# Patient Record
Sex: MALE | Race: WHITE | NOT HISPANIC OR LATINO | Employment: OTHER | URBAN - METROPOLITAN AREA
[De-identification: names, ages, dates, MRNs, and addresses within clinical notes are randomized per-mention and may not be internally consistent; named-entity substitution may affect disease eponyms.]

---

## 2017-01-03 ENCOUNTER — INFUSION (OUTPATIENT)
Dept: ONCOLOGY | Facility: HOSPITAL | Age: 72
End: 2017-01-03

## 2017-01-03 ENCOUNTER — APPOINTMENT (OUTPATIENT)
Dept: ONCOLOGY | Facility: HOSPITAL | Age: 72
End: 2017-01-03

## 2017-01-03 VITALS
TEMPERATURE: 98.4 F | DIASTOLIC BLOOD PRESSURE: 74 MMHG | BODY MASS INDEX: 27.16 KG/M2 | SYSTOLIC BLOOD PRESSURE: 136 MMHG | WEIGHT: 230 LBS | HEIGHT: 77 IN | RESPIRATION RATE: 16 BRPM | HEART RATE: 85 BPM | OXYGEN SATURATION: 95 %

## 2017-01-03 DIAGNOSIS — C91.10 CHRONIC LYMPHOCYTIC LEUKEMIA (HCC): Primary | ICD-10-CM

## 2017-01-03 LAB
ALBUMIN SERPL-MCNC: 4 G/DL (ref 3.5–5.2)
ALBUMIN/GLOB SERPL: 1.6 G/DL
ALP SERPL-CCNC: 56 U/L (ref 39–117)
ALT SERPL W P-5'-P-CCNC: 13 U/L (ref 1–41)
ANION GAP SERPL CALCULATED.3IONS-SCNC: 15.8 MMOL/L
AST SERPL-CCNC: 16 U/L (ref 1–40)
BASOPHILS # BLD AUTO: 0.05 10*3/MM3 (ref 0–0.2)
BASOPHILS NFR BLD AUTO: 0.9 % (ref 0–1.5)
BILIRUB SERPL-MCNC: 0.7 MG/DL (ref 0.1–1.2)
BUN BLD-MCNC: 20 MG/DL (ref 8–23)
BUN/CREAT SERPL: 22.2 (ref 7–25)
CALCIUM SPEC-SCNC: 9.3 MG/DL (ref 8.6–10.5)
CHLORIDE SERPL-SCNC: 101 MMOL/L (ref 98–107)
CO2 SERPL-SCNC: 23.2 MMOL/L (ref 22–29)
CREAT BLD-MCNC: 0.9 MG/DL (ref 0.76–1.27)
DEPRECATED RDW RBC AUTO: 45.8 FL (ref 37–54)
EOSINOPHIL # BLD AUTO: 0.18 10*3/MM3 (ref 0–0.7)
EOSINOPHIL NFR BLD AUTO: 3.2 % (ref 0.3–6.2)
ERYTHROCYTE [DISTWIDTH] IN BLOOD BY AUTOMATED COUNT: 14.8 % (ref 11.5–14.5)
GFR SERPL CREATININE-BSD FRML MDRD: 83 ML/MIN/1.73
GLOBULIN UR ELPH-MCNC: 2.5 GM/DL
GLUCOSE BLD-MCNC: 92 MG/DL (ref 65–99)
HCT VFR BLD AUTO: 41.3 % (ref 40.4–52.2)
HGB BLD-MCNC: 13.9 G/DL (ref 13.7–17.6)
IMM GRANULOCYTES # BLD: 0.03 10*3/MM3 (ref 0–0.03)
IMM GRANULOCYTES NFR BLD: 0.5 % (ref 0–0.5)
LYMPHOCYTES # BLD AUTO: 1.28 10*3/MM3 (ref 0.9–4.8)
LYMPHOCYTES NFR BLD AUTO: 22.7 % (ref 19.6–45.3)
MCH RBC QN AUTO: 28.4 PG (ref 27–32.7)
MCHC RBC AUTO-ENTMCNC: 33.7 G/DL (ref 32.6–36.4)
MCV RBC AUTO: 84.5 FL (ref 79.8–96.2)
MONOCYTES # BLD AUTO: 0.51 10*3/MM3 (ref 0.2–1.2)
MONOCYTES NFR BLD AUTO: 9 % (ref 5–12)
NEUTROPHILS # BLD AUTO: 3.59 10*3/MM3 (ref 1.9–8.1)
NEUTROPHILS NFR BLD AUTO: 63.7 % (ref 42.7–76)
PLATELET # BLD AUTO: 146 10*3/MM3 (ref 140–500)
PMV BLD AUTO: 9.5 FL (ref 6–12)
POTASSIUM BLD-SCNC: 4.5 MMOL/L (ref 3.5–5.2)
PROT SERPL-MCNC: 6.5 G/DL (ref 6–8.5)
RBC # BLD AUTO: 4.89 10*6/MM3 (ref 4.6–6)
SODIUM BLD-SCNC: 140 MMOL/L (ref 136–145)
WBC NRBC COR # BLD: 5.64 10*3/MM3 (ref 4.5–10.7)

## 2017-01-03 PROCEDURE — 36415 COLL VENOUS BLD VENIPUNCTURE: CPT

## 2017-01-03 PROCEDURE — 96375 TX/PRO/DX INJ NEW DRUG ADDON: CPT

## 2017-01-03 PROCEDURE — 25010000002 RITUXIMAB 10 MG/ML SOLUTION 50 ML VIAL: Performed by: INTERNAL MEDICINE

## 2017-01-03 PROCEDURE — 25010000002 DIPHENHYDRAMINE PER 50 MG: Performed by: INTERNAL MEDICINE

## 2017-01-03 PROCEDURE — 96413 CHEMO IV INFUSION 1 HR: CPT

## 2017-01-03 PROCEDURE — 96415 CHEMO IV INFUSION ADDL HR: CPT

## 2017-01-03 PROCEDURE — 25010000002 RITUXIMAB 10 MG/ML SOLUTION 10 ML VIAL: Performed by: INTERNAL MEDICINE

## 2017-01-03 PROCEDURE — 96376 TX/PRO/DX INJ SAME DRUG ADON: CPT

## 2017-01-03 PROCEDURE — 25010000002 HYDROCORTISONE SODIUM SUCCINATE 100 MG RECONSTITUTED SOLUTION: Performed by: INTERNAL MEDICINE

## 2017-01-03 PROCEDURE — 96367 TX/PROPH/DG ADDL SEQ IV INF: CPT

## 2017-01-03 RX ORDER — FAMOTIDINE 10 MG/ML
20 INJECTION, SOLUTION INTRAVENOUS ONCE
Status: COMPLETED | OUTPATIENT
Start: 2017-01-03 | End: 2017-01-03

## 2017-01-03 RX ORDER — SODIUM CHLORIDE 9 MG/ML
250 INJECTION, SOLUTION INTRAVENOUS ONCE
Status: COMPLETED | OUTPATIENT
Start: 2017-01-03 | End: 2017-01-03

## 2017-01-03 RX ORDER — MEPERIDINE HYDROCHLORIDE 50 MG/ML
25 INJECTION INTRAMUSCULAR; INTRAVENOUS; SUBCUTANEOUS
Status: DISCONTINUED | OUTPATIENT
Start: 2017-01-03 | End: 2017-01-03 | Stop reason: HOSPADM

## 2017-01-03 RX ORDER — FAMOTIDINE 10 MG/ML
20 INJECTION, SOLUTION INTRAVENOUS AS NEEDED
Status: DISCONTINUED | OUTPATIENT
Start: 2017-01-03 | End: 2017-01-03 | Stop reason: HOSPADM

## 2017-01-03 RX ORDER — DIPHENHYDRAMINE HYDROCHLORIDE 50 MG/ML
50 INJECTION INTRAMUSCULAR; INTRAVENOUS AS NEEDED
Status: DISCONTINUED | OUTPATIENT
Start: 2017-01-03 | End: 2017-01-03 | Stop reason: HOSPADM

## 2017-01-03 RX ORDER — ACETAMINOPHEN 325 MG/1
650 TABLET ORAL ONCE
Status: COMPLETED | OUTPATIENT
Start: 2017-01-03 | End: 2017-01-03

## 2017-01-03 RX ADMIN — HYDROCORTISONE SODIUM SUCCINATE 200 MG: 100 INJECTION, POWDER, FOR SOLUTION INTRAMUSCULAR; INTRAVENOUS at 10:39

## 2017-01-03 RX ADMIN — ACETAMINOPHEN 650 MG: 325 TABLET ORAL at 10:39

## 2017-01-03 RX ADMIN — FAMOTIDINE 20 MG: 10 INJECTION, SOLUTION INTRAVENOUS at 10:38

## 2017-01-03 RX ADMIN — DIPHENHYDRAMINE HYDROCHLORIDE 25 MG: 50 INJECTION, SOLUTION INTRAMUSCULAR; INTRAVENOUS at 11:01

## 2017-01-03 RX ADMIN — RITUXIMAB 890 MG: 10 INJECTION, SOLUTION INTRAVENOUS at 11:28

## 2017-01-03 RX ADMIN — SODIUM CHLORIDE 250 ML: 9 INJECTION, SOLUTION INTRAVENOUS at 10:38

## 2017-02-01 ENCOUNTER — LAB (OUTPATIENT)
Dept: LAB | Facility: HOSPITAL | Age: 72
End: 2017-02-01

## 2017-02-01 ENCOUNTER — OFFICE VISIT (OUTPATIENT)
Dept: ONCOLOGY | Facility: CLINIC | Age: 72
End: 2017-02-01

## 2017-02-01 VITALS
BODY MASS INDEX: 29.88 KG/M2 | WEIGHT: 245.4 LBS | TEMPERATURE: 98.4 F | SYSTOLIC BLOOD PRESSURE: 142 MMHG | DIASTOLIC BLOOD PRESSURE: 70 MMHG | OXYGEN SATURATION: 96 % | HEART RATE: 86 BPM | RESPIRATION RATE: 14 BRPM | HEIGHT: 76 IN

## 2017-02-01 DIAGNOSIS — C91.10 CHRONIC LYMPHOCYTIC LEUKEMIA (HCC): ICD-10-CM

## 2017-02-01 DIAGNOSIS — C61 PROSTATE CANCER (HCC): ICD-10-CM

## 2017-02-01 DIAGNOSIS — R91.8 MASS OF LEFT LUNG: ICD-10-CM

## 2017-02-01 DIAGNOSIS — C91.10 CHRONIC LYMPHOCYTIC LEUKEMIA (HCC): Primary | ICD-10-CM

## 2017-02-01 LAB
BASOPHILS # BLD AUTO: 0.06 10*3/MM3 (ref 0–0.1)
BASOPHILS NFR BLD AUTO: 1 % (ref 0–1.1)
DEPRECATED RDW RBC AUTO: 42.9 FL (ref 37–49)
EOSINOPHIL # BLD AUTO: 0.2 10*3/MM3 (ref 0–0.36)
EOSINOPHIL NFR BLD AUTO: 3.2 % (ref 1–5)
ERYTHROCYTE [DISTWIDTH] IN BLOOD BY AUTOMATED COUNT: 14.4 % (ref 11.7–14.5)
HCT VFR BLD AUTO: 40.6 % (ref 40–49)
HGB BLD-MCNC: 14.5 G/DL (ref 13.5–16.5)
IMM GRANULOCYTES # BLD: 0.04 10*3/MM3 (ref 0–0.03)
IMM GRANULOCYTES NFR BLD: 0.6 % (ref 0–0.5)
LYMPHOCYTES # BLD AUTO: 1.34 10*3/MM3 (ref 1–3.5)
LYMPHOCYTES NFR BLD AUTO: 21.5 % (ref 20–49)
MCH RBC QN AUTO: 29.4 PG (ref 27–33)
MCHC RBC AUTO-ENTMCNC: 35.7 G/DL (ref 32–35)
MCV RBC AUTO: 82.2 FL (ref 83–97)
MONOCYTES # BLD AUTO: 0.7 10*3/MM3 (ref 0.25–0.8)
MONOCYTES NFR BLD AUTO: 11.2 % (ref 4–12)
NEUTROPHILS # BLD AUTO: 3.9 10*3/MM3 (ref 1.5–7)
NEUTROPHILS NFR BLD AUTO: 62.5 % (ref 39–75)
NRBC BLD MANUAL-RTO: 0 /100 WBC (ref 0–0)
PLATELET # BLD AUTO: 191 10*3/MM3 (ref 150–375)
PMV BLD AUTO: 9.7 FL (ref 8.9–12.1)
RBC # BLD AUTO: 4.94 10*6/MM3 (ref 4.3–5.5)
WBC NRBC COR # BLD: 6.24 10*3/MM3 (ref 4–10)

## 2017-02-01 PROCEDURE — 99213 OFFICE O/P EST LOW 20 MIN: CPT | Performed by: INTERNAL MEDICINE

## 2017-02-01 PROCEDURE — 36416 COLLJ CAPILLARY BLOOD SPEC: CPT

## 2017-02-01 PROCEDURE — 85025 COMPLETE CBC W/AUTO DIFF WBC: CPT

## 2017-02-01 NOTE — PROGRESS NOTES
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     HISTORY OF PRESENT ILLNESS:  Mr. Bass returns t  amparo to the office for followup.  He is here today with no specific symptomatology related to his chronic lymphoid leukemia, specifically no fevers, no chills, no sweats, no pruritus. No skin abnormalities. No peripheral adenopathy. No fatigue. No alterations in weight   He had significant difficulty with the first Rituxan infusion with a reaction and fever requiring to come back the second day for completion of his infusion. Today he presents for a new infusion and he will be premedicated and from here on with hydrocortisone 200 mg IV. So far the white count is dramatically improving to normalization and the lymphocyte count is improving, a sign of response to therapy.     The second issue is that the patient has undergone bronchoscopy. Please review the pathological analysis before showing no evidence of malignancy or infectious or inflammatory process. Even thyroid biopsy was obtained that disclosed no obvious pathological diagnosis. Therefore under these circumstances we will plan to repeat his CT scan of the chest 2-3 months from now just to review the thyroid and to review the area in the lingula. He has no respiratory symptomatology at this time. He had no ill consequences from the bronchoscopy.    In regard to his prostate cancer he had the seed implants at the [facility]. He still has some dysuria with no hematuria, no infection. He does not require a catheter and he is coping with this in the best way that he can. He has no need for any other assessment in this regard.                         PAST MEDICAL HISTORY:  Significant for hyperlipidemia.  He also has vitamin D deficiency and has undergone replacement of vitamin D.  SUE ponce has no history of hypertension, cardiovascular disease, diabetes, asthma or respiratory problems.  He was  worked up by Dr. Luis Ortega in 2009 for an incidentally found mass in the liver through MRI and CT scan that turned out to be a hemangioma.  He al  s  o has benign cysts in the liver.  PET scan in that location in 2009 disclosed no abnormal uptake.  On that occasion, it was also found that the patient had a retrosternal thyroid enlargement with a normal TSH.  The patient had no symptomatology in regard   to thyroid abnormalities otherwise.          The patient has had kidney stones in the past on one occasion.  He had trauma as a child of the middle finger of the right hand with some deformity in the nail that never grew back normally.  Otherwise, he has not had   any hospitalizations or other interventions.          HEMATOLOGIC/ONCOLOGIC HISTORY: (History from previous dates can be found in the separate document.)        On 02/22/2016 no clinical evidence of recurrence of his CLL.  Hematological parameters were normal.  He had no B symptoms, no autoimmunity, no infections.  He was advised to remain in observation.          MEDICATIONS: The current medication list was reviewed with the patient and updated in the EMR this date per the medical assistant.  Medical dosages and frequencies were confirmed to be accurate.         ALLERGIES:  No known drug allergies.          SOCIAL HISTORY:  Lives with his wife (who has developed Alzheimer and requires total care at home).  One child, a daughter age 36, who just had her first baby recently.  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  He does not smoke and does not drink alcohol.  The patient, as per 03/14/2014, had a lengthy conversation with Dr. Schroeder in regard to the future of his wife who has advanced Alzheimer nena  ntia.  In the way I see things, according to the patient’s report, I think she is going to be institutionalized very soon, given the circumstances and level of care that she requires.  As of 08/22/2014 please see HPI.   "       FAMILY HISTORY:  Father  of heart   disease after rheumatic heart disease was found.  Mother  at age 97.  He has no brothers or sisters.  As per 2015, in regard to further illness in his ill wife who has dementia with significant changes in her overall situation with more somnole  nce, likely stroke and like an infection of some sort.         REVIEW OF SYSTEMS:      PAIN:  See VITAL SIGNS below.     GENERAL:  No change in appetite or weight, no fevers, chills or sweats.     SKIN:  No rashes or nonhealing lesions.    HEME/LYMPH:  No anemia, easy bruising, bleeding or swollen nodes.    EYES:  No vision changes or diplopia.      ENT:  No tinnitus, hearing loss, gum bleeding, epistaxis, hoarseness or dysphagia.    RESPIRATORY:  No cough, shortness of breath, hemoptysis or wheezing.    CVS:  No chest pain, palpitations, orthopnea, dyspnea on exertion or PND.    GI:  No abdominal pain, nausea, vomiting, constipation, diarrhea, melena or hematochezia.    :  STATED lower tract obstructive symptoms, dysuria ,NO hematuria.     MUSCULOSKELETAL:  No bone pain or joint stiffness.    NEUROLOGICAL:  No dizziness, global weakness, loss of consciousness or seizures.    PSYCHIATRIC:  No increased nervousness, mood changes or depression.        VITAL SIGNS:  Vitals:    17 1425   BP: 142/70   Pulse: 86   Resp: 14   Temp: 98.4 °F (36.9 °C)   SpO2: 96%   Weight: 245 lb 6.4 oz (111 kg)   Height: 76.38\" (194 cm)           PAIN:  0 out of 10      ECO         PHYSICAL EXAMINATION:      GENERAL:  Well-developed, well-nourished male in no acute distress.     SKIN:  Warm, dry without rashes, purpura or petechiae.    HEAD:  Normocephalic.    EYES:  Pupils equal, round and reactive to light.  EOMs intact.  Conjunctivae normal.    EARS:  Hearing intact.    NOSE:  Septum midline.  No excoriations or nasal discharge.    MOUTH:  Tongue is well-papillated; no stomatitis or ulcers.  Lips normal.    THROAT:  Oropharynx " without lesions or exudates.    NECK:  Supple with good range of motion; no thyromegaly or masses, no JVD or bruits.    LYMPHATICS:  No cervical, supraclavicular, axillary or inguinal adenopathy.    CHEST:  Lungs clear to percussion and auscultation.    CARDIAC:  Regular rate and rhythm without murmurs, rubs or gallops.    ABDOMEN:  Soft, nontender with no organomegaly or masses.    EXTREMITIES:  No clubbing, cyanosis or edema.    NEUROLOGICAL:  No focal neurological deficits.        LABORATORY DATA:  Auto Differential   Order: [id number] - Part of Panel Order [id number]   Status:  Final result   Visible to patient:  No (Not Released) Dx:  Chronic lymphocytic leukemia      Ref Range & Units 2:17 PM     WBC 4.00 - 10.00 10*3/mm3 6.24   RBC 4.30 - 5.50 10*6/mm3 4.94   Hemoglobin 13.5 - 16.5 g/dL 14.5   Hematocrit 40.0 - 49.0 % 40.6   MCV 83.0 - 97.0 fL 82.2 (L)   MCH 27.0 - 33.0 pg 29.4   MCHC 32.0 - 35.0 g/dL 35.7 (H)   RDW 11.7 - 14.5 % 14.4   RDW-SD 37.0 - 49.0 fl 42.9   MPV 8.9 - 12.1 fL 9.7   Platelets 150 - 375 10*3/mm3 191   Neutrophil % 39.0 - 75.0 % 62.5   Lymphocyte % 20.0 - 49.0 % 21.5   Monocyte % 4.0 - 12.0 % 11.2   Eosinophil % 1.0 - 5.0 % 3.2   Basophil % 0.0 - 1.1 % 1.0   Immature Grans % 0.0 - 0.5 % 0.6 (H)   Neutrophils, Absolute 1.50 - 7.00 10*3/mm3 3.90   Lymphocytes, Absolute 1.00 - 3.50 10*3/mm3 1.34   Monocytes, Absolute 0.25 - 0.80 10*3/mm3 0.70   Eosinophils, Absolute 0.00 - 0.36 10*3/mm3 0.20   Basophils, Absolute 0.00 - 0.10 10*3/mm3 0.06   Immature Grans, Absolute 0.00 - 0.03 10*3/mm3 0.04 (H)   nRBC 0.0 - 0.0 /100 WBC 0.0   Resulting Agency   CBC LAB      Specimen Collected: 02/01/17  2:17 PM Last Resulted: 02/01/17  2:32 PM                 :      ASSESSMENT:  PROBLEM:   1. This patient has chronic lymphoid leukemia that was treated years back with Treanda/Rituxan.       1. In December 2016 the patient had a relapse of his CLL documented through minimal mediastinal adenopathy and also a  progressive leukocytosis and lymphocytosis with no autoimmunity and no B symptoms, no bulky disease. He was treated with Rituxan on 4 different occasions developing febrile illness during the infusion of the first Rituxan that was further controlled with IV hydrocortisone. As per 02/01/2017 the patients lymphocytosis has resolved, the hemoglobin is stable, the platelet count is stable, the physical exam is normal with no palpable peripheral adenopathy or hepatosplenomegaly. The patient has no B symptoms nor does he have any autoimmunity. He will remain in observation returning in 7 weeks for a CT scan of the chest.   2. Mass in the lingula. The patient had bronchoscopy and extensive assessment biopsies, bronchoalveolar lavage, not concluded anything in particular. His radon level in his office was high at 10 and he has done radon mitigation process by now. He needs to have a CT scan of the chest in 7 weeks for followup on this.     The patient will pursue this avenue and go from there. In the meantime I have not advised him anything in particular besides continuing staying away from people who are sick and to be sure that he washes his hands before he eats.

## 2017-02-02 DIAGNOSIS — R91.1 PULMONARY NODULE: ICD-10-CM

## 2017-02-02 DIAGNOSIS — E04.1 THYROID NODULE: ICD-10-CM

## 2017-02-02 DIAGNOSIS — C91.10 CHRONIC LYMPHOCYTIC LEUKEMIA (HCC): Primary | ICD-10-CM

## 2017-03-24 ENCOUNTER — HOSPITAL ENCOUNTER (OUTPATIENT)
Dept: PET IMAGING | Facility: HOSPITAL | Age: 72
Discharge: HOME | End: 2017-03-24
Attending: INTERNAL MEDICINE | Admitting: INTERNAL MEDICINE

## 2017-03-24 DIAGNOSIS — C91.10 CHRONIC LYMPHOCYTIC LEUKEMIA (HCC): ICD-10-CM

## 2017-03-24 DIAGNOSIS — R91.1 PULMONARY NODULE: ICD-10-CM

## 2017-03-24 DIAGNOSIS — E04.1 THYROID NODULE: ICD-10-CM

## 2017-03-24 PROCEDURE — 71250 CT THORAX DX C-: CPT

## 2017-03-31 ENCOUNTER — OFFICE VISIT (OUTPATIENT)
Dept: ONCOLOGY | Facility: CLINIC | Age: 72
End: 2017-03-31

## 2017-03-31 ENCOUNTER — LAB (OUTPATIENT)
Dept: LAB | Facility: HOSPITAL | Age: 72
End: 2017-03-31

## 2017-03-31 VITALS
BODY MASS INDEX: 30.03 KG/M2 | HEIGHT: 76 IN | SYSTOLIC BLOOD PRESSURE: 134 MMHG | DIASTOLIC BLOOD PRESSURE: 72 MMHG | TEMPERATURE: 98.2 F | RESPIRATION RATE: 16 BRPM | HEART RATE: 75 BPM | OXYGEN SATURATION: 97 % | WEIGHT: 246.6 LBS

## 2017-03-31 DIAGNOSIS — C91.10 CHRONIC LYMPHOCYTIC LEUKEMIA (HCC): ICD-10-CM

## 2017-03-31 DIAGNOSIS — C91.10 CHRONIC LYMPHOCYTIC LEUKEMIA (HCC): Primary | ICD-10-CM

## 2017-03-31 DIAGNOSIS — C61 PROSTATE CANCER (HCC): ICD-10-CM

## 2017-03-31 DIAGNOSIS — R91.8 MASS OF LEFT LUNG: ICD-10-CM

## 2017-03-31 LAB
ALBUMIN SERPL-MCNC: 4 G/DL (ref 3.5–5.2)
ALBUMIN/GLOB SERPL: 1.6 G/DL (ref 1.1–2.4)
ALP SERPL-CCNC: 55 U/L (ref 38–116)
ALT SERPL W P-5'-P-CCNC: 12 U/L (ref 0–41)
ANION GAP SERPL CALCULATED.3IONS-SCNC: 11.5 MMOL/L
AST SERPL-CCNC: 13 U/L (ref 0–40)
BASOPHILS # BLD AUTO: 0.07 10*3/MM3 (ref 0–0.1)
BASOPHILS NFR BLD AUTO: 1.1 % (ref 0–1.1)
BILIRUB SERPL-MCNC: 0.4 MG/DL (ref 0.1–1.2)
BUN BLD-MCNC: 17 MG/DL (ref 6–20)
BUN/CREAT SERPL: 23.9 (ref 7.3–30)
CALCIUM SPEC-SCNC: 9.2 MG/DL (ref 8.5–10.2)
CHLORIDE SERPL-SCNC: 99 MMOL/L (ref 98–107)
CO2 SERPL-SCNC: 26.5 MMOL/L (ref 22–29)
CREAT BLD-MCNC: 0.71 MG/DL (ref 0.7–1.3)
DEPRECATED RDW RBC AUTO: 40.8 FL (ref 37–49)
EOSINOPHIL # BLD AUTO: 0.15 10*3/MM3 (ref 0–0.36)
EOSINOPHIL NFR BLD AUTO: 2.4 % (ref 1–5)
ERYTHROCYTE [DISTWIDTH] IN BLOOD BY AUTOMATED COUNT: 13.3 % (ref 11.7–14.5)
GFR SERPL CREATININE-BSD FRML MDRD: 109 ML/MIN/1.73
GLOBULIN UR ELPH-MCNC: 2.5 GM/DL (ref 1.8–3.5)
GLUCOSE BLD-MCNC: 116 MG/DL (ref 74–124)
HCT VFR BLD AUTO: 43.8 % (ref 40–49)
HGB BLD-MCNC: 15.3 G/DL (ref 13.5–16.5)
IMM GRANULOCYTES # BLD: 0.04 10*3/MM3 (ref 0–0.03)
IMM GRANULOCYTES NFR BLD: 0.6 % (ref 0–0.5)
LYMPHOCYTES # BLD AUTO: 1.27 10*3/MM3 (ref 1–3.5)
LYMPHOCYTES NFR BLD AUTO: 20 % (ref 20–49)
MCH RBC QN AUTO: 29.3 PG (ref 27–33)
MCHC RBC AUTO-ENTMCNC: 34.9 G/DL (ref 32–35)
MCV RBC AUTO: 83.9 FL (ref 83–97)
MONOCYTES # BLD AUTO: 0.54 10*3/MM3 (ref 0.25–0.8)
MONOCYTES NFR BLD AUTO: 8.5 % (ref 4–12)
NEUTROPHILS # BLD AUTO: 4.29 10*3/MM3 (ref 1.5–7)
NEUTROPHILS NFR BLD AUTO: 67.4 % (ref 39–75)
NRBC BLD MANUAL-RTO: 0 /100 WBC (ref 0–0)
PLATELET # BLD AUTO: 188 10*3/MM3 (ref 150–375)
PMV BLD AUTO: 9.5 FL (ref 8.9–12.1)
POTASSIUM BLD-SCNC: 3.7 MMOL/L (ref 3.5–4.7)
PROT SERPL-MCNC: 6.5 G/DL (ref 6.3–8)
RBC # BLD AUTO: 5.22 10*6/MM3 (ref 4.3–5.5)
SODIUM BLD-SCNC: 137 MMOL/L (ref 134–145)
WBC NRBC COR # BLD: 6.36 10*3/MM3 (ref 4–10)

## 2017-03-31 PROCEDURE — 85025 COMPLETE CBC W/AUTO DIFF WBC: CPT

## 2017-03-31 PROCEDURE — 80053 COMPREHEN METABOLIC PANEL: CPT

## 2017-03-31 PROCEDURE — 36416 COLLJ CAPILLARY BLOOD SPEC: CPT

## 2017-03-31 PROCEDURE — 36415 COLL VENOUS BLD VENIPUNCTURE: CPT

## 2017-03-31 PROCEDURE — 99213 OFFICE O/P EST LOW 20 MIN: CPT | Performed by: INTERNAL MEDICINE

## 2017-03-31 NOTE — PROGRESS NOTES
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     HISTORY OF PRESENT ILLNESS:  Mr. Bass returns t  amparo to the office for followup.  He is here today with no specific symptomatology related to his chronic lymphoid leukemia, specifically no fevers, no chills, no sweats, no pruritus. No skin abnormalities. No peripheral adenopathy. No fatigue. No alterations in weight   In regard to his prostate cancer he had the seed implants at the [facility]. He still has some dysuria with no hematuria, no infection. He does not require a catheter and he is coping with this in the best way that he can. He has no need for any other assessment in this regard.               PAST MEDICAL HISTORY:  Significant for hyperlipidemia.  He also has vitamin D deficiency and has undergone replacement of vitamin D.  SUE ponce has no history of hypertension, cardiovascular disease, diabetes, asthma or respiratory problems.  He was worked up by Dr. Luis Ortega in 2009 for an incidentally found mass in the liver through MRI and CT scan that turned out to be a hemangioma.  He al  s  o has benign cysts in the liver.  PET scan in that location in 2009 disclosed no abnormal uptake.  On that occasion, it was also found that the patient had a retrosternal thyroid enlargement with a normal TSH.  The patient had no symptomatology in regard   to thyroid abnormalities otherwise.          The patient has had kidney stones in the past on one occasion.  He had trauma as a child of the middle finger of the right hand with some deformity in the nail that never grew back normally.  Otherwise, he has not had   any hospitalizations or other interventions.          HEMATOLOGIC/ONCOLOGIC HISTORY: (History from previous dates can be found in the separate document.)        On 02/22/2016 no clinical evidence of recurrence of his CLL.  Hematological parameters were normal.  He had no B symptoms, no  autoimmunity, no infections.  He was advised to remain in observation.          MEDICATIONS: The current medication list was reviewed with the patient and updated in the EMR this date per the medical assistant.  Medical dosages and frequencies were confirmed to be accurate.         ALLERGIES:  No known drug allergies.          SOCIAL HISTORY:  Lives with his wife (who has developed Alzheimer and requires total care at home).  One child, a daughter age 36, who just had her first baby recently.  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  He does not smoke and does not drink alcohol.  The patient, as per 2014, had a lengthy conversation with Dr. Schroeder in regard to the future of his wife who has advanced Alzheimer nena  ntia.  In the way I see things, according to the patient’s report, I think she is going to be institutionalized very soon, given the circumstances and level of care that she requires.  As of 2014 please see HPI.         FAMILY HISTORY:  Father  of heart   disease after rheumatic heart disease was found.  Mother  at age 97.  He has no brothers or sisters.  As per 2015, in regard to further illness in his ill wife who has dementia with significant changes in her overall situation with more somnole  nce, likely stroke and like an infection of some sort.         REVIEW OF SYSTEMS:      PAIN:  See VITAL SIGNS below.     GENERAL:  No change in appetite or weight, no fevers, chills or sweats.     SKIN:  No rashes or nonhealing lesions.    HEME/LYMPH:  No anemia, easy bruising, bleeding or swollen nodes.    EYES:  No vision changes or diplopia.      ENT:  No tinnitus, hearing loss, gum bleeding, epistaxis, hoarseness or dysphagia.    RESPIRATORY:  No cough, shortness of breath, hemoptysis or wheezing.    CVS:  No chest pain, palpitations, orthopnea, dyspnea on exertion or PND.    GI:  No abdominal pain, nausea, vomiting, constipation, diarrhea,  "melena or hematochezia.    :  STATED lower tract obstructive symptoms, dysuria ,NO hematuria.     MUSCULOSKELETAL:  No bone pain or joint stiffness.    NEUROLOGICAL:  No dizziness, global weakness, loss of consciousness or seizures.    PSYCHIATRIC:  No increased nervousness, mood changes or depression.        VITAL SIGNS:  Vitals:    17 1513   BP: 134/72   Pulse: 75   Resp: 16   Temp: 98.2 °F (36.8 °C)   TempSrc: Oral   SpO2: 97%   Weight: 246 lb 9.6 oz (112 kg)   Height: 76.38\" (194 cm)           PAIN:  0 out of 10      ECO         PHYSICAL EXAMINATION:      GENERAL:  Well-developed, well-nourished male in no acute distress.     SKIN:  Warm, dry without rashes, purpura or petechiae.    HEAD:  Normocephalic.    EYES:  Pupils equal, round and reactive to light.  EOMs intact.  Conjunctivae normal.    EARS:  Hearing intact.    NOSE:  Septum midline.  No excoriations or nasal discharge.    MOUTH:  Tongue is well-papillated; no stomatitis or ulcers.  Lips normal.    THROAT:  Oropharynx without lesions or exudates.    NECK:  Supple with good range of motion; no thyromegaly or masses, no JVD or bruits.    LYMPHATICS:  No cervical, supraclavicular, axillary or inguinal adenopathy.    CHEST:  Lungs clear to percussion and auscultation.    CARDIAC:  Regular rate and rhythm without murmurs, rubs or gallops.    ABDOMEN:  Soft, nontender with no organomegaly or masses.    EXTREMITIES:  No clubbing, cyanosis or edema.    NEUROLOGICAL:  No focal neurological deficits.        LABORATORY DATA:  CHEST WITHOUT CONTRAST      HISTORY: Pulmonary nodule, CLL.      TECHNIQUE: Spiral axial CT imaging of the chest was performed at 3 mm  intervals throughout. No intravenous contrast was administered.      COMPARISON: Correlation is made with noncontrasted CT chest 2016.   There are prior CTs of the chest dated 2012. A PET/CT includes  noncontrasted CT imaging of the chest as early as 2009.      FINDINGS: Heart " and great vessels appear stable compared with previous  imaging. There is calcified coronary disease along the proximal LAD. No  mass or adenopathy is present within the mediastinum or at the hilar  levels. Calcified nodes are present consistent with granulomatous  disease. There is a lesion within the left lobe of the thyroid measuring  up to 5.6 x 5.4 cm producing rightward tracheal deviation at the  thoracic inlet. There has been a lesion at this level dating to original  imaging of 2009 where it measured 5.3 x 4.2 cm. No axillary or  supraclavicular adenopathy is present. Chest wall is unremarkable.  Degenerative changes are noted at the spine.      Lung windows again demonstrate a somewhat ill-defined nodular density  within the perihilar left upper lobe measuring approximately 1.5 x 0.8  cm. It is unchanged in size and configuration compared to previous  imaging 12- and appears slightly smaller than on previous study  of 11/03/2016 where it measured 2.2 x 1.1 cm. There are calcified  granulomata slightly more inferiorly near the lesion. No new pulmonary  nodule or mass is identified within either lung. There is no active  infiltrate or effusion. Limited imaging of the upper abdomen is stable.  Numerous hepatic cysts are present.      IMPRESSION:  1. There is an ill-defined nodular density within the perihilar left  upper lobe measuring approximately 1.5 x 0.8 cm. This is stable compared  with previous imaging of 12-, however has regressed in size when  compared with prior CT 11/03/2016. I would recommend continued CT  followup.  2. There is a mass in the left lobe of thyroid causing rightward  tracheal deviation at the level of the thoracic inlet. This has been  present since imaging of 2009 although appears to have slowly progressed  in size.      This report was finalized on 3/27/2017 4:54 PM by Dr. Hunter Martin,     Differential   Order: [id number] - Part of Panel Order [id number]   Status:   Final result   Visible to patient:  No (Not Released)      Ref Range & Units 3:02 PM     WBC 4.00 - 10.00 10*3/mm3 6.36   RBC 4.30 - 5.50 10*6/mm3 5.22   Hemoglobin 13.5 - 16.5 g/dL 15.3   Hematocrit 40.0 - 49.0 % 43.8   MCV 83.0 - 97.0 fL 83.9   MCH 27.0 - 33.0 pg 29.3   MCHC 32.0 - 35.0 g/dL 34.9   RDW 11.7 - 14.5 % 13.3   RDW-SD 37.0 - 49.0 fl 40.8   MPV 8.9 - 12.1 fL 9.5   Platelets 150 - 375 10*3/mm3 188   Neutrophil % 39.0 - 75.0 % 67.4   Lymphocyte % 20.0 - 49.0 % 20.0   Monocyte % 4.0 - 12.0 % 8.5   Eosinophil % 1.0 - 5.0 % 2.4   Basophil % 0.0 - 1.1 % 1.1   Immature Grans % 0.0 - 0.5 % 0.6 (H)   Neutrophils, Absolute 1.50 - 7.00 10*3/mm3 4.29   Lymphocytes, Absolute 1.00 - 3.50 10*3/mm3 1.27   Monocytes, Absolute 0.25 - 0.80 10*3/mm3 0.54   Eosinophils, Absolute 0.00 - 0.36 10*3/mm3 0.15   Basophils, Absolute 0.00 - 0.10 10*3/mm3 0.07   Immature Grans, Absolute 0.00 - 0.03 10*3/mm3 0.04 (H)   nRBC 0.0 - 0.0 /100 WBC 0.0   Resulting Agency   CBC LAB      Specimen Collected: 03/31/17  3:02 PM Last Resulted: 03/31/17  3:11 PM                 :      ASSESSMENT:  PROBLEM:   1. This patient has chronic lymphoid leukemia that was treated years back with Treanda/Rituxan, In December 2016 the patient had a relapse of his CLL documented through minimal mediastinal adenopathy and also a progressive leukocytosis and lymphocytosis with no autoimmunity and no B symptoms, no bulky disease. He was treated with Rituxan on 4 different occasions developing febrile illness during the infusion of the first Rituxan that was further controlled with IV hydrocortisone. As per TODAY the patient’s lymphocytosis has resolved, the hemoglobin is stable, the platelet count is stable, the physical exam is normal with no palpable peripheral adenopathy or hepatosplenomegaly. The patient has no B symptoms nor does he have any autoimmunity. He will remain in observation returning in 12 weeks.  2. Mass in the lingula. The patient had  bronchoscopy and extensive assessment biopsies, bronchoalveolar lavage, not concluded anything in particular. His radon level in his office was high at 10 and he has done radon mitigation process by now. He  HAS A CT SCAN THAT SHOWS IMPROVEMENT, LIKELY INFLAMMATORY LYMPH NODE, NO OTHER ABNORMALITIES. SCAN IN 6 MONTHS    3. PROSTATE CANCER RECENT PSA 2.1, S/P SEED IMPLANTS [facility].

## 2017-06-23 ENCOUNTER — LAB (OUTPATIENT)
Dept: LAB | Facility: HOSPITAL | Age: 72
End: 2017-06-23

## 2017-06-23 ENCOUNTER — OFFICE VISIT (OUTPATIENT)
Dept: ONCOLOGY | Facility: CLINIC | Age: 72
End: 2017-06-23

## 2017-06-23 VITALS
SYSTOLIC BLOOD PRESSURE: 120 MMHG | OXYGEN SATURATION: 95 % | HEIGHT: 76 IN | RESPIRATION RATE: 16 BRPM | DIASTOLIC BLOOD PRESSURE: 76 MMHG | TEMPERATURE: 98.1 F | HEART RATE: 70 BPM | WEIGHT: 251.8 LBS | BODY MASS INDEX: 30.66 KG/M2

## 2017-06-23 DIAGNOSIS — C61 PROSTATE CANCER (HCC): ICD-10-CM

## 2017-06-23 DIAGNOSIS — C91.10 CHRONIC LYMPHOCYTIC LEUKEMIA (HCC): Primary | ICD-10-CM

## 2017-06-23 DIAGNOSIS — R91.8 MASS OF LEFT LUNG: ICD-10-CM

## 2017-06-23 DIAGNOSIS — C91.10 CHRONIC LYMPHOCYTIC LEUKEMIA (HCC): ICD-10-CM

## 2017-06-23 LAB
BASOPHILS # BLD AUTO: 0.05 10*3/MM3 (ref 0–0.1)
BASOPHILS NFR BLD AUTO: 0.7 % (ref 0–1.1)
DEPRECATED RDW RBC AUTO: 39.5 FL (ref 37–49)
EOSINOPHIL # BLD AUTO: 0.17 10*3/MM3 (ref 0–0.36)
EOSINOPHIL NFR BLD AUTO: 2.3 % (ref 1–5)
ERYTHROCYTE [DISTWIDTH] IN BLOOD BY AUTOMATED COUNT: 12.6 % (ref 11.7–14.5)
HCT VFR BLD AUTO: 44.7 % (ref 40–49)
HGB BLD-MCNC: 16.1 G/DL (ref 13.5–16.5)
IMM GRANULOCYTES # BLD: 0.07 10*3/MM3 (ref 0–0.03)
IMM GRANULOCYTES NFR BLD: 0.9 % (ref 0–0.5)
LYMPHOCYTES # BLD AUTO: 1.33 10*3/MM3 (ref 1–3.5)
LYMPHOCYTES NFR BLD AUTO: 17.7 % (ref 20–49)
MCH RBC QN AUTO: 30.9 PG (ref 27–33)
MCHC RBC AUTO-ENTMCNC: 36 G/DL (ref 32–35)
MCV RBC AUTO: 85.8 FL (ref 83–97)
MONOCYTES # BLD AUTO: 0.67 10*3/MM3 (ref 0.25–0.8)
MONOCYTES NFR BLD AUTO: 8.9 % (ref 4–12)
NEUTROPHILS # BLD AUTO: 5.22 10*3/MM3 (ref 1.5–7)
NEUTROPHILS NFR BLD AUTO: 69.5 % (ref 39–75)
NRBC BLD MANUAL-RTO: 0 /100 WBC (ref 0–0)
PLATELET # BLD AUTO: 202 10*3/MM3 (ref 150–375)
PMV BLD AUTO: 9.4 FL (ref 8.9–12.1)
RBC # BLD AUTO: 5.21 10*6/MM3 (ref 4.3–5.5)
WBC NRBC COR # BLD: 7.51 10*3/MM3 (ref 4–10)

## 2017-06-23 PROCEDURE — 99213 OFFICE O/P EST LOW 20 MIN: CPT | Performed by: INTERNAL MEDICINE

## 2017-06-23 PROCEDURE — 36416 COLLJ CAPILLARY BLOOD SPEC: CPT | Performed by: INTERNAL MEDICINE

## 2017-06-23 PROCEDURE — 85025 COMPLETE CBC W/AUTO DIFF WBC: CPT | Performed by: INTERNAL MEDICINE

## 2017-06-23 NOTE — PROGRESS NOTES
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     HISTORY OF PRESENT ILLNESS:  Mr. Bass returns t  amparo to the office for followup.  He is here today with no specific symptomatology related to his chronic lymphoid leukemia, specifically no fevers, no chills, no sweats, no pruritus. No skin abnormalities. No peripheral adenopathy. No fatigue. No alterations in weight   In regard to his prostate cancer he had the seed implants at the [facility]. He still has some dysuria with no hematuria, no infection. He does not require a catheter and he is coping with this in the best way that he can. He has no need for any other assessment in this regard. New urologist in [place]. He wants to have a peneal implant.              PAST MEDICAL HISTORY:  Significant for hyperlipidemia.  He also has vitamin D deficiency and has undergone replacement of vitamin D.  SUE ponce has no history of hypertension, cardiovascular disease, diabetes, asthma or respiratory problems.  He was worked up by Dr. Luis Ortega in 2009 for an incidentally found mass in the liver through MRI and CT scan that turned out to be a hemangioma.  He al  s  o has benign cysts in the liver.  PET scan in that location in 2009 disclosed no abnormal uptake.  On that occasion, it was also found that the patient had a retrosternal thyroid enlargement with a normal TSH.  The patient had no symptomatology in regard   to thyroid abnormalities otherwise.          The patient has had kidney stones in the past on one occasion.  He had trauma as a child of the middle finger of the right hand with some deformity in the nail that never grew back normally.  Otherwise, he has not had   any hospitalizations or other interventions.          HEMATOLOGIC/ONCOLOGIC HISTORY: (History from previous dates can be found in the separate document.)        On 02/22/2016 no clinical evidence of recurrence of his CLL.   Hematological parameters were normal.  He had no B symptoms, no autoimmunity, no infections.  He was advised to remain in observation.          MEDICATIONS: The current medication list was reviewed with the patient and updated in the EMR this date per the medical assistant.  Medical dosages and frequencies were confirmed to be accurate.         ALLERGIES:  No known drug allergies.          SOCIAL HISTORY:  Lives with his wife (who has developed Alzheimer and requires total care at home).  One child, a daughter age 36, who just had her first baby recently.  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  He does not smoke and does not drink alcohol.  The patient, as per 2014, had a lengthy conversation with Dr. Schroeder in regard to the future of his wife who has advanced Alzheimer nena  ntia.  In the way I see things, according to the patient’s report, I think she is going to be institutionalized very soon, given the circumstances and level of care that she requires.  As of 2014 please see HPI.         FAMILY HISTORY:  Father  of heart   disease after rheumatic heart disease was found.  Mother  at age 97.  He has no brothers or sisters.  As per 2015, in regard to further illness in his ill wife who has dementia with significant changes in her overall situation with more somnole  nce, likely stroke and like an infection of some sort.         REVIEW OF SYSTEMS:      PAIN:  See VITAL SIGNS below.     GENERAL:  No change in appetite or weight, no fevers, chills or sweats.     SKIN:  No rashes or nonhealing lesions.    HEME/LYMPH:  No anemia, easy bruising, bleeding or swollen nodes.    EYES:  No vision changes or diplopia.      ENT:  No tinnitus, hearing loss, gum bleeding, epistaxis, hoarseness or dysphagia.    RESPIRATORY:  No cough, shortness of breath, hemoptysis or wheezing.    CVS:  No chest pain, palpitations, orthopnea, dyspnea on exertion or PND.    GI:   "No abdominal pain, nausea, vomiting, constipation, diarrhea, melena or hematochezia.    :  STATED lower tract obstructive symptoms, dysuria ,NO hematuria.     MUSCULOSKELETAL:  No bone pain or joint stiffness.    NEUROLOGICAL:  No dizziness, global weakness, loss of consciousness or seizures.    PSYCHIATRIC:  No increased nervousness, mood changes or depression.        VITAL SIGNS:  Vitals:    17 1500   BP: 120/76   Pulse: 70   Resp: 16   Temp: 98.1 °F (36.7 °C)   TempSrc: Oral   SpO2: 95%   Weight: 251 lb 12.8 oz (114 kg)   Height: 76.38\" (194 cm)           PAIN:  0 out of 10      ECO         PHYSICAL EXAMINATION:      GENERAL:  Well-developed, well-nourished male in no acute distress.     SKIN:  Warm, dry without rashes, purpura or petechiae.    HEAD:  Normocephalic.    EYES:  Pupils equal, round and reactive to light.  EOMs intact.  Conjunctivae normal.    EARS:  Hearing intact.    NOSE:  Septum midline.  No excoriations or nasal discharge.    MOUTH:  Tongue is well-papillated; no stomatitis or ulcers.  Lips normal.    THROAT:  Oropharynx without lesions or exudates.    NECK:  Supple with good range of motion; no thyromegaly or masses, no JVD or bruits.    LYMPHATICS:  No cervical, supraclavicular, axillary or inguinal adenopathy.    CHEST:  Lungs clear to percussion and auscultation.    CARDIAC:  Regular rate and rhythm without murmurs, rubs or gallops.    ABDOMEN:  Soft, nontender with no organomegaly or masses.    EXTREMITIES:  No clubbing, cyanosis or edema.    NEUROLOGICAL:  No focal neurological deficits.        LABORATORY DATA:   Component      Latest Ref Rng & Units 3/31/2017 2017   WBC      4.00 - 10.00 10*3/mm3 6.36 7.51   RBC      4.30 - 5.50 10*6/mm3 5.22 5.21   Hemoglobin      13.5 - 16.5 g/dL 15.3 16.1   Hematocrit      40.0 - 49.0 % 43.8 44.7   MCV      83.0 - 97.0 fL 83.9 85.8   MCH      27.0 - 33.0 pg 29.3 30.9   MCHC      32.0 - 35.0 g/dL 34.9 36.0 (H)   RDW      11.7 - 14.5 % " 13.3 12.6   RDW-SD      37.0 - 49.0 fl 40.8 39.5   MPV      8.9 - 12.1 fL 9.5 9.4   Platelets      150 - 375 10*3/mm3 188 202   Neutrophil %      39.0 - 75.0 % 67.4 69.5   Lymphocyte %      20.0 - 49.0 % 20.0 17.7 (L)   Monocyte %      4.0 - 12.0 % 8.5 8.9   Eosinophil %      1.0 - 5.0 % 2.4 2.3   Basophil %      0.0 - 1.1 % 1.1 0.7   Immature Grans %      0.0 - 0.5 % 0.6 (H) 0.9 (H)   Neutrophils, Absolute      1.50 - 7.00 10*3/mm3 4.29 5.22   Lymphocytes, Absolute      1.00 - 3.50 10*3/mm3 1.27 1.33      :      ASSESSMENT:  PROBLEM:   1. This patient has chronic lymphoid leukemia that was treated years back with Treanda/Rituxan, In December 2016 the patient had a relapse of his CLL documented through minimal mediastinal adenopathy and also a progressive leukocytosis and lymphocytosis with no autoimmunity and no B symptoms, no bulky disease. He was treated with Rituxan on 4 different occasions developing febrile illness during the infusion of the first Rituxan that was further controlled with IV hydrocortisone. As per TODAY the patient’s lymphocytosis has resolved, the hemoglobin is stable, the platelet count is stable, the physical exam is normal with no palpable peripheral adenopathy or hepatosplenomegaly. The patient has no B symptoms nor does he have any autoimmunity. He will remain in observation returning in 8 weeks.  2. Mass in the lingula. The patient had bronchoscopy and extensive assessment biopsies, bronchoalveolar lavage, not concluded anything in particular. His radon level in his office was high at 10 and he has done radon mitigation process by now. He  HAS A CT SCAN THAT SHOWS IMPROVEMENT, LIKELY INFLAMMATORY LYMPH NODE, NO OTHER ABNORMALITIES. SCAN IN 4 MONTHS    3. PROSTATE CANCER RECENT PSA 2.05, S/P SEED IMPLANTS [facility].Peneal implant to be placed in September

## 2017-08-17 ENCOUNTER — TELEPHONE (OUTPATIENT)
Dept: ONCOLOGY | Facility: HOSPITAL | Age: 72
End: 2017-08-17

## 2017-08-17 NOTE — TELEPHONE ENCOUNTER
Following message sent to Dr Schroeder per patient request:    Patient calling to see if you could discuss his need for a Titan OTR penile implant with Dr Babcock at the [facility]. Pt is being set up to go to [place] for one but if [facility] does it he might prefer to go there. Not sure if this is something you can take on before patient sees you next week or not? Told patient I would just leave you a note. Thanks

## 2017-08-18 ENCOUNTER — TELEPHONE (OUTPATIENT)
Dept: ONCOLOGY | Facility: HOSPITAL | Age: 72
End: 2017-08-18

## 2017-08-18 NOTE — TELEPHONE ENCOUNTER
Called and notified patient of this and he v/u     ----- Message from Gerry Schroeder MD sent at 8/18/2017 10:02 AM EDT -----  He can set it up himself calling dr shane office, he doesnot need referral from me for that  ----- Message -----     From: Ilana English RN     Sent: 8/17/2017   4:06 PM       To: Gerry Schroeder MD    Patient calling to see if you could discuss his need for a Titan OTR penile implant with Dr Shane at the [facility]. Pt is being set up to go to [place] for one but if [facility] does it he might prefer to go there. Not sure if this is something you can take on before patient sees you next week or not? Told patient I would just leave you a note. Thanks

## 2017-08-23 ENCOUNTER — LAB (OUTPATIENT)
Dept: LAB | Facility: HOSPITAL | Age: 72
End: 2017-08-23

## 2017-08-23 ENCOUNTER — OFFICE VISIT (OUTPATIENT)
Dept: ONCOLOGY | Facility: CLINIC | Age: 72
End: 2017-08-23

## 2017-08-23 VITALS
HEART RATE: 76 BPM | OXYGEN SATURATION: 97 % | TEMPERATURE: 98.1 F | BODY MASS INDEX: 30.76 KG/M2 | HEIGHT: 76 IN | WEIGHT: 252.6 LBS | RESPIRATION RATE: 16 BRPM | SYSTOLIC BLOOD PRESSURE: 142 MMHG | DIASTOLIC BLOOD PRESSURE: 76 MMHG

## 2017-08-23 DIAGNOSIS — C91.10 CHRONIC LYMPHOCYTIC LEUKEMIA (HCC): ICD-10-CM

## 2017-08-23 DIAGNOSIS — C91.10 CHRONIC LYMPHOCYTIC LEUKEMIA (HCC): Primary | ICD-10-CM

## 2017-08-23 LAB
ALBUMIN SERPL-MCNC: 4.1 G/DL (ref 3.5–5.2)
ALBUMIN/GLOB SERPL: 1.6 G/DL (ref 1.1–2.4)
ALP SERPL-CCNC: 50 U/L (ref 38–116)
ALT SERPL W P-5'-P-CCNC: 12 U/L (ref 0–41)
ANION GAP SERPL CALCULATED.3IONS-SCNC: 13.9 MMOL/L
AST SERPL-CCNC: 13 U/L (ref 0–40)
BASOPHILS # BLD AUTO: 0.06 10*3/MM3 (ref 0–0.1)
BASOPHILS NFR BLD AUTO: 0.8 % (ref 0–1.1)
BILIRUB SERPL-MCNC: 0.6 MG/DL (ref 0.1–1.2)
BUN BLD-MCNC: 20 MG/DL (ref 6–20)
BUN/CREAT SERPL: 25 (ref 7.3–30)
CALCIUM SPEC-SCNC: 9.4 MG/DL (ref 8.5–10.2)
CHLORIDE SERPL-SCNC: 99 MMOL/L (ref 98–107)
CO2 SERPL-SCNC: 27.1 MMOL/L (ref 22–29)
CREAT BLD-MCNC: 0.8 MG/DL (ref 0.7–1.3)
DEPRECATED RDW RBC AUTO: 39.3 FL (ref 37–49)
EOSINOPHIL # BLD AUTO: 0.19 10*3/MM3 (ref 0–0.36)
EOSINOPHIL NFR BLD AUTO: 2.6 % (ref 1–5)
ERYTHROCYTE [DISTWIDTH] IN BLOOD BY AUTOMATED COUNT: 12.4 % (ref 11.7–14.5)
GFR SERPL CREATININE-BSD FRML MDRD: 95 ML/MIN/1.73
GLOBULIN UR ELPH-MCNC: 2.5 GM/DL (ref 1.8–3.5)
GLUCOSE BLD-MCNC: 103 MG/DL (ref 74–124)
HCT VFR BLD AUTO: 43.4 % (ref 40–49)
HGB BLD-MCNC: 15.4 G/DL (ref 13.5–16.5)
IMM GRANULOCYTES # BLD: 0.02 10*3/MM3 (ref 0–0.03)
IMM GRANULOCYTES NFR BLD: 0.3 % (ref 0–0.5)
LYMPHOCYTES # BLD AUTO: 1.43 10*3/MM3 (ref 1–3.5)
LYMPHOCYTES NFR BLD AUTO: 19.6 % (ref 20–49)
MCH RBC QN AUTO: 30.8 PG (ref 27–33)
MCHC RBC AUTO-ENTMCNC: 35.5 G/DL (ref 32–35)
MCV RBC AUTO: 86.8 FL (ref 83–97)
MONOCYTES # BLD AUTO: 0.66 10*3/MM3 (ref 0.25–0.8)
MONOCYTES NFR BLD AUTO: 9.1 % (ref 4–12)
NEUTROPHILS # BLD AUTO: 4.93 10*3/MM3 (ref 1.5–7)
NEUTROPHILS NFR BLD AUTO: 67.6 % (ref 39–75)
NRBC BLD MANUAL-RTO: 0 /100 WBC (ref 0–0)
PLATELET # BLD AUTO: 198 10*3/MM3 (ref 150–375)
PMV BLD AUTO: 9.7 FL (ref 8.9–12.1)
POTASSIUM BLD-SCNC: 3.8 MMOL/L (ref 3.5–4.7)
PROT SERPL-MCNC: 6.6 G/DL (ref 6.3–8)
RBC # BLD AUTO: 5 10*6/MM3 (ref 4.3–5.5)
SODIUM BLD-SCNC: 140 MMOL/L (ref 134–145)
WBC NRBC COR # BLD: 7.29 10*3/MM3 (ref 4–10)

## 2017-08-23 PROCEDURE — 85025 COMPLETE CBC W/AUTO DIFF WBC: CPT | Performed by: INTERNAL MEDICINE

## 2017-08-23 PROCEDURE — 99213 OFFICE O/P EST LOW 20 MIN: CPT | Performed by: INTERNAL MEDICINE

## 2017-08-23 PROCEDURE — 36415 COLL VENOUS BLD VENIPUNCTURE: CPT | Performed by: INTERNAL MEDICINE

## 2017-08-23 PROCEDURE — 80053 COMPREHEN METABOLIC PANEL: CPT | Performed by: INTERNAL MEDICINE

## 2017-08-23 NOTE — PROGRESS NOTES
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     HISTORY OF PRESENT ILLNESS:  Mr. Bass returns t  amparo to the office for followup.  He is here today with no specific symptomatology related to his chronic lymphoid leukemia, specifically no fevers, no chills, no sweats, no pruritus. No skin abnormalities. No peripheral adenopathy. No fatigue. No alterations in weight   In regard to his prostate cancer he had the seed implants at the [facility]. He still has some dysuria with no hematuria, no infection. He does not require a catheter and he is coping with this in the best way that he can. He has no need for any other assessment in this regard. New urologist in [place]. He wants to have a peneal implant.              PAST MEDICAL HISTORY:  Significant for hyperlipidemia.  He also has vitamin D deficiency and has undergone replacement of vitamin D.  SUE ponce has no history of hypertension, cardiovascular disease, diabetes, asthma or respiratory problems.  He was worked up by Dr. Luis Ortega in 2009 for an incidentally found mass in the liver through MRI and CT scan that turned out to be a hemangioma.  He al  s  o has benign cysts in the liver.  PET scan in that location in 2009 disclosed no abnormal uptake.  On that occasion, it was also found that the patient had a retrosternal thyroid enlargement with a normal TSH.  The patient had no symptomatology in regard   to thyroid abnormalities otherwise.          The patient has had kidney stones in the past on one occasion.  He had trauma as a child of the middle finger of the right hand with some deformity in the nail that never grew back normally.  Otherwise, he has not had   any hospitalizations or other interventions.          HEMATOLOGIC/ONCOLOGIC HISTORY: (History from previous dates can be found in the separate document.)        On 02/22/2016 no clinical evidence of recurrence of his CLL.   Hematological parameters were normal.  He had no B symptoms, no autoimmunity, no infections.  He was advised to remain in observation.          MEDICATIONS: The current medication list was reviewed with the patient and updated in the EMR this date per the medical assistant.  Medical dosages and frequencies were confirmed to be accurate.         ALLERGIES:  No known drug allergies.          SOCIAL HISTORY:  Lives with his wife (who has developed Alzheimer and requires total care at home).  One child, a daughter age 36, who just had her first baby recently.  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  He does not smoke and does not drink alcohol.  The patient, as per 2014, had a lengthy conversation with Dr. Schroeder in regard to the future of his wife who has advanced Alzheimer nena  ntia.  In the way I see things, according to the patient’s report, I think she is going to be institutionalized very soon, given the circumstances and level of care that she requires.  As of 2014 please see HPI.         FAMILY HISTORY:  Father  of heart   disease after rheumatic heart disease was found.  Mother  at age 97.  He has no brothers or sisters.  As per 2015, in regard to further illness in his ill wife who has dementia with significant changes in her overall situation with more somnole  nce, likely stroke and like an infection of some sort.         REVIEW OF SYSTEMS:      PAIN:  See VITAL SIGNS below.     GENERAL:  No change in appetite or weight, no fevers, chills or sweats.     SKIN:  No rashes or nonhealing lesions.    HEME/LYMPH:  No anemia, easy bruising, bleeding or swollen nodes.    EYES:  No vision changes or diplopia.      ENT:  No tinnitus, hearing loss, gum bleeding, epistaxis, hoarseness or dysphagia.    RESPIRATORY:  No cough, shortness of breath, hemoptysis or wheezing.    CVS:  No chest pain, palpitations, orthopnea, dyspnea on exertion or PND.    GI:   "No abdominal pain, nausea, vomiting, constipation, diarrhea, melena or hematochezia.    :  STATED lower tract obstructive symptoms, dysuria ,NO hematuria.     MUSCULOSKELETAL:  No bone pain or joint stiffness.    NEUROLOGICAL:  No dizziness, global weakness, loss of consciousness or seizures.    PSYCHIATRIC:  No increased nervousness, mood changes or depression.        VITAL SIGNS:  Vitals:    17 1513   BP: 142/76   Pulse: 76   Resp: 16   Temp: 98.1 °F (36.7 °C)   SpO2: 97%   Weight: 252 lb 9.6 oz (115 kg)   Height: 76.38\" (194 cm)           PAIN:  0 out of 10      ECO         PHYSICAL EXAMINATION:      GENERAL:  Well-developed, well-nourished male in no acute distress.     SKIN:  Warm, dry without rashes, purpura or petechiae.    HEAD:  Normocephalic.    EYES:  Pupils equal, round and reactive to light.  EOMs intact.  Conjunctivae normal.    EARS:  Hearing intact.    NOSE:  Septum midline.  No excoriations or nasal discharge.    MOUTH:  Tongue is well-papillated; no stomatitis or ulcers.  Lips normal.    THROAT:  Oropharynx without lesions or exudates.    NECK:  Supple with good range of motion; no thyromegaly or masses, no JVD or bruits.    LYMPHATICS:  No cervical, supraclavicular, axillary or inguinal adenopathy.    CHEST:  Lungs clear to percussion and auscultation.    CARDIAC:  Regular rate and rhythm without murmurs, rubs or gallops.    ABDOMEN:  Soft, nontender with no organomegaly or masses.    EXTREMITIES:  No clubbing, cyanosis or edema.    NEUROLOGICAL:  No focal neurological deficits.        LABORATORY DATA: CBC Auto Differential   Order: [id number] - Part of Panel Order [id number]   Status:  Final result   Visible to patient:  Yes (MyChart)      Newer results are available. Click to view them now.            Ref Range & Units 4mo ago     WBC 4.00 - 10.00 10*3/mm3 6.36   RBC 4.30 - 5.50 10*6/mm3 5.22   Hemoglobin 13.5 - 16.5 g/dL 15.3   Hematocrit 40.0 - 49.0 % 43.8   MCV 83.0 - 97.0 fL 83.9 "   MCH 27.0 - 33.0 pg 29.3   MCHC 32.0 - 35.0 g/dL 34.9   RDW 11.7 - 14.5 % 13.3   RDW-SD 37.0 - 49.0 fl 40.8   MPV 8.9 - 12.1 fL 9.5   Platelets 150 - 375 10*3/mm3 188   Neutrophil % 39.0 - 75.0 % 67.4   Lymphocyte % 20.0 - 49.0 % 20.                ASSESSMENT:  PROBLEM:   1. This patient has chronic lymphoid leukemia that was treated years back with Treanda/Rituxan, In December 2016 the patient had a relapse of his CLL documented through minimal mediastinal adenopathy and also a progressive leukocytosis and lymphocytosis with no autoimmunity and no B symptoms, no bulky disease. He was treated with Rituxan on 4 different occasions developing febrile illness during the infusion of the first Rituxan that was further controlled with IV hydrocortisone. As per TODAY the patient’s lymphocytosis has resolved, the hemoglobin is stable, the platelet count is stable, the physical exam is normal with no palpable peripheral adenopathy or hepatosplenomegaly. The patient has no B symptoms nor does he have any autoimmunity. He will remain in observation returning in 12 weeks.  2. Mass in the lingula. The patient had bronchoscopy and extensive assessment biopsies, bronchoalveolar lavage, not concluded anything in particular. His radon level in his office was high at 10 and he has done radon mitigation process by now. He  HAS A CT SCAN THAT SHOWS IMPROVEMENT, LIKELY INFLAMMATORY LYMPH NODE, NO OTHER ABNORMALITIES.     3. PROSTATE CANCER RECENT PSA 2.05, S/P SEED IMPLANTS [facility].Peneal implant to be placed in September

## 2017-09-18 ENCOUNTER — TELEPHONE (OUTPATIENT)
Dept: ONCOLOGY | Facility: HOSPITAL | Age: 72
End: 2017-09-18

## 2017-09-18 NOTE — TELEPHONE ENCOUNTER
Radha with Dr Salomon office calling to see if there was a CT scan scheduled for patient or f/u with us. Informed her we saw patient on 8/23 and patient is scheduled for MD f/u in November. There are no scans ordered at this time. She v/u

## 2017-11-15 ENCOUNTER — APPOINTMENT (OUTPATIENT)
Dept: ONCOLOGY | Facility: CLINIC | Age: 72
End: 2017-11-15

## 2017-11-15 ENCOUNTER — APPOINTMENT (OUTPATIENT)
Dept: LAB | Facility: HOSPITAL | Age: 72
End: 2017-11-15

## 2018-01-04 ENCOUNTER — HOSPITAL ENCOUNTER (OUTPATIENT)
Dept: CARDIOLOGY | Facility: HOSPITAL | Age: 73
Discharge: HOME | End: 2018-01-04
Attending: INTERNAL MEDICINE | Admitting: INTERNAL MEDICINE

## 2018-01-04 ENCOUNTER — TELEPHONE (OUTPATIENT)
Dept: ONCOLOGY | Facility: CLINIC | Age: 73
End: 2018-01-04

## 2018-01-04 ENCOUNTER — LAB (OUTPATIENT)
Dept: LAB | Facility: HOSPITAL | Age: 73
End: 2018-01-04

## 2018-01-04 ENCOUNTER — OFFICE VISIT (OUTPATIENT)
Dept: ONCOLOGY | Facility: CLINIC | Age: 73
End: 2018-01-04

## 2018-01-04 VITALS
HEIGHT: 77 IN | BODY MASS INDEX: 29.09 KG/M2 | DIASTOLIC BLOOD PRESSURE: 88 MMHG | RESPIRATION RATE: 16 BRPM | WEIGHT: 246.4 LBS | TEMPERATURE: 98.5 F | OXYGEN SATURATION: 95 % | SYSTOLIC BLOOD PRESSURE: 142 MMHG | HEART RATE: 86 BPM

## 2018-01-04 DIAGNOSIS — M79.604 PAIN AND SWELLING OF LOWER EXTREMITY, RIGHT: ICD-10-CM

## 2018-01-04 DIAGNOSIS — C91.10 CHRONIC LYMPHOCYTIC LEUKEMIA (HCC): Primary | ICD-10-CM

## 2018-01-04 DIAGNOSIS — C61 PROSTATE CANCER (HCC): ICD-10-CM

## 2018-01-04 DIAGNOSIS — L53.9 REDNESS: ICD-10-CM

## 2018-01-04 DIAGNOSIS — M79.89 PAIN AND SWELLING OF LOWER EXTREMITY, RIGHT: ICD-10-CM

## 2018-01-04 DIAGNOSIS — C91.10 CHRONIC LYMPHOCYTIC LEUKEMIA (HCC): ICD-10-CM

## 2018-01-04 LAB
BASOPHILS # BLD AUTO: 0.06 10*3/MM3 (ref 0–0.1)
BASOPHILS NFR BLD AUTO: 1 % (ref 0–1.1)
BH CV LOW VAS RIGHT POPLITEAL SPONT: 1
BH CV LOWER VASCULAR LEFT COMMON FEMORAL AUGMENT: NORMAL
BH CV LOWER VASCULAR LEFT COMMON FEMORAL COMPETENT: NORMAL
BH CV LOWER VASCULAR LEFT COMMON FEMORAL COMPRESS: NORMAL
BH CV LOWER VASCULAR LEFT COMMON FEMORAL PHASIC: NORMAL
BH CV LOWER VASCULAR LEFT COMMON FEMORAL SPONT: NORMAL
BH CV LOWER VASCULAR RIGHT COMMON FEMORAL AUGMENT: NORMAL
BH CV LOWER VASCULAR RIGHT COMMON FEMORAL COMPETENT: NORMAL
BH CV LOWER VASCULAR RIGHT COMMON FEMORAL COMPRESS: NORMAL
BH CV LOWER VASCULAR RIGHT COMMON FEMORAL PHASIC: NORMAL
BH CV LOWER VASCULAR RIGHT COMMON FEMORAL SPONT: NORMAL
BH CV LOWER VASCULAR RIGHT DISTAL FEMORAL COMPRESS: NORMAL
BH CV LOWER VASCULAR RIGHT GASTRONEMIUS COMPRESS: NORMAL
BH CV LOWER VASCULAR RIGHT GREATER SAPH AK COMPRESS: NORMAL
BH CV LOWER VASCULAR RIGHT GREATER SAPH BK COMPRESS: NORMAL
BH CV LOWER VASCULAR RIGHT LESSER SAPH COMPRESS: NORMAL
BH CV LOWER VASCULAR RIGHT MID FEMORAL AUGMENT: NORMAL
BH CV LOWER VASCULAR RIGHT MID FEMORAL COMPETENT: NORMAL
BH CV LOWER VASCULAR RIGHT MID FEMORAL COMPRESS: NORMAL
BH CV LOWER VASCULAR RIGHT MID FEMORAL PHASIC: NORMAL
BH CV LOWER VASCULAR RIGHT MID FEMORAL SPONT: NORMAL
BH CV LOWER VASCULAR RIGHT PERONEAL COMPRESS: NORMAL
BH CV LOWER VASCULAR RIGHT POPLITEAL AUGMENT: NORMAL
BH CV LOWER VASCULAR RIGHT POPLITEAL COMPETENT: NORMAL
BH CV LOWER VASCULAR RIGHT POPLITEAL COMPRESS: NORMAL
BH CV LOWER VASCULAR RIGHT POPLITEAL PHASIC: NORMAL
BH CV LOWER VASCULAR RIGHT POPLITEAL SPONT: NORMAL
BH CV LOWER VASCULAR RIGHT POSTERIOR TIBIAL COMPRESS: NORMAL
BH CV LOWER VASCULAR RIGHT PROXIMAL FEMORAL COMPRESS: NORMAL
BH CV LOWER VASCULAR RIGHT SAPHENOFEMORAL JUNCTION AUGMENT: NORMAL
BH CV LOWER VASCULAR RIGHT SAPHENOFEMORAL JUNCTION COMPETENT: NORMAL
BH CV LOWER VASCULAR RIGHT SAPHENOFEMORAL JUNCTION COMPRESS: NORMAL
BH CV LOWER VASCULAR RIGHT SAPHENOFEMORAL JUNCTION PHASIC: NORMAL
BH CV LOWER VASCULAR RIGHT SAPHENOFEMORAL JUNCTION SPONT: NORMAL
BH CV VAS POP FLUID COLLECTED: 1
DEPRECATED RDW RBC AUTO: 37.5 FL (ref 37–49)
EOSINOPHIL # BLD AUTO: 0.08 10*3/MM3 (ref 0–0.36)
EOSINOPHIL NFR BLD AUTO: 1.3 % (ref 1–5)
ERYTHROCYTE [DISTWIDTH] IN BLOOD BY AUTOMATED COUNT: 12.2 % (ref 11.7–14.5)
HCT VFR BLD AUTO: 43.9 % (ref 40–49)
HGB BLD-MCNC: 15.9 G/DL (ref 13.5–16.5)
IMM GRANULOCYTES # BLD: 0.04 10*3/MM3 (ref 0–0.03)
IMM GRANULOCYTES NFR BLD: 0.6 % (ref 0–0.5)
LYMPHOCYTES # BLD AUTO: 1.16 10*3/MM3 (ref 1–3.5)
LYMPHOCYTES NFR BLD AUTO: 18.5 % (ref 20–49)
MCH RBC QN AUTO: 30.8 PG (ref 27–33)
MCHC RBC AUTO-ENTMCNC: 36.2 G/DL (ref 32–35)
MCV RBC AUTO: 85.1 FL (ref 83–97)
MONOCYTES # BLD AUTO: 0.48 10*3/MM3 (ref 0.25–0.8)
MONOCYTES NFR BLD AUTO: 7.7 % (ref 4–12)
NEUTROPHILS # BLD AUTO: 4.45 10*3/MM3 (ref 1.5–7)
NEUTROPHILS NFR BLD AUTO: 70.9 % (ref 39–75)
NRBC BLD MANUAL-RTO: 0 /100 WBC (ref 0–0)
PLATELET # BLD AUTO: 203 10*3/MM3 (ref 150–375)
PMV BLD AUTO: 9.5 FL (ref 8.9–12.1)
RBC # BLD AUTO: 5.16 10*6/MM3 (ref 4.3–5.5)
WBC NRBC COR # BLD: 6.27 10*3/MM3 (ref 4–10)

## 2018-01-04 PROCEDURE — 99214 OFFICE O/P EST MOD 30 MIN: CPT | Performed by: INTERNAL MEDICINE

## 2018-01-04 PROCEDURE — 36416 COLLJ CAPILLARY BLOOD SPEC: CPT | Performed by: INTERNAL MEDICINE

## 2018-01-04 PROCEDURE — 93971 EXTREMITY STUDY: CPT

## 2018-01-04 PROCEDURE — 85025 COMPLETE CBC W/AUTO DIFF WBC: CPT | Performed by: INTERNAL MEDICINE

## 2018-01-04 RX ORDER — GABAPENTIN 100 MG/1
CAPSULE ORAL
Refills: 2 | COMMUNITY
Start: 2017-12-15 | End: 2020-09-21 | Stop reason: ALTCHOICE

## 2018-01-04 NOTE — TELEPHONE ENCOUNTER
Phone with pt no dvt, fluid in popliteal fossa ? Baker cyst he will see his orthopedic md tomorrow, nothing to change, pt agrees

## 2018-01-04 NOTE — PROGRESS NOTES
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     HISTORY OF PRESENT ILLNESS:  Mr. Bass returns t  amparo to the office for followup.  He is here today with no specific symptomatology related to his chronic lymphoid leukemia, specifically no fevers, no chills, no sweats, no pruritus. No skin abnormalities. No peripheral adenopathy. No fatigue. No alterations in weight   In regard to his prostate cancer he had the seed implants at the [facility]. He still has some dysuria with no hematuria, no infection. He does not require a catheter and he is coping with this in the best way that he can. He has no need for any other assessment in this regard. New urologist in [place]. He wants to have a peneal implant.      Also he decided a month ago to have stem cells injected into the knees through orthopedic surgery at [facility]. On further questioning in regard if they did any kind of purging in bone marrow specimen knowing that he has chronic lymphoid leukemia, he was surprised by the question. I do not know the answer to the question anyway neither.  That raises a concern from my point of view. On further asking him he is not aware if orthopedic surgeon was aware that the patient has chronic lymphoid leukemia. In any event he has some pain in the right calf with swelling especially when he stands up and walks and the physical exam documents some degree of swelling in the right calf. For this reason a Doppler study will be done today. Otherwise no other new issues.             PAST MEDICAL HISTORY:  Significant for hyperlipidemia.  He also has vitamin D deficiency and has undergone replacement of vitamin D.  SUE ponce has no history of hypertension, cardiovascular disease, diabetes, asthma or respiratory problems.  He was worked up by Dr. Luis Ortega in 2009 for an incidentally found mass in the liver through MRI and CT scan that turned out to  be a hemangioma.  He al  s  o has benign cysts in the liver.  PET scan in that location in  disclosed no abnormal uptake.  On that occasion, it was also found that the patient had a retrosternal thyroid enlargement with a normal TSH.  The patient had no symptomatology in regard   to thyroid abnormalities otherwise.          The patient has had kidney stones in the past on one occasion.  He had trauma as a child of the middle finger of the right hand with some deformity in the nail that never grew back normally.  Otherwise, he has not had   any hospitalizations or other interventions.          HEMATOLOGIC/ONCOLOGIC HISTORY: (History from previous dates can be found in the separate document.)        On 2016 no clinical evidence of recurrence of his CLL.  Hematological parameters were normal.  He had no B symptoms, no autoimmunity, no infections.  He was advised to remain in observation.          MEDICATIONS: The current medication list was reviewed with the patient and updated in the EMR this date per the medical assistant.  Medical dosages and frequencies were confirmed to be accurate.         ALLERGIES:  No known drug allergies.          SOCIAL HISTORY:  Lives with his wife (who has developed Alzheimer and requires total care at home).  One child, a daughter age 36, who just had her first baby recently.  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  He does not smoke and does not drink alcohol.  The patient, as per 2014, had a lengthy conversation with Dr. Schroeder in regard to the future of his wife who has advanced Alzheimer nena  ntia.  In the way I see things, according to the patient’s report, I think she is going to be institutionalized very soon, given the circumstances and level of care that she requires.  As of 2014 please see HPI.         FAMILY HISTORY:  Father  of heart   disease after rheumatic heart disease was found.  Mother  at age 97.  He  "has no brothers or sisters.  As per 2015, in regard to further illness in his ill wife who has dementia with significant changes in her overall situation with more somnole  nce, likely stroke and like an infection of some sort.         REVIEW OF SYSTEMS:      PAIN:  See VITAL SIGNS below.     GENERAL:  No change in appetite or weight, no fevers, chills or sweats.     SKIN:  No rashes or nonhealing lesions.    HEME/LYMPH:  No anemia, easy bruising, bleeding or swollen nodes.    EYES:  No vision changes or diplopia.      ENT:  No tinnitus, hearing loss, gum bleeding, epistaxis, hoarseness or dysphagia.    RESPIRATORY:  No cough, shortness of breath, hemoptysis or wheezing.    CVS:  No chest pain, palpitations, orthopnea, dyspnea on exertion or PND.    GI:  No abdominal pain, nausea, vomiting, constipation, diarrhea, melena or hematochezia.    :  STATED lower tract obstructive symptoms, dysuria ,NO hematuria.     MUSCULOSKELETAL:  No bone pain or joint stiffness.  Stated pain r calf  NEUROLOGICAL:  No dizziness, global weakness, loss of consciousness or seizures.    PSYCHIATRIC:  No increased nervousness, mood changes or depression.        VITAL SIGNS:  Vitals:    18 0927   BP: 142/88   Pulse: 86   Resp: 16   Temp: 98.5 °F (36.9 °C)   TempSrc: Oral   SpO2: 95%   Weight: 112 kg (246 lb 6.4 oz)   Height: 196 cm (77.17\")           PAIN:  0 out of 10      ECO         PHYSICAL EXAMINATION:      GENERAL:  Well-developed, well-nourished male in no acute distress.     SKIN:  Warm, dry without rashes, purpura or petechiae.    HEAD:  Normocephalic.    EYES:  Pupils equal, round and reactive to light.  EOMs intact.  Conjunctivae normal.    EARS:  Hearing intact.    NOSE:  Septum midline.  No excoriations or nasal discharge.    MOUTH:  Tongue is well-papillated; no stomatitis or ulcers.  Lips normal.    THROAT:  Oropharynx without lesions or exudates.    NECK:  Supple with good range of motion; no thyromegaly or " masses, no JVD or bruits.    LYMPHATICS:  No cervical, supraclavicular, axillary or inguinal adenopathy.    CHEST:  Lungs clear to percussion and auscultation.    CARDIAC:  Regular rate and rhythm without murmurs, rubs or gallops.    ABDOMEN:  Soft, nontender with no organomegaly or masses.    EXTREMITIES:  No clubbing, cyanosis . Pain and minimal edema r calf with sentinel veins no cyanosis   NEUROLOGICAL:  No focal neurological deficits.        LABORATORY DATA:CBC Auto Differential   Order: [id number] - Part of Panel Order [id number]   Status:  Final result   Visible to patient:  No (Not Released)   Dx:  Chronic lymphocytic leukemia      Ref Range & Units 9:13 AM     WBC 4.00 - 10.00 10*3/mm3 6.27   RBC 4.30 - 5.50 10*6/mm3 5.16   Hemoglobin 13.5 - 16.5 g/dL 15.9   Hematocrit 40.0 - 49.0 % 43.9   MCV 83.0 - 97.0 fL 85.1   MCH 27.0 - 33.0 pg 30.8   MCHC 32.0 - 35.0 g/dL 36.2 (H)   RDW 11.7 - 14.5 % 12.2   RDW-SD 37.0 - 49.0 fl 37.5   MPV 8.9 - 12.1 fL 9.5   Platelets 150 - 375 10*3/mm3 203   Neutrophil % 39.0 - 75.0 % 70.9   Lymphocyte % 20.0 - 49.0 % 18.5 (L)   Monocyte % 4.0 - 12.0 % 7.7   Eosinophil % 1.0 - 5.0 % 1.3   Basophil % 0.0 - 1.1 % 1.0   Immature Grans % 0.0 - 0.5 % 0.6 (H)   Neutrophils, Absolute 1.50 - 7.00 10*3/mm3 4.45   Lymphocytes, Absolute 1.00 - 3.50 10*3/mm3 1.16                    ASSESSMENT:  PROBLEM:   1. This patient has chronic lymphoid leukemia that was treated years back with Treanda/Rituxan, In December 2016 the patient had a relapse of his CLL documented through minimal mediastinal adenopathy and also a progressive leukocytosis and lymphocytosis with no autoimmunity and no B symptoms, no bulky disease. He was treated with Rituxan on 4 different occasions developing febrile illness during the infusion of the first Rituxan that was further controlled with IV hydrocortisone. As per TODAY the patient’s lymphocytosis has resolved, the hemoglobin is stable, the platelet count is stable, the  physical exam is normal with no palpable peripheral adenopathy or hepatosplenomegaly. The patient has no B symptoms nor does he have any autoimmunity. He will remain in observation returning in 12 weeks.  2. Mass in the lingula. The patient had bronchoscopy and extensive assessment biopsies, bronchoalveolar lavage, not concluded anything in particular. His radon level in his office was high at 10 and he has done radon mitigation process by now. He  HAS A CT SCAN THAT SHOWS IMPROVEMENT, LIKELY INFLAMMATORY LYMPH NODE, NO OTHER ABNORMALITIES.     3. PROSTATE CANCER RECENT PSA 2.05, S/P SEED IMPLANTS [facility].Peneal implant to be placed in September 4.1. Injection of stem cells in both knees through orthopedic surgery. On further questioning to the patient if this was purged by the orthopedic surgeon the answer is unknown. The consequences of this to me in the background of chronic lymphoid leukemia is unknown as well.   2. Pain in the right calf with minimal swelling and minimal evidence of sentinel veins. We need to be sure that there are no blood clots. Doppler study will be done today. The patient will be informed about the report of this.  3. Cardiac irregularity on physical examination. I asked him to be seen by Dr. Salomon as soon as possible. The patient has no other symptomatology. I think he is going to require a LOOP for chronic monitoring of this.   4. I will review him back in 3 months with a CBC. Again he will be called with the report of the Doppler test.

## 2018-03-28 ENCOUNTER — OFFICE VISIT (OUTPATIENT)
Dept: ONCOLOGY | Facility: CLINIC | Age: 73
End: 2018-03-28

## 2018-03-28 ENCOUNTER — APPOINTMENT (OUTPATIENT)
Dept: LAB | Facility: HOSPITAL | Age: 73
End: 2018-03-28

## 2018-03-28 ENCOUNTER — LAB (OUTPATIENT)
Dept: LAB | Facility: HOSPITAL | Age: 73
End: 2018-03-28

## 2018-03-28 ENCOUNTER — APPOINTMENT (OUTPATIENT)
Dept: ONCOLOGY | Facility: CLINIC | Age: 73
End: 2018-03-28

## 2018-03-28 VITALS
RESPIRATION RATE: 16 BRPM | DIASTOLIC BLOOD PRESSURE: 82 MMHG | HEART RATE: 74 BPM | BODY MASS INDEX: 28.69 KG/M2 | TEMPERATURE: 97.6 F | SYSTOLIC BLOOD PRESSURE: 124 MMHG | WEIGHT: 243 LBS | HEIGHT: 77 IN | OXYGEN SATURATION: 94 %

## 2018-03-28 DIAGNOSIS — C91.10 CHRONIC LYMPHOCYTIC LEUKEMIA (HCC): Primary | ICD-10-CM

## 2018-03-28 DIAGNOSIS — C61 PROSTATE CANCER (HCC): ICD-10-CM

## 2018-03-28 DIAGNOSIS — C91.10 CHRONIC LYMPHOCYTIC LEUKEMIA (HCC): ICD-10-CM

## 2018-03-28 LAB
BASOPHILS # BLD AUTO: 0.07 10*3/MM3 (ref 0–0.1)
BASOPHILS NFR BLD AUTO: 1 % (ref 0–1.1)
DEPRECATED RDW RBC AUTO: 38.7 FL (ref 37–49)
EOSINOPHIL # BLD AUTO: 0.15 10*3/MM3 (ref 0–0.36)
EOSINOPHIL NFR BLD AUTO: 2.1 % (ref 1–5)
ERYTHROCYTE [DISTWIDTH] IN BLOOD BY AUTOMATED COUNT: 12.7 % (ref 11.7–14.5)
HCT VFR BLD AUTO: 42.2 % (ref 40–49)
HGB BLD-MCNC: 15.2 G/DL (ref 13.5–16.5)
IMM GRANULOCYTES # BLD: 0.05 10*3/MM3 (ref 0–0.03)
IMM GRANULOCYTES NFR BLD: 0.7 % (ref 0–0.5)
LYMPHOCYTES # BLD AUTO: 1.36 10*3/MM3 (ref 1–3.5)
LYMPHOCYTES NFR BLD AUTO: 18.9 % (ref 20–49)
MCH RBC QN AUTO: 30.4 PG (ref 27–33)
MCHC RBC AUTO-ENTMCNC: 36 G/DL (ref 32–35)
MCV RBC AUTO: 84.4 FL (ref 83–97)
MONOCYTES # BLD AUTO: 0.69 10*3/MM3 (ref 0.25–0.8)
MONOCYTES NFR BLD AUTO: 9.6 % (ref 4–12)
NEUTROPHILS # BLD AUTO: 4.88 10*3/MM3 (ref 1.5–7)
NEUTROPHILS NFR BLD AUTO: 67.7 % (ref 39–75)
NRBC BLD MANUAL-RTO: 0 /100 WBC (ref 0–0)
PLATELET # BLD AUTO: 215 10*3/MM3 (ref 150–375)
PMV BLD AUTO: 9.2 FL (ref 8.9–12.1)
RBC # BLD AUTO: 5 10*6/MM3 (ref 4.3–5.5)
WBC NRBC COR # BLD: 7.2 10*3/MM3 (ref 4–10)

## 2018-03-28 PROCEDURE — 36415 COLL VENOUS BLD VENIPUNCTURE: CPT | Performed by: INTERNAL MEDICINE

## 2018-03-28 PROCEDURE — 99214 OFFICE O/P EST MOD 30 MIN: CPT | Performed by: INTERNAL MEDICINE

## 2018-03-28 PROCEDURE — 85025 COMPLETE CBC W/AUTO DIFF WBC: CPT | Performed by: INTERNAL MEDICINE

## 2018-03-28 NOTE — PROGRESS NOTES
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     HISTORY OF PRESENT ILLNESS:  This patient is here today feeling relatively well in regard to the history of his chronic lymphoid leukemia that looks in hematological remission at this time.  The patient is not having any fevers, chills, infection or anything of this nature.  He continues having some pain in the right knee and right hip and he is going to see his orthopedic surgeon for this.  He has had steroid therapy before and local injections with not too much benefit.  He has not had any new issues with his prostate cancer, his PSA remains at 2 and he continues seeing his urologist in [place].  He has not had any hematuria or urinary tract infections.  He has his usual screening checkup for skin cancer by Dr. Thacker and no new alterations found.  He is aware that he has a higher chance of developing skin cancer given his CLL.            PAST MEDICAL HISTORY:  Significant for hyperlipidemia.  He also has vitamin D deficiency and has undergone replacement of vitamin D.  SUE ponce has no history of hypertension, cardiovascular disease, diabetes, asthma or respiratory problems.  He was worked up by Dr. Luis Ortega in 2009 for an incidentally found mass in the liver through MRI and CT scan that turned out to be a hemangioma.  He al  s  o has benign cysts in the liver.  PET scan in that location in 2009 disclosed no abnormal uptake.  On that occasion, it was also found that the patient had a retrosternal thyroid enlargement with a normal TSH.  The patient had no symptomatology in regard   to thyroid abnormalities otherwise.          The patient has had kidney stones in the past on one occasion.  He had trauma as a child of the middle finger of the right hand with some deformity in the nail that never grew back normally.  Otherwise, he has not had   any hospitalizations or other interventions.           HEMATOLOGIC/ONCOLOGIC HISTORY: (History from previous dates can be found in the separate document.)        On 2016 no clinical evidence of recurrence of his CLL.  Hematological parameters were normal.  He had no B symptoms, no autoimmunity, no infections.  He was advised to remain in observation.          MEDICATIONS: The current medication list was reviewed with the patient and updated in the EMR this date per the medical assistant.  Medical dosages and frequencies were confirmed to be accurate.         ALLERGIES:  No known drug allergies.          SOCIAL HISTORY:  Lives with his wife (who has developed Alzheimer and requires total care at home).  One child, a daughter age 36, who just had her first baby recently.  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  He does not smoke and does not drink alcohol.  The patient, as per 2014, had a lengthy conversation with Dr. Schroeder in regard to the future of his wife who has advanced Alzheimer nena  ntia.  In the way I see things, according to the patient’s report, I think she is going to be institutionalized very soon, given the circumstances and level of care that she requires.  As of 2014 please see HPI.         FAMILY HISTORY:  Father  of heart   disease after rheumatic heart disease was found.  Mother  at age 97.  He has no brothers or sisters.  As per 2015, in regard to further illness in his ill wife who has dementia with significant changes in her overall situation with more somnole  nce, likely stroke and like an infection of some sort.         REVIEW OF SYSTEMS:      General: no fever, chills, fatigue, weight changes, or lack of appetite.  Eyes: no epiphora, xerophthalmia,conjunctivitis, pain, glaucoma, blurred vision, blindness, secretion, photophobia, proptosis, diplopia.  Ears: no otorrhea, tinnitus, otorrhagia, deafness, pain, vertigo.  Nose: no rhinorrhea, epistaxis, alteration in perception  "of odors, sinuses pressure.  Mouth: no alteration in gums or teeth,  ulcers, no difficulty with mastication or deglut ion, no odynophagia.  Neck: no masses or pain, no thyroid alterations, no pain in muscles or arteries, no carotid odynia, no crepitation.  Respiratory: no cough, sputum production, dyspnea, trepopnea, pleuritic pain, hemoptysis.  Heart: no syncope, irregularity, palpitations, angina, orthopnea, paroxysmal nocturnal dyspnea.  Vascular Venous: no tenderness,edema, palpable cords, postphlebitic syndrome, skin changes or ulcerations.  Vascular Arterial: no distal ischemia, claudication, gangrene, neuropathic ischemic pain, skin ulcers, paleness or cyanosis.  GI: no dysphagia, odynophagia, no regurgitation, no heartburn,no indigestion,no nausea,no vomiting,no hematemesis ,no melena,no jaundice,no distention, no obstipation,no enterorrhagia,no proctalgia,no anal  lesions, nochanges in bowel habits.  : no frequency, hesitancy, hematuria, discharge, pain.  Musculoskeletal: stated muscle or tendon pain or inflammation, joint pain, edema, functional limitation, fasciculations, mass.  Neurologic: no headache, seizures, alterations on Craneal nerves, no motor or senssory deficit, normal coordination, no alteration in memory, orientation, calculation,writting, verbal or written language.  Skin: no rashes, pruritus or localized lesions.  Psychiatric: no anxiety, depression, agitation, delusions, proper insight.          VITAL SIGNS:  Vitals:    18 1143   BP: 124/82   Pulse: 74   Resp: 16   Temp: 97.6 °F (36.4 °C)   TempSrc: Oral   SpO2: 94%   Weight: 110 kg (243 lb)   Height: 196 cm (77.17\")           PAIN:  0 out of 10      ECO         PHYSICAL EXAMINATION:    GENERAL:  Well-developed, well-nourished  Patient  in no acute distress.   SKIN:  Warm, dry without rashes, purpura or petechiae.  HEENT:  Pupils were equal and reactive to light and accomodation, conjunctivas non injected, no pterigion, normal " extraocular movements, normal visual acuity.   Mouth mucosa was moist, no exudates in oropharynx, normal gum line, normal roof of the mouth and pillars, normal papillations of the tongue.  NECK:  Supple with good range of motion; no thyromegaly or masses, no JVD or bruits, no cervical adenopathies.No carotid arteries pain, no carotid abnormal pulsation or arterial dance.  LYMPHATICS:  No cervical, supraclavicular, axillary, epitrochlear or inguinal adenopathy.  CHEST:  Normal excursion of both francis thoraces, normal voice fremitus, no subcutaneous emphysema, normal axillas, no rashes or acanthosis nigricans. Lungs clear to percussion and auscultation, normal breath sounds bilaterally, no wheezing, crackles or ronchi, no stridor, no rubs.  CARDIAC AND VASCULAR: normal rate and regular rhythm, without murmurs, rubs or S3 or S4 right or left sided gallops. Normal femoral, popliteal, pedis, brachial and carotid pulses.  ABDOMEN:  Soft, nontender with no organomegaly or masses, no ascites, no collateral circulation,no distention,no [place] sign, no abdominal pain, no inguinal hernias,no umbilical hernias, no abdominal bruits. Normal bowel sounds.  GENITAL: Not  Performed.  EXTREMITIES  AND SPINE:  No clubbing, cyanosis or edema, no deformities or pain r baker  cyst.No kyphosis, scoliosis, deformities or pain in spine, ribs or pelvic bone.  NEUROLOGICAL:  Patient was awake, alert, oriented to time, person and place,normal gait and coordination            LABORATORY DATA:CBC Auto Differential   Order: [id number] - Part of Panel Order [id number]   Status:  Final result   Visible to patient:  No (Not Released) Dx:  Prostate cancer; Chronic lymphocytic ...    Ref Range & Units 11:36   WBC 4.00 - 10.00 10*3/mm3 7.20    RBC 4.30 - 5.50 10*6/mm3 5.00    Hemoglobin 13.5 - 16.5 g/dL 15.2    Hematocrit 40.0 - 49.0 % 42.2    MCV 83.0 - 97.0 fL 84.4    MCH 27.0 - 33.0 pg 30.4    MCHC 32.0 - 35.0 g/dL 36.0     RDW 11.7 - 14.5 % 12.7     RDW-SD 37.0 - 49.0 fl 38.7    MPV 8.9 - 12.1 fL 9.2    Platelets 150 - 375 10*3/mm3 215    Neutrophil % 39.0 - 75.0 % 67.7    Lymphocyte % 20.0 - 49.0 % 18.9     Monocyte % 4.0 - 12.0 % 9.6    Eosinophil % 1.0 - 5.0 % 2.1    Basophil % 0.0 - 1.1 % 1.0    Immature Grans % 0.0 - 0.5 % 0.7     Neutrophils, Absolute 1.50 - 7.00 10*3/mm3 4.88    Lymphocytes, Absolute 1.00 - 3.50 10*3/mm3 1.36    Monocytes, Absolute 0.25 - 0.80 10*3/mm3 0.69    Eosinophils, Absolute 0.00 - 0.36 10*3/mm3 0.15                         ASSESSMENT:  1.  Patient has history of chronic lymphoid leukemia that was treated years ago with bendamustine/Rituxan achieving remission.  He has a relapse in 12/2016 that was treated with single agent Rituxan for 4 infusions and he has had remission since then.  Today, his white count, hemoglobin, platelets and white count differential are normal.  He had no peripheral adenopathy or hepatosplenomegaly, no B symptoms, no infection, no autoimmunity.  This entity remains in remission for the time being and he will be monitored in absence of any other intervention.  I find no need for radiological assessment at this time.    2.  History of prostate cancer status post implants at the [facility] and PSA remains at 2.  No evidence of recurrent disease.  There is still some bladder outlet obstruction symptomatology monitored by the Urologist in [place].  3.  Patient is aware that he has a higher chance of skin cancer developing even in any location and not exposed to sunshine related to the chronic lymphoid leukemia.  Given this fact, I asked him to remain attentive to skin lesions and if he has any problems consult with Darryl Thacker MD his Dermatologist. I asked him to wear a hat and have sun skin protection, Neutrogena 50, during the spring, summer and fall months if he is going to be outside.  4.  Patient raised the question in regard to the new shingles vaccination.  I do not recommend this for  him given his CLL history.  5.  I asked him to review with his Orthopedic Surgeon issues in regard to right hip producing pain in the right knee and maybe he needs to have at least a plain x-ray of the right hip or even an MRI.  He will discuss this with them.  Otherwise, I will review him back in 3 months.

## 2018-04-11 ENCOUNTER — TRANSCRIBE ORDERS (OUTPATIENT)
Dept: PHYSICAL THERAPY | Facility: HOSPITAL | Age: 73
End: 2018-04-11

## 2018-04-11 DIAGNOSIS — M54.5 LOW BACK PAIN, UNSPECIFIED BACK PAIN LATERALITY, UNSPECIFIED CHRONICITY, WITH SCIATICA PRESENCE UNSPECIFIED: Primary | ICD-10-CM

## 2018-04-16 ENCOUNTER — HOSPITAL ENCOUNTER (OUTPATIENT)
Dept: PHYSICAL THERAPY | Facility: HOSPITAL | Age: 73
Setting detail: THERAPIES SERIES
Discharge: HOME | End: 2018-04-16
Attending: INTERNAL MEDICINE

## 2018-04-16 DIAGNOSIS — M79.604 RIGHT LEG PAIN: ICD-10-CM

## 2018-04-16 DIAGNOSIS — M54.50 RIGHT-SIDED LOW BACK PAIN WITHOUT SCIATICA, UNSPECIFIED CHRONICITY: Primary | ICD-10-CM

## 2018-04-16 PROCEDURE — 97161 PT EVAL LOW COMPLEX 20 MIN: CPT

## 2018-04-16 PROCEDURE — G8978 MOBILITY CURRENT STATUS: HCPCS

## 2018-04-16 PROCEDURE — G8979 MOBILITY GOAL STATUS: HCPCS

## 2018-04-16 NOTE — THERAPY TREATMENT NOTE
"    Outpatient Physical Therapy Ortho Initial Evaluation  [facility]     Patient Name: Jeff Bass  : 1945  MRN: [id number]  Today's Date: 2018      Visit Date: 2018    Patient Active Problem List   Diagnosis   • Health care maintenance   • Benign prostatic hyperplasia   • Chronic lymphocytic leukemia   • Hypertension   • Hypovitaminosis D   • Hyperlipidemia   • Hypogonadism in male   • Vitamin D deficiency   • Prostate cancer        Past Medical History:   Diagnosis Date   • Chronic kidney disease     Kidney stones   • CLL (chronic lymphocytic leukemia)    • Disease of thyroid gland     enlarged   • Hyperlipidemia    • Hypertension    • Prostate CA    • Vitamin D deficiency         Past Surgical History:   Procedure Laterality Date   • BRONCHOSCOPY N/A 2016    Procedure: BRONCHOSCOPY WITH  BAL AND BIOPSY AND ENDOBRONCHIAL ULTRASOUND  AND NAVIGATION WITH BRUSHINGS AND BIOPSY AND BAL;  Surgeon: Pierre Manning MD;  Location: [facility] ENDOSCOPY;  Service:    • PROSTATE RADIOACTIVE SEED IMPLANT         Visit Dx:     ICD-10-CM ICD-9-CM   1. Right-sided low back pain without sciatica, unspecified chronicity M54.5 724.2   2. Right leg pain M79.604 729.5             Patient History     Row Name 18 1300             History    Chief Complaint Pain  -LP      Type of Pain Lower Extremity / Leg   pain shoots down R leg when I stand up  -LP      Date Current Problem(s) Began 18  -LP      Brief Description of Current Complaint Pt states he began having R leg pain when he stands up from a chair or when getting out of bed, about 4 weeks ago.  He also states that he has had previous episodes of this throughout the years.  Last time he went to [facility] and did water therapy and he was better in a week.  Pt denies any fall or incident that would have triggered said pain.  He states he is able to play \"pickle ball\" 3 days a week without difficulty or pain.  Pt. 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  He lives with his wife who has Alzhiemers and requires full care.  -LP         Pain     Pain Location Leg  -LP      Pain at Present 0  -LP      Pain at Worst 10  -LP      Pain Frequency Intermittent   when he stands up from bed or chair.  -LP      Pain Description Shooting;Radiating;Sharp  -LP      Is your sleep disturbed? Yes  -LP      Is medication used to assist with sleep? Yes  -LP         Fall Risk Assessment    Any falls in the past year: No  -LP        User Key  (r) = Recorded By, (t) = Taken By, (c) = Cosigned By    Initials Name Provider Type    LP Kimmy Castaneda PT Physical Therapist                PT Ortho     Row Name 04/16/18 1300       Subjective Pain    Able to rate subjective pain? yes  -LP    Pre-Treatment Pain Level 0  -LP       Posture/Observations    Posture/Observations Comments leans forward;doesn't stand up all the way., keeps knees slightly bent.  Possible thoracic scoliosis deformity  with rib hump-R.  -LP       DTR- Lower Quarter Clearing    Patellar tendon (L2-4) Bilateral:;2- Normal response  -LP    Achilles tendon (S1-2) Right:;1- Minimal response;Left:;2- Normal response  -LP       Sensory Screen for Light Touch- Lower Quarter Clearing    S1 (bottom of foot) Intact   numbness in bilateral toes since 2016  -LP       Myotomal Screen- Lower Quarter Clearing    Hip flexion (L2) Bilateral:;4 (Good)  -LP    Knee extension (L3) Right:;4- (Good -);Left:;4 (Good)  -LP    Ankle DF (L4) Bilateral:;4+ (Good +)  -LP    Great toe extension (L5) Bilateral:;4+ (Good +)  -LP    Ankle PF (S1) Bilateral:;4+ (Good +)  -LP    Knee flexion (S2) Bilateral:;4 (Good)  -LP       Lumbar ROM Screen- Lower Quarter Clearing    Lumbar Flexion Impaired   performed 75% of range no pain  -LP    Lumbar Extension Normal   no pain  -LP    Lumbar Lateral Flexion Impaired   to R- 50%, to L 75% of range.  No pain  -LP    Lumbar Rotation Impaired   to R-75- full, to L 50% of range- no  pain  -LP    Lumbar Quadrant  Normal  -LP       SI/Hip Screen- Lower Quarter Clearing    Nunu's/Pablo's test Bilateral:;Negative  -LP       Right Lower Ext    Rt Knee Extension/Flexion AROM -15-21/115  -LP    RT Lower Extremity Comments hip flex/rotation=WNL  -LP       Left Lower Ext    Lt Knee Extension/Flexion AROM -17-22/118  -LP    LT Lower Extremity Comments hip flex/rot+ WNL  -LP       Lower Extremity Flexibility    Hamstrings Bilateral:;Moderately limited  -LP       Balance Skills Training    SLS R=7-10 sec, L10 sec  -LP       Gait/Stairs Assessment/Training    Comment (Gait/Stairs) generally no apparent limp, but does lean forward with head and shoulders.   Pt had to get to another appt, so more detailed gait analysis was not done.  -LP      User Key  (r) = Recorded By, (t) = Taken By, (c) = Cosigned By    Initials Name Provider Type    LP Kimmy Castaneda, PT Physical Therapist                      Therapy Education  Given: HEP, Posture/body mechanics, Symptoms/condition management  Program: New (pt had to leave so only one ex given)  How Provided: Verbal, Written, Demonstration  Provided to: Patient  Level of Understanding: Teach back education performed           PT OP Goals     Row Name 04/16/18 1400          PT Short Term Goals    STG Date to Achieve 05/16/18  -LP     STG 1 Independent with home exer program/aquatic ex  -LP     STG 1 Progress New  -LP     STG 2 Pt reporting pain in R leg no worse than 7/10  -LP     STG 2 Progress New  -LP     STG 3 Pt demsontrates improved standing posture without cuing  -LP     STG 3 Progress New  -LP     STG 4 pt demonstrating 75%- full trunk rotation, bilaterally  -LP     STG 4 Progress New  -LP        Long Term Goals    LTG Date to Achieve 06/15/18  -LP     LTG 1 Pt with improved oswestry scoreby 5 points  -LP     LTG 1 Progress New  -LP     LTG 2 Pt able to stand from a chair without pain shooting down his leg.  -LP     LTG 2 Progress New  -LP     LTG 3 Pt to  "start walking program with goal for walking 1 mile without increasig leg pain  -LP     LTG 3 Progress New  -LP        Time Calculation    PT Goal Re-Cert Due Date 07/16/18  -LP       User Key  (r) = Recorded By, (t) = Taken By, (c) = Cosigned By    Initials Name Provider Type    LP Kimmy Castaneda, PT Physical Therapist                PT Assessment/Plan     Row Name 04/16/18 1116          PT Assessment    Functional Limitations Impaired gait;Performance in work activities  -LP     Impairments Poor body mechanics;Posture;Pain;Range of motion  -LP     Assessment Comments Mr. Bass presents today with complaints of R leg pain, when he stands up from a chair, or when he gets up out of bed in the morning.  Pt states this has been happening for about 4 weeks, but has had similar episodes in the past.  He has mild limitations in trunk ROM, mostly for forward flexion, R side-bending, and L rotation.  Pt also has moderate restricition for bilateral knee extension; R=-15-20 degrees, and l =-17-22 degrees.  Lack of knee extension is noticeable when he stands.  He also has very forward posture, but he is 6'5\" tall.  He has slightly decreased reflex of R. achilles, but equal strength for bilateral extremities.  He has moderately tight hanstrings bilaerally.  Pt will benefit from skilled pysical therapy at this time to help decrease R leg pain, to improve trunk mobility and core strength.  Pt would also benefit from education regarding back care and better posture habits.  -LP     Please refer to paper survey for additional self-reported information Yes  -LP     Rehab Potential Excellent  -LP     Patient/caregiver participated in establishment of treatment plan and goals Yes  -LP     Patient would benefit from skilled therapy intervention Yes  -LP        PT Plan    PT Frequency 2x/week  -LP     Predicted Duration of Therapy Intervention (OT Eval) 4-6 weeks  -LP     Planned CPT's? PT EVAL LOW COMPLEXITY: 46823;PT THER PROC " EA 15 MIN: 12862;PT TRACTION LUMBAR: 77928;PT HOT OR COLD PACK TREAT MCARE;PT AQUATIC THERAPY EA 15 MIN: 37938;PT MANUAL THERAPY EA 15 MIN: 22473  -LP     Physical Therapy Interventions (Optional Details) aquatics exercise;home exercise program;manual therapy techniques;lumbar stabilization;postural re-education;ROM (Range of Motion);strengthening;stretching  -LP     PT Plan Comments Pt to start aquatic therapy in one-two weeks, as he is going out of town on Thurs. for one week.    -LP       User Key  (r) = Recorded By, (t) = Taken By, (c) = Cosigned By    Initials Name Provider Type    LP Kimmy Castaneda, PT Physical Therapist                  Exercises     Row Name 04/16/18 1300             Subjective Pain    Able to rate subjective pain? yes  -LP      Pre-Treatment Pain Level 0  -LP         Exercise 1    Exercise Name 1 post pelvic tilt  -LP         Exercise 2    Exercise Name 2 DKTC/SKTC  -LP      Additional Comments R leg pain reporduced with DKTC and with R- SKTC- instructed pt NOT to do.  -LP        User Key  (r) = Recorded By, (t) = Taken By, (c) = Cosigned By    Initials Name Provider Type     Kimmy Castaneda, PT Physical Therapist                        Outcome Measure Options: Modifed Owestry  Modified Oswestry  Modified Oswestry Score/Comments: 44%      Time Calculation:   Start Time: 1300  Stop Time: 1340  Time Calculation (min): 40 min     Therapy Charges for Today     Code Description Service Date Service Provider Modifiers Qty    51239376698 HC PT MOBILITY CURRENT 4/16/2018 Kimmy Castaneda, PT GP, CK 1    23198967968 HC PT MOBILITY PROJECTED 4/16/2018 Kimmy Castaneda, PT GP, CL 1    70140045776 HC PT EVAL LOW COMPLEXITY 2 4/16/2018 Kimmy Castaneda, PT GP 1          PT G-Codes  PT Professional Judgement Used?: Yes  Outcome Measure Options: Modifed Owestry  Score: 44%  Functional Limitation: Mobility: Walking and moving around  Mobility: Walking and Moving Around Current Status (): At least  40 percent but less than 60 percent impaired, limited or restricted  Mobility: Walking and Moving Around Goal Status (): At least 60 percent but less than 80 percent impaired, limited or restricted         Kimmy Castaneda, PT  4/16/2018

## 2018-04-25 ENCOUNTER — HOSPITAL ENCOUNTER (OUTPATIENT)
Dept: PHYSICAL THERAPY | Facility: HOSPITAL | Age: 73
Setting detail: THERAPIES SERIES
Discharge: HOME | End: 2018-04-25

## 2018-04-25 DIAGNOSIS — M54.50 RIGHT-SIDED LOW BACK PAIN WITHOUT SCIATICA, UNSPECIFIED CHRONICITY: Primary | ICD-10-CM

## 2018-04-25 DIAGNOSIS — M79.604 RIGHT LEG PAIN: ICD-10-CM

## 2018-04-25 PROCEDURE — 97113 AQUATIC THERAPY/EXERCISES: CPT | Performed by: PHYSICAL THERAPIST

## 2018-04-25 NOTE — THERAPY TREATMENT NOTE
"    Outpatient Physical Therapy Ortho Treatment Note  [facility]     Patient Name: Jeff Bass  : 1945  MRN: [id number]  Today's Date: 2018      Visit Date: 2018    Visit Dx:    ICD-10-CM ICD-9-CM   1. Right-sided low back pain without sciatica, unspecified chronicity M54.5 724.2   2. Right leg pain M79.604 729.5       Patient Active Problem List   Diagnosis   • Health care maintenance   • Benign prostatic hyperplasia   • Chronic lymphocytic leukemia   • Hypertension   • Hypovitaminosis D   • Hyperlipidemia   • Hypogonadism in male   • Vitamin D deficiency   • Prostate cancer        Past Medical History:   Diagnosis Date   • Chronic kidney disease     Kidney stones   • CLL (chronic lymphocytic leukemia)    • Disease of thyroid gland     enlarged   • Hyperlipidemia    • Hypertension    • Prostate CA    • Vitamin D deficiency         Past Surgical History:   Procedure Laterality Date   • BRONCHOSCOPY N/A 2016    Procedure: BRONCHOSCOPY WITH  BAL AND BIOPSY AND ENDOBRONCHIAL ULTRASOUND  AND NAVIGATION WITH BRUSHINGS AND BIOPSY AND BAL;  Surgeon: Pierre Manning MD;  Location: [facility] ENDOSCOPY;  Service:    • PROSTATE RADIOACTIVE SEED IMPLANT                               PT Assessment/Plan     Row Name 18 1313          PT Assessment    Assessment Comments Initiated aquatic therapy including walking, mobility, LE stretching, LE/core exercises, and decompression.  Limited with walking today due to large class occupying deep end of pool which would be best for patient to walk in for reduced compression on spine secondary to his height (reports 6'5\").  Patient denied any increased symptoms during aquatic ex/activity except for extreme ROM with hip sweeps and when getting up from sitting on pool bench.  He required cues for posture, core activation, and correct form/technique with ther ex.   -RA        PT Plan    PT Plan Comments Assess respnse to initial aquatic session, continue " with ex/activity as appropriate/tolerated.  Patient reports he is also scheduled for land OT at 3900 for traction.    -RA       User Key  (r) = Recorded By, (t) = Taken By, (c) = Cosigned By    Initials Name Provider Type    POOJA West PT Physical Therapist                    Exercises     Row Name 04/25/18 1200             Subjective Comments    Subjective Comments I sit a lot at work and try to get up about every hour but I have pain down back of R LE whenever I get up from a chair.   It's really bad when I have to get OOB during the night to go to BR or 1st thing in the morning.    -RA         Subjective Pain    Able to rate subjective pain? yes  -RA      Subjective Pain Comment none at present, pain with STS transition rated 8-10/10  -RA         Aquatics    Aquatics performed? Yes  -RA         Exercise 3    Exercise Name 3 See  aquatic flowsheet in chart for ex details.   -RA      Time 3 Total aquatic = 44 minutes   -RA        User Key  (r) = Recorded By, (t) = Taken By, (c) = Cosigned By    Initials Name Provider Type    POOJA West PT Physical Therapist                             Therapy Education  Education Details: Benefits of aquatic environment and importance of optimal posture / core strength to reduce stress on spine with ex/activity  Given: Posture/body mechanics, Symptoms/condition management, Mobility training  Program: New (aquatic ex/activity)  How Provided: Demonstration, Verbal  Provided to: Patient  Level of Understanding: Verbalized              Time Calculation:   Start Time: 1204  Stop Time: 1248  Time Calculation (min): 44 min    Therapy Charges for Today     Code Description Service Date Service Provider Modifiers Qty    02163787346  PT AQUATIC THERAPY EA 15 MIN 4/25/2018 Lanette West, PT GP 3                    Lanette West PT  4/25/2018

## 2018-04-27 ENCOUNTER — APPOINTMENT (OUTPATIENT)
Dept: PHYSICAL THERAPY | Facility: HOSPITAL | Age: 73
End: 2018-04-27

## 2018-04-27 ENCOUNTER — OFFICE VISIT CONVERTED (OUTPATIENT)
Dept: SURGERY | Facility: CLINIC | Age: 73
End: 2018-04-27
Attending: UROLOGY

## 2018-04-30 ENCOUNTER — HOSPITAL ENCOUNTER (OUTPATIENT)
Dept: PHYSICAL THERAPY | Facility: HOSPITAL | Age: 73
Setting detail: THERAPIES SERIES
Discharge: HOME | End: 2018-04-30

## 2018-04-30 DIAGNOSIS — M79.604 RIGHT LEG PAIN: ICD-10-CM

## 2018-04-30 DIAGNOSIS — M54.50 RIGHT-SIDED LOW BACK PAIN WITHOUT SCIATICA, UNSPECIFIED CHRONICITY: Primary | ICD-10-CM

## 2018-04-30 PROCEDURE — 97110 THERAPEUTIC EXERCISES: CPT

## 2018-04-30 PROCEDURE — 97012 MECHANICAL TRACTION THERAPY: CPT

## 2018-05-02 ENCOUNTER — HOSPITAL ENCOUNTER (OUTPATIENT)
Dept: PHYSICAL THERAPY | Facility: HOSPITAL | Age: 73
Setting detail: THERAPIES SERIES
Discharge: HOME | End: 2018-05-02

## 2018-05-02 DIAGNOSIS — M54.50 RIGHT-SIDED LOW BACK PAIN WITHOUT SCIATICA, UNSPECIFIED CHRONICITY: Primary | ICD-10-CM

## 2018-05-02 DIAGNOSIS — M79.604 RIGHT LEG PAIN: ICD-10-CM

## 2018-05-02 PROCEDURE — 97113 AQUATIC THERAPY/EXERCISES: CPT | Performed by: PHYSICAL THERAPIST

## 2018-05-02 NOTE — THERAPY TREATMENT NOTE
Outpatient Physical Therapy Ortho Treatment Note   [facility]     Patient Name: Jeff Bass  : 1945  MRN: [id number]  Today's Date: 2018      Visit Date: 2018    Visit Dx:    ICD-10-CM ICD-9-CM   1. Right-sided low back pain without sciatica, unspecified chronicity M54.5 724.2   2. Right leg pain M79.604 729.5       Patient Active Problem List   Diagnosis   • Health care maintenance   • Benign prostatic hyperplasia   • Chronic lymphocytic leukemia   • Hypertension   • Hypovitaminosis D   • Hyperlipidemia   • Hypogonadism in male   • Vitamin D deficiency   • Prostate cancer        Past Medical History:   Diagnosis Date   • Chronic kidney disease     Kidney stones   • CLL (chronic lymphocytic leukemia)    • Disease of thyroid gland     enlarged   • Hyperlipidemia    • Hypertension    • Prostate CA    • Vitamin D deficiency         Past Surgical History:   Procedure Laterality Date   • BRONCHOSCOPY N/A 2016    Procedure: BRONCHOSCOPY WITH  BAL AND BIOPSY AND ENDOBRONCHIAL ULTRASOUND  AND NAVIGATION WITH BRUSHINGS AND BIOPSY AND BAL;  Surgeon: Pierre Manning MD;  Location: [facility] ENDOSCOPY;  Service:    • PROSTATE RADIOACTIVE SEED IMPLANT                               PT Assessment/Plan     Row Name 18 0954          PT Assessment    Assessment Comments Patient continues with pain primarily when getting up from sitting but doesn't think it's as bad as it was when he first started.  Progressed reps with some ther ex and added a couple new UE/core ex today.  Patient continues to need cuing for posture, core activation, and proper form/technique with ther ex.    -RA        PT Plan    PT Plan Comments Continue with aquatic therapy working on core/LE strength/stabilization, land therapy for stabilization and modalities (traction) as beneficial.  -RA       User Key  (r) = Recorded By, (t) = Taken By, (c) = Cosigned By    Initials Name Provider Type    RA Lnaette West, PT  Physical Therapist                    Exercises     Row Name 05/02/18 0900             Subjective Comments    Subjective Comments Pain continues with sit<->stand but doesn’t seem to be as bad when it hits. Had traction Friday but think I felt better after ther water than I did after the traction.  -RA         Subjective Pain    Able to rate subjective pain? yes  -RA      Pre-Treatment Pain Level 8  -RA      Subjective Pain Comment 7-8 with getting up from sitting  -RA         Aquatics    Aquatics performed? Yes  -RA         Exercise 3    Exercise Name 3 See  aquatic flowsheet in chart for ex details.   -RA        User Key  (r) = Recorded By, (t) = Taken By, (c) = Cosigned By    Initials Name Provider Type    RA Lanette West, PT Physical Therapist                             Therapy Education  Given: Symptoms/condition management, Posture/body mechanics  How Provided: Verbal, Demonstration  Provided to: Patient  Level of Understanding: Verbalized              Time Calculation:   Start Time: 0900  Stop Time: 0945  Time Calculation (min): 45 min  Total Timed Code Minutes- PT: 45 minute(s)    Therapy Charges for Today     Code Description Service Date Service Provider Modifiers Qty    82053533147 HC PT AQUATIC THERAPY EA 15 MIN 5/2/2018 Lanette West, PT GP 3                    Lanette West PT  5/2/2018

## 2018-05-08 ENCOUNTER — HOSPITAL ENCOUNTER (OUTPATIENT)
Dept: PHYSICAL THERAPY | Facility: HOSPITAL | Age: 73
Setting detail: THERAPIES SERIES
Discharge: HOME | End: 2018-05-08

## 2018-05-08 DIAGNOSIS — M54.50 RIGHT-SIDED LOW BACK PAIN WITHOUT SCIATICA, UNSPECIFIED CHRONICITY: Primary | ICD-10-CM

## 2018-05-08 DIAGNOSIS — M79.604 RIGHT LEG PAIN: ICD-10-CM

## 2018-05-08 PROCEDURE — 97113 AQUATIC THERAPY/EXERCISES: CPT | Performed by: PHYSICAL THERAPIST

## 2018-05-08 NOTE — THERAPY TREATMENT NOTE
Outpatient Physical Therapy Ortho Treatment Note   [facility]     Patient Name: Jeff Bass  : 1945  MRN: [id number]  Today's Date: 2018      Visit Date: 2018    Visit Dx:    ICD-10-CM ICD-9-CM   1. Right-sided low back pain without sciatica, unspecified chronicity M54.5 724.2   2. Right leg pain M79.604 729.5       Patient Active Problem List   Diagnosis   • Health care maintenance   • Benign prostatic hyperplasia   • Chronic lymphocytic leukemia   • Hypertension   • Hypovitaminosis D   • Hyperlipidemia   • Hypogonadism in male   • Vitamin D deficiency   • Prostate cancer        Past Medical History:   Diagnosis Date   • Chronic kidney disease     Kidney stones   • CLL (chronic lymphocytic leukemia)    • Disease of thyroid gland     enlarged   • Hyperlipidemia    • Hypertension    • Prostate CA    • Vitamin D deficiency         Past Surgical History:   Procedure Laterality Date   • BRONCHOSCOPY N/A 2016    Procedure: BRONCHOSCOPY WITH  BAL AND BIOPSY AND ENDOBRONCHIAL ULTRASOUND  AND NAVIGATION WITH BRUSHINGS AND BIOPSY AND BAL;  Surgeon: Pierre Manning MD;  Location: [facility] ENDOSCOPY;  Service:    • PROSTATE RADIOACTIVE SEED IMPLANT                               PT Assessment/Plan     Row Name 18 1345          PT Assessment    Assessment Comments worked on more educational aspect of LSS with emphasis on flexion, nerve flossing, and decompression ex. will get pt some written HEP for next session  -ZK       User Key  (r) = Recorded By, (t) = Taken By, (c) = Cosigned By    Initials Name Provider Type    ZK Zorre Zeno Kimura, PT Physical Therapist                    Exercises     Row Name 18 1300             Subjective Comments    Subjective Comments pt with some relief for 12 or more hours after rx but pain returns. does well in $3,000 office chair at home and afraid to make road trip to [place] due to LBP  -ZK         Subjective Pain    Able to rate subjective pain? yes   -ZK      Pre-Treatment Pain Level 5  -ZK      Post-Treatment Pain Level 4  -ZK         Aquatics    Aquatics performed? Yes  -ZK         Exercise 3    Exercise Name 3 see flow sheet: added quad stretch and more flossing ex for sciatic nerve  -ZK        User Key  (r) = Recorded By, (t) = Taken By, (c) = Cosigned By    Initials Name Provider Type    ASIMK Zorre Zeno Kimura, PT Physical Therapist                                            Time Calculation:   Start Time: 0900  Stop Time: 0945  Time Calculation (min): 45 min  Total Timed Code Minutes- PT: 44 minute(s)    Therapy Charges for Today     Code Description Service Date Service Provider Modifiers Qty    28904957423 HC PT AQUATIC THERAPY EA 15 MIN 5/8/2018 Zorre Zeno Kimura, PT GP 3                    Zorre Zeno Kimura, PT  5/8/2018

## 2018-05-10 ENCOUNTER — APPOINTMENT (OUTPATIENT)
Dept: PHYSICAL THERAPY | Facility: HOSPITAL | Age: 73
End: 2018-05-10

## 2018-05-15 ENCOUNTER — APPOINTMENT (OUTPATIENT)
Dept: PHYSICAL THERAPY | Facility: HOSPITAL | Age: 73
End: 2018-05-15

## 2018-05-17 ENCOUNTER — APPOINTMENT (OUTPATIENT)
Dept: PHYSICAL THERAPY | Facility: HOSPITAL | Age: 73
End: 2018-05-17

## 2018-05-21 ENCOUNTER — TRANSCRIBE ORDERS (OUTPATIENT)
Dept: ADMINISTRATIVE | Facility: HOSPITAL | Age: 73
End: 2018-05-21

## 2018-05-21 DIAGNOSIS — M54.5 CHRONIC LOW BACK PAIN, UNSPECIFIED BACK PAIN LATERALITY, WITH SCIATICA PRESENCE UNSPECIFIED: Primary | ICD-10-CM

## 2018-05-21 DIAGNOSIS — G89.29 CHRONIC LOW BACK PAIN, UNSPECIFIED BACK PAIN LATERALITY, WITH SCIATICA PRESENCE UNSPECIFIED: Primary | ICD-10-CM

## 2018-05-24 NOTE — PROGRESS NOTES
Subjective   Patient ID: Jeff Bass is a 72 y.o. male is being seen for consultation today at the request of Jovani Salomon,* for leg pain    Today the patient reports pain in the lower right leg with numbness in the foot and toes. He reports that he has seen some improvement since he started going to physical therapy.    Leg Pain    The incident occurred more than 1 week ago. There was no injury mechanism. The pain is present in the right knee, right ankle, right heel, right foot and right toes. Associated symptoms include numbness. Pertinent negatives include no inability to bear weight or tingling.       The following portions of the patient's history were reviewed and updated as appropriate: allergies, current medications, past family history, past medical history, past social history, past surgical history and problem list.    Review of Systems   Neurological: Positive for numbness. Negative for tingling.   All other systems reviewed and are negative.    The patient is generally active. 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 He has had about a 2-month history of pain in his calf and his ankle and foot when he stands up. He has had a history of knee problems and has had stem cell injections into his knees by the orthopedic surgeons. The pain in the right calf and the ankle occurs when he stands up. It does not bother him when he is sitting. After he has been up for a while it stops hurting him and he can walk all he wants and stand all he wants but it is just the transition from the sitting to the standing position that hurts and he does this many times per day. He has no neurological deficits. We discussed his lumbar MRI which does show severe stenosis at L3-L4 and L4-L5. I do think these symptoms are coming from his stenosis in his lumbar spine. They do represent early symptoms. He has already been scheduled to have an epidural block by Parag Breaux MD, on 06/05/2018. I  told him that is not a bad idea at this point. He has also been doing physical therapy exercises which seem to help his symptoms but we will touch base with him in 1 month to see if the block helped him. Again he is not debilitated by this and I will take a decidedly conservative nonoperative approach at least at the outset with this particular patient.       Objective   Physical Exam   Constitutional: He is oriented to person, place, and time. He appears well-developed and well-nourished.   HENT:   Head: Normocephalic and atraumatic.   Eyes: Conjunctivae and EOM are normal. Pupils are equal, round, and reactive to light.   Fundoscopic exam:       The right eye shows no papilledema. The right eye shows venous pulsations.        The left eye shows no papilledema. The left eye shows venous pulsations.   Neck: Carotid bruit is not present.   Neurological: He is oriented to person, place, and time. He has a normal Finger-Nose-Finger Test and a normal Heel to Shin Test. Gait normal.   Reflex Scores:       Tricep reflexes are 2+ on the right side and 2+ on the left side.       Bicep reflexes are 2+ on the right side and 2+ on the left side.       Brachioradialis reflexes are 2+ on the right side and 2+ on the left side.       Patellar reflexes are 2+ on the right side and 2+ on the left side.       Achilles reflexes are 2+ on the right side and 2+ on the left side.  Psychiatric: His speech is normal.     Neurologic Exam     Mental Status   Oriented to person, place, and time.   Registration of memory: Good recent and remote memory.   Attention: normal. Concentration: normal.   Speech: speech is normal   Level of consciousness: alert  Knowledge: consistent with education.     Cranial Nerves     CN II   Visual fields full to confrontation.   Visual acuity: normal    CN III, IV, VI   Pupils are equal, round, and reactive to light.  Extraocular motions are normal.     CN V   Facial sensation intact.   Right corneal reflex:  normal  Left corneal reflex: normal    CN VII   Facial expression full, symmetric.   Right facial weakness: none  Left facial weakness: none    CN VIII   Hearing: intact    CN IX, X   Palate: symmetric    CN XI   Right sternocleidomastoid strength: normal  Left sternocleidomastoid strength: normal    CN XII   Tongue: not atrophic  Tongue deviation: none    Motor Exam   Muscle bulk: normal  Right arm tone: normal  Left arm tone: normal  Right leg tone: normal  Left leg tone: normal    Strength   Strength 5/5 except as noted.     Sensory Exam   Light touch normal.     Gait, Coordination, and Reflexes     Gait  Gait: normal    Coordination   Finger to nose coordination: normal  Heel to shin coordination: normal    Reflexes   Right brachioradialis: 2+  Left brachioradialis: 2+  Right biceps: 2+  Left biceps: 2+  Right triceps: 2+  Left triceps: 2+  Right patellar: 2+  Left patellar: 2+  Right achilles: 2+  Left achilles: 2+  Right : 2+  Left : 2+      Assessment/Plan   Independent Review of Radiographic Studies:    The lumbar MRI done on 4/11/18 was reviewed.  He does show some dextroscoliosis and multilevel degenerative disc disease.  Most significant finding though is spinal stenosis at L3-L4 and L4-L5 which can be quite severe.  Agree with the report.      Medical Decision Making:    I think his symptoms are early symptoms from his spinal stenosis.  I encouraged him to go ahead and get the epidural block by Dr. Breaux on 6/5/18 and see me in 1 month.  He can utilize epidural blocks to help his symptoms on an intermittent basis.      Jeff was seen today for leg pain.    Diagnoses and all orders for this visit:    Spinal stenosis of lumbar region with neurogenic claudication    DDD (degenerative disc disease), lumbar      Return in about 4 weeks (around 6/26/2018).

## 2018-05-25 ENCOUNTER — HOSPITAL ENCOUNTER (OUTPATIENT)
Dept: PHYSICAL THERAPY | Facility: HOSPITAL | Age: 73
Setting detail: THERAPIES SERIES
Discharge: HOME | End: 2018-05-25

## 2018-05-25 DIAGNOSIS — M79.604 RIGHT LEG PAIN: ICD-10-CM

## 2018-05-25 DIAGNOSIS — M54.50 RIGHT-SIDED LOW BACK PAIN WITHOUT SCIATICA, UNSPECIFIED CHRONICITY: Primary | ICD-10-CM

## 2018-05-25 PROCEDURE — 97012 MECHANICAL TRACTION THERAPY: CPT

## 2018-05-25 PROCEDURE — G8979 MOBILITY GOAL STATUS: HCPCS

## 2018-05-25 PROCEDURE — 97110 THERAPEUTIC EXERCISES: CPT

## 2018-05-25 PROCEDURE — G8978 MOBILITY CURRENT STATUS: HCPCS

## 2018-05-25 NOTE — THERAPY RE-EVALUATION
Outpatient Physical Therapy Ortho Progress Note   Psychiatric     Patient Name: Jeff Bass  : 1945  MRN: [id number]  Today's Date: 2018      Visit Date: 2018    Patient Active Problem List   Diagnosis   • Health care maintenance   • Benign prostatic hyperplasia   • Chronic lymphocytic leukemia   • Hypertension   • Hypovitaminosis D   • Hyperlipidemia   • Hypogonadism in male   • Vitamin D deficiency   • Prostate cancer        Past Medical History:   Diagnosis Date   • Chronic kidney disease     Kidney stones   • CLL (chronic lymphocytic leukemia)    • Disease of thyroid gland     enlarged   • Hyperlipidemia    • Hypertension    • Prostate CA    • Vitamin D deficiency         Past Surgical History:   Procedure Laterality Date   • BRONCHOSCOPY N/A 2016    Procedure: BRONCHOSCOPY WITH  BAL AND BIOPSY AND ENDOBRONCHIAL ULTRASOUND  AND NAVIGATION WITH BRUSHINGS AND BIOPSY AND BAL;  Surgeon: Pierre Manning MD;  Location: [facility] ENDOSCOPY;  Service:    • PROSTATE RADIOACTIVE SEED IMPLANT         Visit Dx:     ICD-10-CM ICD-9-CM   1. Right-sided low back pain without sciatica, unspecified chronicity M54.5 724.2   2. Right leg pain M79.604 729.5                           Therapy Education  Education Details: progressed HEP, discussed body mechanics and posture   Given: Symptoms/condition management, HEP  Program: Reinforced  How Provided: Demonstration, Verbal, Written  Provided to: Patient  Level of Understanding: Verbalized, Teach back education performed, Demonstrated           PT OP Goals     Row Name 18 1600          PT Short Term Goals    STG Date to Achieve 18  -LS     STG 1 Independent with home exer program/aquatic ex  -LS     STG 1 Progress Partially Met  -LS     STG 1 Progress Comments Pt compliant with land HEP, limited performance of aquaic.  -LS     STG 2 Pt reporting pain in R leg no worse than 7/10  -LS     STG 2 Progress Ongoing  -LS     STG 2 Progress  Comments 8/10 in AM with STS  -LS     STG 3 Pt demsontrates improved standing posture without cuing  -LS     STG 3 Progress Ongoing  -LS     STG 3 Progress Comments continued forward head, rounded shoulders.  -LS     STG 4 pt demonstrating 75%- full trunk rotation, bilaterally  -LS     STG 4 Progress Met  -LS        Long Term Goals    LTG Date to Achieve 06/15/18  -LS     LTG 1 Pt with improved oswestry scoreby 5 points  -LS     LTG 1 Progress Ongoing  -LS     LTG 1 Progress Comments Pt reports little change in pain levels.  -LS     LTG 2 Pt able to stand from a chair without pain shooting down his leg.  -LS     LTG 2 Progress Ongoing  -LS     LTG 2 Progress Comments Pt continues to report shooting pain intermittently.  -LS     LTG 3 Pt to start walking program with goal for walking 1 mile without increasig leg pain  -LS     LTG 3 Progress Ongoing  -LS     LTG 3 Progress Comments Pt has not yet initiated walking program.  -LS       User Key  (r) = Recorded By, (t) = Taken By, (c) = Cosigned By    Initials Name Provider Type    LS Lillie Fowler, PT Physical Therapist                PT Assessment/Plan     Row Name 05/25/18 165          PT Assessment    Functional Limitations Impaired gait;Performance in work activities  -LS     Impairments Poor body mechanics;Posture;Pain;Range of motion;Locomotion;Sensation  -LS     Assessment Comments Pt has participated in 6 total land and aquatic therapy sessions to address sharp low back pain and RLE pain with STS transition primarily. He reports little change in symptoms today. However, previously he did report improvement but has experienced recent return of symptoms. Pt reports relief after aquatic therapy and lumbar traction but symptoms return after 1-2 days. He is tolerating pickle ball playing for 3 hours but reports this is tolerated bc he does not sit down. Pt continues to demonstrate poor posture and dec postural awareness. He has difficulty engaging TA musculature. He  will see neuro MD next week and plans to resume therapy following this appt and epidural once next steps are determined.   -LS     Please refer to paper survey for additional self-reported information Yes  -LS     Patient/caregiver participated in establishment of treatment plan and goals Yes  -LS     Patient would benefit from skilled therapy intervention Yes  -LS        PT Plan    PT Frequency 2x/week  -LS     Predicted Duration of Therapy Intervention (OT Eval) 4 weeks combined land and aquatic  -LS     PT Plan Comments Follow up after MD appt, continue combined land and aquatic therapy to improve posture, core strength, and neural gliding.   -LS       User Key  (r) = Recorded By, (t) = Taken By, (c) = Cosigned By    Initials Name Provider Type    LS Lillie Fowler, PT Physical Therapist                Modalities     Row Name 05/25/18 1600             Moist Heat    MH Applied Yes  -LS      Location lumbar spine  -LS      Rx Minutes 10 mins  -LS         Traction 59442    Traction Type Lumbar  -LS      Rx Minutes 10  -LS      Duration Intermittent  -LS      Position Hook-lying  -LS      Weight 80   /55  -LS      Hold 45  -LS      Relax 15  -LS        User Key  (r) = Recorded By, (t) = Taken By, (c) = Cosigned By    Initials Name Provider Type    LS Lillie Fowler, PT Physical Therapist              Exercises     Row Name 05/25/18 1600             Subjective Comments    Subjective Comments My pain was increased after the pool last time so I didn't go back. I was better after traction. When I first get up it is bad but I played pickle ball this morning and it was fine.  -LS         Subjective Pain    Able to rate subjective pain? yes  -LS      Pre-Treatment Pain Level 8  -LS      Subjective Pain Comment Right now its a 0/10 but when I get up it is an 8.   -LS         Aquatics    Aquatics performed? No  -LS         Exercise 1    Exercise Name 1 PPT  -LS      Reps 1 15  -LS      Time 1 5  -LS         Exercise 2     Exercise Name 2 stair HS/gastroc stretch  -LS      Reps 2 3  -LS      Time 2 20  -LS         Exercise 3    Exercise Name 3 LTR on blue ball  -LS      Reps 3 20  -LS         Exercise 4    Exercise Name 4 HS roll in blue ball  -LS      Reps 4 15  -LS         Exercise 5    Exercise Name 5 prone hip extension  -LS      Reps 5 15  -LS      Additional Comments B  -LS         Exercise 6    Exercise Name 6 prone on elbows   -LS      Sets 6 2  -LS      Time 6 30 seconds  -LS         Exercise 7    Exercise Name 7 SKTC  -LS      Reps 7 3  -LS      Time 7 20  -LS         Exercise 8    Exercise Name 8 seated R sciatic nerve glide  -LS      Reps 8 15  -LS      Time 8 5  -LS        User Key  (r) = Recorded By, (t) = Taken By, (c) = Cosigned By    Initials Name Provider Type    LS Lillie Fowler PT Physical Therapist                                  Time Calculation:   Start Time: 1610  Stop Time: 1655  Time Calculation (min): 45 min  Total Timed Code Minutes- PT: 30 minute(s)     Therapy Charges for Today     Code Description Service Date Service Provider Modifiers Qty    80690312354 HC PT MOBILITY CURRENT 5/25/2018 Lillie Fowler, PT GP, CK 1    81945840502 HC PT MOBILITY PROJECTED 5/25/2018 Lillie Fowler, PT GP, CJ 1    04998331760 HC PT THER PROC EA 15 MIN 5/25/2018 Lillie Fowler, PT GP 2    47537249743 HC PT HOT OR COLD PACK TREAT MCARE 5/25/2018 Lillie Fowler, PT GP 1    33551592351 HC PT TRACTION LUMBAR 5/25/2018 Lillie Fowler, PT GP 1          PT G-Codes  PT Professional Judgement Used?: Yes  Functional Limitation: Mobility: Walking and moving around  Mobility: Walking and Moving Around Current Status (): At least 40 percent but less than 60 percent impaired, limited or restricted  Mobility: Walking and Moving Around Goal Status (): At least 20 percent but less than 40 percent impaired, limited or restricted         Lillie Fowler PT  5/25/2018

## 2018-05-29 ENCOUNTER — OFFICE VISIT (OUTPATIENT)
Dept: NEUROSURGERY | Facility: CLINIC | Age: 73
End: 2018-05-29

## 2018-05-29 VITALS
HEART RATE: 83 BPM | WEIGHT: 243 LBS | HEIGHT: 77 IN | BODY MASS INDEX: 28.69 KG/M2 | SYSTOLIC BLOOD PRESSURE: 122 MMHG | DIASTOLIC BLOOD PRESSURE: 71 MMHG

## 2018-05-29 DIAGNOSIS — M51.36 DDD (DEGENERATIVE DISC DISEASE), LUMBAR: ICD-10-CM

## 2018-05-29 DIAGNOSIS — M48.062 SPINAL STENOSIS OF LUMBAR REGION WITH NEUROGENIC CLAUDICATION: Primary | ICD-10-CM

## 2018-05-29 PROCEDURE — 99204 OFFICE O/P NEW MOD 45 MIN: CPT | Performed by: NEUROLOGICAL SURGERY

## 2018-06-05 ENCOUNTER — ANESTHESIA (OUTPATIENT)
Dept: PAIN MEDICINE | Facility: HOSPITAL | Age: 73
End: 2018-06-05

## 2018-06-05 ENCOUNTER — HOSPITAL ENCOUNTER (OUTPATIENT)
Dept: GENERAL RADIOLOGY | Facility: HOSPITAL | Age: 73
Discharge: HOME | End: 2018-06-05

## 2018-06-05 ENCOUNTER — ANESTHESIA EVENT (OUTPATIENT)
Dept: PAIN MEDICINE | Facility: HOSPITAL | Age: 73
End: 2018-06-05

## 2018-06-05 ENCOUNTER — HOSPITAL ENCOUNTER (OUTPATIENT)
Dept: PAIN MEDICINE | Facility: HOSPITAL | Age: 73
Discharge: HOME | End: 2018-06-05
Attending: INTERNAL MEDICINE | Admitting: INTERNAL MEDICINE

## 2018-06-05 VITALS
HEART RATE: 80 BPM | BODY MASS INDEX: 29.16 KG/M2 | TEMPERATURE: 98 F | RESPIRATION RATE: 16 BRPM | WEIGHT: 247 LBS | HEIGHT: 77 IN | DIASTOLIC BLOOD PRESSURE: 75 MMHG | OXYGEN SATURATION: 94 % | SYSTOLIC BLOOD PRESSURE: 125 MMHG

## 2018-06-05 DIAGNOSIS — M54.5 CHRONIC LOW BACK PAIN, UNSPECIFIED BACK PAIN LATERALITY, WITH SCIATICA PRESENCE UNSPECIFIED: ICD-10-CM

## 2018-06-05 DIAGNOSIS — G89.29 CHRONIC LOW BACK PAIN, UNSPECIFIED BACK PAIN LATERALITY, WITH SCIATICA PRESENCE UNSPECIFIED: ICD-10-CM

## 2018-06-05 DIAGNOSIS — R52 PAIN: ICD-10-CM

## 2018-06-05 PROCEDURE — 0 IOPAMIDOL 41 % SOLUTION: Performed by: ANESTHESIOLOGY

## 2018-06-05 PROCEDURE — 25010000002 METHYLPREDNISOLONE PER 80 MG: Performed by: ANESTHESIOLOGY

## 2018-06-05 PROCEDURE — 77003 FLUOROGUIDE FOR SPINE INJECT: CPT

## 2018-06-05 PROCEDURE — C1755 CATHETER, INTRASPINAL: HCPCS

## 2018-06-05 RX ORDER — MIDAZOLAM HYDROCHLORIDE 1 MG/ML
1 INJECTION INTRAMUSCULAR; INTRAVENOUS
Status: DISCONTINUED | OUTPATIENT
Start: 2018-06-05 | End: 2018-06-06 | Stop reason: HOSPADM

## 2018-06-05 RX ORDER — FENTANYL CITRATE 50 UG/ML
50 INJECTION, SOLUTION INTRAMUSCULAR; INTRAVENOUS AS NEEDED
Status: DISCONTINUED | OUTPATIENT
Start: 2018-06-05 | End: 2018-06-06 | Stop reason: HOSPADM

## 2018-06-05 RX ORDER — LIDOCAINE HYDROCHLORIDE 10 MG/ML
1 INJECTION, SOLUTION INFILTRATION; PERINEURAL ONCE
Status: DISCONTINUED | OUTPATIENT
Start: 2018-06-05 | End: 2018-06-06 | Stop reason: HOSPADM

## 2018-06-05 RX ORDER — METHYLPREDNISOLONE ACETATE 80 MG/ML
80 INJECTION, SUSPENSION INTRA-ARTICULAR; INTRALESIONAL; INTRAMUSCULAR; SOFT TISSUE ONCE
Status: COMPLETED | OUTPATIENT
Start: 2018-06-05 | End: 2018-06-05

## 2018-06-05 RX ADMIN — METHYLPREDNISOLONE ACETATE 80 MG: 80 INJECTION, SUSPENSION INTRA-ARTICULAR; INTRALESIONAL; INTRAMUSCULAR; SOFT TISSUE at 13:28

## 2018-06-05 RX ADMIN — IOPAMIDOL 10 ML: 408 INJECTION, SOLUTION INTRATHECAL at 13:28

## 2018-06-05 NOTE — ANESTHESIA PROCEDURE NOTES
PAIN Epidural block    Patient location during procedure: pain clinic  Start Time: 6/5/2018 1:22 PM  Stop Time: 6/5/2018 1:29 PM  Indication:at surgeon's request and procedure for pain  Performed By  Anesthesiologist: ZAID KEMP  Preanesthetic Checklist  Completed: patient identified, site marked, surgical consent, pre-op evaluation, timeout performed, IV checked, risks and benefits discussed and monitors and equipment checked  Additional Notes  Dx:  Post-Op Diagnosis Codes:     * Lumbar spinal stenosis (M48.061)     * Lumbar degenerative disc disease (M51.36)  80 mg depomedrol in epid    Plan : return to clinic as needed  Prep:  Pt Position:prone (prone)  Sterile Tech:cap, gloves, mask and sterile barrier  Prep:chlorhexidine gluconate and isopropyl alcohol  Monitoring:blood pressure monitoring, EKG and continuous pulse oximetry  Procedure:  Sedation: no   Approach:midline  Guidance: fluoroscopy and c arm pa and lat and loss of resistance  Location:lumbar  Level:4-5 (interlaminar)  Needle Type:Wabeebwatead  Needle Gauge:20  Aspiration:negative  Medications:  Depomedrol:80 mg  Preservative Free Saline:3mL  Isovue:2mL    Post Assessment:  Post-procedure: bandaid.  Pt Tolerance:patient tolerated the procedure well with no apparent complications  Complications:no

## 2018-06-05 NOTE — H&P
[facility]    History and Physical    Patient Name: Jeff Bass  :  1945  MRN:  [id number]  Date of Admission: 2018    Subjective     Patient is a 72 y.o. male presents with chief complaint of chronic, constant, severe, ? etiology, 0-10/10, sharp, numb, leg: below the knee pain.  Onset of symptoms was gradual starting several years ago.  Symptoms are associated/aggravated by activity. Symptoms improve with certain positions    The following portions of the patients history were reviewed and updated as appropriate: current medications, allergies, past medical history, past surgical history, past family history, past social history and problem list                Objective     Past Medical History:   Past Medical History:   Diagnosis Date   • Chronic kidney disease     Kidney stones   • CLL (chronic lymphocytic leukemia)    • Disease of thyroid gland     enlarged   • Hyperlipidemia    • Hypertension    • Prostate CA    • Vitamin D deficiency      Past Surgical History:   Past Surgical History:   Procedure Laterality Date   • BRONCHOSCOPY N/A 2016    Procedure: BRONCHOSCOPY WITH  BAL AND BIOPSY AND ENDOBRONCHIAL ULTRASOUND  AND NAVIGATION WITH BRUSHINGS AND BIOPSY AND BAL;  Surgeon: Pierre Manning MD;  Location: [facility] ENDOSCOPY;  Service:    • PROSTATE RADIOACTIVE SEED IMPLANT       Family History:   Family History   Problem Relation Age of Onset   • Heart disease Father         Rheumatic heart disease   • Stroke Mother      Social History:   Social History   Substance Use Topics   • Smoking status: Never Smoker   • Smokeless tobacco: Never Used   • Alcohol use No       Vital Signs Range for the last 24 hours  Temperature:     Temp Source:     BP:     Pulse:     Respirations:     SPO2:     O2 Amount (l/min):     O2 Devices     Weight:           --------------------------------------------------------------------------------    Current Outpatient Prescriptions   Medication Sig Dispense  Refill   • alfuzosin (UROXATRAL) 10 MG 24 hr tablet      • Aspirin (ASPIR-81 PO) Take 81 mg by mouth.     • Cholecalciferol (VITAMIN D3) 96322 UNITS capsule Take by mouth.     • Cyanocobalamin (B-12 PO) Take 5,000 mg by mouth.     • doxycycline (ORAXYL) 20 MG capsule Take 20 mg by mouth Every 12 (Twelve) Hours.     • gabapentin (NEURONTIN) 100 MG capsule TK 4 CS PO QHS  2   • losartan (COZAAR) 25 MG tablet Take 25 mg by mouth.     • losartan-hydrochlorothiazide (HYZAAR) 100-25 MG per tablet Take 1 tablet by mouth Daily.     • potassium chloride (K-DUR,KLOR-CON) 20 MEQ CR tablet TAKE 1 TABLET EVERY DAY 90 tablet 0   • pravastatin (PRAVACHOL) 10 MG tablet      • Red Yeast Rice Extract (RED YEAST RICE PO) Take 1,200 mg by mouth.     • triamcinolone (KENALOG) 0.025 % ointment Apply  topically 2 (Two) Times a Day. Apply on left buttock twice a day 15 g 0   • triptorelin pamoate (TRELSTAR) 11.25 MG reconstituted suspension IM suspension Inject  into the shoulder, thigh, or buttocks As Needed.       Current Facility-Administered Medications   Medication Dose Route Frequency Provider Last Rate Last Dose   • fentaNYL citrate (PF) (SUBLIMAZE) injection 50 mcg  50 mcg Intravenous PRN Clifton Davalos MD       • iopamidol (ISOVUE-M 200) injection 41%  3 mL Epidural Once in imaging Clifton Davalos MD       • lidocaine (XYLOCAINE) 1 % injection 1 mL  1 mL Intradermal Once Clifton Davalos MD       • methylPREDNISolone acetate (DEPO-medrol) injection 80 mg  80 mg Intramuscular Once Clifton Davalos MD       • midazolam (VERSED) injection 1 mg  1 mg Intravenous Q5 Min PRN Clifton Davalos MD           --------------------------------------------------------------------------------  Assessment/Plan      Anesthesia Evaluation     Patient summary reviewed and Nursing notes reviewed         Pain impairs ability to perform ADLs: Sleeping  Modalities previously tried to control pain with limited effectiveness: OTC medications     Airway    Mallampati: II  Dental - normal exam     Pulmonary - negative pulmonary ROS and normal exam   Cardiovascular - normal exam    (+) hypertension,       Neuro/Psych- negative ROS and neuro exam normal  GI/Hepatic/Renal/Endo - negative ROS     Musculoskeletal (-) negative ROS and normal exam    Abdominal  - normal exam   Substance History - negative use     OB/GYN negative ob/gyn ROS         Other            Phys Exam Other: exercise         Diagnosis and Plan    Treatment Plan  ASA 2      Procedures: Lumbar Epidural Steroid Injection(LESI), With fluoroscopy,       Anesthetic plan and risks discussed with patient.          Diagnosis     * Lumbar spinal stenosis [M48.061]     * Lumbar degenerative disc disease [M51.36]                      PAST MEDICAL HISTORY:  Current Outpatient Prescriptions on File Prior to Encounter   Medication Sig Dispense Refill   • alfuzosin (UROXATRAL) 10 MG 24 hr tablet      • Aspirin (ASPIR-81 PO) Take 81 mg by mouth.     • Cholecalciferol (VITAMIN D3) 37158 UNITS capsule Take by mouth.     • doxycycline (ORAXYL) 20 MG capsule Take 20 mg by mouth Every 12 (Twelve) Hours.     • gabapentin (NEURONTIN) 100 MG capsule TK 4 CS PO QHS  2   • losartan (COZAAR) 25 MG tablet Take 25 mg by mouth.     • losartan-hydrochlorothiazide (HYZAAR) 100-25 MG per tablet Take 1 tablet by mouth Daily.     • potassium chloride (K-DUR,KLOR-CON) 20 MEQ CR tablet TAKE 1 TABLET EVERY DAY 90 tablet 0   • pravastatin (PRAVACHOL) 10 MG tablet      • Red Yeast Rice Extract (RED YEAST RICE PO) Take 1,200 mg by mouth.     • triamcinolone (KENALOG) 0.025 % ointment Apply  topically 2 (Two) Times a Day. Apply on left buttock twice a day 15 g 0   • triptorelin pamoate (TRELSTAR) 11.25 MG reconstituted suspension IM suspension Inject  into the shoulder, thigh, or buttocks As Needed.     • [DISCONTINUED] Coenzyme Q10 (COQ10 PO) Take  by mouth. Liquid form  2 tablespoons daily     • [DISCONTINUED] Cyanocobalamin (B-12 PO) Take  5,000 mg by mouth.       No current facility-administered medications on file prior to encounter.        Past Medical History:   Diagnosis Date   • Chronic kidney disease     Kidney stones   • CLL (chronic lymphocytic leukemia)    • Disease of thyroid gland     enlarged   • Hyperlipidemia    • Hypertension    • Prostate CA    • Vitamin D deficiency          SOCIAL HISTORY:  No tobacco    REVIEW OF SYSTEMS:  No hematologic infectious or constitutional symptoms  Other review of systems non-contributory    PHYSICAL EXAM:  There were no vitals taken for this visit.  Well-developed well-nourished no acute distress  Extra ocular movements intact  Mallampati class 2 airway  Cardiac:  Regular rate and rhythm  Lungs:  Clear to auscultation bilaterally with good effort  Alert and oriented ×3  Deep tendon reflexes normal in the bilateral patella  Negative straight leg raise bilaterally  5 out of 5 strength bilateral upper and lower extremities  Lumbar spine without obvious deformities ecchymoses  Lumbar spine nontender to palpation      DIAGNOSIS:  Post-Op Diagnosis Codes:     * Lumbar spinal stenosis [M48.061]     * Lumbar degenerative disc disease [M51.36]    PLAN:  1.  Lumbar epidural steroid injections, up to 3, spaced 1-2 weeks apart.  If pain control is acceptable after 1 or 2 injections, it was discussed with the patient that they may return for the subsequent injections if and when their pain returns.  The risks were discussed with the patient including failure of relief, worsening pain, Headache (post dural puncture headache), bleeding (epidural hematoma) and infection (epidural abscess or skin infection).  2.  Physical therapy exercises at home as prescribed by physical therapy or from the pain clinic handout (given to the patient).  Continuation of these exercises every day, or multiple times per week, even when the patient has good pain relief, was stressed to the patient as a preventative measure to decrease the  frequency and severity of future pain episodes.  3.  Continue pain medicines as already prescribed.  If patient not currently taking any, it is recommended to begin Acetaminophen 1000 mg po q 8 hours.  If other medicines containing Acetaminophen are currently prescribed, maintain daily dose at 3000 mg.    4.  If they can tolerate NSAIDS, it is recommended to take Ibuprofen 600 mg po q 6 hours for 7 days during pain exacerbations.  Alternatively, they may substitute an NSAID of their choice (e.g. Aleve).  This may be taken at the same time as Acetaminophen.  5.  Heat and ice to the affected area as tolerated for pain control.  It was discussed that heating pads can cause burns.  6.  Daily low impact exercise such as walking or water exercise was recommended to maintain overall health and aid in weight control.   7.  Follow up as needed for subsequent injections.  8.  Patient was counseled to abstain from tobacco products.

## 2018-06-20 ENCOUNTER — APPOINTMENT (OUTPATIENT)
Dept: ONCOLOGY | Facility: CLINIC | Age: 73
End: 2018-06-20

## 2018-06-20 ENCOUNTER — APPOINTMENT (OUTPATIENT)
Dept: LAB | Facility: HOSPITAL | Age: 73
End: 2018-06-20

## 2018-06-29 ENCOUNTER — OFFICE VISIT (OUTPATIENT)
Dept: NEUROSURGERY | Facility: CLINIC | Age: 73
End: 2018-06-29

## 2018-06-29 VITALS
HEART RATE: 84 BPM | SYSTOLIC BLOOD PRESSURE: 133 MMHG | WEIGHT: 247 LBS | DIASTOLIC BLOOD PRESSURE: 74 MMHG | BODY MASS INDEX: 29.16 KG/M2 | HEIGHT: 77 IN

## 2018-06-29 DIAGNOSIS — M48.062 SPINAL STENOSIS OF LUMBAR REGION WITH NEUROGENIC CLAUDICATION: Primary | ICD-10-CM

## 2018-06-29 DIAGNOSIS — M51.36 DDD (DEGENERATIVE DISC DISEASE), LUMBAR: ICD-10-CM

## 2018-06-29 PROCEDURE — 99213 OFFICE O/P EST LOW 20 MIN: CPT | Performed by: NEUROLOGICAL SURGERY

## 2018-07-18 ENCOUNTER — ANESTHESIA EVENT (OUTPATIENT)
Dept: PAIN MEDICINE | Facility: HOSPITAL | Age: 73
End: 2018-07-18

## 2018-07-18 ENCOUNTER — HOSPITAL ENCOUNTER (OUTPATIENT)
Dept: PAIN MEDICINE | Facility: HOSPITAL | Age: 73
Discharge: HOME | End: 2018-07-18
Admitting: ANESTHESIOLOGY

## 2018-07-18 ENCOUNTER — LAB (OUTPATIENT)
Dept: LAB | Facility: HOSPITAL | Age: 73
End: 2018-07-18

## 2018-07-18 ENCOUNTER — HOSPITAL ENCOUNTER (OUTPATIENT)
Dept: GENERAL RADIOLOGY | Facility: HOSPITAL | Age: 73
Discharge: HOME | End: 2018-07-18

## 2018-07-18 ENCOUNTER — ANESTHESIA (OUTPATIENT)
Dept: PAIN MEDICINE | Facility: HOSPITAL | Age: 73
End: 2018-07-18

## 2018-07-18 ENCOUNTER — OFFICE VISIT (OUTPATIENT)
Dept: ONCOLOGY | Facility: CLINIC | Age: 73
End: 2018-07-18

## 2018-07-18 VITALS
RESPIRATION RATE: 16 BRPM | HEART RATE: 81 BPM | BODY MASS INDEX: 28.34 KG/M2 | SYSTOLIC BLOOD PRESSURE: 129 MMHG | TEMPERATURE: 98.3 F | WEIGHT: 240 LBS | OXYGEN SATURATION: 93 % | HEIGHT: 77 IN | DIASTOLIC BLOOD PRESSURE: 72 MMHG

## 2018-07-18 VITALS
TEMPERATURE: 98.7 F | SYSTOLIC BLOOD PRESSURE: 120 MMHG | RESPIRATION RATE: 16 BRPM | OXYGEN SATURATION: 97 % | BODY MASS INDEX: 28.34 KG/M2 | HEIGHT: 77 IN | WEIGHT: 240 LBS | HEART RATE: 74 BPM | DIASTOLIC BLOOD PRESSURE: 70 MMHG

## 2018-07-18 DIAGNOSIS — C91.10 CHRONIC LYMPHOCYTIC LEUKEMIA (HCC): ICD-10-CM

## 2018-07-18 DIAGNOSIS — R52 PAIN: ICD-10-CM

## 2018-07-18 DIAGNOSIS — C91.10 CHRONIC LYMPHOCYTIC LEUKEMIA (HCC): Primary | ICD-10-CM

## 2018-07-18 LAB
ALBUMIN SERPL-MCNC: 4.1 G/DL (ref 3.5–5.2)
ALBUMIN/GLOB SERPL: 1.4 G/DL (ref 1.1–2.4)
ALP SERPL-CCNC: 50 U/L (ref 38–116)
ALT SERPL W P-5'-P-CCNC: 8 U/L (ref 0–41)
ANION GAP SERPL CALCULATED.3IONS-SCNC: 9 MMOL/L
AST SERPL-CCNC: 12 U/L (ref 0–40)
BASOPHILS # BLD AUTO: 0.08 10*3/MM3 (ref 0–0.1)
BASOPHILS NFR BLD AUTO: 1.1 % (ref 0–1.1)
BILIRUB SERPL-MCNC: 0.9 MG/DL (ref 0.1–1.2)
BUN BLD-MCNC: 12 MG/DL (ref 6–20)
BUN/CREAT SERPL: 13.6 (ref 7.3–30)
CALCIUM SPEC-SCNC: 9.6 MG/DL (ref 8.5–10.2)
CHLORIDE SERPL-SCNC: 99 MMOL/L (ref 98–107)
CO2 SERPL-SCNC: 29 MMOL/L (ref 22–29)
CREAT BLD-MCNC: 0.88 MG/DL (ref 0.7–1.3)
DEPRECATED RDW RBC AUTO: 39.8 FL (ref 37–49)
EOSINOPHIL # BLD AUTO: 0.2 10*3/MM3 (ref 0–0.36)
EOSINOPHIL NFR BLD AUTO: 2.8 % (ref 1–5)
ERYTHROCYTE [DISTWIDTH] IN BLOOD BY AUTOMATED COUNT: 12.8 % (ref 11.7–14.5)
GFR SERPL CREATININE-BSD FRML MDRD: 85 ML/MIN/1.73
GLOBULIN UR ELPH-MCNC: 2.9 GM/DL (ref 1.8–3.5)
GLUCOSE BLD-MCNC: 97 MG/DL (ref 74–124)
HCT VFR BLD AUTO: 43.7 % (ref 40–49)
HGB BLD-MCNC: 15.2 G/DL (ref 13.5–16.5)
IMM GRANULOCYTES # BLD: 0.06 10*3/MM3 (ref 0–0.03)
IMM GRANULOCYTES NFR BLD: 0.8 % (ref 0–0.5)
LDH SERPL-CCNC: 141 U/L (ref 99–259)
LYMPHOCYTES # BLD AUTO: 1.36 10*3/MM3 (ref 1–3.5)
LYMPHOCYTES NFR BLD AUTO: 19 % (ref 20–49)
MCH RBC QN AUTO: 29.8 PG (ref 27–33)
MCHC RBC AUTO-ENTMCNC: 34.8 G/DL (ref 32–35)
MCV RBC AUTO: 85.7 FL (ref 83–97)
MONOCYTES # BLD AUTO: 0.71 10*3/MM3 (ref 0.25–0.8)
MONOCYTES NFR BLD AUTO: 9.9 % (ref 4–12)
NEUTROPHILS # BLD AUTO: 4.74 10*3/MM3 (ref 1.5–7)
NEUTROPHILS NFR BLD AUTO: 66.4 % (ref 39–75)
NRBC BLD MANUAL-RTO: 0 /100 WBC (ref 0–0)
PLATELET # BLD AUTO: 213 10*3/MM3 (ref 150–375)
PMV BLD AUTO: 9.2 FL (ref 8.9–12.1)
POTASSIUM BLD-SCNC: 3.8 MMOL/L (ref 3.5–4.7)
PROT SERPL-MCNC: 7 G/DL (ref 6.3–8)
RBC # BLD AUTO: 5.1 10*6/MM3 (ref 4.3–5.5)
SODIUM BLD-SCNC: 137 MMOL/L (ref 134–145)
WBC NRBC COR # BLD: 7.15 10*3/MM3 (ref 4–10)

## 2018-07-18 PROCEDURE — 99214 OFFICE O/P EST MOD 30 MIN: CPT | Performed by: INTERNAL MEDICINE

## 2018-07-18 PROCEDURE — 25010000002 METHYLPREDNISOLONE PER 80 MG: Performed by: ANESTHESIOLOGY

## 2018-07-18 PROCEDURE — 85025 COMPLETE CBC W/AUTO DIFF WBC: CPT | Performed by: INTERNAL MEDICINE

## 2018-07-18 PROCEDURE — 80053 COMPREHEN METABOLIC PANEL: CPT | Performed by: INTERNAL MEDICINE

## 2018-07-18 PROCEDURE — 77003 FLUOROGUIDE FOR SPINE INJECT: CPT

## 2018-07-18 PROCEDURE — 36415 COLL VENOUS BLD VENIPUNCTURE: CPT | Performed by: INTERNAL MEDICINE

## 2018-07-18 PROCEDURE — 0 IOPAMIDOL 41 % SOLUTION: Performed by: ANESTHESIOLOGY

## 2018-07-18 PROCEDURE — 83615 LACTATE (LD) (LDH) ENZYME: CPT | Performed by: INTERNAL MEDICINE

## 2018-07-18 PROCEDURE — C1755 CATHETER, INTRASPINAL: HCPCS

## 2018-07-18 RX ORDER — METHYLPREDNISOLONE ACETATE 80 MG/ML
80 INJECTION, SUSPENSION INTRA-ARTICULAR; INTRALESIONAL; INTRAMUSCULAR; SOFT TISSUE ONCE
Status: COMPLETED | OUTPATIENT
Start: 2018-07-18 | End: 2018-07-18

## 2018-07-18 RX ORDER — MIDAZOLAM HYDROCHLORIDE 1 MG/ML
1 INJECTION INTRAMUSCULAR; INTRAVENOUS
Status: DISCONTINUED | OUTPATIENT
Start: 2018-07-18 | End: 2018-07-19 | Stop reason: HOSPADM

## 2018-07-18 RX ORDER — FENTANYL CITRATE 50 UG/ML
50 INJECTION, SOLUTION INTRAMUSCULAR; INTRAVENOUS AS NEEDED
Status: DISCONTINUED | OUTPATIENT
Start: 2018-07-18 | End: 2018-07-19 | Stop reason: HOSPADM

## 2018-07-18 RX ORDER — LIDOCAINE HYDROCHLORIDE 10 MG/ML
1 INJECTION, SOLUTION INFILTRATION; PERINEURAL ONCE
Status: DISCONTINUED | OUTPATIENT
Start: 2018-07-18 | End: 2018-07-19 | Stop reason: HOSPADM

## 2018-07-18 RX ADMIN — METHYLPREDNISOLONE ACETATE 80 MG: 80 INJECTION, SUSPENSION INTRA-ARTICULAR; INTRALESIONAL; INTRAMUSCULAR; SOFT TISSUE at 14:22

## 2018-07-18 RX ADMIN — IOPAMIDOL 2 ML: 408 INJECTION, SOLUTION INTRATHECAL at 14:22

## 2018-07-18 NOTE — PROGRESS NOTES
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     HISTORY OF PRESENT ILLNESS: This patient returns today to the office stating that he has had significant workup at [facility] recently doing cardiac assessment regarding coronary circulation.  He decided to do this because he has been having sensation of cardiac skip, skipping the 1st thing in the morning when he 1st wakes up in absence of any angina or syncopal episodes. Other than that the patient passed his heart testing with no difficulties and he is going to get connected with Jillian Burleson MD at [facility] in order to further review his cardiac irregularity.  The patient is not having any issues regarding his leukemia with no fevers, no chills, no sweats, no pruritus, no adenopathy, no pain.  Normal performance status.  In regard to his prostate cancer he is not having any issues at this time and his PSA has further dropped to 1.6.    He is going to have an epidural injection done this afternoon because he is having a significant degree of back pain associated with degenerative arthritis.  Otherwise he has no other new problems in life and his overall condition remains good.                PAST MEDICAL HISTORY:  Significant for hyperlipidemia.  He also has vitamin D deficiency and has undergone replacement of vitamin D.  SUE ponce has no history of hypertension, cardiovascular disease, diabetes, asthma or respiratory problems.  He was worked up by Dr. Luis Ortega in 2009 for an incidentally found mass in the liver through MRI and CT scan that turned out to be a hemangioma.  He al  s  o has benign cysts in the liver.  PET scan in that location in 2009 disclosed no abnormal uptake.  On that occasion, it was also found that the patient had a retrosternal thyroid enlargement with a normal TSH.  The patient had no symptomatology in regard   to thyroid  abnormalities otherwise.          The patient has had kidney stones in the past on one occasion.  He had trauma as a child of the middle finger of the right hand with some deformity in the nail that never grew back normally.  Otherwise, he has not had   any hospitalizations or other interventions.          HEMATOLOGIC/ONCOLOGIC HISTORY: (History from previous dates can be found in the separate document.)        On 2016 no clinical evidence of recurrence of his CLL.  Hematological parameters were normal.  He had no B symptoms, no autoimmunity, no infections.  He was advised to remain in observation.          MEDICATIONS: The current medication list was reviewed with the patient and updated in the EMR this date per the medical assistant.  Medical dosages and frequencies were confirmed to be accurate.         ALLERGIES:  No known drug allergies.          SOCIAL HISTORY:  Lives with his wife (who has developed Alzheimer and requires total care at home).  One child, a daughter age 36, who just had her first baby recently.  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  He does not smoke and does not drink alcohol.  The patient, as per 2014, had a lengthy conversation with Dr. Schroeder in regard to the future of his wife who has advanced Alzheimer nena  ntia.  In the way I see things, according to the patient’s report, I think she is going to be institutionalized very soon, given the circumstances and level of care that she requires.  As of 2014 please see HPI.         FAMILY HISTORY:  Father  of heart   disease after rheumatic heart disease was found.  Mother  at age 97.  He has no brothers or sisters.  As per 2015, in regard to further illness in his ill wife who has dementia with significant changes in her overall situation with more somnole  nce, likely stroke and like an infection of some sort.         REVIEW OF SYSTEMS:      General: no fever, chills,  "fatigue, weight changes, or lack of appetite.  Eyes: no epiphora, xerophthalmia,conjunctivitis, pain, glaucoma, blurred vision, blindness, secretion, photophobia, proptosis, diplopia.  Ears: no otorrhea, tinnitus, otorrhagia, deafness, pain, vertigo.  Nose: no rhinorrhea, epistaxis, alteration in perception of odors, sinuses pressure.  Mouth: no alteration in gums or teeth,  ulcers, no difficulty with mastication or deglut ion, no odynophagia.  Neck: no masses or pain, no thyroid alterations, no pain in muscles or arteries, no carotid odynia, no crepitation.  Respiratory: no cough, sputum production, dyspnea, trepopnea, pleuritic pain, hemoptysis.  Heart: no syncope,stated irregularity, palpitations,no angina, orthopnea, paroxysmal nocturnal dyspnea.  Vascular Venous: no tenderness,edema, palpable cords, postphlebitic syndrome, skin changes or ulcerations.  Vascular Arterial: no distal ischemia, claudication, gangrene, neuropathic ischemic pain, skin ulcers, paleness or cyanosis.  GI: no dysphagia, odynophagia, no regurgitation, no heartburn,no indigestion,no nausea,no vomiting,no hematemesis ,no melena,no jaundice,no distention, no obstipation,no enterorrhagia,no proctalgia,no anal  lesions, nochanges in bowel habits.  : no frequency, hesitancy, hematuria, discharge, pain.  Musculoskeletal: stated muscle or tendon pain or inflammation, joint pain, edema, functional limitation,no  fasciculations, mass.  Neurologic: no headache, seizures, alterations on Craneal nerves, no motor or senssory deficit, normal coordination, no alteration in memory, orientation, calculation,writting, verbal or written language.  Skin: no rashes, pruritus or localized lesions.  Psychiatric: no anxiety, depression, agitation, delusions, proper insight.          VITAL SIGNS:  Vitals:    07/18/18 1121   BP: 120/70   Pulse: 74   Resp: 16   Temp: 98.7 °F (37.1 °C)   SpO2: 97%   Weight: 109 kg (240 lb)   Height: 196 cm (77.17\")     "       PAIN:  0 out of 10      ECO         PHYSICAL EXAMINATION:    GENERAL:  Well-developed, well-nourished  Patient  in no acute distress.   SKIN:  Warm, dry without rashes, purpura or petechiae.  HEENT:  Pupils were equal and reactive to light and accomodation, conjunctivas non injected, no pterigion, normal extraocular movements, normal visual acuity.   Mouth mucosa was moist, no exudates in oropharynx, normal gum line, normal roof of the mouth and pillars, normal papillations of the tongue.  NECK:  Supple with good range of motion; no thyromegaly or masses, no JVD or bruits, no cervical adenopathies.No carotid arteries pain, no carotid abnormal pulsation or arterial dance.  LYMPHATICS:  No cervical, supraclavicular, axillary, epitrochlear or inguinal adenopathy.  CHEST:  Normal excursion of both francis thoraces, normal voice fremitus, no subcutaneous emphysema, normal axillas, no rashes or acanthosis nigricans. Lungs clear to percussion and auscultation, normal breath sounds bilaterally, no wheezing, crackles or ronchi, no stridor, no rubs.  CARDIAC AND VASCULAR: PMI not displaced,no thrills, normal rate and regular rhythm, without murmurs, rubs or S3 or S4 right or left sided gallops. Normal femoral, popliteal, pedis, brachial and carotid pulses.  ABDOMEN:  Soft, nontender with no organomegaly or masses, no ascites, no collateral circulation,no distention,no Eddie sign, no abdominal pain, no inguinal hernias,no umbilical hernias, no abdominal bruits. Normal bowel sounds.  GENITAL: Not  Performed.  EXTREMITIES  AND SPINE:  No clubbing, cyanosis or edema, no deformities or pain .No kyphosis, scoliosis, deformities or pain in spine, ribs or pelvic bone.pain in lumbar spine  NEUROLOGICAL:  Patient was awake, alert, oriented to time, person and place            LABORATORY DATA:  Component      Latest Ref Rng & Units 3/28/2018 2018          11:36 AM 10:48 AM   WBC      4.00 - 10.00 10*3/mm3 7.20 7.15   RBC       4.30 - 5.50 10*6/mm3 5.00 5.10   Hemoglobin      13.5 - 16.5 g/dL 15.2 15.2   Hematocrit      40.0 - 49.0 % 42.2 43.7   MCV      83.0 - 97.0 fL 84.4 85.7   MCH      27.0 - 33.0 pg 30.4 29.8   MCHC      32.0 - 35.0 g/dL 36.0 (H) 34.8   RDW      11.7 - 14.5 % 12.7 12.8   RDW-SD      37.0 - 49.0 fl 38.7 39.8   MPV      8.9 - 12.1 fL 9.2 9.2   Platelets      150 - 375 10*3/mm3 215 213   Neutrophil %      39.0 - 75.0 % 67.7 66.4   Lymphocyte %      20.0 - 49.0 % 18.9 (L) 19.0 (L)   Monocyte %      4.0 - 12.0 % 9.6 9.9   Eosinophil %      1.0 - 5.0 % 2.1 2.8   Basophil %      0.0 - 1.1 % 1.0 1.1   Immature Grans %      0.0 - 0.5 % 0.7 (H) 0.8 (H)   Neutrophils, Absolute      1.50 - 7.00 10*3/mm3 4.88 4.74   Lymphocytes, Absolute      1.00 - 3.50 10*3/mm3 1.36 1.36   Monocytes, Absolute      0.25 - 0.80 10*3/mm3 0.69 0.71   Eosinophils, Absolute      0.00 - 0.36 10*3/mm3 0.15 0.20   Basophils, Absolute      0.00 - 0.10 10*3/mm3 0.07 0.08                    ASSESSMENT: 1.  Patient has history of chronic lymphoid leukemia that required therapy with Rituxan a year ago and since then the patient has had remission with normal white count, normal hemoglobin, normal platelet count and total lymphocyte remains low today.  He has no palpable peripheral adenopathy, no hepatosplenomegaly, no B symptoms, no autoimmunity and no infections.  He will remain in observation from this point of view.   2.  Patient has had recently a workup at [facility].  He did this because he was experiencing palpitations.  His cardiac testing that I have reviewed in Epic available to me shows an excellent treadmill test with normal perfusion through coronary arteries. The patient will follow up with Jillian Burleson MD in order to continue monitoring his cardiac irregularity.  I would not be surprised if a loop-recording device is placed on him for further analysis.    3.  In regard to his previous history of prostate cancer  the patient’s PSA keeps dropping, he has had implanted beads, he is not experiencing too much trouble at this time in regard to urination.  This will be watched in absence of any other intervention.     RECOMMENDATIONS:  I will review him back in 4 months with a CBC and CMP at that time. Otherwise no other intervention from my point of view.  It seems to me that the stem cells that he is receiving in his knees as successful in regard to pain control and mobility.  Again, if they transferred CLL cells into the joints remain to be seen.  At least he has no symptoms related to this.

## 2018-07-18 NOTE — ANESTHESIA PROCEDURE NOTES
PAIN Epidural block    Patient location during procedure: pain clinic  Start Time: 7/18/2018 2:16 PM  Stop Time: 7/18/2018 2:23 PM  Indication:at surgeon's request and procedure for pain  Performed By  Anesthesiologist: ZAID KEMP  Preanesthetic Checklist  Completed: patient identified, site marked, surgical consent, pre-op evaluation, timeout performed, IV checked, risks and benefits discussed and monitors and equipment checked  Additional Notes  Dx:  Post-Op Diagnosis Codes:     * Lumbar spinal stenosis (M48.061)     * Lumbar degenerative disc disease (M51.36)  80 mg depomedrol in epid    Plan : return to clinic as needed  Prep:  Pt Position:prone (prone)  Sterile Tech:cap, gloves, mask and sterile barrier  Prep:chlorhexidine gluconate and isopropyl alcohol  Monitoring:blood pressure monitoring, EKG and continuous pulse oximetry  Procedure:  Sedation: no   Approach:midline  Guidance: fluoroscopy and c arm pa and lat and loss of resistance  Location:lumbar  Level:5-6 (interlaminar   L5S1)  Needle Type:Sociercisetead  Needle Gauge:20  Aspiration:negative  Medications:  Depomedrol:80 mg  Preservative Free Saline:3mL  Isovue:2mL    Post Assessment:  Post-procedure: bandaid.  Pt Tolerance:patient tolerated the procedure well with no apparent complications  Complications:no

## 2018-07-18 NOTE — H&P
INTERVAL HISTORY:    The patient returns for another Lumbar epidural steroid injection 2 today.  They have received 40 % improvement since their last injection with a pain level of 5 /10 at its worst recently.    Conservative measures tried in the interim. Daily activities are still affected by the pain.    Radiology reports and/or previous notes have been reviewed and are consistent with their diagnosis of Post-Op Diagnosis Codes:     * Lumbar spinal stenosis [M48.061]     * Lumbar degenerative disc disease [M51.36]    Alert and oriented  MP - 2  Lungs - clear  CV - rrr    Diagnosis:  Post-Op Diagnosis Codes:     * Lumbar spinal stenosis [M48.061]     * Lumbar degenerative disc disease [M51.36]      Plan:  Lumbar epidural steroid injection under fluoroscopic guidance    I have encouraged them to continue:  1.  Physical therapy exercises at home as prescribed by physical therapy or from the pain clinic handout (given to the patient).  Continuation of these exercises every day, or multiple times per week, even when the patient has good pain relief, was stressed to the patient as a preventative measure to decrease the frequency and severity of future pain episodes.  2.  Continue pain medicines as already prescribed.  If patient not currently taking any, it is recommended to begin Acetaminophen 1000 mg po q 8 hours.  If other medicines containing Acetaminophen are currently prescribed, maintain daily dose at 3000mg.    3.  If they can tolerate NSAIDS, it is recommended to take Ibuprofen 600 mg po q 6 hours for 7 days during pain exacerbations.   Alternatively, they may substitute an NSAID of their choice (e.g. Aleve)  4.  Heat and ice to the affected area as tolerated for pain control.  It was discussed that heating pads can cause burns.  5.  Low impact exercise such as walking or water exercise was recommended to maintain overall health and aid in weight control.   6.  Follow up as needed for subsequent  injections.  7.  Patient was counseled to abstain from tobacco products.

## 2018-08-08 ENCOUNTER — OFFICE VISIT (OUTPATIENT)
Dept: CARDIOLOGY | Facility: CLINIC | Age: 73
End: 2018-08-08

## 2018-08-08 VITALS
SYSTOLIC BLOOD PRESSURE: 124 MMHG | WEIGHT: 239 LBS | BODY MASS INDEX: 28.22 KG/M2 | RESPIRATION RATE: 16 BRPM | HEIGHT: 77 IN | DIASTOLIC BLOOD PRESSURE: 70 MMHG | HEART RATE: 96 BPM

## 2018-08-08 DIAGNOSIS — I25.10 CORONARY ARTERY DISEASE INVOLVING NATIVE HEART WITHOUT ANGINA PECTORIS, UNSPECIFIED VESSEL OR LESION TYPE: Primary | ICD-10-CM

## 2018-08-08 DIAGNOSIS — I49.3 PVC (PREMATURE VENTRICULAR CONTRACTION): ICD-10-CM

## 2018-08-08 DIAGNOSIS — E78.2 MIXED HYPERLIPIDEMIA: ICD-10-CM

## 2018-08-08 DIAGNOSIS — I10 ESSENTIAL HYPERTENSION: ICD-10-CM

## 2018-08-08 PROCEDURE — 93000 ELECTROCARDIOGRAM COMPLETE: CPT | Performed by: INTERNAL MEDICINE

## 2018-08-08 PROCEDURE — 99204 OFFICE O/P NEW MOD 45 MIN: CPT | Performed by: INTERNAL MEDICINE

## 2018-08-08 RX ORDER — ASPIRIN 81 MG/1
81 TABLET ORAL DAILY
COMMUNITY
End: 2018-12-13

## 2018-08-08 RX ORDER — MELOXICAM 15 MG/1
15 TABLET ORAL DAILY
COMMUNITY
End: 2019-01-31

## 2018-08-08 NOTE — PROGRESS NOTES
PATIENTINFORMATION    Date of Office Visit: 2018  Encounter Provider: Jillian Courtney MD  Place of Service: [facility]  Patient Name: Jeff Bass  : 1945    Subjective:     Encounter Date:2018      Patient ID: Jeff Bass is a 72 y.o. male.      History of Present Illness    This is a nice gentleman who was referred by Dr. Schroeder.  In 2016 he had a calcium score performed which revealed his total coronary calcification was 229 Agatston units, which was significantly elevated.  Dr. Schroeder noted some irregular heart rhythm in 2018 so the patient wore a 24-hour Holter monitor which revealed approximately 5,000 premature ventricular contractions which were asymptomatic.  In 2018 the patient started to feel uncomfortable in his chest.  He said it was not really a pain and it was not a palpitation.  He was concerned because he was about to leave on a 6 hour drive by himself to this [facility].  He ended up at the [facility].  On 2018 they did an exercise nuclear stress test.  He had a Duke treadmill score of 5.  He had an ejection fraction of 53% and no evidence of ischemia.  He also had an echocardiogram which revealed normal LV systolic function and an ejection fraction of 57%.  They commented that it was technically difficult.  I cannot tell if they used contrast or not.  Chamber sizes were normal.  There was trace tricuspid regurgitation.  There was aortic valve calcification with mild aortic valve regurgitation.      He says he has been feeling fine.  He plays pickleball three times a week and has had no symptoms with exertion.  His blood pressure is well controlled.  He is on statin therapy as well as aspirin.  He is not having any palpitation symptoms.  He denies shortness of breath, edema, lightheadedness or fatigue.        Review of Systems   Constitution: Negative for fever, malaise/fatigue, weight  "gain and weight loss.   HENT: Negative for ear pain, hearing loss, nosebleeds and sore throat.    Eyes: Negative for double vision, pain, vision loss in left eye and vision loss in right eye.   Cardiovascular:        See history of present illness.   Respiratory: Negative for cough, shortness of breath, sleep disturbances due to breathing, snoring and wheezing.    Endocrine: Negative for cold intolerance, heat intolerance and polyuria.   Skin: Negative for itching, poor wound healing and rash.   Musculoskeletal: Positive for joint pain and joint swelling. Negative for myalgias.   Gastrointestinal: Negative for abdominal pain, diarrhea, hematochezia, nausea and vomiting.   Genitourinary: Negative for hematuria and hesitancy.   Neurological: Negative for numbness, paresthesias and seizures.   Psychiatric/Behavioral: Negative for depression. The patient is not nervous/anxious.            ECG 12 Lead  Date/Time: 8/8/2018 11:22 AM  Performed by: ALMA STEINER  Authorized by: ALMA STEINER   Comparison: compared with previous ECG from 6/15/2017  Similar to previous ECG  Rhythm: sinus rhythm  BPM: 96  Conduction: right bundle branch block               Objective:     /70 (BP Location: Right arm, Patient Position: Sitting, Cuff Size: Adult)   Pulse 96   Resp 16   Ht 195.6 cm (77\")   Wt 108 kg (239 lb)   BMI 28.34 kg/m²  Body mass index is 28.34 kg/m².     Physical Exam   Constitutional: He appears well-developed.   HENT:   Head: Normocephalic and atraumatic.   Eyes: Pupils are equal, round, and reactive to light. Conjunctivae and lids are normal. Lids are everted and swept, no foreign bodies found.   Neck: Normal range of motion. No JVD present. Carotid bruit is not present. No tracheal deviation present. No thyroid mass present.   Cardiovascular: Normal rate, regular rhythm and normal heart sounds.    Pulses:       Dorsalis pedis pulses are 2+ on the right side, and 2+ on the left side.   Pulmonary/Chest: " "Effort normal and breath sounds normal.   Abdominal: Normal appearance and bowel sounds are normal.   Musculoskeletal: Normal range of motion.   Neurological: He is alert. He has normal strength.   Skin: Skin is warm, dry and intact.   Psychiatric: He has a normal mood and affect. His behavior is normal.   Vitals reviewed.      Lab Review: I reviewed lab work from July 2018      Assessment/Plan:       1. Coronary artery disease.  He has an elevated calcium score with a normal stress test.  I reviewed these reports which were performed at the [facility] in 07/2018.  He has normal LV systolic function.  He is on aspirin and statin therapy.  I encouraged regular exercise, good blood pressure control and monitoring his cholesterol.  We talked about the symptoms of coronary disease and when to call back.  2. PVCs.  He had about 5,000 PVCs on a Holter monitor in 2018.  These are asymptomatic.  I do not think that any further workup or change in medication is necessary at this time.  He does have a normal LV systolic function.    3. \"Funny feeling\" in his heart.  That has resolved and has not recurred.  This prior to his echocardiogram and stress test which were unremarkable.   4. Hypertension; well controlled.  Continue losartan and hydrochlorothiazide.    5. Hyperlipidemia.  He is on pravastatin and red yeast rice.    6. History of prostate cancer.    7. History of leukemia.  He follows with Dr. Schroeder.      The patient will followup with Barb in six months just to make sure there has been no change in his symptoms.  If he is feeling well at that time, then I will see him back yearly and maybe every other year after that.      Coronary Artery Disease  Assessment  • The patient has no angina    Plan  • Lifestyle modifications discussed include adhering to a heart healthy diet, regular exercise and regular monitoring of cholesterol and blood pressure    Subjective - Objective  • Current antiplatelet therapy " includes aspirin 81 mg          Orders Placed This Encounter   Procedures   • ECG 12 Lead     This order was created via procedure documentation        Discharge Medications          Accurate as of 8/8/18 12:10 PM. If you have any questions, ask your nurse or doctor.               Continue These Medications      Instructions Start Date   alfuzosin 10 MG 24 hr tablet  Commonly known as:  UROXATRAL   No dose, route, or frequency recorded.      aspirin 81 MG EC tablet   81 mg, Oral, Daily      COENZYME Q-10 PO   Oral      DOXYCYCLINE HYCLATE PO   20 mg, Oral, Daily      gabapentin 100 MG capsule  Commonly known as:  NEURONTIN   TK 4 CS PO QHS      GARLIC PO   Oral      losartan-hydrochlorothiazide 100-25 MG per tablet  Commonly known as:  HYZAAR   1 tablet, Oral, Daily      meloxicam 15 MG tablet  Commonly known as:  MOBIC   15 mg, Oral, Daily      potassium chloride 20 MEQ CR tablet  Commonly known as:  K-DUR,KLOR-CON   TAKE 1 TABLET EVERY DAY      pravastatin 10 MG tablet  Commonly known as:  PRAVACHOL   No dose, route, or frequency recorded.      RED YEAST RICE PO   1,200 mg, Oral      triptorelin pamoate 11.25 MG reconstituted suspension IM suspension  Commonly known as:  TRELSTAR   Intramuscular, As Needed      Vitamin D3 32200 units capsule   Oral                    Jillian Courtney MD  08/08/18  12:10 PM

## 2018-08-09 ENCOUNTER — TRANSCRIBE ORDERS (OUTPATIENT)
Dept: PHYSICAL THERAPY | Facility: HOSPITAL | Age: 73
End: 2018-08-09

## 2018-08-09 DIAGNOSIS — M54.5 LOW BACK PAIN, UNSPECIFIED BACK PAIN LATERALITY, UNSPECIFIED CHRONICITY, WITH SCIATICA PRESENCE UNSPECIFIED: Primary | ICD-10-CM

## 2018-08-20 ENCOUNTER — HOSPITAL ENCOUNTER (OUTPATIENT)
Dept: PHYSICAL THERAPY | Facility: HOSPITAL | Age: 73
Setting detail: THERAPIES SERIES
Discharge: HOME | End: 2018-08-20

## 2018-08-20 DIAGNOSIS — M79.604 RIGHT LEG PAIN: ICD-10-CM

## 2018-08-20 DIAGNOSIS — M54.50 RIGHT-SIDED LOW BACK PAIN WITHOUT SCIATICA, UNSPECIFIED CHRONICITY: Primary | ICD-10-CM

## 2018-08-20 PROCEDURE — G8978 MOBILITY CURRENT STATUS: HCPCS | Performed by: PHYSICAL THERAPIST

## 2018-08-20 PROCEDURE — G8979 MOBILITY GOAL STATUS: HCPCS | Performed by: PHYSICAL THERAPIST

## 2018-08-20 PROCEDURE — 97110 THERAPEUTIC EXERCISES: CPT | Performed by: PHYSICAL THERAPIST

## 2018-08-20 PROCEDURE — 97161 PT EVAL LOW COMPLEX 20 MIN: CPT | Performed by: PHYSICAL THERAPIST

## 2018-08-20 NOTE — THERAPY EVALUATION
Outpatient Physical Therapy Ortho Initial Evaluation  [facility]     Patient Name: Jeff Bass  : 1945  MRN: [id number]  Today's Date: 2018      Visit Date: 2018    Patient Active Problem List   Diagnosis   • Health care maintenance   • Benign prostatic hyperplasia   • Chronic lymphocytic leukemia (CMS/HCC)   • Hypertension   • Hypovitaminosis D   • Hyperlipidemia   • Hypogonadism in male   • Vitamin D deficiency   • Prostate cancer (CMS/HCC)   • Spinal stenosis of lumbar region with neurogenic claudication   • DDD (degenerative disc disease), lumbar   • Coronary artery disease involving native heart without angina pectoris   • PVC (premature ventricular contraction)        Past Medical History:   Diagnosis Date   • Benign prostatic hyperplasia    • Bursitis of right hip    • Chronic kidney disease     Kidney stones   • CLL (chronic lymphocytic leukemia) (CMS/HCC)    • Coronary arteriosclerosis    • Degeneration of lumbar intervertebral disc    • Disease of thyroid gland     enlarged   • Disorder of lipid metabolism    • Hyperlipidemia    • Hypertension    • Impotence of organic origin    • Leukemia (CMS/HCC)    • Localized, primary osteoarthritis    • Lung mass    • Osteoarthritis of knee    • Polyp of colon    • Primary erectile dysfunction    • Primary malignant neoplasm of prostate (CMS/HCC)    • Prostate CA (CMS/HCC)    • Sensory hearing loss, bilateral    • Substernal goiter    • Vitamin D deficiency         Past Surgical History:   Procedure Laterality Date   • BRONCHOSCOPY N/A 2016    Procedure: BRONCHOSCOPY WITH  BAL AND BIOPSY AND ENDOBRONCHIAL ULTRASOUND  AND NAVIGATION WITH BRUSHINGS AND BIOPSY AND BAL;  Surgeon: Pierre Manning MD;  Location: [facility] ENDOSCOPY;  Service:    • PROSTATE RADIOACTIVE SEED IMPLANT         Visit Dx:     ICD-10-CM ICD-9-CM   1. Right-sided low back pain without sciatica, unspecified chronicity M54.5 724.2   2. Right leg pain M79.604 729.5              Patient History     Row Name 08/20/18 1800             History    Chief Complaint Difficulty with daily activities;Difficulty Walking;Pain  -ZK      Type of Pain Back pain;Lower Extremity / Leg  -ZK      Date Current Problem(s) Began 03/16/18  -ZK      Brief Description of Current Complaint pt known to this PT for aquatic therapy returns after 6 sessions from April to May of this year. Pt helped with epidurals earlier this year and now ready for his 3rd of 3 shots on 8-24-18. Focus on flexion per MD and our LSS protocol. Pt still active at work as an , and still feels good playing [employer] 3x/week. Denies back pain at this time having more local right medial shin pain along L4 dermatome which matches with recent MRI.   -ZK      Previous treatment for THIS PROBLEM Pain Management;Rehabilitation  -ZK      Patient/Caregiver Goals --   resolve LE pain and avoid micro surgery  -ZK      Patient's Rating of General Health Good  -ZK      What clinical tests have you had for this problem? MRI  -ZK         Pain     Pain Location Leg  -ZK      Pain at Present 5  -ZK      Pain at Best 1  -ZK      Pain at Worst 5  -ZK      What Performance Factors Make the Current Problem(s) WORSE? standing more than 10-15 minutes  -ZK      What Performance Factors Make the Current Problem(s) BETTER? flexion type ex and activity like pickle ball  -ZK         Fall Risk Assessment    Any falls in the past year: No  -ZK         Services    Are you currently receiving Home Health services No  -ZK         Daily Activities    Primary Language English  -ZK      Barriers to learning None  -ZK      Pt Participated in POC and Goals Yes  -ZK         Safety    Are you being hurt, hit, or frightened by anyone at home or in your life? No  -ZK      Are you being neglected by a caregiver No  -ZK        User Key  (r) = Recorded By, (t) = Taken By, (c) = Cosigned By    Initials Name Provider Type    ZK Kimura, Zorre Zeno, PT Physical  Therapist                PT Ortho     Row Name 08/20/18 1800       Subjective Comments    Subjective Comments Review of notes here from prior rx shows a mix of reactions from water vs. traction not sure what really made him better or worse.   -ZK       Precautions and Contraindications    Precautions avoid any extension, over rotation, or excessive LE stretching for now  -ZK       Posture/Observations    Alignment Options Thoracic kyphosis  -ZK    Thoracic Kyphosis Moderate;Increased  -ZK       DTR- Lower Quarter Clearing    Patellar tendon (L2-4) Right:;1- Minimal response;Left:;2- Normal response  -ZK    Achilles tendon (S1-2) Right:;1- Minimal response;Left:;2- Normal response  -ZK       Neural Tension Signs- Lower Quarter Clearing    SLR Bilateral:;Negative  -ZK       Sensory Screen for Light Touch- Lower Quarter Clearing    L4 (medial lower leg/foot) Diminished;Right:  -ZK       Myotomal Screen- Lower Quarter Clearing    Hip flexion (L2) Right:;4- (Good -);Left:;5 (Normal)  -ZK    Knee extension (L3) Right:;4- (Good -);Left:;5 (Normal)  -ZK    Ankle DF (L4) Right:;4- (Good -);Left:;5 (Normal)  -ZK    Great toe extension (L5) Bilateral:;WNL  -ZK    Ankle PF (S1) Right:;4- (Good -);Left:;WNL  -ZK    Knee flexion (S2) Right:;4- (Good -);Left:;5 (Normal)  -ZK       Lumbar ROM Screen- Lower Quarter Clearing    Lumbar Flexion Impaired   45  -ZK    Lumbar Extension Impaired   0  -ZK    Lumbar Lateral Flexion Impaired   10  bending above lumbar spine  -ZK       Flexibility    Flexibility Tested? Lower Extremity  -ZK       Lower Extremity Flexibility    Hamstrings Bilateral:;Moderately limited  -ZK       Transfers    Comment (Transfers) no issue with sit to stand  -ZK       Gait/Stairs Assessment/Training    Comment (Gait/Stairs) no issues with gait or balance noted beside postural flexion  -ZK      User Key  (r) = Recorded By, (t) = Taken By, (c) = Cosigned By    Initials Name Provider Type    ZK Kimura, Zorre Zeno, PT  Physical Therapist                                  PT OP Goals     Row Name 08/20/18 1800          PT Short Term Goals    STG Date to Achieve 09/20/18  -ZK     STG 1 pt to be indep with aquatic ex following flexion protocol   -ZK     STG 1 Progress New  -ZK     STG 2 pt to state decrease in R LE pain from 5 to 2/10 at worse  -ZK     STG 2 Progress New  -ZK     STG 3 pt to be indep in his land based HEP with improvement in hamstring flexibility from -40 to -35 degrees   -ZK     STG 3 Progress New  -ZK        Long Term Goals    LTG Date to Achieve 10/20/18  -ZK     LTG 1 pt to improve LLOYD from 36 to < 28% by dc  -ZK     LTG 2 pt to feel that he can avoid micro surgery via pool and land based ex indep   -ZK     LTG 3 pt to feel like his R shin pain is more mild and intermittent  -ZK        Time Calculation    PT Goal Re-Cert Due Date 11/20/18  -K       User Key  (r) = Recorded By, (t) = Taken By, (c) = Cosigned By    Initials Name Provider Type    ZK Kimura, Zorre Zeno, PT Physical Therapist                PT Assessment/Plan     Row Name 08/20/18 1839          PT Assessment    Functional Limitations Impaired gait;Performance in work activities  -Z     Impairments Poor body mechanics;Posture;Pain;Range of motion;Locomotion;Sensation  -Z     Assessment Comments Pt continues to be functioning at a high level still working full time and enjoys playing pickle ball too with no increase in LBP. Very tight hamstrings and pt needs some guidance on frequency and specifics of his land and aquatic program. Condition and prognois appears good and stable but patient has little tolerance for the constant tendency of his LE pain thus may opt for micro surgery soon if we can't help him calm this pain down.   -PRECIOUS     Please refer to paper survey for additional self-reported information Yes  -ASIMK     Rehab Potential Good  -ASIMK     Patient/caregiver participated in establishment of treatment plan and goals Yes  -ASIMK     Patient would  benefit from skilled therapy intervention Yes  -ZK        PT Plan    PT Frequency 2x/week  -ZK     Predicted Duration of Therapy Intervention (Therapy Eval) 4 weeks  -ZK     Planned CPT's? PT EVAL LOW COMPLEXITY: 32221;PT AQUATIC THERAPY EA 15 MIN: 43375  -ZK       User Key  (r) = Recorded By, (t) = Taken By, (c) = Cosigned By    Initials Name Provider Type    ZK Kimura, Zorre Zeno, PT Physical Therapist                  Exercises     Row Name 08/20/18 1800             Subjective Comments    Subjective Comments Review of notes here from prior rx shows a mix of reactions from water vs. traction not sure what really made him better or worse.   -ZK         Total Minutes    62877 - PT Therapeutic Exercise Minutes 15  -ZK         Exercise 1    Exercise Name 1 reviewed his prior land HEP and crossed of ext ex and added supine hamstring stretch.   -ZK        User Key  (r) = Recorded By, (t) = Taken By, (c) = Cosigned By    Initials Name Provider Type    ZK Kimura, Zorre Zeno, PT Physical Therapist                        Outcome Measure Options: Modjaquelin Callahan  Modified Oswestry  Modified Oswestry Score/Comments: 36%      Time Calculation:     Therapy Suggested Charges     Code   Minutes Charges    51713 (CPT®) Hc Pt Neuromusc Re Education Ea 15 Min      44544 (CPT®) Hc Pt Ther Proc Ea 15 Min 15 1    92836 (CPT®) Hc Gait Training Ea 15 Min      80986 (CPT®) Hc Pt Therapeutic Act Ea 15 Min      09917 (CPT®) Hc Pt Manual Therapy Ea 15 Min      97470 (CPT®) Hc Pt Ther Massage- Per 15 Min      20995 (CPT®) Hc Pt Iontophoresis Ea 15 Min      09745 (CPT®) Hc Pt Elec Stim Ea-Per 15 Min      31634 (CPT®) Hc Pt Ultrasound Ea 15 Min      90366 (CPT®) Hc Pt Self Care/Mgmt/Train Ea 15 Min      85046 (CPT®) Hc Pt Prosthetic (S) Train Initial Encounter, Each 15 Min      62175 (CPT®) Hc Orthotic(S) Mgmt/Train Initial Encounter, Each 15min      60360 (CPT®) Hc Pt Aquatic Therapy Ea 15 Min      02081 (CPT®) Hc Pt Orthotic(S)/Prosthetic(S)  Encounter, Each 15 Min      Total  15 1          Start Time: 1430  Stop Time: 1515  Time Calculation (min): 45 min  Total Timed Code Minutes- PT: 45 minute(s)     Therapy Charges for Today     Code Description Service Date Service Provider Modifiers Qty    28611126962 HC PT MOBILITY CURRENT 8/20/2018 Kimura, Zorre Zeno, PT GP, CJ 1    83447599703 HC PT MOBILITY PROJECTED 8/20/2018 Kimura, Zorre Zeno, PT GP, CJ 1    64741166315 HC PT THER PROC EA 15 MIN 8/20/2018 Kimura, Zorre Zeno, PT GP 1    68148847198 HC PT AQUA EVAL LOW COMPLEXITY 2 8/20/2018 Kimura, Zorre Zeno, PT GP 1          PT G-Codes  Outcome Measure Options: Josh Callahan  Score: 36%  Functional Limitation: Mobility: Walking and moving around  Mobility: Walking and Moving Around Current Status (): At least 20 percent but less than 40 percent impaired, limited or restricted  Mobility: Walking and Moving Around Goal Status (): At least 20 percent but less than 40 percent impaired, limited or restricted         Zorre Zeno Kimura, PT  8/20/2018

## 2018-08-24 ENCOUNTER — ANESTHESIA EVENT (OUTPATIENT)
Dept: PAIN MEDICINE | Facility: HOSPITAL | Age: 73
End: 2018-08-24

## 2018-08-24 ENCOUNTER — HOSPITAL ENCOUNTER (OUTPATIENT)
Dept: PAIN MEDICINE | Facility: HOSPITAL | Age: 73
Discharge: HOME | End: 2018-08-24
Admitting: ANESTHESIOLOGY

## 2018-08-24 ENCOUNTER — ANESTHESIA (OUTPATIENT)
Dept: PAIN MEDICINE | Facility: HOSPITAL | Age: 73
End: 2018-08-24

## 2018-08-24 ENCOUNTER — HOSPITAL ENCOUNTER (OUTPATIENT)
Dept: GENERAL RADIOLOGY | Facility: HOSPITAL | Age: 73
Discharge: HOME | End: 2018-08-24

## 2018-08-24 VITALS
RESPIRATION RATE: 16 BRPM | SYSTOLIC BLOOD PRESSURE: 125 MMHG | DIASTOLIC BLOOD PRESSURE: 73 MMHG | HEART RATE: 85 BPM | TEMPERATURE: 97.7 F | OXYGEN SATURATION: 93 %

## 2018-08-24 DIAGNOSIS — R52 PAIN: ICD-10-CM

## 2018-08-24 PROCEDURE — 25010000002 METHYLPREDNISOLONE PER 80 MG: Performed by: ANESTHESIOLOGY

## 2018-08-24 PROCEDURE — 77003 FLUOROGUIDE FOR SPINE INJECT: CPT

## 2018-08-24 PROCEDURE — C1755 CATHETER, INTRASPINAL: HCPCS

## 2018-08-24 PROCEDURE — 0 IOPAMIDOL 41 % SOLUTION: Performed by: ANESTHESIOLOGY

## 2018-08-24 RX ORDER — LIDOCAINE HYDROCHLORIDE 10 MG/ML
1 INJECTION, SOLUTION INFILTRATION; PERINEURAL ONCE AS NEEDED
Status: DISCONTINUED | OUTPATIENT
Start: 2018-08-24 | End: 2018-08-25 | Stop reason: HOSPADM

## 2018-08-24 RX ORDER — SODIUM CHLORIDE 0.9 % (FLUSH) 0.9 %
1-10 SYRINGE (ML) INJECTION AS NEEDED
Status: DISCONTINUED | OUTPATIENT
Start: 2018-08-24 | End: 2018-08-25 | Stop reason: HOSPADM

## 2018-08-24 RX ORDER — METHYLPREDNISOLONE ACETATE 80 MG/ML
80 INJECTION, SUSPENSION INTRA-ARTICULAR; INTRALESIONAL; INTRAMUSCULAR; SOFT TISSUE ONCE
Status: COMPLETED | OUTPATIENT
Start: 2018-08-24 | End: 2018-08-24

## 2018-08-24 RX ORDER — MIDAZOLAM HYDROCHLORIDE 1 MG/ML
1 INJECTION INTRAMUSCULAR; INTRAVENOUS AS NEEDED
Status: DISCONTINUED | OUTPATIENT
Start: 2018-08-24 | End: 2018-08-25 | Stop reason: HOSPADM

## 2018-08-24 RX ORDER — FENTANYL CITRATE 50 UG/ML
50 INJECTION, SOLUTION INTRAMUSCULAR; INTRAVENOUS AS NEEDED
Status: DISCONTINUED | OUTPATIENT
Start: 2018-08-24 | End: 2018-08-25 | Stop reason: HOSPADM

## 2018-08-24 RX ADMIN — IOPAMIDOL 2 ML: 408 INJECTION, SOLUTION INTRATHECAL at 13:12

## 2018-08-24 RX ADMIN — METHYLPREDNISOLONE ACETATE 80 MG: 80 INJECTION, SUSPENSION INTRA-ARTICULAR; INTRALESIONAL; INTRAMUSCULAR; SOFT TISSUE at 13:12

## 2018-08-24 NOTE — ANESTHESIA PROCEDURE NOTES
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)      Prep:  Pt Position:prone  Sterile Tech:gloves, mask and sterile barrier  Prep:chlorhexidine gluconate and isopropyl alcohol  Monitoring:blood pressure monitoring, continuous pulse oximetry and EKG  Procedure:  Sedation: no   Approach:right paramedian  Guidance: fluoroscopy  Location:lumbar  Level:3-4 (attempted L4-5 without success)  Needle Type:Tuohy  Needle Gauge:20  Aspiration:negative  Test Dose:negative  Medications:  Depomedrol:80 mg  Preservative Free Saline:3mL  Isovue:3mL  Comments:Isovue dye was instilled and dye spread was confirmed to be consistent with epidural placement    Post Assessment:  Dressing:occlusive dressing applied  Pt Tolerance:patient tolerated the procedure well with no apparent complications  Complications:no

## 2018-08-24 NOTE — H&P
INTERVAL HISTORY:    The patient returns for another Lumbar epidural steroid injection today.  They have received 50-60% improvement since their last injection with a pain level of 6-7/10 at its worst recently.    Conservative measures tried in the interim     Current Outpatient Prescriptions on File Prior to Encounter   Medication Sig Dispense Refill   • alfuzosin (UROXATRAL) 10 MG 24 hr tablet      • Cholecalciferol (VITAMIN D3) 02161 UNITS capsule Take by mouth.     • COENZYME Q-10 PO Take  by mouth.     • DOXYCYCLINE HYCLATE PO Take 20 mg by mouth Daily.     • gabapentin (NEURONTIN) 100 MG capsule TK 4 CS PO QHS  2   • GARLIC PO Take  by mouth.     • losartan-hydrochlorothiazide (HYZAAR) 100-25 MG per tablet Take 1 tablet by mouth Daily.     • meloxicam (MOBIC) 15 MG tablet Take 15 mg by mouth Daily.     • potassium chloride (K-DUR,KLOR-CON) 20 MEQ CR tablet TAKE 1 TABLET EVERY DAY 90 tablet 0   • pravastatin (PRAVACHOL) 10 MG tablet      • Red Yeast Rice Extract (RED YEAST RICE PO) Take 1,200 mg by mouth.     • triptorelin pamoate (TRELSTAR) 11.25 MG reconstituted suspension IM suspension Inject  into the shoulder, thigh, or buttocks As Needed.     • aspirin 81 MG EC tablet Take 81 mg by mouth Daily.       No current facility-administered medications on file prior to encounter.        Past Medical History:   Diagnosis Date   • Benign prostatic hyperplasia    • Bursitis of right hip    • Chronic kidney disease     Kidney stones   • CLL (chronic lymphocytic leukemia) (CMS/HCC)    • Coronary arteriosclerosis    • Degeneration of lumbar intervertebral disc    • Disease of thyroid gland     enlarged   • Disorder of lipid metabolism    • Hyperlipidemia    • Hypertension    • Impotence of organic origin    • Leukemia (CMS/HCC)    • Localized, primary osteoarthritis    • Lung mass    • Osteoarthritis of knee    • Polyp of colon    • Primary erectile dysfunction    • Primary malignant neoplasm of prostate (CMS/HCC)    •  Prostate CA (CMS/HCC)    • Sensory hearing loss, bilateral    • Substernal goiter    • Vitamin D deficiency        No hematologic infectious or constitutional symptoms    Exam:  /71 (BP Location: Left arm, Patient Position: Sitting)   Pulse 81   Temp 36.5 °C (97.7 °F) (Oral)   Resp 16   SpO2 97%   Airway Mallampatti 2  Alert and oriented      Diagnosis:  Post-Op Diagnosis Codes:     * Lumbar stenosis [M48.061]     * Lumbar degenerative disc disease [M51.36]     * Lumbar neuritis [M54.16]    Plan:  Lumbar epidural steroid injection under fluoroscopic guidance    I have encouraged them to continue:  1.  Physical therapy exercises at home as prescribed by physical therapy or from the pain clinic handout (given to the patient).  Continuation of these exercises every day, or multiple times per week, even when the patient has good pain relief, was stressed to the patient as a preventative measure to decrease the frequency and severity of future pain episodes.  2.  Continue pain medicines as already prescribed.  If patient not currently taking any, it is recommended to begin Acetaminophen 1000 mg po q 8 hours.  If other medicines containing Acetaminophen are currently prescribed, maintain daily dose at 3000mg.    3.  If they can tolerate NSAIDS, it is recommended to take Ibuprofen 600 mg po q 6 hours for 7 days during pain exacerbations.   Alternatively, they may substitute an NSAID of their choice (e.g. Aleve)  4.  Heat and ice to the affected area as tolerated for pain control.  It was discussed that heating pads can cause burns.  5.  Low impact exercise such as walking or water exercise was recommended to maintain overall health and aid in weight control.   6.  Follow up as needed for subsequent injections.  7.  Patient was counseled to abstain from tobacco products.

## 2018-09-05 ENCOUNTER — HOSPITAL ENCOUNTER (OUTPATIENT)
Dept: PHYSICAL THERAPY | Facility: HOSPITAL | Age: 73
Setting detail: THERAPIES SERIES
Discharge: HOME | End: 2018-09-05

## 2018-09-05 DIAGNOSIS — M79.604 RIGHT LEG PAIN: ICD-10-CM

## 2018-09-05 DIAGNOSIS — M54.50 RIGHT-SIDED LOW BACK PAIN WITHOUT SCIATICA, UNSPECIFIED CHRONICITY: Primary | ICD-10-CM

## 2018-09-05 PROCEDURE — 97113 AQUATIC THERAPY/EXERCISES: CPT | Performed by: PHYSICAL THERAPIST

## 2018-09-05 NOTE — THERAPY TREATMENT NOTE
Outpatient Physical Therapy Ortho Treatment Note  [facility]     Patient Name: Jeff Bass  : 1945  MRN: [id number]  Today's Date: 2018      Visit Date: 2018    Visit Dx:    ICD-10-CM ICD-9-CM   1. Right-sided low back pain without sciatica, unspecified chronicity M54.5 724.2   2. Right leg pain M79.604 729.5       Patient Active Problem List   Diagnosis   • Health care maintenance   • Benign prostatic hyperplasia   • Chronic lymphocytic leukemia (CMS/HCC)   • Hypertension   • Hypovitaminosis D   • Hyperlipidemia   • Hypogonadism in male   • Vitamin D deficiency   • Prostate cancer (CMS/HCC)   • Spinal stenosis of lumbar region with neurogenic claudication   • DDD (degenerative disc disease), lumbar   • Coronary artery disease involving native heart without angina pectoris   • PVC (premature ventricular contraction)        Past Medical History:   Diagnosis Date   • Benign prostatic hyperplasia    • Bursitis of right hip    • Chronic kidney disease     Kidney stones   • CLL (chronic lymphocytic leukemia) (CMS/HCC)    • Coronary arteriosclerosis    • Degeneration of lumbar intervertebral disc    • Disease of thyroid gland     enlarged   • Disorder of lipid metabolism    • Hyperlipidemia    • Hypertension    • Impotence of organic origin    • Leukemia (CMS/HCC)    • Localized, primary osteoarthritis    • Lung mass    • Osteoarthritis of knee    • Polyp of colon    • Primary erectile dysfunction    • Primary malignant neoplasm of prostate (CMS/HCC)    • Prostate CA (CMS/HCC)    • Sensory hearing loss, bilateral    • Substernal goiter    • Vitamin D deficiency         Past Surgical History:   Procedure Laterality Date   • BRONCHOSCOPY N/A 2016    Procedure: BRONCHOSCOPY WITH  BAL AND BIOPSY AND ENDOBRONCHIAL ULTRASOUND  AND NAVIGATION WITH BRUSHINGS AND BIOPSY AND BAL;  Surgeon: Pierre Manning MD;  Location: [facility] ENDOSCOPY;  Service:    • PROSTATE RADIOACTIVE SEED IMPLANT                                PT Assessment/Plan     Row Name 09/05/18 1602          PT Assessment    Assessment Comments First session in aquatic environment. Educated/instructed on aquatic exercise. Noted significant flexibility restrictions in RLE musculature compared to L. Discussed use of inversion table 10 min every other day to assist with decompression strategy for lumbar spine. Exacerbation of pain today due to extended (6 hour) drive for holiday weekend.  -CK       User Key  (r) = Recorded By, (t) = Taken By, (c) = Cosigned By    Initials Name Provider Type    CK Henrik Kinsey, PT Physical Therapist                    Exercises     Row Name 09/05/18 1400             Subjective Comments    Subjective Comments Went out of town to [place] over the weekend 6 hours there and 6 hours back. Had my last epidural last week. I was painfree for about a week but now its back. I am trying to avoid surgery,  -CK         Subjective Pain    Able to rate subjective pain? yes  -CK      Pre-Treatment Pain Level 6  -CK      Post-Treatment Pain Level 6  -CK         Aquatics    Aquatics performed? Yes  -CK      86752 - Aquatic Therapy Minutes 42  -CK         Aquatics LE    Water Walk forward;side;backward   deep water x 6 laps with TA  -CK      Stretch 1 B calf stretch 2 x 30  -CK      Stretch 2 B piriformis stretch 2 x 30  -CK      Stretch 3 B HS stretch/sweep w/ LN x 12   -CK      Stretch Other 1 B quad stretch w/ LN 2 x30  -CK      Stretch Other 2 DKTC on wall 4 x 5 sec  -CK      Abdominals noodle   LN x 20, feet together  -CK      Hip Abd/Add B x 15  -CK      Mini Squat walking, deep water, 4 laps  -CK      Uni-Squat suspended tuck ups x 10  -CK      Bicycle suspendedx 2 min  -CK        User Key  (r) = Recorded By, (t) = Taken By, (c) = Cosigned By    Initials Name Provider Type    CK Henrik Kinsey, PT Physical Therapist                               PT OP Goals     Row Name 09/05/18 1600          PT Short Term Goals    STG Date  to Achieve 09/20/18  -CK     STG 1 pt to be indep with aquatic ex following flexion protocol   -CK     STG 1 Progress Ongoing  -CK     STG 1 Progress Comments first session  -CK     STG 2 pt to state decrease in R LE pain from 5 to 2/10 at worse  -CK     STG 2 Progress Ongoing  -CK     STG 2 Progress Comments 6/10  -CK     STG 3 pt to be indep in his land based HEP with improvement in hamstring flexibility from -40 to -35 degrees   -CK     STG 3 Progress Ongoing  -CK     STG 4 pt demonstrating 75%- full trunk rotation, bilaterally  -CK     STG 4 Progress Ongoing  -CK        Long Term Goals    LTG Date to Achieve 10/20/18  -CK     LTG 1 pt to improve LLOYD from 36 to < 28% by dc  -CK     LTG 1 Progress Ongoing  -CK     LTG 2 pt to feel that he can avoid micro surgery via pool and land based ex indep   -CK     LTG 2 Progress Ongoing  -CK     LTG 3 pt to feel like his R shin pain is more mild and intermittent  -CK     LTG 3 Progress Ongoing  -CK       User Key  (r) = Recorded By, (t) = Taken By, (c) = Cosigned By    Initials Name Provider Type    CK Henrik Kinsey S, PT Physical Therapist                         Time Calculation:   Start Time: 1430  Stop Time: 1515  Time Calculation (min): 45 min  Total Timed Code Minutes- PT: 45 minute(s)  Therapy Suggested Charges     Code   Minutes Charges    71279 (CPT®) Hc Pt Neuromusc Re Education Ea 15 Min      21898 (CPT®) Hc Pt Ther Proc Ea 15 Min      96074 (CPT®) Hc Gait Training Ea 15 Min      73880 (CPT®) Hc Pt Therapeutic Act Ea 15 Min      77263 (CPT®) Hc Pt Manual Therapy Ea 15 Min      15915 (CPT®) Hc Pt Ther Massage- Per 15 Min      84482 (CPT®) Hc Pt Iontophoresis Ea 15 Min      37159 (CPT®) Hc Pt Elec Stim Ea-Per 15 Min      85306 (CPT®) Hc Pt Ultrasound Ea 15 Min      43649 (CPT®) Hc Pt Self Care/Mgmt/Train Ea 15 Min      21563 (CPT®) Hc Pt Prosthetic (S) Train Initial Encounter, Each 15 Min      63428 (CPT®) Hc Orthotic(S) Mgmt/Train Initial Encounter, Each 15min       96419 (CPT®) Hc Pt Aquatic Therapy Ea 15 Min 42 3    43132 (CPT®) Hc Pt Orthotic(S)/Prosthetic(S) Encounter, Each 15 Min      Total  42 3        Therapy Charges for Today     Code Description Service Date Service Provider Modifiers Qty    95288432243 HC PT AQUATIC THERAPY EA 15 MIN 9/5/2018 Henrik Kinsey, PT GP 3                    Henrik DIOR. Tio, PT  9/5/2018

## 2018-09-10 ENCOUNTER — HOSPITAL ENCOUNTER (OUTPATIENT)
Dept: PHYSICAL THERAPY | Facility: HOSPITAL | Age: 73
Setting detail: THERAPIES SERIES
End: 2018-09-10

## 2018-09-12 ENCOUNTER — APPOINTMENT (OUTPATIENT)
Dept: PHYSICAL THERAPY | Facility: HOSPITAL | Age: 73
End: 2018-09-12

## 2018-09-17 ENCOUNTER — HOSPITAL ENCOUNTER (OUTPATIENT)
Dept: PHYSICAL THERAPY | Facility: HOSPITAL | Age: 73
Setting detail: THERAPIES SERIES
Discharge: HOME | End: 2018-09-17

## 2018-09-17 DIAGNOSIS — M79.604 RIGHT LEG PAIN: ICD-10-CM

## 2018-09-17 DIAGNOSIS — M54.50 RIGHT-SIDED LOW BACK PAIN WITHOUT SCIATICA, UNSPECIFIED CHRONICITY: Primary | ICD-10-CM

## 2018-09-17 PROCEDURE — 97113 AQUATIC THERAPY/EXERCISES: CPT | Performed by: PHYSICAL THERAPIST

## 2018-09-17 NOTE — THERAPY TREATMENT NOTE
Outpatient Physical Therapy Ortho Treatment Note  [facility]     Patient Name: Jeff Bass  : 1945  MRN: [id number]  Today's Date: 2018      Visit Date: 2018    Visit Dx:    ICD-10-CM ICD-9-CM   1. Right-sided low back pain without sciatica, unspecified chronicity M54.5 724.2   2. Right leg pain M79.604 729.5       Patient Active Problem List   Diagnosis   • Health care maintenance   • Benign prostatic hyperplasia   • Chronic lymphocytic leukemia (CMS/HCC)   • Hypertension   • Hypovitaminosis D   • Hyperlipidemia   • Hypogonadism in male   • Vitamin D deficiency   • Prostate cancer (CMS/HCC)   • Spinal stenosis of lumbar region with neurogenic claudication   • DDD (degenerative disc disease), lumbar   • Coronary artery disease involving native heart without angina pectoris   • PVC (premature ventricular contraction)        Past Medical History:   Diagnosis Date   • Benign prostatic hyperplasia    • Bursitis of right hip    • Chronic kidney disease     Kidney stones   • CLL (chronic lymphocytic leukemia) (CMS/HCC)    • Coronary arteriosclerosis    • Degeneration of lumbar intervertebral disc    • Disease of thyroid gland     enlarged   • Disorder of lipid metabolism    • Hyperlipidemia    • Hypertension    • Impotence of organic origin    • Leukemia (CMS/HCC)    • Localized, primary osteoarthritis    • Lung mass    • Osteoarthritis of knee    • Polyp of colon    • Primary erectile dysfunction    • Primary malignant neoplasm of prostate (CMS/HCC)    • Prostate CA (CMS/HCC)    • Sensory hearing loss, bilateral    • Substernal goiter    • Vitamin D deficiency         Past Surgical History:   Procedure Laterality Date   • BRONCHOSCOPY N/A 2016    Procedure: BRONCHOSCOPY WITH  BAL AND BIOPSY AND ENDOBRONCHIAL ULTRASOUND  AND NAVIGATION WITH BRUSHINGS AND BIOPSY AND BAL;  Surgeon: Pierre Manning MD;  Location: [facility] ENDOSCOPY;  Service:    • PROSTATE RADIOACTIVE SEED IMPLANT                                PT Assessment/Plan     Row Name 09/17/18 1517          PT Assessment    Assessment Comments Pt reports he has been a 9/10 since last session with inability to get OOB for “a few days” after last therapy session. No pain reported in R calf while in pool. Performed nearly all exercises in deep water with emphasis on decompression strategies. Slight reproduction of R calf pain with neural flossing and HS stretch (2-3/10). Patient to return to MD 9/24 for follow up re. micro surgery and/or aquatic gains. Reports he has not had enough water therapy to know if it is helping. No reports of pain at end of session. Suggested use of ice and decompression position x 15 min upon getting home to control potential for irritation. Also suggested hold on inversion until late tomorrow to be able to assess response to today’s aqua.  -CK       User Key  (r) = Recorded By, (t) = Taken By, (c) = Cosigned By    Initials Name Provider Type    CK Henrik Kinsey, PT Physical Therapist                    Exercises     Row Name 09/17/18 1400             Subjective Comments    Subjective Comments I have been a 9/10 since last session. It is back to where it was prior to CORRINE. I have tried the inversion table twice for 10 min each.   -CK         Subjective Pain    Able to rate subjective pain? yes  -CK      Pre-Treatment Pain Level 9  -CK      Post-Treatment Pain Level 9  -CK      Subjective Pain Comment no pain in water  -CK         Aquatics    Aquatics performed? Yes  -CK      75940 - Aquatic Therapy Minutes 44  -CK         Aquatics LE    Water Walk forward;side;backward   x 3 each deep water  -CK      Stretch 1 B calf stretch 2 x 30  -CK      Stretch 2 B piriformis stretch 2 x 30  -CK      Stretch 3 B HS stretch/sweep w/ SN x 10  -CK      Vertical Traction 3x 2 min  -CK      Abdominals noodle   LN x 20  -CK      Clams suspended x 20  -CK      Hip Abd/Add B x 15  -CK      March in Place walking x 3 laps deep water   -CK      Uni-Squat suspended tuck ups x 10  -CK      Uni-Clock leg press, B LN x 15  -CK      Bicycle suspendedx 2 min  -CK         Exercise 1    Exercise Name 1 hot tub jet massage x 4 min  -CK        User Key  (r) = Recorded By, (t) = Taken By, (c) = Cosigned By    Initials Name Provider Type    CK Henrik Kinsey, PT Physical Therapist                                            Time Calculation:   Start Time: 1430  Stop Time: 1514  Time Calculation (min): 44 min  Total Timed Code Minutes- PT: 44 minute(s)  Therapy Suggested Charges     Code   Minutes Charges    24516 (CPT®) Hc Pt Neuromusc Re Education Ea 15 Min      26503 (CPT®) Hc Pt Ther Proc Ea 15 Min      96823 (CPT®) Hc Gait Training Ea 15 Min      00517 (CPT®) Hc Pt Therapeutic Act Ea 15 Min      02148 (CPT®) Hc Pt Manual Therapy Ea 15 Min      74201 (CPT®) Hc Pt Ther Massage- Per 15 Min      80254 (CPT®) Hc Pt Iontophoresis Ea 15 Min      24091 (CPT®) Hc Pt Elec Stim Ea-Per 15 Min      97998 (CPT®) Hc Pt Ultrasound Ea 15 Min      47326 (CPT®) Hc Pt Self Care/Mgmt/Train Ea 15 Min      93165 (CPT®) Hc Pt Prosthetic (S) Train Initial Encounter, Each 15 Min      91763 (CPT®) Hc Orthotic(S) Mgmt/Train Initial Encounter, Each 15min      67696 (CPT®) Hc Pt Aquatic Therapy Ea 15 Min 44 3    86276 (CPT®) Hc Pt Orthotic(S)/Prosthetic(S) Encounter, Each 15 Min      Total  44 3        Therapy Charges for Today     Code Description Service Date Service Provider Modifiers Qty    [id number] HC PT AQUATIC THERAPY EA 15 MIN 9/17/2018 Henrik Kinsey, PT GP 3                    Henrik Kinsey PT  9/17/2018

## 2018-09-19 ENCOUNTER — APPOINTMENT (OUTPATIENT)
Dept: PHYSICAL THERAPY | Facility: HOSPITAL | Age: 73
End: 2018-09-19

## 2018-09-20 ENCOUNTER — PREP FOR SURGERY (OUTPATIENT)
Dept: OTHER | Facility: HOSPITAL | Age: 73
End: 2018-09-20

## 2018-09-20 DIAGNOSIS — Z86.010 HISTORY OF COLON POLYPS: Primary | ICD-10-CM

## 2018-09-24 ENCOUNTER — APPOINTMENT (OUTPATIENT)
Dept: PHYSICAL THERAPY | Facility: HOSPITAL | Age: 73
End: 2018-09-24

## 2018-09-26 ENCOUNTER — APPOINTMENT (OUTPATIENT)
Dept: PHYSICAL THERAPY | Facility: HOSPITAL | Age: 73
End: 2018-09-26

## 2018-10-03 ENCOUNTER — DOCUMENTATION (OUTPATIENT)
Dept: PHYSICAL THERAPY | Facility: HOSPITAL | Age: 73
End: 2018-10-03

## 2018-10-03 ENCOUNTER — APPOINTMENT (OUTPATIENT)
Dept: PHYSICAL THERAPY | Facility: HOSPITAL | Age: 73
End: 2018-10-03

## 2018-10-03 NOTE — THERAPY DISCHARGE NOTE
Outpatient Physical Therapy Discharge Summary         Patient Name: Jeff Bass  : 1945  MRN: [id number]    Today's Date: 10/3/2018    Visit Dx:  No diagnosis found.        OP PT Discharge Summary  Date of Discharge: 18  Reason for Discharge: Lack of progress, Patient/Caregiver request  Discharge Destination: Home without follow-up  Discharge Instructions/Additional Comments: pt seen for eval then just 2 sessions in the pool. given info on indep aquatic ex as pt had a hard time matching his busy work schedule with our available appts. Pt also expected marked and immediate pain relief during this short course of PT which did not occur. Hoping that he can find relief via pain management and home stretching but not sure of final status??      Time Calculation:        Therapy Suggested Charges     Code   Minutes Charges    None                       Zorre Zeno Kimura, PT  10/3/2018

## 2018-11-21 ENCOUNTER — DOCUMENTATION (OUTPATIENT)
Dept: PHYSICAL THERAPY | Facility: HOSPITAL | Age: 73
End: 2018-11-21

## 2018-11-21 NOTE — THERAPY DISCHARGE NOTE
Outpatient Physical Therapy Discharge Summary         Patient Name: Jeff Bass  : 1945  MRN: [id number]    Today's Date: 2018    Visit Dx:  No diagnosis found.        OP PT Discharge Summary  Date of Discharge: 18  Reason for Discharge: other (comment)(self discharged due to minimal tolerance to program. Per MD discharge)  Outcomes Achieved: Unable to tolerate or actively participate in therapy  Discharge Instructions/Additional Comments: Mr. Bass was seen for 6 skilled therapy sessions: 3 land/3 aquatic for low back pain with RLE radiculopathy. Discharged per MD orders due to increasing pain/lack of improvement with aquatic therapy.              Henrik Kinsey, PT  2018

## 2018-12-10 ENCOUNTER — OFFICE VISIT (OUTPATIENT)
Dept: ONCOLOGY | Facility: CLINIC | Age: 73
End: 2018-12-10

## 2018-12-10 ENCOUNTER — LAB (OUTPATIENT)
Dept: LAB | Facility: HOSPITAL | Age: 73
End: 2018-12-10

## 2018-12-10 VITALS
TEMPERATURE: 98.1 F | OXYGEN SATURATION: 94 % | HEIGHT: 77 IN | WEIGHT: 232.1 LBS | DIASTOLIC BLOOD PRESSURE: 81 MMHG | BODY MASS INDEX: 27.41 KG/M2 | SYSTOLIC BLOOD PRESSURE: 139 MMHG | RESPIRATION RATE: 16 BRPM | HEART RATE: 84 BPM

## 2018-12-10 DIAGNOSIS — C91.10 CHRONIC LYMPHOCYTIC LEUKEMIA (HCC): Primary | ICD-10-CM

## 2018-12-10 DIAGNOSIS — C91.10 CHRONIC LYMPHOCYTIC LEUKEMIA (HCC): ICD-10-CM

## 2018-12-10 LAB
ALBUMIN SERPL-MCNC: 4.3 G/DL (ref 3.5–5.2)
ALBUMIN/GLOB SERPL: 1.7 G/DL (ref 1.1–2.4)
ALP SERPL-CCNC: 55 U/L (ref 38–116)
ALT SERPL W P-5'-P-CCNC: 9 U/L (ref 0–41)
ANION GAP SERPL CALCULATED.3IONS-SCNC: 10.2 MMOL/L
AST SERPL-CCNC: 12 U/L (ref 0–40)
BASOPHILS # BLD AUTO: 0.05 10*3/MM3 (ref 0–0.1)
BASOPHILS NFR BLD AUTO: 0.7 % (ref 0–1.1)
BILIRUB SERPL-MCNC: 0.9 MG/DL (ref 0.1–1.2)
BUN BLD-MCNC: 13 MG/DL (ref 6–20)
BUN/CREAT SERPL: 14.4 (ref 7.3–30)
CALCIUM SPEC-SCNC: 9.6 MG/DL (ref 8.5–10.2)
CHLORIDE SERPL-SCNC: 99 MMOL/L (ref 98–107)
CO2 SERPL-SCNC: 27.8 MMOL/L (ref 22–29)
CREAT BLD-MCNC: 0.9 MG/DL (ref 0.7–1.3)
DEPRECATED RDW RBC AUTO: 40.6 FL (ref 37–49)
EOSINOPHIL # BLD AUTO: 0.09 10*3/MM3 (ref 0–0.36)
EOSINOPHIL NFR BLD AUTO: 1.2 % (ref 1–5)
ERYTHROCYTE [DISTWIDTH] IN BLOOD BY AUTOMATED COUNT: 12.8 % (ref 11.7–14.5)
GFR SERPL CREATININE-BSD FRML MDRD: 82 ML/MIN/1.73
GLOBULIN UR ELPH-MCNC: 2.5 GM/DL (ref 1.8–3.5)
GLUCOSE BLD-MCNC: 96 MG/DL (ref 74–124)
HCT VFR BLD AUTO: 44.1 % (ref 40–49)
HGB BLD-MCNC: 15.3 G/DL (ref 13.5–16.5)
IMM GRANULOCYTES # BLD: 0.03 10*3/MM3 (ref 0–0.03)
IMM GRANULOCYTES NFR BLD: 0.4 % (ref 0–0.5)
LYMPHOCYTES # BLD AUTO: 1.29 10*3/MM3 (ref 1–3.5)
LYMPHOCYTES NFR BLD AUTO: 17.6 % (ref 20–49)
MCH RBC QN AUTO: 30.2 PG (ref 27–33)
MCHC RBC AUTO-ENTMCNC: 34.7 G/DL (ref 32–35)
MCV RBC AUTO: 87.2 FL (ref 83–97)
MONOCYTES # BLD AUTO: 0.66 10*3/MM3 (ref 0.25–0.8)
MONOCYTES NFR BLD AUTO: 9 % (ref 4–12)
NEUTROPHILS # BLD AUTO: 5.22 10*3/MM3 (ref 1.5–7)
NEUTROPHILS NFR BLD AUTO: 71.1 % (ref 39–75)
NRBC BLD MANUAL-RTO: 0 /100 WBC (ref 0–0)
PLATELET # BLD AUTO: 193 10*3/MM3 (ref 150–375)
PMV BLD AUTO: 9.1 FL (ref 8.9–12.1)
POTASSIUM BLD-SCNC: 4.3 MMOL/L (ref 3.5–4.7)
PROT SERPL-MCNC: 6.8 G/DL (ref 6.3–8)
RBC # BLD AUTO: 5.06 10*6/MM3 (ref 4.3–5.5)
SODIUM BLD-SCNC: 137 MMOL/L (ref 134–145)
WBC NRBC COR # BLD: 7.34 10*3/MM3 (ref 4–10)

## 2018-12-10 PROCEDURE — 80053 COMPREHEN METABOLIC PANEL: CPT | Performed by: INTERNAL MEDICINE

## 2018-12-10 PROCEDURE — 85025 COMPLETE CBC W/AUTO DIFF WBC: CPT | Performed by: INTERNAL MEDICINE

## 2018-12-10 PROCEDURE — 99214 OFFICE O/P EST MOD 30 MIN: CPT | Performed by: INTERNAL MEDICINE

## 2018-12-10 PROCEDURE — 36415 COLL VENOUS BLD VENIPUNCTURE: CPT | Performed by: INTERNAL MEDICINE

## 2018-12-10 NOTE — PROGRESS NOTES
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     HISTORY OF PRESENT ILLNESS: This patient returns today to the office for followup. He is here today stating that he has developed some neuropathic discomfort in the lower extremities most likely associated with his spine issues. Maybe suggestion of some neuropathy. He has not had any further cardiac issues. His appetite has remained excellent, he has not had any infections, his bowel activity is normal, urination is normal. He is scheduled to have a colonoscopy the first of the year by Dr. Hieu Lester. The patient has no symptoms pertinent to his leukemia including no fevers, no chills, no sweats, no pruritus, no adenopathies, no autoimmunity and no repetitive infections.     His PSA has remained low.                     PAST MEDICAL HISTORY:  Significant for hyperlipidemia.  He also has vitamin D deficiency and has undergone replacement of vitamin D.  SUE ponce has no history of hypertension, cardiovascular disease, diabetes, asthma or respiratory problems.  He was worked up by Dr. Luis Ortega in 2009 for an incidentally found mass in the liver through MRI and CT scan that turned out to be a hemangioma.  He al  s  o has benign cysts in the liver.  PET scan in that location in 2009 disclosed no abnormal uptake.  On that occasion, it was also found that the patient had a retrosternal thyroid enlargement with a normal TSH.  The patient had no symptomatology in regard   to thyroid abnormalities otherwise.          The patient has had kidney stones in the past on one occasion.  He had trauma as a child of the middle finger of the right hand with some deformity in the nail that never grew back normally.  Otherwise, he has not had   any hospitalizations or other interventions.          HEMATOLOGIC/ONCOLOGIC HISTORY: (History from previous dates can be found in the separate document.)        On 02/22/2016 no  clinical evidence of recurrence of his CLL.  Hematological parameters were normal.  He had no B symptoms, no autoimmunity, no infections.  He was advised to remain in observation.          MEDICATIONS: The current medication list was reviewed with the patient and updated in the EMR this date per the medical assistant.  Medical dosages and frequencies were confirmed to be accurate.         ALLERGIES:  No known drug allergies.          SOCIAL HISTORY:  Lives with his wife (who has developed Alzheimer and requires total care at home).  One child, a daughter age 36, who just had her first baby recently.  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  He does not smoke and does not drink alcohol.  The patient, as per 2014, had a lengthy conversation with Dr. Schroeder in regard to the future of his wife who has advanced Alzheimer nena  ntia.  In the way I see things, according to the patient’s report, I think she is going to be institutionalized very soon, given the circumstances and level of care that she requires.  As of 2014 please see HPI.         FAMILY HISTORY:  Father  of heart   disease after rheumatic heart disease was found.  Mother  at age 97.  He has no brothers or sisters.  As per 2015, in regard to further illness in his ill wife who has dementia with significant changes in her overall situation with more somnole  nce, likely stroke and like an infection of some sort.         REVIEW OF SYSTEMS:        General: no fever, no chills, no fatigue,no weight changes, no lack of appetite.  Eyes: no epiphora, xerophthalmia,conjunctivitis, pain, glaucoma, blurred vision, blindness, secretion, photophobia, proptosis, diplopia.  Ears: no otorrhea, tinnitus, otorrhagia, deafness, pain, vertigo.  Nose: no rhinorrhea, no epistaxis, no alteration in perception of odors, no sinuses pressure.  Mouth: no alteration in gums or teeth,  No ulcers, no difficulty with  "mastication or deglut ion, no odynophagia.  Neck: no masses or pain, no thyroid alterations, no pain in muscles or arteries, no carotid odynia, no crepitation.  Respiratory: no cough, no sputum production,no dyspnea,no trepopnea, no pleuritic pain,no hemoptysis.  Heart: no syncope, no irregularity, no palpitations, no angina,no orthopnea,no paroxysmal nocturnal dyspnea.  Vascular Venous: no tenderness,no edema,no palpable cords,no postphlebitic syndrome, no skin changes no ulcerations.  Vascular Arterial: no distal ischemia, noclaudication, no gangrene, no neuropathic ischemic pain, no skin ulcers, no paleness no cyanosis.  GI: no dysphagia, no odynophagia, no regurgitation, no heartburn,no indigestion,no nausea,no vomiting,no hematemesis ,no melena,no jaundice,no distention, no obstipation,no enterorrhagia,no proctalgia,no anal  lesions, no changes in bowel habits.  : no frequency, no hesitancy, no hematuria, no discharge,no  pain.  Musculoskeletal: no muscle or tendon pain or inflammation,no  joint pain, no edema, no functional limitation,no fasciculations, no mass.  Neurologic: no headache, no seizures, noalterations on Craneal nerves, no motor deficit, STATED RLE sensory deficit, normal coordination, no alteration in memory,normal orientation, calculation,normal writting, verbal and written language.  Skin: no rashes,no pruritus no localized lesions.  Psychiatric: no anxiety, no depression,no agitation, no delusions, proper insight.          VITAL SIGNS:  Vitals:    12/10/18 1146   BP: 139/81   Pulse: 84   Resp: 16   Temp: 98.1 °F (36.7 °C)   TempSrc: Oral   SpO2: 94%   Weight: 105 kg (232 lb 1.6 oz)   Height: 195.6 cm (77.01\")           PAIN:  0 out of 10      ECO         PHYSICAL EXAMINATION:      GENERAL:  Well-developed, well-nourished  Patient  in no acute distress.   SKIN:  Warm, dry ,NO rashes,NO purpura ,NO petechiae.  HEENT:  Pupils were equal and reactive to light and accomodation, conjunctivas " non injected, no pterigion, normal extraocular movements, normal visual acuity.   Mouth mucosa was moist, no exudates in oropharynx, normal gum line, normal roof of the mouth and pillars, normal papillations of the tongue.  NECK:  Supple with good range of motion; no thyromegaly or masses, no JVD or bruits, no cervical adenopathies.No carotid arteries pain, no carotid abnormal pulsation , NO arterial dance.  LYMPHATICS:  No cervical, NO supraclavicular, NO axillary,NO epitrochlear , NO inguinal adenopathy.  CHEST:  Normal excursion of both francis thoraces, normal voice fremitus, no subcutaneous emphysema, normal axillas, no rashes or acanthosis nigricans. Lungs clear to percussion and auscultation, normal breath sounds bilaterally, no wheezing,NO crackles NO ronchi, NO stridor, NO rubs.  CARDIAC AND VASCULAR:  normal rate and regular rhythm, without murmurs,NO rubs NO S3 NO S4 right or left . Normal femoral, popliteal, pedis, brachial and carotid pulses.  ABDOMEN:  Soft, nontender with no organomegaly or masses, no ascites, no collateral circulation,no distention,no Eddie sign, no abdominal pain, no inguinal hernias,no umbilical hernia, no abdominal bruits. Normal bowel sounds.  GENITAL: Not  Performed.  EXTREMITIES  AND SPINE:  No clubbing, cyanosis or edema, no deformities or pain .No kyphosis, scoliosis, deformities or pain in spine, ribs or pelvic bone.  NEUROLOGICAL:  Patient was awake, alert, oriented to time, person and place.LOST VIBRATORY SENSATION RLE              LABORATORY DATA:Differential   Order: [id number] - Part of Panel Order [id number]   Status:  Final result   Visible to patient:  No (Not Released) Dx:  Chronic lymphocytic leukemia (CMS/[facility])    Ref Range & Units 11:39   WBC 4.00 - 10.00 10*3/mm3 7.34    RBC 4.30 - 5.50 10*6/mm3 5.06    Hemoglobin 13.5 - 16.5 g/dL 15.3    Hematocrit 40.0 - 49.0 % 44.1    MCV 83.0 - 97.0 fL 87.2    MCH 27.0 - 33.0 pg 30.2    MCHC 32.0 - 35.0 g/dL 34.7    RDW 11.7 -  14.5 % 12.8    RDW-SD 37.0 - 49.0 fl 40.6    MPV 8.9 - 12.1 fL 9.1    Platelets 150 - 375 10*3/mm3 193    Neutrophil % 39.0 - 75.0 % 71.1    Lymphocyte % 20.0 - 49.0 % 17.6 Abnormally low     Monocyte % 4.0 - 12.0 % 9.0    Eosinophil % 1.0 - 5.0 % 1.2    Basophil % 0.0 - 1.1 % 0.7    Immature Grans % 0.0 - 0.5 % 0.4    Neutrophils, Absolute 1.50 - 7.00 10*3/mm3 5.22                 ASSESSMENT: 1.  Patient has history of chronic lymphoid leukemia that required therapy with Rituxan 2 year ago and since then the patient has had remission with normal white count, normal hemoglobin, normal platelet count and total lymphocyte remains low today.  He has no palpable peripheral adenopathy, no hepatosplenomegaly, no B symptoms, no autoimmunity and no infections.  He will remain in observation from this point of view.   2. The patient has history of prostate cancer status post seed implants, PSA of 0.7. He has still some urinary frequency, no hematuria, no urinary tract infections. He will followup with his urologist in this regard.   3. Possible peripheral neuropathy in his right lower extremity, likely some component of radiculopathy as well. He will have another epidural injection soon.  4. Screening colonoscopy planned for January of 2019.  5. From the point of view of his leukemia I will review him back in 4 months with a CBC and a CMP. I find no need for radiological assessment on him at this point. His leukemia remains in remission.

## 2018-12-13 ENCOUNTER — HOSPITAL ENCOUNTER (OUTPATIENT)
Dept: GENERAL RADIOLOGY | Facility: HOSPITAL | Age: 73
Discharge: HOME | End: 2018-12-13

## 2018-12-13 ENCOUNTER — ANESTHESIA EVENT (OUTPATIENT)
Dept: PAIN MEDICINE | Facility: HOSPITAL | Age: 73
End: 2018-12-13

## 2018-12-13 ENCOUNTER — ANESTHESIA (OUTPATIENT)
Dept: PAIN MEDICINE | Facility: HOSPITAL | Age: 73
End: 2018-12-13

## 2018-12-13 ENCOUNTER — HOSPITAL ENCOUNTER (OUTPATIENT)
Dept: PAIN MEDICINE | Facility: HOSPITAL | Age: 73
Discharge: HOME | End: 2018-12-13
Admitting: ANESTHESIOLOGY

## 2018-12-13 VITALS
TEMPERATURE: 98 F | HEART RATE: 89 BPM | DIASTOLIC BLOOD PRESSURE: 71 MMHG | RESPIRATION RATE: 16 BRPM | SYSTOLIC BLOOD PRESSURE: 140 MMHG | OXYGEN SATURATION: 97 %

## 2018-12-13 DIAGNOSIS — R52 PAIN: ICD-10-CM

## 2018-12-13 DIAGNOSIS — M48.062 SPINAL STENOSIS OF LUMBAR REGION WITH NEUROGENIC CLAUDICATION: Primary | ICD-10-CM

## 2018-12-13 PROCEDURE — 77003 FLUOROGUIDE FOR SPINE INJECT: CPT

## 2018-12-13 PROCEDURE — C1755 CATHETER, INTRASPINAL: HCPCS

## 2018-12-13 PROCEDURE — 25010000002 METHYLPREDNISOLONE PER 80 MG: Performed by: ANESTHESIOLOGY

## 2018-12-13 PROCEDURE — 0 IOPAMIDOL 41 % SOLUTION: Performed by: ANESTHESIOLOGY

## 2018-12-13 RX ORDER — FENTANYL CITRATE 50 UG/ML
50 INJECTION, SOLUTION INTRAMUSCULAR; INTRAVENOUS AS NEEDED
Status: DISCONTINUED | OUTPATIENT
Start: 2018-12-13 | End: 2018-12-14 | Stop reason: HOSPADM

## 2018-12-13 RX ORDER — MIDAZOLAM HYDROCHLORIDE 1 MG/ML
1 INJECTION INTRAMUSCULAR; INTRAVENOUS AS NEEDED
Status: DISCONTINUED | OUTPATIENT
Start: 2018-12-13 | End: 2018-12-14 | Stop reason: HOSPADM

## 2018-12-13 RX ORDER — SODIUM CHLORIDE 0.9 % (FLUSH) 0.9 %
1-10 SYRINGE (ML) INJECTION AS NEEDED
Status: DISCONTINUED | OUTPATIENT
Start: 2018-12-13 | End: 2018-12-14 | Stop reason: HOSPADM

## 2018-12-13 RX ORDER — METHYLPREDNISOLONE ACETATE 80 MG/ML
80 INJECTION, SUSPENSION INTRA-ARTICULAR; INTRALESIONAL; INTRAMUSCULAR; SOFT TISSUE ONCE
Status: COMPLETED | OUTPATIENT
Start: 2018-12-13 | End: 2018-12-13

## 2018-12-13 RX ORDER — LIDOCAINE HYDROCHLORIDE 10 MG/ML
1 INJECTION, SOLUTION INFILTRATION; PERINEURAL ONCE AS NEEDED
Status: DISCONTINUED | OUTPATIENT
Start: 2018-12-13 | End: 2018-12-14 | Stop reason: HOSPADM

## 2018-12-13 RX ADMIN — METHYLPREDNISOLONE ACETATE 80 MG: 80 INJECTION, SUSPENSION INTRA-ARTICULAR; INTRALESIONAL; INTRAMUSCULAR; SOFT TISSUE at 10:26

## 2018-12-13 RX ADMIN — IOPAMIDOL 10 ML: 408 INJECTION, SOLUTION INTRATHECAL at 10:26

## 2018-12-13 NOTE — H&P
INTERVAL HISTORY:    The patient returns for another Lumbar epidural steroid injection today.  They have received over 90% improvement since their last injection but has returned to a pain level of 7-8/10 at its worst recently.    Conservative measures tried in the interim     Current Outpatient Medications on File Prior to Encounter   Medication Sig Dispense Refill   • alfuzosin (UROXATRAL) 10 MG 24 hr tablet      • Cholecalciferol (VITAMIN D3) 22984 UNITS capsule Take by mouth.     • COENZYME Q-10 PO Take  by mouth.     • cyanocobalamin ([employer] VITAMIN B-12) 1000 MCG tablet Take  by mouth.     • DOXYCYCLINE HYCLATE PO Take 20 mg by mouth Daily.     • gabapentin (NEURONTIN) 100 MG capsule TK 4 CS PO QHS  2   • GARLIC PO Take  by mouth.     • losartan-hydrochlorothiazide (HYZAAR) 100-25 MG per tablet Take 1 tablet by mouth Daily.     • Lycopene 10 MG capsule Take  by mouth.     • meloxicam (MOBIC) 15 MG tablet Take 15 mg by mouth Daily.     • potassium chloride (K-DUR,KLOR-CON) 20 MEQ CR tablet TAKE 1 TABLET EVERY DAY 90 tablet 0   • pravastatin (PRAVACHOL) 10 MG tablet      • Red Yeast Rice Extract (RED YEAST RICE PO) Take 1,200 mg by mouth.     • triptorelin pamoate (TRELSTAR) 11.25 MG reconstituted suspension IM suspension Inject  into the shoulder, thigh, or buttocks As Needed.     • [DISCONTINUED] aspirin 81 MG EC tablet Take 81 mg by mouth Daily.       No current facility-administered medications on file prior to encounter.        Past Medical History:   Diagnosis Date   • Benign prostatic hyperplasia    • Bursitis of right hip    • Chronic kidney disease     Kidney stones   • Coronary arteriosclerosis    • Degeneration of lumbar intervertebral disc    • Disease of thyroid gland     enlarged   • Disorder of lipid metabolism    • Erectile dysfunction    • History of kidney stones    • Hyperlipidemia    • Hypertension    • Impotence of organic origin    • Localized, primary osteoarthritis    • Lung mass    •  Osteoarthritis of knee    • Polyp of colon    • Primary erectile dysfunction    • Prostate CA (CMS/HCC)    • Sensory hearing loss, bilateral    • Substernal goiter    • Vitamin D deficiency        No hematologic infectious or constitutional symptoms    Exam:  /88 (BP Location: Left arm, Patient Position: Lying)   Pulse 86   Temp 36.7 °C (98 °F) (Oral)   Resp 16   SpO2 95%   Airway Mallampatti 2  Alert and oriented      Diagnosis:  Post-Op Diagnosis Codes:     * Lumbar degenerative disc disease [M51.36]     * Lumbar stenosis with neurogenic claudication [M48.062]     * Lumbar neuritis [M54.16]    Plan:  Lumbar epidural steroid injection under fluoroscopic guidance    I have encouraged them to continue:  1.  Physical therapy exercises at home as prescribed by physical therapy or from the pain clinic handout (given to the patient).  Continuation of these exercises every day, or multiple times per week, even when the patient has good pain relief, was stressed to the patient as a preventative measure to decrease the frequency and severity of future pain episodes.  2.  Continue pain medicines as already prescribed.  If patient not currently taking any, it is recommended to begin Acetaminophen 1000 mg po q 8 hours.  If other medicines containing Acetaminophen are currently prescribed, maintain daily dose at 3000mg.    3.  If they can tolerate NSAIDS, it is recommended to take Ibuprofen 600 mg po q 6 hours for 7 days during pain exacerbations.   Alternatively, they may substitute an NSAID of their choice (e.g. Aleve)  4.  Heat and ice to the affected area as tolerated for pain control.  It was discussed that heating pads can cause burns.  5.  Low impact exercise such as walking or water exercise was recommended to maintain overall health and aid in weight control.   6.  Follow up as needed for subsequent injections.  7.  Patient was counseled to abstain from tobacco products.

## 2018-12-13 NOTE — ANESTHESIA PROCEDURE NOTES
PAIN Epidural block    Pre-sedation assessment completed: 12/13/2018 10:20 AM    Patient reassessed immediately prior to procedure    Patient location during procedure: pain clinic  Start Time: 12/13/2018 10:21 AM  Stop Time: 12/13/2018 10:31 AM  Indication:procedure for pain  Performed By  Anesthesiologist: Diallo Moeller MD  Preanesthetic Checklist  Completed: patient identified, surgical consent, pre-op evaluation, timeout performed, IV checked, risks and benefits discussed and monitors and equipment checked  Additional Notes  Diagnosis:  Post-Op Diagnosis Codes:     * Lumbar degenerative disc disease (M51.36)     * Lumbar stenosis with neurogenic claudication (M48.062)     * Lumbar neuritis (M54.16)      Prep:  Pt Position:prone  Sterile Tech:gloves, mask and sterile barrier  Prep:chlorhexidine gluconate and isopropyl alcohol  Monitoring:blood pressure monitoring, continuous pulse oximetry and EKG  Procedure:  Sedation: no   Approach:right paramedian  Guidance: fluoroscopy  Location:lumbar  Interspace: L5-S1.  Needle Type:Tuohy  Needle Gauge:20  Aspiration:negative  Test Dose:negative  Medications:  Depomedrol:80 mg  Preservative Free Saline:3mL  Comments:Isovue dye was instilled and dye spread was confirmed to be consistent with epidural placement    Post Assessment:  Dressing:occlusive dressing applied  Pt Tolerance:patient tolerated the procedure well with no apparent complications  Complications:no

## 2019-01-19 ENCOUNTER — HOSPITAL ENCOUNTER (OUTPATIENT)
Facility: HOSPITAL | Age: 74
Setting detail: OBSERVATION
Discharge: HOME | End: 2019-01-20
Attending: EMERGENCY MEDICINE | Admitting: EMERGENCY MEDICINE

## 2019-01-19 ENCOUNTER — APPOINTMENT (OUTPATIENT)
Dept: ULTRASOUND IMAGING | Facility: HOSPITAL | Age: 74
End: 2019-01-19

## 2019-01-19 ENCOUNTER — APPOINTMENT (OUTPATIENT)
Dept: GENERAL RADIOLOGY | Facility: HOSPITAL | Age: 74
End: 2019-01-19

## 2019-01-19 ENCOUNTER — APPOINTMENT (OUTPATIENT)
Dept: CT IMAGING | Facility: HOSPITAL | Age: 74
End: 2019-01-19

## 2019-01-19 DIAGNOSIS — R10.84 GENERALIZED ABDOMINAL PAIN: Primary | ICD-10-CM

## 2019-01-19 DIAGNOSIS — R50.9 FEVER AND CHILLS: ICD-10-CM

## 2019-01-19 DIAGNOSIS — E87.20 LACTIC ACIDOSIS: ICD-10-CM

## 2019-01-19 PROBLEM — R10.9 ABDOMINAL PAIN: Status: ACTIVE | Noted: 2019-01-19

## 2019-01-19 LAB
ALBUMIN SERPL-MCNC: 4 G/DL (ref 3.5–5.2)
ALBUMIN/GLOB SERPL: 1.2 G/DL
ALP SERPL-CCNC: 60 U/L (ref 39–117)
ALT SERPL W P-5'-P-CCNC: 9 U/L (ref 1–41)
ANION GAP SERPL CALCULATED.3IONS-SCNC: 19.8 MMOL/L
AST SERPL-CCNC: 11 U/L (ref 1–40)
BASOPHILS # BLD AUTO: 0.01 10*3/MM3 (ref 0–0.2)
BASOPHILS NFR BLD AUTO: 0.1 % (ref 0–1.5)
BILIRUB SERPL-MCNC: 1.4 MG/DL (ref 0.1–1.2)
BILIRUB UR QL STRIP: NEGATIVE
BUN BLD-MCNC: 14 MG/DL (ref 8–23)
BUN/CREAT SERPL: 14.3 (ref 7–25)
CALCIUM SPEC-SCNC: 9.8 MG/DL (ref 8.6–10.5)
CHLORIDE SERPL-SCNC: 92 MMOL/L (ref 98–107)
CLARITY UR: CLEAR
CO2 SERPL-SCNC: 21.2 MMOL/L (ref 22–29)
COLOR UR: YELLOW
CREAT BLD-MCNC: 0.98 MG/DL (ref 0.76–1.27)
D-LACTATE SERPL-SCNC: 3.4 MMOL/L (ref 0.5–2)
DEPRECATED RDW RBC AUTO: 41.6 FL (ref 37–54)
EOSINOPHIL # BLD AUTO: 0 10*3/MM3 (ref 0–0.7)
EOSINOPHIL NFR BLD AUTO: 0 % (ref 0.3–6.2)
ERYTHROCYTE [DISTWIDTH] IN BLOOD BY AUTOMATED COUNT: 13.2 % (ref 11.5–14.5)
GFR SERPL CREATININE-BSD FRML MDRD: 75 ML/MIN/1.73
GLOBULIN UR ELPH-MCNC: 3.3 GM/DL
GLUCOSE BLD-MCNC: 123 MG/DL (ref 65–99)
GLUCOSE UR STRIP-MCNC: NEGATIVE MG/DL
HCT VFR BLD AUTO: 44 % (ref 40.4–52.2)
HGB BLD-MCNC: 15.4 G/DL (ref 13.7–17.6)
HGB UR QL STRIP.AUTO: NEGATIVE
HOLD SPECIMEN: NORMAL
HOLD SPECIMEN: NORMAL
IMM GRANULOCYTES # BLD AUTO: 0.03 10*3/MM3 (ref 0–0.03)
IMM GRANULOCYTES NFR BLD AUTO: 0.3 % (ref 0–0.5)
KETONES UR QL STRIP: ABNORMAL
LEUKOCYTE ESTERASE UR QL STRIP.AUTO: NEGATIVE
LIPASE SERPL-CCNC: 18 U/L (ref 13–60)
LYMPHOCYTES # BLD AUTO: 0.83 10*3/MM3 (ref 0.9–4.8)
LYMPHOCYTES NFR BLD AUTO: 8.2 % (ref 19.6–45.3)
MCH RBC QN AUTO: 30.4 PG (ref 27–32.7)
MCHC RBC AUTO-ENTMCNC: 35 G/DL (ref 32.6–36.4)
MCV RBC AUTO: 87 FL (ref 79.8–96.2)
MONOCYTES # BLD AUTO: 0.03 10*3/MM3 (ref 0.2–1.2)
MONOCYTES NFR BLD AUTO: 0.3 % (ref 5–12)
NEUTROPHILS # BLD AUTO: 9.18 10*3/MM3 (ref 1.9–8.1)
NEUTROPHILS NFR BLD AUTO: 91.1 % (ref 42.7–76)
NITRITE UR QL STRIP: NEGATIVE
PH UR STRIP.AUTO: 6.5 [PH] (ref 5–8)
PLATELET # BLD AUTO: 200 10*3/MM3 (ref 140–500)
PMV BLD AUTO: 9.4 FL (ref 6–12)
POTASSIUM BLD-SCNC: 3.2 MMOL/L (ref 3.5–5.2)
PROT SERPL-MCNC: 7.3 G/DL (ref 6–8.5)
PROT UR QL STRIP: NEGATIVE
RBC # BLD AUTO: 5.06 10*6/MM3 (ref 4.6–6)
SODIUM BLD-SCNC: 133 MMOL/L (ref 136–145)
SP GR UR STRIP: >=1.03 (ref 1–1.03)
TROPONIN T SERPL-MCNC: <0.01 NG/ML (ref 0–0.03)
UROBILINOGEN UR QL STRIP: ABNORMAL
WBC NRBC COR # BLD: 10.08 10*3/MM3 (ref 4.5–10.7)
WHOLE BLOOD HOLD SPECIMEN: NORMAL
WHOLE BLOOD HOLD SPECIMEN: NORMAL

## 2019-01-19 PROCEDURE — 25010000002 HYDROMORPHONE PER 4 MG: Performed by: PHYSICIAN ASSISTANT

## 2019-01-19 PROCEDURE — 83605 ASSAY OF LACTIC ACID: CPT | Performed by: EMERGENCY MEDICINE

## 2019-01-19 PROCEDURE — 81003 URINALYSIS AUTO W/O SCOPE: CPT | Performed by: PHYSICIAN ASSISTANT

## 2019-01-19 PROCEDURE — 25010000002 HYDROMORPHONE PER 4 MG: Performed by: EMERGENCY MEDICINE

## 2019-01-19 PROCEDURE — 74177 CT ABD & PELVIS W/CONTRAST: CPT

## 2019-01-19 PROCEDURE — 87581 M.PNEUMON DNA AMP PROBE: CPT | Performed by: PHYSICIAN ASSISTANT

## 2019-01-19 PROCEDURE — 93010 ELECTROCARDIOGRAM REPORT: CPT | Performed by: INTERNAL MEDICINE

## 2019-01-19 PROCEDURE — 83690 ASSAY OF LIPASE: CPT | Performed by: EMERGENCY MEDICINE

## 2019-01-19 PROCEDURE — 99285 EMERGENCY DEPT VISIT HI MDM: CPT

## 2019-01-19 PROCEDURE — 93005 ELECTROCARDIOGRAM TRACING: CPT | Performed by: PHYSICIAN ASSISTANT

## 2019-01-19 PROCEDURE — 84484 ASSAY OF TROPONIN QUANT: CPT | Performed by: PHYSICIAN ASSISTANT

## 2019-01-19 PROCEDURE — 80053 COMPREHEN METABOLIC PANEL: CPT | Performed by: EMERGENCY MEDICINE

## 2019-01-19 PROCEDURE — G0378 HOSPITAL OBSERVATION PER HR: HCPCS

## 2019-01-19 PROCEDURE — 87633 RESP VIRUS 12-25 TARGETS: CPT | Performed by: PHYSICIAN ASSISTANT

## 2019-01-19 PROCEDURE — 25010000002 ONDANSETRON PER 1 MG: Performed by: PHYSICIAN ASSISTANT

## 2019-01-19 PROCEDURE — 71046 X-RAY EXAM CHEST 2 VIEWS: CPT

## 2019-01-19 PROCEDURE — 85025 COMPLETE CBC W/AUTO DIFF WBC: CPT | Performed by: EMERGENCY MEDICINE

## 2019-01-19 PROCEDURE — 87486 CHLMYD PNEUM DNA AMP PROBE: CPT | Performed by: PHYSICIAN ASSISTANT

## 2019-01-19 PROCEDURE — 96375 TX/PRO/DX INJ NEW DRUG ADDON: CPT

## 2019-01-19 PROCEDURE — 87040 BLOOD CULTURE FOR BACTERIA: CPT | Performed by: PHYSICIAN ASSISTANT

## 2019-01-19 PROCEDURE — 87798 DETECT AGENT NOS DNA AMP: CPT | Performed by: PHYSICIAN ASSISTANT

## 2019-01-19 PROCEDURE — 96376 TX/PRO/DX INJ SAME DRUG ADON: CPT

## 2019-01-19 PROCEDURE — 76705 ECHO EXAM OF ABDOMEN: CPT

## 2019-01-19 PROCEDURE — 25010000002 IOPAMIDOL 61 % SOLUTION: Performed by: EMERGENCY MEDICINE

## 2019-01-19 PROCEDURE — 25010000002 ONDANSETRON PER 1 MG: Performed by: EMERGENCY MEDICINE

## 2019-01-19 PROCEDURE — 96374 THER/PROPH/DIAG INJ IV PUSH: CPT

## 2019-01-19 RX ORDER — HYDROMORPHONE HYDROCHLORIDE 1 MG/ML
0.5 INJECTION, SOLUTION INTRAMUSCULAR; INTRAVENOUS; SUBCUTANEOUS ONCE
Status: COMPLETED | OUTPATIENT
Start: 2019-01-19 | End: 2019-01-19

## 2019-01-19 RX ORDER — HYDROMORPHONE HYDROCHLORIDE 1 MG/ML
0.5 INJECTION, SOLUTION INTRAMUSCULAR; INTRAVENOUS; SUBCUTANEOUS ONCE
Status: DISCONTINUED | OUTPATIENT
Start: 2019-01-19 | End: 2019-01-19

## 2019-01-19 RX ORDER — SODIUM CHLORIDE 0.9 % (FLUSH) 0.9 %
10 SYRINGE (ML) INJECTION AS NEEDED
Status: DISCONTINUED | OUTPATIENT
Start: 2019-01-19 | End: 2019-01-20 | Stop reason: HOSPADM

## 2019-01-19 RX ORDER — ONDANSETRON 2 MG/ML
4 INJECTION INTRAMUSCULAR; INTRAVENOUS ONCE
Status: COMPLETED | OUTPATIENT
Start: 2019-01-19 | End: 2019-01-19

## 2019-01-19 RX ADMIN — ONDANSETRON 4 MG: 2 INJECTION INTRAMUSCULAR; INTRAVENOUS at 19:27

## 2019-01-19 RX ADMIN — SODIUM CHLORIDE 1000 ML: 9 INJECTION, SOLUTION INTRAVENOUS at 22:04

## 2019-01-19 RX ADMIN — SODIUM CHLORIDE 1000 ML: 9 INJECTION, SOLUTION INTRAVENOUS at 19:25

## 2019-01-19 RX ADMIN — ONDANSETRON 4 MG: 2 INJECTION INTRAMUSCULAR; INTRAVENOUS at 20:20

## 2019-01-19 RX ADMIN — IOPAMIDOL 85 ML: 612 INJECTION, SOLUTION INTRAVENOUS at 20:11

## 2019-01-19 RX ADMIN — HYDROMORPHONE HYDROCHLORIDE 0.5 MG: 1 INJECTION, SOLUTION INTRAMUSCULAR; INTRAVENOUS; SUBCUTANEOUS at 20:20

## 2019-01-19 RX ADMIN — HYDROMORPHONE HYDROCHLORIDE 0.5 MG: 1 INJECTION, SOLUTION INTRAMUSCULAR; INTRAVENOUS; SUBCUTANEOUS at 20:25

## 2019-01-20 ENCOUNTER — APPOINTMENT (OUTPATIENT)
Dept: NUCLEAR MEDICINE | Facility: HOSPITAL | Age: 74
End: 2019-01-20

## 2019-01-20 VITALS
HEART RATE: 82 BPM | TEMPERATURE: 98.4 F | WEIGHT: 230.3 LBS | HEIGHT: 77 IN | SYSTOLIC BLOOD PRESSURE: 140 MMHG | BODY MASS INDEX: 27.19 KG/M2 | DIASTOLIC BLOOD PRESSURE: 92 MMHG | OXYGEN SATURATION: 95 % | RESPIRATION RATE: 18 BRPM

## 2019-01-20 LAB
ALBUMIN SERPL-MCNC: 3.3 G/DL (ref 3.5–5.2)
ALBUMIN/GLOB SERPL: 1.5 G/DL
ALP SERPL-CCNC: 42 U/L (ref 39–117)
ALT SERPL W P-5'-P-CCNC: 11 U/L (ref 1–41)
ANION GAP SERPL CALCULATED.3IONS-SCNC: 11.1 MMOL/L
AST SERPL-CCNC: 16 U/L (ref 1–40)
B PARAPERT DNA SPEC QL NAA+PROBE: NOT DETECTED
B PERT DNA SPEC QL NAA+PROBE: NOT DETECTED
BASOPHILS # BLD AUTO: 0.05 10*3/MM3 (ref 0–0.2)
BASOPHILS NFR BLD AUTO: 0.8 % (ref 0–1.5)
BILIRUB SERPL-MCNC: 0.6 MG/DL (ref 0.1–1.2)
BUN BLD-MCNC: 11 MG/DL (ref 8–23)
BUN/CREAT SERPL: 13.9 (ref 7–25)
C PNEUM DNA NPH QL NAA+NON-PROBE: NOT DETECTED
CALCIUM SPEC-SCNC: 8.5 MG/DL (ref 8.6–10.5)
CHLORIDE SERPL-SCNC: 99 MMOL/L (ref 98–107)
CO2 SERPL-SCNC: 25.9 MMOL/L (ref 22–29)
CREAT BLD-MCNC: 0.79 MG/DL (ref 0.76–1.27)
D-LACTATE SERPL-SCNC: 1.2 MMOL/L (ref 0.5–2)
DEPRECATED RDW RBC AUTO: 43.1 FL (ref 37–54)
EOSINOPHIL # BLD AUTO: 0.02 10*3/MM3 (ref 0–0.7)
EOSINOPHIL NFR BLD AUTO: 0.3 % (ref 0.3–6.2)
ERYTHROCYTE [DISTWIDTH] IN BLOOD BY AUTOMATED COUNT: 13.5 % (ref 11.5–14.5)
FLUAV H1 2009 PAND RNA NPH QL NAA+PROBE: NOT DETECTED
FLUAV H1 HA GENE NPH QL NAA+PROBE: NOT DETECTED
FLUAV H3 RNA NPH QL NAA+PROBE: NOT DETECTED
FLUAV SUBTYP SPEC NAA+PROBE: NOT DETECTED
FLUBV RNA ISLT QL NAA+PROBE: NOT DETECTED
GFR SERPL CREATININE-BSD FRML MDRD: 96 ML/MIN/1.73
GLOBULIN UR ELPH-MCNC: 2.2 GM/DL
GLUCOSE BLD-MCNC: 105 MG/DL (ref 65–99)
HADV DNA SPEC NAA+PROBE: NOT DETECTED
HCOV 229E RNA SPEC QL NAA+PROBE: NOT DETECTED
HCOV HKU1 RNA SPEC QL NAA+PROBE: NOT DETECTED
HCOV NL63 RNA SPEC QL NAA+PROBE: NOT DETECTED
HCOV OC43 RNA SPEC QL NAA+PROBE: NOT DETECTED
HCT VFR BLD AUTO: 38.8 % (ref 40.4–52.2)
HGB BLD-MCNC: 13.6 G/DL (ref 13.7–17.6)
HMPV RNA NPH QL NAA+NON-PROBE: NOT DETECTED
HOLD SPECIMEN: NORMAL
HPIV1 RNA SPEC QL NAA+PROBE: NOT DETECTED
HPIV2 RNA SPEC QL NAA+PROBE: NOT DETECTED
HPIV3 RNA NPH QL NAA+PROBE: NOT DETECTED
HPIV4 P GENE NPH QL NAA+PROBE: NOT DETECTED
IMM GRANULOCYTES # BLD AUTO: 0.03 10*3/MM3 (ref 0–0.03)
IMM GRANULOCYTES NFR BLD AUTO: 0.5 % (ref 0–0.5)
LYMPHOCYTES # BLD AUTO: 1.2 10*3/MM3 (ref 0.9–4.8)
LYMPHOCYTES NFR BLD AUTO: 18.2 % (ref 19.6–45.3)
M PNEUMO IGG SER IA-ACNC: NOT DETECTED
MCH RBC QN AUTO: 30.4 PG (ref 27–32.7)
MCHC RBC AUTO-ENTMCNC: 35.1 G/DL (ref 32.6–36.4)
MCV RBC AUTO: 86.6 FL (ref 79.8–96.2)
MONOCYTES # BLD AUTO: 0.55 10*3/MM3 (ref 0.2–1.2)
MONOCYTES NFR BLD AUTO: 8.3 % (ref 5–12)
NEUTROPHILS # BLD AUTO: 4.77 10*3/MM3 (ref 1.9–8.1)
NEUTROPHILS NFR BLD AUTO: 72.4 % (ref 42.7–76)
NRBC BLD AUTO-RTO: 0 /100 WBC (ref 0–0)
PLATELET # BLD AUTO: 191 10*3/MM3 (ref 140–500)
PMV BLD AUTO: 9.1 FL (ref 6–12)
POTASSIUM BLD-SCNC: 3.2 MMOL/L (ref 3.5–5.2)
PROT SERPL-MCNC: 5.5 G/DL (ref 6–8.5)
RBC # BLD AUTO: 4.48 10*6/MM3 (ref 4.6–6)
RHINOVIRUS RNA SPEC NAA+PROBE: NOT DETECTED
RSV RNA NPH QL NAA+NON-PROBE: NOT DETECTED
SODIUM BLD-SCNC: 136 MMOL/L (ref 136–145)
WBC NRBC COR # BLD: 6.59 10*3/MM3 (ref 4.5–10.7)

## 2019-01-20 PROCEDURE — 96361 HYDRATE IV INFUSION ADD-ON: CPT

## 2019-01-20 PROCEDURE — 85025 COMPLETE CBC W/AUTO DIFF WBC: CPT | Performed by: INTERNAL MEDICINE

## 2019-01-20 PROCEDURE — 78226 HEPATOBILIARY SYSTEM IMAGING: CPT

## 2019-01-20 PROCEDURE — G0378 HOSPITAL OBSERVATION PER HR: HCPCS

## 2019-01-20 PROCEDURE — 83605 ASSAY OF LACTIC ACID: CPT | Performed by: EMERGENCY MEDICINE

## 2019-01-20 PROCEDURE — A9537 TC99M MEBROFENIN: HCPCS | Performed by: INTERNAL MEDICINE

## 2019-01-20 PROCEDURE — 80053 COMPREHEN METABOLIC PANEL: CPT | Performed by: INTERNAL MEDICINE

## 2019-01-20 PROCEDURE — 0 TECHNETIUM TC 99M MEBROFENIN KIT: Performed by: INTERNAL MEDICINE

## 2019-01-20 RX ORDER — GABAPENTIN 100 MG/1
100 CAPSULE ORAL NIGHTLY
Status: DISCONTINUED | OUTPATIENT
Start: 2019-01-20 | End: 2019-01-20 | Stop reason: HOSPADM

## 2019-01-20 RX ORDER — HYDROMORPHONE HYDROCHLORIDE 1 MG/ML
0.5 INJECTION, SOLUTION INTRAMUSCULAR; INTRAVENOUS; SUBCUTANEOUS
Status: DISCONTINUED | OUTPATIENT
Start: 2019-01-20 | End: 2019-01-20 | Stop reason: HOSPADM

## 2019-01-20 RX ORDER — SODIUM CHLORIDE 0.9 % (FLUSH) 0.9 %
3-10 SYRINGE (ML) INJECTION AS NEEDED
Status: DISCONTINUED | OUTPATIENT
Start: 2019-01-20 | End: 2019-01-20 | Stop reason: HOSPADM

## 2019-01-20 RX ORDER — DEXTROSE, SODIUM CHLORIDE, AND POTASSIUM CHLORIDE 5; .45; .15 G/100ML; G/100ML; G/100ML
100 INJECTION INTRAVENOUS CONTINUOUS
Status: DISCONTINUED | OUTPATIENT
Start: 2019-01-20 | End: 2019-01-20

## 2019-01-20 RX ORDER — SODIUM CHLORIDE 0.9 % (FLUSH) 0.9 %
3 SYRINGE (ML) INJECTION EVERY 12 HOURS SCHEDULED
Status: DISCONTINUED | OUTPATIENT
Start: 2019-01-20 | End: 2019-01-20 | Stop reason: HOSPADM

## 2019-01-20 RX ORDER — PROMETHAZINE HYDROCHLORIDE 25 MG/ML
25 INJECTION, SOLUTION INTRAMUSCULAR; INTRAVENOUS EVERY 4 HOURS PRN
Status: DISCONTINUED | OUTPATIENT
Start: 2019-01-20 | End: 2019-01-20 | Stop reason: HOSPADM

## 2019-01-20 RX ORDER — KIT FOR THE PREPARATION OF TECHNETIUM TC 99M MEBROFENIN 45 MG/10ML
1 INJECTION, POWDER, LYOPHILIZED, FOR SOLUTION INTRAVENOUS
Status: COMPLETED | OUTPATIENT
Start: 2019-01-20 | End: 2019-01-20

## 2019-01-20 RX ADMIN — MEBROFENIN 1 DOSE: 45 INJECTION, POWDER, LYOPHILIZED, FOR SOLUTION INTRAVENOUS at 06:58

## 2019-01-20 RX ADMIN — POTASSIUM CHLORIDE, DEXTROSE MONOHYDRATE AND SODIUM CHLORIDE 100 ML/HR: 150; 5; 450 INJECTION, SOLUTION INTRAVENOUS at 01:40

## 2019-01-20 RX ADMIN — Medication 3 ML: at 11:20

## 2019-01-20 NOTE — ED PROVIDER NOTES
MD ATTESTATION NOTE    The SABRINA and I have discussed this patient's history, physical exam, and treatment plan.  I have reviewed the documentation and personally had a face to face interaction with the patient. I affirm the documentation and agree with the treatment and plan.  The attached note describes my personal findings.    Pt with lower abd pain that began today. He also reports having some nausea and vomiting. He reports that his pain is currently resolved.    On exam, Pt is resting comfortably, in no distress, and without focal neurologic deficit. Abd is soft and tender with normal bowel sounds.       Lactate is 3.4. Will admit.     Documentation assistance provided by lito Stuart for Dr. Madden.  Information recorded by the scribe was done at my direction and has been verified and validated by me.       Jeff Stuart  01/19/19 4278       Orlando Madden MD  01/20/19 4025

## 2019-01-20 NOTE — PLAN OF CARE
Problem: Patient Care Overview  Goal: Plan of Care Review  Outcome: Ongoing (interventions implemented as appropriate)   01/20/19 0439   Plan of Care Review   Progress no change   OTHER   Outcome Summary VSS, on RA. Clear liquid diet. No c/o pain or nausea. CT, CXR, and US of the gallbladder complete. HIDA scan to be done. No acute changes, will continue to monitor.     Goal: Individualization and Mutuality  Outcome: Ongoing (interventions implemented as appropriate)    Goal: Discharge Needs Assessment  Outcome: Ongoing (interventions implemented as appropriate)    Goal: Interprofessional Rounds/Family Conf  Outcome: Ongoing (interventions implemented as appropriate)      Problem: Fall Risk (Adult)  Goal: Identify Related Risk Factors and Signs and Symptoms  Outcome: Ongoing (interventions implemented as appropriate)    Goal: Absence of Fall  Outcome: Ongoing (interventions implemented as appropriate)      Problem: Pain, Acute (Adult)  Goal: Identify Related Risk Factors and Signs and Symptoms  Outcome: Ongoing (interventions implemented as appropriate)    Goal: Acceptable Pain Control/Comfort Level  Outcome: Ongoing (interventions implemented as appropriate)      Problem: Nausea/Vomiting (Adult)  Goal: Identify Related Risk Factors and Signs and Symptoms  Outcome: Ongoing (interventions implemented as appropriate)    Goal: Symptom Relief  Outcome: Ongoing (interventions implemented as appropriate)    Goal: Adequate Hydration  Outcome: Ongoing (interventions implemented as appropriate)

## 2019-01-20 NOTE — H&P
"      Patient Care Team:  Jovani Salomon MD as PCP - General (Internal Medicine)  Gerry Schroeder MD as Consulting Physician (Hematology and Oncology)  Cisco Toledo MD as Referring Physician (Internal Medicine)    Chief complaint Abdominal pain    Subjective Had his usual muffins at [employer] yesterday morning and felt some mid to lower abdominal pain and cramping develop. He had no diarrhea and in fact felt that he had some constipation. He progressed to nausea but no vomiting.  Took antacids with no relief, and told me that he \"had the worse pain he had ever had\" and I sent him to the ER.     ER has found an elevated lactic acid. CT of abdomen shows known hemangioma of the liver.His HIDA scan this morning shows an Ejection fraction of 12%.        History of Present Illness Known history of implanted radioactive seeds for prostate cancer.Known coronary artery disease. Lumbosacral disc disease is responding to epidurals. He has had recurrent chronic lymphocytic leukemia. Hypertension. High cholesterol.Osteoarthritis of the knees.    Review of Systems   Constitutional: Positive for appetite change. Negative for chills, diaphoresis and fever.   HENT: Negative.    Eyes: Negative.    Respiratory: Negative.    Cardiovascular: Negative.    Gastrointestinal: Positive for abdominal pain, constipation and nausea. Negative for abdominal distention, anal bleeding, blood in stool, diarrhea and vomiting.   Endocrine: Negative.    Genitourinary: Negative.    Musculoskeletal: Negative.    Skin: Negative.    Allergic/Immunologic: Negative.    Neurological: Negative.    Hematological: Negative.    Psychiatric/Behavioral: Negative.         Past Medical History:   Diagnosis Date   • Benign prostatic hyperplasia    • Bursitis of right hip    • Chronic kidney disease     Kidney stones   • Coronary arteriosclerosis    • Degeneration of lumbar intervertebral disc    • Disease of thyroid gland     enlarged   • Disorder of lipid " metabolism    • Erectile dysfunction    • History of kidney stones    • Hyperlipidemia    • Hypertension    • Impotence of organic origin    • Kidney stone    • Localized, primary osteoarthritis    • Lung mass    • Osteoarthritis of knee    • Polyp of colon    • Primary erectile dysfunction    • Prostate CA (CMS/HCC)    • Sensory hearing loss, bilateral    • Substernal goiter    • Vitamin D deficiency      Past Surgical History:   Procedure Laterality Date   • BRONCHOSCOPY N/A 12/14/2016    Procedure: BRONCHOSCOPY WITH  BAL AND BIOPSY AND ENDOBRONCHIAL ULTRASOUND  AND NAVIGATION WITH BRUSHINGS AND BIOPSY AND BAL;  Surgeon: Pierre Manning MD;  Location: [facility] ENDOSCOPY;  Service:    • PROSTATE RADIOACTIVE SEED IMPLANT       Family History   Problem Relation Age of Onset   • Heart disease Father         Rheumatic heart disease   • Hypertension Father    • Stroke Mother      Social History     Tobacco Use   • Smoking status: Never Smoker   • Smokeless tobacco: Never Used   • Tobacco comment: no caffiene   Substance Use Topics   • Alcohol use: No   • Drug use: No     Medications Prior to Admission   Medication Sig Dispense Refill Last Dose   • alfuzosin (UROXATRAL) 10 MG 24 hr tablet    1/19/2019 at Unknown time   • Cholecalciferol (VITAMIN D3) 96928 UNITS capsule Take by mouth.   1/19/2019 at Unknown time   • COENZYME Q-10 PO Take  by mouth.   1/19/2019 at Unknown time   • cyanocobalamin ([employer] VITAMIN B-12) 1000 MCG tablet Take  by mouth.   1/19/2019 at Unknown time   • DOXYCYCLINE HYCLATE PO Take 20 mg by mouth Daily.   1/19/2019 at Unknown time   • gabapentin (NEURONTIN) 100 MG capsule TK 4 CS PO QHS  2 1/19/2019 at Unknown time   • GARLIC PO Take  by mouth.   1/19/2019 at Unknown time   • losartan-hydrochlorothiazide (HYZAAR) 100-25 MG per tablet Take 1 tablet by mouth Daily.   1/19/2019 at Unknown time   • Lycopene 10 MG capsule Take  by mouth.   1/19/2019 at Unknown time   • meloxicam (MOBIC) 15 MG tablet Take 15  mg by mouth Daily.   1/19/2019 at Unknown time   • potassium chloride (K-DUR,KLOR-CON) 20 MEQ CR tablet TAKE 1 TABLET EVERY DAY 90 tablet 0 1/19/2019 at Unknown time   • pravastatin (PRAVACHOL) 10 MG tablet    1/19/2019 at Unknown time   • Red Yeast Rice Extract (RED YEAST RICE PO) Take 1,200 mg by mouth.   1/19/2019 at Unknown time   • triptorelin pamoate (TRELSTAR) 11.25 MG reconstituted suspension IM suspension Inject  into the shoulder, thigh, or buttocks As Needed.   1/19/2019 at Unknown time     Allergies:  Patient has no known allergies.    Objective      Vital Signs  Temp:  [98 °F (36.7 °C)-100.4 °F (38 °C)] 98.4 °F (36.9 °C)  Heart Rate:  [76-98] 82  Resp:  [17-20] 18  BP: (104-150)/(64-92) 140/92    Physical Exam   Constitutional: He is oriented to person, place, and time. He appears well-developed and well-nourished.   HENT:   Head: Normocephalic and atraumatic.   Eyes: EOM are normal. Pupils are equal, round, and reactive to light.   Neck: Normal range of motion. Neck supple.   Cardiovascular: Normal rate, regular rhythm and normal heart sounds.   Pulmonary/Chest: Effort normal and breath sounds normal.   Abdominal: Soft. Bowel sounds are normal. He exhibits no distension and no mass. There is no tenderness. There is no rebound and no guarding. No hernia.   Musculoskeletal: Normal range of motion. He exhibits no edema.   Neurological: He is alert and oriented to person, place, and time.   Skin: Skin is warm and dry.   Psychiatric: He has a normal mood and affect. His behavior is normal. Judgment and thought content normal.       Results Review:   I reviewed the patient's new clinical results.      Assessment/Plan       Abdominal pain      Assessment:  (1. Abdominal pain with nausea with normal CT scan and quick resolution of symptoms  2. Abnormal HIDA scan.).     Plan:   (1. IV fluids  2. Meds for nausea  3.General surgery consult     Jovani Salomon MD).       I discussed the patients findings  and my recommendations with patient and nursing staff    Jovani Salomon MD  01/20/19  11:09 AM    Time: Discharge 45 min

## 2019-01-20 NOTE — DISCHARGE SUMMARY
Patient admitted with abdominal pain and cramping and nausea.  His WBC was normal, and the liver function tests were normal. His HIDA scan was abnormal with a severely reduced ejection fraction of 12 %.    Order was done for general surgery consult.  Patient refused, stating that he needed to get home to be with his demented wife......    Patient is now assymptomatic and his exam is negative. He will be discharged and I will follow up with him as an outpatient.    Gall bladder dyskinesia    Jovani Salomon Md

## 2019-01-20 NOTE — ED PROVIDER NOTES
EMERGENCY DEPARTMENT ENCOUNTER    CHIEF COMPLAINT  Chief Complaint: abdominal pain  History given by: patient  History limited by: none  Room Number: E464/1  PMD: Jovani Salomon MD      HPI:  Pt is a 73 y.o. male who presents complaining of worsening abd pain that began earlier about 14 hours ago. Pt also c/o N/V and constipation for the past few days. Pt states that he contacted his PCP who advised him to come to the ED for further evaluation, believing that the pt might have a bowel blockage. Pt denies any other Sx at this time.     Pt states that his sister had a Hx of bowel blockages.    Duration:  About a day  Onset: gradual  Timing: constant  Location: generalized abd  Radiation: none  Quality: abd pain  Intensity/Severity: 9/10  Progression: worsened  Associated Symptoms: N/V, constipation  Aggravating Factors: none  Alleviating Factors: none  Previous Episodes: none  Treatment before arrival: none    PAST MEDICAL HISTORY  Active Ambulatory Problems     Diagnosis Date Noted   • Health care maintenance 01/31/2016   • Benign prostatic hyperplasia 01/31/2016   • Chronic lymphocytic leukemia (CMS/HCC) 01/31/2016   • Hypertension 01/31/2016   • Hypovitaminosis D 01/31/2016   • Hyperlipidemia 01/31/2016   • Hypogonadism in male 01/31/2016   • Vitamin D deficiency    • Prostate cancer (CMS/HCC) 06/03/2016   • Spinal stenosis of lumbar region with neurogenic claudication 05/29/2018   • DDD (degenerative disc disease), lumbar 05/29/2018   • Coronary artery disease involving native heart without angina pectoris 08/08/2018   • PVC (premature ventricular contraction) 08/08/2018   • History of colon polyps 09/20/2018     Resolved Ambulatory Problems     Diagnosis Date Noted   • Mass of left lung 11/10/2016     Past Medical History:   Diagnosis Date   • Benign prostatic hyperplasia    • Bursitis of right hip    • Chronic kidney disease    • Coronary arteriosclerosis    • Degeneration of lumbar intervertebral  disc    • Disease of thyroid gland    • Disorder of lipid metabolism    • Erectile dysfunction    • History of kidney stones    • Hyperlipidemia    • Hypertension    • Impotence of organic origin    • Kidney stone    • Localized, primary osteoarthritis    • Lung mass    • Osteoarthritis of knee    • Polyp of colon    • Primary erectile dysfunction    • Prostate CA (CMS/HCC)    • Sensory hearing loss, bilateral    • Substernal goiter    • Vitamin D deficiency        PAST SURGICAL HISTORY  Past Surgical History:   Procedure Laterality Date   • BRONCHOSCOPY N/A 12/14/2016    Procedure: BRONCHOSCOPY WITH  BAL AND BIOPSY AND ENDOBRONCHIAL ULTRASOUND  AND NAVIGATION WITH BRUSHINGS AND BIOPSY AND BAL;  Surgeon: Pierre Manning MD;  Location: [facility] ENDOSCOPY;  Service:    • PROSTATE RADIOACTIVE SEED IMPLANT         FAMILY HISTORY  Family History   Problem Relation Age of Onset   • Heart disease Father         Rheumatic heart disease   • Hypertension Father    • Stroke Mother        SOCIAL HISTORY  Social History     Socioeconomic History   • Marital status:      Spouse name: No   • Number of children: Not on file   • Years of education: College   • Highest education level: Not on file   Social Needs   • Financial resource strain: Not on file   • Food insecurity - worry: Not on file   • Food insecurity - inability: Not on file   • Transportation needs - medical: Not on file   • Transportation needs - non-medical: Not on file   Occupational History   • Occupation: Business Owner     Employer: [employer]   Tobacco Use   • Smoking status: Never Smoker   • Smokeless tobacco: Never Used   • Tobacco comment: no caffiene   Substance and Sexual Activity   • Alcohol use: No   • Drug use: No   • Sexual activity: Defer   Other Topics Concern   • Not on file   Social History Narrative    LIVES ALONE       ALLERGIES  Patient has no known allergies.    REVIEW OF SYSTEMS  Review of Systems   Constitutional: Negative for activity  change, appetite change and fever.   HENT: Negative for congestion and sore throat.    Eyes: Negative.    Respiratory: Negative for cough and shortness of breath.    Cardiovascular: Negative for chest pain and leg swelling.   Gastrointestinal: Positive for abdominal pain, constipation, nausea and vomiting. Negative for diarrhea.   Endocrine: Negative.    Genitourinary: Negative for decreased urine volume and dysuria.   Musculoskeletal: Negative for neck pain.   Skin: Negative for rash and wound.   Allergic/Immunologic: Negative.    Neurological: Negative for weakness, numbness and headaches.   Hematological: Negative.    Psychiatric/Behavioral: Negative.    All other systems reviewed and are negative.      PHYSICAL EXAM  ED Triage Vitals [01/19/19 1843]   Temp Heart Rate Resp BP SpO2   -- 98 20 -- 99 %      Temp src Heart Rate Source Patient Position BP Location FiO2 (%)   -- -- -- -- --       Physical Exam   Constitutional: He is oriented to person, place, and time. No distress.   HENT:   Head: Normocephalic and atraumatic.   Eyes: EOM are normal. Pupils are equal, round, and reactive to light.   Neck: Normal range of motion. Neck supple.   Cardiovascular: Normal rate, regular rhythm and normal heart sounds.   Pulmonary/Chest: Effort normal and breath sounds normal. No respiratory distress.   Abdominal: Soft. Bowel sounds are hypoactive. There is tenderness (mid and epigastric) in the epigastric area. There is guarding (mild). There is no rebound.   Musculoskeletal: Normal range of motion. He exhibits no edema.   Neurological: He is alert and oriented to person, place, and time. He has normal sensation and normal strength.   Skin: Skin is warm and dry.   Psychiatric: Mood and affect normal.   Nursing note and vitals reviewed.      LAB RESULTS  Lab Results (last 24 hours)     Procedure Component Value Units Date/Time    CBC & Differential [[id number]] Collected:  01/19/19 1920    Specimen:  Blood Updated:  01/19/19  2014    Narrative:       The following orders were created for panel order CBC & Differential.  Procedure                               Abnormality         Status                     ---------                               -----------         ------                     CBC Auto Differential[[id number]]        Abnormal            Final result                 Please view results for these tests on the individual orders.    Comprehensive Metabolic Panel [[id number]]  (Abnormal) Collected:  01/19/19 1920    Specimen:  Blood Updated:  01/19/19 1958     Glucose 123 mg/dL      BUN 14 mg/dL      Creatinine 0.98 mg/dL      Sodium 133 mmol/L      Potassium 3.2 mmol/L      Chloride 92 mmol/L      CO2 21.2 mmol/L      Calcium 9.8 mg/dL      Total Protein 7.3 g/dL      Albumin 4.00 g/dL      ALT (SGPT) 9 U/L      AST (SGOT) 11 U/L      Alkaline Phosphatase 60 U/L      Total Bilirubin 1.4 mg/dL      eGFR Non African Amer 75 mL/min/1.73      Globulin 3.3 gm/dL      A/G Ratio 1.2 g/dL      BUN/Creatinine Ratio 14.3     Anion Gap 19.8 mmol/L     Narrative:       The MDRD GFR formula is only valid for adults with stable renal function between ages 18 and 70.    Lipase [[id number]]  (Normal) Collected:  01/19/19 1920    Specimen:  Blood Updated:  01/19/19 1958     Lipase 18 U/L     CBC Auto Differential [[id number]]  (Abnormal) Collected:  01/19/19 1920    Specimen:  Blood Updated:  01/19/19 2014     WBC 10.08 10*3/mm3      RBC 5.06 10*6/mm3      Hemoglobin 15.4 g/dL      Hematocrit 44.0 %      MCV 87.0 fL      MCH 30.4 pg      MCHC 35.0 g/dL      RDW 13.2 %      RDW-SD 41.6 fl      MPV 9.4 fL      Platelets 200 10*3/mm3      Neutrophil % 91.1 %      Lymphocyte % 8.2 %      Monocyte % 0.3 %      Eosinophil % 0.0 %      Basophil % 0.1 %      Immature Grans % 0.3 %      Neutrophils, Absolute 9.18 10*3/mm3      Lymphocytes, Absolute 0.83 10*3/mm3      Monocytes, Absolute 0.03 10*3/mm3      Eosinophils, Absolute 0.00 10*3/mm3      Basophils,  Absolute 0.01 10*3/mm3      Immature Grans, Absolute 0.03 10*3/mm3     Troponin [[id number]]  (Normal) Collected:  01/19/19 1920    Specimen:  Blood Updated:  01/19/19 2128     Troponin T <0.010 ng/mL     Narrative:       Troponin T Reference Ranges:  Less than 0.03 ng/mL:    Negative for AMI  0.03 to 0.09 ng/mL:      Indeterminant for AMI  Greater than 0.09 ng/mL: Positive for AMI    Lactic Acid, Plasma [[id number]]  (Abnormal) Collected:  01/19/19 2029    Specimen:  Blood Updated:  01/19/19 2101     Lactate 3.4 mmol/L     Lactic Acid, Reflex Timer (This will reflex a repeat order 3-3:15 hours after ordered.) [[id number]] Collected:  01/19/19 2029    Specimen:  Blood Updated:  01/20/19 0015     Extra Tube Hold for add-ons.     Comment: Auto resulted.       Blood Culture - Blood, Arm, Left [[id number]] Collected:  01/19/19 2129    Specimen:  Blood from Arm, Left Updated:  01/19/19 2134    Blood Culture - Blood, Arm, Left [[id number]] Collected:  01/19/19 2129    Specimen:  Blood from Arm, Left Updated:  01/19/19 2134    Respiratory Panel, PCR - Swab, Nasopharynx [[id number]]  (Normal) Collected:  01/19/19 2201    Specimen:  Swab from Nasopharynx Updated:  01/20/19 0001     ADENOVIRUS, PCR Not Detected     Coronavirus 229E Not Detected     Coronavirus HKU1 Not Detected     Coronavirus NL63 Not Detected     Coronavirus OC43 Not Detected     Human Metapneumovirus Not Detected     Human Rhinovirus/Enterovirus Not Detected     Influenza B PCR Not Detected     Parainfluenza Virus 1 Not Detected     Parainfluenza Virus 2 Not Detected     Parainfluenza Virus 3 Not Detected     Parainfluenza Virus 4 Not Detected     Bordetella pertussis pcr Not Detected     Influenza A H1 2009 PCR Not Detected     Chlamydophila pneumoniae PCR Not Detected     Mycoplasma pneumo by PCR Not Detected     Influenza A PCR Not Detected     Influenza A H3 Not Detected     Influenza A H1 Not Detected     RSV, PCR Not Detected     Bordetella parapertussis  PCR Not Detected    Urinalysis With Microscopic If Indicated (No Culture) - Urine, Clean Catch [[id number]]  (Abnormal) Collected:  01/19/19 2249    Specimen:  Urine, Clean Catch Updated:  01/19/19 2302     Color, UA Yellow     Appearance, UA Clear     pH, UA 6.5     Specific Gravity, UA >=1.030     Glucose, UA Negative     Ketones, UA 15 mg/dL (1+)     Bilirubin, UA Negative     Blood, UA Negative     Protein, UA Negative     Leuk Esterase, UA Negative     Nitrite, UA Negative     Urobilinogen, UA 0.2 E.U./dL    Narrative:       Urine microscopic not indicated.    Lactic Acid, Reflex [[id number]]  (Normal) Collected:  01/20/19 0055    Specimen:  Blood Updated:  01/20/19 0119     Lactate 1.2 mmol/L           I ordered the above labs and reviewed the results    RADIOLOGY  XR Chest 2 View   Final Result   1. No active disease       This report was finalized on 1/19/2019 10:59 PM by Haris Cintron M.D.          US Gallbladder   Final Result   1. Small amount of gallbladder sludge.   2. Hepatic and right renal lesions as discussed       This report was finalized on 1/19/2019 10:07 PM by Haris Cintron M.D.          CT Abdomen Pelvis With Contrast   Final Result   1.  Diminishing tiny effusions.   2. Decrease in size of known right hepatic lobe hemangioma. Other liver   lesions and tiny renal lesions are felt to reflect cysts.   3. Constipation without obstruction.   4. Stable prostate gland enlargement                   This report was finalized on 1/19/2019 8:31 PM by Haris Cintron M.D.               I ordered the above noted radiological studies. Interpreted by radiologist. Reviewed by me in PACS.       PROCEDURES  Procedures  EKG          EKG time: 2112  Rhythm/Rate: NSR 86  P waves and WI: nml  QRS, axis: RBBB   ST and T waves: nonspecific T changes inferior leads, no acute ST changes     Interpreted Contemporaneously by me, independently viewed  No prior      PROGRESS AND CONSULTS     1918- Ordered Zofran for pt  nausea and dilaudid for pt pain. Ordered labs and IVF.    2000- Ordered CT abd pelvis.     2002- Rechecked pt who states that he is still in pain after medication. Discussed the plan to have the pt receive a CT scan and the plan to order additional pain medication for the pt. Pt understands and agrees with the plan, all questions answered.    2104- Ordered EKG. Ordered additional labs.    2106- Ordered US Gallbladder.    2137- Discussed pt with Dr. Madden, who after a bedside examination of the pt, agrees with course of care.    2149- Rechecked pt who is resting comfortably. Pt reports relief of pain with medication. Discussed the plan to have the pt admitted after his US Gallbladder results come in. Pt understands and agrees with the plan, all questions answered.    2227- Placed call to IP general consult.    2233- Discussed pt with Dr. Salomon (PCP) who agrees to admit the pt.     2236- Ordered additional labs.    2237- Rechecked the pt who is resting comfortably. Pt reports continued relief of pain with medication. Informed the pt of his imaging and lab results. Discussed the plan to admit the pt to Dr. Salomon for further evaluation and treatment. Pt understands and agrees with the plan, all questions answered.    MEDICAL DECISION MAKING  Results were reviewed/discussed with the patient and they were also made aware of online access. Pt also made aware that some labs, such as cultures, will not be resulted during ER visit and follow up with PMD is necessary.     MDM  Number of Diagnoses or Management Options     Amount and/or Complexity of Data Reviewed  Clinical lab tests: ordered and reviewed (Lactic Acid 3.4)  Tests in the radiology section of CPT®: ordered and reviewed (CT Abd/ Pelvis- 1.  Diminishing tiny effusions.  2. Decrease in size of known right hepatic lobe hemangioma. Other liver lesions and tiny renal lesions are felt to reflect cysts.  3. Constipation without obstruction.  4. Stable prostate gland  enlargement  US Gallbladder- 1. Small amount of gallbladder sludge.  2. Hepatic and right renal lesions as discussed)  Tests in the medicine section of CPT®: ordered and reviewed (See EKG note.)  Discuss the patient with other providers: yes (Dr. Madden (Emergency Medicine)  Dr. Salomon (PCP))    Patient Progress  Patient progress: stable         DIAGNOSIS  Final diagnoses:   Generalized abdominal pain   Lactic acidosis   Fever and chills       DISPOSITION  ADMISSION    Discussed treatment plan and reason for admission with pt/family and admitting physician.  Pt/family voiced understanding of the plan for admission for further testing/treatment as needed.     Latest Documented Vital Signs:  As of 5:09 AM  BP- 121/69 HR- 76 Temp- 98 °F (36.7 °C) (Oral) O2 sat- 95%    --  Documentation assistance provided by lito Dunn for Tai Solano PA-C.  Information recorded by the scribe was done at my direction and has been verified and validated by me.     Lalo Dunn  01/19/19 2592       Benigno Solano III, PA  01/20/19 3301

## 2019-01-21 ENCOUNTER — TELEPHONE (OUTPATIENT)
Dept: ONCOLOGY | Facility: CLINIC | Age: 74
End: 2019-01-21

## 2019-01-21 ENCOUNTER — TELEPHONE (OUTPATIENT)
Dept: ONCOLOGY | Facility: HOSPITAL | Age: 74
End: 2019-01-21

## 2019-01-21 NOTE — PROGRESS NOTES
Case Management Discharge Note    Final Note: home via private auto    Destination      No service has been selected for the patient.      Durable Medical Equipment      No service has been selected for the patient.      Dialysis/Infusion      No service has been selected for the patient.      Home Medical Care      No service has been selected for the patient.      Community Resources      No service has been selected for the patient.        Other: Other(private auto)    Final Discharge Disposition Code: 01 - home or self-care

## 2019-01-21 NOTE — TELEPHONE ENCOUNTER
Patient requesting to only speak with Dr. Schroeder, offered to help and he said no, I'll wait for Dr. Schroeder.  Message sent to Dr. Schroeder.     ----- Message from Marii Love sent at 1/21/2019  9:28 AM EST -----  [phone]    Needs to talk with Dr. Schroeder  Concerning a test over the weekend and other issues.

## 2019-01-21 NOTE — TELEPHONE ENCOUNTER
PT CALLED TODAY IN REGARD HIS VISIT TO ER ON THE WEEKEND: ABD PAIN CONSTIPATION, NOW HE FEELS FINE, HIDA SHOWED LOW EF  OF THE GALLBLADDER NO ACUTE CHOLECYSTITIS ON THE US, HE HAS THICK BILE, I THINK HE NEEDS A LAP JHONY, I WILL REFER TO DR CARPIO, HE AGREES.

## 2019-01-22 DIAGNOSIS — C91.10 CHRONIC LYMPHOCYTIC LEUKEMIA (HCC): Primary | ICD-10-CM

## 2019-01-22 DIAGNOSIS — R10.9 ABDOMINAL PAIN, UNSPECIFIED ABDOMINAL LOCATION: ICD-10-CM

## 2019-01-22 DIAGNOSIS — K59.00 CONSTIPATION, UNSPECIFIED CONSTIPATION TYPE: ICD-10-CM

## 2019-01-22 DIAGNOSIS — C61 PROSTATE CANCER (HCC): ICD-10-CM

## 2019-01-22 NOTE — PROGRESS NOTES
Referral placed to Dr Smith per VO Dr Schroeder for abdominal pain and constipation to be seen ASAP

## 2019-01-24 LAB
BACTERIA SPEC AEROBE CULT: NORMAL
BACTERIA SPEC AEROBE CULT: NORMAL

## 2019-01-31 ENCOUNTER — OFFICE VISIT (OUTPATIENT)
Dept: SURGERY | Facility: CLINIC | Age: 74
End: 2019-01-31

## 2019-01-31 VITALS — HEIGHT: 77 IN | HEART RATE: 87 BPM | OXYGEN SATURATION: 98 % | WEIGHT: 227 LBS | BODY MASS INDEX: 26.8 KG/M2

## 2019-01-31 DIAGNOSIS — K82.8 BILIARY DYSKINESIA: Primary | ICD-10-CM

## 2019-01-31 PROCEDURE — 99204 OFFICE O/P NEW MOD 45 MIN: CPT | Performed by: SURGERY

## 2019-01-31 RX ORDER — PHENOL 1.4 %
10 AEROSOL, SPRAY (ML) MUCOUS MEMBRANE NIGHTLY
COMMUNITY

## 2019-01-31 NOTE — PROGRESS NOTES
SUMMARY (A/P):    73-year-old gentleman whose had one episode of symptoms that possibly represented biliary colic but not definitively.  Again, he's had no symptoms prior to or since the recent episode.  Given that he does not have any definite gallstones and only abnormal HIDA scan, I certainly think it would be reasonable for him to wait and see if he has further symptoms suggestive of biliary colic prior to proceeding with laparoscopic cholecystectomy.  I did discuss with him the nature of the procedure, the rationale for the procedure, and the risks should we get to that point.      CC:    Abnormal HIDA scan, referred for consultation by Dr. Jovani Salomon    HPI:    73-year-old gentleman who a proximally 2 weeks ago awoke with moderately severe epigastric pain associated with nausea and subsequent emesis.  He also had associated constipation for which she took MiraLAX and Metamucil.  His symptoms were severe enough on 1/20/19 that he went to the emergency room.    PSH:    None    PMH:    -Adenomatous colon polyps  -BPH  -Coronary artery disease  -Nephrolithiasis  -Hypertension  -Hyperlipidemia  -Leukemia  -Prostate cancer treated with radiation seeds    FAMILY HISTORY:    Negative for colorectal cancer  -Negative for gallbladder disease    SOCIAL HISTORY:   Denies tobacco use  Denies alcohol use    ALLERGIES: reviewed, in Epic    MEDICATIONS: reviewed, in Epic    ROS:  No chest pain or shortness of air.  All other systems reviewed and negative other than presenting complaints.    RADIOLOGY/ENDOSCOPY:    -Ultrasound of the gallbladder 1/19/2019 demonstrated small amount of sludge, I reviewed the images and concur, findings fairly subtle  -CT abdomen pelvis 1/19/2019 demonstrated multiple cystic lesions of the liver and kidney but no specific abnormalities of the gallbladder on my review of imaging nor on the report  -HIDA scan with Kinevac stimulation 1/20/2019 demonstrated 12% function    LABS:     1/20/19  Glucose 105, potassium 3.2, albumin 3.3, CMP otherwise normal  WBC 6.6, hemoglobin 13.6, platelets 191    PHYSICAL EXAM:   Constitutional: Well-developed well-nourished, no acute distress  Vital signs:   HR 87  Weight 227 pounds  Height 77 inches  BMI 26.9  Eyes: Conjunctiva normal, sclera nonicteric  ENMT: Hearing grossly normal, oral mucosa moist  Neck: Supple, no palpable mass, normal thyroid, trachea midline  Respiratory: Clear to auscultation, normal inspiratory effort  Cardiovascular: Regular rate, no murmur, no carotid bruit, no peripheral edema, no jugular venous distention  Gastrointestinal: Soft, nontender, no palpable mass, no hepatosplenomegaly, negative for hernia, bowel sounds normal  Lymphatics (palpable nodes):  cervical-negative, axillary-negative, inguinal-negative  Skin:  Warm, dry, no rash on visualized skin surfaces  Musculoskeletal: Symmetric strength, normal gait  Psychiatric: Alert and oriented ×3, normal affect     DORENE CARPIO M.D.

## 2019-02-28 ENCOUNTER — HOSPITAL ENCOUNTER (OUTPATIENT)
Dept: GENERAL RADIOLOGY | Facility: HOSPITAL | Age: 74
Discharge: HOME | End: 2019-02-28

## 2019-02-28 ENCOUNTER — ANESTHESIA EVENT (OUTPATIENT)
Dept: PAIN MEDICINE | Facility: HOSPITAL | Age: 74
End: 2019-02-28

## 2019-02-28 ENCOUNTER — HOSPITAL ENCOUNTER (OUTPATIENT)
Dept: PAIN MEDICINE | Facility: HOSPITAL | Age: 74
Discharge: HOME | End: 2019-02-28
Admitting: ANESTHESIOLOGY

## 2019-02-28 ENCOUNTER — ANESTHESIA (OUTPATIENT)
Dept: PAIN MEDICINE | Facility: HOSPITAL | Age: 74
End: 2019-02-28

## 2019-02-28 VITALS
TEMPERATURE: 98.7 F | HEART RATE: 97 BPM | SYSTOLIC BLOOD PRESSURE: 118 MMHG | DIASTOLIC BLOOD PRESSURE: 82 MMHG | RESPIRATION RATE: 16 BRPM | OXYGEN SATURATION: 94 %

## 2019-02-28 DIAGNOSIS — M48.062 SPINAL STENOSIS OF LUMBAR REGION WITH NEUROGENIC CLAUDICATION: Primary | ICD-10-CM

## 2019-02-28 DIAGNOSIS — R52 PAIN: ICD-10-CM

## 2019-02-28 PROCEDURE — 77003 FLUOROGUIDE FOR SPINE INJECT: CPT

## 2019-02-28 PROCEDURE — 0 IOPAMIDOL 41 % SOLUTION: Performed by: ANESTHESIOLOGY

## 2019-02-28 PROCEDURE — C1755 CATHETER, INTRASPINAL: HCPCS

## 2019-02-28 PROCEDURE — 25010000002 METHYLPREDNISOLONE PER 80 MG: Performed by: ANESTHESIOLOGY

## 2019-02-28 RX ORDER — LIDOCAINE HYDROCHLORIDE 10 MG/ML
1 INJECTION, SOLUTION INFILTRATION; PERINEURAL ONCE AS NEEDED
Status: DISCONTINUED | OUTPATIENT
Start: 2019-02-28 | End: 2019-03-01 | Stop reason: HOSPADM

## 2019-02-28 RX ORDER — SODIUM CHLORIDE 0.9 % (FLUSH) 0.9 %
1-10 SYRINGE (ML) INJECTION AS NEEDED
Status: DISCONTINUED | OUTPATIENT
Start: 2019-02-28 | End: 2019-03-01 | Stop reason: HOSPADM

## 2019-02-28 RX ORDER — METHYLPREDNISOLONE ACETATE 80 MG/ML
80 INJECTION, SUSPENSION INTRA-ARTICULAR; INTRALESIONAL; INTRAMUSCULAR; SOFT TISSUE ONCE
Status: COMPLETED | OUTPATIENT
Start: 2019-02-28 | End: 2019-02-28

## 2019-02-28 RX ORDER — MIDAZOLAM HYDROCHLORIDE 1 MG/ML
1 INJECTION INTRAMUSCULAR; INTRAVENOUS AS NEEDED
Status: DISCONTINUED | OUTPATIENT
Start: 2019-02-28 | End: 2019-03-01 | Stop reason: HOSPADM

## 2019-02-28 RX ORDER — FENTANYL CITRATE 50 UG/ML
50 INJECTION, SOLUTION INTRAMUSCULAR; INTRAVENOUS AS NEEDED
Status: DISCONTINUED | OUTPATIENT
Start: 2019-02-28 | End: 2019-03-01 | Stop reason: HOSPADM

## 2019-02-28 RX ADMIN — IOPAMIDOL 10 ML: 408 INJECTION, SOLUTION INTRATHECAL at 14:27

## 2019-02-28 RX ADMIN — METHYLPREDNISOLONE ACETATE 80 MG: 80 INJECTION, SUSPENSION INTRA-ARTICULAR; INTRALESIONAL; INTRAMUSCULAR; SOFT TISSUE at 14:27

## 2019-02-28 NOTE — ANESTHESIA PROCEDURE NOTES
PAIN Epidural block      Patient reassessed immediately prior to procedure    Patient location during procedure: pain clinic  Start Time: 2/28/2019 2:21 PM  Stop Time: 2/28/2019 2:29 PM  Indication:procedure for pain  Performed By  Anesthesiologist: Diallo Moeller MD  Preanesthetic Checklist  Completed: patient identified, surgical consent, pre-op evaluation, timeout performed, IV checked, risks and benefits discussed and monitors and equipment checked  Additional Notes  Diagnosis:  Post-Op Diagnosis Codes:     * Lumbar degenerative disc disease (M51.36)     * Spinal stenosis of lumbar region with neurogenic claudication (M48.062)     * Lumbar neuritis (M54.16)      Prep:  Pt Position:prone  Sterile Tech:gloves, mask and sterile barrier  Prep:chlorhexidine gluconate and isopropyl alcohol  Monitoring:blood pressure monitoring, continuous pulse oximetry and EKG  Procedure:  Sedation: no   Approach:right paramedian  Guidance: fluoroscopy  Location:lumbar  Interspace: L5-S1.  Needle Type:Tuohy  Needle Gauge:20  Aspiration:negative  Test Dose:negative  Medications:  Depomedrol:80 mg  Preservative Free Saline:3mL  Isovue:1mL  Comments:Isovue dye was instilled and dye spread was confirmed to be consistent with epidural placement    Post Assessment:  Dressing:occlusive dressing applied  Pt Tolerance:patient tolerated the procedure well with no apparent complications  Complications:no

## 2019-03-12 PROBLEM — C95.90 LEUKEMIA: Status: ACTIVE | Noted: 2017-06-15

## 2019-03-12 PROBLEM — L91.8 SKIN TAG: Status: ACTIVE | Noted: 2018-12-20

## 2019-03-12 PROBLEM — E04.9 SUBSTERNAL GOITER: Status: ACTIVE | Noted: 2017-06-15

## 2019-03-12 PROBLEM — K59.00 CONSTIPATION: Status: ACTIVE | Noted: 2018-08-23

## 2019-03-12 RX ORDER — LACTULOSE 20 G/30ML
15 SOLUTION ORAL 3 TIMES DAILY
COMMUNITY
End: 2021-08-13

## 2019-03-13 ENCOUNTER — ANESTHESIA (OUTPATIENT)
Dept: GASTROENTEROLOGY | Facility: HOSPITAL | Age: 74
End: 2019-03-13

## 2019-03-13 ENCOUNTER — HOSPITAL ENCOUNTER (OUTPATIENT)
Facility: HOSPITAL | Age: 74
Setting detail: HOSPITAL OUTPATIENT SURGERY
Discharge: HOME | End: 2019-03-13
Attending: COLON & RECTAL SURGERY | Admitting: COLON & RECTAL SURGERY

## 2019-03-13 ENCOUNTER — ANESTHESIA EVENT (OUTPATIENT)
Dept: GASTROENTEROLOGY | Facility: HOSPITAL | Age: 74
End: 2019-03-13

## 2019-03-13 VITALS
DIASTOLIC BLOOD PRESSURE: 70 MMHG | RESPIRATION RATE: 16 BRPM | OXYGEN SATURATION: 95 % | WEIGHT: 225.5 LBS | SYSTOLIC BLOOD PRESSURE: 115 MMHG | TEMPERATURE: 97.5 F | HEART RATE: 74 BPM | BODY MASS INDEX: 26.74 KG/M2

## 2019-03-13 DIAGNOSIS — Z86.010 HISTORY OF COLON POLYPS: ICD-10-CM

## 2019-03-13 PROCEDURE — 88305 TISSUE EXAM BY PATHOLOGIST: CPT | Performed by: COLON & RECTAL SURGERY

## 2019-03-13 PROCEDURE — 45380 COLONOSCOPY AND BIOPSY: CPT | Performed by: COLON & RECTAL SURGERY

## 2019-03-13 PROCEDURE — 25010000002 PROPOFOL 10 MG/ML EMULSION: Performed by: ANESTHESIOLOGY

## 2019-03-13 RX ORDER — SODIUM CHLORIDE, SODIUM LACTATE, POTASSIUM CHLORIDE, CALCIUM CHLORIDE 600; 310; 30; 20 MG/100ML; MG/100ML; MG/100ML; MG/100ML
1000 INJECTION, SOLUTION INTRAVENOUS CONTINUOUS
Status: DISCONTINUED | OUTPATIENT
Start: 2019-03-13 | End: 2019-03-13 | Stop reason: HOSPADM

## 2019-03-13 RX ORDER — LIDOCAINE HYDROCHLORIDE 20 MG/ML
INJECTION, SOLUTION INFILTRATION; PERINEURAL AS NEEDED
Status: DISCONTINUED | OUTPATIENT
Start: 2019-03-13 | End: 2019-03-13 | Stop reason: SURG

## 2019-03-13 RX ORDER — PROPOFOL 10 MG/ML
VIAL (ML) INTRAVENOUS CONTINUOUS PRN
Status: DISCONTINUED | OUTPATIENT
Start: 2019-03-13 | End: 2019-03-13 | Stop reason: SURG

## 2019-03-13 RX ORDER — PROPOFOL 10 MG/ML
VIAL (ML) INTRAVENOUS AS NEEDED
Status: DISCONTINUED | OUTPATIENT
Start: 2019-03-13 | End: 2019-03-13 | Stop reason: SURG

## 2019-03-13 RX ADMIN — SODIUM CHLORIDE, POTASSIUM CHLORIDE, SODIUM LACTATE AND CALCIUM CHLORIDE 1000 ML: 600; 310; 30; 20 INJECTION, SOLUTION INTRAVENOUS at 09:46

## 2019-03-13 RX ADMIN — PROPOFOL 100 MCG/KG/MIN: 10 INJECTION, EMULSION INTRAVENOUS at 10:23

## 2019-03-13 RX ADMIN — PROPOFOL 100 MG: 10 INJECTION, EMULSION INTRAVENOUS at 10:23

## 2019-03-13 RX ADMIN — LIDOCAINE HYDROCHLORIDE 60 MG: 20 INJECTION, SOLUTION INFILTRATION; PERINEURAL at 10:23

## 2019-03-13 NOTE — ANESTHESIA PREPROCEDURE EVALUATION
Anesthesia Evaluation                  Airway   Mallampati: III  TM distance: >3 FB  Neck ROM: full  No difficulty expected  Dental - normal exam     Pulmonary - normal exam   Cardiovascular - normal exam    (+) hypertension, CAD, dysrhythmias, hyperlipidemia,       Neuro/Psych  (+) numbness,     GI/Hepatic/Renal/Endo    (+)  GERD,      Musculoskeletal     Abdominal    Substance History      OB/GYN          Other   (+) arthritis   history of cancer    (-) blood dyscrasia                  Anesthesia Plan    ASA 3     MAC     intravenous induction   Anesthetic plan, all risks, benefits, and alternatives have been provided, discussed and informed consent has been obtained with: patient.

## 2019-03-13 NOTE — H&P
Jeff Bass is a 73 y.o. male  who is referred by Neha Lester MD for a colonoscopy. He is an  has a history of previous adenomatous polyp.     He denies any change in bowel function, melena, or hematochezia.    Past Medical History:   Diagnosis Date   • Actinic keratosis    • Baker's cyst     BILATERAL POPLITEAL   • Benign prostatic hyperplasia     FOLLOWED BY DR. IVVIEN PATEL   • Biliary dyskinesia    • Bunion of right foot     GREAT TOE   • Bursitis of right hip    • Bursopathy    • CAD (coronary artery disease)    • Cataract     BILATERAL   • CKD (chronic kidney disease)    • Closed right ankle fracture    • Colon polyps     FOLLOWED BY DR. NEHA LESTER   • Constipation    • Coronary arteriosclerosis    • Degeneration of lumbar intervertebral disc    • Disease of thyroid gland     enlarged   • Diverticulitis    • Erectile dysfunction    • GERD (gastroesophageal reflux disease)    • Hallux valgus of left foot    • Hyperlipidemia     MIXED   • Hypertension    • Kidney stone 02/21/2009    SEEN AT [facility] ER   • Lactic acidosis 01/19/2019    ADMITTED TO [facility]   • Leukemia (CMS/[facility])     CLL, FOLLOWED BY DR. KYA WALKER   • Localized, primary osteoarthritis    • Lumbar radiculopathy    • Lumbar stenosis    • Lung mass     LEFT SIDE   • Polyneuropathy    • Primary erectile dysfunction    • Prostate CA (CMS/HCC) 03/2016    JACQUELYN 6, S/P XRT, FOLLOWED BY DR. VIVIEN PATEL   • PVC (premature ventricular contraction)    • RBBB    • Sensory hearing loss, bilateral    • Skin cancer     SQUAMOUS CELL CARCINOMA OF FACE   • Substernal goiter    • Substernal thyroid goiter    • Thyromegaly 12/2016    ADMITTED TO [facility]   • Vitamin D deficiency    • Vitamin D deficiency        Past Surgical History:   Procedure Laterality Date   • BRONCHOSCOPY N/A 12/14/2016    Procedure: BRONCHOSCOPY WITH  BAL AND BIOPSY AND ENDOBRONCHIAL ULTRASOUND  AND NAVIGATION WITH BRUSHINGS AND BIOPSY AND BAL;  Surgeon: Pierre Manning MD;   Location: [facility] ENDOSCOPY;  Service:    • COLONOSCOPY W/ POLYPECTOMY N/A 03/19/2015    3 TUBULAR ADENOMA POLYPS, 12 TUBULOVILLOUS POLYP RECTO-SIGMOID, DIVERTICULOSIS, RESCOPE IN 3 YRS, DR. NEHA HAM AT [facility]   • EYE SURGERY Bilateral     CATARACT EXTRACTION WITH LENS IMPLANT, DR. GOVEA   • PROSTATE RADIOACTIVE SEED IMPLANT N/A 09/06/2016    DR. HOA JARAMILLO AT [facility]   • PROSTATE SURGERY N/A 03/11/2016    BIOPSY: ADENOCARCINOMA, DR. ZAID IBARRA   • THYROID BIOPSY Bilateral 11/01/2017    NO B CELL OR T CELL LYMPHOMA, DONE AT [facility]       Medications Prior to Admission   Medication Sig Dispense Refill Last Dose   • alfuzosin (UROXATRAL) 10 MG 24 hr tablet Take 20 mg by mouth Daily.   3/12/2019 at Unknown time   • Cholecalciferol (VITAMIN D3) 34076 UNITS capsule Take 50,000 Units by mouth Daily.   3/12/2019 at Unknown time   • COENZYME Q-10 PO Take 6 teaspoon(s) by mouth Daily. Takes 2 tablespoons/6 teaspoons of Liquid Qunol CoQ10 daily   3/12/2019 at Unknown time   • Cyanocobalamin ([employer] VITAMIN B-12 PO) Take 5,000 mcg by mouth Daily.   3/12/2019 at Unknown time   • gabapentin (NEURONTIN) 100 MG capsule TK 4 CS PO QHS PRN  2 3/12/2019 at Unknown time   • lactulose 20 GM/30ML solution solution lactulose 20 gram/30 mL oral solution   1 tablespoon by mouth twice daily   3/12/2019 at Unknown time   • losartan-hydrochlorothiazide (HYZAAR) 100-25 MG per tablet Take 1 tablet by mouth Daily.   3/13/2019 at Unknown time   • Melatonin 10 MG tablet Take 10 mg by mouth Every Night.   3/12/2019 at Unknown time   • potassium chloride (K-DUR,KLOR-CON) 20 MEQ CR tablet TAKE 1 TABLET EVERY DAY 90 tablet 0 3/12/2019 at Unknown time   • pravastatin (PRAVACHOL) 10 MG tablet Take 10 mg by mouth Daily.   3/12/2019 at Unknown time   • Red Yeast Rice Extract (RED YEAST RICE PO) Take 1,200 mg by mouth Daily.   3/12/2019 at Unknown time   • triptorelin pamoate (TRELSTAR) 11.25 MG reconstituted suspension IM suspension Inject  11.25 mg into the appropriate muscle as directed by prescriber As Needed.   More than a month at Unknown time       No Known Allergies    Family History   Problem Relation Age of Onset   • Heart disease Father         Rheumatic heart disease   • Hypertension Father    • Stroke Mother    • No Known Problems Daughter        Social History     Socioeconomic History   • Marital status:      Spouse name: No   • Number of children: Not on file   • Years of education: College   • Highest education level: Not on file   Social Needs   • Financial resource strain: Not on file   • Food insecurity - worry: Not on file   • Food insecurity - inability: Not on file   • Transportation needs - medical: Not on file   • Transportation needs - non-medical: Not on file   Occupational History   • Occupation: Business Owner     Employer: [employer]   Tobacco Use   • Smoking status: Never Smoker   • Smokeless tobacco: Never Used   • Tobacco comment: no caffiene   Substance and Sexual Activity   • Alcohol use: No   • Drug use: No   • Sexual activity: Yes     Partners: Female     Birth control/protection: None   Other Topics Concern   • Not on file   Social History Narrative    LIVES ALONE       Review of Systems   Gastrointestinal: Negative for abdominal pain, nausea and vomiting.   All other systems reviewed and are negative.      Vitals:    03/13/19 0937   BP: 136/80   Pulse: 94   Resp: 14   Temp: 98 °F (36.7 °C)   SpO2: 97%         Physical Exam   Constitutional: He is oriented to person, place, and time. He appears well-developed and well-nourished.   HENT:   Head: Normocephalic and atraumatic.   Eyes: EOM are normal. Pupils are equal, round, and reactive to light.   Cardiovascular: Regular rhythm.   Pulmonary/Chest: Effort normal.   Abdominal: Soft. He exhibits no distension.   Musculoskeletal: Normal range of motion.   Neurological: He is alert and oriented to person, place, and time.   Skin: Skin is warm and dry.    Psychiatric: Judgment and thought content normal.         Assessment/Plan      previous adenomatous polyp.         I recommend colonoscopy.  I described risks, benefits of the procedure with the patient including but not limited to bleeding, infection, possibility of perforation and possible polypectomy. All of the patient's questions were answered and they would like to proceed with the above recommendations.

## 2019-03-13 NOTE — DISCHARGE INSTRUCTIONS
For the next 24 hours patient needs to be with a responsible adult.    For 24 hours DO NOT drive, operate machinery, appliances, drink alcohol, make important decisions or sign legal documents.    Start with a light or bland diet if you are feeling sick to your stomach otherwise advance to regular diet as tolerated.    Follow recommendations on procedure report if provided by your doctor.    Call Dr HAM for problems [phone]    Problems may include but not limited to: large amounts of bleeding, trouble breathing, repeated vomiting, severe unrelieved pain, fever or chills.

## 2019-03-13 NOTE — ANESTHESIA POSTPROCEDURE EVALUATION
Patient: Jeff Bass    Procedure Summary     Date:  03/13/19 Room / Location:  [facility] ENDOSCOPY 9 /  [facility]    Anesthesia Start:  1022 Anesthesia Stop:  1047    Procedure:  COLONOSCOPY to cecum with biopsy / polypectomy (N/A ) Diagnosis:       History of colon polyps      (History of colon polyps [Z86.010])    Surgeon:  Hieu Lester MD Provider:  Stephanie Bryan MD    Anesthesia Type:  MAC ASA Status:  3          Anesthesia Type: MAC  Last vitals  BP   97/61 (03/13/19 1046)   Temp   36.7 °C (98 °F) (03/13/19 0937)   Pulse   68 (03/13/19 1046)   Resp   16 (03/13/19 1046)     SpO2   95 % (03/13/19 1046)     Post Anesthesia Care and Evaluation    Patient location during evaluation: bedside  Patient participation: complete - patient participated  Level of consciousness: awake  Pain management: adequate  Airway patency: patent  Anesthetic complications: No anesthetic complications    Cardiovascular status: acceptable  Respiratory status: acceptable  Hydration status: acceptable

## 2019-03-14 ENCOUNTER — TELEPHONE (OUTPATIENT)
Dept: ONCOLOGY | Facility: CLINIC | Age: 74
End: 2019-03-14

## 2019-03-14 LAB
CYTO UR: NORMAL
LAB AP CASE REPORT: NORMAL
LAB AP CLINICAL INFORMATION: NORMAL
PATH REPORT.FINAL DX SPEC: NORMAL
PATH REPORT.GROSS SPEC: NORMAL

## 2019-03-14 NOTE — TELEPHONE ENCOUNTER
----- Message from Alice Velazquez sent at 3/14/2019  3:32 PM EDT -----  Contact: [phone]  Pt wants to know if Dr. Schroeder could give him the name of a doctor at [facility] for a relative

## 2019-04-12 ENCOUNTER — OFFICE VISIT CONVERTED (OUTPATIENT)
Dept: SURGERY | Facility: CLINIC | Age: 74
End: 2019-04-12
Attending: UROLOGY

## 2019-04-16 ENCOUNTER — LAB (OUTPATIENT)
Dept: LAB | Facility: HOSPITAL | Age: 74
End: 2019-04-16

## 2019-04-16 ENCOUNTER — OFFICE VISIT (OUTPATIENT)
Dept: ONCOLOGY | Facility: CLINIC | Age: 74
End: 2019-04-16

## 2019-04-16 VITALS
WEIGHT: 235.8 LBS | HEIGHT: 76 IN | TEMPERATURE: 98.1 F | HEART RATE: 74 BPM | BODY MASS INDEX: 28.71 KG/M2 | SYSTOLIC BLOOD PRESSURE: 143 MMHG | DIASTOLIC BLOOD PRESSURE: 77 MMHG | RESPIRATION RATE: 16 BRPM | OXYGEN SATURATION: 96 %

## 2019-04-16 DIAGNOSIS — C91.10 CHRONIC LYMPHOCYTIC LEUKEMIA (HCC): Primary | ICD-10-CM

## 2019-04-16 DIAGNOSIS — C91.10 CHRONIC LYMPHOCYTIC LEUKEMIA (HCC): ICD-10-CM

## 2019-04-16 LAB
ALBUMIN SERPL-MCNC: 4.2 G/DL (ref 3.5–5.2)
ALBUMIN/GLOB SERPL: 1.6 G/DL (ref 1.1–2.4)
ALP SERPL-CCNC: 57 U/L (ref 38–116)
ALT SERPL W P-5'-P-CCNC: 5 U/L (ref 0–41)
ANION GAP SERPL CALCULATED.3IONS-SCNC: 10.4 MMOL/L
AST SERPL-CCNC: 11 U/L (ref 0–40)
BASOPHILS # BLD AUTO: 0.06 10*3/MM3 (ref 0–0.2)
BASOPHILS NFR BLD AUTO: 0.8 % (ref 0–1.5)
BILIRUB SERPL-MCNC: 0.8 MG/DL (ref 0.2–1.2)
BUN BLD-MCNC: 9 MG/DL (ref 6–20)
BUN/CREAT SERPL: 10.8 (ref 7.3–30)
CALCIUM SPEC-SCNC: 9.5 MG/DL (ref 8.5–10.2)
CHLORIDE SERPL-SCNC: 94 MMOL/L (ref 98–107)
CO2 SERPL-SCNC: 28.6 MMOL/L (ref 22–29)
CREAT BLD-MCNC: 0.83 MG/DL (ref 0.7–1.3)
DEPRECATED RDW RBC AUTO: 38.3 FL (ref 37–54)
EOSINOPHIL # BLD AUTO: 0.07 10*3/MM3 (ref 0–0.4)
EOSINOPHIL NFR BLD AUTO: 0.9 % (ref 0.3–6.2)
ERYTHROCYTE [DISTWIDTH] IN BLOOD BY AUTOMATED COUNT: 12.6 % (ref 12.3–15.4)
GFR SERPL CREATININE-BSD FRML MDRD: 90 ML/MIN/1.73
GLOBULIN UR ELPH-MCNC: 2.7 GM/DL (ref 1.8–3.5)
GLUCOSE BLD-MCNC: 105 MG/DL (ref 74–124)
HCT VFR BLD AUTO: 43.1 % (ref 37.5–51)
HGB BLD-MCNC: 15 G/DL (ref 13–17.7)
IMM GRANULOCYTES # BLD AUTO: 0.02 10*3/MM3 (ref 0–0.05)
IMM GRANULOCYTES NFR BLD AUTO: 0.3 % (ref 0–0.5)
LYMPHOCYTES # BLD AUTO: 1.66 10*3/MM3 (ref 0.7–3.1)
LYMPHOCYTES NFR BLD AUTO: 21.1 % (ref 19.6–45.3)
MCH RBC QN AUTO: 29.4 PG (ref 26.6–33)
MCHC RBC AUTO-ENTMCNC: 34.8 G/DL (ref 31.5–35.7)
MCV RBC AUTO: 84.5 FL (ref 79–97)
MONOCYTES # BLD AUTO: 0.76 10*3/MM3 (ref 0.1–0.9)
MONOCYTES NFR BLD AUTO: 9.6 % (ref 5–12)
NEUTROPHILS # BLD AUTO: 5.31 10*3/MM3 (ref 1.4–7)
NEUTROPHILS NFR BLD AUTO: 67.3 % (ref 42.7–76)
NRBC BLD AUTO-RTO: 0 /100 WBC (ref 0–0)
PLATELET # BLD AUTO: 203 10*3/MM3 (ref 140–450)
PMV BLD AUTO: 8.8 FL (ref 6–12)
POTASSIUM BLD-SCNC: 3.8 MMOL/L (ref 3.5–4.7)
PROT SERPL-MCNC: 6.9 G/DL (ref 6.3–8)
RBC # BLD AUTO: 5.1 10*6/MM3 (ref 4.14–5.8)
SODIUM BLD-SCNC: 133 MMOL/L (ref 134–145)
WBC NRBC COR # BLD: 7.88 10*3/MM3 (ref 3.4–10.8)

## 2019-04-16 PROCEDURE — 85025 COMPLETE CBC W/AUTO DIFF WBC: CPT | Performed by: INTERNAL MEDICINE

## 2019-04-16 PROCEDURE — 80053 COMPREHEN METABOLIC PANEL: CPT | Performed by: INTERNAL MEDICINE

## 2019-04-16 PROCEDURE — 36415 COLL VENOUS BLD VENIPUNCTURE: CPT | Performed by: INTERNAL MEDICINE

## 2019-04-16 PROCEDURE — 99214 OFFICE O/P EST MOD 30 MIN: CPT | Performed by: INTERNAL MEDICINE

## 2019-04-16 NOTE — PROGRESS NOTES
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     HISTORY OF PRESENT ILLNESS:This patient returns today to the office for followup stating that he has had a colonoscopy in March, a tubular adenoma was removed and he is grateful that this was not going to become a problem. In the meantime he has not had any new health issues. His appetite is good, his weight is stable, his bowel function is normal. He has developed a new lesion on the skin I\on the cheek on the right side that he wants for me to review. The patient otherwise has no other new health problems.    In regard to his CLL he has not had any fevers, chills, night sweats, pruritus, infections or autoimmunity. He has not developed any adenopathies or any other alterations or modification in his performance status.                       PAST MEDICAL HISTORY:  Significant for hyperlipidemia.  He also has vitamin D deficiency and has undergone replacement of vitamin D.  SUE ponce has no history of hypertension, cardiovascular disease, diabetes, asthma or respiratory problems.  He was worked up by Dr. Luis Ortega in 2009 for an incidentally found mass in the liver through MRI and CT scan that turned out to be a hemangioma.  He al  s  o has benign cysts in the liver.  PET scan in that location in 2009 disclosed no abnormal uptake.  On that occasion, it was also found that the patient had a retrosternal thyroid enlargement with a normal TSH.  The patient had no symptomatology in regard   to thyroid abnormalities otherwise.          The patient has had kidney stones in the past on one occasion.  He had trauma as a child of the middle finger of the right hand with some deformity in the nail that never grew back normally.  Otherwise, he has not had   any hospitalizations or other interventions.          HEMATOLOGIC/ONCOLOGIC HISTORY: (History from previous dates can be found in the separate document.)         On 2016 no clinical evidence of recurrence of his CLL.  Hematological parameters were normal.  He had no B symptoms, no autoimmunity, no infections.  He was advised to remain in observation.          MEDICATIONS: The current medication list was reviewed with the patient and updated in the EMR this date per the medical assistant.  Medical dosages and frequencies were confirmed to be accurate.         ALLERGIES:  No known drug allergies.          SOCIAL HISTORY:  Lives with his wife (who has developed Alzheimer and requires total care at home).  One child, a daughter age 36, who just had her first baby recently.  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  He does not smoke and does not drink alcohol.  The patient, as per 2014, had a lengthy conversation with Dr. Schroeder in regard to the future of his wife who has advanced Alzheimer nena  ntia.  In the way I see things, according to the patient’s report, I think she is going to be institutionalized very soon, given the circumstances and level of care that she requires.  As of 2014 please see HPI.         FAMILY HISTORY:  Father  of heart   disease after rheumatic heart disease was found.  Mother  at age 97.  He has no brothers or sisters.  As per 2015, in regard to further illness in his ill wife who has dementia with significant changes in her overall situation with more somnole  nce, likely stroke and like an infection of some sort.         REVIEW OF SYSTEMS:          General: no fever, no chills, no fatigue,no weight changes, no lack of appetite.  Eyes: no epiphora, xerophthalmia,conjunctivitis, pain, glaucoma, blurred vision, blindness, secretion, photophobia, proptosis, diplopia.  Ears: no otorrhea, tinnitus, otorrhagia, deafness, pain, vertigo.  Nose: no rhinorrhea, no epistaxis, no alteration in perception of odors, no sinuses pressure.  Mouth: no alteration in gums or teeth,  No ulcers, no  "difficulty with mastication or deglut ion, no odynophagia.  Neck: no masses or pain, no thyroid alterations, no pain in muscles or arteries, no carotid odynia, no crepitation.  Respiratory: no cough, no sputum production,no dyspnea,no trepopnea, no pleuritic pain,no hemoptysis.  Heart: no syncope, no irregularity, no palpitations, no angina,no orthopnea,no paroxysmal nocturnal dyspnea.  Vascular Venous: no tenderness,no edema,no palpable cords,no postphlebitic syndrome, no skin changes no ulcerations.  Vascular Arterial: no distal ischemia, noclaudication, no gangrene, no neuropathic ischemic pain, no skin ulcers, no paleness no cyanosis.  GI: no dysphagia, no odynophagia, no regurgitation, no heartburn,no indigestion,no nausea,no vomiting,no hematemesis ,no melena,no jaundice,no distention, no obstipation,no enterorrhagia,no proctalgia,no anal  lesions, no changes in bowel habits.  : no frequency, no hesitancy, no hematuria, no discharge,no  pain.  Musculoskeletal: no muscle or tendon pain or inflammation,no  joint pain, no edema, no functional limitation,no fasciculations, no mass.  Neurologic: no headache, no seizures, noalterations on Craneal nerves, no motor deficit, no sensory deficit, normal coordination, no alteration in memory,normal orientation, calculation,normal writting, verbal and written language.  Skin: no rashes,no pruritus STATED localized lesions.  Psychiatric: no anxiety, no depression,no agitation, no delusions, proper insight.            VITAL SIGNS:  Vitals:    19 1304   BP: 143/77   Pulse: 74   Resp: 16   Temp: 98.1 °F (36.7 °C)   TempSrc: Oral   SpO2: 96%   Weight: 107 kg (235 lb 12.8 oz)   Height: 194 cm (76.38\")           PAIN:  0 out of 10      ECO         PHYSICAL EXAMINATION:    GENERAL:  Well-developed, well-nourished  Patient  in no acute distress.   SKIN:  Warm, dry ,NO rashes,NO purpura ,NO petechiae.PAULAR LESION 1 CM DESQUAMATION R CHEECK  HEENT:  Pupils were equal and " reactive to light and accomodation, conjunctivas non injected, no pterigion, normal extraocular movements, normal visual acuity.   Mouth mucosa was moist, no exudates in oropharynx, normal gum line, normal roof of the mouth and pillars, normal papillations of the tongue.  NECK:  Supple with good range of motion; no thyromegaly or masses, no JVD or bruits, no cervical adenopathies.No carotid arteries pain, no carotid abnormal pulsation , NO arterial dance.  LYMPHATICS:  No cervical, NO supraclavicular, NO axillary,NO epitrochlear , NO inguinal adenopathy.  CHEST:  Normal excursion of both francis thoraces, normal voice fremitus, no subcutaneous emphysema, normal axillas, no rashes or acanthosis nigricans. Lungs clear to percussion and auscultation, normal breath sounds bilaterally, no wheezing,NO crackles NO ronchi, NO stridor, NO rubs.  CARDIAC AND VASCULAR:  normal rate and regular rhythm, without murmurs,NO rubs NO S3 NO S4 right or left . Normal femoral, popliteal, pedis, brachial and carotid pulses.  ABDOMEN:  Soft, nontender with no organomegaly or masses, no ascites, no collateral circulation,no distention,no Eddie sign, no abdominal pain, no inguinal hernias,no umbilical hernia, no abdominal bruits. Normal bowel sounds.  GENITAL: Not  Performed.  EXTREMITIES  AND SPINE:  No clubbing, cyanosis or edema, no deformities or pain .No kyphosis, scoliosis, deformities or pain in spine, ribs or pelvic bone.  NEUROLOGICAL:  Patient was awake, alert, oriented to time, person and place.                      LABORATORY DATA:Differential   Order: [id number] - Part of Panel Order [id number]   Status:  Final result   Visible to patient:  No (Not Released)   Dx:  Chronic lymphocytic leukemia (CMS/[facility])    Ref Range & Units 12:33   WBC 3.40 - 10.80 10*3/mm3 7.88    RBC 4.14 - 5.80 10*6/mm3 5.10    Hemoglobin 13.0 - 17.7 g/dL 15.0    Hematocrit 37.5 - 51.0 % 43.1    MCV 79.0 - 97.0 fL 84.5    MCH 26.6 - 33.0 pg 29.4    MCHC 31.5 -  35.7 g/dL 34.8    RDW 12.3 - 15.4 % 12.6    RDW-SD 37.0 - 54.0 fl 38.3    MPV 6.0 - 12.0 fL 8.8    Platelets 140 - 450 10*3/mm3 203    Neutrophil % 42.7 - 76.0 % 67.3    Lymphocyte % 19.6 - 45.3 % 21.1    Monocyte % 5.0 - 12.0 % 9.6    Eosinophil % 0.3 - 6.2 % 0.9    Basophil % 0.0 - 1.5 % 0.8    Immature Grans % 0.0 - 0.5 % 0.3    Neutrophils, Absolute 1.40 - 7.00 10*3/mm3 5.31    Lymphocytes, Absolute 0.70 - 3.10 10*3/mm3 1.66    Monocytes, Absolute 0.10 - 0.90 10*3/mm3 0.76            Pathology Exam: [id number]   Order: [id number]   Collected:  3/13/2019 10:37   Status:  Final result   Visible to patient:  Yes (MyChart)   Dx:  History of colon polyps   Component    Clinical Information    forceps   Final Diagnosis   1. Colon, Transverse, Biopsy:               A. Tubular adenoma.     [id number]/pkm    Electronically signed by Jovani Dailey MD on 3/14/2019 at 0903                      ASSESSMENT: 1.  Patient has history of chronic lymphoid leukemia that required therapy with Rituxan 2 year ago and since then the patient has had remission with normal white count, normal hemoglobin, normal platelet count and total lymphocyte remains low today.  He has no palpable peripheral adenopathy, no hepatosplenomegaly, no B symptoms, no autoimmunity and no infections.  He will remain in observation from this point of view.   2. The patient has history of prostate cancer status post seed implants, PSA of 1.6. He has still some urinary frequency, no hematuria, no urinary tract infections. He will followup with his urologist in this regard.   3. Possible peripheral neuropathy in his right lower extremity, likely some component of radiculopathy as well. He will have another epidural injection soon.  4. Screening colonoscopy Was performed finding a tubular adenoma in the colon that was removed completely. He will require a repeat colonoscopy in 2-3 years.     The patient has developed a skin lesion in the right cheek that has  desquamation and no tendency to heal, has minimal tendency for ulceration and to me looks typical of a basal cell carcinoma. He has been referred for Dermatology assessment. This is not surprising. He has had a lot of sunshine in his life on his face besides chronic lymphoid leukemia makes him more prone to develop skin cancer.    I will review him back in 4 months with a CBC. No need for any other radiological assessment at this time.

## 2019-08-01 ENCOUNTER — TELEPHONE (OUTPATIENT)
Dept: ONCOLOGY | Facility: HOSPITAL | Age: 74
End: 2019-08-01

## 2019-08-01 NOTE — TELEPHONE ENCOUNTER
----- Message from Marii Love sent at 8/1/2019  9:36 AM EDT -----  [phone]   Having issues and needs to talk with Dr. Schroeder

## 2019-08-01 NOTE — TELEPHONE ENCOUNTER
Pt calling with questions regarding referral to [facility].  Message sent to Dr. Schroeder with pt's phone number.

## 2019-08-07 ENCOUNTER — TELEPHONE (OUTPATIENT)
Dept: ONCOLOGY | Facility: CLINIC | Age: 74
End: 2019-08-07

## 2019-08-07 DIAGNOSIS — N32.0 BLADDER OUTLET OBSTRUCTION: ICD-10-CM

## 2019-08-07 DIAGNOSIS — C91.10 CHRONIC LYMPHOCYTIC LEUKEMIA (HCC): ICD-10-CM

## 2019-08-07 DIAGNOSIS — C61 PROSTATE CANCER (HCC): Primary | ICD-10-CM

## 2019-08-07 NOTE — TELEPHONE ENCOUNTER
Patient had an inquiry in regard to his urinary symptomatology. During the last few days, he has seen significant frequency, difficulty to urinate and no hematuria and he is struggling to pass any urine. He does not want to see any Urologist in [place]. The only option as I mentioned to him in the past would be to go back to [facility] or to be seen at the [facility]. Given the urgency of the symptoms, we will place a consultation to the [facility] today to see if the patient will be able to be seen in the next week or so.     We asked the patient to keep me posted in regard to the outcome of this.     I would not be surprised if the patient has to end up in the emergency room with a Mcdaniel catheter until he is seen at [facility]. Remind ourselves that he has had seeds implanted for prostate cancer at the [facility].

## 2019-08-07 NOTE — PROGRESS NOTES
Message   Received: Today   Message Contents   Gerry Schroeder MD Jackie, Angela, RN             Pt has severe bladder outlet obstruction he needs asap referral to [facility] urologist as new pt he has history of prostate ca and radioactive seeds in the prostate placed at the [facility]        Referral order placed per LOVELY Schroeder  Message to appt desk to arrange

## 2019-08-09 ENCOUNTER — DOCUMENTATION (OUTPATIENT)
Dept: ONCOLOGY | Facility: CLINIC | Age: 74
End: 2019-08-09

## 2019-08-09 NOTE — PROGRESS NOTES
Pt has appt at  Urology on Nov 18 at 10am with Dr Annie Haro  Pt prefers to see a male MD and not a PA  I discussed with pt that this was the first available appt with male MD  They advised that the pt could call as frequently as he would like to see if there were any cancellations.  I gave the pt the #[phone]  The pt is scheduled to see Dr Schroeder on Friday August 16  Pt V/U

## 2019-08-16 ENCOUNTER — OFFICE VISIT (OUTPATIENT)
Dept: ONCOLOGY | Facility: CLINIC | Age: 74
End: 2019-08-16

## 2019-08-16 ENCOUNTER — LAB (OUTPATIENT)
Dept: LAB | Facility: HOSPITAL | Age: 74
End: 2019-08-16

## 2019-08-16 VITALS
SYSTOLIC BLOOD PRESSURE: 145 MMHG | HEART RATE: 77 BPM | WEIGHT: 239.8 LBS | TEMPERATURE: 97.7 F | OXYGEN SATURATION: 97 % | RESPIRATION RATE: 16 BRPM | BODY MASS INDEX: 28.9 KG/M2 | DIASTOLIC BLOOD PRESSURE: 79 MMHG

## 2019-08-16 DIAGNOSIS — C91.10 CHRONIC LYMPHOCYTIC LEUKEMIA (HCC): ICD-10-CM

## 2019-08-16 DIAGNOSIS — C61 MALIGNANT NEOPLASM OF PROSTATE (HCC): Primary | ICD-10-CM

## 2019-08-16 DIAGNOSIS — N32.0 BLADDER OUTLET OBSTRUCTION: ICD-10-CM

## 2019-08-16 DIAGNOSIS — C61 MALIGNANT NEOPLASM OF PROSTATE (HCC): ICD-10-CM

## 2019-08-16 LAB
ALBUMIN SERPL-MCNC: 4.1 G/DL (ref 3.5–5.2)
ALBUMIN/GLOB SERPL: 1.5 G/DL (ref 1.1–2.4)
ALP SERPL-CCNC: 60 U/L (ref 38–116)
ALT SERPL W P-5'-P-CCNC: 8 U/L (ref 0–41)
ANION GAP SERPL CALCULATED.3IONS-SCNC: 10.3 MMOL/L (ref 5–15)
AST SERPL-CCNC: 10 U/L (ref 0–40)
BASOPHILS # BLD AUTO: 0.08 10*3/MM3 (ref 0–0.2)
BASOPHILS NFR BLD AUTO: 1.1 % (ref 0–1.5)
BILIRUB SERPL-MCNC: 0.5 MG/DL (ref 0.2–1.2)
BUN BLD-MCNC: 10 MG/DL (ref 6–20)
BUN/CREAT SERPL: 11.4 (ref 7.3–30)
CALCIUM SPEC-SCNC: 9.5 MG/DL (ref 8.5–10.2)
CHLORIDE SERPL-SCNC: 97 MMOL/L (ref 98–107)
CO2 SERPL-SCNC: 27.7 MMOL/L (ref 22–29)
CREAT BLD-MCNC: 0.88 MG/DL (ref 0.7–1.3)
DEPRECATED RDW RBC AUTO: 39.9 FL (ref 37–54)
EOSINOPHIL # BLD AUTO: 0.17 10*3/MM3 (ref 0–0.4)
EOSINOPHIL NFR BLD AUTO: 2.3 % (ref 0.3–6.2)
ERYTHROCYTE [DISTWIDTH] IN BLOOD BY AUTOMATED COUNT: 12.9 % (ref 12.3–15.4)
GFR SERPL CREATININE-BSD FRML MDRD: 84 ML/MIN/1.73
GLOBULIN UR ELPH-MCNC: 2.7 GM/DL (ref 1.8–3.5)
GLUCOSE BLD-MCNC: 94 MG/DL (ref 74–124)
HCT VFR BLD AUTO: 42.5 % (ref 37.5–51)
HGB BLD-MCNC: 15 G/DL (ref 13–17.7)
IMM GRANULOCYTES # BLD AUTO: 0.22 10*3/MM3 (ref 0–0.05)
IMM GRANULOCYTES NFR BLD AUTO: 2.9 % (ref 0–0.5)
LYMPHOCYTES # BLD AUTO: 1.72 10*3/MM3 (ref 0.7–3.1)
LYMPHOCYTES NFR BLD AUTO: 22.8 % (ref 19.6–45.3)
MCH RBC QN AUTO: 29.9 PG (ref 26.6–33)
MCHC RBC AUTO-ENTMCNC: 35.3 G/DL (ref 31.5–35.7)
MCV RBC AUTO: 84.8 FL (ref 79–97)
MONOCYTES # BLD AUTO: 0.91 10*3/MM3 (ref 0.1–0.9)
MONOCYTES NFR BLD AUTO: 12.1 % (ref 5–12)
NEUTROPHILS # BLD AUTO: 4.45 10*3/MM3 (ref 1.7–7)
NEUTROPHILS NFR BLD AUTO: 58.8 % (ref 42.7–76)
NRBC BLD AUTO-RTO: 0 /100 WBC (ref 0–0.2)
PLATELET # BLD AUTO: 184 10*3/MM3 (ref 140–450)
PMV BLD AUTO: 8.9 FL (ref 6–12)
POTASSIUM BLD-SCNC: 4.2 MMOL/L (ref 3.5–4.7)
PROT SERPL-MCNC: 6.8 G/DL (ref 6.3–8)
PSA SERPL-MCNC: 0.83 NG/ML (ref 0–4)
RBC # BLD AUTO: 5.01 10*6/MM3 (ref 4.14–5.8)
SODIUM BLD-SCNC: 135 MMOL/L (ref 134–145)
TESTOST SERPL-MCNC: 623 NG/DL (ref 193–740)
WBC NRBC COR # BLD: 7.55 10*3/MM3 (ref 3.4–10.8)

## 2019-08-16 PROCEDURE — 99215 OFFICE O/P EST HI 40 MIN: CPT | Performed by: INTERNAL MEDICINE

## 2019-08-16 PROCEDURE — 85025 COMPLETE CBC W/AUTO DIFF WBC: CPT

## 2019-08-16 PROCEDURE — 84153 ASSAY OF PSA TOTAL: CPT | Performed by: INTERNAL MEDICINE

## 2019-08-16 PROCEDURE — 36415 COLL VENOUS BLD VENIPUNCTURE: CPT

## 2019-08-16 PROCEDURE — 80053 COMPREHEN METABOLIC PANEL: CPT | Performed by: INTERNAL MEDICINE

## 2019-08-16 PROCEDURE — 84403 ASSAY OF TOTAL TESTOSTERONE: CPT | Performed by: INTERNAL MEDICINE

## 2019-08-16 NOTE — PROGRESS NOTES
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     HISTORY OF PRESENT ILLNESS:This patient returns today for followup in regard to his chronic lymphoid leukemia. He also has bladder outlet obstruction related to previous seed implantation for prostate cancer at [facility]. We have made him an appointment to be seen at the urological clinic at the [facility]. In any event the patient continues having frequency to urinate and nocturia, no hematuria and no urinary tract infection. He also wants to have a penile implant done at some point. The patient denies any difficulties with cough, sputum production or shortness of breath. No symptoms related to his CLL. He has not had any peripheral adenopathy, B symptoms or alteration in performance status. Appetite is excellent, weight is stable, bowel function is normal. No cardiovascular or respiratory issues at this time.                         PAST MEDICAL HISTORY:  Significant for hyperlipidemia.  He also has vitamin D deficiency and has undergone replacement of vitamin D.  SUE ponce has no history of hypertension, cardiovascular disease, diabetes, asthma or respiratory problems.  He was worked up by Dr. Luis Ortega in 2009 for an incidentally found mass in the liver through MRI and CT scan that turned out to be a hemangioma.  He al  s  o has benign cysts in the liver.  PET scan in that location in 2009 disclosed no abnormal uptake.  On that occasion, it was also found that the patient had a retrosternal thyroid enlargement with a normal TSH.  The patient had no symptomatology in regard   to thyroid abnormalities otherwise.          The patient has had kidney stones in the past on one occasion.  He had trauma as a child of the middle finger of the right hand with some deformity in the nail that never grew back normally.  Otherwise, he has not had   any hospitalizations or other interventions.           HEMATOLOGIC/ONCOLOGIC HISTORY: (History from previous dates can be found in the separate document.)        On 2016 no clinical evidence of recurrence of his CLL.  Hematological parameters were normal.  He had no B symptoms, no autoimmunity, no infections.  He was advised to remain in observation.          MEDICATIONS: The current medication list was reviewed with the patient and updated in the EMR this date per the medical assistant.  Medical dosages and frequencies were confirmed to be accurate.         ALLERGIES:  No known drug allergies.          SOCIAL HISTORY:  Lives with his wife (who has developed Alzheimer and requires total care at home).  One child, a daughter age 36, who just had her first baby recently.  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  He does not smoke and does not drink alcohol.  The patient, as per 2014, had a lengthy conversation with Dr. Schroeder in regard to the future of his wife who has advanced Alzheimer nena  ntia.  In the way I see things, according to the patient’s report, I think she is going to be institutionalized very soon, given the circumstances and level of care that she requires.  As of 2014 please see HPI.         FAMILY HISTORY:  Father  of heart   disease after rheumatic heart disease was found.  Mother  at age 97.  He has no brothers or sisters.  As per 2015, in regard to further illness in his ill wife who has dementia with significant changes in her overall situation with more somnole  nce, likely stroke and like an infection of some sort.         REVIEW OF SYSTEMS:          General: no fever, no chills, no fatigue,no weight changes, no lack of appetite.  Eyes: no epiphora, xerophthalmia,conjunctivitis, pain, glaucoma, blurred vision, blindness, secretion, photophobia, proptosis, diplopia.  Ears: no otorrhea, tinnitus, otorrhagia, deafness, pain, vertigo.  Nose: no rhinorrhea, no epistaxis, no  alteration in perception of odors, no sinuses pressure.  Mouth: no alteration in gums or teeth,  No ulcers, no difficulty with mastication or deglut ion, no odynophagia.  Neck: no masses or pain, no thyroid alterations, no pain in muscles or arteries, no carotid odynia, no crepitation.  Respiratory: no cough, no sputum production,no dyspnea,no trepopnea, no pleuritic pain,no hemoptysis.  Heart: no syncope, no irregularity, no palpitations, no angina,no orthopnea,no paroxysmal nocturnal dyspnea.  Vascular Venous: no tenderness,no edema,no palpable cords,no postphlebitic syndrome, no skin changes no ulcerations.  Vascular Arterial: no distal ischemia, noclaudication, no gangrene, no neuropathic ischemic pain, no skin ulcers, no paleness no cyanosis.  GI: no dysphagia, no odynophagia, no regurgitation, no heartburn,no indigestion,no nausea,no vomiting,no hematemesis ,no melena,no jaundice,no distention, no obstipation,no enterorrhagia,no proctalgia,no anal  lesions, no changes in bowel habits.  :  frequency,  hesitancy, no hematuria, no discharge,  pain.  Musculoskeletal: no muscle or tendon pain or inflammation,no  joint pain, no edema, no functional limitation,no fasciculations, no mass.  Neurologic: no headache, no seizures, noalterations on Craneal nerves, no motor deficit, no sensory deficit, normal coordination, no alteration in memory,normal orientation, calculation,normal writting, verbal and written language.  Skin: no rashes,no pruritus no localized lesions.  Psychiatric: no anxiety, no depression,no agitation, no delusions, proper insight.            VITAL SIGNS:  Vitals:    19 1213   BP: 145/79   Pulse: 77   Resp: 16   Temp: 97.7 °F (36.5 °C)   TempSrc: Oral   SpO2: 97%   Weight: 109 kg (239 lb 12.8 oz)           PAIN:  0 out of 10      ECO         PHYSICAL EXAMINATION:    GENERAL:  Well-developed, well-nourished  Patient  in no acute distress.   SKIN:  Warm, dry ,NO rashes,NO purpura ,NO  petechiae.  HEENT:  Pupils were equal and reactive to light and accomodation, conjunctivas non injected, no pterigion, normal extraocular movements, normal visual acuity.   Mouth mucosa was moist, no exudates in oropharynx, normal gum line, normal roof of the mouth and pillars, normal papillations of the tongue.  NECK:  Supple with good range of motion; no thyromegaly or masses, no JVD or bruits, no cervical adenopathies.No carotid arteries pain, no carotid abnormal pulsation , NO arterial dance.  LYMPHATICS:  No cervical, NO supraclavicular, NO axillary,NO epitrochlear , NO inguinal adenopathy.  CHEST:  Normal excursion of both francis thoraces, normal voice fremitus, no subcutaneous emphysema, normal axillas, no rashes or acanthosis nigricans. Lungs clear to percussion and auscultation, normal breath sounds bilaterally, no wheezing,NO crackles NO ronchi, NO stridor, NO rubs.  CARDIAC AND VASCULAR:  normal rate and regular rhythm, without murmurs,NO rubs NO S3 NO S4 right or left . Normal femoral, popliteal, pedis, brachial and carotid pulses.  ABDOMEN:  Soft, nontender with no organomegaly or masses, no ascites, no collateral circulation,no distention,no Eddie sign, no abdominal pain, no inguinal hernias,no umbilical hernia, no abdominal bruits. Normal bowel sounds.  GENITAL: Not  Performed.  EXTREMITIES  AND SPINE:  No clubbing, cyanosis or edema, no deformities or pain .No kyphosis, scoliosis, deformities or pain in spine, ribs or pelvic bone.  NEUROLOGICAL:  Patient was awake, alert, oriented to time, person and place.                            LABORATORY DATA:    Ref Range & Units 12:02   WBC  3.40 - 10.80 10*3/mm3 7.55    RBC  4.14 - 5.80 10*6/mm3 5.01    Hemoglobin  13.0 - 17.7 g/dL 15.0    Hematocrit  37.5 - 51.0 % 42.5    MCV  79.0 - 97.0 fL 84.8    MCH  26.6 - 33.0 pg 29.9    MCHC  31.5 - 35.7 g/dL 35.3    RDW  12.3 - 15.4 % 12.9    RDW-SD  37.0 - 54.0 fl 39.9    MPV  6.0 - 12.0 fL 8.9    Platelets  140 -  450 10*3/mm3 184    Neutrophil %  42.7 - 76.0 % 58.8    Lymphocyte %  19.6 - 45.3 % 22.8    Monocyte %  5.0 - 12.0 % 12.1 Abnormally high     Eosinophil %  0.3 - 6.2 % 2.3    Basophil %  0.0 - 1.5 % 1.1    Immature Grans %  0.0 - 0.5 % 2.9 Abnormally high     Neutrophils, Absolute  1.70 - 7.00 10*3/mm3 4.45    Lymphocytes, Absolute  0.70 - 3.10 10*3/mm3 1.72    Monocytes, Absolute  0.10 - 0.90 10*3/mm3 0.91 Abnormally high     Eosinophils, Absolute  0.00 - 0.40 10*3/mm3 0.17    Basophils, Absolute  0.00 - 0.20 10*3/mm3 0.08    Immature Grans, Absolute  0.00 - 0.05 10*3/mm3 0.22 Abnormally high     nRBC  0.0 - 0.2 /100 WBC 0.0                           ASSESSMENT: 1.  Patient has history of chronic lymphoid leukemia that required therapy with Rituxan 2 year ago and since then the patient has had remission with normal white count, normal hemoglobin, normal platelet count and total lymphocyte remains low today.  He has no palpable peripheral adenopathy, no hepatosplenomegaly, no B symptoms, no autoimmunity and no infections.  He will remain in observation from this point of view.   2. The patient has history of prostate cancer status post seed implants. He has still some urinary frequency, no hematuria, no urinary tract infections.We went ahead and made him an appointment to be seen at the [facility] urological clinic.    They have requested a testosterone level in preparation for a possible penile implant and also requested a CT scan of the abdomen and pelvis. We are going to go ahead and schedule this for the patient.    The patient also was advised that whenever he is seen by urology do not feel surprised if he needs to have a cystoscopy. Do not feel surprised as well if he needs to have stretching of the prostatic urethra or some other intervention to modify the passage of urine from the bladder to the urethra.     I discussed with him the fact that we will be sending him the reports of his future  laboratory testing and radiological analysis to his house. Theoretical appointment to see him back in 4 months for his routine followup in regard to his CLL.    Otherwise no other intervention from my point of view.       3. Possible peripheral neuropathy in his right lower extremity, likely some component of radiculopathy as well. He will have another epidural injection soon.  4. Screening colonoscopy Was performed finding a tubular adenoma in the colon that was removed completely. He will require a repeat colonoscopy in 2-3 years.     The patient has developed a skin lesion in the right cheek that has desquamation and no tendency to heal, has minimal tendency for ulceration and to me looks typical of a basal cell carcinoma. He has been referred for Dermatology assessment. This is not surprising. He has had a lot of sunshine in his life on his face besides chronic lymphoid leukemia makes him more prone to develop skin cancer.    I will review him back in 4 months with a CBC. No need for any other radiological assessment at this time.

## 2019-08-20 ENCOUNTER — TELEPHONE (OUTPATIENT)
Dept: ONCOLOGY | Facility: CLINIC | Age: 74
End: 2019-08-20

## 2019-08-20 NOTE — TELEPHONE ENCOUNTER
I spoke to the pt and informed him of his labs. I printed out copies and mailed them to him as well. Pt v/u.

## 2019-08-20 NOTE — TELEPHONE ENCOUNTER
----- Message from Gerry Schroeder MD sent at 8/20/2019 12:54 PM EDT -----  Send him on the mail all his labs from last week call him and let him know, testosterone was great, psa normal

## 2019-08-21 ENCOUNTER — HOSPITAL ENCOUNTER (OUTPATIENT)
Dept: PET IMAGING | Facility: HOSPITAL | Age: 74
Discharge: HOME | End: 2019-08-21
Admitting: INTERNAL MEDICINE

## 2019-08-21 DIAGNOSIS — C61 MALIGNANT NEOPLASM OF PROSTATE (HCC): ICD-10-CM

## 2019-08-21 DIAGNOSIS — C91.10 CHRONIC LYMPHOCYTIC LEUKEMIA (HCC): ICD-10-CM

## 2019-08-21 LAB — CREAT BLDA-MCNC: 0.8 MG/DL (ref 0.6–1.3)

## 2019-08-21 PROCEDURE — 25010000002 IOPAMIDOL 61 % SOLUTION: Performed by: INTERNAL MEDICINE

## 2019-08-21 PROCEDURE — 82565 ASSAY OF CREATININE: CPT

## 2019-08-21 PROCEDURE — 74177 CT ABD & PELVIS W/CONTRAST: CPT

## 2019-08-21 PROCEDURE — 0 DIATRIZOATE MEGLUMINE & SODIUM PER 1 ML: Performed by: INTERNAL MEDICINE

## 2019-08-21 RX ADMIN — IOPAMIDOL 85 ML: 612 INJECTION, SOLUTION INTRAVENOUS at 13:46

## 2019-08-21 RX ADMIN — DIATRIZOATE MEGLUMINE AND DIATRIZOATE SODIUM 30 ML: 660; 100 LIQUID ORAL; RECTAL at 12:48

## 2019-08-26 ENCOUNTER — TELEPHONE (OUTPATIENT)
Dept: ONCOLOGY | Facility: CLINIC | Age: 74
End: 2019-08-26

## 2019-08-26 NOTE — TELEPHONE ENCOUNTER
CT OF THE ABDOMEN AND PELVIS WITH CONTRAST 08/21/2019.     HISTORY: Chronic lymphocytic leukemia. Prostate cancer. Follow-up.     TECHNIQUE: Axial images were obtained from the lung bases to the  symphysis pubis after intravenous and oral contrast.     FINDINGS: There is mild pleural thickening posteriorly in both  hemithoraces. There are multiple low-density lesions in the liver also  seen on the previous study. Most of these resemble benign hepatic cysts.  There is one somewhat triangular-shaped lesion in the right hepatic lobe  on image 33 which does not resemble a benign cyst and measures  approximately 2.4 cm in size. This appears similar in size to the  previous study. It does appear smaller than on the previous studies of  07/20/2012 at 11/03/2016 and could represent a hemangioma.     The largest cystic lesion is seen in the inferior right hepatic lobe  measuring 6.8 cm. Medial to this lesion there is a low-density lesion  measuring approximately 1.8 cm in size. This may be slightly larger in  size than on the previous study of 01/19/2019 when it measured 1.5 cm  but may have been present on the 11/03/2016  study and appears to  measure approximately 2 cm.     The gallbladder, spleen, pancreas, adrenals and kidneys appear  unremarkable except for renal cysts.     No bowel wall thickening or bowel dilatation is seen. Radiopaque  prostate implant seeds are seen. Urinary bladder is unremarkable.     IMPRESSION:  1. Multiple liver lesions. Most of these resemble benign cysts. At least  2 lesions do not resemble benign cysts and may represent hemangiomas.  Additional short-term follow-up CT of the abdomen in approximately 4  months to 6 months suggested.  2. Posterior pleural thickening has a slightly nodular appearance and  may be slightly more prominent than on the 01/19/2019 study. Follow-up  CT of the chest at the time of the follow-up CT of the abdomen also may  be helpful.     Radiation dose reduction  techniques were utilized, including automated  exposure control and exposure modulation based on body size.     This report was finalized on 8/22/2019 8:29 PM by Dr. Kushal Starkey M.D.     I called the patient this morning regarding report of his CT scan of the abdomen and pelvis showing multiple cysts in the liver and kidneys with no implications whatsoever.  They have been present for a long time and again have no implications from any point of view. I see how tight the bladder neck is related to the prostate and there is no trabeculation or distention of the bladder per se or hydronephrosis.  I do not see anything that suggests prostate cancer spreading.     I will send a personal report of this to the patient in the mail today.  Otherwise, he is to keep his appointment for the future as previously scheduled.      The patient is also in the process of being seen at the Department of Urology [facility].

## 2019-11-11 ENCOUNTER — TELEPHONE (OUTPATIENT)
Dept: ONCOLOGY | Facility: HOSPITAL | Age: 74
End: 2019-11-11

## 2019-11-11 NOTE — TELEPHONE ENCOUNTER
----- Message from Marii Love sent at 11/11/2019  3:16 PM EST -----  [phone]   His  GP wants him to talk to Dr. Schroeder about the shingles shot  And other medications      Attempted to call pt. No answer, left VM    Pt called back stating he had a visit with PCP Dr. Salomon today and had 2 questions.     1.) can pt take new shingles shot?  2.) pt stopped taking baby aspirin over a year ago but Dr. Salomon felt pt should restart, wants Dr. Salmeron to advise    Message sent to Dr. Schroeder, await response.

## 2019-11-12 ENCOUNTER — TELEPHONE (OUTPATIENT)
Dept: ONCOLOGY | Facility: HOSPITAL | Age: 74
End: 2019-11-12

## 2019-11-12 NOTE — TELEPHONE ENCOUNTER
"Called and notified patient and he v/u     ----- Message from Gerry Schroeder MD sent at 11/12/2019  9:49 AM EST -----  OK TO ASPIRIN OK TO SHINGLES VACCINE  ----- Message -----  From: Christie Patterson RN  Sent: 11/11/2019   3:34 PM  To: Gerry Schroeder MD    Pt states he had an appt with Dr. Salomon today and had 2 questions    1.) can he take the \"new\" shingles shot? He took the original shot years ago and Dr. Salomon feels he could benefit from it    2.) pt stopped over a year ago taking baby aspirin but Dr. Salomon feels he should resume, is it ok to resume baby aspirin?    Thanks Christie       "

## 2019-12-06 ENCOUNTER — OFFICE VISIT (OUTPATIENT)
Dept: ONCOLOGY | Facility: CLINIC | Age: 74
End: 2019-12-06

## 2019-12-06 ENCOUNTER — LAB (OUTPATIENT)
Dept: LAB | Facility: HOSPITAL | Age: 74
End: 2019-12-06

## 2019-12-06 VITALS
DIASTOLIC BLOOD PRESSURE: 76 MMHG | WEIGHT: 244.3 LBS | OXYGEN SATURATION: 96 % | BODY MASS INDEX: 29.75 KG/M2 | HEIGHT: 76 IN | RESPIRATION RATE: 16 BRPM | HEART RATE: 76 BPM | SYSTOLIC BLOOD PRESSURE: 119 MMHG | TEMPERATURE: 98.2 F

## 2019-12-06 DIAGNOSIS — E04.9 SUBSTERNAL GOITER: ICD-10-CM

## 2019-12-06 DIAGNOSIS — C61 MALIGNANT NEOPLASM OF PROSTATE (HCC): ICD-10-CM

## 2019-12-06 DIAGNOSIS — C91.10 CHRONIC LYMPHOCYTIC LEUKEMIA (HCC): Primary | ICD-10-CM

## 2019-12-06 DIAGNOSIS — C91.10 CHRONIC LYMPHOCYTIC LEUKEMIA (HCC): ICD-10-CM

## 2019-12-06 DIAGNOSIS — N32.0 BLADDER OUTLET OBSTRUCTION: ICD-10-CM

## 2019-12-06 LAB
ALBUMIN SERPL-MCNC: 4.1 G/DL (ref 3.5–5.2)
ALBUMIN/GLOB SERPL: 1.5 G/DL (ref 1.1–2.4)
ALP SERPL-CCNC: 70 U/L (ref 38–116)
ALT SERPL W P-5'-P-CCNC: 9 U/L (ref 0–41)
ANION GAP SERPL CALCULATED.3IONS-SCNC: 12.1 MMOL/L (ref 5–15)
AST SERPL-CCNC: 14 U/L (ref 0–40)
BASOPHILS # BLD AUTO: 0.07 10*3/MM3 (ref 0–0.2)
BASOPHILS NFR BLD AUTO: 0.9 % (ref 0–1.5)
BILIRUB SERPL-MCNC: 0.7 MG/DL (ref 0.2–1.2)
BUN BLD-MCNC: 9 MG/DL (ref 6–20)
BUN/CREAT SERPL: 9.9 (ref 7.3–30)
CALCIUM SPEC-SCNC: 9.4 MG/DL (ref 8.5–10.2)
CHLORIDE SERPL-SCNC: 97 MMOL/L (ref 98–107)
CO2 SERPL-SCNC: 28.9 MMOL/L (ref 22–29)
CREAT BLD-MCNC: 0.91 MG/DL (ref 0.7–1.3)
DEPRECATED RDW RBC AUTO: 41.5 FL (ref 37–54)
EOSINOPHIL # BLD AUTO: 0.22 10*3/MM3 (ref 0–0.4)
EOSINOPHIL NFR BLD AUTO: 2.7 % (ref 0.3–6.2)
ERYTHROCYTE [DISTWIDTH] IN BLOOD BY AUTOMATED COUNT: 13.2 % (ref 12.3–15.4)
GFR SERPL CREATININE-BSD FRML MDRD: 81 ML/MIN/1.73
GLOBULIN UR ELPH-MCNC: 2.7 GM/DL (ref 1.8–3.5)
GLUCOSE BLD-MCNC: 101 MG/DL (ref 74–124)
HCT VFR BLD AUTO: 43.6 % (ref 37.5–51)
HGB BLD-MCNC: 14.7 G/DL (ref 13–17.7)
IMM GRANULOCYTES # BLD AUTO: 0.02 10*3/MM3 (ref 0–0.05)
IMM GRANULOCYTES NFR BLD AUTO: 0.2 % (ref 0–0.5)
LYMPHOCYTES # BLD AUTO: 2.48 10*3/MM3 (ref 0.7–3.1)
LYMPHOCYTES NFR BLD AUTO: 30.3 % (ref 19.6–45.3)
MCH RBC QN AUTO: 29.3 PG (ref 26.6–33)
MCHC RBC AUTO-ENTMCNC: 33.7 G/DL (ref 31.5–35.7)
MCV RBC AUTO: 87 FL (ref 79–97)
MONOCYTES # BLD AUTO: 1.4 10*3/MM3 (ref 0.1–0.9)
MONOCYTES NFR BLD AUTO: 17.1 % (ref 5–12)
NEUTROPHILS # BLD AUTO: 4 10*3/MM3 (ref 1.7–7)
NEUTROPHILS NFR BLD AUTO: 48.8 % (ref 42.7–76)
NRBC BLD AUTO-RTO: 0 /100 WBC (ref 0–0.2)
PLATELET # BLD AUTO: 160 10*3/MM3 (ref 140–450)
PMV BLD AUTO: 8.5 FL (ref 6–12)
POTASSIUM BLD-SCNC: 4.5 MMOL/L (ref 3.5–4.7)
PROT SERPL-MCNC: 6.8 G/DL (ref 6.3–8)
RBC # BLD AUTO: 5.01 10*6/MM3 (ref 4.14–5.8)
SODIUM BLD-SCNC: 138 MMOL/L (ref 134–145)
WBC NRBC COR # BLD: 8.19 10*3/MM3 (ref 3.4–10.8)

## 2019-12-06 PROCEDURE — 85025 COMPLETE CBC W/AUTO DIFF WBC: CPT

## 2019-12-06 PROCEDURE — 80053 COMPREHEN METABOLIC PANEL: CPT

## 2019-12-06 PROCEDURE — 99213 OFFICE O/P EST LOW 20 MIN: CPT | Performed by: INTERNAL MEDICINE

## 2019-12-06 PROCEDURE — 36415 COLL VENOUS BLD VENIPUNCTURE: CPT

## 2019-12-06 NOTE — PROGRESS NOTES
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     HISTORY OF PRESENT ILLNESS:This patient returns today to the office for followup.  He is going to have a urological procedure in the next few days and is happy with the urologist that is taking care of his bladder outlet obstruction for him.  He has not had any urinary tract infection or hematuria.  Otherwise his appetite has been excellent.  He has not had any fever, chills or infection, no peripheral adenopathy and no symptoms pertinent to his CLL.  His energy level is normal, appetite is normal, weight is stable and he has no autoimmunity.                           PAST MEDICAL HISTORY:  Significant for hyperlipidemia.  He also has vitamin D deficiency and has undergone replacement of vitamin D.  SUE ponce has no history of hypertension, cardiovascular disease, diabetes, asthma or respiratory problems.  He was worked up by Dr. Luis Ortega in 2009 for an incidentally found mass in the liver through MRI and CT scan that turned out to be a hemangioma.  He al  s  o has benign cysts in the liver.  PET scan in that location in 2009 disclosed no abnormal uptake.  On that occasion, it was also found that the patient had a retrosternal thyroid enlargement with a normal TSH.  The patient had no symptomatology in regard   to thyroid abnormalities otherwise.          The patient has had kidney stones in the past on one occasion.  He had trauma as a child of the middle finger of the right hand with some deformity in the nail that never grew back normally.  Otherwise, he has not had   any hospitalizations or other interventions.          HEMATOLOGIC/ONCOLOGIC HISTORY: (History from previous dates can be found in the separate document.)        On 02/22/2016 no clinical evidence of recurrence of his CLL.  Hematological parameters were normal.  He had no B symptoms, no autoimmunity, no infections.  He was advised to  remain in observation.          MEDICATIONS: The current medication list was reviewed with the patient and updated in the EMR this date per the medical assistant.  Medical dosages and frequencies were confirmed to be accurate.         ALLERGIES:  No known drug allergies.          SOCIAL HISTORY:  Lives with his wife (who has developed Alzheimer and requires total care at home).  One child, a daughter age 36, who just had her first baby recently.  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  He does not smoke and does not drink alcohol.  The patient, as per 2014, had a lengthy conversation with Dr. Schroeder in regard to the future of his wife who has advanced Alzheimer nena  ntia.  In the way I see things, according to the patient’s report, I think she is going to be institutionalized very soon, given the circumstances and level of care that she requires.  As of 2014 please see HPI.         FAMILY HISTORY:  Father  of heart   disease after rheumatic heart disease was found.  Mother  at age 97.  He has no brothers or sisters.  As per 2015, in regard to further illness in his ill wife who has dementia with significant changes in her overall situation with more somnole  nce, likely stroke and like an infection of some sort.         REVIEW OF SYSTEMS:          General: no fever, no chills, no fatigue,no weight changes, no lack of appetite.  Eyes: no epiphora, xerophthalmia,conjunctivitis, pain, glaucoma, blurred vision, blindness, secretion, photophobia, proptosis, diplopia.  Ears: no otorrhea, tinnitus, otorrhagia, deafness, pain, vertigo.  Nose: no rhinorrhea, no epistaxis, no alteration in perception of odors, no sinuses pressure.  Mouth: no alteration in gums or teeth,  No ulcers, no difficulty with mastication or deglut ion, no odynophagia.  Neck: no masses or pain, no thyroid alterations, no pain in muscles or arteries, no carotid odynia, no  "crepitation.  Respiratory: no cough, no sputum production,no dyspnea,no trepopnea, no pleuritic pain,no hemoptysis.  Heart: no syncope, no irregularity, no palpitations, no angina,no orthopnea,no paroxysmal nocturnal dyspnea.  Vascular Venous: no tenderness,no edema,no palpable cords,no postphlebitic syndrome, no skin changes no ulcerations.  Vascular Arterial: no distal ischemia, noclaudication, no gangrene, no neuropathic ischemic pain, no skin ulcers, no paleness no cyanosis.  GI: no dysphagia, no odynophagia, no regurgitation, no heartburn,no indigestion,no nausea,no vomiting,no hematemesis ,no melena,no jaundice,no distention, no obstipation,no enterorrhagia,no proctalgia,no anal  lesions, no changes in bowel habits.  : no frequency, no hesitancy, no hematuria, no discharge,no  pain.  Musculoskeletal: no muscle or tendon pain or inflammation,no  joint pain, no edema, no functional limitation,no fasciculations, no mass.  Neurologic: no headache, no seizures, noalterations on Craneal nerves, no motor deficit, no sensory deficit, normal coordination, no alteration in memory,normal orientation, calculation,normal writting, verbal and written language.  Skin: no rashes,no pruritus no localized lesions.  Psychiatric: no anxiety, no depression,no agitation, no delusions, proper insight.              VITAL SIGNS:  Vitals:    19 1147   BP: 119/76   Pulse: 76   Resp: 16   Temp: 98.2 °F (36.8 °C)   TempSrc: Oral   SpO2: 96%   Weight: 111 kg (244 lb 4.8 oz)   Height: 194 cm (76.38\")           PAIN:  0 out of 10      ECO         PHYSICAL EXAMINATION:    GENERAL:  Well-developed, well-nourished  Patient  in no acute distress.   SKIN:  Warm, dry ,NO rashes,NO purpura ,NO petechiae.  HEENT:  Pupils were equal and reactive to light and accomodation, conjunctivas non injected, no pterigion, normal extraocular movements, normal visual acuity.   Mouth mucosa was moist, no exudates in oropharynx, normal gum line, normal " roof of the mouth and pillars, normal papillations of the tongue.  NECK:  Supple with good range of motion; no thyromegaly or masses, no JVD or bruits, no cervical adenopathies.No carotid arteries pain, no carotid abnormal pulsation , NO arterial dance.  LYMPHATICS:  No cervical, NO supraclavicular, NO axillary,NO epitrochlear , NO inguinal adenopathy.  CHEST:  Normal excursion of both francis thoraces, normal voice fremitus, no subcutaneous emphysema, normal axillas, no rashes or acanthosis nigricans. Lungs clear to percussion and auscultation, normal breath sounds bilaterally, no wheezing,NO crackles NO ronchi, NO stridor, NO rubs.  CARDIAC AND VASCULAR:  normal rate and regular rhythm, without murmurs,NO rubs NO S3 NO S4 right or left . Normal femoral, popliteal, pedis, brachial and carotid pulses.  ABDOMEN:  Soft, nontender with no organomegaly or masses, no ascites, no collateral circulation,no distention,no [place] sign, no abdominal pain, no inguinal hernias,no umbilical hernia, no abdominal bruits. Normal bowel sounds.  GENITAL: Not  Performed.  EXTREMITIES  AND SPINE:  No clubbing, cyanosis or edema, no deformities or pain .No kyphosis, scoliosis, deformities or pain in spine, ribs or pelvic bone.  NEUROLOGICAL:  Patient was awake, alert, oriented to time, person and place.                                LABORATORY DATA:Order: [id number] - Part of Panel Order [id number]   Status:  Final result   Visible to patient:  No (Not Released)   Dx:  Malignant neoplasm of prostate (CMS/H...   Component  Ref Range & Units 11:11   WBC  3.40 - 10.80 10*3/mm3 8.19    RBC  4.14 - 5.80 10*6/mm3 5.01    Hemoglobin  13.0 - 17.7 g/dL 14.7    Hematocrit  37.5 - 51.0 % 43.6    MCV  79.0 - 97.0 fL 87.0    MCH  26.6 - 33.0 pg 29.3    MCHC  31.5 - 35.7 g/dL 33.7    RDW  12.3 - 15.4 % 13.2    RDW-SD  37.0 - 54.0 fl 41.5    MPV  6.0 - 12.0 fL 8.5    Platelets  140 - 450 10*3/mm3 160    Neutrophil %  42.7 - 76.0 % 48.8    Lymphocyte  %  19.6 - 45.3 % 30.3    Monocyte %  5.0 - 12.0 % 17.1 Abnormally high     Eosinophil %  0.3 - 6.2 % 2.7    Basophil %  0.0 - 1.5 % 0.9    Immature Grans %  0.0 - 0.5 % 0.2    Neutrophils, Absolute  1.70 - 7.00 10*3/mm3 4.00    Lymphocytes, Absolute  0.70 - 3.10 10*3/mm3 2.48    Monocytes, Absolute  0.10 - 0.90 10*3/mm3 1.40 Abnormally high     Eosinophils, Absolute  0.00 - 0.40 10*3/mm3 0.22    Basophils, Absolute  0.00 - 0.20 10*3/mm3 0.07    Immature Grans, Absolute  0.00 - 0.05 10*3/mm3 0.02    nRBC  0.0 - 0.2 /100 WBC 0.0                       ASSESSMENT: 1.  Patient has history of chronic lymphoid leukemia that required therapy with Rituxan 2 year ago and since then the patient has had remission with normal white count, normal hemoglobin, normal platelet count and total lymphocyte remains low today.  He has no palpable peripheral adenopathy, no hepatosplenomegaly, no B symptoms, no autoimmunity and no infections.  He will remain in observation from this point of view.   2. The patient has history of prostate cancer status post seed implants. He has still some urinary frequency, no hematuria, no urinary tract infections.We went ahead and made him an appointment to be seen at the [facility] urological clinic.     On 12/06/2019 the patient has been checked by dermatology and no suspicious lesions have been found.  The patient otherwise will followup with the urologist in a few days to correct all his urological issues.       I will review him back in 4 months with CBC regarding his leukemia.

## 2020-03-27 ENCOUNTER — APPOINTMENT (OUTPATIENT)
Dept: LAB | Facility: HOSPITAL | Age: 75
End: 2020-03-27

## 2020-04-28 ENCOUNTER — TELEPHONE (OUTPATIENT)
Dept: ONCOLOGY | Facility: CLINIC | Age: 75
End: 2020-04-28

## 2020-05-12 ENCOUNTER — TELEPHONE (OUTPATIENT)
Dept: ONCOLOGY | Facility: CLINIC | Age: 75
End: 2020-05-12

## 2020-05-12 NOTE — TELEPHONE ENCOUNTER
Spoke with Stephanie and requested she fax orders.       Gerry Schroeder MD Melody from Dr Salomon office called wanting to add labs to the patients lab appt on 05/21    Dr Salomon pt PCP wants to add:   CMP,Free T4 and TSH     Please call [phone] with any questions    Thanks

## 2020-05-12 NOTE — TELEPHONE ENCOUNTER
Gerry Schroeder MD Melody from Dr Salomon office called wanting to add labs to the patients lab appt on 05/21    Dr Salomon pt PCP wants to add:   CMP,Free T4 and TSH     Please call [phone] with any questions    Thanks

## 2020-05-18 ENCOUNTER — TELEPHONE (OUTPATIENT)
Dept: ONCOLOGY | Facility: CLINIC | Age: 75
End: 2020-05-18

## 2020-05-19 ENCOUNTER — LAB (OUTPATIENT)
Dept: LAB | Facility: HOSPITAL | Age: 75
End: 2020-05-19

## 2020-05-19 DIAGNOSIS — C91.10 CHRONIC LYMPHOCYTIC LEUKEMIA (HCC): ICD-10-CM

## 2020-05-19 DIAGNOSIS — N32.0 BLADDER OUTLET OBSTRUCTION: ICD-10-CM

## 2020-05-19 DIAGNOSIS — E78.2 MIXED HYPERLIPIDEMIA: ICD-10-CM

## 2020-05-19 DIAGNOSIS — C61 MALIGNANT NEOPLASM OF PROSTATE (HCC): Primary | ICD-10-CM

## 2020-05-19 DIAGNOSIS — E04.9 SUBSTERNAL GOITER: ICD-10-CM

## 2020-05-19 LAB
ALBUMIN SERPL-MCNC: 4 G/DL (ref 3.5–5.2)
ALBUMIN/GLOB SERPL: 1.3 G/DL (ref 1.1–2.4)
ALP SERPL-CCNC: 89 U/L (ref 38–116)
ALT SERPL W P-5'-P-CCNC: 11 U/L (ref 0–41)
ANION GAP SERPL CALCULATED.3IONS-SCNC: 11.6 MMOL/L (ref 5–15)
AST SERPL-CCNC: 15 U/L (ref 0–40)
BASOPHILS # BLD AUTO: 0.09 10*3/MM3 (ref 0–0.2)
BASOPHILS NFR BLD AUTO: 0.5 % (ref 0–1.5)
BILIRUB SERPL-MCNC: 0.9 MG/DL (ref 0.2–1.2)
BUN BLD-MCNC: 8 MG/DL (ref 6–20)
BUN/CREAT SERPL: 9.1 (ref 7.3–30)
CALCIUM SPEC-SCNC: 9.6 MG/DL (ref 8.5–10.2)
CHLORIDE SERPL-SCNC: 93 MMOL/L (ref 98–107)
CO2 SERPL-SCNC: 26.4 MMOL/L (ref 22–29)
CREAT BLD-MCNC: 0.88 MG/DL (ref 0.7–1.3)
DEPRECATED RDW RBC AUTO: 40.7 FL (ref 37–54)
EOSINOPHIL # BLD AUTO: 0.1 10*3/MM3 (ref 0–0.4)
EOSINOPHIL NFR BLD AUTO: 0.6 % (ref 0.3–6.2)
ERYTHROCYTE [DISTWIDTH] IN BLOOD BY AUTOMATED COUNT: 13.7 % (ref 12.3–15.4)
GFR SERPL CREATININE-BSD FRML MDRD: 84 ML/MIN/1.73
GLOBULIN UR ELPH-MCNC: 3.2 GM/DL (ref 1.8–3.5)
GLUCOSE BLD-MCNC: 116 MG/DL (ref 74–124)
HCT VFR BLD AUTO: 37.8 % (ref 37.5–51)
HGB BLD-MCNC: 13 G/DL (ref 13–17.7)
IMM GRANULOCYTES # BLD AUTO: 0.05 10*3/MM3 (ref 0–0.05)
IMM GRANULOCYTES NFR BLD AUTO: 0.3 % (ref 0–0.5)
LYMPHOCYTES # BLD AUTO: 7.74 10*3/MM3 (ref 0.7–3.1)
LYMPHOCYTES NFR BLD AUTO: 43.1 % (ref 19.6–45.3)
MCH RBC QN AUTO: 28.4 PG (ref 26.6–33)
MCHC RBC AUTO-ENTMCNC: 34.4 G/DL (ref 31.5–35.7)
MCV RBC AUTO: 82.5 FL (ref 79–97)
MONOCYTES # BLD AUTO: 4.09 10*3/MM3 (ref 0.1–0.9)
MONOCYTES NFR BLD AUTO: 22.8 % (ref 5–12)
NEUTROPHILS # BLD AUTO: 5.87 10*3/MM3 (ref 1.7–7)
NEUTROPHILS NFR BLD AUTO: 32.7 % (ref 42.7–76)
NRBC BLD AUTO-RTO: 0 /100 WBC (ref 0–0.2)
PLATELET # BLD AUTO: 192 10*3/MM3 (ref 140–450)
PMV BLD AUTO: 8.7 FL (ref 6–12)
POTASSIUM BLD-SCNC: 3.7 MMOL/L (ref 3.5–4.7)
PROT SERPL-MCNC: 7.2 G/DL (ref 6.3–8)
RBC # BLD AUTO: 4.58 10*6/MM3 (ref 4.14–5.8)
SODIUM BLD-SCNC: 131 MMOL/L (ref 134–145)
T4 FREE SERPL-MCNC: 1.06 NG/DL (ref 0.93–1.7)
TSH SERPL DL<=0.05 MIU/L-ACNC: 2.78 UIU/ML (ref 0.27–4.2)
WBC NRBC COR # BLD: 17.94 10*3/MM3 (ref 3.4–10.8)

## 2020-05-19 PROCEDURE — 84439 ASSAY OF FREE THYROXINE: CPT | Performed by: INTERNAL MEDICINE

## 2020-05-19 PROCEDURE — 84443 ASSAY THYROID STIM HORMONE: CPT | Performed by: INTERNAL MEDICINE

## 2020-05-19 PROCEDURE — 36415 COLL VENOUS BLD VENIPUNCTURE: CPT

## 2020-05-19 PROCEDURE — 85025 COMPLETE CBC W/AUTO DIFF WBC: CPT

## 2020-05-19 PROCEDURE — 80053 COMPREHEN METABOLIC PANEL: CPT

## 2020-05-21 ENCOUNTER — APPOINTMENT (OUTPATIENT)
Dept: LAB | Facility: HOSPITAL | Age: 75
End: 2020-05-21

## 2020-05-21 ENCOUNTER — OFFICE VISIT (OUTPATIENT)
Dept: ONCOLOGY | Facility: CLINIC | Age: 75
End: 2020-05-21

## 2020-05-21 DIAGNOSIS — R94.6 ABNORMAL RESULTS OF THYROID FUNCTION STUDIES: ICD-10-CM

## 2020-05-21 DIAGNOSIS — C91.10 CHRONIC LYMPHOCYTIC LEUKEMIA (HCC): Primary | ICD-10-CM

## 2020-05-21 PROCEDURE — 99442 PR PHYS/QHP TELEPHONE EVALUATION 11-20 MIN: CPT | Performed by: INTERNAL MEDICINE

## 2020-05-21 RX ORDER — DEXAMETHASONE 4 MG/1
4 TABLET ORAL TAKE AS DIRECTED
Qty: 4 TABLET | Refills: 0 | Status: SHIPPED | OUTPATIENT
Start: 2020-05-21 | End: 2020-09-21 | Stop reason: ALTCHOICE

## 2020-05-21 RX ORDER — DEXAMETHASONE 4 MG/1
4 TABLET ORAL TAKE AS DIRECTED
Qty: 4 TABLET | Refills: 0 | Status: SHIPPED | OUTPATIENT
Start: 2020-05-21 | End: 2020-05-21

## 2020-05-21 NOTE — PROGRESS NOTES
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     HISTORY OF PRESENT ILLNESS:  I HAVE CALLED THIS PATIENT ON THE PHONE TODAY AND VERBALLY HAS CONSENTED ABOUT TELEMEDICINE VISIT     This patient has been reviewed today by telemedicine. Overall the only problem that he has is not too much of an appetite because he used to eat 100% of his meals outside in restaurants and now with the pandemia he has to be relocating his meals to his kitchen. He is now going to drive thru's and things look better in that way. He has developed probably constipation related to his dramatic changes in bowel and his food consumption. He has not had any fever or chills. He has no fatigue. He has not had any adenopathies or rashes, no pruritus. He has not developed any infections. His urination is normal. He has not had any cardiovascular or respiratory issues of any nature at this time. He feels otherwise well. He is taking care of his wife. She has continued care having dementia and this is another blessing that he has related to this overall pandemia.                         PAST MEDICAL HISTORY:  Significant for hyperlipidemia.  He also has vitamin D deficiency and has undergone replacement of vitamin D.  SUE ponce has no history of hypertension, cardiovascular disease, diabetes, asthma or respiratory problems.  He was worked up by Dr. Luis Ortega in 2009 for an incidentally found mass in the liver through MRI and CT scan that turned out to be a hemangioma.  He al  s  o has benign cysts in the liver.  PET scan in that location in 2009 disclosed no abnormal uptake.  On that occasion, it was also found that the patient had a retrosternal thyroid enlargement with a normal TSH.  The patient had no symptomatology in regard   to thyroid abnormalities otherwise.          The patient has had kidney stones in the past on one occasion.  He had trauma as a child of the middle finger of the  right hand with some deformity in the nail that never grew back normally.  Otherwise, he has not had   any hospitalizations or other interventions.          HEMATOLOGIC/ONCOLOGIC HISTORY: (History from previous dates can be found in the separate document.)        On 2016 no clinical evidence of recurrence of his CLL.  Hematological parameters were normal.  He had no B symptoms, no autoimmunity, no infections.  He was advised to remain in observation.          MEDICATIONS: The current medication list was reviewed with the patient and updated in the EMR this date per the medical assistant.  Medical dosages and frequencies were confirmed to be accurate.         ALLERGIES:  No known drug allergies.          SOCIAL HISTORY:  Lives with his wife (who has developed Alzheimer and requires total care at home).  One child, a daughter age 36, who just had her first baby recently.  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  He does not smoke and does not drink alcohol.  The patient, as per 2014, had a lengthy conversation with Dr. Schroeder in regard to the future of his wife who has advanced Alzheimer nena  ntia.  In the way I see things, according to the patient’s report, I think she is going to be institutionalized very soon, given the circumstances and level of care that she requires.  As of 2014 please see HPI.         FAMILY HISTORY:  Father  of heart   disease after rheumatic heart disease was found.  Mother  at age 97.  He has no brothers or sisters.  As per 2015, in regard to further illness in his ill wife who has dementia with significant changes in her overall situation with more somnole  nce, likely stroke and like an infection of some sort.         REVIEW OF SYSTEMS:            General: no fever, no chills, no fatigue,weight changes,  lack of appetite.  Eyes: no epiphora, xerophthalmia,conjunctivitis, pain, glaucoma, blurred vision, blindness,  secretion, photophobia, proptosis, diplopia.  Ears: no otorrhea, tinnitus, otorrhagia, deafness, pain, vertigo.  Nose: no rhinorrhea, no epistaxis, no alteration in perception of odors, no sinuses pressure.  Mouth: no alteration in gums or teeth,  No ulcers, no difficulty with mastication or deglut ion, no odynophagia.  Neck: no masses or pain, no thyroid alterations, no pain in muscles or arteries, no carotid odynia, no crepitation.  Respiratory: no cough, no sputum production,no dyspnea,no trepopnea, no pleuritic pain,no hemoptysis.  Heart: no syncope, no irregularity, no palpitations, no angina,no orthopnea,no paroxysmal nocturnal dyspnea.  Vascular Venous: no tenderness,no edema,no palpable cords,no postphlebitic syndrome, no skin changes no ulcerations.  Vascular Arterial: no distal ischemia, noclaudication, no gangrene, no neuropathic ischemic pain, no skin ulcers, no paleness no cyanosis.  GI: no dysphagia, no odynophagia, no regurgitation, no heartburn,no indigestion,no nausea,no vomiting,no hematemesis ,no melena,no jaundice,no distention, no obstipation,no enterorrhagia,no proctalgia,no anal  lesions, stated changes in bowel habits.  : no frequency, no hesitancy, no hematuria, no discharge,no  pain.  Musculoskeletal: no muscle or tendon pain or inflammation,no  joint pain, no edema, no functional limitation,no fasciculations, no mass.  Neurologic: no headache, no seizures, noalterations on Craneal nerves, no motor deficit, no sensory deficit, normal coordination, no alteration in memory,normal orientation, calculation,normal writting, verbal and written language.  Skin: no rashes,no pruritus no localized lesions.  Psychiatric: no anxiety, no depression,no agitation, no delusions, proper insight.            VITAL SIGNS:  There were no vitals filed for this visit.        PAIN:  0 out of 10      ECO         PHYSICAL EXAMINATION:  He sounds normal on the telephone. He is going out and taking care of his  business in his company and he is now going through drive thru to buy food because he is a terrible cook and he has nobody. He used to eat 100% of his meals out of the house. Now with the pandemia he has reorganized everything and things are changing. He has not had any fever, infections or pain.                                 LABORATORY DATA:  Specimen Information: Blood        View Full Report  Component  Ref Range & Units 2d ago   TSH  0.270 - 4.200 uIU/mL 2.780    Comment: TSH results may be falsely decreased if patient taking Biotin.            Related Result Highlights         T4, Free  Final result 5/19/2020                  T4, Free   Order: [id number]   Collected:  5/19/2020 14:38   Specimen Information: Blood        View Full Report  Component  Ref Range & Units 2d ago   Free T4  0.93 - 1.70 ng/dL 1.06       Narrative     Results may be falsely increased if patient taking Biotin.         Related Result Highlights         TSH  Final result 5/19/2020                  Contains abnormal data Comprehensive Metabolic Panel   Order: [id number]   Collected:  5/19/2020 14:38   Specimen Information: Blood        View Full Report  Component  Ref Range & Units 2d ago   Glucose  74 - 124 mg/dL 116    BUN  6 - 20 mg/dL 8    Creatinine  0.70 - 1.30 mg/dL 0.88    Sodium  134 - 145 mmol/L 131Low     Potassium  3.5 - 4.7 mmol/L 3.7    Chloride  98 - 107 mmol/L 93Low     CO2  22.0 - 29.0 mmol/L 26.4    Calcium  8.5 - 10.2 mg/dL 9.6    Total Protein  6.3 - 8.0 g/dL 7.2    Albumin  3.50 - 5.20 g/dL 4.00    ALT (SGPT)  0 - 41 U/L 11    AST (SGOT)  0 - 40 U/L 15    Alkaline Phosphatase  38 - 116 U/L 89    Total Bilirubin  0.2 - 1.2 mg/dL 0.9    eGFR Non African Amer  >60 mL/min/1.73 84    Globulin  1.8 - 3.5 gm/dL 3.2    A/G Ratio  1.1 - 2.4 g/dL 1.3    BUN/Creatinine Ratio  7.3 - 30.0 9.1    Anion Gap  5.0 - 15.0 mmol/L 11.6       Narrative     The MDRD GFR formula is only valid for adults with stable renal function between ages  18 and 70.            Contains abnormal data CBC & Differential   Order: [id number]   Collected:  5/19/2020 14:38   Specimen Information: Blood        View Full Report              Contains abnormal data CBC Auto Differential   Order: [id number] - Part of Panel Order [id number]   Collected:  5/19/2020 14:38   Specimen Information: Blood        View Full Report  Component  Ref Range & Units 2d ago   WBC  3.40 - 10.80 10*3/mm3 17.94High     RBC  4.14 - 5.80 10*6/mm3 4.58    Hemoglobin  13.0 - 17.7 g/dL 13.0    Hematocrit  37.5 - 51.0 % 37.8    MCV  79.0 - 97.0 fL 82.5    MCH  26.6 - 33.0 pg 28.4    MCHC  31.5 - 35.7 g/dL 34.4    RDW  12.3 - 15.4 % 13.7    RDW-SD  37.0 - 54.0 fl 40.7    MPV  6.0 - 12.0 fL 8.7    Platelets  140 - 450 10*3/mm3 192    Neutrophil %  42.7 - 76.0 % 32.7Low     Lymphocyte %  19.6 - 45.3 % 43.1    Monocyte %  5.0 - 12.0 % 22.8High     Eosinophil %  0.3 - 6.2 % 0.6    Basophil %  0.0 - 1.5 % 0.5    Immature Grans %  0.0 - 0.5 % 0.3    Neutrophils, Absolute  1.70 - 7.00 10*3/mm3 5.87    Lymphocytes, Absolute  0.70 - 3.10 10*3/mm3 7.74High     Monocytes, Absolute  0.10 - 0.90 10*3/mm3 4.09High     Eosinophils, Absolute  0.00 - 0.40 10*3/mm3 0.10    Basophils, Absolute  0.00 - 0.20 10*3/mm3 0.09    Immature Grans, Absolute  0.00 - 0.05 10*3/mm3 0.05    nRBC  0.0 - 0.2 /100 WBC 0.0                                 ASSESSMENT: 1.  Patient has history of chronic lymphoid leukemia that required therapy with Rituxan in 2016. Before that he was treated with bendamustine and Rituxan with no difficulties. In 2016 when the patient was treated with Rituxan as a single agent he had a very severe reaction during the 1st infusion and the infusion needed to be completed in the hospital. The 2nd infusion was completed in the office. Thereafter he did not get the 3rd or 4th infusions. Since then the patient has remained in remission. Obviously all of the stressors of the pandemia have been on him. He has changed his  bowel habits because he is not able to go out to eat food like he used to go before. He was 100% vested on outside facility foods in restaurants. Now he is having a hard time cooking that he does not know how to do and he is learning to go through drive thru's.    The patient is using [employer] to make his bowels to work. His recent TSH was normal. He has no abdominal pain or cramping, no passage of blood in the stool. I think all of the changes in regard to constipation are related to his changes in his dietetic habits. I suggested to control this with Milk of Magnesia once a day and he will wait on this. I advised him to try to eat some fruit that he does not like to eat and salads that he does not like to eat. Therefore that is a real problem. I suggested to eat dates or prunes. He hates prunes, he will be able to eat some dates.     In regard to his leukemia I think it is back because the white count is 17,000 with predominance of lymphocytes, the hemoglobin is normal at 13, the platelet count is normal. The patient states that he is not having any fever or chills. I think the lack of appetite is probably related to the leukemia as well. He lost 10 pounds of weight. He has not developed any visible palpable adenopathies to his eyes. Therefore and given his reaction to Rituxan before I have advised th patient the followin. He will be called a prescription for dexamethasone 4 mg tablet to take the day before each Rituxan infusion.  2. The day of the Rituxan infusion the patient will be premedicated with Pepcid 40 mg IV, benadryl 25 mg IV, Tylenol 650 mg PO and dexamethasone 12 mg IV. Hopefully this will keep him from reacting to the medication.    If by any chance the patient has a reaction again I think Rituxan is not for him and we will need to modify regimen of medicine thinking about bendemustine again.  3. I will proceed with measurement of LDH, uric acid at each time. He never has had organomegaly or  adenopathy with his disease process.      I am planning to see him back on the 4th week of infusion.     4. I suggested measures in order to control his constipation as stated above.  5. Otherwise no other intervention from my point of view.    Total duration of the telephonic visit with him today 19 minutes 20 seconds.    I DISCUSSED WITH PATIENT IN DETAIL FORMS TO DECREASE CHANCES OF CORONAVIRUS INFECTION INCLUDING ISOLATION, PROPER HAND HIGIENE, AVOID PUBLIC PLACES  WITH CROWDS, FOLLOW  CDC RECOMENDATIONS, AND KEEP PERSONAL AND SOCIAL RESPONSIBILITY, WARE A MASK IN PUBLIC PLACES.  PATIENT IS AWARE THIS INFECTION COULD HAVE SEVERE CONSEQUENCES TO PERSONAL HEALTH AND FAMILY RAMIFICATIONS OF THIS.

## 2020-05-21 NOTE — PROGRESS NOTES
inbox message rec'd from Dr Schroeder to send rx for dexamethasone 4mg po one day before each rituxan treatments.  Adjustments to premed as follows: Pepcid 40mg IV, Dexamethasone 12mg IV, Benadryl 25mg IV.  Notified pt by phone of oral dex and instructions, sent to pt pharmacy, [facility] [place].  Pt v/u.

## 2020-05-22 DIAGNOSIS — C91.10 CHRONIC LYMPHOCYTIC LEUKEMIA (HCC): ICD-10-CM

## 2020-05-22 RX ORDER — FAMOTIDINE 10 MG/ML
40 INJECTION, SOLUTION INTRAVENOUS ONCE
Status: CANCELLED | OUTPATIENT
Start: 2020-06-04

## 2020-05-22 RX ORDER — FAMOTIDINE 10 MG/ML
40 INJECTION, SOLUTION INTRAVENOUS ONCE
Status: CANCELLED | OUTPATIENT
Start: 2020-06-18

## 2020-05-22 RX ORDER — FAMOTIDINE 10 MG/ML
20 INJECTION, SOLUTION INTRAVENOUS AS NEEDED
Status: CANCELLED | OUTPATIENT
Start: 2020-06-11

## 2020-05-22 RX ORDER — SODIUM CHLORIDE 9 MG/ML
250 INJECTION, SOLUTION INTRAVENOUS ONCE
Status: CANCELLED | OUTPATIENT
Start: 2020-06-04

## 2020-05-22 RX ORDER — DIPHENHYDRAMINE HYDROCHLORIDE 50 MG/ML
50 INJECTION INTRAMUSCULAR; INTRAVENOUS AS NEEDED
Status: CANCELLED | OUTPATIENT
Start: 2020-06-18

## 2020-05-22 RX ORDER — ACETAMINOPHEN 325 MG/1
650 TABLET ORAL ONCE
Status: CANCELLED | OUTPATIENT
Start: 2020-05-28

## 2020-05-22 RX ORDER — SODIUM CHLORIDE 9 MG/ML
250 INJECTION, SOLUTION INTRAVENOUS ONCE
Status: CANCELLED | OUTPATIENT
Start: 2020-05-28

## 2020-05-22 RX ORDER — SODIUM CHLORIDE 9 MG/ML
250 INJECTION, SOLUTION INTRAVENOUS ONCE
Status: CANCELLED | OUTPATIENT
Start: 2020-06-18

## 2020-05-22 RX ORDER — FAMOTIDINE 10 MG/ML
40 INJECTION, SOLUTION INTRAVENOUS ONCE
Status: CANCELLED | OUTPATIENT
Start: 2020-05-28

## 2020-05-22 RX ORDER — DIPHENHYDRAMINE HYDROCHLORIDE 50 MG/ML
50 INJECTION INTRAMUSCULAR; INTRAVENOUS AS NEEDED
Status: CANCELLED | OUTPATIENT
Start: 2020-06-11

## 2020-05-22 RX ORDER — SODIUM CHLORIDE 9 MG/ML
250 INJECTION, SOLUTION INTRAVENOUS ONCE
Status: CANCELLED | OUTPATIENT
Start: 2020-06-11

## 2020-05-22 RX ORDER — DIPHENHYDRAMINE HYDROCHLORIDE 50 MG/ML
50 INJECTION INTRAMUSCULAR; INTRAVENOUS AS NEEDED
Status: CANCELLED | OUTPATIENT
Start: 2020-05-28

## 2020-05-22 RX ORDER — FAMOTIDINE 10 MG/ML
20 INJECTION, SOLUTION INTRAVENOUS AS NEEDED
Status: CANCELLED | OUTPATIENT
Start: 2020-06-04

## 2020-05-22 RX ORDER — MEPERIDINE HYDROCHLORIDE 50 MG/ML
25 INJECTION INTRAMUSCULAR; INTRAVENOUS; SUBCUTANEOUS
Status: CANCELLED | OUTPATIENT
Start: 2020-06-11

## 2020-05-22 RX ORDER — FAMOTIDINE 10 MG/ML
40 INJECTION, SOLUTION INTRAVENOUS ONCE
Status: CANCELLED | OUTPATIENT
Start: 2020-06-11

## 2020-05-22 RX ORDER — MEPERIDINE HYDROCHLORIDE 50 MG/ML
25 INJECTION INTRAMUSCULAR; INTRAVENOUS; SUBCUTANEOUS
Status: CANCELLED | OUTPATIENT
Start: 2020-05-28

## 2020-05-22 RX ORDER — FAMOTIDINE 10 MG/ML
20 INJECTION, SOLUTION INTRAVENOUS AS NEEDED
Status: CANCELLED | OUTPATIENT
Start: 2020-06-18

## 2020-05-22 RX ORDER — DIPHENHYDRAMINE HYDROCHLORIDE 50 MG/ML
50 INJECTION INTRAMUSCULAR; INTRAVENOUS AS NEEDED
Status: CANCELLED | OUTPATIENT
Start: 2020-06-04

## 2020-05-22 RX ORDER — MEPERIDINE HYDROCHLORIDE 50 MG/ML
25 INJECTION INTRAMUSCULAR; INTRAVENOUS; SUBCUTANEOUS
Status: CANCELLED | OUTPATIENT
Start: 2020-06-18

## 2020-05-22 RX ORDER — ACETAMINOPHEN 325 MG/1
650 TABLET ORAL ONCE
Status: CANCELLED | OUTPATIENT
Start: 2020-06-11

## 2020-05-22 RX ORDER — MEPERIDINE HYDROCHLORIDE 50 MG/ML
25 INJECTION INTRAMUSCULAR; INTRAVENOUS; SUBCUTANEOUS
Status: CANCELLED | OUTPATIENT
Start: 2020-06-04

## 2020-05-22 RX ORDER — ACETAMINOPHEN 325 MG/1
650 TABLET ORAL ONCE
Status: CANCELLED | OUTPATIENT
Start: 2020-06-04

## 2020-05-22 RX ORDER — ACETAMINOPHEN 325 MG/1
650 TABLET ORAL ONCE
Status: CANCELLED | OUTPATIENT
Start: 2020-06-18

## 2020-05-22 RX ORDER — FAMOTIDINE 10 MG/ML
20 INJECTION, SOLUTION INTRAVENOUS AS NEEDED
Status: CANCELLED | OUTPATIENT
Start: 2020-05-28

## 2020-05-28 ENCOUNTER — INFUSION (OUTPATIENT)
Dept: ONCOLOGY | Facility: HOSPITAL | Age: 75
End: 2020-05-28

## 2020-05-28 ENCOUNTER — APPOINTMENT (OUTPATIENT)
Dept: LAB | Facility: HOSPITAL | Age: 75
End: 2020-05-28

## 2020-05-28 VITALS
WEIGHT: 224 LBS | HEART RATE: 99 BPM | OXYGEN SATURATION: 96 % | BODY MASS INDEX: 27 KG/M2 | TEMPERATURE: 97.6 F | SYSTOLIC BLOOD PRESSURE: 126 MMHG | DIASTOLIC BLOOD PRESSURE: 70 MMHG

## 2020-05-28 DIAGNOSIS — R94.6 ABNORMAL RESULTS OF THYROID FUNCTION STUDIES: ICD-10-CM

## 2020-05-28 DIAGNOSIS — C91.10 CHRONIC LYMPHOCYTIC LEUKEMIA (HCC): Primary | ICD-10-CM

## 2020-05-28 LAB
ALBUMIN SERPL-MCNC: 3.9 G/DL (ref 3.5–5.2)
ALBUMIN/GLOB SERPL: 1.3 G/DL (ref 1.1–2.4)
ALP SERPL-CCNC: 85 U/L (ref 38–116)
ALT SERPL W P-5'-P-CCNC: 9 U/L (ref 0–41)
ANION GAP SERPL CALCULATED.3IONS-SCNC: 15 MMOL/L (ref 5–15)
AST SERPL-CCNC: 14 U/L (ref 0–40)
BASOPHILS # BLD AUTO: 0.05 10*3/MM3 (ref 0–0.2)
BASOPHILS NFR BLD AUTO: 0.2 % (ref 0–1.5)
BILIRUB SERPL-MCNC: 0.6 MG/DL (ref 0.2–1.2)
BUN BLD-MCNC: 7 MG/DL (ref 6–20)
BUN/CREAT SERPL: 9.1 (ref 7.3–30)
CALCIUM SPEC-SCNC: 9.5 MG/DL (ref 8.5–10.2)
CHLORIDE SERPL-SCNC: 94 MMOL/L (ref 98–107)
CO2 SERPL-SCNC: 22 MMOL/L (ref 22–29)
CREAT BLD-MCNC: 0.77 MG/DL (ref 0.7–1.3)
DEPRECATED RDW RBC AUTO: 39.9 FL (ref 37–54)
EOSINOPHIL # BLD AUTO: 0 10*3/MM3 (ref 0–0.4)
EOSINOPHIL NFR BLD AUTO: 0 % (ref 0.3–6.2)
ERYTHROCYTE [DISTWIDTH] IN BLOOD BY AUTOMATED COUNT: 13.4 % (ref 12.3–15.4)
GFR SERPL CREATININE-BSD FRML MDRD: 98 ML/MIN/1.73
GLOBULIN UR ELPH-MCNC: 3.1 GM/DL (ref 1.8–3.5)
GLUCOSE BLD-MCNC: 162 MG/DL (ref 74–124)
HCT VFR BLD AUTO: 38 % (ref 37.5–51)
HGB BLD-MCNC: 13.1 G/DL (ref 13–17.7)
IMM GRANULOCYTES # BLD AUTO: 0.11 10*3/MM3 (ref 0–0.05)
IMM GRANULOCYTES NFR BLD AUTO: 0.5 % (ref 0–0.5)
LDH SERPL-CCNC: 270 U/L (ref 99–259)
LYMPHOCYTES # BLD AUTO: 9.08 10*3/MM3 (ref 0.7–3.1)
LYMPHOCYTES NFR BLD AUTO: 39.2 % (ref 19.6–45.3)
MCH RBC QN AUTO: 28.4 PG (ref 26.6–33)
MCHC RBC AUTO-ENTMCNC: 34.5 G/DL (ref 31.5–35.7)
MCV RBC AUTO: 82.3 FL (ref 79–97)
MONOCYTES # BLD AUTO: 4.41 10*3/MM3 (ref 0.1–0.9)
MONOCYTES NFR BLD AUTO: 19 % (ref 5–12)
NEUTROPHILS # BLD AUTO: 9.54 10*3/MM3 (ref 1.7–7)
NEUTROPHILS NFR BLD AUTO: 41.1 % (ref 42.7–76)
NRBC BLD AUTO-RTO: 0 /100 WBC (ref 0–0.2)
PLATELET # BLD AUTO: 219 10*3/MM3 (ref 140–450)
PMV BLD AUTO: 8.7 FL (ref 6–12)
POTASSIUM BLD-SCNC: 3.4 MMOL/L (ref 3.5–4.7)
PROT SERPL-MCNC: 7 G/DL (ref 6.3–8)
RBC # BLD AUTO: 4.62 10*6/MM3 (ref 4.14–5.8)
SODIUM BLD-SCNC: 131 MMOL/L (ref 134–145)
TSH SERPL DL<=0.05 MIU/L-ACNC: 1.17 UIU/ML (ref 0.27–4.2)
URATE SERPL-MCNC: 5.1 MG/DL (ref 2.8–7.4)
WBC NRBC COR # BLD: 23.19 10*3/MM3 (ref 3.4–10.8)

## 2020-05-28 PROCEDURE — 96415 CHEMO IV INFUSION ADDL HR: CPT

## 2020-05-28 PROCEDURE — 25010000002 DIPHENHYDRAMINE PER 50 MG: Performed by: INTERNAL MEDICINE

## 2020-05-28 PROCEDURE — 25010000002 RITUXIMAB 10 MG/ML SOLUTION 50 ML VIAL: Performed by: INTERNAL MEDICINE

## 2020-05-28 PROCEDURE — 25010000002 DEXAMETHASONE SODIUM PHOSPHATE 100 MG/10ML SOLUTION: Performed by: INTERNAL MEDICINE

## 2020-05-28 PROCEDURE — 83615 LACTATE (LD) (LDH) ENZYME: CPT

## 2020-05-28 PROCEDURE — 25010000002 RITUXIMAB 10 MG/ML SOLUTION 10 ML VIAL: Performed by: INTERNAL MEDICINE

## 2020-05-28 PROCEDURE — 84443 ASSAY THYROID STIM HORMONE: CPT | Performed by: INTERNAL MEDICINE

## 2020-05-28 PROCEDURE — 80053 COMPREHEN METABOLIC PANEL: CPT

## 2020-05-28 PROCEDURE — 96413 CHEMO IV INFUSION 1 HR: CPT

## 2020-05-28 PROCEDURE — 96375 TX/PRO/DX INJ NEW DRUG ADDON: CPT

## 2020-05-28 PROCEDURE — 85025 COMPLETE CBC W/AUTO DIFF WBC: CPT

## 2020-05-28 PROCEDURE — 84550 ASSAY OF BLOOD/URIC ACID: CPT

## 2020-05-28 RX ORDER — FAMOTIDINE 10 MG/ML
40 INJECTION, SOLUTION INTRAVENOUS ONCE
Status: COMPLETED | OUTPATIENT
Start: 2020-05-28 | End: 2020-05-28

## 2020-05-28 RX ORDER — SODIUM CHLORIDE 9 MG/ML
250 INJECTION, SOLUTION INTRAVENOUS ONCE
Status: COMPLETED | OUTPATIENT
Start: 2020-05-28 | End: 2020-05-28

## 2020-05-28 RX ORDER — ACETAMINOPHEN 325 MG/1
650 TABLET ORAL ONCE
Status: COMPLETED | OUTPATIENT
Start: 2020-05-28 | End: 2020-05-28

## 2020-05-28 RX ADMIN — DEXAMETHASONE SODIUM PHOSPHATE 12 MG: 10 INJECTION, SOLUTION INTRAMUSCULAR; INTRAVENOUS at 09:32

## 2020-05-28 RX ADMIN — SODIUM CHLORIDE 250 ML: 9 INJECTION, SOLUTION INTRAVENOUS at 09:31

## 2020-05-28 RX ADMIN — ACETAMINOPHEN 650 MG: 325 TABLET ORAL at 09:29

## 2020-05-28 RX ADMIN — DIPHENHYDRAMINE HYDROCHLORIDE 50 MG: 50 INJECTION, SOLUTION INTRAMUSCULAR; INTRAVENOUS at 09:47

## 2020-05-28 RX ADMIN — RITUXIMAB 900 MG: 10 INJECTION, SOLUTION INTRAVENOUS at 10:35

## 2020-05-28 RX ADMIN — FAMOTIDINE 40 MG: 10 INJECTION INTRAVENOUS at 09:30

## 2020-05-28 NOTE — PROGRESS NOTES
Pt here to reinitiate Rituxan. Pt reports having reaction to rituxan on his first infusion last round. Dr. Schroeder added additional premeds. Pt confirms taking PO dex yesterday about 3pm. Reviewed side effects and potential reaction with pt. Consent for Rituxan signed.

## 2020-05-29 ENCOUNTER — TELEPHONE (OUTPATIENT)
Dept: ONCOLOGY | Facility: CLINIC | Age: 75
End: 2020-05-29

## 2020-05-29 RX ORDER — CHLORPROMAZINE HYDROCHLORIDE 25 MG/1
TABLET, FILM COATED ORAL
Qty: 270 TABLET | OUTPATIENT
Start: 2020-05-29

## 2020-05-29 RX ORDER — CHLORPROMAZINE HYDROCHLORIDE 25 MG/1
25 TABLET, FILM COATED ORAL 3 TIMES DAILY
Qty: 90 TABLET | Refills: 0 | Status: SHIPPED | OUTPATIENT
Start: 2020-05-29 | End: 2020-09-21 | Stop reason: ALTCHOICE

## 2020-05-29 NOTE — TELEPHONE ENCOUNTER
Pt called stating that he hardly slept last night he had such bad hiccups. This happens after every treatment for a few days. D/W Anneliese Cardona NP. Per ELIEL Coy to call in thorazine 25mg TID for hiccups. Advised pt to take this on a scheduled basis until hiccups resolve. V/u. Also advised the pharmacy to let the pt know this.

## 2020-05-29 NOTE — TELEPHONE ENCOUNTER
PT called to get a new Hiccup medication as the current one does not work, pt also needs a refill of his nausea medication which I did not see on the list. Pharm is [facility] on [address] in [place]

## 2020-06-03 ENCOUNTER — DOCUMENTATION (OUTPATIENT)
Dept: ONCOLOGY | Facility: CLINIC | Age: 75
End: 2020-06-03

## 2020-06-03 NOTE — PROGRESS NOTES
CSW sent information to patient via e-mail about the COVID-19 financial support fund through the [employer] in case it might be helpful to him.

## 2020-06-04 ENCOUNTER — INFUSION (OUTPATIENT)
Dept: ONCOLOGY | Facility: HOSPITAL | Age: 75
End: 2020-06-04

## 2020-06-04 ENCOUNTER — LAB (OUTPATIENT)
Dept: LAB | Facility: HOSPITAL | Age: 75
End: 2020-06-04

## 2020-06-04 VITALS
TEMPERATURE: 98.6 F | WEIGHT: 222 LBS | DIASTOLIC BLOOD PRESSURE: 72 MMHG | SYSTOLIC BLOOD PRESSURE: 130 MMHG | BODY MASS INDEX: 26.76 KG/M2 | HEART RATE: 76 BPM | OXYGEN SATURATION: 96 %

## 2020-06-04 DIAGNOSIS — C91.10 CHRONIC LYMPHOCYTIC LEUKEMIA (HCC): ICD-10-CM

## 2020-06-04 DIAGNOSIS — C91.10 CHRONIC LYMPHOCYTIC LEUKEMIA (HCC): Primary | ICD-10-CM

## 2020-06-04 LAB
ALBUMIN SERPL-MCNC: 3.9 G/DL (ref 3.5–5.2)
ALBUMIN/GLOB SERPL: 1.3 G/DL (ref 1.1–2.4)
ALP SERPL-CCNC: 68 U/L (ref 38–116)
ALT SERPL W P-5'-P-CCNC: 11 U/L (ref 0–41)
ANION GAP SERPL CALCULATED.3IONS-SCNC: 14.6 MMOL/L (ref 5–15)
AST SERPL-CCNC: 15 U/L (ref 0–40)
BASOPHILS # BLD AUTO: 0.02 10*3/MM3 (ref 0–0.2)
BASOPHILS NFR BLD AUTO: 0.2 % (ref 0–1.5)
BILIRUB SERPL-MCNC: 0.7 MG/DL (ref 0.2–1.2)
BUN BLD-MCNC: 11 MG/DL (ref 6–20)
BUN/CREAT SERPL: 13.6 (ref 7.3–30)
CALCIUM SPEC-SCNC: 9.6 MG/DL (ref 8.5–10.2)
CHLORIDE SERPL-SCNC: 92 MMOL/L (ref 98–107)
CO2 SERPL-SCNC: 24.4 MMOL/L (ref 22–29)
CREAT BLD-MCNC: 0.81 MG/DL (ref 0.7–1.3)
DEPRECATED RDW RBC AUTO: 38.8 FL (ref 37–54)
EOSINOPHIL # BLD AUTO: 0 10*3/MM3 (ref 0–0.4)
EOSINOPHIL NFR BLD AUTO: 0 % (ref 0.3–6.2)
ERYTHROCYTE [DISTWIDTH] IN BLOOD BY AUTOMATED COUNT: 13.5 % (ref 12.3–15.4)
GFR SERPL CREATININE-BSD FRML MDRD: 93 ML/MIN/1.73
GLOBULIN UR ELPH-MCNC: 3.1 GM/DL (ref 1.8–3.5)
GLUCOSE BLD-MCNC: 155 MG/DL (ref 74–124)
HCT VFR BLD AUTO: 39.5 % (ref 37.5–51)
HGB BLD-MCNC: 13.9 G/DL (ref 13–17.7)
IMM GRANULOCYTES # BLD AUTO: 0.03 10*3/MM3 (ref 0–0.05)
IMM GRANULOCYTES NFR BLD AUTO: 0.4 % (ref 0–0.5)
LDH SERPL-CCNC: 214 U/L (ref 99–259)
LYMPHOCYTES # BLD AUTO: 1.31 10*3/MM3 (ref 0.7–3.1)
LYMPHOCYTES NFR BLD AUTO: 15.7 % (ref 19.6–45.3)
MCH RBC QN AUTO: 28.1 PG (ref 26.6–33)
MCHC RBC AUTO-ENTMCNC: 35.2 G/DL (ref 31.5–35.7)
MCV RBC AUTO: 79.8 FL (ref 79–97)
MONOCYTES # BLD AUTO: 0.47 10*3/MM3 (ref 0.1–0.9)
MONOCYTES NFR BLD AUTO: 5.6 % (ref 5–12)
NEUTROPHILS # BLD AUTO: 6.51 10*3/MM3 (ref 1.7–7)
NEUTROPHILS NFR BLD AUTO: 78.1 % (ref 42.7–76)
NRBC BLD AUTO-RTO: 0 /100 WBC (ref 0–0.2)
PLATELET # BLD AUTO: 215 10*3/MM3 (ref 140–450)
PMV BLD AUTO: 9 FL (ref 6–12)
POTASSIUM BLD-SCNC: 3.5 MMOL/L (ref 3.5–4.7)
PROT SERPL-MCNC: 7 G/DL (ref 6.3–8)
RBC # BLD AUTO: 4.95 10*6/MM3 (ref 4.14–5.8)
SODIUM BLD-SCNC: 131 MMOL/L (ref 134–145)
URATE SERPL-MCNC: 5.5 MG/DL (ref 2.8–7.4)
WBC NRBC COR # BLD: 8.34 10*3/MM3 (ref 3.4–10.8)

## 2020-06-04 PROCEDURE — 83615 LACTATE (LD) (LDH) ENZYME: CPT

## 2020-06-04 PROCEDURE — 36415 COLL VENOUS BLD VENIPUNCTURE: CPT

## 2020-06-04 PROCEDURE — 25010000002 DEXAMETHASONE SODIUM PHOSPHATE 100 MG/10ML SOLUTION: Performed by: INTERNAL MEDICINE

## 2020-06-04 PROCEDURE — 80053 COMPREHEN METABOLIC PANEL: CPT

## 2020-06-04 PROCEDURE — 25010000002 DIPHENHYDRAMINE PER 50 MG: Performed by: INTERNAL MEDICINE

## 2020-06-04 PROCEDURE — 96375 TX/PRO/DX INJ NEW DRUG ADDON: CPT

## 2020-06-04 PROCEDURE — 25010000002 RITUXIMAB 10 MG/ML SOLUTION 10 ML VIAL: Performed by: INTERNAL MEDICINE

## 2020-06-04 PROCEDURE — 85025 COMPLETE CBC W/AUTO DIFF WBC: CPT

## 2020-06-04 PROCEDURE — 84550 ASSAY OF BLOOD/URIC ACID: CPT

## 2020-06-04 PROCEDURE — 25010000002 RITUXIMAB 10 MG/ML SOLUTION 50 ML VIAL: Performed by: INTERNAL MEDICINE

## 2020-06-04 PROCEDURE — 96415 CHEMO IV INFUSION ADDL HR: CPT

## 2020-06-04 PROCEDURE — 96413 CHEMO IV INFUSION 1 HR: CPT

## 2020-06-04 RX ORDER — SODIUM CHLORIDE 9 MG/ML
250 INJECTION, SOLUTION INTRAVENOUS ONCE
Status: COMPLETED | OUTPATIENT
Start: 2020-06-04 | End: 2020-06-04

## 2020-06-04 RX ORDER — FAMOTIDINE 10 MG/ML
40 INJECTION, SOLUTION INTRAVENOUS ONCE
Status: COMPLETED | OUTPATIENT
Start: 2020-06-04 | End: 2020-06-04

## 2020-06-04 RX ORDER — ACETAMINOPHEN 325 MG/1
650 TABLET ORAL ONCE
Status: COMPLETED | OUTPATIENT
Start: 2020-06-04 | End: 2020-06-04

## 2020-06-04 RX ADMIN — DIPHENHYDRAMINE HYDROCHLORIDE 25 MG: 50 INJECTION, SOLUTION INTRAMUSCULAR; INTRAVENOUS at 09:45

## 2020-06-04 RX ADMIN — FAMOTIDINE 40 MG: 10 INJECTION, SOLUTION INTRAVENOUS at 09:41

## 2020-06-04 RX ADMIN — DEXAMETHASONE SODIUM PHOSPHATE 12 MG: 10 INJECTION, SOLUTION INTRAMUSCULAR; INTRAVENOUS at 09:29

## 2020-06-04 RX ADMIN — RITUXIMAB 900 MG: 10 INJECTION, SOLUTION INTRAVENOUS at 10:29

## 2020-06-04 RX ADMIN — SODIUM CHLORIDE 250 ML: 9 INJECTION, SOLUTION INTRAVENOUS at 09:29

## 2020-06-04 RX ADMIN — ACETAMINOPHEN 650 MG: 325 TABLET ORAL at 09:29

## 2020-06-11 ENCOUNTER — TELEPHONE (OUTPATIENT)
Dept: ONCOLOGY | Facility: HOSPITAL | Age: 75
End: 2020-06-11

## 2020-06-11 ENCOUNTER — LAB (OUTPATIENT)
Dept: LAB | Facility: HOSPITAL | Age: 75
End: 2020-06-11

## 2020-06-11 ENCOUNTER — INFUSION (OUTPATIENT)
Dept: ONCOLOGY | Facility: HOSPITAL | Age: 75
End: 2020-06-11

## 2020-06-11 VITALS
HEART RATE: 82 BPM | OXYGEN SATURATION: 95 % | DIASTOLIC BLOOD PRESSURE: 69 MMHG | TEMPERATURE: 98 F | WEIGHT: 221.8 LBS | BODY MASS INDEX: 26.73 KG/M2 | SYSTOLIC BLOOD PRESSURE: 126 MMHG

## 2020-06-11 DIAGNOSIS — C91.10 CHRONIC LYMPHOCYTIC LEUKEMIA (HCC): ICD-10-CM

## 2020-06-11 DIAGNOSIS — C91.10 CHRONIC LYMPHOCYTIC LEUKEMIA (HCC): Primary | ICD-10-CM

## 2020-06-11 LAB
ALBUMIN SERPL-MCNC: 4 G/DL (ref 3.5–5.2)
ALBUMIN/GLOB SERPL: 1.4 G/DL (ref 1.1–2.4)
ALP SERPL-CCNC: 62 U/L (ref 38–116)
ALT SERPL W P-5'-P-CCNC: 12 U/L (ref 0–41)
ANION GAP SERPL CALCULATED.3IONS-SCNC: 16.9 MMOL/L (ref 5–15)
AST SERPL-CCNC: 15 U/L (ref 0–40)
BASOPHILS # BLD AUTO: 0.02 10*3/MM3 (ref 0–0.2)
BASOPHILS NFR BLD AUTO: 0.3 % (ref 0–1.5)
BILIRUB SERPL-MCNC: 0.9 MG/DL (ref 0.2–1.2)
BUN BLD-MCNC: 10 MG/DL (ref 6–20)
BUN/CREAT SERPL: 14.1 (ref 7.3–30)
CALCIUM SPEC-SCNC: 9.5 MG/DL (ref 8.5–10.2)
CHLORIDE SERPL-SCNC: 93 MMOL/L (ref 98–107)
CO2 SERPL-SCNC: 21.1 MMOL/L (ref 22–29)
CREAT BLD-MCNC: 0.71 MG/DL (ref 0.7–1.3)
DEPRECATED RDW RBC AUTO: 40.6 FL (ref 37–54)
EOSINOPHIL # BLD AUTO: 0.07 10*3/MM3 (ref 0–0.4)
EOSINOPHIL NFR BLD AUTO: 0.9 % (ref 0.3–6.2)
ERYTHROCYTE [DISTWIDTH] IN BLOOD BY AUTOMATED COUNT: 14 % (ref 12.3–15.4)
GFR SERPL CREATININE-BSD FRML MDRD: 108 ML/MIN/1.73
GLOBULIN UR ELPH-MCNC: 2.8 GM/DL (ref 1.8–3.5)
GLUCOSE BLD-MCNC: 179 MG/DL (ref 74–124)
HCT VFR BLD AUTO: 39.4 % (ref 37.5–51)
HGB BLD-MCNC: 13.9 G/DL (ref 13–17.7)
IMM GRANULOCYTES # BLD AUTO: 0.05 10*3/MM3 (ref 0–0.05)
IMM GRANULOCYTES NFR BLD AUTO: 0.6 % (ref 0–0.5)
LDH SERPL-CCNC: 161 U/L (ref 99–259)
LYMPHOCYTES # BLD AUTO: 1.27 10*3/MM3 (ref 0.7–3.1)
LYMPHOCYTES NFR BLD AUTO: 16.2 % (ref 19.6–45.3)
MCH RBC QN AUTO: 28.5 PG (ref 26.6–33)
MCHC RBC AUTO-ENTMCNC: 35.3 G/DL (ref 31.5–35.7)
MCV RBC AUTO: 80.9 FL (ref 79–97)
MONOCYTES # BLD AUTO: 0.4 10*3/MM3 (ref 0.1–0.9)
MONOCYTES NFR BLD AUTO: 5.1 % (ref 5–12)
NEUTROPHILS # BLD AUTO: 6.05 10*3/MM3 (ref 1.7–7)
NEUTROPHILS NFR BLD AUTO: 76.9 % (ref 42.7–76)
NRBC BLD AUTO-RTO: 0 /100 WBC (ref 0–0.2)
PLATELET # BLD AUTO: 168 10*3/MM3 (ref 140–450)
PMV BLD AUTO: 10.6 FL (ref 6–12)
POTASSIUM BLD-SCNC: 3.4 MMOL/L (ref 3.5–4.7)
PROT SERPL-MCNC: 6.8 G/DL (ref 6.3–8)
RBC # BLD AUTO: 4.87 10*6/MM3 (ref 4.14–5.8)
SODIUM BLD-SCNC: 131 MMOL/L (ref 134–145)
WBC NRBC COR # BLD: 7.86 10*3/MM3 (ref 3.4–10.8)

## 2020-06-11 PROCEDURE — 25010000002 DEXAMETHASONE SODIUM PHOSPHATE 100 MG/10ML SOLUTION: Performed by: INTERNAL MEDICINE

## 2020-06-11 PROCEDURE — 96415 CHEMO IV INFUSION ADDL HR: CPT

## 2020-06-11 PROCEDURE — 80053 COMPREHEN METABOLIC PANEL: CPT

## 2020-06-11 PROCEDURE — 25010000002 RITUXIMAB 10 MG/ML SOLUTION 10 ML VIAL: Performed by: INTERNAL MEDICINE

## 2020-06-11 PROCEDURE — 36415 COLL VENOUS BLD VENIPUNCTURE: CPT

## 2020-06-11 PROCEDURE — 85025 COMPLETE CBC W/AUTO DIFF WBC: CPT

## 2020-06-11 PROCEDURE — 96413 CHEMO IV INFUSION 1 HR: CPT

## 2020-06-11 PROCEDURE — 25010000002 RITUXIMAB 10 MG/ML SOLUTION 50 ML VIAL: Performed by: INTERNAL MEDICINE

## 2020-06-11 PROCEDURE — 83615 LACTATE (LD) (LDH) ENZYME: CPT

## 2020-06-11 PROCEDURE — 25010000002 DIPHENHYDRAMINE PER 50 MG: Performed by: INTERNAL MEDICINE

## 2020-06-11 PROCEDURE — 96375 TX/PRO/DX INJ NEW DRUG ADDON: CPT

## 2020-06-11 RX ORDER — POTASSIUM CHLORIDE 20 MEQ/1
20 TABLET, EXTENDED RELEASE ORAL DAILY
Qty: 30 TABLET | Refills: 3 | Status: SHIPPED | OUTPATIENT
Start: 2020-06-11

## 2020-06-11 RX ORDER — ACETAMINOPHEN 325 MG/1
650 TABLET ORAL ONCE
Status: COMPLETED | OUTPATIENT
Start: 2020-06-11 | End: 2020-06-11

## 2020-06-11 RX ORDER — FAMOTIDINE 10 MG/ML
40 INJECTION, SOLUTION INTRAVENOUS ONCE
Status: COMPLETED | OUTPATIENT
Start: 2020-06-11 | End: 2020-06-11

## 2020-06-11 RX ORDER — SODIUM CHLORIDE 9 MG/ML
250 INJECTION, SOLUTION INTRAVENOUS ONCE
Status: COMPLETED | OUTPATIENT
Start: 2020-06-11 | End: 2020-06-11

## 2020-06-11 RX ADMIN — ACETAMINOPHEN 650 MG: 325 TABLET ORAL at 09:10

## 2020-06-11 RX ADMIN — DEXAMETHASONE SODIUM PHOSPHATE 12 MG: 10 INJECTION, SOLUTION INTRAMUSCULAR; INTRAVENOUS at 09:20

## 2020-06-11 RX ADMIN — SODIUM CHLORIDE 250 ML: 9 INJECTION, SOLUTION INTRAVENOUS at 09:06

## 2020-06-11 RX ADMIN — FAMOTIDINE 40 MG: 10 INJECTION INTRAVENOUS at 09:07

## 2020-06-11 RX ADMIN — RITUXIMAB 900 MG: 10 INJECTION, SOLUTION INTRAVENOUS at 10:00

## 2020-06-11 RX ADMIN — DIPHENHYDRAMINE HYDROCHLORIDE 25 MG: 50 INJECTION, SOLUTION INTRAMUSCULAR; INTRAVENOUS at 09:09

## 2020-06-11 NOTE — TELEPHONE ENCOUNTER
Pt notified and script sent      ----- Message from Gerry Schroeder MD sent at 6/11/2020  2:25 PM EDT -----  HE NEEDS KCL 20 MEQ A DAY CALL HIM AND LET HIM KNOW [facility] WILL BE SENT

## 2020-06-18 ENCOUNTER — LAB (OUTPATIENT)
Dept: LAB | Facility: HOSPITAL | Age: 75
End: 2020-06-18

## 2020-06-18 ENCOUNTER — OFFICE VISIT (OUTPATIENT)
Dept: ONCOLOGY | Facility: CLINIC | Age: 75
End: 2020-06-18

## 2020-06-18 ENCOUNTER — APPOINTMENT (OUTPATIENT)
Dept: ONCOLOGY | Facility: HOSPITAL | Age: 75
End: 2020-06-18

## 2020-06-18 VITALS
HEART RATE: 104 BPM | RESPIRATION RATE: 20 BRPM | OXYGEN SATURATION: 96 % | SYSTOLIC BLOOD PRESSURE: 117 MMHG | BODY MASS INDEX: 27.12 KG/M2 | WEIGHT: 222.7 LBS | TEMPERATURE: 97.8 F | DIASTOLIC BLOOD PRESSURE: 72 MMHG | HEIGHT: 76 IN

## 2020-06-18 DIAGNOSIS — C91.10 CHRONIC LYMPHOCYTIC LEUKEMIA (HCC): ICD-10-CM

## 2020-06-18 DIAGNOSIS — C91.10 CHRONIC LYMPHOCYTIC LEUKEMIA (HCC): Primary | ICD-10-CM

## 2020-06-18 LAB
BASOPHILS # BLD AUTO: 0.04 10*3/MM3 (ref 0–0.2)
BASOPHILS NFR BLD AUTO: 0.8 % (ref 0–1.5)
DEPRECATED RDW RBC AUTO: 43.8 FL (ref 37–54)
EOSINOPHIL # BLD AUTO: 0.14 10*3/MM3 (ref 0–0.4)
EOSINOPHIL NFR BLD AUTO: 2.7 % (ref 0.3–6.2)
ERYTHROCYTE [DISTWIDTH] IN BLOOD BY AUTOMATED COUNT: 14.3 % (ref 12.3–15.4)
HCT VFR BLD AUTO: 40 % (ref 37.5–51)
HGB BLD-MCNC: 13.7 G/DL (ref 13–17.7)
IMM GRANULOCYTES # BLD AUTO: 0.03 10*3/MM3 (ref 0–0.05)
IMM GRANULOCYTES NFR BLD AUTO: 0.6 % (ref 0–0.5)
LYMPHOCYTES # BLD AUTO: 1.14 10*3/MM3 (ref 0.7–3.1)
LYMPHOCYTES NFR BLD AUTO: 21.8 % (ref 19.6–45.3)
MCH RBC QN AUTO: 28.7 PG (ref 26.6–33)
MCHC RBC AUTO-ENTMCNC: 34.3 G/DL (ref 31.5–35.7)
MCV RBC AUTO: 83.9 FL (ref 79–97)
MONOCYTES # BLD AUTO: 0.46 10*3/MM3 (ref 0.1–0.9)
MONOCYTES NFR BLD AUTO: 8.8 % (ref 5–12)
NEUTROPHILS # BLD AUTO: 3.42 10*3/MM3 (ref 1.7–7)
NEUTROPHILS NFR BLD AUTO: 65.3 % (ref 42.7–76)
NRBC BLD AUTO-RTO: 0 /100 WBC (ref 0–0.2)
PLATELET # BLD AUTO: 151 10*3/MM3 (ref 140–450)
PMV BLD AUTO: 8.8 FL (ref 6–12)
RBC # BLD AUTO: 4.77 10*6/MM3 (ref 4.14–5.8)
WBC NRBC COR # BLD: 5.23 10*3/MM3 (ref 3.4–10.8)

## 2020-06-18 PROCEDURE — 85025 COMPLETE CBC W/AUTO DIFF WBC: CPT

## 2020-06-18 PROCEDURE — 99214 OFFICE O/P EST MOD 30 MIN: CPT | Performed by: INTERNAL MEDICINE

## 2020-06-18 PROCEDURE — 36415 COLL VENOUS BLD VENIPUNCTURE: CPT

## 2020-06-18 NOTE — PROGRESS NOTES
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     HISTORY OF PRESENT ILLNESS:PATIENT WAS CALLED THE DAY BEFORE BY THE OFFICE TO ASK FOR SYMPTOMS THAT COULD BE CONSISTENT WITH CORONAVIRUS INFECTION, AND BEING NEGATIVE WAS SCHEDULED TO BE SEEN IN THE OFFICE TODAY. SIMILAR QUESTIONING TODAY INCLUDING, CHILLS, FEVER, NEW COUGH, SHORTNESS OF BREATH, DIARRHEA,DIFFUSE BODY ACHES  AND CHANGES IN SMELL OR TASTE WERE NEGATIVE.DURING THE VISIT WITH THE PATIENT TODAY , PATIENT HAD FACE MASK, I HAD PROPPER PROTECTIVE EQUIPMENT, AND I DID HAND HYGIENE WITH SOAP AND WATER BEFORE AND AFTER THE VISIT.      This patient has been brought back today to the office to proceed with his 4th and last cycle of Rituxan in the treatment of his relapsed chronic lymphoid leukemia. The patient had in the previous bout of this condition 3 years ago a reaction to Rituxan and he received only 1 infusion. On this occasion we have been very proactive in premedication with dexamethasone and he has not had any issues pertinent to this.  He does not want to proceed with any other infusion today. He has noticed that he has had dramatic improvement in his energy, his appetite has improved, he has gained weight and he feels like a new person. He is not experiencing any B symptoms. He has no pain. His bowel function is appropriate, urination is appropriate. He has not had any cardiovascular or respiratory issues. No recent infections. Again no pain.                          PAST MEDICAL HISTORY:  Significant for hyperlipidemia.  He also has vitamin D deficiency and has undergone replacement of vitamin D.  SUE ponce has no history of hypertension, cardiovascular disease, diabetes, asthma or respiratory problems.  He was worked up by Dr. Luis Ortega in 2009 for an incidentally found mass in the liver through MRI and CT scan that turned out to be a hemangioma.  He al  s  o has benign cysts in the  liver.  PET scan in that location in  disclosed no abnormal uptake.  On that occasion, it was also found that the patient had a retrosternal thyroid enlargement with a normal TSH.  The patient had no symptomatology in regard   to thyroid abnormalities otherwise.          The patient has had kidney stones in the past on one occasion.  He had trauma as a child of the middle finger of the right hand with some deformity in the nail that never grew back normally.  Otherwise, he has not had   any hospitalizations or other interventions.          HEMATOLOGIC/ONCOLOGIC HISTORY: (History from previous dates can be found in the separate document.)        On 2016 no clinical evidence of recurrence of his CLL.  Hematological parameters were normal.  He had no B symptoms, no autoimmunity, no infections.  He was advised to remain in observation.          MEDICATIONS: The current medication list was reviewed with the patient and updated in the EMR this date per the medical assistant.  Medical dosages and frequencies were confirmed to be accurate.         ALLERGIES:  No known drug allergies.          SOCIAL HISTORY:  Lives with his wife (who has developed Alzheimer and requires total care at home).  One child, a daughter age 36, who just had her first baby recently.  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  He does not smoke and does not drink alcohol.  The patient, as per 2014, had a lengthy conversation with Dr. Schroeder in regard to the future of his wife who has advanced Alzheimer nena  ntia.  In the way I see things, according to the patient’s report, I think she is going to be institutionalized very soon, given the circumstances and level of care that she requires.  As of 2014 please see HPI.         FAMILY HISTORY:  Father  of heart   disease after rheumatic heart disease was found.  Mother  at age 97.  He has no brothers or sisters.  As per 2015, in  regard to further illness in his ill wife who has dementia with significant changes in her overall situation with more somnole  nce, likely stroke and like an infection of some sort.         REVIEW OF SYSTEMS:      General: no fever, no chills, no fatigue,no weight changes, no lack of appetite.  Eyes: no epiphora, xerophthalmia,conjunctivitis, pain, glaucoma, blurred vision, blindness, secretion, photophobia, proptosis, diplopia.  Ears: no otorrhea, tinnitus, otorrhagia, deafness, pain, vertigo.  Nose: no rhinorrhea, no epistaxis, no alteration in perception of odors, no sinuses pressure.  Mouth: no alteration in gums or teeth,  No ulcers, no difficulty with mastication or deglut ion, no odynophagia.  Neck: no masses or pain, no thyroid alterations, no pain in muscles or arteries, no carotid odynia, no crepitation.  Respiratory: no cough, no sputum production,no dyspnea,no trepopnea, no pleuritic pain,no hemoptysis.  Heart: no syncope, no irregularity, no palpitations, no angina,no orthopnea,no paroxysmal nocturnal dyspnea.  Vascular Venous: no tenderness,no edema,no palpable cords,no postphlebitic syndrome, no skin changes no ulcerations.  Vascular Arterial: no distal ischemia, noclaudication, no gangrene, no neuropathic ischemic pain, no skin ulcers, no paleness no cyanosis.  GI: no dysphagia, no odynophagia, no regurgitation, no heartburn,no indigestion,no nausea,no vomiting,no hematemesis ,no melena,no jaundice,no distention, no obstipation,no enterorrhagia,no proctalgia,no anal  lesions, no changes in bowel habits.  : no frequency, no hesitancy, no hematuria, no discharge,no  pain.  Musculoskeletal: no muscle or tendon pain or inflammation,no  joint pain, no edema, no functional limitation,no fasciculations, no mass.  Neurologic: no headache, no seizures, noalterations on Craneal nerves, no motor deficit, no sensory deficit, normal coordination, no alteration in memory,normal orientation, calculation,normal  "writting, verbal and written language.  Skin: no rashes,no pruritus no localized lesions.  Psychiatric: no anxiety, no depression,no agitation, no delusions, proper insight.                      VITAL SIGNS:  Vitals:    20 0905   BP: 117/72   Pulse: 104   Resp: 20   Temp: 97.8 °F (36.6 °C)   TempSrc: Oral   SpO2: 96%   Weight: 101 kg (222 lb 11.2 oz)   Height: 194 cm (76.38\")           PAIN:  0 out of 10      ECO         PHYSICAL EXAMINATION:      GENERAL:  Well-developed, well-nourished  Patient  in no acute distress.   SKIN:  Warm, dry ,NO rashes,NO purpura ,NO petechiae.  HEENT:  Pupils were equal and reactive to light and accomodation, conjunctivas non injected, no pterigion, normal extraocular movements, normal visual acuity.   Mouth mucosa was moist, no exudates in oropharynx, normal gum line, normal roof of the mouth and pillars, normal papillations of the tongue.  NECK:  Supple with good range of motion; no thyromegaly or masses, no JVD or bruits, no cervical adenopathies.No carotid arteries pain, no carotid abnormal pulsation , NO arterial dance.  LYMPHATICS:  No cervical, NO supraclavicular, NO axillary,NO epitrochlear , NO inguinal adenopathy.  CHEST:  Normal excursion of both francis thoraces, normal voice fremitus, no subcutaneous emphysema, normal axillas, no rashes or acanthosis nigricans. normal breath sounds bilaterally, NO wheezing,NO crackles NO ronchi, NO stridor, NO rubs.  CARDIAC   normal rate and regular rhythm, without murmur,NO rubs NO S3 NO S4 right or left . Normal femoral, popliteal, pedis, brachial and carotid pulses.  VASCULAR ARTERIAL: normal carotids,brachial,radial,femoral,popliteal, pedis pulses , no bruits.no paleness or cyanosis, no pain, no edema, no numbness, no gangrene.  VASCULAR VENOUS: no cyanosis, collateral circulation, varicosities, edema, palpable cords, pain, erythema.  ABDOMEN:  Soft, nontender with no hepatomegaly, no splenomegaly,no masses, no ascites, no " collateral circulation,no distention,no [place] sign, no abdominal pain, no inguinal hernias,no umbilical hernia, no abdominal bruits. Normal bowel sounds.  GENITAL: Not  Performed.  EXTREMITIES  AND SPINE:  No clubbing, cyanosis or edema, no deformities or pain .No kyphosis, scoliosis, deformities or pain in spine, ribs or pelvic bone.  NEUROLOGICAL:  Patient was awake, alert, oriented to time, person and place.                              LABORATORY DATA:  Component      Latest Ref Rng & Units 12/6/2019 5/19/2020 5/28/2020 6/4/2020   WBC      3.40 - 10.80 10*3/mm3 8.19 17.94 (H) 23.19 (H) 8.34   RBC      4.14 - 5.80 10*6/mm3 5.01 4.58 4.62 4.95   Hemoglobin      13.0 - 17.7 g/dL 14.7 13.0 13.1 13.9   Hematocrit      37.5 - 51.0 % 43.6 37.8 38.0 39.5   MCV      79.0 - 97.0 fL 87.0 82.5 82.3 79.8   MCH      26.6 - 33.0 pg 29.3 28.4 28.4 28.1   MCHC      31.5 - 35.7 g/dL 33.7 34.4 34.5 35.2   RDW      12.3 - 15.4 % 13.2 13.7 13.4 13.5   RDW-SD      37.0 - 54.0 fl 41.5 40.7 39.9 38.8   MPV      6.0 - 12.0 fL 8.5 8.7 8.7 9.0   Platelets      140 - 450 10*3/mm3 160 192 219 215   Neutrophil Rel %      42.7 - 76.0 % 48.8 32.7 (L) 41.1 (L) 78.1 (H)   Lymphocyte Rel %      19.6 - 45.3 % 30.3 43.1 39.2 15.7 (L)   Monocyte Rel %      5.0 - 12.0 % 17.1 (H) 22.8 (H) 19.0 (H) 5.6   Eosinophil Rel %      0.3 - 6.2 % 2.7 0.6 0.0 (L) 0.0 (L)   Basophil Rel %      0.0 - 1.5 % 0.9 0.5 0.2 0.2   Immature Granulocyte Rel %      0.0 - 0.5 % 0.2 0.3 0.5 0.4   Neutrophils Absolute      1.70 - 7.00 10*3/mm3 4.00 5.87 9.54 (H) 6.51   Lymphocytes Absolute      0.70 - 3.10 10*3/mm3 2.48 7.74 (H) 9.08 (H) 1.31   Monocytes Absolute      0.10 - 0.90 10*3/mm3 1.40 (H) 4.09 (H) 4.41 (H) 0.47   Eosinophils Absolute      0.00 - 0.40 10*3/mm3 0.22 0.10 0.00 0.00   Basophils Absolute      0.00 - 0.20 10*3/mm3 0.07 0.09 0.05 0.02   Immature Grans, Absolute      0.00 - 0.05 10*3/mm3 0.02 0.05 0.11 (H) 0.03   nRBC      0.0 - 0.2 /100 WBC 0.0 0.0 0.0 0.0      Component      Latest Ref Rng & Units 6/11/2020 6/18/2020   WBC      3.40 - 10.80 10*3/mm3 7.86 5.23   RBC      4.14 - 5.80 10*6/mm3 4.87 4.77   Hemoglobin      13.0 - 17.7 g/dL 13.9 13.7   Hematocrit      37.5 - 51.0 % 39.4 40.0   MCV      79.0 - 97.0 fL 80.9 83.9   MCH      26.6 - 33.0 pg 28.5 28.7   MCHC      31.5 - 35.7 g/dL 35.3 34.3   RDW      12.3 - 15.4 % 14.0 14.3   RDW-SD      37.0 - 54.0 fl 40.6 43.8   MPV      6.0 - 12.0 fL 10.6 8.8   Platelets      140 - 450 10*3/mm3 168 151   Neutrophil Rel %      42.7 - 76.0 % 76.9 (H) 65.3   Lymphocyte Rel %      19.6 - 45.3 % 16.2 (L) 21.8   Monocyte Rel %      5.0 - 12.0 % 5.1 8.8   Eosinophil Rel %      0.3 - 6.2 % 0.9 2.7   Basophil Rel %      0.0 - 1.5 % 0.3 0.8   Immature Granulocyte Rel %      0.0 - 0.5 % 0.6 (H) 0.6 (H)   Neutrophils Absolute      1.70 - 7.00 10*3/mm3 6.05 3.42   Lymphocytes Absolute      0.70 - 3.10 10*3/mm3 1.27 1.14   Monocytes Absolute      0.10 - 0.90 10*3/mm3 0.40 0.46   Eosinophils Absolute      0.00 - 0.40 10*3/mm3 0.07 0.14   Basophils Absolute      0.00 - 0.20 10*3/mm3 0.02 0.04   Immature Grans, Absolute      0.00 - 0.05 10*3/mm3 0.05 0.03   nRBC      0.0 - 0.2 /100 WBC 0.0 0.0                          ASSESSMENT: 1.  Patient has history of chronic lymphoid leukemia that required therapy with Rituxan in 2016. Before that he was treated with bendamustine and Rituxan with no difficulties. In 2016 when the patient was treated with Rituxan as a single agent he had a very severe reaction during the 1st infusion and the infusion needed to be completed in the hospital. The 2nd infusion was completed in the office. Thereafter he did not get the 3rd or 4th infusions. Since then the patient has remained in remission. Obviously all of the stressors of the pandemia have been on him. We documented in May that his white count was rising, his lymphocyte count was rising, he was having fatigue and weight loss and we advised him to proceed with  Rituxan. To avoid the reaction that he had during the previous Rituxan infusion in 2016 we advised the patient to be premedicated. He has not had any reaction whatsoever with the 1st, 2nd and 3rd infusion but he feels so well that he does not want to pursue the 4th and last infusion today. His white count has normalized, his total lymphocyte count has bottomed down as it is supposed to be, the hemoglobin is excellent, the platelet count is excellent, the patient has no B symptoms, no infection and no autoimmunity. He looks terrific. He feels terrific. Under the present circumstances and not having the need to proceed with the last infusion of Rituxan today we advised the patient the followin. I would like to review him back in 6 weeks with a CBC and a CMP. We need to monitor his potassium level as well. He is taking now potassium supplementation.   2. The patient is aware that he eventually will require more Rituxan in the future. The timing of this remains to be seen. Typically the time between treatments becomes shorter and shorter and raises the question in the long run the patient will be a candidate to receive venetoclax. I do not see him receiving Imbruvica given his previous history of heart condition and cardiac irregularity.     The patient is aware that there are medications long term by oral route that he could take but I find no need to use them at this point.     I find no need for radiological assessment at this time.    I find no need for any prophylaxis from the point of view of antivirals, antifungals or antibiotics on him at this time.            I DISCUSSED WITH PATIENT IN DETAIL FORMS TO DECREASE CHANCES OF CORONAVIRUS INFECTION INCLUDING ISOLATION, PROPER HAND HIGIENE, AVOID PUBLIC PLACES  WITH CROWDS, FOLLOW  CDC RECOMENDATIONS, AND KEEP PERSONAL AND SOCIAL RESPONSIBILITY, WARE A MASK IN PUBLIC PLACES.  PATIENT IS AWARE THIS INFECTION COULD HAVE SEVERE CONSEQUENCES TO PERSONAL HEALTH AND  FAMILY RAMIFICATIONS OF THIS.

## 2020-07-30 ENCOUNTER — LAB (OUTPATIENT)
Dept: LAB | Facility: HOSPITAL | Age: 75
End: 2020-07-30

## 2020-07-30 ENCOUNTER — OFFICE VISIT (OUTPATIENT)
Dept: ONCOLOGY | Facility: CLINIC | Age: 75
End: 2020-07-30

## 2020-07-30 VITALS
OXYGEN SATURATION: 98 % | SYSTOLIC BLOOD PRESSURE: 124 MMHG | WEIGHT: 223.4 LBS | HEART RATE: 90 BPM | TEMPERATURE: 97.3 F | HEIGHT: 76 IN | RESPIRATION RATE: 18 BRPM | DIASTOLIC BLOOD PRESSURE: 73 MMHG | BODY MASS INDEX: 27.2 KG/M2

## 2020-07-30 DIAGNOSIS — C91.10 CHRONIC LYMPHOCYTIC LEUKEMIA (HCC): ICD-10-CM

## 2020-07-30 DIAGNOSIS — C91.10 CHRONIC LYMPHOCYTIC LEUKEMIA (HCC): Primary | ICD-10-CM

## 2020-07-30 LAB
ALBUMIN SERPL-MCNC: 4.3 G/DL (ref 3.5–5.2)
ALBUMIN/GLOB SERPL: 1.8 G/DL (ref 1.1–2.4)
ALP SERPL-CCNC: 51 U/L (ref 38–116)
ALT SERPL W P-5'-P-CCNC: <5 U/L (ref 0–41)
ANION GAP SERPL CALCULATED.3IONS-SCNC: 9.7 MMOL/L (ref 5–15)
AST SERPL-CCNC: 10 U/L (ref 0–40)
BASOPHILS # BLD AUTO: 0.05 10*3/MM3 (ref 0–0.2)
BASOPHILS NFR BLD AUTO: 0.9 % (ref 0–1.5)
BILIRUB SERPL-MCNC: 0.7 MG/DL (ref 0.2–1.2)
BUN SERPL-MCNC: 6 MG/DL (ref 6–20)
BUN/CREAT SERPL: 7.4 (ref 7.3–30)
CALCIUM SPEC-SCNC: 9.7 MG/DL (ref 8.5–10.2)
CHLORIDE SERPL-SCNC: 95 MMOL/L (ref 98–107)
CO2 SERPL-SCNC: 26.3 MMOL/L (ref 22–29)
CREAT SERPL-MCNC: 0.81 MG/DL (ref 0.7–1.3)
DEPRECATED RDW RBC AUTO: 41.4 FL (ref 37–54)
EOSINOPHIL # BLD AUTO: 0.04 10*3/MM3 (ref 0–0.4)
EOSINOPHIL NFR BLD AUTO: 0.7 % (ref 0.3–6.2)
ERYTHROCYTE [DISTWIDTH] IN BLOOD BY AUTOMATED COUNT: 13.5 % (ref 12.3–15.4)
GFR SERPL CREATININE-BSD FRML MDRD: 93 ML/MIN/1.73
GLOBULIN UR ELPH-MCNC: 2.4 GM/DL (ref 1.8–3.5)
GLUCOSE SERPL-MCNC: 107 MG/DL (ref 74–124)
HCT VFR BLD AUTO: 40.6 % (ref 37.5–51)
HGB BLD-MCNC: 14.3 G/DL (ref 13–17.7)
IMM GRANULOCYTES # BLD AUTO: 0.01 10*3/MM3 (ref 0–0.05)
IMM GRANULOCYTES NFR BLD AUTO: 0.2 % (ref 0–0.5)
LYMPHOCYTES # BLD AUTO: 1.51 10*3/MM3 (ref 0.7–3.1)
LYMPHOCYTES NFR BLD AUTO: 28.2 % (ref 19.6–45.3)
MCH RBC QN AUTO: 29.6 PG (ref 26.6–33)
MCHC RBC AUTO-ENTMCNC: 35.2 G/DL (ref 31.5–35.7)
MCV RBC AUTO: 84.1 FL (ref 79–97)
MONOCYTES # BLD AUTO: 0.44 10*3/MM3 (ref 0.1–0.9)
MONOCYTES NFR BLD AUTO: 8.2 % (ref 5–12)
NEUTROPHILS NFR BLD AUTO: 3.31 10*3/MM3 (ref 1.7–7)
NEUTROPHILS NFR BLD AUTO: 61.8 % (ref 42.7–76)
NRBC BLD AUTO-RTO: 0 /100 WBC (ref 0–0.2)
PLATELET # BLD AUTO: 182 10*3/MM3 (ref 140–450)
PMV BLD AUTO: 9.1 FL (ref 6–12)
POTASSIUM SERPL-SCNC: 4.3 MMOL/L (ref 3.5–4.7)
PROT SERPL-MCNC: 6.7 G/DL (ref 6.3–8)
RBC # BLD AUTO: 4.83 10*6/MM3 (ref 4.14–5.8)
SODIUM SERPL-SCNC: 131 MMOL/L (ref 134–145)
WBC # BLD AUTO: 5.36 10*3/MM3 (ref 3.4–10.8)

## 2020-07-30 PROCEDURE — 80053 COMPREHEN METABOLIC PANEL: CPT

## 2020-07-30 PROCEDURE — 36415 COLL VENOUS BLD VENIPUNCTURE: CPT

## 2020-07-30 PROCEDURE — 99213 OFFICE O/P EST LOW 20 MIN: CPT | Performed by: INTERNAL MEDICINE

## 2020-07-30 PROCEDURE — 85025 COMPLETE CBC W/AUTO DIFF WBC: CPT

## 2020-07-30 NOTE — PROGRESS NOTES
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     HISTORY OF PRESENT ILLNESS:PATIENT WAS CALLED THE DAY BEFORE BY THE OFFICE TO ASK FOR SYMPTOMS THAT COULD BE CONSISTENT WITH CORONAVIRUS INFECTION, AND BEING NEGATIVE WAS SCHEDULED TO BE SEEN IN THE OFFICE TODAY. SIMILAR QUESTIONING TODAY INCLUDING, CHILLS, FEVER, NEW COUGH, SHORTNESS OF BREATH, DIARRHEA,DIFFUSE BODY ACHES  AND CHANGES IN SMELL OR TASTE WERE NEGATIVE.DURING THE VISIT WITH THE PATIENT TODAY , PATIENT HAD FACE MASK, I HAD PROPPER PROTECTIVE EQUIPMENT, AND I DID HAND HYGIENE WITH SOAP AND WATER BEFORE AND AFTER THE VISIT.      This patient returns today to the office for followup. He is here today with no fever, chills, lymphadenopathy, fatigue or changes in his weight. Actually his appetite got dramatically better since Rituxan therapy. He has gained weight, in fact. Now he is struggling with his wife who has dementia and has developed a bed sore, grade 3.     Otherwise, the patient has no issues in regard to his previous history of prostate cancer. He has no skeletal pain and he feels well, completely functional.    Past Medical History:   Diagnosis Date   • Actinic keratosis    • Baker's cyst     BILATERAL POPLITEAL   • Benign prostatic hyperplasia     FOLLOWED BY DR. VIVIEN PATEL   • Biliary dyskinesia    • Bunion of right foot     GREAT TOE   • Bursitis of right hip    • Bursopathy    • CAD (coronary artery disease)    • Cataract     BILATERAL   • CKD (chronic kidney disease)    • Closed right ankle fracture    • Colon polyps     FOLLOWED BY DR. NEHA HAM   • Constipation    • Coronary arteriosclerosis    • Degeneration of lumbar intervertebral disc    • Disease of thyroid gland     enlarged   • Diverticulitis    • Erectile dysfunction    • GERD (gastroesophageal reflux disease)    • Hallux valgus of left foot    • Hyperlipidemia     MIXED   • Hypertension    • Kidney stone  02/21/2009    SEEN AT [facility]   • Lactic acidosis 01/19/2019    ADMITTED TO [facility]   • Leukemia (CMS/[facility])     CLL, FOLLOWED BY DR. KYA WALKER   • Localized, primary osteoarthritis    • Lumbar radiculopathy    • Lumbar stenosis    • Lung mass     LEFT SIDE   • Polyneuropathy    • Primary erectile dysfunction    • Prostate CA (CMS/[facility]) 03/2016    JACQUELYN 6, S/P XRT, FOLLOWED BY DR. VIVIEN PATEL   • PVC (premature ventricular contraction)    • RBBB    • Sensory hearing loss, bilateral    • Skin cancer     SQUAMOUS CELL CARCINOMA OF FACE   • Substernal goiter    • Substernal thyroid goiter    • Thyromegaly 12/2016    ADMITTED TO [facility]   • Vitamin D deficiency    • Vitamin D deficiency      Social History     Socioeconomic History   • Marital status:      Spouse name: No   • Number of children: Not on file   • Years of education: College   • Highest education level: Not on file   Occupational History   • Occupation: Business Owner     Employer: [employer]   Tobacco Use   • Smoking status: Never Smoker   • Smokeless tobacco: Never Used   • Tobacco comment: no caffiene   Substance and Sexual Activity   • Alcohol use: No   • Drug use: No   • Sexual activity: Yes     Partners: Female     Birth control/protection: None   Social History Narrative    LIVES ALONE     Family History   Problem Relation Age of Onset   • Heart disease Father         Rheumatic heart disease   • Hypertension Father    • Stroke Mother    • No Known Problems Daughter                                REVIEW OF SYSTEMS:      General: no fever, no chills, no fatigue,no weight changes, no lack of appetite.  Eyes: no epiphora, xerophthalmia,conjunctivitis, pain, glaucoma, blurred vision, blindness, secretion, photophobia, proptosis, diplopia.  Ears: no otorrhea, tinnitus, otorrhagia, deafness, pain, vertigo.  Nose: no rhinorrhea, no epistaxis, no alteration in perception of odors, no sinuses pressure.  Mouth: no alteration in gums or teeth,  No  "ulcers, no difficulty with mastication or deglut ion, no odynophagia.  Neck: no masses or pain, no thyroid alterations, no pain in muscles or arteries, no carotid odynia, no crepitation.  Respiratory: no cough, no sputum production,no dyspnea,no trepopnea, no pleuritic pain,no hemoptysis.  Heart: no syncope, no irregularity, no palpitations, no angina,no orthopnea,no paroxysmal nocturnal dyspnea.  Vascular Venous: no tenderness,no edema,no palpable cords,no postphlebitic syndrome, no skin changes no ulcerations.  Vascular Arterial: no distal ischemia, noclaudication, no gangrene, no neuropathic ischemic pain, no skin ulcers, no paleness no cyanosis.  GI: no dysphagia, no odynophagia, no regurgitation, no heartburn,no indigestion,no nausea,no vomiting,no hematemesis ,no melena,no jaundice,no distention, no obstipation,no enterorrhagia,no proctalgia,no anal  lesions, no changes in bowel habits.  : no frequency, no hesitancy, no hematuria, no discharge,no  pain.  Musculoskeletal: no muscle or tendon pain or inflammation,no  joint pain, no edema, no functional limitation,no fasciculations, no mass.  Neurologic: no headache, no seizures, noalterations on Craneal nerves, no motor deficit, no sensory deficit, normal coordination, no alteration in memory,normal orientation, calculation,normal writting, verbal and written language.  Skin: no rashes,no pruritus no localized lesions.  Psychiatric: no anxiety, no depression,no agitation, no delusions, proper insight.                      VITAL SIGNS:  Vitals:    20 1118   BP: 124/73   Pulse: 90   Resp: 18   Temp: 97.3 °F (36.3 °C)   TempSrc: Temporal   SpO2: 98%   Weight: 101 kg (223 lb 6.4 oz)   Height: 194 cm (76.38\")           PAIN:  0 out of 10      ECO         PHYSICAL EXAMINATION:        Patient screened  for depression based on a PHQ-9 score of 0 on 2020.          This patient's ACP documentation is up to date, and there's nothing further left to " document.    GENERAL ASPECT: IN GOOD HEALTH WALKING THROUGH THE ROOM NO DIFFICULTIES, NO PAIN.PROPER NUTRITION.WELL KEPT PHYSICALLY.  EYES : NOT JAUNDICED, PUPILS WERE EQUAL.  NECK: NO CERVICAL ADENOPATHIES OR MASSES OR THYROID ENLARGEMENT.  HEART: REGULAR ,NO MURMURS , NO GALLOPS, NO RUBS.  ABDOMEN: NOT DISTENDED, NO PAIN, NO LIVER OR SPLEEN ENLARGEMENT, NO ASCITES, NO COLLATERAL CIRCULATION.  EXTREMITIES: NO EDEMA, NO PAIN, NO CLUBBING, NO DEFORMITIES.  NEUROLOGIC: NORMAL CONVERSATION, MEMORY , SPEECH AND GAIT.  SKIN: NO LESIONS.                        LABORATORY DATA:Differential   Order: [id number] - Part of Panel Order [id number]   Status:  Final result   Visible to patient:  No (Not Released) Dx:  Chronic lymphocytic leukemia (CMS/[facility])   Specimen Information: Blood        Component  Ref Range & Units 11:06   WBC  3.40 - 10.80 10*3/mm3 5.36    RBC  4.14 - 5.80 10*6/mm3 4.83    Hemoglobin  13.0 - 17.7 g/dL 14.3    Hematocrit  37.5 - 51.0 % 40.6    MCV  79.0 - 97.0 fL 84.1    MCH  26.6 - 33.0 pg 29.6    MCHC  31.5 - 35.7 g/dL 35.2    RDW  12.3 - 15.4 % 13.5    RDW-SD  37.0 - 54.0 fl 41.4    MPV  6.0 - 12.0 fL 9.1    Platelets  140 - 450 10*3/mm3 182    Neutrophil %  42.7 - 76.0 % 61.8    Lymphocyte %  19.6 - 45.3 % 28.2    Monocyte %  5.0 - 12.0 % 8.2    Eosinophil %  0.3 - 6.2 % 0.7    Basophil %  0.0 - 1.5 % 0.9    Immature Grans %  0.0 - 0.5 % 0.2                                       ASSESSMENT: 1.  Patient has history of chronic lymphoid leukemia that required therapy with Rituxan in 2016. Before that he was treated with bendamustine and Rituxan with no difficulties. In 2016 when the patient was treated with Rituxan as a single agent he had a very severe reaction during the 1st infusion and the infusion needed to be completed in the hospital. The 2nd infusion was completed in the office. Thereafter he did not get the 3rd or 4th infusions. Since then the patient has remained in remission. Obviously all of the  stressors of the pandemia have been on him. We documented in May that his white count was rising, his lymphocyte count was rising, he was having fatigue and weight loss and we advised him to proceed with Rituxan. To avoid the reaction that he had during the previous Rituxan infusion in  we advised the patient to be premedicated. He has not had any reaction whatsoever with the 1st, 2nd and 3rd infusion but he feels so well that he does not want to pursue the 4th and last infusion today. His white count has normalized, his total lymphocyte count has bottomed down as it is supposed to be, the hemoglobin is excellent, the platelet count is excellent, the patient has no B symptoms, no infection and no autoimmunity. He looks terrific. He feels terrific. Under the present circumstances and not having the need to proceed with the last infusion of Rituxan today we advised the patient the followin. I would like to review him back in 6 weeks with a CBC and a CMP. We need to monitor his potassium level as well. He is taking now potassium supplementation.   2. The patient is aware that he eventually will require more Rituxan in the future. The timing of this remains to be seen. Typically the time between treatments becomes shorter and shorter and raises the question in the long run the patient will be a candidate to receive venetoclax. I do not see him receiving Imbruvica given his previous history of heart condition and cardiac irregularity.   The patient was further reviewed on 2020. He had no symptoms pertinent to his chronic lymphoid leukemia including no fever, chills, night sweats, pruritus, fatigue, weight loss or lymphadenopathy. Actually he feels exactly the opposite. He is stressed out because of issues pertinent to his wife who has dementia and now she has a decubitus, grade 3, in the buttock and he is trying to be proactive, trying a way to correct this issue.     Overall, the patient’s white count,  hemoglobin, platelets and white count differential are normal. His exam is fine and I think his leukemia achieved a new remission with Rituxan single agent. I find no reason to put him through radiological testing or add any maintenance medicine to his regimen. Hopefully the Rituxan will give him another 3 or 4 years or remission like happened before.     I will review him back in 3 months with a CBC.            I DISCUSSED WITH PATIENT IN DETAIL FORMS TO DECREASE CHANCES OF CORONAVIRUS INFECTION INCLUDING ISOLATION, PROPER HAND HIGIENE, AVOID PUBLIC PLACES  WITH CROWDS, FOLLOW  CDC RECOMENDATIONS, AND KEEP PERSONAL AND SOCIAL RESPONSIBILITY, WARE A MASK IN PUBLIC PLACES.  PATIENT IS AWARE THIS INFECTION COULD HAVE SEVERE CONSEQUENCES TO PERSONAL HEALTH AND FAMILY RAMIFICATIONS OF THIS.

## 2020-09-14 ENCOUNTER — TELEPHONE (OUTPATIENT)
Dept: ONCOLOGY | Facility: CLINIC | Age: 75
End: 2020-09-14

## 2020-09-15 NOTE — TELEPHONE ENCOUNTER
"PT. WANTED TO KNOW OF A \"GOOD SURGEON\" TO SEE  FOR HERNIA SURGERY.  INFORMED PT. OF DR. CARPIO OR DR. LUCERO.  PT WOULD LIKE FOR ME TO ASK DR. WALKER WHEN HE COMES BACK NEXT WEEK.  INFORMED PT. I CAN S/W HIM ON Tuesday 9/22/20.  UNDERSTANDING NOTED.  "

## 2020-09-16 ENCOUNTER — TELEPHONE (OUTPATIENT)
Dept: ONCOLOGY | Facility: CLINIC | Age: 75
End: 2020-09-16

## 2020-09-16 NOTE — TELEPHONE ENCOUNTER
Pt got the answer to his question, so you do not need to ask Dr. Schroeder on Tuesday when when gets back from vacation.  Pt has chosen Dr. Escobedo to see about his hernia.

## 2020-09-21 ENCOUNTER — OFFICE VISIT (OUTPATIENT)
Dept: SURGERY | Facility: CLINIC | Age: 75
End: 2020-09-21

## 2020-09-21 VITALS — HEIGHT: 77 IN | BODY MASS INDEX: 26.49 KG/M2

## 2020-09-21 DIAGNOSIS — R10.32 LEFT INGUINAL PAIN: Primary | ICD-10-CM

## 2020-09-21 PROCEDURE — 99213 OFFICE O/P EST LOW 20 MIN: CPT | Performed by: SURGERY

## 2020-09-21 RX ORDER — ALFUZOSIN HYDROCHLORIDE 10 MG/1
20 TABLET, EXTENDED RELEASE ORAL NIGHTLY
COMMUNITY
Start: 2020-09-17

## 2020-09-22 ENCOUNTER — TELEPHONE (OUTPATIENT)
Dept: ONCOLOGY | Facility: CLINIC | Age: 75
End: 2020-09-22

## 2020-09-22 NOTE — TELEPHONE ENCOUNTER
PT. INFORMED I S/W DR. WALKER ABOUT A SURGERY REFERRAL FOR A HERNIA REPAIR.  INFORMED PT. HE WOULD RECOMMEND DR. CARPIO.  PT. STATES HE HAS ALREADY SEEN HIM AND HE DOESN'T REQUIRE ANY SURGERY AT THIS TIME.  NO FURTHER QUESTIONS OR CONCERNS.

## 2020-09-23 NOTE — PROGRESS NOTES
SUMMARY (A/P):    75-year-old gentleman who has been suffering fairly severe constipation over the last year but now has had improvement with medical treatment.  He has developed left inguinal pain as result and felt a possible bulge in the region last week.  On examination today, his external ring is minimally if at all dilated and no definite evidence of hernia is palpated.  Given that he is only had pain in the left inguinal region for the last week and given findings on physical exam today, I recommended expectant observation.  He is to return if his pain worsens or does not resolve or if he definitively starts feeling a bulge in the region.      CC:    Left inguinal pain    HPI:    75-year-old gentleman who indicates for the last year that he has had fairly severe constipation which has now improved with Citrucel and lactulose treatment.  In the last week he has developed mild left inguinal pain with questionable bulge.    RADIOLOGY:  CT abdomen pelvis 8/21/2019: Multiple liver cysts.  I reviewed the images and at that time no obvious abnormality of the abdominal wall/inguinal regions evident.    PHYSICAL EXAM:   • Constitutional: Well-developed well-nourished, no acute distress  • Respiratory: Normal inspiratory effort  • Cardiovascular: Regular rate  • Gastrointestinal: Soft, nontender  • Genitourinary: Testicles are descended and normal.  External rings are relatively normal.  No obvious palpable or visible inguinal hernia present.  No inguinal mass.  No inguinal adenopathy.  • Psychiatric: Alert and oriented ×3, normal affect     ROS:    Influenza Like Illness: no fever, no  cough, no  sore throat, no  body aches, no loss of sense of taste or smell, no known exposure to person with Covid-19.  All other systems reviewed and negative other than presenting complaints.    DORENE CARPIO M.D.

## 2020-10-22 ENCOUNTER — OFFICE VISIT (OUTPATIENT)
Dept: ONCOLOGY | Facility: CLINIC | Age: 75
End: 2020-10-22

## 2020-10-22 ENCOUNTER — LAB (OUTPATIENT)
Dept: LAB | Facility: HOSPITAL | Age: 75
End: 2020-10-22

## 2020-10-22 VITALS
DIASTOLIC BLOOD PRESSURE: 75 MMHG | BODY MASS INDEX: 27.42 KG/M2 | OXYGEN SATURATION: 97 % | TEMPERATURE: 97.3 F | HEART RATE: 72 BPM | RESPIRATION RATE: 20 BRPM | WEIGHT: 225.2 LBS | HEIGHT: 76 IN | SYSTOLIC BLOOD PRESSURE: 153 MMHG

## 2020-10-22 DIAGNOSIS — C91.10 CHRONIC LYMPHOCYTIC LEUKEMIA (HCC): ICD-10-CM

## 2020-10-22 DIAGNOSIS — C91.10 CHRONIC LYMPHOCYTIC LEUKEMIA (HCC): Primary | ICD-10-CM

## 2020-10-22 LAB
BASOPHILS # BLD AUTO: 0.08 10*3/MM3 (ref 0–0.2)
BASOPHILS NFR BLD AUTO: 1.2 % (ref 0–1.5)
DEPRECATED RDW RBC AUTO: 39.1 FL (ref 37–54)
EOSINOPHIL # BLD AUTO: 0.38 10*3/MM3 (ref 0–0.4)
EOSINOPHIL NFR BLD AUTO: 5.7 % (ref 0.3–6.2)
ERYTHROCYTE [DISTWIDTH] IN BLOOD BY AUTOMATED COUNT: 13 % (ref 12.3–15.4)
HCT VFR BLD AUTO: 40.1 % (ref 37.5–51)
HGB BLD-MCNC: 14.4 G/DL (ref 13–17.7)
IMM GRANULOCYTES # BLD AUTO: 0.03 10*3/MM3 (ref 0–0.05)
IMM GRANULOCYTES NFR BLD AUTO: 0.5 % (ref 0–0.5)
LYMPHOCYTES # BLD AUTO: 1.62 10*3/MM3 (ref 0.7–3.1)
LYMPHOCYTES NFR BLD AUTO: 24.4 % (ref 19.6–45.3)
MCH RBC QN AUTO: 29.9 PG (ref 26.6–33)
MCHC RBC AUTO-ENTMCNC: 35.9 G/DL (ref 31.5–35.7)
MCV RBC AUTO: 83.4 FL (ref 79–97)
MONOCYTES # BLD AUTO: 0.57 10*3/MM3 (ref 0.1–0.9)
MONOCYTES NFR BLD AUTO: 8.6 % (ref 5–12)
NEUTROPHILS NFR BLD AUTO: 3.97 10*3/MM3 (ref 1.7–7)
NEUTROPHILS NFR BLD AUTO: 59.6 % (ref 42.7–76)
NRBC BLD AUTO-RTO: 0 /100 WBC (ref 0–0.2)
PLATELET # BLD AUTO: 163 10*3/MM3 (ref 140–450)
PMV BLD AUTO: 9.3 FL (ref 6–12)
RBC # BLD AUTO: 4.81 10*6/MM3 (ref 4.14–5.8)
WBC # BLD AUTO: 6.65 10*3/MM3 (ref 3.4–10.8)

## 2020-10-22 PROCEDURE — 36415 COLL VENOUS BLD VENIPUNCTURE: CPT

## 2020-10-22 PROCEDURE — 99213 OFFICE O/P EST LOW 20 MIN: CPT | Performed by: INTERNAL MEDICINE

## 2020-10-22 PROCEDURE — 85025 COMPLETE CBC W/AUTO DIFF WBC: CPT

## 2020-10-22 RX ORDER — INFLUENZA A VIRUS A/MICHIGAN/45/2015 X-275 (H1N1) ANTIGEN (FORMALDEHYDE INACTIVATED), INFLUENZA A VIRUS A/SINGAPORE/INFIMH-16-0019/2016 IVR-186 (H3N2) ANTIGEN (FORMALDEHYDE INACTIVATED), INFLUENZA B VIRUS B/PHUKET/3073/2013 ANTIGEN (FORMALDEHYDE INACTIVATED), AND INFLUENZA B VIRUS B/MARYLAND/15/2016 BX-69A ANTIGEN (FORMALDEHYDE INACTIVATED) 60; 60; 60; 60 UG/.7ML; UG/.7ML; UG/.7ML; UG/.7ML
INJECTION, SUSPENSION INTRAMUSCULAR
COMMUNITY
Start: 2020-09-25 | End: 2021-10-18

## 2020-10-22 NOTE — PROGRESS NOTES
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     HISTORY OF PRESENT ILLNESS:  PATIENT WAS CALLED THE DAY BEFORE BY THE OFFICE TO ASK FOR SYMPTOMS THAT COULD BE CONSISTENT WITH CORONAVIRUS INFECTION, AND BEING NEGATIVE WAS SCHEDULED TO BE SEEN IN THE OFFICE TODAY. SIMILAR QUESTIONING TODAY INCLUDING, CHILLS, FEVER, NEW COUGH, SHORTNESS OF BREATH, DIARRHEA,DIFFUSE BODY ACHES  AND CHANGES IN SMELL OR TASTE WERE NEGATIVE.THE PATIENT DENIED ANY CONTACT WITH PERSONS WHO WERE POSITIVE FOR COVID, AND PATIENT IS NOT IN CATEGORY OF HIGH RISK BEHAVIOR TO ACQUIRE COVID.    DURING THE VISIT WITH THE PATIENT TODAY , PATIENT HAD FACE MASK, MY MEDICAL ASSISTANT AND I  HAD PROPPER PROTECTIVE EQUIPMENT, AND I DID HAND HYGIENE WITH SOAP AND WATER BEFORE AND AFTER THE VISIT.  This patient returns today to the office for follow up. He has called the office requesting the help to be referred to general surgery because he has developed a hernia in his abdominal wall. He was seen by Dr. Kolb, it was suggested to have more laxative and more fiber in his diet but no need for intervention was necessary at that time. The patient is now using prunes and some laxative with some benefit. He is not having any abdominal pain. No abdominal distention. Appetite is terrific, his weight is stable. He has no passage of blood in the stool. Urination with some frequency, no hematuria. He has no fever, no chills, no sweats, no pruritus, no adenopathies related to his chronic lymphoid leukemia. He remains busy in his work. He has not had any fever, infections or autoimmunity.             Past Medical History:   Diagnosis Date   • Actinic keratosis    • Arthritis    • Baker's cyst     BILATERAL POPLITEAL   • Benign prostatic hyperplasia     FOLLOWED BY DR. VIVIEN PATEL   • Biliary dyskinesia    • Bunion of right foot     GREAT TOE   • Bursitis of right hip    • Bursopathy    • CAD  (coronary artery disease)    • Cataract     BILATERAL   • CKD (chronic kidney disease)    • Closed right ankle fracture    • Colon polyps     FOLLOWED BY DR. NEHA HAM   • Constipation    • Coronary arteriosclerosis    • Degeneration of lumbar intervertebral disc    • Disease of thyroid gland     enlarged   • Diverticulitis    • Erectile dysfunction    • GERD (gastroesophageal reflux disease)    • Hallux valgus of left foot    • Hyperlipidemia     MIXED   • Hypertension    • Kidney stone 02/21/2009    SEEN AT [facility]   • Lactic acidosis 01/19/2019    ADMITTED TO [facility]   • Leukemia (CMS/[facility])     CLL, FOLLOWED BY DR. KYA WALKER   • Localized, primary osteoarthritis    • Lumbar radiculopathy    • Lumbar stenosis    • Lung mass     LEFT SIDE   • Polyneuropathy    • Primary erectile dysfunction    • Prostate CA (CMS/[facility]) 03/2016    JACQUELYN 6, S/P XRT, FOLLOWED BY DR. VIVIEN PATEL   • PVC (premature ventricular contraction)    • RBBB    • Sensory hearing loss, bilateral    • Skin cancer     SQUAMOUS CELL CARCINOMA OF FACE   • Substernal goiter    • Substernal thyroid goiter    • Thyromegaly 12/2016    ADMITTED TO [facility]   • Vitamin D deficiency    • Vitamin D deficiency      Social History     Socioeconomic History   • Marital status:      Spouse name: No   • Number of children: Not on file   • Years of education: College   • Highest education level: Not on file   Occupational History   • Occupation: Business Owner     Employer: [employer]   Tobacco Use   • Smoking status: Never Smoker   • Smokeless tobacco: Never Used   • Tobacco comment: no caffiene   Substance and Sexual Activity   • Alcohol use: No   • Drug use: No   • Sexual activity: Yes     Partners: Female     Birth control/protection: None   Social History Narrative    LIVES ALONE     Family History   Problem Relation Age of Onset   • Heart disease Father         Rheumatic heart disease   • Hypertension Father    • Stroke Mother    • No Known  Problems Daughter                                REVIEW OF SYSTEMS:      General: no fever, no chills, no fatigue,no weight changes, no lack of appetite.  Eyes: no epiphora, xerophthalmia,conjunctivitis, pain, glaucoma, blurred vision, blindness, secretion, photophobia, proptosis, diplopia.  Ears: no otorrhea, tinnitus, otorrhagia, deafness, pain, vertigo.  Nose: no rhinorrhea, no epistaxis, no alteration in perception of odors, no sinuses pressure.  Mouth: no alteration in gums or teeth,  No ulcers, no difficulty with mastication or deglut ion, no odynophagia.  Neck: no masses or pain, no thyroid alterations, no pain in muscles or arteries, no carotid odynia, no crepitation.  Respiratory: no cough, no sputum production,no dyspnea,no trepopnea, no pleuritic pain,no hemoptysis.  Heart: no syncope, no irregularity, no palpitations, no angina,no orthopnea,no paroxysmal nocturnal dyspnea.  Vascular Venous: no tenderness,no edema,no palpable cords,no postphlebitic syndrome, no skin changes no ulcerations.  Vascular Arterial: no distal ischemia, noclaudication, no gangrene, no neuropathic ischemic pain, no skin ulcers, no paleness no cyanosis.  GI: no dysphagia, no odynophagia, no regurgitation, no heartburn,no indigestion,no nausea,no vomiting,no hematemesis ,no melena,no jaundice,no distention, no obstipation,no enterorrhagia,no proctalgia,no anal  lesions, no changes in bowel habits.  : no frequency, no hesitancy, no hematuria, no discharge,no  pain.  Musculoskeletal: no muscle or tendon pain or inflammation,no  joint pain, no edema, no functional limitation,no fasciculations, no mass.  Neurologic: no headache, no seizures, noalterations on Craneal nerves, no motor deficit, no sensory deficit, normal coordination, no alteration in memory,normal orientation, calculation,normal writting, verbal and written language.  Skin: no rashes,no pruritus no localized lesions.  Psychiatric: no anxiety, no depression,no agitation,  "no delusions, proper insight.                  VITAL SIGNS:    Vitals:    10/22/20 1112   BP: 153/75   Pulse: 72   Resp: 20   Temp: 97.3 °F (36.3 °C)   TempSrc: Temporal   SpO2: 97%   Weight: 102 kg (225 lb 3.2 oz)   Height: 194 cm (76.38\")           PAIN:  0 out of 10      ECO         PHYSICAL EXAMINATION:  I HAVE PERSONALLY REVIEWED THE HISTORY OF THE PRESENT ILLNESS, PAST MEDICAL HISTORY, FAMILY HISTORY, SOCIAL HISTORY, ALLERGIES, MEDICATIONS STATED ABOVE IN THE OFFICE NOTE FROM TODAY.        GENERAL:  Well-developed, well-nourished  Patient  in no acute distress.   SKIN:  Warm, dry ,NO rashes,NO purpura ,NO petechiae.  HEENT:  Pupils were equal and reactive to light and accomodation, conjunctivae noninjected, no pterigion, normal extraocular movements, normal visual acuity.   NECK:  Supple with good range of motion; no thyromegaly or masses, no JVD or bruits, no cervical adenopathies.No carotid artery pain, no carotid abnormal pulsation , NO arterial dance.  LYMPHATICS:  No cervical, NO supraclavicular, NO axillary,NO epitrochlear , NO inguinal adenopathy.  CARDIAC   normal rate and regular rhythm, without murmur,NO rubs NO S3 NO S4 right or left . Normal femoral, popliteal, pedis, brachial and carotid pulses.  VASCULAR ARTERIAL: normal carotids,brachial,radial,femoral,popliteal, pedis pulses , no bruits.no paleness or cyanosis, no pain, no edema, no numbness, no gangrene.  VASCULAR VENOUS: no cyanosis, collateral circulation, varicosities, edema, palpable cords, pain, erythema.  ABDOMEN:  Soft, nontender with no hepatomegaly, no splenomegaly,no masses, no ascites, no collateral circulation,no distention,no Eddie sign, no abdominal pain, small inguinal hernias,no umbilical hernia, no abdominal bruits. Normal bowel sounds.  GENITAL: Not  Performed.  EXTREMITIES  AND SPINE:  No clubbing, cyanosis or edema, no deformities or pain .No kyphosis, scoliosis, deformities or pain in spine, ribs or pelvic " bone.  NEUROLOGICAL:  Patient was awake, alert, oriented to time, person and place.                    LABORATORY DATA:CBC Auto Differential  Order: [id number] - Part of Panel Order [id number]  Status:  Final result   Visible to patient:  No (not released)   Dx:  Chronic lymphocytic leukemia (CMS/formerly [facility])  Specimen Information: Blood        Component   Ref Range & Units 11:03   WBC   3.40 - 10.80 10*3/mm3 6.65    RBC   4.14 - 5.80 10*6/mm3 4.81    Hemoglobin   13.0 - 17.7 g/dL 14.4    Hematocrit   37.5 - 51.0 % 40.1    MCV   79.0 - 97.0 fL 83.4    MCH   26.6 - 33.0 pg 29.9    MCHC   31.5 - 35.7 g/dL 35.9High     RDW   12.3 - 15.4 % 13.0    RDW-SD   37.0 - 54.0 fl 39.1    MPV   6.0 - 12.0 fL 9.3    Platelets   140 - 450 10*3/mm3 163    Neutrophil %   42.7 - 76.0 % 59.6    Lymphocyte %   19.6 - 45.3 % 24.4    Monocyte %   5.0 - 12.0 % 8.6    Eosinophil %   0.3 - 6.2 % 5.7    Basophil %   0.0 - 1.5 % 1.2    Immature Grans %   0.0 - 0.5 % 0.5    Neutrophils, Absolute   1.70 - 7.00 10*3/mm3 3.97               ASSESSMENT: 1.  Patient has history of chronic lymphoid leukemia that required therapy with Rituxan in 2016. Before that he was treated with bendamustine and Rituxan with no difficulties. In 2016 when the patient was treated with Rituxan as a single agent he had a very severe reaction during the 1st infusion and the infusion needed to be completed in the hospital. The 2nd infusion was completed in the office. Thereafter he did not get the 3rd or 4th infusions. Since then the patient has remained in remission. Obviously all of the stressors of the pandemia have been on him. We documented in May that his white count was rising, his lymphocyte count was rising, he was having fatigue and weight loss and we advised him to proceed with Rituxan. To avoid the reaction that he had during the previous Rituxan infusion in 2016 we advised the patient to be premedicated. He has not had any reaction whatsoever with the 1st, 2nd and 3rd  infusion but he feels so well that he does not want to pursue the 4th and last infusion .  Since the previus visit the patient has been feeling fine from the point of view of his chronic lymphoid leukemia. His appetite is excellent, his weight is stable. His wife  and obviously he feels lonely. He has a lot of friends and he is sharing some time with them obviously having precautions in regard to coronavirus infection. He hs developed an abdominal wall hernia, he has been seen by Nixon Kolb MD, who has not recommended any intervention. The patient has been advised to take more laxatives. I also advised him to consider using dates that are very efficient in regard to bowel activity. Otherwise his white count, hemoglobin, platelets and white count differential are normal, his total lymphocyte count is normal and the patient has no evidence of leukemia activity.     I will review him back in 3 months with a CBC and a CMP.                   I DISCUSSED WITH PATIENT IN DETAIL FORMS TO DECREASE CHANCES OF CORONAVIRUS INFECTION INCLUDING ISOLATION, PROPER HAND HIGIENE, AVOID PUBLIC PLACES  WITH CROWDS, FOLLOW  CDC RECOMENDATIONS, AND KEEP PERSONAL AND SOCIAL RESPONSIBILITY, WARE A MASK IN PUBLIC PLACES.  PATIENT IS AWARE THIS INFECTION COULD HAVE SEVERE CONSEQUENCES TO PERSONAL HEALTH AND FAMILY RAMIFICATIONS OF THIS.

## 2021-01-18 ENCOUNTER — TELEPHONE (OUTPATIENT)
Dept: ONCOLOGY | Facility: CLINIC | Age: 76
End: 2021-01-18

## 2021-01-18 NOTE — TELEPHONE ENCOUNTER
Caller: PT    Relationship to patient: PT    Best call back number: [phone]  PT TO RESCHED APPT 1/19.    PLEASE RETURN CALL.

## 2021-01-19 ENCOUNTER — APPOINTMENT (OUTPATIENT)
Dept: LAB | Facility: HOSPITAL | Age: 76
End: 2021-01-19

## 2021-01-20 ENCOUNTER — LAB (OUTPATIENT)
Dept: LAB | Facility: HOSPITAL | Age: 76
End: 2021-01-20

## 2021-01-20 ENCOUNTER — OFFICE VISIT (OUTPATIENT)
Dept: ONCOLOGY | Facility: CLINIC | Age: 76
End: 2021-01-20

## 2021-01-20 VITALS
SYSTOLIC BLOOD PRESSURE: 150 MMHG | OXYGEN SATURATION: 97 % | TEMPERATURE: 98 F | HEART RATE: 66 BPM | BODY MASS INDEX: 29.08 KG/M2 | HEIGHT: 76 IN | RESPIRATION RATE: 19 BRPM | DIASTOLIC BLOOD PRESSURE: 78 MMHG | WEIGHT: 238.8 LBS

## 2021-01-20 DIAGNOSIS — C91.10 CHRONIC LYMPHOCYTIC LEUKEMIA (HCC): ICD-10-CM

## 2021-01-20 DIAGNOSIS — K21.9 GASTROESOPHAGEAL REFLUX DISEASE, UNSPECIFIED WHETHER ESOPHAGITIS PRESENT: ICD-10-CM

## 2021-01-20 DIAGNOSIS — K59.04 CHRONIC IDIOPATHIC CONSTIPATION: ICD-10-CM

## 2021-01-20 DIAGNOSIS — C91.10 CHRONIC LYMPHOCYTIC LEUKEMIA (HCC): Primary | ICD-10-CM

## 2021-01-20 LAB
ALBUMIN SERPL-MCNC: 4.1 G/DL (ref 3.5–5.2)
ALBUMIN/GLOB SERPL: 1.6 G/DL (ref 1.1–2.4)
ALP SERPL-CCNC: 59 U/L (ref 38–116)
ALT SERPL W P-5'-P-CCNC: 9 U/L (ref 0–41)
ANION GAP SERPL CALCULATED.3IONS-SCNC: 9.6 MMOL/L (ref 5–15)
AST SERPL-CCNC: 12 U/L (ref 0–40)
BASOPHILS # BLD AUTO: 0.06 10*3/MM3 (ref 0–0.2)
BASOPHILS NFR BLD AUTO: 1 % (ref 0–1.5)
BILIRUB SERPL-MCNC: 0.7 MG/DL (ref 0.2–1.2)
BUN SERPL-MCNC: 14 MG/DL (ref 6–20)
BUN/CREAT SERPL: 15.7 (ref 7.3–30)
CALCIUM SPEC-SCNC: 9.9 MG/DL (ref 8.5–10.2)
CHLORIDE SERPL-SCNC: 99 MMOL/L (ref 98–107)
CO2 SERPL-SCNC: 27.4 MMOL/L (ref 22–29)
CREAT SERPL-MCNC: 0.89 MG/DL (ref 0.7–1.3)
DEPRECATED RDW RBC AUTO: 39.3 FL (ref 37–54)
EOSINOPHIL # BLD AUTO: 0.13 10*3/MM3 (ref 0–0.4)
EOSINOPHIL NFR BLD AUTO: 2.2 % (ref 0.3–6.2)
ERYTHROCYTE [DISTWIDTH] IN BLOOD BY AUTOMATED COUNT: 12.7 % (ref 12.3–15.4)
GFR SERPL CREATININE-BSD FRML MDRD: 83 ML/MIN/1.73
GLOBULIN UR ELPH-MCNC: 2.5 GM/DL (ref 1.8–3.5)
GLUCOSE SERPL-MCNC: 94 MG/DL (ref 74–124)
HCT VFR BLD AUTO: 43.9 % (ref 37.5–51)
HGB BLD-MCNC: 15.2 G/DL (ref 13–17.7)
IMM GRANULOCYTES # BLD AUTO: 0.02 10*3/MM3 (ref 0–0.05)
IMM GRANULOCYTES NFR BLD AUTO: 0.3 % (ref 0–0.5)
LYMPHOCYTES # BLD AUTO: 1.49 10*3/MM3 (ref 0.7–3.1)
LYMPHOCYTES NFR BLD AUTO: 24.8 % (ref 19.6–45.3)
MCH RBC QN AUTO: 29.5 PG (ref 26.6–33)
MCHC RBC AUTO-ENTMCNC: 34.6 G/DL (ref 31.5–35.7)
MCV RBC AUTO: 85.1 FL (ref 79–97)
MONOCYTES # BLD AUTO: 0.52 10*3/MM3 (ref 0.1–0.9)
MONOCYTES NFR BLD AUTO: 8.7 % (ref 5–12)
NEUTROPHILS NFR BLD AUTO: 3.79 10*3/MM3 (ref 1.7–7)
NEUTROPHILS NFR BLD AUTO: 63 % (ref 42.7–76)
NRBC BLD AUTO-RTO: 0 /100 WBC (ref 0–0.2)
PLATELET # BLD AUTO: 172 10*3/MM3 (ref 140–450)
PMV BLD AUTO: 9.1 FL (ref 6–12)
POTASSIUM SERPL-SCNC: 4 MMOL/L (ref 3.5–4.7)
PROT SERPL-MCNC: 6.6 G/DL (ref 6.3–8)
RBC # BLD AUTO: 5.16 10*6/MM3 (ref 4.14–5.8)
SODIUM SERPL-SCNC: 136 MMOL/L (ref 134–145)
WBC # BLD AUTO: 6.01 10*3/MM3 (ref 3.4–10.8)

## 2021-01-20 PROCEDURE — 85025 COMPLETE CBC W/AUTO DIFF WBC: CPT

## 2021-01-20 PROCEDURE — 36415 COLL VENOUS BLD VENIPUNCTURE: CPT

## 2021-01-20 PROCEDURE — 80053 COMPREHEN METABOLIC PANEL: CPT

## 2021-01-20 PROCEDURE — 99214 OFFICE O/P EST MOD 30 MIN: CPT | Performed by: INTERNAL MEDICINE

## 2021-01-20 NOTE — PROGRESS NOTES
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     HISTORY OF PRESENT ILLNESS:    PATIENT WAS CALLED THE DAY BEFORE BY THE OFFICE TO ASK FOR SYMPTOMS THAT COULD BE CONSISTENT WITH CORONAVIRUS INFECTION, AND BEING NEGATIVE WAS SCHEDULED TO BE SEEN IN THE OFFICE TODAY. SIMILAR QUESTIONING TODAY INCLUDING, CHILLS, FEVER, NEW COUGH, SHORTNESS OF BREATH, DIARRHEA,DIFFUSE BODY ACHES  AND CHANGES IN SMELL OR TASTE WERE NEGATIVE.THE PATIENT DENIED ANY CONTACT WITH PERSONS WHO WERE POSITIVE FOR COVID, AND PATIENT IS NOT IN CATEGORY OF HIGH RISK BEHAVIOR TO ACQUIRE COVID.    DURING THE VISIT WITH THE PATIENT TODAY , PATIENT HAD FACE MASK, MY MEDICAL ASSISTANT AND I  HAD PROPPER PROTECTIVE EQUIPMENT, AND I DID HAND HYGIENE WITH SOAP AND WATER BEFORE AND AFTER THE VISIT.  This patient returns today to the office for follow up. He is here today stating that he has gained a lot of weight given the fact that he is eating all of his meals out of the house after the death of his wife. This is triggering reflux symptomatology and retrosternal discomfort. He had an EKG done yesterday that was normal according to him. He has not had any obvious angina to my eyes, no palpitations, no shortness of breath, no perspiration and no cough, no sputum production, no pleuritic pain, no hemoptysis. Constipation resolved after I advised him to use dates that corrected that problem altogether. Urination is fine with no frequency, no urgency, no hematuria. His PSA remains at 0.04. He has no other new issues at this time.             Past Medical History:   Diagnosis Date   • Actinic keratosis    • Arthritis    • Baker's cyst     BILATERAL POPLITEAL   • Benign prostatic hyperplasia     FOLLOWED BY DR. VIVIEN PATEL   • Biliary dyskinesia    • Bunion of right foot     GREAT TOE   • Bursitis of right hip    • Bursopathy    • CAD (coronary artery disease)    • Cataract     BILATERAL   •  CKD (chronic kidney disease)    • Closed right ankle fracture    • Colon polyps     FOLLOWED BY DR. NEHA HAM   • Constipation    • Coronary arteriosclerosis    • Degeneration of lumbar intervertebral disc    • Disease of thyroid gland     enlarged   • Diverticulitis    • Erectile dysfunction    • GERD (gastroesophageal reflux disease)    • Hallux valgus of left foot    • Hyperlipidemia     MIXED   • Hypertension    • Kidney stone 02/21/2009    SEEN AT [facility] ER   • Lactic acidosis 01/19/2019    ADMITTED TO [facility]   • Leukemia (CMS/HCC)     CLL, FOLLOWED BY DR. KYA WALKER   • Localized, primary osteoarthritis    • Lumbar radiculopathy    • Lumbar stenosis    • Lung mass     LEFT SIDE   • Polyneuropathy    • Primary erectile dysfunction    • Prostate CA (CMS/HCC) 03/2016    JACQUELYN 6, S/P XRT, FOLLOWED BY DR. VIVIEN PATEL   • PVC (premature ventricular contraction)    • RBBB    • Sensory hearing loss, bilateral    • Skin cancer     SQUAMOUS CELL CARCINOMA OF FACE   • Substernal goiter    • Substernal thyroid goiter    • Thyromegaly 12/2016    ADMITTED TO [facility]   • Vitamin D deficiency    • Vitamin D deficiency      Past Surgical History:   Procedure Laterality Date   • BRONCHOSCOPY N/A 12/14/2016    Procedure: BRONCHOSCOPY WITH  BAL AND BIOPSY AND ENDOBRONCHIAL ULTRASOUND  AND NAVIGATION WITH BRUSHINGS AND BIOPSY AND BAL;  Surgeon: Pierre Manning MD;  Location: [facility] ENDOSCOPY;  Service:    • COLONOSCOPY N/A 3/13/2019    5 MM SESSILE TUBULAR ADENOMA POLYP IN TRANSVERSE, MULTIPLE SMALL AND LARGE DIVERTICULA IN SIGMOID, RESCOPE IN 5 YRS, DR. NEHA HAM AT [facility]   • COLONOSCOPY W/ POLYPECTOMY N/A 03/19/2015    3 TUBULAR ADENOMA POLYPS, 12 TUBULOVILLOUS POLYP RECTO-SIGMOID, DIVERTICULOSIS, RESCOPE IN 3 YRS, DR. NEHA HAM AT [facility]   • EYE SURGERY Bilateral     CATARACT EXTRACTION WITH LENS IMPLANT, DR. GOVEA   • PROSTATE RADIOACTIVE SEED IMPLANT N/A 09/06/2016    DR. HOA JARAMILLO AT [facility]   •  PROSTATE SURGERY N/A 03/11/2016    BIOPSY: ADENOCARCINOMA, DR. ZAID IBARRA   • THYROID BIOPSY Bilateral 11/01/2017    NO B CELL OR T CELL LYMPHOMA, DONE AT [facility]     Social History     Socioeconomic History   • Marital status:      Spouse name: No   • Number of children: Not on file   • Years of education: College   • Highest education level: Not on file   Occupational History   • Occupation: Business Owner     Employer: [employer]   Tobacco Use   • Smoking status: Never Smoker   • Smokeless tobacco: Never Used   • Tobacco comment: no caffiene   Substance and Sexual Activity   • Alcohol use: No   • Drug use: No   • Sexual activity: Yes     Partners: Female     Birth control/protection: None   Social History Narrative    LIVES ALONE     Family History   Problem Relation Age of Onset   • Heart disease Father         Rheumatic heart disease   • Hypertension Father    • Stroke Mother    • No Known Problems Daughter      Current Outpatient Medications on File Prior to Visit   Medication Sig   • alfuzosin (UROXATRAL) 10 MG 24 hr tablet Take 2 tablets by mouth 2 (two) times a day.   • Cholecalciferol (VITAMIN D3) 28536 UNITS capsule Take 50,000 Units by mouth Daily.   • Cyanocobalamin ([employer] VITAMIN B-12 PO) Take 5,000 mcg by mouth Daily.   • Fluzone High-Dose Quadrivalent 0.7 ML suspension prefilled syringe ADM 0.7ML IM UTD   • lactulose 20 GM/30ML solution solution Take 15 g by mouth 3 (Three) Times a Day.   • losartan-hydrochlorothiazide (HYZAAR) 100-25 MG per tablet Take 1 tablet by mouth Daily.   • MAGNESIUM PO Take 500 mg by mouth Every Night.   • Melatonin 10 MG tablet Take 10 mg by mouth Every Night.   • Methylcellulose, Laxative, (CITRUCEL PO) Take  by mouth.   • potassium chloride (K-DUR,KLOR-CON) 20 MEQ CR tablet Take 1 tablet by mouth Daily.   • pravastatin (PRAVACHOL) 10 MG tablet Take 5 mg by mouth Daily.   • Red Yeast Rice Extract (RED YEAST RICE PO) Take 1,200 mg by mouth Daily.     No current  "facility-administered medications on file prior to visit.    No Known Allergies                                          VITAL SIGNS:    Vitals:    21 1219   BP: 150/78   Pulse: 66   Resp: 19   Temp: 98 °F (36.7 °C)   TempSrc: Temporal   SpO2: 97%   Weight: 108 kg (238 lb 12.8 oz)   Height: 194 cm (76.38\")           PAIN:  0 out of 10      ECO         PHYSICAL EXAMINATION:  I HAVE PERSONALLY REVIEWED THE HISTORY OF THE PRESENT ILLNESS, PAST MEDICAL HISTORY, FAMILY HISTORY, SOCIAL HISTORY, ALLERGIES, MEDICATIONS STATED ABOVE IN THE OFFICE NOTE FROM TODAY.        GENERAL:  Well-developed, well-nourished  Patient  in no acute distress.   SKIN:  Warm, dry ,NO rashes,NO purpura ,NO petechiae.  HEENT:  Pupils were equal and reactive to light and accomodation, conjunctivae noninjected, no pterigion, normal extraocular movements, normal visual acuity.   NECK:  Supple with good range of motion; no thyromegaly or masses, no JVD or bruits, no cervical adenopathies.No carotid artery pain, no carotid abnormal pulsation , NO arterial dance.  LYMPHATICS:  No cervical, NO supraclavicular, NO axillary,NO epitrochlear , NO inguinal adenopathy.  CARDIAC   normal rate and regular rhythm, with G 2/6 AORTIC murmur,NO rubs NO S3 NO S4 right or left . Normal femoral, popliteal, pedis, brachial and carotid pulses.  LUNGS WERE CLEAR NORMAL BS BILATERALLY NO RUBS  VASCULAR ARTERIAL: normal carotids,brachial,radial,femoral,popliteal, pedis pulses , no bruits.no paleness or cyanosis, no pain, no edema, no numbness, no gangrene.  VASCULAR VENOUS: no cyanosis, collateral circulation, varicosities, edema, palpable cords, pain, erythema.  ABDOMEN:  Soft, nontender with no hepatomegaly, no splenomegaly,no masses, no ascites, no collateral circulation,no distention,no Eddie sign, no abdominal pain, small inguinal hernias,no umbilical hernia, no abdominal bruits. Normal bowel sounds.  GENITAL: Not  Performed.  EXTREMITIES  AND SPINE:  No " clubbing, cyanosis or edema, no deformities or pain .No kyphosis, scoliosis, deformities or pain in spine, ribs or pelvic bone.  NEUROLOGICAL:  Patient was awake, alert, oriented to time, person and place.                    LABORATORY DATA:Auto Differential  Order: [id number] - Part of Panel Order [id number]  Status:  Final result   Visible to patient:  No (not released)   Dx:  Chronic lymphocytic leukemia (CMS/[facility])  Specimen Information: Blood        Component   Ref Range & Units 12:04   WBC   3.40 - 10.80 10*3/mm3 6.01    RBC   4.14 - 5.80 10*6/mm3 5.16    Hemoglobin   13.0 - 17.7 g/dL 15.2    Hematocrit   37.5 - 51.0 % 43.9    MCV   79.0 - 97.0 fL 85.1    MCH   26.6 - 33.0 pg 29.5    MCHC   31.5 - 35.7 g/dL 34.6    RDW   12.3 - 15.4 % 12.7    RDW-SD   37.0 - 54.0 fl 39.3    MPV   6.0 - 12.0 fL 9.1    Platelets   140 - 450 10*3/mm3 172    Neutrophil %   42.7 - 76.0 % 63.0    Lymphocyte %   19.6 - 45.3 % 24.8    Monocyte %   5.0 - 12.0 % 8.7    Eosinophil %   0.3 - 6.2 % 2.2    Basophil %   0.0 - 1.5 % 1.0    Immature Grans %   0.0 - 0.5 % 0.3                     ASSESSMENT: 1.  Patient has history of chronic lymphoid leukemia that required therapy with Rituxan in 2016. Before that he was treated with bendamustine and Rituxan with no difficulties. In 2016 when the patient was treated with Rituxan as a single agent he had a very severe reaction during the 1st infusion and the infusion needed to be completed in the hospital. The 2nd infusion was completed in the office. Thereafter he did not get the 3rd or 4th infusions. Since then the patient has remained in remission. Obviously all of the stressors of the pandemia have been on him. We documented in May that his white count was rising, his lymphocyte count was rising, he was having fatigue and weight loss and we advised him to proceed with Rituxan. To avoid the reaction that he had during the previous Rituxan infusion in 2016 we advised the patient to be  premedicated. He has not had any reaction whatsoever with the 1st, 2nd and 3rd infusion but he feels so well that he does not want to pursue the 4th and last infusion .  Since the previus visit the patient has been feeling fine from the point of view of his chronic lymphoid leukemia. His appetite is excellent, his weight is stable. His wife  and obviously he feels lonely. He has a lot of friends and he is sharing some time with them obviously having precautions in regard to coronavirus infection. He hs developed an abdominal wall hernia, he has been seen by Nixon Kolb MD, who has not recommended any intervention.  2.The patient has developed some retrosternal pain, in my opinion is reflux like symptomatology more than anything else. He had an EKG done yesterday that according to him was normal. I have suggested obviously eat less. He is drinking a lot of tea, a lot of sugar, a lot of chocolate and a lot of food and I advised him to not go to bed with a full stomach. I advised him that on trips to the window to  food to have things that are more salad oriented and vegetable oriented more than bread and meats and fried material.    I recommended for him to take Prilosec 20 mg oral daily that he can buy over the counter.     In regard to his chronic idiopathic constipation the patient was advised to use some dates in his diet. This corrected the problem altogether and he feels dramatically satisfied about that.     In regard to his prostate cancer his PSA is 0.04. he follows up with his urologist but he wants me to monitor this from time to time as well.    I will review him back in 3 months with a CBC and a CMP.     I asked him to decrease weight and do more physical activity if possible.     I advised him to get COVID vaccination and he agrees with this completely.     THIS PATIENT HAS MULTIPLE CHRONIC ILLNESSES WITH EXACERBATION, PROGRESSION OR SIDE EFFECT OF THE TREATMENT THAT DOES NOT REQUIRE  HOSPITALIZATION, AND REQUIRED ORDERING MULTIPLE LABORATORY TESTING AND INTERPRETATION,REQUIRING MEDICATION PRESCRIPTION AND DIET CHANGE.              I DISCUSSED WITH PATIENT IN DETAIL FORMS TO DECREASE CHANCES OF CORONAVIRUS INFECTION INCLUDING ISOLATION, PROPER HAND HIGIENE, AVOID PUBLIC PLACES  WITH CROWDS, FOLLOW  CDC RECOMENDATIONS, AND KEEP PERSONAL AND SOCIAL RESPONSIBILITY, WARE A MASK IN PUBLIC PLACES.  PATIENT IS AWARE THIS INFECTION COULD HAVE SEVERE CONSEQUENCES TO PERSONAL HEALTH AND FAMILY RAMIFICATIONS OF THIS.                                                                               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     HISTORY OF PRESENT ILLNESS:  PATIENT WAS CALLED THE DAY BEFORE BY THE OFFICE TO ASK FOR SYMPTOMS THAT COULD BE CONSISTENT WITH CORONAVIRUS INFECTION, AND BEING NEGATIVE WAS SCHEDULED TO BE SEEN IN THE OFFICE TODAY. SIMILAR QUESTIONING TODAY INCLUDING, CHILLS, FEVER, NEW COUGH, SHORTNESS OF BREATH, DIARRHEA,DIFFUSE BODY ACHES  AND CHANGES IN SMELL OR TASTE WERE NEGATIVE.THE PATIENT DENIED ANY CONTACT WITH PERSONS WHO WERE POSITIVE FOR COVID, AND PATIENT IS NOT IN CATEGORY OF HIGH RISK BEHAVIOR TO ACQUIRE COVID.    DURING THE VISIT WITH THE PATIENT TODAY , PATIENT HAD FACE MASK, MY MEDICAL ASSISTANT AND I  HAD PROPPER PROTECTIVE EQUIPMENT, AND I DID HAND HYGIENE WITH SOAP AND WATER BEFORE AND AFTER THE VISIT.  This patient returns today to the office for follow up. He has called the office requesting the help to be referred to general surgery because he has developed a hernia in his abdominal wall. He was seen by Dr. Kolb, it was suggested to have more laxative and more fiber in his diet but no need for intervention was necessary at that time. The patient is now using prunes and some laxative with some benefit. He is not having any abdominal pain. No abdominal distention. Appetite is terrific, his weight is stable. He  has no passage of blood in the stool. Urination with some frequency, no hematuria. He has no fever, no chills, no sweats, no pruritus, no adenopathies related to his chronic lymphoid leukemia. He remains busy in his work. He has not had any fever, infections or autoimmunity.             Past Medical History:   Diagnosis Date   • Actinic keratosis    • Arthritis    • Baker's cyst     BILATERAL POPLITEAL   • Benign prostatic hyperplasia     FOLLOWED BY DR. VIVIEN PATEL   • Biliary dyskinesia    • Bunion of right foot     GREAT TOE   • Bursitis of right hip    • Bursopathy    • CAD (coronary artery disease)    • Cataract     BILATERAL   • CKD (chronic kidney disease)    • Closed right ankle fracture    • Colon polyps     FOLLOWED BY DR. NEHA HAM   • Constipation    • Coronary arteriosclerosis    • Degeneration of lumbar intervertebral disc    • Disease of thyroid gland     enlarged   • Diverticulitis    • Erectile dysfunction    • GERD (gastroesophageal reflux disease)    • Hallux valgus of left foot    • Hyperlipidemia     MIXED   • Hypertension    • Kidney stone 02/21/2009    SEEN AT [facility] ER   • Lactic acidosis 01/19/2019    ADMITTED TO [facility]   • Leukemia (CMS/[facility])     CLL, FOLLOWED BY DR. KYA WALKER   • Localized, primary osteoarthritis    • Lumbar radiculopathy    • Lumbar stenosis    • Lung mass     LEFT SIDE   • Polyneuropathy    • Primary erectile dysfunction    • Prostate CA (CMS/[facility]) 03/2016    JACQUELYN 6, S/P XRT, FOLLOWED BY DR. VIVIEN PATEL   • PVC (premature ventricular contraction)    • RBBB    • Sensory hearing loss, bilateral    • Skin cancer     SQUAMOUS CELL CARCINOMA OF FACE   • Substernal goiter    • Substernal thyroid goiter    • Thyromegaly 12/2016    ADMITTED TO [facility]   • Vitamin D deficiency    • Vitamin D deficiency      Social History     Socioeconomic History   • Marital status:      Spouse name: No   • Number of children: Not on file   • Years of education: College   •  Highest education level: Not on file   Occupational History   • Occupation: Business Owner     Employer: [employer]   Tobacco Use   • Smoking status: Never Smoker   • Smokeless tobacco: Never Used   • Tobacco comment: no caffiene   Substance and Sexual Activity   • Alcohol use: No   • Drug use: No   • Sexual activity: Yes     Partners: Female     Birth control/protection: None   Social History Narrative    LIVES ALONE     Family History   Problem Relation Age of Onset   • Heart disease Father         Rheumatic heart disease   • Hypertension Father    • Stroke Mother    • No Known Problems Daughter                                REVIEW OF SYSTEMS:      General: no fever, no chills, no fatigue,no weight changes, no lack of appetite.  Eyes: no epiphora, xerophthalmia,conjunctivitis, pain, glaucoma, blurred vision, blindness, secretion, photophobia, proptosis, diplopia.  Ears: no otorrhea, tinnitus, otorrhagia, deafness, pain, vertigo.  Nose: no rhinorrhea, no epistaxis, no alteration in perception of odors, no sinuses pressure.  Mouth: no alteration in gums or teeth,  No ulcers, no difficulty with mastication or deglut ion, no odynophagia.  Neck: no masses or pain, no thyroid alterations, no pain in muscles or arteries, no carotid odynia, no crepitation.  Respiratory: no cough, no sputum production,no dyspnea,no trepopnea, no pleuritic pain,no hemoptysis.  Heart: no syncope, no irregularity, no palpitations, no angina,no orthopnea,no paroxysmal nocturnal dyspnea.  Vascular Venous: no tenderness,no edema,no palpable cords,no postphlebitic syndrome, no skin changes no ulcerations.  Vascular Arterial: no distal ischemia, noclaudication, no gangrene, no neuropathic ischemic pain, no skin ulcers, no paleness no cyanosis.  GI: no dysphagia, no odynophagia, no regurgitation, no heartburn,no indigestion,no nausea,no vomiting,no hematemesis ,no melena,no jaundice,no distention, no obstipation,no enterorrhagia,no proctalgia,no  "anal  lesions, no changes in bowel habits.  : no frequency, no hesitancy, no hematuria, no discharge,no  pain.  Musculoskeletal: no muscle or tendon pain or inflammation,no  joint pain, no edema, no functional limitation,no fasciculations, no mass.  Neurologic: no headache, no seizures, noalterations on Craneal nerves, no motor deficit, no sensory deficit, normal coordination, no alteration in memory,normal orientation, calculation,normal writting, verbal and written language.  Skin: no rashes,no pruritus no localized lesions.  Psychiatric: no anxiety, no depression,no agitation, no delusions, proper insight.                  VITAL SIGNS:    Vitals:    21 1219   BP: 150/78   Pulse: 66   Resp: 19   Temp: 98 °F (36.7 °C)   TempSrc: Temporal   SpO2: 97%   Weight: 108 kg (238 lb 12.8 oz)   Height: 194 cm (76.38\")           PAIN:  0 out of 10      ECO         PHYSICAL EXAMINATION:  I HAVE PERSONALLY REVIEWED THE HISTORY OF THE PRESENT ILLNESS, PAST MEDICAL HISTORY, FAMILY HISTORY, SOCIAL HISTORY, ALLERGIES, MEDICATIONS STATED ABOVE IN THE OFFICE NOTE FROM TODAY.        GENERAL:  Well-developed, well-nourished  Patient  in no acute distress.   SKIN:  Warm, dry ,NO rashes,NO purpura ,NO petechiae.  HEENT:  Pupils were equal and reactive to light and accomodation, conjunctivae noninjected, no pterigion, normal extraocular movements, normal visual acuity.   NECK:  Supple with good range of motion; no thyromegaly or masses, no JVD or bruits, no cervical adenopathies.No carotid artery pain, no carotid abnormal pulsation , NO arterial dance.  LYMPHATICS:  No cervical, NO supraclavicular, NO axillary,NO epitrochlear , NO inguinal adenopathy.  CARDIAC   normal rate and regular rhythm, without murmur,NO rubs NO S3 NO S4 right or left . Normal femoral, popliteal, pedis, brachial and carotid pulses.  VASCULAR ARTERIAL: normal carotids,brachial,radial,femoral,popliteal, pedis pulses , no bruits.no paleness or cyanosis, no " pain, no edema, no numbness, no gangrene.  VASCULAR VENOUS: no cyanosis, collateral circulation, varicosities, edema, palpable cords, pain, erythema.  ABDOMEN:  Soft, nontender with no hepatomegaly, no splenomegaly,no masses, no ascites, no collateral circulation,no distention,no Eddie sign, no abdominal pain, small inguinal hernias,no umbilical hernia, no abdominal bruits. Normal bowel sounds.  GENITAL: Not  Performed.  EXTREMITIES  AND SPINE:  No clubbing, cyanosis or edema, no deformities or pain .No kyphosis, scoliosis, deformities or pain in spine, ribs or pelvic bone.  NEUROLOGICAL:  Patient was awake, alert, oriented to time, person and place.                    LABORATORY DATA:CBC Auto Differential  Order: [id number] - Part of Panel Order [id number]  Status:  Final result   Visible to patient:  No (not released)   Dx:  Chronic lymphocytic leukemia (CMS/[facility])  Specimen Information: Blood        Component   Ref Range & Units 11:03   WBC   3.40 - 10.80 10*3/mm3 6.65    RBC   4.14 - 5.80 10*6/mm3 4.81    Hemoglobin   13.0 - 17.7 g/dL 14.4    Hematocrit   37.5 - 51.0 % 40.1    MCV   79.0 - 97.0 fL 83.4    MCH   26.6 - 33.0 pg 29.9    MCHC   31.5 - 35.7 g/dL 35.9High     RDW   12.3 - 15.4 % 13.0    RDW-SD   37.0 - 54.0 fl 39.1    MPV   6.0 - 12.0 fL 9.3    Platelets   140 - 450 10*3/mm3 163    Neutrophil %   42.7 - 76.0 % 59.6    Lymphocyte %   19.6 - 45.3 % 24.4    Monocyte %   5.0 - 12.0 % 8.6    Eosinophil %   0.3 - 6.2 % 5.7    Basophil %   0.0 - 1.5 % 1.2    Immature Grans %   0.0 - 0.5 % 0.5    Neutrophils, Absolute   1.70 - 7.00 10*3/mm3 3.97               ASSESSMENT: 1.  Patient has history of chronic lymphoid leukemia that required therapy with Rituxan in 2016. Before that he was treated with bendamustine and Rituxan with no difficulties. In 2016 when the patient was treated with Rituxan as a single agent he had a very severe reaction during the 1st infusion and the infusion needed to be completed in the  hospital. The 2nd infusion was completed in the office. Thereafter he did not get the 3rd or 4th infusions. Since then the patient has remained in remission. Obviously all of the stressors of the pandemia have been on him. We documented in May that his white count was rising, his lymphocyte count was rising, he was having fatigue and weight loss and we advised him to proceed with Rituxan. To avoid the reaction that he had during the previous Rituxan infusion in  we advised the patient to be premedicated. He has not had any reaction whatsoever with the 1st, 2nd and 3rd infusion but he feels so well that he does not want to pursue the 4th and last infusion .  Since the previus visit the patient has been feeling fine from the point of view of his chronic lymphoid leukemia. His appetite is excellent, his weight is stable. His wife  and obviously he feels lonely. He has a lot of friends and he is sharing some time with them obviously having precautions in regard to coronavirus infection. He hs developed an abdominal wall hernia, he has been seen by Nixon Kolb MD, who has not recommended any intervention. The patient has been advised to take more laxatives. I also advised him to consider using dates that are very efficient in regard to bowel activity. Otherwise his white count, hemoglobin, platelets and white count differential are normal, his total lymphocyte count is normal and the patient has no evidence of leukemia activity.     I will review him back in 3 months with a CBC and a CMP.                   I DISCUSSED WITH PATIENT IN DETAIL FORMS TO DECREASE CHANCES OF CORONAVIRUS INFECTION INCLUDING ISOLATION, PROPER HAND HIGIENE, AVOID PUBLIC PLACES  WITH CROWDS, FOLLOW  CDC RECOMENDATIONS, AND KEEP PERSONAL AND SOCIAL RESPONSIBILITY, WARE A MASK IN PUBLIC PLACES.  PATIENT IS AWARE THIS INFECTION COULD HAVE SEVERE CONSEQUENCES TO PERSONAL HEALTH AND FAMILY RAMIFICATIONS OF THIS.

## 2021-03-09 DIAGNOSIS — Z23 IMMUNIZATION DUE: ICD-10-CM

## 2021-03-26 ENCOUNTER — TELEPHONE (OUTPATIENT)
Dept: ONCOLOGY | Facility: CLINIC | Age: 76
End: 2021-03-26

## 2021-03-26 NOTE — TELEPHONE ENCOUNTER
Caller: PT    Relationship to patient: PT    Best call back number: [phone]    Chief complaint: PT LEAVING [place]OWN 4/12 AND RETURNING 4/21    Type of visit: FOLLOW UP    Requested date: NA    If rescheduling, when is the original appointment:4/12

## 2021-04-22 ENCOUNTER — LAB (OUTPATIENT)
Dept: LAB | Facility: HOSPITAL | Age: 76
End: 2021-04-22

## 2021-04-22 ENCOUNTER — OFFICE VISIT (OUTPATIENT)
Dept: ONCOLOGY | Facility: CLINIC | Age: 76
End: 2021-04-22

## 2021-04-22 ENCOUNTER — TELEPHONE (OUTPATIENT)
Dept: ONCOLOGY | Facility: CLINIC | Age: 76
End: 2021-04-22

## 2021-04-22 VITALS
BODY MASS INDEX: 29.36 KG/M2 | HEART RATE: 92 BPM | SYSTOLIC BLOOD PRESSURE: 117 MMHG | RESPIRATION RATE: 20 BRPM | HEIGHT: 76 IN | OXYGEN SATURATION: 96 % | TEMPERATURE: 97.5 F | WEIGHT: 241.1 LBS | DIASTOLIC BLOOD PRESSURE: 66 MMHG

## 2021-04-22 DIAGNOSIS — C91.10 CHRONIC LYMPHOCYTIC LEUKEMIA (HCC): Primary | ICD-10-CM

## 2021-04-22 DIAGNOSIS — K59.04 CHRONIC IDIOPATHIC CONSTIPATION: ICD-10-CM

## 2021-04-22 DIAGNOSIS — C91.10 CHRONIC LYMPHOCYTIC LEUKEMIA (HCC): ICD-10-CM

## 2021-04-22 LAB
ALBUMIN SERPL-MCNC: 4 G/DL (ref 3.5–5.2)
ALBUMIN/GLOB SERPL: 1.5 G/DL (ref 1.1–2.4)
ALP SERPL-CCNC: 61 U/L (ref 38–116)
ALT SERPL W P-5'-P-CCNC: 10 U/L (ref 0–41)
ANION GAP SERPL CALCULATED.3IONS-SCNC: 10.2 MMOL/L (ref 5–15)
AST SERPL-CCNC: 13 U/L (ref 0–40)
BASOPHILS # BLD AUTO: 0.08 10*3/MM3 (ref 0–0.2)
BASOPHILS NFR BLD AUTO: 1.2 % (ref 0–1.5)
BILIRUB SERPL-MCNC: 0.6 MG/DL (ref 0.2–1.2)
BUN SERPL-MCNC: 14 MG/DL (ref 6–20)
BUN/CREAT SERPL: 15.7 (ref 7.3–30)
CALCIUM SPEC-SCNC: 9.3 MG/DL (ref 8.5–10.2)
CHLORIDE SERPL-SCNC: 99 MMOL/L (ref 98–107)
CO2 SERPL-SCNC: 25.8 MMOL/L (ref 22–29)
CREAT SERPL-MCNC: 0.89 MG/DL (ref 0.7–1.3)
DEPRECATED RDW RBC AUTO: 39.7 FL (ref 37–54)
EOSINOPHIL # BLD AUTO: 0.15 10*3/MM3 (ref 0–0.4)
EOSINOPHIL NFR BLD AUTO: 2.2 % (ref 0.3–6.2)
ERYTHROCYTE [DISTWIDTH] IN BLOOD BY AUTOMATED COUNT: 12.8 % (ref 12.3–15.4)
GFR SERPL CREATININE-BSD FRML MDRD: 83 ML/MIN/1.73
GLOBULIN UR ELPH-MCNC: 2.6 GM/DL (ref 1.8–3.5)
GLUCOSE SERPL-MCNC: 108 MG/DL (ref 74–124)
HCT VFR BLD AUTO: 43.6 % (ref 37.5–51)
HGB BLD-MCNC: 15 G/DL (ref 13–17.7)
IMM GRANULOCYTES # BLD AUTO: 0.02 10*3/MM3 (ref 0–0.05)
IMM GRANULOCYTES NFR BLD AUTO: 0.3 % (ref 0–0.5)
LYMPHOCYTES # BLD AUTO: 1.75 10*3/MM3 (ref 0.7–3.1)
LYMPHOCYTES NFR BLD AUTO: 26.2 % (ref 19.6–45.3)
MCH RBC QN AUTO: 29.2 PG (ref 26.6–33)
MCHC RBC AUTO-ENTMCNC: 34.4 G/DL (ref 31.5–35.7)
MCV RBC AUTO: 84.8 FL (ref 79–97)
MONOCYTES # BLD AUTO: 0.62 10*3/MM3 (ref 0.1–0.9)
MONOCYTES NFR BLD AUTO: 9.3 % (ref 5–12)
NEUTROPHILS NFR BLD AUTO: 4.06 10*3/MM3 (ref 1.7–7)
NEUTROPHILS NFR BLD AUTO: 60.8 % (ref 42.7–76)
NRBC BLD AUTO-RTO: 0 /100 WBC (ref 0–0.2)
PLATELET # BLD AUTO: 182 10*3/MM3 (ref 140–450)
PMV BLD AUTO: 9.2 FL (ref 6–12)
POTASSIUM SERPL-SCNC: 3.8 MMOL/L (ref 3.5–4.7)
PROT SERPL-MCNC: 6.6 G/DL (ref 6.3–8)
RBC # BLD AUTO: 5.14 10*6/MM3 (ref 4.14–5.8)
SODIUM SERPL-SCNC: 135 MMOL/L (ref 134–145)
WBC # BLD AUTO: 6.68 10*3/MM3 (ref 3.4–10.8)

## 2021-04-22 PROCEDURE — 85025 COMPLETE CBC W/AUTO DIFF WBC: CPT

## 2021-04-22 PROCEDURE — 36415 COLL VENOUS BLD VENIPUNCTURE: CPT

## 2021-04-22 PROCEDURE — 80053 COMPREHEN METABOLIC PANEL: CPT

## 2021-04-22 PROCEDURE — 99213 OFFICE O/P EST LOW 20 MIN: CPT | Performed by: INTERNAL MEDICINE

## 2021-04-22 NOTE — PROGRESS NOTES
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     HISTORY OF PRESENT ILLNESS:        DURING THE VISIT WITH THE PATIENT TODAY , PATIENT HAD FACE MASK, MY MEDICAL ASSISTANT AND I  HAD PROPPER PROTECTIVE EQUIPMENT, AND I DID HAND HYGIENE WITH SOAP AND WATER BEFORE AND AFTER THE VISIT.    This patient returns today to the office for followup. He continues gaining weight because he has had a lot of stress situations and he is feeling better in this regard. He has had COVID vaccination twice, no difficulties with this. He has a terrific appetite. No fevers, chills or infection. His bowel function is still with constipation and he is requesting a referral for colorectal surgery colonoscopy. Urination is normal. His primary physician continues checking on his PSA given his previous history of prostate cancer.     He has not had any issues pertinent to his chronic lymphoid leukemia with no fatigue, chills, fever, adenopathy, autoimmunity or infections.            Past Medical History:   Diagnosis Date   • Actinic keratosis    • Arthritis    • Baker's cyst     BILATERAL POPLITEAL   • Benign prostatic hyperplasia     FOLLOWED BY DR. VIVIEN PATEL   • Biliary dyskinesia    • Bunion of right foot     GREAT TOE   • Bursitis of right hip    • Bursopathy    • CAD (coronary artery disease)    • Cataract     BILATERAL   • CKD (chronic kidney disease)    • Closed right ankle fracture    • Colon polyps     FOLLOWED BY DR. NEHA HAM   • Constipation    • Coronary arteriosclerosis    • Degeneration of lumbar intervertebral disc    • Disease of thyroid gland     enlarged   • Diverticulitis    • Erectile dysfunction    • GERD (gastroesophageal reflux disease)    • Hallux valgus of left foot    • Hyperlipidemia     MIXED   • Hypertension    • Kidney stone 02/21/2009    SEEN AT [facility] ER   • Lactic acidosis 01/19/2019    ADMITTED TO [facility]   • Leukemia (CMS/[facility])     CLL,  FOLLOWED BY DR. KYA WALKER   • Localized, primary osteoarthritis    • Lumbar radiculopathy    • Lumbar stenosis    • Lung mass     LEFT SIDE   • Polyneuropathy    • Primary erectile dysfunction    • Prostate CA (CMS/HCC) 03/2016    JACQUELYN 6, S/P XRT, FOLLOWED BY DR. VIVIEN PATEL   • PVC (premature ventricular contraction)    • RBBB    • Sensory hearing loss, bilateral    • Skin cancer     SQUAMOUS CELL CARCINOMA OF FACE   • Substernal goiter    • Substernal thyroid goiter    • Thyromegaly 12/2016    ADMITTED TO [facility]   • Vitamin D deficiency    • Vitamin D deficiency      Past Surgical History:   Procedure Laterality Date   • BRONCHOSCOPY N/A 12/14/2016    Procedure: BRONCHOSCOPY WITH  BAL AND BIOPSY AND ENDOBRONCHIAL ULTRASOUND  AND NAVIGATION WITH BRUSHINGS AND BIOPSY AND BAL;  Surgeon: Pirere Manning MD;  Location: [facility] ENDOSCOPY;  Service:    • COLONOSCOPY N/A 3/13/2019    5 MM SESSILE TUBULAR ADENOMA POLYP IN TRANSVERSE, MULTIPLE SMALL AND LARGE DIVERTICULA IN SIGMOID, RESCOPE IN 5 YRS, DR. NEHA HAM AT [facility]   • COLONOSCOPY W/ POLYPECTOMY N/A 03/19/2015    3 TUBULAR ADENOMA POLYPS, 12 TUBULOVILLOUS POLYP RECTO-SIGMOID, DIVERTICULOSIS, RESCOPE IN 3 YRS, DR. NEHA HAM AT [facility]   • EYE SURGERY Bilateral     CATARACT EXTRACTION WITH LENS IMPLANT, DR. GOVEA   • PROSTATE RADIOACTIVE SEED IMPLANT N/A 09/06/2016    DR. HOA JARAMILLO AT [facility]   • PROSTATE SURGERY N/A 03/11/2016    BIOPSY: ADENOCARCINOMA, DR. ZAID IBARRA   • THYROID BIOPSY Bilateral 11/01/2017    NO B CELL OR T CELL LYMPHOMA, DONE AT [facility]     Social History     Socioeconomic History   • Marital status:      Spouse name: No   • Number of children: Not on file   • Years of education: College   • Highest education level: Not on file   Tobacco Use   • Smoking status: Never Smoker   • Smokeless tobacco: Never Used   • Tobacco comment: no caffiene   Substance and Sexual Activity   • Alcohol use: No   • Drug use: No   •  "Sexual activity: Yes     Partners: Female     Birth control/protection: None     Family History   Problem Relation Age of Onset   • Heart disease Father         Rheumatic heart disease   • Hypertension Father    • Stroke Mother    • No Known Problems Daughter      Current Outpatient Medications on File Prior to Visit   Medication Sig   • alfuzosin (UROXATRAL) 10 MG 24 hr tablet Take 2 tablets by mouth 2 (two) times a day.   • Cholecalciferol (VITAMIN D3) 84846 UNITS capsule Take 50,000 Units by mouth Daily.   • Cyanocobalamin ([employer] VITAMIN B-12 PO) Take 5,000 mcg by mouth Daily.   • Fluzone High-Dose Quadrivalent 0.7 ML suspension prefilled syringe ADM 0.7ML IM UTD   • lactulose 20 GM/30ML solution solution Take 15 g by mouth 3 (Three) Times a Day.   • losartan-hydrochlorothiazide (HYZAAR) 100-25 MG per tablet Take 1 tablet by mouth Daily.   • MAGNESIUM PO Take 500 mg by mouth Every Night.   • Melatonin 10 MG tablet Take 10 mg by mouth Every Night.   • Methylcellulose, Laxative, (CITRUCEL PO) Take  by mouth.   • potassium chloride (K-DUR,KLOR-CON) 20 MEQ CR tablet Take 1 tablet by mouth Daily.   • pravastatin (PRAVACHOL) 10 MG tablet Take 5 mg by mouth Daily.   • Red Yeast Rice Extract (RED YEAST RICE PO) Take 1,200 mg by mouth Daily.     No current facility-administered medications on file prior to visit.   No Known Allergies              VITAL SIGNS:    Vitals:    21 1552   BP: 117/66   Pulse: 92   Resp: 20   Temp: 97.5 °F (36.4 °C)   TempSrc: Temporal   SpO2: 96%   Weight: 109 kg (241 lb 1.6 oz)   Height: 194 cm (76.38\")           PAIN:  0 out of 10      ECO         PHYSICAL EXAMINATION:   I HAVE PERSONALLY REVIEWED THE HISTORY OF THE PRESENT ILLNESS, PAST MEDICAL HISTORY, FAMILY HISTORY, SOCIAL HISTORY, ALLERGIES, MEDICATIONS STATED ABOVE IN THE OFFICE NOTE FROM TODAY.        GENERAL:  Well-developed, well-nourished  Patient  in no acute distress.   SKIN:  Warm, dry ,NO rashes,NO purpura ,NO " petechiae.  HEENT:  Pupils were equal and reactive to light and accomodation, conjunctivae noninjected, no pterygium, normal extraocular movements, normal visual acuity.   NECK:  Supple with good range of motion; no thyromegaly or masses, no JVD or bruits, no cervical adenopathies.No carotid artery pain, no carotid abnormal pulsation , NO arterial dance.  LYMPHATICS:  No cervical, NO supraclavicular, NO axillary,NO epitrochlear , NO inguinal adenopathy.  CARDIAC   normal rate and regular rhythm, without murmur,NO rubs NO S3 NO S4 right or left . Normal femoral, popliteal, pedis, brachial and carotid pulses.  VASCULAR ARTERIAL: normal carotids,brachial,radial,femoral,popliteal, pedis pulses , no bruits.no paleness or cyanosis, no pain, no edema, no numbness, no gangrene.  VASCULAR VENOUS: no cyanosis, collateral circulation, varicosities, edema, palpable cords, pain, erythema.  ABDOMEN:  Soft, nontender with no hepatomegaly, no splenomegaly,no masses, no ascites, no collateral circulation,no distention,no Eddie sign, no abdominal pain, no inguinal hernias,no umbilical hernia, no abdominal bruits. Normal bowel sounds.  GENITAL: Not  Performed.  EXTREMITIES  AND SPINE:  No clubbing, cyanosis or edema, no deformities or pain .No kyphosis, scoliosis, deformities or pain in spine, ribs or pelvic bone.  NEUROLOGICAL:  Patient was awake, alert, oriented to time, person and place.                    LABORATORY DATA:CBC Auto Differential  Order: [id number] - Part of Panel Order [id number]  Status:  Final result   Visible to patient:  No (scheduled for 4/22/2021  3:49 PM)   Dx:  Chronic idiopathic constipation; ...  Specimen Information: Blood        Component   Ref Range & Units 15:40   WBC   3.40 - 10.80 10*3/mm3 6.68    RBC   4.14 - 5.80 10*6/mm3 5.14    Hemoglobin   13.0 - 17.7 g/dL 15.0    Hematocrit   37.5 - 51.0 % 43.6    MCV   79.0 - 97.0 fL 84.8    MCH   26.6 - 33.0 pg 29.2    MCHC   31.5 - 35.7 g/dL 34.4    RDW    12.3 - 15.4 % 12.8    RDW-SD   37.0 - 54.0 fl 39.7    MPV   6.0 - 12.0 fL 9.2    Platelets   140 - 450 10*3/mm3 182    Neutrophil %   42.7 - 76.0 % 60.8    Lymphocyte %   19.6 - 45.3 % 26.2    Monocyte %   5.0 - 12.0 % 9.3    Eosinophil %   0.3 - 6.2 % 2.2    Basophil %   0.0 - 1.5 % 1.2    Immature Grans %   0.0 - 0.5 % 0.3    Neutrophils, Absolute   1.70 - 7.00 10*3/mm3 4.06                           ASSESSMENT: 1.  Patient has history of chronic lymphoid leukemia that required therapy with Rituxan in 2016. Before that he was treated with bendamustine and Rituxan with no difficulties. In 2016 when the patient was treated with Rituxan as a single agent he had a very severe reaction during the 1st infusion and the infusion needed to be completed in the hospital. The 2nd infusion was completed in the office. Thereafter he did not get the 3rd or 4th infusions. Since then the patient has remained in remission. Obviously all of the stressors of the pandemia have been on him. We documented in May that his white count was rising, his lymphocyte count was rising, he was having fatigue and weight loss and we advised him to proceed with Rituxan. To avoid the reaction that he had during the previous Rituxan infusion in 2016 we advised the patient to be premedicated. He has not had any reaction whatsoever with the 1st, 2nd and 3rd infusion but he feels so well that he does not want to pursue the 4th and last infusion .            The patient was further checked on 04/22/2021. He has been gaining weight because of stressors and situations that make him to eat more. He has not had any infections and he completed his COVID vaccination. He has no peripheral adenopathy, hepatosplenomegaly. His white count, hemoglobin, platelets and white count differential remain normal. He has no autoimmunity.     His constipation remains an ongoing issue. This is in spite of taking multiple stool softeners and bulk medications. It is time for  him to have a colonoscopy and I have referred him to be seen by Dr. Hieu Lester.     I will review him back in 4 months with a CBC and a CMP.     I find no reason to proceed with any other radiological assessment in regard to his CLL.

## 2021-04-22 NOTE — TELEPHONE ENCOUNTER
Provider: AARON  Caller:  MAC  Relationship to Patient: SELF  Phone Number: [phone]      Reason for Call:  PT CALLING TO SCHEDULE A 4 MONTH FOLLOW UP AND WOULD LIKE AROUND 1:00 PM PLEASE.

## 2021-04-23 ENCOUNTER — TELEPHONE (OUTPATIENT)
Dept: ONCOLOGY | Facility: CLINIC | Age: 76
End: 2021-04-23

## 2021-05-10 ENCOUNTER — TELEPHONE (OUTPATIENT)
Dept: SURGERY | Facility: CLINIC | Age: 76
End: 2021-05-10

## 2021-05-10 NOTE — TELEPHONE ENCOUNTER
Does not need colonoscopy at this time.  One polyp 2019 = surveillance in 2024. Keep office appt to d/w how to help constipation . If he is not amenable to coming to my office, we can ask Dr. Kolb to see him since he saw him last year for constipation and a suspected hernia

## 2021-05-10 NOTE — TELEPHONE ENCOUNTER
Follow up appointment Monday 05/17/2021 for constipation.    Patient called asking how soon he was going to get his prep colonoscopy paperwork that is scheduled for this coming up Monday, when I check and told him it was an office visit per Dr. Schroeder off for constipation, he started to yell that his daughter took the whole day off from work for his CY, told him that it can be discussed at the time of O.V. he said no Im going in for a CY because I have leukemia. I also notice their is a recall for 5 years, last one done 03/13/2019.    He asked who he needs to speak with to have this CY for this Monday, I told him that I would speak with Dr. Lester and he yelled GOOD BYE and hung up on me.    Then Jillian Schroeder  called that patient called her too yelling at her as well, I did mentioned what happen when I spoke with him he also was yelling at me. She gave me her cell phone number to see what Dr. Lester decides to do with patient if it's going to stay as an office visit or switch to an CY.    Please advice so I can call the , she said she would reach out to patient once Dr. Lester replied to me.    Thank you.    Jillian cell #[phone].

## 2021-05-11 ENCOUNTER — DOCUMENTATION (OUTPATIENT)
Dept: SURGERY | Facility: CLINIC | Age: 76
End: 2021-05-11

## 2021-05-11 PROBLEM — K40.90 LEFT INGUINAL HERNIA: Status: ACTIVE | Noted: 2020-09-14

## 2021-05-11 PROBLEM — R94.6 ABNORMAL FINDING ON THYROID FUNCTION TEST: Status: ACTIVE | Noted: 2019-10-03

## 2021-05-11 PROBLEM — D18.03 HEMANGIOMA OF LIVER: Status: ACTIVE | Noted: 2020-09-21

## 2021-05-11 PROBLEM — M70.71 BURSITIS OF RIGHT HIP: Status: ACTIVE | Noted: 2018-03-15

## 2021-05-11 PROBLEM — H61.20 IMPACTED CERUMEN: Status: ACTIVE | Noted: 2020-04-16

## 2021-05-11 NOTE — PROGRESS NOTES
Phoned Jillian Fontana at [phone] to let her know that I forward Dr. Lester's reply to her regarding patient's appointment, we will be keeping his follow up appointment and that we will not be doing a colonoscopy, his next one is in 2024.    Please see Dr. Lester's note down below;       Does not need colonoscopy at this time.  One polyp 2019 = surveillance in 2024. Keep office appt to d/w how to help constipation . If he is not amenable to coming to my office, we can ask Dr. Kolb to see him since he saw him last year for constipation and a suspected hernia          My note below;   Note     Follow up appointment Monday 05/17/2021 for constipation.     Patient called asking how soon he was going to get his prep colonoscopy paperwork that is scheduled for this coming up Monday, when I check and told him it was an office visit per Dr. Schroeder off for constipation, he started to yell that his daughter took the whole day off from work for his CY, told him that it can be discussed at the time of O.V. he said no Im going in for a CY because I have leukemia. I also notice their is a recall for 5 years, last one done 03/13/2019.     He asked who he needs to speak with to have this CY for this Monday, I told him that I would speak with Dr. Lester and he yelled GOOD BYE and hung up on me.     Then Jillian Schroeder  called that patient called her too yelling at her as well, I did mentioned what happen when I spoke with him he also was yelling at me. She gave me her cell phone number to see what Dr. Lester decides to do with patient if it's going to stay as an office visit or switch to an CY.     Please advice so I can call the , she said she would reach out to patient once Dr. Lester replied to me.     Thank you.     Jillian cell #[phone].

## 2021-05-11 NOTE — TELEPHONE ENCOUNTER
Good morning Jillian,     Please notify the patient, Dr. Lester's response is below.    Thank you,   Kalie, Medical Assistant.

## 2021-05-12 DIAGNOSIS — Z86.010 HISTORY OF COLON POLYPS: ICD-10-CM

## 2021-05-12 DIAGNOSIS — C61 MALIGNANT NEOPLASM OF PROSTATE (HCC): ICD-10-CM

## 2021-05-12 DIAGNOSIS — C91.10 CHRONIC LYMPHOCYTIC LEUKEMIA (HCC): Primary | ICD-10-CM

## 2021-05-12 DIAGNOSIS — M48.062 SPINAL STENOSIS OF LUMBAR REGION WITH NEUROGENIC CLAUDICATION: ICD-10-CM

## 2021-05-12 NOTE — TELEPHONE ENCOUNTER
Spoke with pt made him aware and he has a lot going on right now. He is going to wait and maybe when things calm down we will call  office for the colonoscopy

## 2021-05-15 VITALS — HEIGHT: 77 IN | BODY MASS INDEX: 27.39 KG/M2 | WEIGHT: 232 LBS | RESPIRATION RATE: 16 BRPM

## 2021-05-16 VITALS — HEART RATE: 91 BPM | WEIGHT: 151 LBS | OXYGEN SATURATION: 98 % | BODY MASS INDEX: 17.83 KG/M2 | HEIGHT: 77 IN

## 2021-08-13 ENCOUNTER — TELEPHONE (OUTPATIENT)
Dept: ONCOLOGY | Facility: CLINIC | Age: 76
End: 2021-08-13

## 2021-08-13 ENCOUNTER — LAB (OUTPATIENT)
Dept: LAB | Facility: HOSPITAL | Age: 76
End: 2021-08-13

## 2021-08-13 ENCOUNTER — OFFICE VISIT (OUTPATIENT)
Dept: ONCOLOGY | Facility: CLINIC | Age: 76
End: 2021-08-13

## 2021-08-13 VITALS
BODY MASS INDEX: 29.54 KG/M2 | DIASTOLIC BLOOD PRESSURE: 70 MMHG | WEIGHT: 242.6 LBS | SYSTOLIC BLOOD PRESSURE: 137 MMHG | HEIGHT: 76 IN | TEMPERATURE: 97.8 F | RESPIRATION RATE: 18 BRPM | HEART RATE: 74 BPM | OXYGEN SATURATION: 96 %

## 2021-08-13 DIAGNOSIS — C91.10 CHRONIC LYMPHOCYTIC LEUKEMIA (HCC): ICD-10-CM

## 2021-08-13 DIAGNOSIS — C91.10 CHRONIC LYMPHOCYTIC LEUKEMIA (HCC): Primary | ICD-10-CM

## 2021-08-13 LAB
ALBUMIN SERPL-MCNC: 4.3 G/DL (ref 3.5–5.2)
ALBUMIN/GLOB SERPL: 2.2 G/DL (ref 1.1–2.4)
ALP SERPL-CCNC: 57 U/L (ref 38–116)
ALT SERPL W P-5'-P-CCNC: 7 U/L (ref 0–41)
ANION GAP SERPL CALCULATED.3IONS-SCNC: 10 MMOL/L (ref 5–15)
AST SERPL-CCNC: 14 U/L (ref 0–40)
BASOPHILS # BLD AUTO: 0.09 10*3/MM3 (ref 0–0.2)
BASOPHILS NFR BLD AUTO: 1.3 % (ref 0–1.5)
BILIRUB SERPL-MCNC: 0.8 MG/DL (ref 0.2–1.2)
BUN SERPL-MCNC: 11 MG/DL (ref 6–20)
BUN/CREAT SERPL: 13.4 (ref 7.3–30)
CALCIUM SPEC-SCNC: 9.4 MG/DL (ref 8.5–10.2)
CHLORIDE SERPL-SCNC: 101 MMOL/L (ref 98–107)
CO2 SERPL-SCNC: 25 MMOL/L (ref 22–29)
CREAT SERPL-MCNC: 0.82 MG/DL (ref 0.7–1.3)
DEPRECATED RDW RBC AUTO: 39.7 FL (ref 37–54)
EOSINOPHIL # BLD AUTO: 0.15 10*3/MM3 (ref 0–0.4)
EOSINOPHIL NFR BLD AUTO: 2.1 % (ref 0.3–6.2)
ERYTHROCYTE [DISTWIDTH] IN BLOOD BY AUTOMATED COUNT: 13.2 % (ref 12.3–15.4)
GFR SERPL CREATININE-BSD FRML MDRD: 91 ML/MIN/1.73
GLOBULIN UR ELPH-MCNC: 2 GM/DL (ref 1.8–3.5)
GLUCOSE SERPL-MCNC: 97 MG/DL (ref 74–124)
HCT VFR BLD AUTO: 44.1 % (ref 37.5–51)
HGB BLD-MCNC: 15.6 G/DL (ref 13–17.7)
IMM GRANULOCYTES # BLD AUTO: 0.03 10*3/MM3 (ref 0–0.05)
IMM GRANULOCYTES NFR BLD AUTO: 0.4 % (ref 0–0.5)
LYMPHOCYTES # BLD AUTO: 1.74 10*3/MM3 (ref 0.7–3.1)
LYMPHOCYTES NFR BLD AUTO: 24.4 % (ref 19.6–45.3)
MCH RBC QN AUTO: 29.3 PG (ref 26.6–33)
MCHC RBC AUTO-ENTMCNC: 35.4 G/DL (ref 31.5–35.7)
MCV RBC AUTO: 82.7 FL (ref 79–97)
MONOCYTES # BLD AUTO: 0.58 10*3/MM3 (ref 0.1–0.9)
MONOCYTES NFR BLD AUTO: 8.1 % (ref 5–12)
NEUTROPHILS NFR BLD AUTO: 4.54 10*3/MM3 (ref 1.7–7)
NEUTROPHILS NFR BLD AUTO: 63.7 % (ref 42.7–76)
NRBC BLD AUTO-RTO: 0 /100 WBC (ref 0–0.2)
PLATELET # BLD AUTO: 192 10*3/MM3 (ref 140–450)
PMV BLD AUTO: 9.4 FL (ref 6–12)
POTASSIUM SERPL-SCNC: 3.9 MMOL/L (ref 3.5–4.7)
PROT SERPL-MCNC: 6.3 G/DL (ref 6.3–8)
RBC # BLD AUTO: 5.33 10*6/MM3 (ref 4.14–5.8)
SODIUM SERPL-SCNC: 136 MMOL/L (ref 134–145)
WBC # BLD AUTO: 7.13 10*3/MM3 (ref 3.4–10.8)

## 2021-08-13 PROCEDURE — 85025 COMPLETE CBC W/AUTO DIFF WBC: CPT

## 2021-08-13 PROCEDURE — 80053 COMPREHEN METABOLIC PANEL: CPT

## 2021-08-13 PROCEDURE — 36415 COLL VENOUS BLD VENIPUNCTURE: CPT

## 2021-08-13 PROCEDURE — 99213 OFFICE O/P EST LOW 20 MIN: CPT | Performed by: INTERNAL MEDICINE

## 2021-08-13 RX ORDER — PRAVASTATIN SODIUM 20 MG
20 TABLET ORAL NIGHTLY
COMMUNITY
Start: 2021-08-11

## 2021-08-13 RX ORDER — ZOSTER VACCINE RECOMBINANT, ADJUVANTED 50 MCG/0.5
KIT INTRAMUSCULAR
COMMUNITY
End: 2021-10-18

## 2021-08-13 NOTE — TELEPHONE ENCOUNTER
Caller: Jeff Bass    Relationship to patient: Self    Best call back number: [phone]    Chief complaint: YANG. 4 MONTH FOLLOW UP W/ CBC PER DRMontserrat OFFICE FOLLOW UP NOTES FROM TODAY.    Type of visit: LABS/FOLLOW UP    Requested date: NOT ON A Monday PLEASE & AFTER LUNCH TIME.  Additional notes: PLEASE CALL PATIENT TO [facility].

## 2021-08-13 NOTE — PROGRESS NOTES
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     HISTORY OF PRESENT ILLNESS:        DURING THE VISIT WITH THE PATIENT TODAY , PATIENT HAD FACE MASK, MY MEDICAL ASSISTANT AND I  HAD PROPPER PROTECTIVE EQUIPMENT, AND I DID HAND HYGIENE WITH SOAP AND WATER BEFORE AND AFTER THE VISIT.    This patient returns today to the office for followup of the above diagnosis. He is here with no symptoms of any kind, no infections, no bleeding, no difficulty with urination. Excellent appetite, stable weight and complete resolution of his constipation now that he has learned to eat better. He has not had any passage of blood in the stool. No fevers, chills, night sweats, pruritus, adenopathies or pain. ECOG performance status 0.            Past Medical History:   Diagnosis Date   • Actinic keratosis    • Arthritis    • Baker's cyst     BILATERAL POPLITEAL   • Benign prostatic hyperplasia     FOLLOWED BY DR. VIVIEN PATEL   • Biliary dyskinesia    • Bunion of right foot     GREAT TOE   • Bursitis of right hip    • Bursopathy    • CAD (coronary artery disease)    • Cataract     BILATERAL   • CKD (chronic kidney disease)    • Closed right ankle fracture    • Colon polyps     FOLLOWED BY DR. NEHA HAM   • Constipation    • Coronary arteriosclerosis    • Degeneration of lumbar intervertebral disc    • Disease of thyroid gland     enlarged   • Diverticulitis    • Erectile dysfunction    • GERD (gastroesophageal reflux disease)    • Hallux valgus of left foot    • Hyperlipidemia     MIXED   • Hypertension    • Kidney stone 02/21/2009    SEEN AT [facility] ER   • Lactic acidosis 01/19/2019    ADMITTED TO [facility]   • Leukemia (CMS/[facility])     CLL, FOLLOWED BY DR. KYA WALKER   • Localized, primary osteoarthritis    • Lumbar radiculopathy    • Lumbar stenosis    • Lung mass     LEFT SIDE   • Polyneuropathy    • Primary erectile dysfunction    • Prostate CA (CMS/[facility]) 03/2016     JACQUELYN 6, S/P XRT, FOLLOWED BY DR. VIVIEN PATEL   • PVC (premature ventricular contraction)    • RBBB    • Sensory hearing loss, bilateral    • Skin cancer     SQUAMOUS CELL CARCINOMA OF FACE   • Substernal goiter    • Substernal thyroid goiter    • Thyromegaly 12/2016    ADMITTED TO [facility]   • Vitamin D deficiency    • Vitamin D deficiency      Past Surgical History:   Procedure Laterality Date   • BRONCHOSCOPY N/A 12/14/2016    Procedure: BRONCHOSCOPY WITH  BAL AND BIOPSY AND ENDOBRONCHIAL ULTRASOUND  AND NAVIGATION WITH BRUSHINGS AND BIOPSY AND BAL;  Surgeon: Pierre Manning MD;  Location: [facility] ENDOSCOPY;  Service:    • COLONOSCOPY N/A 3/13/2019    5 MM SESSILE TUBULAR ADENOMA POLYP IN TRANSVERSE, MULTIPLE SMALL AND LARGE DIVERTICULA IN SIGMOID, RESCOPE IN 5 YRS, DR. NEHA HAM AT [facility]   • COLONOSCOPY W/ POLYPECTOMY N/A 03/19/2015    3 TUBULAR ADENOMA POLYPS, 12 TUBULOVILLOUS POLYP RECTO-SIGMOID, DIVERTICULOSIS, RESCOPE IN 3 YRS, DR. NEHA HAM AT [facility]   • EYE SURGERY Bilateral     CATARACT EXTRACTION WITH LENS IMPLANT, DR. GOVEA   • PROSTATE RADIOACTIVE SEED IMPLANT N/A 09/06/2016    DR. HOA JARAMILLO AT [facility]   • PROSTATE SURGERY N/A 03/11/2016    BIOPSY: ADENOCARCINOMA, DR. ZAID IBARRA   • THYROID BIOPSY Bilateral 11/01/2017    NO B CELL OR T CELL LYMPHOMA, DONE AT [facility]     Social History     Socioeconomic History   • Marital status:      Spouse name: No   • Number of children: Not on file   • Years of education: College   • Highest education level: Not on file   Tobacco Use   • Smoking status: Never Smoker   • Smokeless tobacco: Never Used   • Tobacco comment: no caffiene   Substance and Sexual Activity   • Alcohol use: No   • Drug use: No   • Sexual activity: Yes     Partners: Female     Birth control/protection: None     Family History   Problem Relation Age of Onset   • Heart disease Father         Rheumatic heart disease   • Hypertension Father    • Stroke Mother    • No  "Known Problems Daughter      Current Outpatient Medications on File Prior to Visit   Medication Sig   • alfuzosin (UROXATRAL) 10 MG 24 hr tablet Take 2 tablets by mouth 2 (two) times a day.   • Cholecalciferol (VITAMIN D3) 34177 UNITS capsule Take 50,000 Units by mouth Daily.   • cyanocobalamin (VITAMIN B-12) 1000 MCG tablet Take  by mouth.   • Fluzone High-Dose Quadrivalent 0.7 ML suspension prefilled syringe ADM 0.7ML IM UTD   • losartan-hydrochlorothiazide (HYZAAR) 100-25 MG per tablet Take 1 tablet by mouth Daily.   • Melatonin 10 MG tablet Take 10 mg by mouth Every Night.   • Methylcellulose, Laxative, (CITRUCEL PO) Take  by mouth.   • potassium chloride (K-DUR,KLOR-CON) 20 MEQ CR tablet Take 1 tablet by mouth Daily.   • pravastatin (PRAVACHOL) 20 MG tablet    • Red Yeast Rice Extract (RED YEAST RICE PO) Take 1,200 mg by mouth Daily.   • Zoster Vac Recomb Adjuvanted (Shingrix) 50 MCG/0.5ML reconstituted suspension Shingrix (PF) 50 mcg/0.5 mL intramuscular suspension, kit   • [DISCONTINUED] Cyanocobalamin (CVS VITAMIN B-12 PO) Take 5,000 mcg by mouth Daily.   • [DISCONTINUED] lactulose 20 GM/30ML solution solution Take 15 g by mouth 3 (Three) Times a Day.   • [DISCONTINUED] MAGNESIUM PO Take 500 mg by mouth Every Night.   • [DISCONTINUED] pravastatin (PRAVACHOL) 10 MG tablet Take 5 mg by mouth Daily.     No current facility-administered medications on file prior to visit.   No Known Allergies              VITAL SIGNS:    Vitals:    21 1339   BP: 137/70   Pulse: 74   Resp: 18   Temp: 97.8 °F (36.6 °C)   TempSrc: Temporal   SpO2: 96%   Weight: 110 kg (242 lb 9.6 oz)   Height: 194 cm (76.38\")           PAIN:  0 out of 10      ECO         PHYSICAL EXAMINATION:     I HAVE PERSONALLY REVIEWED THE HISTORY OF THE PRESENT ILLNESS, PAST MEDICAL HISTORY, FAMILY HISTORY, SOCIAL HISTORY, ALLERGIES, MEDICATIONS STATED ABOVE IN THE  NOTE FROM TODAY.        GENERAL:  Well-developed, well-nourished  Patient  in no " acute distress.   SKIN:  Warm, dry ,NO rashes,NO purpura ,NO petechiae.  HEENT:  Pupils were equal and reactive to light and accomodation, conjunctivae noninjected, no pterygium, normal extraocular movements, normal visual acuity.   NECK:  Supple with good range of motion; no thyromegaly , no other masses, no JVD or bruits, no cervical adenopathies.No carotid artery pain, no carotid abnormal pulsation , NO arterial dance.  LYMPHATICS:  No cervical, NO supraclavicular, NO axillary,NO epitrochlear , NO inguinal adenopathy.  CARDIAC   normal rate and regular rhythm, without murmur,NO rubs NO S3 NO S4 right or left .  LUNGS: normal breath sounds bilateral, no wheezing, rhonchi, crackles or rubs.  VASCULAR VENOUS: no cyanosis, collateral circulation, varicosities, edema, palpable cords, pain, erythema.  ABDOMEN:  Soft, nontender with no hepatomegaly, no splenomegaly,no masses, no ascites, no collateral circulation,no distention,no Eddie sign.  EXTREMITIES  AND SPINE:  No clubbing, cyanosis or edema, no deformities , no pain .No kyphosis, scoliosis, no other deformities, no pain in spine, no pain in ribs , no pain inpelvic bone.  NEUROLOGICAL:  Patient was awake, alert, oriented to time, person and place.                    LABORATORY DATA:CBC Auto Differential  Order: [id number] - Part of Panel Order [id number]  Status:  Final result   Visible to patient:  No (scheduled for 8/13/2021  2:07 PM)   Next appt:  None   Dx:  Chronic lymphocytic leukemia (CMS/HCC)  Specimen Information: Blood         0 Result Notes  Component   Ref Range & Units 13:43   WBC   3.40 - 10.80 10*3/mm3 7.13    RBC   4.14 - 5.80 10*6/mm3 5.33    Hemoglobin   13.0 - 17.7 g/dL 15.6    Hematocrit   37.5 - 51.0 % 44.1    MCV   79.0 - 97.0 fL 82.7    MCH   26.6 - 33.0 pg 29.3    MCHC   31.5 - 35.7 g/dL 35.4    RDW   12.3 - 15.4 % 13.2    RDW-SD   37.0 - 54.0 fl 39.7    MPV   6.0 - 12.0 fL 9.4    Platelets   140 - 450 10*3/mm3 192    Neutrophil %   42.7 -  76.0 % 63.7    Lymphocyte %   19.6 - 45.3 % 24.4    Monocyte %   5.0 - 12.0 % 8.1    Eosinophil %   0.3 - 6.2 % 2.1    Basophil %   0.0 - 1.5 % 1.3    Immature Grans %   0.0 - 0.5 % 0.4    Neutrophils, Absolute   1.70 - 7.00 10*3/mm3 4.54    Lymphocytes, Absolute   0.70 - 3.10 10*3/mm3 1.74    Monocytes, Absolute   0.10 - 0.90 10*3/mm3 0.58    Eosinophils, Absolute   0.00 - 0.40 10*3/mm3                                 ASSESSMENT: 1.  Patient has history of chronic lymphoid leukemia that required therapy with Rituxan in 2016. Before that he was treated with bendamustine and Rituxan with no difficulties. In 2016 when the patient was treated with Rituxan as a single agent he had a very severe reaction during the 1st infusion and the infusion needed to be completed in the hospital. The 2nd infusion was completed in the office. Thereafter he did not get the 3rd or 4th infusions. Since then the patient has remained in remission. Obviously all of the stressors of the pandemia have been on him. We documented in May that his white count was rising, his lymphocyte count was rising, he was having fatigue and weight loss and we advised him to proceed with Rituxan. To avoid the reaction that he had during the previous Rituxan infusion in 2016 we advised the patient to be premedicated. He has not had any reaction whatsoever with the 1st, 2nd and 3rd infusion but he feels so well that he does not want to pursue the 4th and last infusion .            The patient was further checked on 04/22/2021. He has been gaining weight because of stressors and situations that make him to eat more. He has not had any infections and he completed his COVID vaccination. He has no peripheral adenopathy, hepatosplenomegaly. His white count, hemoglobin, platelets and white count differential remain normal. He has no autoimmunity.     His constipation remains an ongoing issue. This is in spite of taking multiple stool softeners and bulk medications.  It is time for him to have a colonoscopy and I have referred him to be seen by Dr. Hieu Lester.     The patient was further reviewed on 08/13/2021. He has no symptoms or signs of CLL. He has no autoimmunity, no infections, no adenopathies, no hepatosplenomegaly. He looks terrific. I pointed out to him that since pandemia, given the fact that he eats by the window all the time that he has gained 20 pounds. At least he has not gained anymore weight since the previous visit. I asked him to watch what he is putting in his mouth. He has changed his diet with more salads and maybe this is having a positive impact on that issue.     I will review him back in 4 months with a CBC. Otherwise, no other intervention from my point of view. He is up to date in his shingles vaccination.     He also got his COVID vaccination.    ·

## 2021-08-23 ENCOUNTER — TELEPHONE (OUTPATIENT)
Dept: ONCOLOGY | Facility: CLINIC | Age: 76
End: 2021-08-23

## 2021-08-23 NOTE — TELEPHONE ENCOUNTER
Caller: Jeff Bass    Relationship to patient: Self    Best call back number: [phone]    Type of visit: LAB AND FOLLOW UP    Requested date: ANY OTHER DAY IN THE AFTERNOON    If rescheduling, when is the original appointment: 12/14    Additional notes: PLEASE CALL ONCE R/S.

## 2021-10-18 ENCOUNTER — OFFICE VISIT (OUTPATIENT)
Dept: SURGERY | Facility: CLINIC | Age: 76
End: 2021-10-18

## 2021-10-18 VITALS — BODY MASS INDEX: 29 KG/M2 | HEIGHT: 77 IN | WEIGHT: 245.6 LBS

## 2021-10-18 DIAGNOSIS — K40.90 LEFT INGUINAL HERNIA: Primary | ICD-10-CM

## 2021-10-18 PROCEDURE — 99213 OFFICE O/P EST LOW 20 MIN: CPT | Performed by: SURGERY

## 2021-10-18 RX ORDER — NIACIN 100 MG
100 TABLET ORAL
COMMUNITY
End: 2021-10-29

## 2021-10-20 NOTE — PROGRESS NOTES
SUMMARY (A/P):    75-year-old gentleman with large symptomatic left inguinal hernia extending to scrotum.  I recommended proceeding with open left inguinal hernia repair.  He understands the nature of the procedure and the risks including but not limited to bleeding, infection, use of mesh, and recurrence.      CC:    Hernia    HPI:    75-year-old gentleman presents with 2 to 3-week history of left inguinal burning associated with visible and palpable bulge.  Interestingly, I saw him for left inguinal pain back in 2021 on examination he had no evidence of inguinal hernia and also had a CT scan around that time that showed no evidence of inguinal hernia.    ENDOSCOPY:   • Colonoscopy 3/13/2019 Dr. Lester: Diverticulosis.  5 mm transverse colon polyp.  (Pathology tubular adenoma)    PREVIOUS ABDOMINAL SURGERY    • None    PMH:    • Adenomatous colon polyps  • BPH  • Coronary artery disease  • Nephrolithiasis  • Hypertension  • Hyperlipidemia  • Leukemia  • Prostate cancer treated with radiation seeds    ALLERGIES:   • None    MEDICATIONS:   • Alfuzosin  • Hyzaar  • Pravachol    FAMILY HISTORY:    • Colorectal cancer: Negative    SOCIAL HISTORY:   • Denies tobacco use  • Denies alcohol use    PHYSICAL EXAM:   • Constitutional: Well-developed well-nourished, no acute distress  • Vital signs: Weight 245 pounds, height 77 inches, BMI 29.1  • Eyes: Conjunctiva normal, sclera nonicteric  • ENMT: Hearing grossly normal, oral mucosa moist  • Neck: Supple, no palpable mass, trachea midline  • Respiratory: Clear to auscultation, normal inspiratory effort  • Cardiovascular: Regular rate, no murmur, no carotid bruit  • Gastrointestinal: Soft, nontender, no palpable mass, no hepatosplenomegaly delete that  • Genitourinary: Testicles descended and normal.  No evidence of right inguinal hernia.  Large left inguinal hernia extending to the scrotum, nontender.  • Lymphatics (palpable nodes):  cervical-negative,  inguinal-negative  • Skin:  Warm, dry, no rash on visualized skin surfaces  • Musculoskeletal: Symmetric strength, normal gait  • Psychiatric: Alert and oriented ×3, normal affect     ROS:    No cough, chest pain, shortness of air.  All other systems reviewed and negative other than presenting complaints.    DORENE CARPIO M.D.

## 2021-10-29 ENCOUNTER — PRE-ADMISSION TESTING (OUTPATIENT)
Dept: PREADMISSION TESTING | Facility: HOSPITAL | Age: 76
End: 2021-10-29

## 2021-10-29 VITALS
OXYGEN SATURATION: 98 % | RESPIRATION RATE: 18 BRPM | DIASTOLIC BLOOD PRESSURE: 81 MMHG | TEMPERATURE: 97.2 F | HEART RATE: 77 BPM | WEIGHT: 246 LBS | BODY MASS INDEX: 30.59 KG/M2 | HEIGHT: 75 IN | SYSTOLIC BLOOD PRESSURE: 141 MMHG

## 2021-10-29 LAB
ANION GAP SERPL CALCULATED.3IONS-SCNC: 10.9 MMOL/L (ref 5–15)
BASOPHILS # BLD AUTO: 0.07 10*3/MM3 (ref 0–0.2)
BASOPHILS NFR BLD AUTO: 0.9 % (ref 0–1.5)
BUN SERPL-MCNC: 13 MG/DL (ref 8–23)
BUN/CREAT SERPL: 15.7 (ref 7–25)
CALCIUM SPEC-SCNC: 9 MG/DL (ref 8.6–10.5)
CHLORIDE SERPL-SCNC: 100 MMOL/L (ref 98–107)
CO2 SERPL-SCNC: 26.1 MMOL/L (ref 22–29)
CREAT SERPL-MCNC: 0.83 MG/DL (ref 0.76–1.27)
DEPRECATED RDW RBC AUTO: 40.8 FL (ref 37–54)
EOSINOPHIL # BLD AUTO: 0.32 10*3/MM3 (ref 0–0.4)
EOSINOPHIL NFR BLD AUTO: 4.2 % (ref 0.3–6.2)
ERYTHROCYTE [DISTWIDTH] IN BLOOD BY AUTOMATED COUNT: 13 % (ref 12.3–15.4)
GFR SERPL CREATININE-BSD FRML MDRD: 90 ML/MIN/1.73
GLUCOSE SERPL-MCNC: 106 MG/DL (ref 65–99)
HCT VFR BLD AUTO: 43.5 % (ref 37.5–51)
HGB BLD-MCNC: 14.8 G/DL (ref 13–17.7)
IMM GRANULOCYTES # BLD AUTO: 0.05 10*3/MM3 (ref 0–0.05)
IMM GRANULOCYTES NFR BLD AUTO: 0.6 % (ref 0–0.5)
LYMPHOCYTES # BLD AUTO: 2.12 10*3/MM3 (ref 0.7–3.1)
LYMPHOCYTES NFR BLD AUTO: 27.5 % (ref 19.6–45.3)
MCH RBC QN AUTO: 29.1 PG (ref 26.6–33)
MCHC RBC AUTO-ENTMCNC: 34 G/DL (ref 31.5–35.7)
MCV RBC AUTO: 85.5 FL (ref 79–97)
MONOCYTES # BLD AUTO: 0.76 10*3/MM3 (ref 0.1–0.9)
MONOCYTES NFR BLD AUTO: 9.9 % (ref 5–12)
NEUTROPHILS NFR BLD AUTO: 4.38 10*3/MM3 (ref 1.7–7)
NEUTROPHILS NFR BLD AUTO: 56.9 % (ref 42.7–76)
NRBC BLD AUTO-RTO: 0 /100 WBC (ref 0–0.2)
PLATELET # BLD AUTO: 192 10*3/MM3 (ref 140–450)
PMV BLD AUTO: 9.6 FL (ref 6–12)
POTASSIUM SERPL-SCNC: 3.8 MMOL/L (ref 3.5–5.2)
QT INTERVAL: 415 MS
RBC # BLD AUTO: 5.09 10*6/MM3 (ref 4.14–5.8)
SODIUM SERPL-SCNC: 137 MMOL/L (ref 136–145)
WBC # BLD AUTO: 7.7 10*3/MM3 (ref 3.4–10.8)

## 2021-10-29 PROCEDURE — 85025 COMPLETE CBC W/AUTO DIFF WBC: CPT

## 2021-10-29 PROCEDURE — 36415 COLL VENOUS BLD VENIPUNCTURE: CPT

## 2021-10-29 PROCEDURE — 93010 ELECTROCARDIOGRAM REPORT: CPT | Performed by: INTERNAL MEDICINE

## 2021-10-29 PROCEDURE — 80048 BASIC METABOLIC PNL TOTAL CA: CPT

## 2021-10-29 PROCEDURE — 93005 ELECTROCARDIOGRAM TRACING: CPT

## 2021-10-29 RX ORDER — CHLORHEXIDINE GLUCONATE 500 MG/1
CLOTH TOPICAL
COMMUNITY
End: 2021-11-02 | Stop reason: HOSPADM

## 2021-10-29 RX ORDER — POLYETHYLENE GLYCOL 3350 17 G/17G
17 POWDER, FOR SOLUTION ORAL DAILY
COMMUNITY
End: 2022-10-13 | Stop reason: HOSPADM

## 2021-10-30 ENCOUNTER — LAB (OUTPATIENT)
Dept: LAB | Facility: HOSPITAL | Age: 76
End: 2021-10-30

## 2021-10-30 LAB — SARS-COV-2 ORF1AB RESP QL NAA+PROBE: NOT DETECTED

## 2021-10-30 PROCEDURE — U0004 COV-19 TEST NON-CDC HGH THRU: HCPCS | Performed by: NURSE PRACTITIONER

## 2021-10-30 PROCEDURE — C9803 HOPD COVID-19 SPEC COLLECT: HCPCS | Performed by: NURSE PRACTITIONER

## 2021-10-30 PROCEDURE — U0005 INFEC AGEN DETEC AMPLI PROBE: HCPCS | Performed by: NURSE PRACTITIONER

## 2021-11-02 ENCOUNTER — ANESTHESIA (OUTPATIENT)
Dept: PERIOP | Facility: HOSPITAL | Age: 76
End: 2021-11-02

## 2021-11-02 ENCOUNTER — ANESTHESIA EVENT (OUTPATIENT)
Dept: PERIOP | Facility: HOSPITAL | Age: 76
End: 2021-11-02

## 2021-11-02 ENCOUNTER — HOSPITAL ENCOUNTER (OUTPATIENT)
Facility: HOSPITAL | Age: 76
Setting detail: HOSPITAL OUTPATIENT SURGERY
Discharge: HOME | End: 2021-11-02
Attending: SURGERY | Admitting: SURGERY

## 2021-11-02 VITALS
WEIGHT: 241.7 LBS | RESPIRATION RATE: 18 BRPM | TEMPERATURE: 97.6 F | BODY MASS INDEX: 28.54 KG/M2 | DIASTOLIC BLOOD PRESSURE: 76 MMHG | HEIGHT: 77 IN | SYSTOLIC BLOOD PRESSURE: 146 MMHG | OXYGEN SATURATION: 93 % | HEART RATE: 85 BPM

## 2021-11-02 DIAGNOSIS — K40.30 INCARCERATED LEFT INGUINAL HERNIA: ICD-10-CM

## 2021-11-02 DIAGNOSIS — K40.90 LEFT INGUINAL HERNIA: Primary | ICD-10-CM

## 2021-11-02 PROCEDURE — 0 BUPIVACAINE LIPOSOME 1.3 % SUSPENSION 20 ML VIAL: Performed by: SURGERY

## 2021-11-02 PROCEDURE — 25010000002 ONDANSETRON PER 1 MG: Performed by: NURSE ANESTHETIST, CERTIFIED REGISTERED

## 2021-11-02 PROCEDURE — 25010000002 NEOSTIGMINE 5 MG/10ML SOLUTION: Performed by: NURSE ANESTHETIST, CERTIFIED REGISTERED

## 2021-11-02 PROCEDURE — C9290 INJ, BUPIVACAINE LIPOSOME: HCPCS | Performed by: SURGERY

## 2021-11-02 PROCEDURE — 25010000002 FENTANYL CITRATE (PF) 50 MCG/ML SOLUTION: Performed by: NURSE ANESTHETIST, CERTIFIED REGISTERED

## 2021-11-02 PROCEDURE — C1889 IMPLANT/INSERT DEVICE, NOC: HCPCS | Performed by: SURGERY

## 2021-11-02 PROCEDURE — 49507 PRP I/HERN INIT BLOCK >5 YR: CPT | Performed by: SURGERY

## 2021-11-02 PROCEDURE — 0 CEFAZOLIN IN DEXTROSE 2-4 GM/100ML-% SOLUTION: Performed by: SURGERY

## 2021-11-02 PROCEDURE — 25010000002 PHENYLEPHRINE 10 MG/ML SOLUTION: Performed by: NURSE ANESTHETIST, CERTIFIED REGISTERED

## 2021-11-02 PROCEDURE — 25010000002 DEXAMETHASONE PER 1 MG: Performed by: NURSE ANESTHETIST, CERTIFIED REGISTERED

## 2021-11-02 PROCEDURE — 25010000002 PROPOFOL 10 MG/ML EMULSION: Performed by: NURSE ANESTHETIST, CERTIFIED REGISTERED

## 2021-11-02 DEVICE — HORIZON TI ML 6 CLIPS/CART
Type: IMPLANTABLE DEVICE | Site: GROIN | Status: FUNCTIONAL
Brand: WECK

## 2021-11-02 DEVICE — ABSORBABLE HEMOSTAT (OXIDIZED REGENERATED CELLULOSE, U.S.P.)
Type: IMPLANTABLE DEVICE | Site: GROIN | Status: FUNCTIONAL
Brand: SURGICEL

## 2021-11-02 RX ORDER — ROCURONIUM BROMIDE 10 MG/ML
INJECTION, SOLUTION INTRAVENOUS AS NEEDED
Status: DISCONTINUED | OUTPATIENT
Start: 2021-11-02 | End: 2021-11-02 | Stop reason: SURG

## 2021-11-02 RX ORDER — PHENYLEPHRINE HYDROCHLORIDE 10 MG/ML
INJECTION INTRAVENOUS AS NEEDED
Status: DISCONTINUED | OUTPATIENT
Start: 2021-11-02 | End: 2021-11-02 | Stop reason: SURG

## 2021-11-02 RX ORDER — EPHEDRINE SULFATE 50 MG/ML
5 INJECTION, SOLUTION INTRAVENOUS ONCE AS NEEDED
Status: DISCONTINUED | OUTPATIENT
Start: 2021-11-02 | End: 2021-11-02 | Stop reason: HOSPADM

## 2021-11-02 RX ORDER — HYDRALAZINE HYDROCHLORIDE 20 MG/ML
5 INJECTION INTRAMUSCULAR; INTRAVENOUS
Status: DISCONTINUED | OUTPATIENT
Start: 2021-11-02 | End: 2021-11-02 | Stop reason: HOSPADM

## 2021-11-02 RX ORDER — FAMOTIDINE 10 MG/ML
20 INJECTION, SOLUTION INTRAVENOUS ONCE
Status: COMPLETED | OUTPATIENT
Start: 2021-11-02 | End: 2021-11-02

## 2021-11-02 RX ORDER — SODIUM CHLORIDE 0.9 % (FLUSH) 0.9 %
3 SYRINGE (ML) INJECTION EVERY 12 HOURS SCHEDULED
Status: DISCONTINUED | OUTPATIENT
Start: 2021-11-02 | End: 2021-11-02 | Stop reason: HOSPADM

## 2021-11-02 RX ORDER — ONDANSETRON 4 MG/1
4 TABLET, FILM COATED ORAL EVERY 6 HOURS PRN
Qty: 10 TABLET | Refills: 1 | Status: SHIPPED | OUTPATIENT
Start: 2021-11-02 | End: 2021-11-08

## 2021-11-02 RX ORDER — CEFAZOLIN SODIUM 2 G/100ML
2 INJECTION, SOLUTION INTRAVENOUS ONCE
Status: COMPLETED | OUTPATIENT
Start: 2021-11-02 | End: 2021-11-02

## 2021-11-02 RX ORDER — NEOSTIGMINE METHYLSULFATE 0.5 MG/ML
INJECTION, SOLUTION INTRAVENOUS AS NEEDED
Status: DISCONTINUED | OUTPATIENT
Start: 2021-11-02 | End: 2021-11-02 | Stop reason: SURG

## 2021-11-02 RX ORDER — PROMETHAZINE HYDROCHLORIDE 25 MG/1
25 SUPPOSITORY RECTAL ONCE AS NEEDED
Status: DISCONTINUED | OUTPATIENT
Start: 2021-11-02 | End: 2021-11-02 | Stop reason: HOSPADM

## 2021-11-02 RX ORDER — BUPIVACAINE HYDROCHLORIDE AND EPINEPHRINE 5; 5 MG/ML; UG/ML
INJECTION, SOLUTION EPIDURAL; INTRACAUDAL; PERINEURAL AS NEEDED
Status: DISCONTINUED | OUTPATIENT
Start: 2021-11-02 | End: 2021-11-02 | Stop reason: HOSPADM

## 2021-11-02 RX ORDER — FENTANYL CITRATE 50 UG/ML
INJECTION, SOLUTION INTRAMUSCULAR; INTRAVENOUS AS NEEDED
Status: DISCONTINUED | OUTPATIENT
Start: 2021-11-02 | End: 2021-11-02 | Stop reason: SURG

## 2021-11-02 RX ORDER — SODIUM CHLORIDE 0.9 % (FLUSH) 0.9 %
3-10 SYRINGE (ML) INJECTION AS NEEDED
Status: DISCONTINUED | OUTPATIENT
Start: 2021-11-02 | End: 2021-11-02 | Stop reason: HOSPADM

## 2021-11-02 RX ORDER — FENTANYL CITRATE 50 UG/ML
50 INJECTION, SOLUTION INTRAMUSCULAR; INTRAVENOUS
Status: DISCONTINUED | OUTPATIENT
Start: 2021-11-02 | End: 2021-11-02 | Stop reason: HOSPADM

## 2021-11-02 RX ORDER — DIPHENHYDRAMINE HCL 25 MG
25 CAPSULE ORAL
Status: DISCONTINUED | OUTPATIENT
Start: 2021-11-02 | End: 2021-11-02 | Stop reason: HOSPADM

## 2021-11-02 RX ORDER — FENTANYL CITRATE 50 UG/ML
25 INJECTION, SOLUTION INTRAMUSCULAR; INTRAVENOUS
Status: DISCONTINUED | OUTPATIENT
Start: 2021-11-02 | End: 2021-11-02 | Stop reason: HOSPADM

## 2021-11-02 RX ORDER — GLYCOPYRROLATE 0.2 MG/ML
INJECTION INTRAMUSCULAR; INTRAVENOUS AS NEEDED
Status: DISCONTINUED | OUTPATIENT
Start: 2021-11-02 | End: 2021-11-02 | Stop reason: SURG

## 2021-11-02 RX ORDER — OXYCODONE AND ACETAMINOPHEN 7.5; 325 MG/1; MG/1
1 TABLET ORAL EVERY 4 HOURS PRN
Qty: 24 TABLET | Refills: 0 | Status: SHIPPED | OUTPATIENT
Start: 2021-11-02 | End: 2021-11-08

## 2021-11-02 RX ORDER — HYDROMORPHONE HYDROCHLORIDE 1 MG/ML
0.25 INJECTION, SOLUTION INTRAMUSCULAR; INTRAVENOUS; SUBCUTANEOUS
Status: DISCONTINUED | OUTPATIENT
Start: 2021-11-02 | End: 2021-11-02 | Stop reason: HOSPADM

## 2021-11-02 RX ORDER — SODIUM CHLORIDE, SODIUM LACTATE, POTASSIUM CHLORIDE, CALCIUM CHLORIDE 600; 310; 30; 20 MG/100ML; MG/100ML; MG/100ML; MG/100ML
9 INJECTION, SOLUTION INTRAVENOUS CONTINUOUS
Status: DISCONTINUED | OUTPATIENT
Start: 2021-11-02 | End: 2021-11-02 | Stop reason: HOSPADM

## 2021-11-02 RX ORDER — MAGNESIUM HYDROXIDE 1200 MG/15ML
LIQUID ORAL AS NEEDED
Status: DISCONTINUED | OUTPATIENT
Start: 2021-11-02 | End: 2021-11-02 | Stop reason: HOSPADM

## 2021-11-02 RX ORDER — FLUMAZENIL 0.1 MG/ML
0.2 INJECTION INTRAVENOUS AS NEEDED
Status: DISCONTINUED | OUTPATIENT
Start: 2021-11-02 | End: 2021-11-02 | Stop reason: HOSPADM

## 2021-11-02 RX ORDER — MIDAZOLAM HYDROCHLORIDE 1 MG/ML
0.5 INJECTION INTRAMUSCULAR; INTRAVENOUS
Status: DISCONTINUED | OUTPATIENT
Start: 2021-11-02 | End: 2021-11-02 | Stop reason: HOSPADM

## 2021-11-02 RX ORDER — ONDANSETRON 2 MG/ML
INJECTION INTRAMUSCULAR; INTRAVENOUS AS NEEDED
Status: DISCONTINUED | OUTPATIENT
Start: 2021-11-02 | End: 2021-11-02 | Stop reason: SURG

## 2021-11-02 RX ORDER — PROPOFOL 10 MG/ML
VIAL (ML) INTRAVENOUS AS NEEDED
Status: DISCONTINUED | OUTPATIENT
Start: 2021-11-02 | End: 2021-11-02 | Stop reason: SURG

## 2021-11-02 RX ORDER — EPHEDRINE SULFATE 50 MG/ML
INJECTION, SOLUTION INTRAVENOUS AS NEEDED
Status: DISCONTINUED | OUTPATIENT
Start: 2021-11-02 | End: 2021-11-02 | Stop reason: SURG

## 2021-11-02 RX ORDER — LIDOCAINE HYDROCHLORIDE 20 MG/ML
INJECTION, SOLUTION INFILTRATION; PERINEURAL AS NEEDED
Status: DISCONTINUED | OUTPATIENT
Start: 2021-11-02 | End: 2021-11-02 | Stop reason: SURG

## 2021-11-02 RX ORDER — IBUPROFEN 600 MG/1
600 TABLET ORAL ONCE AS NEEDED
Status: DISCONTINUED | OUTPATIENT
Start: 2021-11-02 | End: 2021-11-02 | Stop reason: HOSPADM

## 2021-11-02 RX ORDER — DIPHENHYDRAMINE HYDROCHLORIDE 50 MG/ML
12.5 INJECTION INTRAMUSCULAR; INTRAVENOUS
Status: DISCONTINUED | OUTPATIENT
Start: 2021-11-02 | End: 2021-11-02 | Stop reason: HOSPADM

## 2021-11-02 RX ORDER — OXYCODONE AND ACETAMINOPHEN 10; 325 MG/1; MG/1
1 TABLET ORAL EVERY 4 HOURS PRN
Status: DISCONTINUED | OUTPATIENT
Start: 2021-11-02 | End: 2021-11-02 | Stop reason: HOSPADM

## 2021-11-02 RX ORDER — HYDROCODONE BITARTRATE AND ACETAMINOPHEN 5; 325 MG/1; MG/1
1 TABLET ORAL ONCE AS NEEDED
Status: DISCONTINUED | OUTPATIENT
Start: 2021-11-02 | End: 2021-11-02 | Stop reason: HOSPADM

## 2021-11-02 RX ORDER — DEXAMETHASONE SODIUM PHOSPHATE 4 MG/ML
INJECTION, SOLUTION INTRA-ARTICULAR; INTRALESIONAL; INTRAMUSCULAR; INTRAVENOUS; SOFT TISSUE AS NEEDED
Status: DISCONTINUED | OUTPATIENT
Start: 2021-11-02 | End: 2021-11-02 | Stop reason: SURG

## 2021-11-02 RX ORDER — ONDANSETRON 2 MG/ML
4 INJECTION INTRAMUSCULAR; INTRAVENOUS ONCE AS NEEDED
Status: DISCONTINUED | OUTPATIENT
Start: 2021-11-02 | End: 2021-11-02 | Stop reason: HOSPADM

## 2021-11-02 RX ORDER — PROMETHAZINE HYDROCHLORIDE 25 MG/1
25 TABLET ORAL ONCE AS NEEDED
Status: DISCONTINUED | OUTPATIENT
Start: 2021-11-02 | End: 2021-11-02 | Stop reason: HOSPADM

## 2021-11-02 RX ORDER — LIDOCAINE HYDROCHLORIDE 10 MG/ML
0.5 INJECTION, SOLUTION EPIDURAL; INFILTRATION; INTRACAUDAL; PERINEURAL ONCE AS NEEDED
Status: DISCONTINUED | OUTPATIENT
Start: 2021-11-02 | End: 2021-11-02 | Stop reason: HOSPADM

## 2021-11-02 RX ORDER — NALOXONE HCL 0.4 MG/ML
0.2 VIAL (ML) INJECTION AS NEEDED
Status: DISCONTINUED | OUTPATIENT
Start: 2021-11-02 | End: 2021-11-02 | Stop reason: HOSPADM

## 2021-11-02 RX ORDER — LABETALOL HYDROCHLORIDE 5 MG/ML
5 INJECTION, SOLUTION INTRAVENOUS
Status: DISCONTINUED | OUTPATIENT
Start: 2021-11-02 | End: 2021-11-02 | Stop reason: HOSPADM

## 2021-11-02 RX ADMIN — CEFAZOLIN SODIUM 2 G: 2 INJECTION, SOLUTION INTRAVENOUS at 09:09

## 2021-11-02 RX ADMIN — SODIUM CHLORIDE, POTASSIUM CHLORIDE, SODIUM LACTATE AND CALCIUM CHLORIDE: 600; 310; 30; 20 INJECTION, SOLUTION INTRAVENOUS at 10:22

## 2021-11-02 RX ADMIN — GLYCOPYRROLATE 0.3 MG: 0.2 INJECTION INTRAMUSCULAR; INTRAVENOUS at 10:22

## 2021-11-02 RX ADMIN — LIDOCAINE HYDROCHLORIDE 100 MG: 20 INJECTION, SOLUTION INFILTRATION; PERINEURAL at 09:16

## 2021-11-02 RX ADMIN — ROCURONIUM BROMIDE 40 MG: 50 INJECTION INTRAVENOUS at 09:16

## 2021-11-02 RX ADMIN — PHENYLEPHRINE HYDROCHLORIDE 100 MCG: 10 INJECTION, SOLUTION INTRAVENOUS at 09:48

## 2021-11-02 RX ADMIN — ONDANSETRON 4 MG: 2 INJECTION INTRAMUSCULAR; INTRAVENOUS at 10:22

## 2021-11-02 RX ADMIN — PROPOFOL 180 MG: 10 INJECTION, EMULSION INTRAVENOUS at 09:16

## 2021-11-02 RX ADMIN — EPHEDRINE SULFATE 10 MG: 50 INJECTION INTRAVENOUS at 10:13

## 2021-11-02 RX ADMIN — FENTANYL CITRATE 50 MCG: 0.05 INJECTION, SOLUTION INTRAMUSCULAR; INTRAVENOUS at 09:16

## 2021-11-02 RX ADMIN — DEXAMETHASONE SODIUM PHOSPHATE 6 MG: 4 INJECTION, SOLUTION INTRAMUSCULAR; INTRAVENOUS at 09:21

## 2021-11-02 RX ADMIN — SODIUM CHLORIDE, POTASSIUM CHLORIDE, SODIUM LACTATE AND CALCIUM CHLORIDE 9 ML/HR: 600; 310; 30; 20 INJECTION, SOLUTION INTRAVENOUS at 07:40

## 2021-11-02 RX ADMIN — GLYCOPYRROLATE 0.1 MG: 0.2 INJECTION INTRAMUSCULAR; INTRAVENOUS at 09:16

## 2021-11-02 RX ADMIN — FAMOTIDINE 20 MG: 10 INJECTION INTRAVENOUS at 08:16

## 2021-11-02 RX ADMIN — NEOSTIGMINE METHYLSULFATE 3 MG: 0.5 INJECTION INTRAVENOUS at 10:22

## 2021-11-02 NOTE — ANESTHESIA PREPROCEDURE EVALUATION
Anesthesia Evaluation     Patient summary reviewed and Nursing notes reviewed                Airway   Mallampati: II  TM distance: >3 FB  Dental      Pulmonary - negative pulmonary ROS   Cardiovascular     ECG reviewed  Rhythm: regular  Rate: normal    (+) hypertension, CAD, dysrhythmias PVC, hyperlipidemia,       Neuro/Psych- negative ROS  GI/Hepatic/Renal/Endo    (+) obesity,  GERD,  liver disease, renal disease stones,     Musculoskeletal (-) negative ROS    Abdominal    Substance History - negative use     OB/GYN negative ob/gyn ROS         Other      history of cancer remission                    Anesthesia Plan    ASA 3     general   (I have reviewed the patient's history with the patient and the chart, including all pertinent laboratory results and imaging. I have explained the risks of anesthesia including but not limited to dental damage, corneal abrasion, nerve injury, MI, stroke, and death. Questions asked and answered. Anesthetic plan discussed with patient and team as indicated. Patient expressed understanding of the above.  )  intravenous induction     Anesthetic plan, all risks, benefits, and alternatives have been provided, discussed and informed consent has been obtained with: patient.

## 2021-11-02 NOTE — DISCHARGE INSTRUCTIONS
Dr. Nixon Kolb  [address]  [place], [place] [address] [phone]    Discharge Instructions for Hernia Surgery    1. Go home, rest and take it easy today; however, you should get up and move about several times today to reduce the risk of developing a clot in your legs.      2. You may experience some dizziness or memory loss from the anesthesia.  This may last for the next 24 hours.  Someone should plan on staying with you for the first 24 hours for your safety.    3. Do not make any important legal decisions or sign any legal papers for the next 24 hours.      4. Eat and drink lightly today.  Start off with liquids, jello, soup, crackers or other bland foods at first. You may advance your diet tomorrow as tolerated as long as you do not experience any nausea or vomiting.     5. If skin glue (Dermabond) was used, your incisions are protected and covered.  The invisible glue will dissolve on its own as your incision heals. If dressings were used, you may remove your outer dressings in 3 days.  The white tapes called steri-strips should stay in place.  They will fall off on their own in 1-2 weeks.  Do not worry if they come off sooner.      6. If dressings were used, you may notice some bleeding/drainage on your outer dressings. A little bloody drainage is normal. If the bleeding/drainage is such that the bandage cannot absorb it, remove the dressing, apply clean gauze and apply firm pressure for a full 15 minutes.  If the bleeding continues, please call me.    7. You may shower tomorrow allowing water to run over the incisions; however, do not scrub the incisions.  No tub baths until your incisions are completely healed.      8. No lifting > 20 lbs. until you are seen at your follow-up visit.         9. You have received a prescription for a narcotic pain medicine, as you will have some pain following surgery.   You will not be totally pain free, but your pain medicine should make the pain  tolerable.  Please take your pain medicine as prescribed and always take your pills with food to prevent nausea. If you are having severe pain that cannot be controlled by the pain medicine, please contact me.      10. You have also received a prescription for an anti-nausea medicine.  Please take this as prescribed for any nausea or vomiting.  Nausea could be a result of the anesthesia or a result of the narcotic pain medicine.  If you experience severe nausea and vomiting that cannot be controlled by the nausea medicine, please call me.      11. If you had a laparoscopic surgery, it is not unusual to experience pain/discomfort in your shoulders or under your ribs after surgery.  It is from the gas used during the laparoscopic procedure and usually lasts 1-3 days.  The prescription pain medicine is used to treat the surgical pain and does not typically alleviate this “gassy” pain.     12. No driving for 24 hours and for as long as you are taking your prescription pain medicine.    13. You will need to call the office at [phone] to schedule a follow-up appointment in 6-10 days.     14. Remember to contact me for any of the following:    • Fever > 101 degrees  • Severe pain that cannot be controlled by taking your pain pills  • Severe nausea or vomiting that cannot be controlled by taking your nausea pills  • Significant bleeding of your incisions  • Drainage that has a bad smell or is yellow or green in appearance  • Any other questions or concerns      Additional Instruction for Inguinal Hernia Patients Only    1. If you did not urinate at the hospital after your surgery or if you feel the need to urinate and cannot, this will necessitate a return to the Emergency Room for placement of a urinary catheter.  You should also notify me as well.  As a rule, you should be able to empty your bladder within 4-6 hours after discharge from the hospital.      2. You may notice some scrotal bruising and/or swelling. A scrotal  support or briefs as well as ice packs may be used to alleviate discomfort.

## 2021-11-02 NOTE — OP NOTE
PREOPERATIVE DIAGNOSIS:  Incarcerated large left inguinal hernia    POSTOPERATIVE DIAGNOSIS (FINDINGS):  Incarcerated large left inguinal hernia with sigmoid colon incarcerated within scrotum    PROCEDURE:  Open incarcerated left inguinal hernia repair    SURGEON:  Nixon Kolb MD    ASSISTANT:  Pamela Zuniga and Zoey Garrido, was responsible for performing the following activities: suction, irrigation, suturing, closing, retraction, and placing dressing, and their skilled assistance was necessary for the success of this case.    ANESTHESIA:  General    EBL:  Minimal    SPECIMEN(S):  none    DESCRIPTION:  In supine position under general anesthetic prepped and draped usual sterile manner.  Half percent Marcaine with epinephrine mixed with Exparel infiltrated throughout the procedure.  Oblique incision made and carried to and through the external oblique fascia.  The ilioinguinal nerve was identified proximally and divided.  Large incarcerated hernia sac was encountered.  This was dissected and withdrawn from the scrotum and then  from the cord structures.  The cremasteric muscle was divided proximally.  The hernia was reduced to the preperitoneal plane and the peritoneum was imbricated to keep the hernia from protruding.  Generous medial relaxing incision was made.  Conjoined aponeurosis was tenderness and Bassini repair was performed securing the shelving edge of Poupart's ligament to the conjoined aponeurosis with interrupted 0 Ethibond, leaving an appropriate defect for the cord structures.  Good hemostasis was noted.  15 Bel[place]ian Efrain drain was placed along the inguinal canal into the scrotum.  External oblique was closed with running 0 Vicryl.  Thai's running 3-0 Vicryl.  Skin edges were closed with 3-0 Vicryl deep dermal and 5-0 Vicryl subcuticular followed by Dermabond.  Tolerated well, stable to recovery room.    Nixon Kolb M.D.

## 2021-11-02 NOTE — ANESTHESIA POSTPROCEDURE EVALUATION
Patient: Jeff Bass    Procedure Summary     Date: 11/02/21 Room / Location: [facility] OR 06 / [facility] MAIN OR    Anesthesia Start: 0911 Anesthesia Stop: 1049    Procedure: LEFT OPEN INGUINAL HERNIA REPAIR (Left Abdomen) Diagnosis:       Left inguinal hernia      (Left inguinal hernia [K40.90])    Surgeons: Nixon Kolb MD Provider: Igor Alberts MD    Anesthesia Type: general ASA Status: 3          Anesthesia Type: general    Vitals  Vitals Value Taken Time   /78 11/02/21 1116   Temp 36.4 °C (97.6 °F) 11/02/21 1115   Pulse 64 11/02/21 1119   Resp 18 11/02/21 1115   SpO2 94 % 11/02/21 1119   Vitals shown include unvalidated device data.        Post Anesthesia Care and Evaluation    Patient location during evaluation: PHASE II  Anesthetic complications: No anesthetic complications

## 2021-11-02 NOTE — ANESTHESIA PROCEDURE NOTES
Airway  Urgency: elective    Date/Time: 11/2/2021 9:19 AM  Airway not difficult    General Information and Staff    Patient location during procedure: OR  Anesthesiologist: Igor Alberts MD  CRNA: Lillie Arroyo CRNA    Indications and Patient Condition  Indications for airway management: airway protection    Preoxygenated: yes  MILS maintained throughout  Mask difficulty assessment: 2 - vent by mask + OA or adjuvant +/- NMBA    Final Airway Details  Final airway type: endotracheal airway      Successful airway: ETT  Cuffed: yes   Successful intubation technique: direct laryngoscopy  Endotracheal tube insertion site: oral  Blade: Yue  Blade size: 4  ETT size (mm): 7.5  Cormack-Lehane Classification: grade IIa - partial view of glottis  Placement verified by: chest auscultation and capnometry   Cuff volume (mL): 6  Measured from: teeth  ETT/EBT  to teeth (cm): 23  Number of attempts at approach: 1  Assessment: lips, teeth, and gum same as pre-op and atraumatic intubation

## 2021-11-08 ENCOUNTER — OFFICE VISIT (OUTPATIENT)
Dept: SURGERY | Facility: CLINIC | Age: 76
End: 2021-11-08

## 2021-11-08 DIAGNOSIS — Z09 FOLLOW UP: Primary | ICD-10-CM

## 2021-11-08 PROCEDURE — 99024 POSTOP FOLLOW-UP VISIT: CPT | Performed by: SURGERY

## 2021-11-08 NOTE — PROGRESS NOTES
Postoperative visit    Open left inguinal hernia repair 11/2/2021    Office visit: Incision is healing well.  Minimal scrotal swelling.  Has had no pain since the surgery.  Activity restrictions discussed.  Follow-up as needed.

## 2021-11-19 ENCOUNTER — APPOINTMENT (OUTPATIENT)
Dept: PREADMISSION TESTING | Facility: HOSPITAL | Age: 76
End: 2021-11-19

## 2021-12-03 NOTE — PROGRESS NOTES
Subjective   Patient ID: Jeff Bass is a 72 y.o. male is here today for follow-up on leg pain.    At the last visit the patient reported pain in the lower right leg with numbness in the foot and toes. He reported having some improvement after going to physical therapy.    Today the patient reports that he ha had one epidural since the last visit with 40%-60% relief.    Leg Pain    The incident occurred more than 1 week ago. There was no injury mechanism. The pain is present in the right knee, right ankle, right heel, right foot and right toes. The pain has been improving since onset. Associated symptoms include numbness. Pertinent negatives include no muscle weakness or tingling.       The following portions of the patient's history were reviewed and updated as appropriate: allergies, current medications, past family history, past medical history, past social history, past surgical history and problem list.    Review of Systems   Neurological: Positive for numbness. Negative for tingling.   All other systems reviewed and are negative.    This patient has known spinal stenosis at L3-L4 and L4-L5. His symptoms are not overwhelming but he does have a lot of pain when he stands up straight and tries to climb stairs or descend stairs. He continues to play pickleball, which is an indication of his functional status. The blocks seem to help him. He wanted to know if he could pay for his own block, but I think probably the cost would be prohibitory and so discouraged him from doing that and, instead, to stick with the once every 3-month timeframe dictated by Medicare. We will stick with that for now. He is having some issues with his heart and will be seeing a cardiologist. Will get a block in September, another one in December, and have him come back in 7 months.      Objective   Physical Exam   Constitutional: He is oriented to person, place, and time. He appears well-developed and well-nourished.   HENT:   Head:  Normocephalic and atraumatic.   Eyes: Conjunctivae and EOM are normal. Pupils are equal, round, and reactive to light.   Fundoscopic exam:       The right eye shows no papilledema. The right eye shows venous pulsations.        The left eye shows no papilledema. The left eye shows venous pulsations.   Neck: Carotid bruit is not present.   Neurological: He is oriented to person, place, and time. He has a normal Finger-Nose-Finger Test and a normal Heel to Shin Test. Gait normal.   Reflex Scores:       Tricep reflexes are 2+ on the right side and 2+ on the left side.       Bicep reflexes are 2+ on the right side and 2+ on the left side.       Brachioradialis reflexes are 2+ on the right side and 2+ on the left side.       Patellar reflexes are 2+ on the right side and 2+ on the left side.       Achilles reflexes are 2+ on the right side and 2+ on the left side.  Psychiatric: His speech is normal.     Neurologic Exam     Mental Status   Oriented to person, place, and time.   Registration of memory: Good recent and remote memory.   Attention: normal. Concentration: normal.   Speech: speech is normal   Level of consciousness: alert  Knowledge: consistent with education.     Cranial Nerves     CN II   Visual fields full to confrontation.   Visual acuity: normal    CN III, IV, VI   Pupils are equal, round, and reactive to light.  Extraocular motions are normal.     CN V   Facial sensation intact.   Right corneal reflex: normal  Left corneal reflex: normal    CN VII   Facial expression full, symmetric.   Right facial weakness: none  Left facial weakness: none    CN VIII   Hearing: intact    CN IX, X   Palate: symmetric    CN XI   Right sternocleidomastoid strength: normal  Left sternocleidomastoid strength: normal    CN XII   Tongue: not atrophic  Tongue deviation: none    Motor Exam   Muscle bulk: normal  Right arm tone: normal  Left arm tone: normal  Right leg tone: normal  Left leg tone: normal    Strength   Strength 5/5  except as noted.     Sensory Exam   Light touch normal.     Gait, Coordination, and Reflexes     Gait  Gait: normal    Coordination   Finger to nose coordination: normal  Heel to shin coordination: normal    Reflexes   Right brachioradialis: 2+  Left brachioradialis: 2+  Right biceps: 2+  Left biceps: 2+  Right triceps: 2+  Left triceps: 2+  Right patellar: 2+  Left patellar: 2+  Right achilles: 2+  Left achilles: 2+  Right : 2+  Left : 2+      Assessment/Plan   Independent Review of Radiographic Studies:    Reviewed his lumbar MRI done 4/11/18 which shows dextroscoliosis of multi-degenerative disc disease and spinal stenosis at L3-L4 and L4-L5.  Agree with the report.      Medical Decision Making:    He seemed to get a decent amount of benefit from block so we will stick to that plan and have him get another block in September and another one in December and I'll see him in 7 months.  If all else fails we can consider surgical treatment but I think the blocks will be sufficient.      Jeff was seen today for leg pain.    Diagnoses and all orders for this visit:    Spinal stenosis of lumbar region with neurogenic claudication  -     Epidural Block    DDD (degenerative disc disease), lumbar  -     Epidural Block      Return in about 7 months (around 1/29/2019).                  cleansed

## 2021-12-16 ENCOUNTER — LAB (OUTPATIENT)
Dept: LAB | Facility: HOSPITAL | Age: 76
End: 2021-12-16

## 2021-12-16 ENCOUNTER — OFFICE VISIT (OUTPATIENT)
Dept: ONCOLOGY | Facility: CLINIC | Age: 76
End: 2021-12-16

## 2021-12-16 VITALS
HEART RATE: 88 BPM | OXYGEN SATURATION: 95 % | TEMPERATURE: 97.6 F | HEIGHT: 77 IN | SYSTOLIC BLOOD PRESSURE: 135 MMHG | DIASTOLIC BLOOD PRESSURE: 78 MMHG | WEIGHT: 246.3 LBS | BODY MASS INDEX: 29.08 KG/M2 | RESPIRATION RATE: 16 BRPM

## 2021-12-16 DIAGNOSIS — C91.10 CHRONIC LYMPHOCYTIC LEUKEMIA (HCC): Primary | ICD-10-CM

## 2021-12-16 DIAGNOSIS — C91.10 CHRONIC LYMPHOCYTIC LEUKEMIA (HCC): ICD-10-CM

## 2021-12-16 LAB
BASOPHILS # BLD AUTO: 0.1 10*3/MM3 (ref 0–0.2)
BASOPHILS NFR BLD AUTO: 1.1 % (ref 0–1.5)
DEPRECATED RDW RBC AUTO: 41.1 FL (ref 37–54)
EOSINOPHIL # BLD AUTO: 0.23 10*3/MM3 (ref 0–0.4)
EOSINOPHIL NFR BLD AUTO: 2.6 % (ref 0.3–6.2)
ERYTHROCYTE [DISTWIDTH] IN BLOOD BY AUTOMATED COUNT: 13.3 % (ref 12.3–15.4)
HCT VFR BLD AUTO: 43.5 % (ref 37.5–51)
HGB BLD-MCNC: 14.9 G/DL (ref 13–17.7)
IMM GRANULOCYTES # BLD AUTO: 0.06 10*3/MM3 (ref 0–0.05)
IMM GRANULOCYTES NFR BLD AUTO: 0.7 % (ref 0–0.5)
LYMPHOCYTES # BLD AUTO: 2.24 10*3/MM3 (ref 0.7–3.1)
LYMPHOCYTES NFR BLD AUTO: 25.1 % (ref 19.6–45.3)
MCH RBC QN AUTO: 29 PG (ref 26.6–33)
MCHC RBC AUTO-ENTMCNC: 34.3 G/DL (ref 31.5–35.7)
MCV RBC AUTO: 84.6 FL (ref 79–97)
MONOCYTES # BLD AUTO: 0.7 10*3/MM3 (ref 0.1–0.9)
MONOCYTES NFR BLD AUTO: 7.8 % (ref 5–12)
NEUTROPHILS NFR BLD AUTO: 5.61 10*3/MM3 (ref 1.7–7)
NEUTROPHILS NFR BLD AUTO: 62.7 % (ref 42.7–76)
NRBC BLD AUTO-RTO: 0 /100 WBC (ref 0–0.2)
PLATELET # BLD AUTO: 201 10*3/MM3 (ref 140–450)
PMV BLD AUTO: 9.2 FL (ref 6–12)
RBC # BLD AUTO: 5.14 10*6/MM3 (ref 4.14–5.8)
WBC NRBC COR # BLD: 8.94 10*3/MM3 (ref 3.4–10.8)

## 2021-12-16 PROCEDURE — 36415 COLL VENOUS BLD VENIPUNCTURE: CPT

## 2021-12-16 PROCEDURE — 85025 COMPLETE CBC W/AUTO DIFF WBC: CPT

## 2021-12-16 PROCEDURE — 99213 OFFICE O/P EST LOW 20 MIN: CPT | Performed by: INTERNAL MEDICINE

## 2021-12-16 NOTE — PROGRESS NOTES
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     HISTORY OF PRESENT ILLNESS:        DURING THE VISIT WITH THE PATIENT TODAY , PATIENT HAD FACE MASK, MY MEDICAL ASSISTANT AND I  HAD PROPPER PROTECTIVE EQUIPMENT, AND I DID HAND HYGIENE WITH SOAP AND WATER BEFORE AND AFTER THE VISIT.    This patient returns today to the office for followup of the above diagnosis. In the interim he has had left inguinal hernia surgery by Dr. Kolb. He had great care and he has healed well from this. His appetite is very good. He has slowed down food by the window and he has not gained more weight. His bowel activity is now normal on MiraLAX every day. Urination is ongoing. PSA recently measured by his Urology was 0.2. He has not had any bone pain. Occasional joint pain in the knees. Physical therapy is ongoing for them. He has no fevers, chills, sweats, pruritus, adenopathies, rashes or infections. No autoimmunity.          Past Medical History:   Diagnosis Date   • Arthritis    • Benign prostatic hyperplasia     FOLLOWED BY DR. VIVIEN PATEL   • Biliary dyskinesia    • Bunion of right foot     GREAT TOE   • CLL (chronic lymphocytic leukemia) ([facility]) 2016    FOLLOWED BY DR WALKER   • Colon polyps     FOLLOWED BY DR. NEHA HAM   • Constipation    • Degeneration of lumbar intervertebral disc    • Diverticulosis    • Erectile dysfunction    • GERD (gastroesophageal reflux disease)    • Hallux valgus of left foot    • Hyperlipidemia     MIXED   • Hypertension    • Inguinal hernia    • Kidney stone 02/21/2009    SEEN AT Loc[facility] ER   • Localized, primary osteoarthritis    • Lumbar radiculopathy    • Lumbar stenosis    • Lung mass     LEFT SIDE - SCAR TISSUE PER PATIENT    • Polyneuropathy    • Prostate CA ([facility]) 03/2016    JACQUELYN 6, S/P XRT, FOLLOWED BY DR. VIVIEN PATEL, RADIATION SEEDS   • PVC (premature ventricular contraction)     PT STATES WORKED UP YEARS AGO BY [facility]  CARDIO FOR POSSIBLE MURMUR - NO FOLLOW UP REQUIRED    • RBBB    • Sensory hearing loss, bilateral    • Skin cancer     SQUAMOUS CELL CARCINOMA OF FACE   • Substernal goiter    • Thyromegaly 12/2016    ADMITTED TO [facility]   • Vitamin D deficiency      Past Surgical History:   Procedure Laterality Date   • BRONCHOSCOPY N/A 12/14/2016    Procedure: BRONCHOSCOPY WITH  BAL AND BIOPSY AND ENDOBRONCHIAL ULTRASOUND  AND NAVIGATION WITH BRUSHINGS AND BIOPSY AND BAL;  Surgeon: Pierre Manning MD;  Location: [facility] ENDOSCOPY;  Service:    • COLONOSCOPY N/A 3/13/2019    5 MM SESSILE TUBULAR ADENOMA POLYP IN TRANSVERSE, MULTIPLE SMALL AND LARGE DIVERTICULA IN SIGMOID, RESCOPE IN 5 YRS, DR. NEHA HAM AT [facility]   • COLONOSCOPY W/ POLYPECTOMY N/A 03/19/2015    3 TUBULAR ADENOMA POLYPS, 12 TUBULOVILLOUS POLYP RECTO-SIGMOID, DIVERTICULOSIS, RESCOPE IN 3 YRS, DR. NEHA HAM AT [facility]   • EYE SURGERY Bilateral     CATARACT EXTRACTION WITH LENS IMPLANT, DR. GOVEA   • INGUINAL HERNIA REPAIR Left 11/2/2021    Procedure: LEFT OPEN INGUINAL HERNIA REPAIR;  Surgeon: Nixon Kolb MD;  Location: [facility] MAIN OR;  Service: General;  Laterality: Left;   • PROSTATE RADIOACTIVE SEED IMPLANT N/A 09/06/2016    DR. HOA JARAMILLO AT [facility]   • PROSTATE SURGERY N/A 03/11/2016    BIOPSY: ADENOCARCINOMA, DR. ZAID IBARRA   • THYROID BIOPSY Bilateral 11/01/2017    NO B CELL OR T CELL LYMPHOMA, DONE AT [facility]     Social History     Socioeconomic History   • Marital status:      Spouse name: No   • Years of education: College   Tobacco Use   • Smoking status: Never Smoker   • Smokeless tobacco: Never Used   Substance and Sexual Activity   • Alcohol use: No   • Drug use: No   • Sexual activity: Yes     Partners: Female     Birth control/protection: None     Family History   Problem Relation Age of Onset   • Heart disease Father         Rheumatic heart disease   • Hypertension Father    • Stroke Mother    • No Known Problems Daughter    •  "Malig Hyperthermia Neg Hx      Current Outpatient Medications on File Prior to Visit   Medication Sig   • alfuzosin (UROXATRAL) 10 MG 24 hr tablet Take 20 mg by mouth Every Night.   • ASPIRIN 81 PO Take 81 mg by mouth Daily.   • CRANBERRY CONCENTRATE PO Take 500 mg by mouth Daily.   • esomeprazole (nexIUM) 20 MG capsule Take 20 mg by mouth Every Morning Before Breakfast.   • losartan-hydrochlorothiazide (HYZAAR) 100-25 MG per tablet Take 1 tablet by mouth Daily.   • Melatonin 10 MG tablet Take 10 mg by mouth Every Night.   • polyethylene glycol (MIRALAX) 17 g packet Take 17 g by mouth Daily.   • potassium chloride (K-DUR,KLOR-CON) 20 MEQ CR tablet Take 1 tablet by mouth Daily.   • pravastatin (PRAVACHOL) 20 MG tablet Take 20 mg by mouth Every Night.   • Probiotic Product (PROBIOTIC PO) Take 1 tablet by mouth Daily.   • Red Yeast Rice Extract (RED YEAST RICE PO) Take 1,200 mg by mouth Daily. HELD FOR OR   • Unable to find Med Name: [place], 400 mg, 1 tablet daily   HELD FOR OR   • vitamin D3 125 MCG (5000 UT) capsule capsule Take 5,000 Units by mouth Daily.     No current facility-administered medications on file prior to visit.   No Known Allergies              VITAL SIGNS:    Vitals:    21 1341   BP: 135/78   Pulse: 88   Resp: 16   Temp: 97.6 °F (36.4 °C)   TempSrc: Temporal   SpO2: 95%   Weight: 112 kg (246 lb 4.8 oz)   Height: 195.6 cm (77.01\")           PAIN:  0 out of 10      ECO         PHYSICAL EXAMINATION:   I HAVE PERSONALLY REVIEWED THE HISTORY OF THE PRESENT ILLNESS, PAST MEDICAL HISTORY, FAMILY HISTORY, SOCIAL HISTORY, ALLERGIES, MEDICATIONS STATED ABOVE IN THE  NOTE FROM TODAY.        GENERAL:  Well-developed, well-nourished  Patient  in no acute distress.   SKIN:  Warm, dry ,NO rashes,NO purpura ,NO petechiae.  HEENT:  Pupils were equal and reactive to light and accomodation, conjunctivae noninjected, no pterygium, normal extraocular movements, normal visual acuity.   NECK:  Supple with " good range of motion; no thyromegaly , no other masses, no JVD or bruits, no cervical adenopathies.No carotid artery pain, no carotid abnormal pulsation , NO arterial dance.  LYMPHATICS:  No cervical, NO supraclavicular, NO axillary,NO epitrochlear , NO inguinal adenopathy.  CARDIAC   normal rate and regular rhythm, without murmur,NO rubs NO S3 NO S4 right or left .  LUNGS: normal breath sounds bilateral, no wheezing, rhonchi, crackles or rubs.  VASCULAR VENOUS: no cyanosis, collateral circulation, varicosities, edema, palpable cords, pain, erythema.  ABDOMEN:  Soft, nontender with no hepatomegaly, no splenomegaly,no masses, no ascites, no collateral circulation,no distention,no [place] sign.  EXTREMITIES  AND SPINE:  No clubbing, cyanosis or edema, no deformities , no pain .No kyphosis, scoliosis, no other deformities, no pain in spine, no pain in ribs , no pain inpelvic bone.  NEUROLOGICAL:  Patient was awake, alert, oriented to time, person and place.                   LABORATORY DATA:  CBC Auto Differential  Order: [id number] - Part of Panel Order [id number]   Status: Final result     Visible to patient: No (scheduled for 12/17/2021  3:41 AM)     Dx: Chronic lymphocytic leukemia (HCC)    Specimen Information: Blood         0 Result Notes    Component   Ref Range & Units 13:32   (12/16/21) 1 mo ago   (10/29/21) 4 mo ago   (8/13/21) 7 mo ago   (4/22/21) 11 mo ago   (1/20/21) 1 yr ago   (10/22/20) 1 yr ago   (7/30/20)   WBC   3.40 - 10.80 10*3/mm3 8.94  7.70  7.13  6.68  6.01  6.65  5.36    RBC   4.14 - 5.80 10*6/mm3 5.14  5.09  5.33  5.14  5.16  4.81  4.83    Hemoglobin   13.0 - 17.7 g/dL 14.9  14.8  15.6  15.0  15.2  14.4  14.3    Hematocrit   37.5 - 51.0 % 43.5  43.5  44.1  43.6  43.9  40.1  40.6    MCV   79.0 - 97.0 fL 84.6  85.5  82.7  84.8  85.1  83.4  84.1    MCH   26.6 - 33.0 pg 29.0  29.1  29.3  29.2  29.5  29.9  29.6    MCHC   31.5 - 35.7 g/dL 34.3  34.0  35.4  34.4  34.6  35.9 High   35.2    RDW   12.3 -  15.4 % 13.3  13.0  13.2  12.8  12.7  13.0  13.5    RDW-SD   37.0 - 54.0 fl 41.1  40.8  39.7  39.7  39.3  39.1  41.4    MPV   6.0 - 12.0 fL 9.2  9.6  9.4  9.2  9.1  9.3  9.1    Platelets   140 - 450 10*3/mm3 201  192  192  182  172  163  182    Neutrophil %   42.7 - 76.0 % 62.7  56.9  63.7  60.8  63.0  59.6  61.8    Lymphocyte %   19.6 - 45.3 % 25.1  27.5  24.4  26.2  24.8  24.4  28.2    Monocyte %   5.0 - 12.0 % 7.8  9.9  8.1  9.3  8.7  8.6  8.2    Eosinophil %   0.3 - 6.2 % 2.6  4.2  2.1  2.2  2.2  5.7  0.7    Basophil %   0.0 - 1.5 % 1.1  0.9  1.3  1.2  1.0  1.2  0.9    Immature Grans %   0.0 - 0.5 % 0.7 High   0.6 High   0.4                                       ASSESSMENT: 1.  Patient has history of chronic lymphoid leukemia that required therapy with Rituxan in 2016. Before that he was treated with bendamustine and Rituxan with no difficulties. In 2016 when the patient was treated with Rituxan as a single agent he had a very severe reaction during the 1st infusion and the infusion needed to be completed in the hospital. The 2nd infusion was completed in the office. Thereafter he did not get the 3rd or 4th infusions. Since then the patient has remained in remission. Obviously all of the stressors of the pandemia have been on him. We documented in May that his white count was rising, his lymphocyte count was rising, he was having fatigue and weight loss and we advised him to proceed with Rituxan. To avoid the reaction that he had during the previous Rituxan infusion in 2016 we advised the patient to be premedicated. He has not had any reaction whatsoever with the 1st, 2nd and 3rd infusion but he feels so well that he does not want to pursue the 4th and last infusion .            The patient was further checked on 04/22/2021. He has been gaining weight because of stressors and situations that make him to eat more. He has not had any infections and he completed his COVID vaccination. He has no peripheral  adenopathy, hepatosplenomegaly. His white count, hemoglobin, platelets and white count differential remain normal. He has no autoimmunity.     His constipation remains an ongoing issue. This is in spite of taking multiple stool softeners and bulk medications. It is time for him to have a colonoscopy and I have referred him to be seen by Dr. Hieu Lester.     The patient was further reviewed on 08/13/2021. He has no symptoms or signs of CLL. He has no autoimmunity, no infections, no adenopathies, no hepatosplenomegaly. He looks terrific. I pointed out to him that since pandemia, given the fact that he eats by the window all the time that he has gained 20 pounds. At least he has not gained anymore weight since the previous visit. I asked him to watch what he is putting in his mouth. He has changed his diet with more salads and maybe this is having a positive impact on that issue.     I will review him back in 4 months with a CBC. Otherwise, no other intervention from my point of view. He is up to date in his shingles vaccination.     He also got his COVID vaccination.  The patient was reviewed on 12/16/2021. He has no symptoms pertinent to CLL including no fevers, chills, night sweats, pruritus, autoimmunity or infections. He has no peripheral adenopathy or hepatosplenomegaly. His white count, hemoglobin, platelets, white count differential remain normal.     Under the present circumstances, I do believe that his disease looks in remission. I find no need for radiological assessment or any other intervention and he will return to see us back in 4 months with a CBC.      ·

## 2021-12-17 ENCOUNTER — TELEPHONE (OUTPATIENT)
Dept: ONCOLOGY | Facility: CLINIC | Age: 76
End: 2021-12-17

## 2021-12-17 NOTE — TELEPHONE ENCOUNTER
Caller: Jeff Bass    Relationship to patient: Self    Best call back number: [phone]    Chief complaint: PATIENT NEEDS TO RESCHEDULE APPOINTMENT    Type of visit: LABS AND FOLLOW UP    Requested date: PATIENT NEEDS EARLIER IN THE DAY LIKE AROUND 12:30     If rescheduling, when is the original appointment: 4-22-22     Additional notes:PLEASE CALL PATIENT TO ADVISE

## 2022-04-22 ENCOUNTER — LAB (OUTPATIENT)
Dept: LAB | Facility: HOSPITAL | Age: 77
End: 2022-04-22

## 2022-04-22 ENCOUNTER — OFFICE VISIT (OUTPATIENT)
Dept: ONCOLOGY | Facility: CLINIC | Age: 77
End: 2022-04-22

## 2022-04-22 VITALS
RESPIRATION RATE: 17 BRPM | HEART RATE: 103 BPM | TEMPERATURE: 97.7 F | BODY MASS INDEX: 28.31 KG/M2 | WEIGHT: 239.8 LBS | DIASTOLIC BLOOD PRESSURE: 66 MMHG | HEIGHT: 77 IN | SYSTOLIC BLOOD PRESSURE: 108 MMHG | OXYGEN SATURATION: 97 %

## 2022-04-22 DIAGNOSIS — C91.10 CHRONIC LYMPHOCYTIC LEUKEMIA: ICD-10-CM

## 2022-04-22 DIAGNOSIS — C91.10 CHRONIC LYMPHOCYTIC LEUKEMIA: Primary | ICD-10-CM

## 2022-04-22 DIAGNOSIS — C61 MALIGNANT NEOPLASM OF PROSTATE: ICD-10-CM

## 2022-04-22 LAB
BASOPHILS # BLD AUTO: 0.08 10*3/MM3 (ref 0–0.2)
BASOPHILS NFR BLD AUTO: 1 % (ref 0–1.5)
DEPRECATED RDW RBC AUTO: 43.8 FL (ref 37–54)
EOSINOPHIL # BLD AUTO: 0.48 10*3/MM3 (ref 0–0.4)
EOSINOPHIL NFR BLD AUTO: 6.2 % (ref 0.3–6.2)
ERYTHROCYTE [DISTWIDTH] IN BLOOD BY AUTOMATED COUNT: 14.7 % (ref 12.3–15.4)
HCT VFR BLD AUTO: 37.1 % (ref 37.5–51)
HGB BLD-MCNC: 13.1 G/DL (ref 13–17.7)
IMM GRANULOCYTES # BLD AUTO: 0.05 10*3/MM3 (ref 0–0.05)
IMM GRANULOCYTES NFR BLD AUTO: 0.6 % (ref 0–0.5)
LYMPHOCYTES # BLD AUTO: 1.45 10*3/MM3 (ref 0.7–3.1)
LYMPHOCYTES NFR BLD AUTO: 18.6 % (ref 19.6–45.3)
MCH RBC QN AUTO: 29.2 PG (ref 26.6–33)
MCHC RBC AUTO-ENTMCNC: 35.3 G/DL (ref 31.5–35.7)
MCV RBC AUTO: 82.6 FL (ref 79–97)
MONOCYTES # BLD AUTO: 0.84 10*3/MM3 (ref 0.1–0.9)
MONOCYTES NFR BLD AUTO: 10.8 % (ref 5–12)
NEUTROPHILS NFR BLD AUTO: 4.89 10*3/MM3 (ref 1.7–7)
NEUTROPHILS NFR BLD AUTO: 62.8 % (ref 42.7–76)
NRBC BLD AUTO-RTO: 0 /100 WBC (ref 0–0.2)
PLATELET # BLD AUTO: 149 10*3/MM3 (ref 140–450)
PMV BLD AUTO: 8.7 FL (ref 6–12)
RBC # BLD AUTO: 4.49 10*6/MM3 (ref 4.14–5.8)
WBC NRBC COR # BLD: 7.79 10*3/MM3 (ref 3.4–10.8)

## 2022-04-22 PROCEDURE — 36415 COLL VENOUS BLD VENIPUNCTURE: CPT

## 2022-04-22 PROCEDURE — 99213 OFFICE O/P EST LOW 20 MIN: CPT | Performed by: INTERNAL MEDICINE

## 2022-04-22 PROCEDURE — 85025 COMPLETE CBC W/AUTO DIFF WBC: CPT

## 2022-04-22 RX ORDER — HYDROCHLOROTHIAZIDE 25 MG/1
25 TABLET ORAL DAILY
COMMUNITY
Start: 2022-01-06 | End: 2022-09-28

## 2022-04-22 RX ORDER — PANTOPRAZOLE SODIUM 40 MG/1
40 TABLET, DELAYED RELEASE ORAL DAILY
COMMUNITY

## 2022-04-22 NOTE — PROGRESS NOTES
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     HISTORY OF PRESENT ILLNESS:        DURING THE VISIT WITH THE PATIENT TODAY , PATIENT HAD FACE MASK, MY MEDICAL ASSISTANT AND I  HAD PROPPER PROTECTIVE EQUIPMENT, AND I DID HAND HYGIENE WITH SOAP AND WATER BEFORE AND AFTER THE VISIT.    This patient returns today to the office for followup of the above diagnosis. In the interim he has not had any new health issues. He has been seen by Dr. Salomon, his primary physician. Nothing wrong has been found with him. He wants for me to review his middle finger, right hand nail that is deformed after trauma. He has no pain. Otherwise, the patient feels fantastic. He is very active physically and mentally. Good bowel activity as long as he takes MiraLAX. Urination is normal with no hematuria, no nocturia. His PSA has remained completely quiet. He has no new cardiovascular or respiratory problems. No fever, chills, infections, adenopathy or fatigue.          Past Medical History:   Diagnosis Date   • Arthritis    • Benign prostatic hyperplasia     FOLLOWED BY DR. VIVIEN PATEL   • Biliary dyskinesia    • Bunion of right foot     GREAT TOE   • CLL (chronic lymphocytic leukemia) ([facility]) 2016    FOLLOWED BY DR WALKER   • Colon polyps     FOLLOWED BY DR. NEHA HAM   • Constipation    • Degeneration of lumbar intervertebral disc    • Diverticulosis    • Erectile dysfunction    • GERD (gastroesophageal reflux disease)    • Hallux valgus of left foot    • Hyperlipidemia     MIXED   • Hypertension    • Inguinal hernia    • Kidney stone 02/21/2009    SEEN AT [facility]   • Localized, primary osteoarthritis    • Lumbar radiculopathy    • Lumbar stenosis    • Lung mass     LEFT SIDE - SCAR TISSUE PER PATIENT    • Polyneuropathy    • Prostate CA ([facility]) 03/2016    JACQUELYN 6, S/P XRT, FOLLOWED BY DR. VIVIEN PATEL, RADIATION SEEDS   • PVC (premature ventricular contraction)      PT STATES WORKED UP YEARS AGO BY [facility] FOR POSSIBLE MURMUR - NO FOLLOW UP REQUIRED    • RBBB    • Sensory hearing loss, bilateral    • Skin cancer     SQUAMOUS CELL CARCINOMA OF FACE   • Substernal goiter    • Thyromegaly 12/2016    ADMITTED TO [facility]   • Vitamin D deficiency      Past Surgical History:   Procedure Laterality Date   • BRONCHOSCOPY N/A 12/14/2016    Procedure: BRONCHOSCOPY WITH  BAL AND BIOPSY AND ENDOBRONCHIAL ULTRASOUND  AND NAVIGATION WITH BRUSHINGS AND BIOPSY AND BAL;  Surgeon: Pierre Manning MD;  Location: [facility] ENDOSCOPY;  Service:    • COLONOSCOPY N/A 3/13/2019    5 MM SESSILE TUBULAR ADENOMA POLYP IN TRANSVERSE, MULTIPLE SMALL AND LARGE DIVERTICULA IN SIGMOID, RESCOPE IN 5 YRS, DR. NEHA HAM AT [facility]   • COLONOSCOPY W/ POLYPECTOMY N/A 03/19/2015    3 TUBULAR ADENOMA POLYPS, 12 TUBULOVILLOUS POLYP RECTO-SIGMOID, DIVERTICULOSIS, RESCOPE IN 3 YRS, DR. NEHA HAM AT [facility]   • EYE SURGERY Bilateral     CATARACT EXTRACTION WITH LENS IMPLANT, DR. GOVEA   • INGUINAL HERNIA REPAIR Left 11/2/2021    Procedure: LEFT OPEN INGUINAL HERNIA REPAIR;  Surgeon: Nixon Kolb MD;  Location: [facility] OR;  Service: General;  Laterality: Left;   • PROSTATE RADIOACTIVE SEED IMPLANT N/A 09/06/2016    DR. HOA JARAMILLO AT [facility]   • PROSTATE SURGERY N/A 03/11/2016    BIOPSY: ADENOCARCINOMA, DR. ZAID IBARRA   • THYROID BIOPSY Bilateral 11/01/2017    NO B CELL OR T CELL LYMPHOMA, DONE AT [facility]     Social History     Socioeconomic History   • Marital status:      Spouse name: No   • Years of education: College   Tobacco Use   • Smoking status: Never Smoker   • Smokeless tobacco: Never Used   Substance and Sexual Activity   • Alcohol use: No   • Drug use: No   • Sexual activity: Yes     Partners: Female     Birth control/protection: None     Family History   Problem Relation Age of Onset   • Heart disease Father         Rheumatic heart disease   • Hypertension Father    • Stroke Mother   "  • No Known Problems Daughter    • Loc Hyperthermia Neg Hx      Current Outpatient Medications on File Prior to Visit   Medication Sig   • alfuzosin (UROXATRAL) 10 MG 24 hr tablet Take 20 mg by mouth Every Night.   • ASPIRIN 81 PO Take 81 mg by mouth Daily.   • CRANBERRY CONCENTRATE PO Take 500 mg by mouth Daily.   • esomeprazole (nexIUM) 20 MG capsule Take 20 mg by mouth Every Morning Before Breakfast.   • hydroCHLOROthiazide (HYDRODIURIL) 25 MG tablet    • losartan-hydrochlorothiazide (HYZAAR) 100-25 MG per tablet Take 1 tablet by mouth Daily.   • Melatonin 10 MG tablet Take 10 mg by mouth Every Night.   • pantoprazole (PROTONIX) 40 MG EC tablet Take 40 mg by mouth Daily.   • polyethylene glycol (MIRALAX) 17 g packet Take 17 g by mouth Daily.   • potassium chloride (K-DUR,KLOR-CON) 20 MEQ CR tablet Take 1 tablet by mouth Daily.   • pravastatin (PRAVACHOL) 20 MG tablet Take 20 mg by mouth Every Night.   • Probiotic Product (PROBIOTIC PO) Take 1 tablet by mouth Daily.   • Red Yeast Rice Extract (RED YEAST RICE PO) Take 1,200 mg by mouth Daily. HELD FOR OR   • Unable to find Med Name: Slippery Elm, 400 mg, 1 tablet daily   HELD FOR OR   • vitamin D3 125 MCG (5000 UT) capsule capsule Take 5,000 Units by mouth Daily.     No current facility-administered medications on file prior to visit.   No Known Allergies              VITAL SIGNS:    Vitals:    22 1336   BP: 108/66   Pulse: 103   Resp: 17   Temp: 97.7 °F (36.5 °C)   TempSrc: Temporal   SpO2: 97%   Weight: 109 kg (239 lb 12.8 oz)   Height: 195.6 cm (77.01\")           PAIN:  0 out of 10      ECO         PHYSICAL EXAMINATION:     I HAVE PERSONALLY REVIEWED THE HISTORY OF THE PRESENT ILLNESS, PAST MEDICAL HISTORY, FAMILY HISTORY, SOCIAL HISTORY, ALLERGIES, MEDICATIONS STATED ABOVE IN THE  NOTE FROM TODAY.        GENERAL:  Well-developed, well-nourished  Patient  in no acute distress.   SKIN:  Warm, dry ,NO purpura ,NO petechiae, no rash.thickening of " middle finger nail r hand  HEENT:  Pupils were equal and reactive to light and accomodation, conjunctivae noninjected, no pterygium, normal extraocular movements, normal visual acuity.   NECK:  Supple with good range of motion; no thyromegaly , no other masses, no JVD or bruits,.No carotid artery pain, no carotid abnormal pulsation , NO arterial dance.  LYMPHATICS:  No cervical, NO supraclavicular, NO axillary,NO epitrochlear , NO inguinal adenopathies.  CARDIAC   normal rate , regular rhythm, without murmur,NO rubs NO S3 NO S4   LUNGS: normal breath sounds bilateral, no wheezing, NO rhonchi, NO crackles ,NO rubs.  VASCULAR VENOUS: no cyanosis, NO collateral circulation, NO varicosities, NO edema, NO palpable cords, NO pain,NO erythema, NO pigmentation of the skin.  ABDOMEN:  Soft, NO pain,no hepatomegaly, no splenomegaly,no masses, no ascites, no collateral circulation,no distention,no Eddie sign.  EXTREMITIES  AND SPINE:  No clubbing, no cyanosis ,no deformities , no pain .No kyphosis,  no pain in spine, no pain in ribs , no pain in pelvic bone.  NEUROLOGICAL:  Patient was awake, alert, oriented to time, person and place.                   LABORATORY DATA:    Lab Results   Component Value Date    WBC 7.79 04/22/2022    HGB 13.1 04/22/2022    HCT 37.1 (L) 04/22/2022    MCV 82.6 04/22/2022     04/22/2022                                 ASSESSMENT: 1.  Patient has history of chronic lymphoid leukemia that required therapy with Rituxan in 2016. Before that he was treated with bendamustine and Rituxan with no difficulties. In 2016 when the patient was treated with Rituxan as a single agent he had a very severe reaction during the 1st infusion and the infusion needed to be completed in the hospital. The 2nd infusion was completed in the office. Thereafter he did not get the 3rd or 4th infusions. Since then the patient has remained in remission. Obviously all of the stressors of the pandemia have been on him. We  documented in May that his white count was rising, his lymphocyte count was rising, he was having fatigue and weight loss and we advised him to proceed with Rituxan. To avoid the reaction that he had during the previous Rituxan infusion in 2016 we advised the patient to be premedicated. He has not had any reaction whatsoever with the 1st, 2nd and 3rd infusion but he feels so well that he does not want to pursue the 4th and last infusion .            The patient was further checked on 04/22/2021. He has been gaining weight because of stressors and situations that make him to eat more. He has not had any infections and he completed his COVID vaccination. He has no peripheral adenopathy, hepatosplenomegaly. His white count, hemoglobin, platelets and white count differential remain normal. He has no autoimmunity.     His constipation remains an ongoing issue. This is in spite of taking multiple stool softeners and bulk medications. It is time for him to have a colonoscopy and I have referred him to be seen by Dr. Hieu Lester.     The patient was further reviewed on 08/13/2021. He has no symptoms or signs of CLL. He has no autoimmunity, no infections, no adenopathies, no hepatosplenomegaly. He looks terrific. I pointed out to him that since pandemia, given the fact that he eats by the window all the time that he has gained 20 pounds. At least he has not gained anymore weight since the previous visit. I asked him to watch what he is putting in his mouth. He has changed his diet with more salads and maybe this is having a positive impact on that issue.     I will review him back in 4 months with a CBC. Otherwise, no other intervention from my point of view. He is up to date in his shingles vaccination.     He also got his COVID vaccination.  The patient was reviewed on 12/16/2021. He has no symptoms pertinent to CLL including no fevers, chills, night sweats, pruritus, autoimmunity or infections. He has no peripheral  adenopathy or hepatosplenomegaly. His white count, hemoglobin, platelets, white count differential remain normal.     Under the present circumstances, I do believe that his disease looks in remission. I find no need for radiological assessment or any other intervention and he will return to see us back in 4 months with a CBC.  This patient was seen on 04/22/2022 in regard to his CLL. He has no symptoms pertinent to this including no fevers or chills, no sweats, pruritus, adenopathies, or infections. No autoimmunity. He has no peripheral adenopathy or hepatosplenomegaly. His white count is normal. White count differential is normal. Hemoglobin is up to 13.1. Platelet count is normal.     Under the present circumstances, his CLL will remain in observation, returning to see me back every 4 months.   2. He has onychogryphosis of the nail, middle finger, right hand. I have referred him to see Dr. Bobby, hand surgeon, for removal of this.        ·

## 2022-04-25 ENCOUNTER — TELEPHONE (OUTPATIENT)
Dept: ONCOLOGY | Facility: CLINIC | Age: 77
End: 2022-04-25

## 2022-04-25 NOTE — TELEPHONE ENCOUNTER
Provider: DR WALKER  Caller: CJ  Relationship to Patient: DR ZEE'S OFFICE    Reason for Call: DR ZEE'S OFFICE CALLED, THEY HAVE RECEIVED A REFERRAL FOR PATIENT, CJ IS WANTING TO KNOW WHY MAC'S FINGERNAIL NEEDS TO COME OFF.  SHE IS ASKING FOR ANOTHER OFFICE NOTE.    PLEASE ADVISE

## 2022-05-11 ENCOUNTER — TELEPHONE (OUTPATIENT)
Dept: ONCOLOGY | Facility: CLINIC | Age: 77
End: 2022-05-11

## 2022-05-11 DIAGNOSIS — C91.10 CHRONIC LYMPHOCYTIC LEUKEMIA: Primary | ICD-10-CM

## 2022-05-11 DIAGNOSIS — M25.569 KNEE PAIN, UNSPECIFIED CHRONICITY, UNSPECIFIED LATERALITY: ICD-10-CM

## 2022-05-11 NOTE — TELEPHONE ENCOUNTER
Called to let pt know that we would refer him to Dr. Rachel for both his knee pain and hand issues. Pt v/u. Anneliese Torres RN

## 2022-06-14 ENCOUNTER — TRANSCRIBE ORDERS (OUTPATIENT)
Dept: ADMINISTRATIVE | Facility: HOSPITAL | Age: 77
End: 2022-06-14

## 2022-06-14 DIAGNOSIS — L60.9 NAIL ABNORMALITY: Primary | ICD-10-CM

## 2022-06-16 ENCOUNTER — OFFICE VISIT (OUTPATIENT)
Dept: ORTHOPEDIC SURGERY | Facility: CLINIC | Age: 77
End: 2022-06-16

## 2022-06-16 VITALS — WEIGHT: 247 LBS | TEMPERATURE: 96.7 F | HEIGHT: 77 IN | BODY MASS INDEX: 29.16 KG/M2

## 2022-06-16 DIAGNOSIS — R52 PAIN: Primary | ICD-10-CM

## 2022-06-16 DIAGNOSIS — M17.11 PRIMARY OSTEOARTHRITIS OF RIGHT KNEE: ICD-10-CM

## 2022-06-16 DIAGNOSIS — M17.0 PRIMARY OSTEOARTHRITIS OF BOTH KNEES: ICD-10-CM

## 2022-06-16 PROCEDURE — 99204 OFFICE O/P NEW MOD 45 MIN: CPT | Performed by: ORTHOPAEDIC SURGERY

## 2022-06-16 PROCEDURE — 73562 X-RAY EXAM OF KNEE 3: CPT | Performed by: ORTHOPAEDIC SURGERY

## 2022-06-16 RX ORDER — PREGABALIN 75 MG/1
150 CAPSULE ORAL ONCE
Status: CANCELLED | OUTPATIENT
Start: 2022-08-17 | End: 2022-06-16

## 2022-06-16 RX ORDER — MELOXICAM 15 MG/1
15 TABLET ORAL ONCE
Status: CANCELLED | OUTPATIENT
Start: 2022-08-17 | End: 2022-06-16

## 2022-06-16 RX ORDER — POVIDONE-IODINE 10 MG/ML
SOLUTION TOPICAL ONCE
Status: CANCELLED | OUTPATIENT
Start: 2022-08-17 | End: 2022-06-16

## 2022-06-16 RX ORDER — CHLORHEXIDINE GLUCONATE 500 MG/1
CLOTH TOPICAL 2 TIMES DAILY
Status: CANCELLED | OUTPATIENT
Start: 2022-06-16

## 2022-06-16 RX ORDER — LOSARTAN POTASSIUM 100 MG/1
100 TABLET ORAL DAILY
COMMUNITY
Start: 2022-04-07 | End: 2022-09-28

## 2022-06-16 RX ORDER — CEFAZOLIN SODIUM 2 G/100ML
2 INJECTION, SOLUTION INTRAVENOUS ONCE
Status: CANCELLED | OUTPATIENT
Start: 2022-08-17 | End: 2022-06-16

## 2022-06-16 NOTE — PROGRESS NOTES
willowPatient: Jeff Bass  YOB: 1945 76 y.o. male  Medical Record Number: [id number]    Chief Complaints:   Chief Complaint   Patient presents with   • Right Knee - Establish Care, Pain   • Left Knee - Establish Care, Pain       History of Present Illness:Jeff Bass is a 76 y.o. male who presents with bilateral knee pain severe in nature.  Has markedly progressed over the last few years.  Previous injections and physical therapy exercises have not been helpful.  He states that therapy increase the pain and now has had worsening symptoms.  He has limitations of basic activities of daily living and can only walk short distances.  He has had multiple series of stem cell injections the most recent 2 series without any significant relief.    Allergies: No Known Allergies    Medications:   Current Outpatient Medications   Medication Sig Dispense Refill   • alfuzosin (UROXATRAL) 10 MG 24 hr tablet Take 20 mg by mouth Every Night.     • ASPIRIN 81 PO Take 81 mg by mouth Daily.     • CRANBERRY CONCENTRATE PO Take 500 mg by mouth Daily.     • hydroCHLOROthiazide (HYDRODIURIL) 25 MG tablet      • losartan-hydrochlorothiazide (HYZAAR) 100-25 MG per tablet Take 1 tablet by mouth Daily.     • Melatonin 10 MG tablet Take 10 mg by mouth Every Night.     • pantoprazole (PROTONIX) 40 MG EC tablet Take 40 mg by mouth Daily.     • polyethylene glycol (MIRALAX) 17 g packet Take 17 g by mouth Daily.     • potassium chloride (K-DUR,KLOR-CON) 20 MEQ CR tablet Take 1 tablet by mouth Daily. 30 tablet 3   • pravastatin (PRAVACHOL) 20 MG tablet Take 20 mg by mouth Every Night.     • Red Yeast Rice Extract (RED YEAST RICE PO) Take 1,200 mg by mouth Daily. HELD FOR OR     • Unable to find Med Name: Slippery Elm, 400 mg, 1 tablet daily   HELD FOR OR     • vitamin D3 125 MCG (5000 UT) capsule capsule Take 5,000 Units by mouth Daily.     • losartan (COZAAR) 100 MG tablet Take 100 mg by mouth Daily.       No current  "facility-administered medications for this visit.         The following portions of the patient's history were reviewed and updated as appropriate: allergies, current medications, past family history, past medical history, past social history, past surgical history and problem list.    Review of Systems:   A 14 point review of systems was performed. All systems negative except pertinent positives/negative listed in HPI above    Physical Exam:   Vitals:    06/16/22 1503   Temp: 96.7 °F (35.9 °C)   Weight: 112 kg (247 lb)   Height: 195.6 cm (77\")       General: A and O x 3, ASA, NAD    SCLERA:    Normal    DENTITION:   Normal  Knee:  bilateral    ALIGNMENT:     Varus  ,   Patella  tracks  midline    GAIT:    Antalgic    SKIN:    No abnormality    RANGE OF MOTION:   7-120   DEG    STRENGTH:   4  / 5    LIGAMENTS:    No varus / valgus instability.   Negative  Lachman.    MENISCUS:     Negative   Neno       DISTAL PULSES:    Paplable    DISTAL SENSATION :   Intact    LYMPHATICS:     No   lymphadenopathy    OTHER:          - Positive   effusion      - Crepitance with ROM          Radiology:  Xrays 3views laura knees (ap,lateral, sunrise) were ordered and reviewed for evaluation of knee pain demonstrating advanced varus osteoarthritis with bone on bone articulation, subchondral cysts, and periarticular osteophytes and advanced valgus osteoarthritis with bone on bone articulation, subchondral cysts, and periarticular osteophytes    Assessment/Plan: Severe right greater than left knee end-stage osteoarthritis.  Continuation of conservative management vs. TKA discussed.  The patient wishes to proceed with total knee replacement.  At this point the patient has failed the full compliment of conservative treatment and stating complete understanding of the risks/benefits/ anternatives wishes to proceed with surgical treatment.    Risk and benefits of surgery were reviewed.  Including, but not limited to, blood clots or " pulmonary embolism, anesthesia risk, infection, fracture, skin/leg numbness, persistent pain/crepitance/popping/catching, failure of the implant, need for future surgeries, hematoma, possible nerve or blood vessel injury, need for transfusion, and potential risk of stroke,heart attack or death, among others.  The patient understands and wishes to proceed.     It was explained that if tissue has been repaired or reconstructed, there is also an increased chance of failure which may require further management.  Following the completion of the discussion, the patient expressed understanding of this planned course of care, all their questions were answered and consent will be obtained preoperatively.    Operative Plan: Right [employer]ium Total Knee Replacement an overnight staywith home health rehab          Ran Rachel MD  6/16/2022

## 2022-06-21 PROBLEM — M17.11 PRIMARY OSTEOARTHRITIS OF RIGHT KNEE: Status: ACTIVE | Noted: 2022-06-21

## 2022-06-30 ENCOUNTER — HOSPITAL ENCOUNTER (OUTPATIENT)
Dept: INFUSION THERAPY | Facility: HOSPITAL | Age: 77
Discharge: HOME | End: 2022-06-30
Admitting: PLASTIC SURGERY

## 2022-06-30 VITALS
RESPIRATION RATE: 20 BRPM | DIASTOLIC BLOOD PRESSURE: 84 MMHG | HEART RATE: 103 BPM | OXYGEN SATURATION: 96 % | SYSTOLIC BLOOD PRESSURE: 175 MMHG | TEMPERATURE: 96.9 F

## 2022-06-30 DIAGNOSIS — L60.9 NAIL ABNORMALITY: ICD-10-CM

## 2022-06-30 DIAGNOSIS — L60.8 ACQUIRED DEFORMITY OF NAIL OF FINGER: Primary | ICD-10-CM

## 2022-06-30 PROCEDURE — 0 LIDOCAINE 1 % SOLUTION: Performed by: PLASTIC SURGERY

## 2022-06-30 RX ORDER — HYDROCODONE BITARTRATE AND ACETAMINOPHEN 7.5; 325 MG/1; MG/1
1 TABLET ORAL EVERY 6 HOURS PRN
Qty: 12 TABLET | Refills: 0 | Status: SHIPPED | OUTPATIENT
Start: 2022-06-30 | End: 2022-09-28

## 2022-06-30 RX ORDER — LIDOCAINE HYDROCHLORIDE 10 MG/ML
20 INJECTION, SOLUTION INFILTRATION; PERINEURAL ONCE
Status: COMPLETED | OUTPATIENT
Start: 2022-06-30 | End: 2022-06-30

## 2022-06-30 RX ADMIN — LIDOCAINE HYDROCHLORIDE 20 ML: 10 INJECTION, SOLUTION INFILTRATION; PERINEURAL at 08:34

## 2022-08-11 ENCOUNTER — OFFICE VISIT (OUTPATIENT)
Dept: ONCOLOGY | Facility: CLINIC | Age: 77
End: 2022-08-11

## 2022-08-11 ENCOUNTER — LAB (OUTPATIENT)
Dept: LAB | Facility: HOSPITAL | Age: 77
End: 2022-08-11

## 2022-08-11 VITALS
WEIGHT: 246.8 LBS | OXYGEN SATURATION: 96 % | HEART RATE: 98 BPM | SYSTOLIC BLOOD PRESSURE: 113 MMHG | BODY MASS INDEX: 29.14 KG/M2 | RESPIRATION RATE: 18 BRPM | TEMPERATURE: 97.5 F | HEIGHT: 77 IN | DIASTOLIC BLOOD PRESSURE: 70 MMHG

## 2022-08-11 DIAGNOSIS — C91.10 CHRONIC LYMPHOCYTIC LEUKEMIA: ICD-10-CM

## 2022-08-11 DIAGNOSIS — C91.10 CHRONIC LYMPHOCYTIC LEUKEMIA: Primary | ICD-10-CM

## 2022-08-11 LAB
BASOPHILS # BLD AUTO: 0.11 10*3/MM3 (ref 0–0.2)
BASOPHILS NFR BLD AUTO: 0.9 % (ref 0–1.5)
DEPRECATED RDW RBC AUTO: 40.5 FL (ref 37–54)
EOSINOPHIL # BLD AUTO: 0.3 10*3/MM3 (ref 0–0.4)
EOSINOPHIL NFR BLD AUTO: 2.6 % (ref 0.3–6.2)
ERYTHROCYTE [DISTWIDTH] IN BLOOD BY AUTOMATED COUNT: 13.9 % (ref 12.3–15.4)
HCT VFR BLD AUTO: 44.2 % (ref 37.5–51)
HGB BLD-MCNC: 15.6 G/DL (ref 13–17.7)
IMM GRANULOCYTES # BLD AUTO: 0.06 10*3/MM3 (ref 0–0.05)
IMM GRANULOCYTES NFR BLD AUTO: 0.5 % (ref 0–0.5)
LYMPHOCYTES # BLD AUTO: 3.34 10*3/MM3 (ref 0.7–3.1)
LYMPHOCYTES NFR BLD AUTO: 28.5 % (ref 19.6–45.3)
MCH RBC QN AUTO: 28.6 PG (ref 26.6–33)
MCHC RBC AUTO-ENTMCNC: 35.3 G/DL (ref 31.5–35.7)
MCV RBC AUTO: 81.1 FL (ref 79–97)
MONOCYTES # BLD AUTO: 1.44 10*3/MM3 (ref 0.1–0.9)
MONOCYTES NFR BLD AUTO: 12.3 % (ref 5–12)
NEUTROPHILS NFR BLD AUTO: 55.2 % (ref 42.7–76)
NEUTROPHILS NFR BLD AUTO: 6.47 10*3/MM3 (ref 1.7–7)
NRBC BLD AUTO-RTO: 0 /100 WBC (ref 0–0.2)
PLATELET # BLD AUTO: 205 10*3/MM3 (ref 140–450)
PMV BLD AUTO: 9.1 FL (ref 6–12)
RBC # BLD AUTO: 5.45 10*6/MM3 (ref 4.14–5.8)
WBC NRBC COR # BLD: 11.72 10*3/MM3 (ref 3.4–10.8)

## 2022-08-11 PROCEDURE — 85025 COMPLETE CBC W/AUTO DIFF WBC: CPT

## 2022-08-11 PROCEDURE — 36415 COLL VENOUS BLD VENIPUNCTURE: CPT

## 2022-08-11 PROCEDURE — 99213 OFFICE O/P EST LOW 20 MIN: CPT | Performed by: INTERNAL MEDICINE

## 2022-08-11 NOTE — PROGRESS NOTES
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     HISTORY OF PRESENT ILLNESS:        DURING THE VISIT WITH THE PATIENT TODAY , PATIENT HAD FACE MASK, MY MEDICAL ASSISTANT AND I  HAD PROPPER PROTECTIVE EQUIPMENT, AND I DID HAND HYGIENE WITH SOAP AND WATER BEFORE AND AFTER THE VISIT.    On 08/11/2022 this 76-year-old white male who has had chronic lymphoid leukemia and requiring therapy in the past on several occasions with Rituxan returns today to the office with no infections, no autoimmunity, no peripheral adenopathy, fever, chills, night sweats or pruritus. Otherwise he has not had any new problems. He is going to have his knee replaced by Ran Rachel MD, in 10/2022. This is keeping him from losing weight because he does not have a lot of mobility. His bowel activity is normal, urination is normal, PSA remains at 0.5 in regard to his previous history of prostate cancer. He has not had any new skin lesions. He had the 3rd fingernail of the right hand removed after my observation with him during the previous visit.         Past Medical History:   Diagnosis Date   • Arthritis    • Benign prostatic hyperplasia     FOLLOWED BY DR. VIVIEN PATEL   • Biliary dyskinesia    • Bunion of right foot     GREAT TOE   • CLL (chronic lymphocytic leukemia) (HCC) 2016    FOLLOWED BY DR WALKER   • Colon polyps     FOLLOWED BY DR. NEHA HAM   • Constipation    • Degeneration of lumbar intervertebral disc    • Diverticulosis    • Erectile dysfunction    • Fracture of ankle 1975   • GERD (gastroesophageal reflux disease)    • Hallux valgus of left foot    • Hyperlipidemia     MIXED   • Hypertension    • Inguinal hernia    • Kidney stone 02/21/2009    SEEN AT [facility]   • Knee swelling 2018   • Localized, primary osteoarthritis    • Lumbar radiculopathy    • Lumbar stenosis    • Lung mass     LEFT SIDE - SCAR TISSUE PER PATIENT    • Polyneuropathy    • Prostate CA  (HCC) 03/2016    JACQUELYN 6, S/P XRT, FOLLOWED BY DR. VIVIEN PATEL, RADIATION SEEDS   • PVC (premature ventricular contraction)     PT STATES WORKED UP YEARS AGO BY [facility] FOR POSSIBLE MURMUR - NO FOLLOW UP REQUIRED    • RBBB    • Sensory hearing loss, bilateral    • Skin cancer     SQUAMOUS CELL CARCINOMA OF FACE   • Substernal goiter    • Thyromegaly 12/2016    ADMITTED TO [facility]   • Vitamin D deficiency      Past Surgical History:   Procedure Laterality Date   • BRONCHOSCOPY N/A 12/14/2016    Procedure: BRONCHOSCOPY WITH  BAL AND BIOPSY AND ENDOBRONCHIAL ULTRASOUND  AND NAVIGATION WITH BRUSHINGS AND BIOPSY AND BAL;  Surgeon: Pierre Manning MD;  Location: [facility] ENDOSCOPY;  Service:    • COLONOSCOPY N/A 03/13/2019    5 MM SESSILE TUBULAR ADENOMA POLYP IN TRANSVERSE, MULTIPLE SMALL AND LARGE DIVERTICULA IN SIGMOID, RESCOPE IN 5 YRS, DR. NEHA HAM AT [facility]   • COLONOSCOPY W/ POLYPECTOMY N/A 03/19/2015    3 TUBULAR ADENOMA POLYPS, 12 TUBULOVILLOUS POLYP RECTO-SIGMOID, DIVERTICULOSIS, RESCOPE IN 3 YRS, DR. NEHA HAM AT [facility]   • EYE SURGERY Bilateral     CATARACT EXTRACTION WITH LENS IMPLANT, DR. GOVEA   • INGUINAL HERNIA REPAIR Left 11/02/2021    Procedure: LEFT OPEN INGUINAL HERNIA REPAIR;  Surgeon: Nixon Kolb MD;  Location: [facility] MAIN OR;  Service: General;  Laterality: Left;   • PROSTATE RADIOACTIVE SEED IMPLANT N/A 09/06/2016    DR. HOA JARAMILLO AT [facility]   • PROSTATE SURGERY N/A 03/11/2016    BIOPSY: ADENOCARCINOMA, DR. ZAID IBARRA   • THYROID BIOPSY Bilateral 11/01/2017    NO B CELL OR T CELL LYMPHOMA, DONE AT [facility]     Social History     Socioeconomic History   • Marital status:      Spouse name: No   • Years of education: College   Tobacco Use   • Smoking status: Never Smoker   • Smokeless tobacco: Never Used   Substance and Sexual Activity   • Alcohol use: No   • Drug use: No   • Sexual activity: Yes     Partners: Female     Birth control/protection: None     Family  "History   Problem Relation Age of Onset   • Heart disease Father         Rheumatic heart disease   • Hypertension Father    • Osteoporosis Father    • Stroke Mother    • No Known Problems Daughter    • Malig Hyperthermia Neg Hx      Current Outpatient Medications on File Prior to Visit   Medication Sig   • alfuzosin (UROXATRAL) 10 MG 24 hr tablet Take 20 mg by mouth Every Night.   • ASPIRIN 81 PO Take 81 mg by mouth Daily.   • CRANBERRY CONCENTRATE PO Take 500 mg by mouth Daily.   • hydroCHLOROthiazide (HYDRODIURIL) 25 MG tablet    • HYDROcodone-acetaminophen (Norco) 7.5-325 MG per tablet Take 1 tablet by mouth Every 6 (Six) Hours As Needed for Moderate Pain .   • losartan (COZAAR) 100 MG tablet Take 100 mg by mouth Daily.   • losartan-hydrochlorothiazide (HYZAAR) 100-25 MG per tablet Take 1 tablet by mouth Daily.   • Melatonin 10 MG tablet Take 10 mg by mouth Every Night.   • pantoprazole (PROTONIX) 40 MG EC tablet Take 40 mg by mouth Daily.   • polyethylene glycol (MIRALAX) 17 g packet Take 17 g by mouth Daily.   • potassium chloride (K-DUR,KLOR-CON) 20 MEQ CR tablet Take 1 tablet by mouth Daily.   • pravastatin (PRAVACHOL) 20 MG tablet Take 20 mg by mouth Every Night.   • Red Yeast Rice Extract (RED YEAST RICE PO) Take 1,200 mg by mouth Daily. HELD FOR OR   • Unable to find Med Name: [place], 400 mg, 1 tablet daily   HELD FOR OR   • vitamin D3 125 MCG (5000 UT) capsule capsule Take 5,000 Units by mouth Daily.   • [DISCONTINUED] PANTOPRAZOLE SODIUM PO      No current facility-administered medications on file prior to visit.   No Known Allergies              VITAL SIGNS:    Vitals:    22 1410   BP: 113/70   Pulse: 98   Resp: 18   Temp: 97.5 °F (36.4 °C)   TempSrc: Temporal   SpO2: 96%   Weight: 112 kg (246 lb 12.8 oz)   Height: 195.6 cm (77.01\")           PAIN:  0 out of 10      ECO         PHYSICAL EXAMINATION:         GENERAL:  Well-developed, well-nourished  Patient  in no acute distress.   SKIN:  " Warm, dry ,NO purpura ,no rash.  HEENT:  Pupils were equal and reactive to light and accomodation, conjunctivae noninjected, normal extraocular movements, normal visual acuity.   NECK:  Supple with good range of motion; no thyromegaly , no JVD or bruits,.No carotid artery pain, no carotid abnormal pulsation   LYMPHATICS:  No cervical, NO supraclavicular, NO axillary, NO inguinal adenopathies.  CARDIAC   normal rate , regular rhythm, without murmur,NO rubs NO S3 NO S4   LUNGS: normal breath sounds bilateral, no wheezing, NO rhonchi, NO crackles ,NO rubs.  VASCULAR VENOUS: no cyanosis, NO collateral circulation, NO varicosities, NO edema, NO palpable cords, NO pain,NO erythema, NO pigmentation of the skin.  ABDOMEN:  Soft, NO pain,no hepatomegaly, no splenomegaly,no masses, no ascites, no collateral circulation,no distention.  EXTREMITIES  AND SPINE:  No clubbing, no cyanosis ,no deformities , no pain .No kyphosis,  no pain in spine, no pain in ribs , no pain in pelvic bone.  NEUROLOGICAL:  Patient was awake, alert, oriented to time, person and place.                 LABORATORY DATA:    Lab Results   Component Value Date    WBC 11.72 (H) 08/11/2022    HGB 15.6 08/11/2022    HCT 44.2 08/11/2022    MCV 81.1 08/11/2022     08/11/2022                                 ASSESSMENT: 1.  Patient has history of chronic lymphoid leukemia that required therapy with Rituxan in 2016. Before that he was treated with bendamustine and Rituxan with no difficulties. In 2016 when the patient was treated with Rituxan as a single agent he had a very severe reaction during the 1st infusion and the infusion needed to be completed in the hospital. The 2nd infusion was completed in the office. Thereafter he did not get the 3rd or 4th infusions. Since then the patient has remained in remission. Obviously all of the stressors of the pandemia have been on him. We documented in May that his white count was rising, his lymphocyte count was  rising, he was having fatigue and weight loss and we advised him to proceed with Rituxan. To avoid the reaction that he had during the previous Rituxan infusion in 2016 we advised the patient to be premedicated. He has not had any reaction whatsoever with the 1st, 2nd and 3rd infusion but he feels so well that he does not want to pursue the 4th and last infusion .            The patient was further checked on 04/22/2021. He has been gaining weight because of stressors and situations that make him to eat more. He has not had any infections and he completed his COVID vaccination. He has no peripheral adenopathy, hepatosplenomegaly. His white count, hemoglobin, platelets and white count differential remain normal. He has no autoimmunity.     His constipation remains an ongoing issue. This is in spite of taking multiple stool softeners and bulk medications. It is time for him to have a colonoscopy and I have referred him to be seen by Dr. Hieu Lester.     The patient was further reviewed on 08/13/2021. He has no symptoms or signs of CLL. He has no autoimmunity, no infections, no adenopathies, no hepatosplenomegaly. He looks terrific. I pointed out to him that since pandemia, given the fact that he eats by the window all the time that he has gained 20 pounds. At least he has not gained anymore weight since the previous visit. I asked him to watch what he is putting in his mouth. He has changed his diet with more salads and maybe this is having a positive impact on that issue.     I will review him back in 4 months with a CBC. Otherwise, no other intervention from my point of view. He is up to date in his shingles vaccination.     He also got his COVID vaccination.  The patient was reviewed on 12/16/2021. He has no symptoms pertinent to CLL including no fevers, chills, night sweats, pruritus, autoimmunity or infections. He has no peripheral adenopathy or hepatosplenomegaly. His white count, hemoglobin, platelets, white  count differential remain normal.     Under the present circumstances, I do believe that his disease looks in remission. I find no need for radiological assessment or any other intervention and he will return to see us back in 4 months with a CBC.  This patient was seen on 04/22/2022 in regard to his CLL. He has no symptoms pertinent to this including no fevers or chills, no sweats, pruritus, adenopathies, or infections. No autoimmunity. He has no peripheral adenopathy or hepatosplenomegaly. His white count is normal. White count differential is normal. Hemoglobin is up to 13.1. Platelet count is normal.     Under the present circumstances, his CLL will remain in observation, returning to see me back every 4 months.     On 08/11/2022 the patient has no symptoms or signs of chronic lymphoid leukemia. He had minimal increase in the leukocyte count today but there is no excess of lymphocytes. The hemoglobin is normal, the platelet count is normal. The patient has no peripheral adenopathy or hepatosplenomegaly. He has no B symptoms, he has no autoimmunity and he has no infection.     RECOMMENDATIONS: I have advised him to return to see me back in 3 months with a CBC and CMP. In the meantime he will proceed with knee replacement therapy.    I have no formal contraindication for this or any other recommendations in regard the care perioperatively.        ·

## 2022-09-14 ENCOUNTER — TELEPHONE (OUTPATIENT)
Dept: ONCOLOGY | Facility: CLINIC | Age: 77
End: 2022-09-14

## 2022-09-14 DIAGNOSIS — N52.9 ED (ERECTILE DYSFUNCTION) OF ORGANIC ORIGIN: Primary | ICD-10-CM

## 2022-09-14 NOTE — TELEPHONE ENCOUNTER
Returned call to patient for more information.  Several years ago he went to [facility] for prostate seeding for prostate cancer.  He does not want to go to [facility] and had been seeing a Urologist in [place], who is no longer there.  He has a problem with ED and would like to be referred to another Urologist for this that Dr. Schroeder would recommend, but not within the [facility].  He is willing to go back to [facility] or [facility] or wherever Dr. Schroeder thinks would be the best person to work with him on this.

## 2022-09-14 NOTE — TELEPHONE ENCOUNTER
Caller: Jeff Bass    Relationship: Self    Best call back number: [phone]    What is the best time to reach you: ANYTIME     Who are you requesting to speak with (clinical staff, provider,  specific staff member): DR WALKER OR HIS NURSE        What was the call regarding:     WANTING TO SPEAK WITH DR WALKER NURSE REGARDING GETTING A REFERRAL          Do you require a callback: YES

## 2022-09-15 NOTE — TELEPHONE ENCOUNTER
Called pt back. He received my message. Passed along contact information for  Urology. Pt v/u. Anneliese Torres RN

## 2022-09-15 NOTE — TELEPHONE ENCOUNTER
PATIENT RETURNED [employer] CALL, TRIED TO WARM TRANSFER BUT EVERT NOT AVAILABLE. ADVISED PATIENT WILL SEND MESSAGE TO CALL HIM BACK

## 2022-09-16 ENCOUNTER — TELEPHONE (OUTPATIENT)
Dept: ONCOLOGY | Facility: CLINIC | Age: 77
End: 2022-09-16

## 2022-09-16 DIAGNOSIS — N52.9 ED (ERECTILE DYSFUNCTION) OF ORGANIC ORIGIN: Primary | ICD-10-CM

## 2022-09-16 NOTE — TELEPHONE ENCOUNTER
Caller: Mac Bass    Relationship: Self    Best call back number:[phone]    What is the best time to reach you: ASAP    Who are you requesting to speak with (clinical staff, provider,  specific staff member):JOANN    Do you know the name of the person who called: MAC    What was the call regarding: HE CANNOT GET INTO DR. BOURGEOIS UNTIL 2/16/2022 & IS WANTING TO TALK WITH YOU.    Do you require a callback: YES, PLEASE CALL AFTER 1 AS HE HAS A MEETING SOON.

## 2022-09-20 NOTE — TELEPHONE ENCOUNTER
Called pt to inform him that [facility] is reporting they could see him as soon as one week. Pt would like referral to Dr. Costa Scott in [facility] w/ [facility]. Internal referral placed. Pt v/u. Anneliese Torres RN

## 2022-09-28 ENCOUNTER — PRE-ADMISSION TESTING (OUTPATIENT)
Dept: PREADMISSION TESTING | Facility: HOSPITAL | Age: 77
End: 2022-09-28

## 2022-09-28 VITALS
TEMPERATURE: 98 F | HEIGHT: 77 IN | RESPIRATION RATE: 18 BRPM | DIASTOLIC BLOOD PRESSURE: 71 MMHG | BODY MASS INDEX: 28.58 KG/M2 | SYSTOLIC BLOOD PRESSURE: 132 MMHG | WEIGHT: 242.1 LBS | OXYGEN SATURATION: 98 % | HEART RATE: 104 BPM

## 2022-09-28 DIAGNOSIS — M17.11 PRIMARY OSTEOARTHRITIS OF RIGHT KNEE: ICD-10-CM

## 2022-09-28 LAB
ANION GAP SERPL CALCULATED.3IONS-SCNC: 10.2 MMOL/L (ref 5–15)
BUN SERPL-MCNC: 12 MG/DL (ref 8–23)
BUN/CREAT SERPL: 12.2 (ref 7–25)
CALCIUM SPEC-SCNC: 9.8 MG/DL (ref 8.6–10.5)
CHLORIDE SERPL-SCNC: 101 MMOL/L (ref 98–107)
CO2 SERPL-SCNC: 28.8 MMOL/L (ref 22–29)
CREAT SERPL-MCNC: 0.98 MG/DL (ref 0.76–1.27)
DEPRECATED RDW RBC AUTO: 41.9 FL (ref 37–54)
EGFRCR SERPLBLD CKD-EPI 2021: 78.4 ML/MIN/1.73
ERYTHROCYTE [DISTWIDTH] IN BLOOD BY AUTOMATED COUNT: 14 % (ref 12.3–15.4)
GLUCOSE SERPL-MCNC: 107 MG/DL (ref 65–99)
HCT VFR BLD AUTO: 41.7 % (ref 37.5–51)
HGB BLD-MCNC: 14.5 G/DL (ref 13–17.7)
MCH RBC QN AUTO: 28.6 PG (ref 26.6–33)
MCHC RBC AUTO-ENTMCNC: 34.8 G/DL (ref 31.5–35.7)
MCV RBC AUTO: 82.2 FL (ref 79–97)
PLATELET # BLD AUTO: 177 10*3/MM3 (ref 140–450)
PMV BLD AUTO: 8.9 FL (ref 6–12)
POTASSIUM SERPL-SCNC: 4 MMOL/L (ref 3.5–5.2)
RBC # BLD AUTO: 5.07 10*6/MM3 (ref 4.14–5.8)
SODIUM SERPL-SCNC: 140 MMOL/L (ref 136–145)
WBC NRBC COR # BLD: 10.02 10*3/MM3 (ref 3.4–10.8)

## 2022-09-28 PROCEDURE — 85027 COMPLETE CBC AUTOMATED: CPT

## 2022-09-28 PROCEDURE — 80048 BASIC METABOLIC PNL TOTAL CA: CPT

## 2022-09-28 PROCEDURE — 36415 COLL VENOUS BLD VENIPUNCTURE: CPT

## 2022-09-28 RX ORDER — CHLORHEXIDINE GLUCONATE 500 MG/1
CLOTH TOPICAL 2 TIMES DAILY
Status: ACTIVE | OUTPATIENT
Start: 2022-09-28

## 2022-09-28 ASSESSMENT — KOOS JR
KOOS JR SCORE: 12
KOOS JR SCORE: 57.14

## 2022-09-29 ENCOUNTER — APPOINTMENT (OUTPATIENT)
Dept: PREADMISSION TESTING | Facility: HOSPITAL | Age: 77
End: 2022-09-29

## 2022-10-06 ENCOUNTER — OFFICE VISIT (OUTPATIENT)
Dept: ORTHOPEDIC SURGERY | Facility: CLINIC | Age: 77
End: 2022-10-06

## 2022-10-06 VITALS
HEIGHT: 77 IN | TEMPERATURE: 98.3 F | SYSTOLIC BLOOD PRESSURE: 122 MMHG | WEIGHT: 239 LBS | DIASTOLIC BLOOD PRESSURE: 78 MMHG | BODY MASS INDEX: 28.22 KG/M2

## 2022-10-06 DIAGNOSIS — R52 PAIN: Primary | ICD-10-CM

## 2022-10-06 PROCEDURE — 77077 JOINT SURVEY SINGLE VIEW: CPT | Performed by: ORTHOPAEDIC SURGERY

## 2022-10-06 PROCEDURE — S0260 H&P FOR SURGERY: HCPCS | Performed by: NURSE PRACTITIONER

## 2022-10-06 NOTE — H&P (VIEW-ONLY)
Patient: Jeff Bass    Date of Admission: 10/12/2022    YOB: 1945    Medical Record Number: [id number]    Admitting Physician: Dr. Ran Rachel    Reason for Admission: End Stage Right Knee OA    History of Present Illness: 77 y.o. male presents with severe end stage knee osteoarthritis which has not been responsive to the full compliment of conservative measures. Despite conservative attempts, there is still severe, constant activity limiting pain. Given the severity of the pain, the patient has elected to proceed with knee replacement.    Allergies: No Known Allergies      Current Medications:  Home Medications:    Current Outpatient Medications on File Prior to Visit   Medication Sig   • alfuzosin (UROXATRAL) 10 MG 24 hr tablet Take 20 mg by mouth Every Night.   • ASPIRIN 81 PO Take 81 mg by mouth Daily. HOLD FOR 5-7 DAYS PRIOR TO SURGERY   • CRANBERRY CONCENTRATE PO Take 500 mg by mouth Daily. HOLD FOR SURGERY   • losartan-hydrochlorothiazide (HYZAAR) 100-25 MG per tablet Take 1 tablet by mouth Daily.   • Melatonin 10 MG tablet Take 10 mg by mouth Every Night.   • pantoprazole (PROTONIX) 40 MG EC tablet Take 40 mg by mouth Daily.   • polyethylene glycol (MIRALAX) 17 g packet Take 17 g by mouth Daily.   • potassium chloride (K-DUR,KLOR-CON) 20 MEQ CR tablet Take 1 tablet by mouth Daily. (Patient taking differently: Take 20 mEq by mouth Daily. HOLD FOR ONE WEEK PRIOR TO SURGERY)   • pravastatin (PRAVACHOL) 20 MG tablet Take 20 mg by mouth Every Night.   • vitamin D3 125 MCG (5000 UT) capsule capsule Take 10,000 Units by mouth Daily. HOLD FOR SURGERY     Current Facility-Administered Medications on File Prior to Visit   Medication   • Chlorhexidine Gluconate Cloth 2 % pads     PRN Meds:.    PMH:     Past Medical History:   Diagnosis Date   • Arthritis    • Benign prostatic hyperplasia     FOLLOWED BY DR. VIVIEN PATEL   • Biliary dyskinesia    • Bunion of right foot     GREAT TOE   • CLL  (chronic lymphocytic leukemia) (HCC) 2016    FOLLOWED BY DR WALKER   • Colon polyps     FOLLOWED BY DR. NEHA HAM   • Constipation    • Degeneration of lumbar intervertebral disc    • Diverticulosis    • Erectile dysfunction    • Fracture of ankle 1975   • GERD (gastroesophageal reflux disease)    • Hallux valgus of left foot    • Hyperlipidemia     MIXED   • Hypertension    • Inguinal hernia    • Kidney stone 02/21/2009    SEEN AT [facility]   • Knee swelling 2018   • Localized, primary osteoarthritis    • Lumbar radiculopathy    • Lumbar stenosis    • Lung mass     LEFT SIDE - SCAR TISSUE PER PATIENT    • Osteoarthritis of right knee    • Polyneuropathy    • Prostate CA (HCC) 03/2016    JACQUELYN 6, S/P XRT, FOLLOWED BY DR. VIVIEN PATEL, RADIATION SEEDS   • PVC (premature ventricular contraction)     PT STATES WORKED UP YEARS AGO BY [facility] FOR POSSIBLE MURMUR - NO FOLLOW UP REQUIRED    • RBBB    • Sensory hearing loss, bilateral    • Skin cancer     SQUAMOUS CELL CARCINOMA OF FACE   • Substernal goiter    • Thyromegaly 12/2016    ADMITTED TO [facility]   • Vitamin D deficiency        PF/Surg/Soc Hx:     Past Surgical History:   Procedure Laterality Date   • BRONCHOSCOPY N/A 12/14/2016    Procedure: BRONCHOSCOPY WITH  BAL AND BIOPSY AND ENDOBRONCHIAL ULTRASOUND  AND NAVIGATION WITH BRUSHINGS AND BIOPSY AND BAL;  Surgeon: Pierre Manning MD;  Location: [facility] ENDOSCOPY;  Service:    • COLONOSCOPY N/A 03/13/2019    5 MM SESSILE TUBULAR ADENOMA POLYP IN TRANSVERSE, MULTIPLE SMALL AND LARGE DIVERTICULA IN SIGMOID, RESCOPE IN 5 YRS, DR. NEHA HAM AT [facility]   • COLONOSCOPY W/ POLYPECTOMY N/A 03/19/2015    3 TUBULAR ADENOMA POLYPS, 12 TUBULOVILLOUS POLYP RECTO-SIGMOID, DIVERTICULOSIS, RESCOPE IN 3 YRS, DR. NEHA HAM AT [facility]   • EYE SURGERY Bilateral     CATARACT EXTRACTION WITH LENS IMPLANT, DR. GOVEA   • FINGER NAIL SURGERY Right 2022    [facility]IA   • INGUINAL HERNIA REPAIR Left 11/02/2021    Procedure: LEFT OPEN  "INGUINAL HERNIA REPAIR;  Surgeon: Nixon Kolb MD;  Location: [facility] OR;  Service: General;  Laterality: Left;   • PROSTATE RADIOACTIVE SEED IMPLANT N/A 09/06/2016    DR. HOA JARAMILLO AT [facility]   • PROSTATE SURGERY N/A 03/11/2016    BIOPSY: ADENOCARCINOMA, DR. ZAID IBARRA   • THYROID BIOPSY Bilateral 11/01/2017    NO B CELL OR T CELL LYMPHOMA, DONE AT [facility]        Social History     Occupational History   • Occupation: Business Owner     Employer: [employer]   Tobacco Use   • Smoking status: Never Smoker   • Smokeless tobacco: Never Used   Substance and Sexual Activity   • Alcohol use: No   • Drug use: No   • Sexual activity: Yes     Partners: Female     Birth control/protection: None      Social History     Social History Narrative    LIVES ALONE        Family History   Problem Relation Age of Onset   • Heart disease Father         Rheumatic heart disease   • Hypertension Father    • Osteoporosis Father    • Stroke Mother    • No Known Problems Daughter    • Malig Hyperthermia Neg Hx          Review of Systems:   A 14 point review of systems was performed, pertinent positives discussed above, all other systems are negative    Physical Exam: 77 y.o. male  Vital Signs :   Vitals:    10/06/22 1327   BP: 122/78   BP Location: Left arm   Patient Position: Sitting   Cuff Size: Large Adult   Temp: 98.3 °F (36.8 °C)   TempSrc: Temporal   Weight: 108 kg (239 lb)   Height: 195.6 cm (77.01\")     General: Alert and Oriented x 3, No acute distress.  Psych: mood and affect appropriate; recent and remote memory intact  Eyes: conjunctiva clear; pupils equally round and reactive, sclera nonicteric  CV: no peripheral edema  Resp: normal respiratory effort  Skin: no rashes or wounds; normal turgor  Musculosketetal; pain and crepitance with knee range of motion  Vascular: palpable distal pulses    Xrays:  -3 views (AP, lateral, and sunrise) were reviewed demonstrating end-stage OA with bone on bone " articulation.  -A full length AP xray was ordered and reviewed today for purposes of operative alignment demonstrating end stage arthritic findings. There are no previous full length films for review    Assessment:  End-stage Right knee osteoarthritis. Conservative measures have failed.      Plan:  The plan is to proceed with Right Total Knee Replacement. The patient voiced understanding of the risks, benefits, and alternative forms of treatment that were discussed with Dr Rachel at the time of scheduling. 23     Leana Buckley, APRN  10/6/2022

## 2022-10-06 NOTE — H&P
Patient: Jeff Bass    Date of Admission: 10/12/2022    YOB: 1945    Medical Record Number: [id number]    Admitting Physician: Dr. Ran Rachel    Reason for Admission: End Stage Right Knee OA    History of Present Illness: 77 y.o. male presents with severe end stage knee osteoarthritis which has not been responsive to the full compliment of conservative measures. Despite conservative attempts, there is still severe, constant activity limiting pain. Given the severity of the pain, the patient has elected to proceed with knee replacement.    Allergies: No Known Allergies      Current Medications:  Home Medications:    Current Outpatient Medications on File Prior to Visit   Medication Sig   • alfuzosin (UROXATRAL) 10 MG 24 hr tablet Take 20 mg by mouth Every Night.   • ASPIRIN 81 PO Take 81 mg by mouth Daily. HOLD FOR 5-7 DAYS PRIOR TO SURGERY   • CRANBERRY CONCENTRATE PO Take 500 mg by mouth Daily. HOLD FOR SURGERY   • losartan-hydrochlorothiazide (HYZAAR) 100-25 MG per tablet Take 1 tablet by mouth Daily.   • Melatonin 10 MG tablet Take 10 mg by mouth Every Night.   • pantoprazole (PROTONIX) 40 MG EC tablet Take 40 mg by mouth Daily.   • polyethylene glycol (MIRALAX) 17 g packet Take 17 g by mouth Daily.   • potassium chloride (K-DUR,KLOR-CON) 20 MEQ CR tablet Take 1 tablet by mouth Daily. (Patient taking differently: Take 20 mEq by mouth Daily. HOLD FOR ONE WEEK PRIOR TO SURGERY)   • pravastatin (PRAVACHOL) 20 MG tablet Take 20 mg by mouth Every Night.   • vitamin D3 125 MCG (5000 UT) capsule capsule Take 10,000 Units by mouth Daily. HOLD FOR SURGERY     Current Facility-Administered Medications on File Prior to Visit   Medication   • Chlorhexidine Gluconate Cloth 2 % pads     PRN Meds:.    PMH:     Past Medical History:   Diagnosis Date   • Arthritis    • Benign prostatic hyperplasia     FOLLOWED BY DR. VIVIEN PATEL   • Biliary dyskinesia    • Bunion of right foot     GREAT TOE   • CLL  (chronic lymphocytic leukemia) (HCC) 2016    FOLLOWED BY DR WALKER   • Colon polyps     FOLLOWED BY DR. NEHA HAM   • Constipation    • Degeneration of lumbar intervertebral disc    • Diverticulosis    • Erectile dysfunction    • Fracture of ankle 1975   • GERD (gastroesophageal reflux disease)    • Hallux valgus of left foot    • Hyperlipidemia     MIXED   • Hypertension    • Inguinal hernia    • Kidney stone 02/21/2009    SEEN AT [facility] ER   • Knee swelling 2018   • Localized, primary osteoarthritis    • Lumbar radiculopathy    • Lumbar stenosis    • Lung mass     LEFT SIDE - SCAR TISSUE PER PATIENT    • Osteoarthritis of right knee    • Polyneuropathy    • Prostate CA (HCC) 03/2016    JACQUELYN 6, S/P XRT, FOLLOWED BY DR. VIVIEN PATEL, RADIATION SEEDS   • PVC (premature ventricular contraction)     PT STATES WORKED UP YEARS AGO BY [facility] FOR POSSIBLE MURMUR - NO FOLLOW UP REQUIRED    • RBBB    • Sensory hearing loss, bilateral    • Skin cancer     SQUAMOUS CELL CARCINOMA OF FACE   • Substernal goiter    • Thyromegaly 12/2016    ADMITTED TO [facility]   • Vitamin D deficiency        PF/Surg/Soc Hx:     Past Surgical History:   Procedure Laterality Date   • BRONCHOSCOPY N/A 12/14/2016    Procedure: BRONCHOSCOPY WITH  BAL AND BIOPSY AND ENDOBRONCHIAL ULTRASOUND  AND NAVIGATION WITH BRUSHINGS AND BIOPSY AND BAL;  Surgeon: Pierre Manning MD;  Location: [facility] ENDOSCOPY;  Service:    • COLONOSCOPY N/A 03/13/2019    5 MM SESSILE TUBULAR ADENOMA POLYP IN TRANSVERSE, MULTIPLE SMALL AND LARGE DIVERTICULA IN SIGMOID, RESCOPE IN 5 YRS, DR. NEHA HAM AT [facility]   • COLONOSCOPY W/ POLYPECTOMY N/A 03/19/2015    3 TUBULAR ADENOMA POLYPS, 12 TUBULOVILLOUS POLYP RECTO-SIGMOID, DIVERTICULOSIS, RESCOPE IN 3 YRS, DR. NEHA HAM AT [facility]   • EYE SURGERY Bilateral     CATARACT EXTRACTION WITH LENS IMPLANT, DR. GOVEA   • FINGER NAIL SURGERY Right 2022    KSENIA   • INGUINAL HERNIA REPAIR Left 11/02/2021    Procedure: LEFT OPEN  "INGUINAL HERNIA REPAIR;  Surgeon: Nixon Kolb MD;  Location: [facility] OR;  Service: General;  Laterality: Left;   • PROSTATE RADIOACTIVE SEED IMPLANT N/A 09/06/2016    DR. HOA JARAMILLO AT [facility]   • PROSTATE SURGERY N/A 03/11/2016    BIOPSY: ADENOCARCINOMA, DR. ZAID IBARRA   • THYROID BIOPSY Bilateral 11/01/2017    NO B CELL OR T CELL LYMPHOMA, DONE AT [facility]        Social History     Occupational History   • Occupation: Business Owner     Employer: [employer]   Tobacco Use   • Smoking status: Never Smoker   • Smokeless tobacco: Never Used   Substance and Sexual Activity   • Alcohol use: No   • Drug use: No   • Sexual activity: Yes     Partners: Female     Birth control/protection: None      Social History     Social History Narrative    LIVES ALONE        Family History   Problem Relation Age of Onset   • Heart disease Father         Rheumatic heart disease   • Hypertension Father    • Osteoporosis Father    • Stroke Mother    • No Known Problems Daughter    • Malig Hyperthermia Neg Hx          Review of Systems:   A 14 point review of systems was performed, pertinent positives discussed above, all other systems are negative    Physical Exam: 77 y.o. male  Vital Signs :   Vitals:    10/06/22 1327   BP: 122/78   BP Location: Left arm   Patient Position: Sitting   Cuff Size: Large Adult   Temp: 98.3 °F (36.8 °C)   TempSrc: Temporal   Weight: 108 kg (239 lb)   Height: 195.6 cm (77.01\")     General: Alert and Oriented x 3, No acute distress.  Psych: mood and affect appropriate; recent and remote memory intact  Eyes: conjunctiva clear; pupils equally round and reactive, sclera nonicteric  CV: no peripheral edema  Resp: normal respiratory effort  Skin: no rashes or wounds; normal turgor  Musculosketetal; pain and crepitance with knee range of motion  Vascular: palpable distal pulses    Xrays:  -3 views (AP, lateral, and sunrise) were reviewed demonstrating end-stage OA with bone on bone " articulation.  -A full length AP xray was ordered and reviewed today for purposes of operative alignment demonstrating end stage arthritic findings. There are no previous full length films for review    Assessment:  End-stage Right knee osteoarthritis. Conservative measures have failed.      Plan:  The plan is to proceed with Right Total Knee Replacement. The patient voiced understanding of the risks, benefits, and alternative forms of treatment that were discussed with Dr Rachel at the time of scheduling. 23     Leana Buckley, APRN  10/6/2022

## 2022-10-12 ENCOUNTER — APPOINTMENT (OUTPATIENT)
Dept: GENERAL RADIOLOGY | Facility: HOSPITAL | Age: 77
End: 2022-10-12

## 2022-10-12 ENCOUNTER — HOSPITAL ENCOUNTER (OUTPATIENT)
Facility: HOSPITAL | Age: 77
Discharge: HOME HEALTH | End: 2022-10-13
Attending: ORTHOPAEDIC SURGERY | Admitting: ORTHOPAEDIC SURGERY

## 2022-10-12 ENCOUNTER — ANESTHESIA (OUTPATIENT)
Dept: PERIOP | Facility: HOSPITAL | Age: 77
End: 2022-10-12

## 2022-10-12 ENCOUNTER — ANESTHESIA EVENT (OUTPATIENT)
Dept: PERIOP | Facility: HOSPITAL | Age: 77
End: 2022-10-12

## 2022-10-12 DIAGNOSIS — M17.11 PRIMARY OSTEOARTHRITIS OF RIGHT KNEE: ICD-10-CM

## 2022-10-12 DIAGNOSIS — Z96.651 S/P TKR (TOTAL KNEE REPLACEMENT), RIGHT: Primary | ICD-10-CM

## 2022-10-12 PROCEDURE — 25010000002 CEFAZOLIN IN DEXTROSE 2-4 GM/100ML-% SOLUTION: Performed by: ORTHOPAEDIC SURGERY

## 2022-10-12 PROCEDURE — 25010000002 MIDAZOLAM PER 1 MG: Performed by: ANESTHESIOLOGY

## 2022-10-12 PROCEDURE — A9270 NON-COVERED ITEM OR SERVICE: HCPCS | Performed by: NURSE PRACTITIONER

## 2022-10-12 PROCEDURE — 73560 X-RAY EXAM OF KNEE 1 OR 2: CPT

## 2022-10-12 PROCEDURE — 25010000002 VANCOMYCIN 10 G RECONSTITUTED SOLUTION: Performed by: ORTHOPAEDIC SURGERY

## 2022-10-12 PROCEDURE — 63710000001 TAMSULOSIN 0.4 MG CAPSULE: Performed by: NURSE PRACTITIONER

## 2022-10-12 PROCEDURE — C1713 ANCHOR/SCREW BN/BN,TIS/BN: HCPCS | Performed by: ORTHOPAEDIC SURGERY

## 2022-10-12 PROCEDURE — C1776 JOINT DEVICE (IMPLANTABLE): HCPCS | Performed by: ORTHOPAEDIC SURGERY

## 2022-10-12 PROCEDURE — 63710000001 MELATONIN 1 MG TABLET: Performed by: NURSE PRACTITIONER

## 2022-10-12 PROCEDURE — 25010000002 PROPOFOL 10 MG/ML EMULSION

## 2022-10-12 PROCEDURE — G0378 HOSPITAL OBSERVATION PER HR: HCPCS

## 2022-10-12 PROCEDURE — A9270 NON-COVERED ITEM OR SERVICE: HCPCS | Performed by: ORTHOPAEDIC SURGERY

## 2022-10-12 PROCEDURE — 25010000002 FENTANYL CITRATE (PF) 50 MCG/ML SOLUTION: Performed by: ANESTHESIOLOGY

## 2022-10-12 PROCEDURE — 27447 TOTAL KNEE ARTHROPLASTY: CPT | Performed by: ORTHOPAEDIC SURGERY

## 2022-10-12 PROCEDURE — 25010000002 DEXAMETHASONE PER 1 MG: Performed by: ANESTHESIOLOGY

## 2022-10-12 PROCEDURE — 63710000001 POTASSIUM CHLORIDE 10 MEQ TABLET CONTROLLED-RELEASE: Performed by: NURSE PRACTITIONER

## 2022-10-12 PROCEDURE — 76942 ECHO GUIDE FOR BIOPSY: CPT | Performed by: ORTHOPAEDIC SURGERY

## 2022-10-12 PROCEDURE — 63710000001 PREGABALIN 75 MG CAPSULE: Performed by: ORTHOPAEDIC SURGERY

## 2022-10-12 PROCEDURE — 25010000002 CEFAZOLIN IN DEXTROSE 2-4 GM/100ML-% SOLUTION: Performed by: NURSE PRACTITIONER

## 2022-10-12 PROCEDURE — 63710000001 MELOXICAM 15 MG TABLET: Performed by: ORTHOPAEDIC SURGERY

## 2022-10-12 PROCEDURE — 25010000002 ROPIVACAINE PER 1 MG: Performed by: ANESTHESIOLOGY

## 2022-10-12 PROCEDURE — 27447 TOTAL KNEE ARTHROPLASTY: CPT | Performed by: NURSE PRACTITIONER

## 2022-10-12 DEVICE — VIOLET ANTIBACTERIAL POLYDIOXANONE, KNOTLESS TISSUE CONTROL DEVICE
Type: IMPLANTABLE DEVICE | Site: KNEE | Status: FUNCTIONAL
Brand: STRATAFIX

## 2022-10-12 DEVICE — GENESIS II NON-POROUS TIBIAL                                    BASEPLATE SIZE 8 RIGHT
Type: IMPLANTABLE DEVICE | Site: KNEE | Status: FUNCTIONAL
Brand: GENESIS II

## 2022-10-12 DEVICE — LEGION CRUCIATE RETAINING OXINIUM                                    FEMORAL SIZE 7 RIGHT
Type: IMPLANTABLE DEVICE | Site: KNEE | Status: FUNCTIONAL
Brand: LEGION

## 2022-10-12 DEVICE — LEGION CRUCIATE RETAINING HIGH                                    FLEX HIGHLY CROSS LINKED                                    POLYETHYLENE SIZE 7-8 11MM
Type: IMPLANTABLE DEVICE | Site: KNEE | Status: FUNCTIONAL
Brand: LEGION

## 2022-10-12 DEVICE — KNOTLESS TISSUE CONTROL DEVICE, UNDYED UNIDIRECTIONAL (ANTIBACTERIAL) SYNTHETIC ABSORBABLE DEVICE
Type: IMPLANTABLE DEVICE | Site: KNEE | Status: FUNCTIONAL
Brand: STRATAFIX

## 2022-10-12 DEVICE — IMPLANTABLE DEVICE: Type: IMPLANTABLE DEVICE | Site: KNEE | Status: FUNCTIONAL

## 2022-10-12 DEVICE — GENESIS II RESURFACING PATELLA 41MM
Type: IMPLANTABLE DEVICE | Site: KNEE | Status: FUNCTIONAL
Brand: GENESIS II

## 2022-10-12 DEVICE — PALACOS® R IS A FAST-CURING, RADIOPAQUE, POLY(METHYL METHACRYLATE)-BASED BONE CEMENT.PALACOS ® R CONTAINS THE X-RAY CONTRAST MEDIUM ZIRCONIUM DIOXIDE. TO IMPROVE VISIBILITY IN THE SURGICAL FIELD PALACOS ® R HAS BEEN COLOURED WITH CHLOROPHYLL (E141). THE BONE CEMENT IS PREPARED DIRECTLY BEFORE USE BY MIXING A POLYMER POWDER COMPONENT WITH A LIQUID MONOMER COMPONENT. A DUCTILE DOUGH FORMS WHICH CURES WITHIN A FEW MINUTES.
Type: IMPLANTABLE DEVICE | Site: KNEE | Status: FUNCTIONAL
Brand: PALACOS®

## 2022-10-12 RX ORDER — FENTANYL CITRATE 50 UG/ML
50 INJECTION, SOLUTION INTRAMUSCULAR; INTRAVENOUS
Status: DISCONTINUED | OUTPATIENT
Start: 2022-10-12 | End: 2022-10-12 | Stop reason: HOSPADM

## 2022-10-12 RX ORDER — ONDANSETRON 4 MG/1
4 TABLET, FILM COATED ORAL EVERY 8 HOURS PRN
Qty: 10 TABLET | Refills: 0 | Status: SHIPPED | OUTPATIENT
Start: 2022-10-12 | End: 2023-01-20 | Stop reason: SDUPTHER

## 2022-10-12 RX ORDER — ACETAMINOPHEN 325 MG/1
650 TABLET ORAL EVERY 6 HOURS PRN
Status: DISCONTINUED | OUTPATIENT
Start: 2022-10-12 | End: 2022-10-13 | Stop reason: HOSPADM

## 2022-10-12 RX ORDER — PREGABALIN 75 MG/1
150 CAPSULE ORAL ONCE
Status: COMPLETED | OUTPATIENT
Start: 2022-10-12 | End: 2022-10-12

## 2022-10-12 RX ORDER — HYDROCODONE BITARTRATE AND ACETAMINOPHEN 7.5; 325 MG/1; MG/1
1 TABLET ORAL ONCE AS NEEDED
Status: DISCONTINUED | OUTPATIENT
Start: 2022-10-12 | End: 2022-10-12 | Stop reason: HOSPADM

## 2022-10-12 RX ORDER — LIDOCAINE HYDROCHLORIDE 10 MG/ML
0.5 INJECTION, SOLUTION EPIDURAL; INFILTRATION; INTRACAUDAL; PERINEURAL ONCE AS NEEDED
Status: DISCONTINUED | OUTPATIENT
Start: 2022-10-12 | End: 2022-10-12 | Stop reason: HOSPADM

## 2022-10-12 RX ORDER — IBUPROFEN 600 MG/1
600 TABLET ORAL ONCE AS NEEDED
Status: DISCONTINUED | OUTPATIENT
Start: 2022-10-12 | End: 2022-10-12 | Stop reason: HOSPADM

## 2022-10-12 RX ORDER — PANTOPRAZOLE SODIUM 40 MG/1
40 TABLET, DELAYED RELEASE ORAL DAILY
Status: DISCONTINUED | OUTPATIENT
Start: 2022-10-13 | End: 2022-10-13 | Stop reason: HOSPADM

## 2022-10-12 RX ORDER — CEFAZOLIN SODIUM 2 G/100ML
2 INJECTION, SOLUTION INTRAVENOUS ONCE
Status: COMPLETED | OUTPATIENT
Start: 2022-10-12 | End: 2022-10-12

## 2022-10-12 RX ORDER — PROMETHAZINE HYDROCHLORIDE 12.5 MG/1
12.5 TABLET ORAL EVERY 4 HOURS PRN
Status: DISCONTINUED | OUTPATIENT
Start: 2022-10-12 | End: 2022-10-13 | Stop reason: HOSPADM

## 2022-10-12 RX ORDER — DEXAMETHASONE SODIUM PHOSPHATE 4 MG/ML
INJECTION, SOLUTION INTRA-ARTICULAR; INTRALESIONAL; INTRAMUSCULAR; INTRAVENOUS; SOFT TISSUE
Status: COMPLETED | OUTPATIENT
Start: 2022-10-12 | End: 2022-10-12

## 2022-10-12 RX ORDER — PROMETHAZINE HYDROCHLORIDE 25 MG/1
25 SUPPOSITORY RECTAL ONCE AS NEEDED
Status: DISCONTINUED | OUTPATIENT
Start: 2022-10-12 | End: 2022-10-12 | Stop reason: HOSPADM

## 2022-10-12 RX ORDER — EPHEDRINE SULFATE 50 MG/ML
5 INJECTION, SOLUTION INTRAVENOUS ONCE AS NEEDED
Status: DISCONTINUED | OUTPATIENT
Start: 2022-10-12 | End: 2022-10-12 | Stop reason: HOSPADM

## 2022-10-12 RX ORDER — LIDOCAINE HYDROCHLORIDE 20 MG/ML
INJECTION, SOLUTION INFILTRATION; PERINEURAL AS NEEDED
Status: DISCONTINUED | OUTPATIENT
Start: 2022-10-12 | End: 2022-10-12 | Stop reason: SURG

## 2022-10-12 RX ORDER — ACETAMINOPHEN 10 MG/ML
INJECTION, SOLUTION INTRAVENOUS AS NEEDED
Status: DISCONTINUED | OUTPATIENT
Start: 2022-10-12 | End: 2022-10-12 | Stop reason: SURG

## 2022-10-12 RX ORDER — MAGNESIUM HYDROXIDE 1200 MG/15ML
LIQUID ORAL AS NEEDED
Status: DISCONTINUED | OUTPATIENT
Start: 2022-10-12 | End: 2022-10-12 | Stop reason: HOSPADM

## 2022-10-12 RX ORDER — TAMSULOSIN HYDROCHLORIDE 0.4 MG/1
0.4 CAPSULE ORAL NIGHTLY
Status: DISCONTINUED | OUTPATIENT
Start: 2022-10-12 | End: 2022-10-13 | Stop reason: HOSPADM

## 2022-10-12 RX ORDER — TRANEXAMIC ACID 100 MG/ML
INJECTION, SOLUTION INTRAVENOUS AS NEEDED
Status: DISCONTINUED | OUTPATIENT
Start: 2022-10-12 | End: 2022-10-12 | Stop reason: SURG

## 2022-10-12 RX ORDER — FAMOTIDINE 10 MG/ML
20 INJECTION, SOLUTION INTRAVENOUS ONCE
Status: COMPLETED | OUTPATIENT
Start: 2022-10-12 | End: 2022-10-12

## 2022-10-12 RX ORDER — NALOXONE HCL 0.4 MG/ML
0.2 VIAL (ML) INJECTION AS NEEDED
Status: DISCONTINUED | OUTPATIENT
Start: 2022-10-12 | End: 2022-10-12 | Stop reason: HOSPADM

## 2022-10-12 RX ORDER — HYDRALAZINE HYDROCHLORIDE 20 MG/ML
5 INJECTION INTRAMUSCULAR; INTRAVENOUS
Status: DISCONTINUED | OUTPATIENT
Start: 2022-10-12 | End: 2022-10-12 | Stop reason: HOSPADM

## 2022-10-12 RX ORDER — HYDROCODONE BITARTRATE AND ACETAMINOPHEN 7.5; 325 MG/1; MG/1
1 TABLET ORAL EVERY 4 HOURS PRN
Status: DISCONTINUED | OUTPATIENT
Start: 2022-10-12 | End: 2022-10-13 | Stop reason: HOSPADM

## 2022-10-12 RX ORDER — HYDROMORPHONE HYDROCHLORIDE 1 MG/ML
0.5 INJECTION, SOLUTION INTRAMUSCULAR; INTRAVENOUS; SUBCUTANEOUS
Status: DISCONTINUED | OUTPATIENT
Start: 2022-10-12 | End: 2022-10-12 | Stop reason: HOSPADM

## 2022-10-12 RX ORDER — FLUMAZENIL 0.1 MG/ML
0.2 INJECTION INTRAVENOUS AS NEEDED
Status: DISCONTINUED | OUTPATIENT
Start: 2022-10-12 | End: 2022-10-12 | Stop reason: HOSPADM

## 2022-10-12 RX ORDER — SODIUM CHLORIDE 0.9 % (FLUSH) 0.9 %
3-10 SYRINGE (ML) INJECTION AS NEEDED
Status: DISCONTINUED | OUTPATIENT
Start: 2022-10-12 | End: 2022-10-12 | Stop reason: HOSPADM

## 2022-10-12 RX ORDER — MELOXICAM 15 MG/1
15 TABLET ORAL ONCE
Status: COMPLETED | OUTPATIENT
Start: 2022-10-12 | End: 2022-10-12

## 2022-10-12 RX ORDER — HYDROCODONE BITARTRATE AND ACETAMINOPHEN 7.5; 325 MG/1; MG/1
TABLET ORAL
Qty: 60 TABLET | Refills: 0 | Status: SHIPPED | OUTPATIENT
Start: 2022-10-12 | End: 2022-12-30

## 2022-10-12 RX ORDER — OXYCODONE AND ACETAMINOPHEN 7.5; 325 MG/1; MG/1
1 TABLET ORAL EVERY 4 HOURS PRN
Status: DISCONTINUED | OUTPATIENT
Start: 2022-10-12 | End: 2022-10-12 | Stop reason: HOSPADM

## 2022-10-12 RX ORDER — ROPIVACAINE HYDROCHLORIDE 5 MG/ML
INJECTION, SOLUTION EPIDURAL; INFILTRATION; PERINEURAL
Status: COMPLETED | OUTPATIENT
Start: 2022-10-12 | End: 2022-10-12

## 2022-10-12 RX ORDER — DIPHENHYDRAMINE HYDROCHLORIDE 50 MG/ML
12.5 INJECTION INTRAMUSCULAR; INTRAVENOUS
Status: DISCONTINUED | OUTPATIENT
Start: 2022-10-12 | End: 2022-10-12 | Stop reason: HOSPADM

## 2022-10-12 RX ORDER — UREA 10 %
1 LOTION (ML) TOPICAL NIGHTLY PRN
Status: DISCONTINUED | OUTPATIENT
Start: 2022-10-12 | End: 2022-10-13 | Stop reason: HOSPADM

## 2022-10-12 RX ORDER — DEXAMETHASONE SODIUM PHOSPHATE 4 MG/ML
INJECTION, SOLUTION INTRA-ARTICULAR; INTRALESIONAL; INTRAMUSCULAR; INTRAVENOUS; SOFT TISSUE AS NEEDED
Status: DISCONTINUED | OUTPATIENT
Start: 2022-10-12 | End: 2022-10-12 | Stop reason: SURG

## 2022-10-12 RX ORDER — POLYETHYLENE GLYCOL 3350 17 G/17G
17 POWDER, FOR SOLUTION ORAL 2 TIMES DAILY
Qty: 238 G | Refills: 0 | Status: SHIPPED | OUTPATIENT
Start: 2022-10-12 | End: 2022-10-20

## 2022-10-12 RX ORDER — POVIDONE-IODINE 10 MG/ML
SOLUTION TOPICAL ONCE
Status: COMPLETED | OUTPATIENT
Start: 2022-10-12 | End: 2022-10-12

## 2022-10-12 RX ORDER — MELOXICAM 15 MG/1
15 TABLET ORAL DAILY
Qty: 14 TABLET | Refills: 0 | Status: SHIPPED | OUTPATIENT
Start: 2022-10-12 | End: 2022-10-27

## 2022-10-12 RX ORDER — SODIUM CHLORIDE, SODIUM LACTATE, POTASSIUM CHLORIDE, CALCIUM CHLORIDE 600; 310; 30; 20 MG/100ML; MG/100ML; MG/100ML; MG/100ML
9 INJECTION, SOLUTION INTRAVENOUS CONTINUOUS
Status: DISCONTINUED | OUTPATIENT
Start: 2022-10-12 | End: 2022-10-12

## 2022-10-12 RX ORDER — PROMETHAZINE HYDROCHLORIDE 25 MG/1
25 TABLET ORAL ONCE AS NEEDED
Status: DISCONTINUED | OUTPATIENT
Start: 2022-10-12 | End: 2022-10-12 | Stop reason: HOSPADM

## 2022-10-12 RX ORDER — ASPIRIN 81 MG/1
81 TABLET ORAL EVERY 12 HOURS SCHEDULED
Status: DISCONTINUED | OUTPATIENT
Start: 2022-10-13 | End: 2022-10-13 | Stop reason: HOSPADM

## 2022-10-12 RX ORDER — DIPHENHYDRAMINE HCL 25 MG
25 CAPSULE ORAL
Status: DISCONTINUED | OUTPATIENT
Start: 2022-10-12 | End: 2022-10-12 | Stop reason: HOSPADM

## 2022-10-12 RX ORDER — PROPOFOL 10 MG/ML
VIAL (ML) INTRAVENOUS AS NEEDED
Status: DISCONTINUED | OUTPATIENT
Start: 2022-10-12 | End: 2022-10-12 | Stop reason: SURG

## 2022-10-12 RX ORDER — HYDROCODONE BITARTRATE AND ACETAMINOPHEN 7.5; 325 MG/1; MG/1
2 TABLET ORAL EVERY 4 HOURS PRN
Status: DISCONTINUED | OUTPATIENT
Start: 2022-10-12 | End: 2022-10-13 | Stop reason: HOSPADM

## 2022-10-12 RX ORDER — ASPIRIN 81 MG/1
TABLET ORAL
Qty: 60 TABLET | Refills: 0 | Status: SHIPPED | OUTPATIENT
Start: 2022-10-12

## 2022-10-12 RX ORDER — MIDAZOLAM HYDROCHLORIDE 1 MG/ML
0.5 INJECTION INTRAMUSCULAR; INTRAVENOUS
Status: COMPLETED | OUTPATIENT
Start: 2022-10-12 | End: 2022-10-12

## 2022-10-12 RX ORDER — ONDANSETRON 2 MG/ML
4 INJECTION INTRAMUSCULAR; INTRAVENOUS ONCE AS NEEDED
Status: DISCONTINUED | OUTPATIENT
Start: 2022-10-12 | End: 2022-10-12 | Stop reason: HOSPADM

## 2022-10-12 RX ORDER — LABETALOL HYDROCHLORIDE 5 MG/ML
5 INJECTION, SOLUTION INTRAVENOUS
Status: DISCONTINUED | OUTPATIENT
Start: 2022-10-12 | End: 2022-10-12 | Stop reason: HOSPADM

## 2022-10-12 RX ORDER — POTASSIUM CHLORIDE 750 MG/1
20 TABLET, FILM COATED, EXTENDED RELEASE ORAL ONCE
Status: COMPLETED | OUTPATIENT
Start: 2022-10-12 | End: 2022-10-12

## 2022-10-12 RX ORDER — SODIUM CHLORIDE 0.9 % (FLUSH) 0.9 %
3 SYRINGE (ML) INJECTION EVERY 12 HOURS SCHEDULED
Status: DISCONTINUED | OUTPATIENT
Start: 2022-10-12 | End: 2022-10-12 | Stop reason: HOSPADM

## 2022-10-12 RX ORDER — CEFAZOLIN SODIUM 2 G/100ML
2 INJECTION, SOLUTION INTRAVENOUS EVERY 8 HOURS
Status: COMPLETED | OUTPATIENT
Start: 2022-10-12 | End: 2022-10-13

## 2022-10-12 RX ADMIN — MIDAZOLAM 0.5 MG: 1 INJECTION, SOLUTION INTRAMUSCULAR; INTRAVENOUS at 12:46

## 2022-10-12 RX ADMIN — SODIUM CHLORIDE, POTASSIUM CHLORIDE, SODIUM LACTATE AND CALCIUM CHLORIDE: 600; 310; 30; 20 INJECTION, SOLUTION INTRAVENOUS at 13:45

## 2022-10-12 RX ADMIN — SUGAMMADEX 200 MG: 100 INJECTION, SOLUTION INTRAVENOUS at 15:26

## 2022-10-12 RX ADMIN — Medication 1 MG: at 22:09

## 2022-10-12 RX ADMIN — DEXAMETHASONE SODIUM PHOSPHATE 8 MG: 4 INJECTION, SOLUTION INTRA-ARTICULAR; INTRALESIONAL; INTRAMUSCULAR; INTRAVENOUS; SOFT TISSUE at 14:05

## 2022-10-12 RX ADMIN — ROPIVACAINE HYDROCHLORIDE 20 ML: 5 INJECTION, SOLUTION EPIDURAL; INFILTRATION; PERINEURAL at 12:48

## 2022-10-12 RX ADMIN — PROPOFOL 150 MCG/KG/MIN: 10 INJECTION, EMULSION INTRAVENOUS at 14:10

## 2022-10-12 RX ADMIN — POVIDONE-IODINE 1 APPLICATION: 10 SOLUTION TOPICAL at 12:26

## 2022-10-12 RX ADMIN — MIDAZOLAM 0.5 MG: 1 INJECTION, SOLUTION INTRAMUSCULAR; INTRAVENOUS at 12:56

## 2022-10-12 RX ADMIN — DEXAMETHASONE SODIUM PHOSPHATE 4 MG: 4 INJECTION, SOLUTION INTRAMUSCULAR; INTRAVENOUS at 12:48

## 2022-10-12 RX ADMIN — CEFAZOLIN SODIUM 2 G: 2 INJECTION, SOLUTION INTRAVENOUS at 21:33

## 2022-10-12 RX ADMIN — FAMOTIDINE 20 MG: 10 INJECTION INTRAVENOUS at 12:42

## 2022-10-12 RX ADMIN — PREGABALIN 150 MG: 75 CAPSULE ORAL at 12:23

## 2022-10-12 RX ADMIN — MELOXICAM 15 MG: 15 TABLET ORAL at 12:23

## 2022-10-12 RX ADMIN — POTASSIUM CHLORIDE 20 MEQ: 750 TABLET, EXTENDED RELEASE ORAL at 21:33

## 2022-10-12 RX ADMIN — ACETAMINOPHEN 1000 MG: 10 INJECTION, SOLUTION INTRAVENOUS at 14:05

## 2022-10-12 RX ADMIN — PROPOFOL 150 MG: 10 INJECTION, EMULSION INTRAVENOUS at 13:56

## 2022-10-12 RX ADMIN — VANCOMYCIN HYDROCHLORIDE 1750 MG: 10 INJECTION, POWDER, LYOPHILIZED, FOR SOLUTION INTRAVENOUS at 12:37

## 2022-10-12 RX ADMIN — CEFAZOLIN SODIUM 2 G: 2 INJECTION, SOLUTION INTRAVENOUS at 13:26

## 2022-10-12 RX ADMIN — SODIUM CHLORIDE, POTASSIUM CHLORIDE, SODIUM LACTATE AND CALCIUM CHLORIDE 500 ML: 600; 310; 30; 20 INJECTION, SOLUTION INTRAVENOUS at 12:25

## 2022-10-12 RX ADMIN — TAMSULOSIN HYDROCHLORIDE 0.4 MG: 0.4 CAPSULE ORAL at 21:33

## 2022-10-12 RX ADMIN — FENTANYL CITRATE 50 MCG: 50 INJECTION INTRAMUSCULAR; INTRAVENOUS at 12:46

## 2022-10-12 RX ADMIN — TRANEXAMIC ACID 1000 MG: 1 INJECTION, SOLUTION INTRAVENOUS at 15:14

## 2022-10-12 RX ADMIN — LIDOCAINE HYDROCHLORIDE 80 MG: 20 INJECTION, SOLUTION INFILTRATION; PERINEURAL at 13:56

## 2022-10-12 NOTE — ANESTHESIA PROCEDURE NOTES
Airway  Urgency: elective    Airway not difficult    General Information and Staff    Patient location during procedure: OR  Anesthesiologist: Theron Jose MD  CRNA/CAA: Elizabeth Ojeda CRNA    Indications and Patient Condition  Indications for airway management: airway protection    Preoxygenated: yes  MILS not maintained throughout  Mask difficulty assessment: 1 - vent by mask    Final Airway Details  Final airway type: endotracheal airway      Successful airway: ETT  Cuffed: yes   Successful intubation technique: direct laryngoscopy  Facilitating devices/methods: intubating stylet  Blade: Yue  Blade size: 4  ETT size (mm): 7.5  Cormack-Lehane Classification: grade IIa - partial view of glottis  Placement verified by: chest auscultation and capnometry   Cuff volume (mL): 7  Measured from: teeth  ETT/EBT  to teeth (cm): 23  Number of attempts at approach: 1  Assessment: lips, teeth, and gum same as pre-op and atraumatic intubation

## 2022-10-12 NOTE — ANESTHESIA POSTPROCEDURE EVALUATION
Patient: Jeff Bass    Procedure Summary     Date: 10/12/22 Room / Location:  [facility] OR [address] [place] [address] [facility]    Anesthesia Start: 1345 Anesthesia Stop: 1611    Procedure: TOTAL KNEE ARTHROPLASTY (Right: Knee) Diagnosis:       Primary osteoarthritis of right knee      (Primary osteoarthritis of right knee [M17.11])    Surgeons: Ran Rachel MD Provider: Sebastian Higuera MD    Anesthesia Type: general ASA Status: 3          Anesthesia Type: general    Vitals  Vitals Value Taken Time   /61 10/12/22 1645   Temp 36.7 °C (98.1 °F) 10/12/22 1610   Pulse 72 10/12/22 1648   Resp 17 10/12/22 1645   SpO2 96 % 10/12/22 1648   Vitals shown include unvalidated device data.        Post Anesthesia Care and Evaluation    Patient location during evaluation: PACU  Patient participation: complete - patient participated  Level of consciousness: awake  Pain management: adequate    Airway patency: patent  Anesthetic complications: No anesthetic complications    Cardiovascular status: acceptable  Respiratory status: acceptable  Hydration status: acceptable    Comments: /61 (BP Location: Right arm, Patient Position: Lying)   Pulse 71   Temp 36.7 °C (98.1 °F) (Oral)   Resp 17   SpO2 96%

## 2022-10-12 NOTE — ANESTHESIA PROCEDURE NOTES
Peripheral Block      Patient reassessed immediately prior to procedure    Patient location during procedure: holding area  Start time: 10/12/2022 12:48 PM  Stop time: 10/12/2022 12:52 PM  Reason for block: at surgeon's request and post-op pain management  Performed by  Anesthesiologist: Theron Jose MD  Preanesthetic Checklist  Completed: patient identified and risks and benefits discussed  Prep:  Sterile barriers:gloves  Prep: ChloraPrep  Patient monitoring: blood pressure monitoring, continuous pulse oximetry and EKG  Procedure    Sedation: yes  Performed under: local infiltration  Guidance:ultrasound guided    ULTRASOUND INTERPRETATION.  Using ultrasound guidance a 21 G gauge needle was placed in close proximity to the femoral nerve, at which point, under ultrasound guidance anesthetic was injected in the area of the nerve and spread of the anesthesia was seen on ultrasound in close proximity thereto.  There were no abnormalities seen on ultrasound; a digital image was taken; and the patient tolerated the procedure with no complications. Images:still images obtained    Laterality:right  Block Type:adductor canal block  Injection Technique:single-shot  Needle Type:short-bevel  Needle Gauge:21 G      Medications Used: ropivacaine (NAROPIN) 0.5 % injection - Injection   20 mL - 10/12/2022 12:48:00 PM  dexamethasone (DECADRON) injection - Injection   4 mg - 10/12/2022 12:48:00 PM      Medications  Comment:      Post Assessment  Injection Assessment: negative aspiration for heme, no paresthesia on injection and incremental injection  Patient Tolerance:comfortable throughout block  Complications:no  Additional Notes  Ultrasound Interpretation:  Using ultrasound guidance the needle was placed in close proximity to the femoral nerve and anesthetic was injected in the area of the femoral nerve and spread of the anesthetic was seen on ultrasound in close proximity thereto.  There were no abnormalities seen on  ultrasound; a digital image was taken; and the patient tolerated the procedure with no complications.    Block placed for postoperative pain control per surgeon request.

## 2022-10-13 ENCOUNTER — HOME HEALTH ADMISSION (OUTPATIENT)
Dept: HOME HEALTH SERVICES | Facility: HOME HEALTHCARE | Age: 77
End: 2022-10-13

## 2022-10-13 VITALS
HEART RATE: 79 BPM | TEMPERATURE: 97.3 F | BODY MASS INDEX: 29.15 KG/M2 | SYSTOLIC BLOOD PRESSURE: 109 MMHG | WEIGHT: 246.91 LBS | DIASTOLIC BLOOD PRESSURE: 67 MMHG | HEIGHT: 77 IN | RESPIRATION RATE: 16 BRPM | OXYGEN SATURATION: 92 %

## 2022-10-13 LAB
HCT VFR BLD AUTO: 38.3 % (ref 37.5–51)
HGB BLD-MCNC: 12.8 G/DL (ref 13–17.7)

## 2022-10-13 PROCEDURE — 63710000001 ASPIRIN 81 MG TABLET DELAYED-RELEASE: Performed by: NURSE PRACTITIONER

## 2022-10-13 PROCEDURE — 99024 POSTOP FOLLOW-UP VISIT: CPT | Performed by: NURSE PRACTITIONER

## 2022-10-13 PROCEDURE — 97110 THERAPEUTIC EXERCISES: CPT

## 2022-10-13 PROCEDURE — G0378 HOSPITAL OBSERVATION PER HR: HCPCS

## 2022-10-13 PROCEDURE — 25010000002 CEFAZOLIN IN DEXTROSE 2-4 GM/100ML-% SOLUTION: Performed by: NURSE PRACTITIONER

## 2022-10-13 PROCEDURE — A9270 NON-COVERED ITEM OR SERVICE: HCPCS | Performed by: NURSE PRACTITIONER

## 2022-10-13 PROCEDURE — 97116 GAIT TRAINING THERAPY: CPT

## 2022-10-13 PROCEDURE — 63710000001 PANTOPRAZOLE 40 MG TABLET DELAYED-RELEASE: Performed by: NURSE PRACTITIONER

## 2022-10-13 PROCEDURE — 97162 PT EVAL MOD COMPLEX 30 MIN: CPT

## 2022-10-13 PROCEDURE — 85018 HEMOGLOBIN: CPT | Performed by: NURSE PRACTITIONER

## 2022-10-13 PROCEDURE — 90662 IIV NO PRSV INCREASED AG IM: CPT | Performed by: ORTHOPAEDIC SURGERY

## 2022-10-13 PROCEDURE — 85014 HEMATOCRIT: CPT | Performed by: NURSE PRACTITIONER

## 2022-10-13 PROCEDURE — 25010000002 INFLUENZA VAC HIGH-DOSE QUAD 0.7 ML SUSPENSION PREFILLED SYRINGE: Performed by: ORTHOPAEDIC SURGERY

## 2022-10-13 PROCEDURE — G0008 ADMIN INFLUENZA VIRUS VAC: HCPCS | Performed by: ORTHOPAEDIC SURGERY

## 2022-10-13 RX ADMIN — CEFAZOLIN SODIUM 2 G: 2 INJECTION, SOLUTION INTRAVENOUS at 04:09

## 2022-10-13 RX ADMIN — INFLUENZA A VIRUS A/VICTORIA/2570/2019 IVR-215 (H1N1) ANTIGEN (FORMALDEHYDE INACTIVATED), INFLUENZA A VIRUS A/DARWIN/9/2021 SAN-010 (H3N2) ANTIGEN (FORMALDEHYDE INACTIVATED), INFLUENZA B VIRUS B/PHUKET/3073/2013 ANTIGEN (FORMALDEHYDE INACTIVATED), AND INFLUENZA B VIRUS B/MICHIGAN/01/2021 ANTIGEN (FORMALDEHYDE INACTIVATED) 0.7 ML: 60; 60; 60; 60 INJECTION, SUSPENSION INTRAMUSCULAR at 09:59

## 2022-10-13 RX ADMIN — PANTOPRAZOLE SODIUM 40 MG: 40 TABLET, DELAYED RELEASE ORAL at 08:04

## 2022-10-13 RX ADMIN — ASPIRIN 81 MG: 81 TABLET, COATED ORAL at 08:04

## 2022-10-13 NOTE — DISCHARGE SUMMARY
Patient Name: Jeff Bass  Patient YOB: 1945    Date of Admission:  10/12/2022  Date of Discharge:  10/13/2022  Discharge Diagnosis: HI TOTAL KNEE ARTHROPLASTY [76304] (TOTAL KNEE ARTHROPLASTY)  Presenting Problem/History of Present Illness: Primary osteoarthritis of right knee [M17.11]  Pain [R52]  Admitting Physician: Dr Ran Rachel  Consults:   Consults     No orders found for last 30 day(s).          DETAILS OF HOSPITAL STAY:  Patient is a 77 y.o. male was admitted to the floor following the above procedure and underwent an uncomplicated hospital stay.  Patient did well with physical therapy and was ambulating well without problems.  On the day of discharge the wound was clean, dry and intact and calf was soft and nontender and Homans sign was negative.  Patient was tolerating   Diet Instructions     Advance Diet as Tolerated      May advance diet as tolerated while in hospital.    Diet:      Diet Texture / Consistency: Regular       without problems.  Patient will be discharged home.    Condition on Discharge:  Stable    Vital Signs  Temp:  [97.3 °F (36.3 °C)-98.2 °F (36.8 °C)] 97.3 °F (36.3 °C)  Heart Rate:  [71-96] 79  Resp:  [16-18] 16  BP: (109-169)/(61-83) 109/67    LABS:      Admission on 10/12/2022   Component Date Value Ref Range Status   • Hemoglobin 10/13/2022 12.8 (L)  13.0 - 17.7 g/dL Final   • Hematocrit 10/13/2022 38.3  37.5 - 51.0 % Final       No results found.    Discharge Medications     Discharge Medications      New Medications      Instructions Start Date   HYDROcodone-acetaminophen 7.5-325 MG per tablet  Commonly known as: NORCO   Take 1-2 tablets by mouth every 4-6 hours as needed for pain.  Take 2 only when in severe pain.      meloxicam 15 MG tablet  Commonly known as: MOBIC   15 mg, Oral, Daily      ondansetron 4 MG tablet  Commonly known as: Zofran   4 mg, Oral, Every 8 Hours PRN      polyethylene glycol 17 GM/SCOOP powder  Commonly known as: MIRALAX  Replaces:  polyethylene glycol 17 g packet   Mix one capful in liquid and take by mouth 2 (Two) Times a Day for 7 days.         Changes to Medications      Instructions Start Date   aspirin 81 MG EC tablet  What changed:   · medication strength  · how much to take  · how to take this  · when to take this  · additional instructions   Take 1 tablet by mouth twice daily x 14 days; then take 1 tablet by mouth daily x 28 days         Continue These Medications      Instructions Start Date   alfuzosin 10 MG 24 hr tablet  Commonly known as: UROXATRAL   20 mg, Oral, Nightly      losartan-hydrochlorothiazide 100-25 MG per tablet  Commonly known as: HYZAAR   1 tablet, Oral, Daily      Melatonin 10 MG tablet   10 mg, Oral, Nightly      pantoprazole 40 MG EC tablet  Commonly known as: PROTONIX   40 mg, Oral, Daily      pravastatin 20 MG tablet  Commonly known as: PRAVACHOL   20 mg, Oral, Nightly         Stop These Medications    polyethylene glycol 17 g packet  Commonly known as: MIRALAX  Replaced by: polyethylene glycol 17 GM/SCOOP powder     vitamin D3 125 MCG (5000 UT) capsule capsule        ASK your doctor about these medications      Instructions Start Date   CRANBERRY CONCENTRATE PO   500 mg, Oral, Daily, HOLD FOR SURGERY      potassium chloride 20 MEQ CR tablet  Commonly known as: K-DUR,KLOR-CON   20 mEq, Oral, Daily             Discharge Instructions: Patient is to continue with physical therapy exercises daily and continue working with the physical therapist as ordered. Patient may weight bear as tolerated. Apply ice regularly. Patient may ice for long periods of time as long as ice is not directly on the skin. Patient instructed on frequent calf pumping exercises.  Patient also instructed on incentive spirometer during hospitalization and encouraged to continue to use at home regularly.    Dressing: The dressing is designed to be left in place until you return to the office in 2 weeks.  The suction unit should stop functioning  at 7 days and the green light will switch to yellow.  At that point the suction unit and tubing can be disconnected at the port closest to the dressing.  The suction unit and tubing may be discarded.  You may shower immediately upon return home, you will need to turn the pump off by depressing the orange button once and then you may disconnect the pump and tubing at the connection port.  After showering, shake off the excess water and reattach the tubing.  Restart the pump by depressing the orange button one time and you will notice the green light flashing again.  If the dressing becomes dislodged or saturated it should be changed. Please refer to the YURIDIA information sheet if you have any questions about the dressing.  If you have a home health nurse or therapist they can be contacted to assist with dressing change or repair. You may also call the YURIDIA dressing hotline for questions related to the dressing ([phone]). If there are still other problems or questions related to the dressing despite these measures then you can contact Alexandra at our office [phone].  If for some reason the YURIDIA dressing is removed, after 7 days the wound can be gently cleaned with antibacterial soap then allowed to dry and covered with a dry sterile dressing. The wound should be covered at all times except while showering.  Patient may change dressings daily and prn using sterile 4x4 and paper tape, and should call if any unusual drainage, redness or swelling.*  Follow up appointment in 2 weeks - patient to call the office at [phone] to schedule.  Patient will be discharged on aspirin 81mg BID x weeks, then daily x 4weeks    Discharge Diagnosis:    Patient Active Problem List   Diagnosis   • Health care maintenance   • Benign prostatic hyperplasia   • Chronic lymphocytic leukemia (HCC)   • Hypertension   • Hypovitaminosis D   • Hyperlipidemia   • Hypogonadism in male   • Vitamin D deficiency   • Malignant neoplasm of prostate  (HCC)   • Spinal stenosis of lumbar region with neurogenic claudication   • Degeneration of lumbar intervertebral disc   • Coronary arteriosclerosis   • PVC (premature ventricular contraction)   • History of colon polyps   • Abdominal pain   • Substernal goiter   • Skin tag   • Sensory hearing loss, bilateral   • ED (erectile dysfunction) of organic origin   • Constipation   • Benign essential hypertension   • Bladder outlet obstruction   • GERD (gastroesophageal reflux disease)   • Left inguinal hernia   • Impacted cerumen   • Hemangioma of liver   • Abnormal finding on thyroid function test   • Bursitis of right hip   • Pain   • Primary osteoarthritis of right knee       Follow-up Appointments  Future Appointments   Date Time Provider Department Center   10/27/2022 11:20 AM Ran Rachel MD [facility]   11/10/2022  1:50 PM LAB CHAIR 6 [facility]  LAB [facility]   11/10/2022  2:20 PM [facility]   12/20/2022 10:15 AM Costa Scott MD [facility]  10/13/22  08:08 EDT

## 2022-10-13 NOTE — DISCHARGE PLACEMENT REQUEST
"Mac Bass (77 y.o. Male)     Date of Birth   1945    Social Security Number       Address   [address] [place] [address]    Home Phone   [phone]    MRN   [id number]       Denominational   Non-Mormon    Marital Status                               Admission Date   10/12/22    Admission Type   Elective    Admitting Provider   Ran Rachel MD    Attending Provider   Ran Rachel MD    Department, Room/Bed   [address], P778/1       Discharge Date       Discharge Disposition   Home-Health Care [facility]    Discharge Destination                               Attending Provider: Ran Rachel MD    Allergies: No Known Allergies    Isolation: None   Infection: COVID Screen (preop/placement) (06/16/22)   Code Status: Prior    Ht: 195.6 cm (77.01\")   Wt: 112 kg (246 lb 14.6 oz)    Admission Cmt: None   Principal Problem: Primary osteoarthritis of right knee [M17.11]                 Active Insurance as of 10/12/2022     Primary Coverage     Payor Plan Insurance Group Employer/Plan Group    MEDICARE MEDICARE A & B      Payor Plan Address Payor Plan Phone Number Payor Plan Fax Number Effective Dates    [address] [phone]  9/1/2008 - None Entered    [facility] [id number]       Subscriber Name Subscriber Birth Date Member ID       MAC BASS 1945 [id number]           Secondary Coverage     Payor Plan Insurance Group Employer/Plan Group    AARP MC SUP AARP HEALTH CARE OPTIONS      Payor Plan Address Payor Plan Phone Number Payor Plan Fax Number Effective Dates    [place] [phone]  1/1/2016 - None Entered    [address]       [facility] [address]       Subscriber Name Subscriber Birth Date Member ID       MAC BASS 1945 [id number]                 Emergency Contacts      (Rel.) Home Phone Work Phone Mobile Phone    Stephanie Andujar (Daughter) [phone] -- --    KATHERINE SMITH (Friend) -- -- " [phone]

## 2022-10-13 NOTE — PROGRESS NOTES
Continued Stay Note  [facility]     Patient Name: Jeff Bass  MRN: [id number]  Today's Date: 10/13/2022    Admit Date: 10/12/2022    Plan: [facility]   Discharge Plan     Row Name 10/13/22 0901       Plan    Plan [facility]    Patient/Family in Agreement with Plan yes    Plan Comments Spoke with pt, verified correct information on facesheet and explained the role of CCP. Pt would like to d/c home with [facility], referral sent in [facility] to [facility]. Plan will be to d/c home with [facility] and family support. No other needs identified.    Final Discharge Disposition Code 06 - home with home health care    Final Note Pt to d/c home with [facility]               Discharge Codes    No documentation.               Expected Discharge Date and Time     Expected Discharge Date Expected Discharge Time    Oct 13, 2022             Ling Ramirez RN

## 2022-10-13 NOTE — PROGRESS NOTES
[facility] to follow for home care PT.  Noted order in epic per Dr Rachel.  Verified all info with patient and address on facesheet is correct.  Please note [facility] main road is closed so staff will have to use detour to get to patient's home.  Patient can give more instructions.

## 2022-10-13 NOTE — THERAPY EVALUATION
Patient Name: Jeff Bass  : 1945    MRN: [id number]                              Today's Date: 10/13/2022       Admit Date: 10/12/2022    Visit Dx:     ICD-10-CM ICD-9-CM   1. S/P TKR (total knee replacement), right  Z96.651 V43.65   2. Primary osteoarthritis of right knee  M17.11 715.16     Patient Active Problem List   Diagnosis   • Health care maintenance   • Benign prostatic hyperplasia   • Chronic lymphocytic leukemia (HCC)   • Hypertension   • Hypovitaminosis D   • Hyperlipidemia   • Hypogonadism in male   • Vitamin D deficiency   • Malignant neoplasm of prostate (HCC)   • Spinal stenosis of lumbar region with neurogenic claudication   • Degeneration of lumbar intervertebral disc   • Coronary arteriosclerosis   • PVC (premature ventricular contraction)   • History of colon polyps   • Abdominal pain   • Substernal goiter   • Skin tag   • Sensory hearing loss, bilateral   • ED (erectile dysfunction) of organic origin   • Constipation   • Benign essential hypertension   • Bladder outlet obstruction   • GERD (gastroesophageal reflux disease)   • Left inguinal hernia   • Impacted cerumen   • Hemangioma of liver   • Abnormal finding on thyroid function test   • Bursitis of right hip   • Pain   • Primary osteoarthritis of right knee     Past Medical History:   Diagnosis Date   • Arthritis    • Benign prostatic hyperplasia     FOLLOWED BY DR. VIVIEN PATEL   • Biliary dyskinesia    • Bunion of right foot     GREAT TOE   • CLL (chronic lymphocytic leukemia) (HCC) 2016    FOLLOWED BY DR WALKER   • Colon polyps     FOLLOWED BY DR. NEHA HAM   • Constipation    • Degeneration of lumbar intervertebral disc    • Diverticulosis    • Erectile dysfunction    • Fracture of ankle    • GERD (gastroesophageal reflux disease)    • Hallux valgus of left foot    • Hyperlipidemia     MIXED   • Hypertension    • Inguinal hernia    • Kidney stone 2009    SEEN AT [facility] ER   • Knee swelling    •  Localized, primary osteoarthritis    • Lumbar radiculopathy    • Lumbar stenosis    • Lung mass     LEFT SIDE - SCAR TISSUE PER PATIENT    • Osteoarthritis of right knee    • Polyneuropathy    • Prostate CA (HCC) 03/2016    JACQUELYN 6, S/P XRT, FOLLOWED BY DR. VIVIEN PATEL, RADIATION SEEDS   • PVC (premature ventricular contraction)     PT STATES WORKED UP YEARS AGO BY [facility] FOR POSSIBLE MURMUR - NO FOLLOW UP REQUIRED    • RBBB    • Sensory hearing loss, bilateral    • Skin cancer     SQUAMOUS CELL CARCINOMA OF FACE   • Substernal goiter    • Thyromegaly 12/2016    ADMITTED TO [facility]   • Vitamin D deficiency      Past Surgical History:   Procedure Laterality Date   • BRONCHOSCOPY N/A 12/14/2016    Procedure: BRONCHOSCOPY WITH  BAL AND BIOPSY AND ENDOBRONCHIAL ULTRASOUND  AND NAVIGATION WITH BRUSHINGS AND BIOPSY AND BAL;  Surgeon: Pierre Manning MD;  Location: [facility] ENDOSCOPY;  Service:    • COLONOSCOPY N/A 03/13/2019    5 MM SESSILE TUBULAR ADENOMA POLYP IN TRANSVERSE, MULTIPLE SMALL AND LARGE DIVERTICULA IN SIGMOID, RESCOPE IN 5 YRS, DR. NEHA HAM AT [facility]   • COLONOSCOPY W/ POLYPECTOMY N/A 03/19/2015    3 TUBULAR ADENOMA POLYPS, 12 TUBULOVILLOUS POLYP RECTO-SIGMOID, DIVERTICULOSIS, RESCOPE IN 3 YRS, DR. NEHA HAM AT [facility]   • EYE SURGERY Bilateral     CATARACT EXTRACTION WITH LENS IMPLANT, DR. GOVEA   • FINGER NAIL SURGERY Right 2022    KSENIA   • INGUINAL HERNIA REPAIR Left 11/02/2021    Procedure: LEFT OPEN INGUINAL HERNIA REPAIR;  Surgeon: Nixon Kolb MD;  Location: [facility];  Service: General;  Laterality: Left;   • PROSTATE RADIOACTIVE SEED IMPLANT N/A 09/06/2016    DR. HOA JARAMILLO AT [facility]   • PROSTATE SURGERY N/A 03/11/2016    BIOPSY: ADENOCARCINOMA, DR. ZAID IBARRA   • THYROID BIOPSY Bilateral 11/01/2017    NO B CELL OR T CELL LYMPHOMA, DONE AT [facility]   • TOTAL KNEE ARTHROPLASTY Right 10/12/2022    Procedure: TOTAL KNEE ARTHROPLASTY;  Surgeon: Ran Rachel MD;   [facility];  Service: Orthopedics;  Laterality: Right;      General Information     Row Name 10/13/22 0952          Physical Therapy Time and Intention    Document Type evaluation  -     Mode of Treatment physical therapy  -     Row Name 10/13/22 0952          General Information    Patient Profile Reviewed yes  -PC     Prior Level of Function independent:  -PC     Row Name 10/13/22 0952          Living Environment    People in Home alone  pt will have a friend staying with him  -PC     Row Name 10/13/22 0952          Home Main Entrance    Number of Stairs, Main Entrance three  no rail, but there is a wall on one side and pt's friend will help him on the other side  -PC     Row Name 10/13/22 0952          Stairs Within Home, Primary    Number of Stairs, Within Home, Primary one  -PC     Row Name 10/13/22 0952          Cognition    Orientation Status (Cognition) oriented x 4  -PC     Row Name 10/13/22 0952          Safety Issues, Functional Mobility    Impairments Affecting Function (Mobility) endurance/activity tolerance;strength;pain;range of motion (ROM)  -PC           User Key  (r) = Recorded By, (t) = Taken By, (c) = Cosigned By    Initials Name Provider Type    PC Honey Mendez PT Physical Therapist               Mobility     Row Name 10/13/22 0954          Bed Mobility    Bed Mobility supine-sit  -PC     Supine-Sit [place] (Bed Mobility) independent  -PC     Row Name 10/13/22 0954          Sit-Stand Transfer    Sit-Stand [place] (Transfers) contact guard  -     Assistive Device (Sit-Stand Transfers) walker, front-wheeled  -PC     Row Name 10/13/22 0954          Gait/Stairs (Locomotion)    [place] Level (Gait) supervision  -     Assistive Device (Gait) walker, front-wheeled  -PC     Distance in Feet (Gait) 250 ft  -     Deviations/Abnormal Patterns (Gait) antalgic;heri decreased;stride length decreased  -     Handrail Location (Stairs) right side (ascending)  -      Number of Steps (Stairs) 4  -PC     Ascending Technique (Stairs) step-to-step  -PC     Descending Technique (Stairs) step-to-step  -PC     Comment, (Gait/Stairs) pt able to walk with reciprocal gait pattern  -PC           User Key  (r) = Recorded By, (t) = Taken By, (c) = Cosigned By    Initials Name Provider Type    PC Honey Mendez PT Physical Therapist               Obj/Interventions     Row Name 10/13/22 0956          Range of Motion Comprehensive    Comment, General Range of Motion WNL x R knee  -PC     Row Name 10/13/22 0956          Strength Comprehensive (MMT)    Comment, General Manual Muscle Testing (MMT) Assessment WNL x R LE< pt able to perform SLR independently  -PC     Row Name 10/13/22 0956          Motor Skills    Therapeutic Exercise --  5 reps TKR  -PC     Row Name 10/13/22 0956          Balance    Comment, Balance WNL  -PC           User Key  (r) = Recorded By, (t) = Taken By, (c) = Cosigned By    Initials Name Provider Type    PC Honey Mendez, PT Physical Therapist               Goals/Plan    No documentation.                Clinical Impression     Row Name 10/13/22 0957          Pain    Pretreatment Pain Rating 0/10 - no pain  -PC     Pain Location - Side/Orientation Right  -PC     Pain Location - knee  -PC     Pain Intervention(s) Medication (See MAR);Cold applied;Repositioned  -PC     Row Name 10/13/22 0957          Plan of Care Review    Plan of Care Reviewed With patient;friend  -PC     Outcome Evaluation Pt is POD 1 R TKR, presents with post op pain, weakness, impaired functional mobility, PT this morning consisted of ROM and strengthening ex, as well as gait and stair training, pt did well and was able to walk 250 ft with rwx with reciprocal gait pattern, and go up and down 4 stairs without difficulty. Pt with good performance of exercises. Pt will have assist at home and a walker and BSC was provided to pt through [facility], pt will cont rehab on his knee with home health  -      Row Name 10/13/22 0957          Therapy Assessment/Plan (PT)    Criteria for Skilled Interventions Met (PT) yes;meets criteria  -PC     Therapy Frequency (PT) evaluation only  evaluation and treatment indicated for safe discharge home  -     Row Name 10/13/22 0957          Positioning and Restraints    Pre-Treatment Position in bed  -PC     Post Treatment Position chair  -PC     In Chair reclined;call light within reach;encouraged to call for assist;exit alarm on;with family/caregiver  -PC           User Key  (r) = Recorded By, (t) = Taken By, (c) = Cosigned By    Initials Name Provider Type    PC Honey Mendez, PT Physical Therapist               Outcome Measures     Row Name 10/13/22 1001 10/13/22 0804       How much help from another person do you currently need...    Turning from your back to your side while in flat bed without using bedrails? 4  -PC 4  -CRUZ    Moving from lying on back to sitting on the side of a flat bed without bedrails? 4  -PC 3  -CRUZ    Moving to and from a bed to a chair (including a wheelchair)? 4  -PC 3  -CRUZ    Standing up from a chair using your arms (e.g., wheelchair, bedside chair)? 3  -PC 3  -CRUZ    Climbing 3-5 steps with a railing? 3  -PC 3  -CRUZ    To walk in hospital room? 3  -PC 3  -CRUZ    AM-PAC 6 Clicks Score (PT) 21  -PC 19  -CRUZ    Highest level of mobility 6 --> Walked 10 steps or more  -PC 6 --> Walked 10 steps or more  -CRUZ    Row Name 10/13/22 1001          Functional Assessment    Outcome Measure Options AM-PAC 6 Clicks Basic Mobility (PT)  -PC           User Key  (r) = Recorded By, (t) = Taken By, (c) = Cosigned By    Initials Name Provider Type    PC Honey Mendez, PT Physical Therapist    Wendi Hussein, RN Registered Nurse                             Physical Therapy Education     Title: PT OT SLP Therapies (Done)     Topic: Physical Therapy (Done)     Point: Mobility training (Done)     Learning Progress Summary           Patient Acceptance, E,D, DU by PC at  10/13/2022 1001   Family Acceptance, E,D, DU by PC at 10/13/2022 1001                   Point: Home exercise program (Done)     Learning Progress Summary           Patient Acceptance, E,D, DU by PC at 10/13/2022 1001   Family Acceptance, E,D, DU by PC at 10/13/2022 1001                   Point: Body mechanics (Done)     Learning Progress Summary           Patient Acceptance, E,D, DU by PC at 10/13/2022 1001   Family Acceptance, E,D, DU by PC at 10/13/2022 1001                   Point: Precautions (Done)     Learning Progress Summary           Patient Acceptance, E,D, DU by PC at 10/13/2022 1001   Family Acceptance, E,D, DU by PC at 10/13/2022 1001                               User Key     Initials Effective Dates Name Provider Type Discipline     06/16/21 -  Honey Mendez PT Physical Therapist PT              PT Recommendation and Plan     Plan of Care Reviewed With: patient, friend  Outcome Evaluation: Pt is POD 1 R TKR, presents with post op pain, weakness, impaired functional mobility, PT this morning consisted of ROM and strengthening ex, as well as gait and stair training, pt did well and was able to walk 250 ft with rwx with reciprocal gait pattern, and go up and down 4 stairs without difficulty. Pt with good performance of exercises. Pt will have assist at home and a walker and BSC was provided to pt through [facility], pt will cont rehab on his knee with home health     Time Calculation:    PT Charges     Row Name 10/13/22 1002             Time Calculation    Start Time 0912  -PC      Stop Time 0950  -PC      Time Calculation (min) 38 min  -PC      PT Received On 10/13/22  -PC         Time Calculation- PT    Total Timed Code Minutes- PT 30 minute(s)  -PC            User Key  (r) = Recorded By, (t) = Taken By, (c) = Cosigned By    Initials Name Provider Type    PC Honey Mendez PT Physical Therapist              Therapy Charges for Today     Code Description Service Date Service Provider Modifiers Qty     62541831327 HC PT EVAL MOD COMPLEXITY 2 10/13/2022 Honey Mendez, PT GP 1    01573093130 HC PT THER PROC EA 15 MIN 10/13/2022 Honey Mendez, PT GP 1    03972401164 HC GAIT TRAINING EA 15 MIN 10/13/2022 Honey Mendez, PT GP 1          PT G-Codes  Outcome Measure Options: AM-PAC 6 Clicks Basic Mobility (PT)  AM-PAC 6 Clicks Score (PT): 21    Honey Mendez, PT  10/13/2022

## 2022-10-13 NOTE — PLAN OF CARE
Goal Outcome Evaluation:  Plan of Care Reviewed With: patient        Progress: improving  Outcome Evaluation: patient ambulating with assistance in hallway, minimal complaints of pain at this time, voiding per urinal,, educated on b/p monitoring

## 2022-10-13 NOTE — ANESTHESIA POSTPROCEDURE EVALUATION
Patient: Jeff Bass    Procedure Summary     Date: 10/12/22 Room / Location:  [facility] [address] [place] [address] [facility]    Anesthesia Start: 1345 Anesthesia Stop: 1611    Procedure: TOTAL KNEE ARTHROPLASTY (Right: Knee) Diagnosis:       Primary osteoarthritis of right knee      (Primary osteoarthritis of right knee [M17.11])    Surgeons: Ran Rachel MD Provider: Sebastian Higuera MD    Anesthesia Type: general ASA Status: 3          Anesthesia Type: general    Vitals  Vitals Value Taken Time   /77 10/12/22 1730   Temp 36.4 °C (97.5 °F) 10/12/22 1740   Pulse 78 10/12/22 1741   Resp 18 10/12/22 1730   SpO2 97 % 10/12/22 1741   Vitals shown include unvalidated device data.        Post Anesthesia Care and Evaluation      Comments: Post op noted entered for chart completion purposes only

## 2022-10-13 NOTE — PLAN OF CARE
Goal Outcome Evaluation:  Plan of Care Reviewed With: patient, friend           Outcome Evaluation: Pt is POD 1 R TKR, presents with post op pain, weakness, impaired functional mobility, PT this morning consisted of ROM and strengthening ex, as well as gait and stair training, pt did well and was able to walk 250 ft with rwx with reciprocal gait pattern, and go up and down 4 stairs without difficulty. Pt with good performance of exercises. Pt will have assist at home and a walker and BSC was provided to pt through [facility], pt will cont rehab on his knee with home health

## 2022-10-14 ENCOUNTER — HOME CARE VISIT (OUTPATIENT)
Dept: HOME HEALTH SERVICES | Facility: HOME HEALTHCARE | Age: 77
End: 2022-10-14

## 2022-10-14 VITALS
DIASTOLIC BLOOD PRESSURE: 65 MMHG | HEART RATE: 78 BPM | SYSTOLIC BLOOD PRESSURE: 128 MMHG | RESPIRATION RATE: 18 BRPM | TEMPERATURE: 97.1 F | OXYGEN SATURATION: 98 %

## 2022-10-14 PROCEDURE — G0151 HHCP-SERV OF PT,EA 15 MIN: HCPCS

## 2022-10-15 NOTE — HOME HEALTH
"Reason for referral: 76 yo M referred to home health physical therapy with decreased ability to transfer and amb related to recent R TKR    Subjective: \"The pain hasn't been too bad as long as I don't move.\" per patient    Past Medical History: HTN, OA, spinal stenosis    Previous level of function: IND with amb without device, IND with ADL's, IND driving, working    Home environment/support: lives in own town home. Has bedroom and full bath available on main floor. Has friend staying with him to assist, 4-5 steps to enter. Has FWW, BSC    Assessment: Skilled physical therapy 1w1, 3w2 is needed due to decreased ability to transfer and amb related to recent R TKR for evaluation, gait training, ROM progression, fall prevention, pain/edema management, HEP instruction. Patient agrees with PT plan of care."

## 2022-10-15 NOTE — OP NOTE
Name: Jeff Bass    YOB: 1945    DATE OF SURGERY: 10/12/2022    PREOPERATIVE DIAGNOSIS: Right knee end-stage osteoarthritis    POSTOPERATIVE DIAGNOSIS: Right knee end-stage osteoarthritis    PROCEDURE PERFORMED: Right total knee replacement     SURGEON: Ran Rachel M.D.    ASSISTANT: DARCIE CHIN    A surgical assistant was integral in ensuring a successful outcome with this procedure.  The assistant was utilized to assist in positioning the patient, draping the patient, was used throughout the case to provide with retraction of tissues, suctioning of blood and body fluids for visualization, positioning of the extremity to allow for proper exposure so that I could perform the procedure.  Without the use of a surgical assistant during this procedure I feel that the outcome may have been compromised or would have been suboptimal or at risk for complications.    IMPLANTS: [employer]:     Implant Name Type Inv. Item Serial No.  Lot No. LRB No. Used Action   CMT BONE PALACOS R HI/VISC 1X40 - [id number] Implant CMT BONE PALACOS R HI/VISC 1X40  [facility] [id number] Right 2 Implanted   DEV CONTRL TISS STRATAFIX SYMM PDS PLUS ANNITA CT-1 60CM - [id number] Implant DEV CONTRL TISS STRATAFIX SYMM PDS PLUS ANNITA CT-1 60CM  [employer]  DIV OF J AND J [id number] Right 1 Implanted   DEV CONTRL TISS STRATAFIXSPIRALMNCRYL PLSPS2 REV3/0 45CM - [id number] Implant DEV CONTRL TISS STRATAFIXSPIRALMNCRYL PLSPS2 REV3/0 45CM  [employer]  DIV OF J AND J [id number] Right 1 Implanted   COMP FEM LEGION OXINIUM CR SZ7 RT - [id number] Implant COMP FEM LEGION OXINIUM CR SZ7 RT  [employer] [id number] Right 1 Implanted   BASE TIB/KN GEN2 NONPOR TI SZ8 RT - [id number] Implant BASE TIB/KN GEN2 NONPOR TI SZ8 RT  PATEL AND NEPHEW [id number] Right 1 Implanted   COMP PAT RESRF ELIZABETH 41MM - [id number] Implant COMP PAT RESRF ELIZABETH 41MM  PATEL [employer] NEPHEW [id number] Right 1 Implanted   INSRT ART/KN LEGION CR HF XLPE  SZ7TO8 11MM - [id number] Implant INSRT ART/KN LEGION CR HF XLPE SZ7TO8 11MM  PAT[employer] AND NEPHEW [id number] Right 1 Implanted   TOTL KN MEL SMITH NEPHEW - [id number] Implant TOTL KN MEL PATEL NEPHEW  PATEL AND NEPHEW  Right 1 Implanted       Estimated Blood Loss: 200cc  Specimens : none  Complications: none    DESCRIPTION OF PROCEDURE: The patient was taken to the operating room and placed in the supine position. A sequential compression device was carefully placed on the non-operative leg. Preoperative antibiotics were administered. Surgical time out was performed. After adequate induction of anesthesia, the leg was prepped and draped in the usual sterile fashion, exsanguinated with an Esmarch bandage and the tourniquet inflated to 250 mmHg. A midline incision was performed followed by a medial parapatellar arthrotomy. The patella was subluxed laterally.  A portion of the fat pad, ACL, and anterior horns of the meniscus were excised.  A drill hole was then placed in the center of the femoral canal in line with the canal.  It was irrigated and suctioned.  The intramedullary azalia was then placed and a 5 degree distal valgus cut was performed after the block pinned in place appropriately.  Cut surface was then removed.  The sizing and rotation guide was then placed and seated appropriately.  It was sized and then the drill holes for the 4-in-1 cutting guide were placed in 3 degrees of external rotation based off of the posterior condyles.  The 4-in-1 cutting block was then placed and the femoral cuts were performed.  The excess bone was removed and the cut surfaces looked good.  At this point we placed the retractors around the proximal tibia and a slight release of the PCL fibers off of the posterior proximal tibia was performed.  We used the extramedullary tibial alignment guide and it was aligned appropriately and then the depth was set and the block pinned in place.  The tibial cut was then performed and the  alignment guide was removed.  The tibial cut was removed and the cut surface looked good.  The posterior horns of the menisci were then removed as well as the posterior osteophytes.  Flexion extension blocks were then used to check the balance of the knee. The tibial cut surface was then sized with the sizing templates and the tibial and femoral trial were then placed. The knee was placed in full extension and then the tibial tray rotation was then matched to the femoral rotation and marked.    Attention was then placed to the patella. The patella was noted to track centrally through range of motion. The patella was then sized with the trials. The thickness of the patella was then measured. The patella was resurfaced and the surrounding osteophytes were removed. The preoperative thickness was reproduced. The patella tracked centrally through range of motion.  We then checked the balance with the trial implants in place and there was excellent medial lateral and flexion-extension balance.  The tibial and femoral arrays were then removed.  The checkpoint markers were then removed.   At this point all trial components were removed, the knee was copiously irrigated with pulsed lavage, and the knee was injected with anesthetic cocktail solution. The cut surfaces were then dried with clean lap sponges, and the components were cemented tibia, followed by femur, then patella. The knee was held in full extension and all excess cement was removed. The knee was held still until the cement had completely hardened. We then placed the trial polyethylene spacer which resulted in full extension and excellent flexion-extension balance. We placed the final polyethylene spacer.   The knee was then copiously irrigated. The tourniquet was then released. There was excellent hemostasis. We placed a one-eighth inch Hemovac drain. We closed the knee in multiple layers in standard fashion. Sterile dressing were applied. At the end of the  case, the sponge and needle counts were reported as being correct. There were no known complications. The patient was then transported to the recovery room.      Ran Rachel M.D.

## 2022-10-17 ENCOUNTER — HOME CARE VISIT (OUTPATIENT)
Dept: HOME HEALTH SERVICES | Facility: HOME HEALTHCARE | Age: 77
End: 2022-10-17

## 2022-10-17 VITALS
TEMPERATURE: 97.5 F | SYSTOLIC BLOOD PRESSURE: 167 MMHG | DIASTOLIC BLOOD PRESSURE: 72 MMHG | HEART RATE: 87 BPM | OXYGEN SATURATION: 97 % | RESPIRATION RATE: 18 BRPM

## 2022-10-17 PROCEDURE — G0151 HHCP-SERV OF PT,EA 15 MIN: HCPCS

## 2022-10-17 NOTE — HOME HEALTH
"Subjective: \"I haven't really needed the pain meds.\" per patient. Patient reports he removed battery pack from picos dressing when picos line became severed after getting tangled up in bed.    no new med changes  no recent falls    Assessment: patient with good follow through with HEP and with good ROM progression    Plan for next visit: progress to standing ther ex, cane amb as able, stair training"

## 2022-10-19 ENCOUNTER — TELEPHONE (OUTPATIENT)
Dept: ORTHOPEDIC SURGERY | Facility: HOSPITAL | Age: 77
End: 2022-10-19

## 2022-10-19 ENCOUNTER — TELEPHONE (OUTPATIENT)
Dept: ORTHOPEDIC SURGERY | Facility: CLINIC | Age: 77
End: 2022-10-19

## 2022-10-19 ENCOUNTER — HOME CARE VISIT (OUTPATIENT)
Dept: HOME HEALTH SERVICES | Facility: HOME HEALTHCARE | Age: 77
End: 2022-10-19

## 2022-10-19 VITALS
DIASTOLIC BLOOD PRESSURE: 74 MMHG | TEMPERATURE: 97.7 F | OXYGEN SATURATION: 96 % | HEART RATE: 80 BPM | SYSTOLIC BLOOD PRESSURE: 144 MMHG | RESPIRATION RATE: 18 BRPM

## 2022-10-19 PROCEDURE — G0157 HHC PT ASSISTANT EA 15: HCPCS

## 2022-10-19 NOTE — TELEPHONE ENCOUNTER
Provider: DARCIE ALMANZA  Caller:  MAC THOMPSON  Relationship to Patient: SELF    Phone Number:  [phone]  Reason for Call:  S/P RIGHT TKA ON 10/12/22 BY DR. JEAN BAPTISTE. PATIENT ASKING TO GIVE MESSAGE TO DARCIE ALMANZA. PATIENT IS ASKING WHEN HE MIGHT BE ABLE TO DRIVE AGAIN. PATIENT HAS CAREGIVER THAT NEEDS TO GO BACK HOME ASAP. PATIENT HAS F/U APPT. WITH [facility] ON 10/27/22.

## 2022-10-19 NOTE — TELEPHONE ENCOUNTER
Attempted to reach Mr. King to see how he is doing as he is 1 week SP TKA, Message left at this time.

## 2022-10-19 NOTE — HOME HEALTH
Patient reports continued progress. Good adherence to HEP. Discussion of transportation to OP and possibility of changing OP location.     SIGNS/SYMPTOMS OF INFECTION: No    FALLS SINCE LAST VISIT: No    EDEMA: Moderate non-pitting. Calf soft and non-tender    WOUNDS: Dressing intact, no new drainage.    PLAN FOR NEXT VISIT:  Gait training  Improve ROM  Review/progress home exercise program  Review fall risk and safety strategies

## 2022-10-19 NOTE — TELEPHONE ENCOUNTER
I called and spoke to patient about this. He knows he cannot drive before his 2 week post op. He is wanting to know will he be able to drive after that appt because his helper has to go back to [facility] and he wont have anyone to drive him. He hasn't had pain meds since the first day after surgery.

## 2022-10-21 ENCOUNTER — HOME CARE VISIT (OUTPATIENT)
Dept: HOME HEALTH SERVICES | Facility: HOME HEALTHCARE | Age: 77
End: 2022-10-21

## 2022-10-21 PROCEDURE — G0157 HHC PT ASSISTANT EA 15: HCPCS

## 2022-10-21 NOTE — HOME HEALTH
"Caregiver still present and available to assist patient today. Patient reports 7-9 Lb Loss since surgery due to poor appetite. Extensive discussion of importance of improved caloric/nutritional intake and hydration. Patient gives verbal understanding and is going to  some protein shakes and \" eat more\". Patient would like to start OP next Friday. Discharge from Home health to be set up for Wed.     SIGNS/SYMPTOMS OF INFECTION: No    FALLS SINCE LAST VISIT: No    EDEMA: Moderate non-pitting. Calf soft and non-tender    WOUNDS: Dressing intact, no new drainage. 4 x 4 Drainage bandage removed as it has become dislodge and also has caused a light reaction to the skin causing increase erythema. No Blister formed at this point. YURIDIA resealed with provided strips using clean technique    PLAN FOR NEXT VISIT:  Improve ROM  Gait with LRAD  Stair management  Review/progress home exercise program  Review fall risk and safety strategies"

## 2022-10-24 ENCOUNTER — HOME CARE VISIT (OUTPATIENT)
Dept: HOME HEALTH SERVICES | Facility: HOME HEALTHCARE | Age: 77
End: 2022-10-24

## 2022-10-24 VITALS
TEMPERATURE: 97.8 F | HEART RATE: 84 BPM | OXYGEN SATURATION: 95 % | SYSTOLIC BLOOD PRESSURE: 137 MMHG | RESPIRATION RATE: 18 BRPM | DIASTOLIC BLOOD PRESSURE: 78 MMHG

## 2022-10-24 PROCEDURE — G0157 HHC PT ASSISTANT EA 15: HCPCS

## 2022-10-24 NOTE — HOME HEALTH
Patient reports improved caloric intake and no new bouts of dizziness. Good adherence to HEP and improved cane use. OP set up for this Fri. DC notice signed. No new concerns at this time    SIGNS/SYMPTOMS OF INFECTION: No    FALLS SINCE LAST VISIT: No    EDEMA: Moderate non-pitting. Calf soft and non-tender    WOUNDS: Dressing intact, no new drainage. Drainage bandage removed and wound ins well healed. Resealed YURIDIA with with repair strip tape provided in YURIDIA dressing kit using clean technique.     PLAN FOR NEXT VISIT:  Review/finalize home exercise program  Gait training with LRAD  Objective measures  Dynamic stepping with cane use

## 2022-10-25 ENCOUNTER — HOME CARE VISIT (OUTPATIENT)
Dept: HOME HEALTH SERVICES | Facility: HOME HEALTHCARE | Age: 77
End: 2022-10-25

## 2022-10-25 VITALS
RESPIRATION RATE: 18 BRPM | HEART RATE: 84 BPM | TEMPERATURE: 97.2 F | OXYGEN SATURATION: 95 % | DIASTOLIC BLOOD PRESSURE: 60 MMHG | SYSTOLIC BLOOD PRESSURE: 124 MMHG

## 2022-10-25 PROCEDURE — G0151 HHCP-SERV OF PT,EA 15 MIN: HCPCS

## 2022-10-25 NOTE — HOME HEALTH
"Subjective: \" I am really doing well and i will start oupatient PT on friday . I also really liked Blanco , he is the best .\""

## 2022-10-27 ENCOUNTER — OFFICE VISIT (OUTPATIENT)
Dept: ORTHOPEDIC SURGERY | Facility: CLINIC | Age: 77
End: 2022-10-27

## 2022-10-27 VITALS — TEMPERATURE: 97.8 F | WEIGHT: 235.7 LBS | HEIGHT: 77 IN | BODY MASS INDEX: 27.83 KG/M2

## 2022-10-27 DIAGNOSIS — Z96.651 STATUS POST RIGHT KNEE REPLACEMENT: Primary | ICD-10-CM

## 2022-10-27 PROCEDURE — 99024 POSTOP FOLLOW-UP VISIT: CPT | Performed by: ORTHOPAEDIC SURGERY

## 2022-10-27 NOTE — PROGRESS NOTES
Jeff Bass : 1945 MRN: [id number] DATE: 10/27/2022    DIAGNOSIS: 2 week follow up right total knee  , severe left knee OA    SUBJECTIVE:Patient returns today for 2 week follow up of right total knee replacement. Patient reports doing well with no unusual complaints. Appears to be progressing appropriately.  His right knee is doing quite well his left knee appears to be the major limiting factor in his recovery and his ability to ambulate currently.  He has known advanced arthritis and likely right knee and has not responded to injections physical therapy and anti-inflammatories.    OBJECTIVE:   Exam:. The incision is healing appropriately. No sign of infection. Range of motion is progressing as expected. The calf is soft and nontender with a negative Homans sign.  Knee:  left    ALIGNMENT:     Varus  ,   Patella  tracks  midline    GAIT:    Antalgic    SKIN:    No abnormality    RANGE OF MOTION:   5  -  120   DEG    STRENGTH:   4  / 5    LIGAMENTS:    No varus / valgus instability.   Negative  Lachman.    MENISCUS:     Negative   Neno       DISTAL PULSES:    Paplable    DISTAL SENSATION :   Intact    LYMPHATICS:     No   lymphadenopathy    OTHER:          - Positive   effusion      - Crepitance with ROM         ASSESSMENT: 2 week status post right knee replacement.  Severe left knee end-stage varus osteoarthritis    PLAN: 1) Staples removed and steri strips applied   2) Order given for PT   3) Discontinue DANUTA hose   4) Continue ice PRN   5) aspirin 81 mg orally every day for 1 month   6) Follow up in 6 weeks with repeat Xrays of right knee (3views)   We will go ahead and plan on proceed with scheduling with the left knee replacement at a 3-month.  Given the severe limitation of activities of daily living and his lack of response to the previous conservative measures which are well-documented over previous visits.  A total of  25 minutes was spent face to face with >13 minutes being spent in  counseling and discussion of joint replacement surgery.  We discussed the risks benefits and alternatives of the procedure.  I explained the proposed surgical procedure in detail and also discussed the expected hospital course, discharge course and postoperative treatment plan in detail.    Ran Rachel MD  10/27/2022

## 2022-10-28 ENCOUNTER — HOSPITAL ENCOUNTER (OUTPATIENT)
Dept: PHYSICAL THERAPY | Facility: HOSPITAL | Age: 77
Setting detail: THERAPIES SERIES
Discharge: HOME | End: 2022-10-28

## 2022-10-28 DIAGNOSIS — M25.561 PAIN IN BOTH KNEES, UNSPECIFIED CHRONICITY: ICD-10-CM

## 2022-10-28 DIAGNOSIS — M25.562 PAIN IN BOTH KNEES, UNSPECIFIED CHRONICITY: ICD-10-CM

## 2022-10-28 DIAGNOSIS — R53.1 WEAKNESS: Primary | ICD-10-CM

## 2022-10-28 DIAGNOSIS — Z47.89 ORTHOPEDIC AFTERCARE: ICD-10-CM

## 2022-10-28 PROCEDURE — 97161 PT EVAL LOW COMPLEX 20 MIN: CPT

## 2022-10-28 PROCEDURE — 97110 THERAPEUTIC EXERCISES: CPT

## 2022-10-28 NOTE — THERAPY EVALUATION
Outpatient Physical Therapy Ortho Initial Evaluation  [facility]     Patient Name: Jeff Bass  : 1945  MRN: [id number]  Today's Date: 10/28/2022      Visit Date: 10/28/2022    Patient Active Problem List   Diagnosis   • Health care maintenance   • Benign prostatic hyperplasia   • Chronic lymphocytic leukemia (HCC)   • Hypertension   • Hypovitaminosis D   • Hyperlipidemia   • Hypogonadism in male   • Vitamin D deficiency   • Malignant neoplasm of prostate (HCC)   • Spinal stenosis of lumbar region with neurogenic claudication   • Degeneration of lumbar intervertebral disc   • Coronary arteriosclerosis   • PVC (premature ventricular contraction)   • History of colon polyps   • Abdominal pain   • Substernal goiter   • Skin tag   • Sensory hearing loss, bilateral   • ED (erectile dysfunction) of organic origin   • Constipation   • Benign essential hypertension   • Bladder outlet obstruction   • GERD (gastroesophageal reflux disease)   • Left inguinal hernia   • Impacted cerumen   • Hemangioma of liver   • Abnormal finding on thyroid function test   • Bursitis of right hip   • Pain   • Primary osteoarthritis of right knee        Past Medical History:   Diagnosis Date   • Arthritis    • Benign prostatic hyperplasia     FOLLOWED BY DR. VIVIEN PATEL   • Biliary dyskinesia    • Bunion of right foot     GREAT TOE   • CLL (chronic lymphocytic leukemia) (HCC) 2016    FOLLOWED BY DR WALKER   • Colon polyps     FOLLOWED BY DR. NEHA HAM   • Constipation    • Degeneration of lumbar intervertebral disc    • Diverticulosis    • Erectile dysfunction    • Fracture of ankle    • GERD (gastroesophageal reflux disease)    • Hallux valgus of left foot    • Hyperlipidemia     MIXED   • Hypertension    • Inguinal hernia    • Kidney stone 2009    SEEN AT [facility] ER   • Knee swelling    • Localized, primary osteoarthritis    • Lumbar radiculopathy    • Lumbar stenosis    • Lung mass     LEFT SIDE -  SCAR TISSUE PER PATIENT    • Osteoarthritis of right knee    • Polyneuropathy    • Prostate CA (HCC) 03/2016    JACQUELYN 6, S/P XRT, FOLLOWED BY DR. VIVIEN PATEL, RADIATION SEEDS   • PVC (premature ventricular contraction)     PT STATES WORKED UP YEARS AGO BY [facility] FOR POSSIBLE MURMUR - NO FOLLOW UP REQUIRED    • RBBB    • Sensory hearing loss, bilateral    • Skin cancer     SQUAMOUS CELL CARCINOMA OF FACE   • Substernal goiter    • Thyromegaly 12/2016    ADMITTED TO [facility]   • Vitamin D deficiency         Past Surgical History:   Procedure Laterality Date   • BRONCHOSCOPY N/A 12/14/2016    Procedure: BRONCHOSCOPY WITH  BAL AND BIOPSY AND ENDOBRONCHIAL ULTRASOUND  AND NAVIGATION WITH BRUSHINGS AND BIOPSY AND BAL;  Surgeon: Pierre Manning MD;  Location: [facility] ENDOSCOPY;  Service:    • COLONOSCOPY N/A 03/13/2019    5 MM SESSILE TUBULAR ADENOMA POLYP IN TRANSVERSE, MULTIPLE SMALL AND LARGE DIVERTICULA IN SIGMOID, RESCOPE IN 5 YRS, DR. NEHA HAM AT [facility]   • COLONOSCOPY W/ POLYPECTOMY N/A 03/19/2015    3 TUBULAR ADENOMA POLYPS, 12 TUBULOVILLOUS POLYP RECTO-SIGMOID, DIVERTICULOSIS, RESCOPE IN 3 YRS, DR. NEHA HAM AT [facility]   • EYE SURGERY Bilateral     CATARACT EXTRACTION WITH LENS IMPLANT, DR. GOVEA   • FINGER NAIL SURGERY Right 2022    KSENIA   • INGUINAL HERNIA REPAIR Left 11/02/2021    Procedure: LEFT OPEN INGUINAL HERNIA REPAIR;  Surgeon: Nixon Kolb MD;  Location: [facility];  Service: General;  Laterality: Left;   • PROSTATE RADIOACTIVE SEED IMPLANT N/A 09/06/2016    DR. HOA JARAMILLO AT [facility]   • PROSTATE SURGERY N/A 03/11/2016    BIOPSY: ADENOCARCINOMA, DR. ZAID IBARRA   • THYROID BIOPSY Bilateral 11/01/2017    NO B CELL OR T CELL LYMPHOMA, DONE AT [facility]   • TOTAL KNEE ARTHROPLASTY Right 10/12/2022    Procedure: TOTAL KNEE ARTHROPLASTY;  Surgeon: Ran Rachel MD;  [place] [facility] [place] [facility] [place]  Service: [place]opedics;  Laterality: Right;       Visit Dx:     ICD-10-CM ICD-9-CM    1. Weakness  R53.1 780.79   2. Orthopedic aftercare  Z47.89 V54.9   3. Pain in both knees, unspecified chronicity  M25.561 719.46    M25.562           Patient History     Row Name 10/28/22 1300             History    Brief Description of Current Complaint Jeff Bass is a 77 y.o. male who presents today S/P TKR, right 10/12/22. He had home health for two weeks after surgery. He still has some stiffness in his R knee, but only really has pain when he sleeps. He is going to have the left knee done in February 2023 and has L knee pain with standing up and sitting down and with stairs. He has multiple places with 2-3 steps in his house to get in and to move between rooms, but has not done the stairs up to the second floor. He used to have back pain but it resolved completely with aquatic therapy. Jeff Bass reports difficulty/increased pain with sleeping and stairs. Pain relieving factors include getting moving. PMH includes Chronic lymphocytic leukemia, HTN, malignant neoplasm of prostate, CAD, B hearing loss, GERD.  -LW      Patient/Caregiver Goals Comment Play pickleball 3x/week, stairs, walking.  -LW         Pain     Pain Location Knee  -LW      Pain at Present 0  -LW      Pain at Best 0  -LW      Pain at Worst 2  -LW      Pain Frequency Intermittent  -LW      Pain Description Aching  -LW      What Performance Factors Make the Current Problem(s) WORSE? Sleeping  -LW      Tolerance Time- Walking 1-2 blocks  -LW      Is your sleep disturbed? 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  -LW         Fall Risk Assessment    Any falls in the past year: No  -LW         Daily Activities    Primary Language English  -LW         Safety    Are you being hurt, hit, or frightened by anyone at home or in your life? No  -LW      Are you being neglected  by a caregiver No  -LW            User Key  (r) = Recorded By, (t) = Taken By, (c) = Cosigned By    Initials Name Provider Type    LW Stephanie Parr, PT Physical Therapist                 PT Ortho     Row Name 10/28/22 1400       Right Lower Ext    Rt Knee Extension/Flexion AROM 0-5-133  -LW       Left Lower Ext    Lt Knee Extension/Flexion AROM 0-7-140  -LW       MMT (Manual Muscle Testing)    Rt Lower Ext Rt Hip Flexion;Rt Hip Extension;Rt Hip ABduction;Rt Hip ADduction;Rt Knee Extension;Rt Knee Flexion;Rt Ankle Plantarflexion;Rt Ankle Dorsiflexion  -LW    Lt Lower Ext Lt Hip Flexion;Lt Hip Extension;Lt Hip ABduction;Lt Hip ADduction;Lt Knee Extension;Lt Knee Flexion;Lt Ankle Plantarflexion;Lt Ankle Dorsiflexion  -LW       MMT Right Lower Ext    Rt Hip Flexion MMT, Gross Movement (5/5) normal  -LW    Rt Hip Extension MMT, Gross Movement (2/5) poor  -LW    Rt Hip ABduction MMT, Gross Movement (4-/5) good minus  -LW    Rt Hip ADduction MMT, Gross Movement (3+/5) fair plus  -LW    Rt Knee Extension MMT, Gross Movement (4+/5) good plus  -LW    Rt Knee Flexion MMT, Gross Movement (5/5) normal  -LW    Rt Ankle Plantarflexion MMT, Gross Movement (5/5) normal  -LW    Rt Ankle Dorsiflexion MMT, Gross Movement (5/5) normal  -LW       MMT Left Lower Ext    Lt Hip Flexion MMT, Gross Movement (5/5) normal  -LW    Lt Hip Extension MMT, Gross Movement (2/5) poor  -LW    Lt Hip ABduction MMT, Gross Movement (4-/5) good minus  -LW    Lt Hip ADduction MMT, Gross Movement (3+/5) fair plus  -LW    Lt Knee Extension MMT, Gross Movement (4+/5) good plus  -LW    Lt Knee Flexion MMT, Gross Movement (5/5) normal  -LW    Lt Ankle Plantarflexion MMT, Gross Movement (5/5) normal  -LW    Lt Ankle Dorsiflexion MMT, Gross Movement (5/5) normal  -LW       Lower Extremity Flexibility    Hamstrings Bilateral:;Severely limited  -LW    Hip Flexors Bilateral:;Mildly limited  -LW    Hip External Rotators Bilateral:;WNL  -LW    Hip Internal Rotators  Bilateral:;Mildly limited  -          User Key  (r) = Recorded By, (t) = Taken By, (c) = Cosigned By    Initials Name Provider Type    Stephanie Esposito PT Physical Therapist                            Therapy Education  Education Details: Educated on anatomy/pathology, evaluation findings, therapy expectations, POC, and HEP  Given: HEP, Symptoms/condition management  Program: New  How Provided: Verbal, Demonstration, Written  Provided to: Patient  Level of Understanding: Verbalized, Demonstrated      PT OP Goals     Row Name 10/28/22 1600          PT Short Term Goals    STG Date to Achieve 11/27/22  -LW     STG 1 Patient will be independent with initial HEP for strength and mobility.  -LW     STG 1 Progress New  -LW     STG 2 Patient will demonstrate 4/5 B hip strength to improve capacity for functional mobility including stairs and gait  -LW     STG 2 Progress New  -     STG 3 Patient will demonstrate B knee extension AROM of at least 0 degrees and R knee lfexion of 140 degrees to improve mobility necessary to perform daily activities including gait and stairs  -LW     STG 3 Progress New  -        Long Term Goals    LTG Date to Achieve 12/27/22  -     LTG 1 Patient will be independent with finalized HEP to maintain function and prevent return of symptoms.  -LW     LTG 1 Progress New  -LW     LTG 2 Patient will demonstrate 5/5 B hip and LE strength to improve capacity for functional mobility including stairs and gait  -     LTG 2 Progress New  -     LTG 3 Patient will score at least 80% on the KOS to demonstrate improved functional mobility and decreased limitation on daily activities from knee pain and weakness.  -     LTG 3 Progress New  -        Time Calculation    PT Goal Re-Cert Due Date 01/26/23  -           User Key  (r) = Recorded By, (t) = Taken By, (c) = Cosigned By    Initials Name Provider Type    Stephanie Esposito PT Physical Therapist                 PT Assessment/Plan     Row Name  10/28/22 1600          PT Assessment    Functional Limitations Impaired gait;Limitations in functional capacity and performance;Performance in work activities  -LW     Impairments Range of motion;Muscle strength;Pain;Gait  -LW     Assessment Comments Jeff Bass is a 77 y.o. male referred to physical therapy for S/P TKR, right, performed on 10/12/22. 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  that may impact his progress in the plan of care. Pt presents today with mild knee pain, limited walking distance, mild limitation in knee extension with severe hamstring tightness and mild hip ER limitation, and weakness of B LEs. His signs and symptoms are consistent with referring diagnosis. The previous impairments limit his ability to perform stairs, walk long distances, and sleep without pain. Pt will benefit from skilled PT to address the previous impairments and return to PLOF.  -LW     Please refer to paper survey for additional self-reported information Yes  -LW     Rehab Potential Excellent  -LW     Patient/caregiver participated in establishment of treatment plan and goals Yes  -LW        PT Plan    PT Frequency 2x/week  -LW     Predicted Duration of Therapy Intervention (PT) 8-10 visits over 4-6 weeks.  -LW     Planned CPT's? PT EVAL LOW COMPLEXITY: 96805;PT RE-EVAL: 62444;PT THER PROC EA 15 MIN: 89399;PT THER ACT EA 15 MIN: 72358;PT MANUAL THERAPY EA 15 MIN: 32509;PT NEUROMUSC RE-EDUCATION EA 15 MIN: 56355;PT GAIT TRAINING EA 15 MIN: 82106;PT SELF CARE/HOME MGMT/TRAIN EA 15: 66348;PT HOT OR COLD PACK TREAT MCARE;PT ELECTRICAL STIM UNATTEND:   -LW     PT Plan Comments Progress functional strengthening. Consider step ups, resisted side steps and monster walks, STS.  -LW           User Key  (r) = Recorded  By, (t) = Taken By, (c) = Cosigned By    Initials Name Provider Type    LW Stephanie Parr, PT Physical Therapist                   OP Exercises     Row Name 10/28/22 1400             Total Minutes    27184 - PT Therapeutic Exercise Minutes 12  -LW         Exercise 1    Exercise Name 1 RCB  -LW      Additional Comments Next session  -LW         Exercise 2    Exercise Name 2 piriformis stretch  -LW      Cueing 2 Verbal;Tactile  -LW      Sets 2 1 B  -LW      Reps 2 3  -LW      Time 2 20 sec  -LW         Exercise 3    Exercise Name 3 Seated hamstring stretch  -LW      Cueing 3 Verbal;Tactile;Demo  -LW      Sets 3 1 B  -LW      Reps 3 3  -LW      Time 3 20 sec  -LW         Exercise 4    Exercise Name 4 Bridge  -LW      Cueing 4 Verbal;Tactile  -LW      Sets 4 1  -LW      Reps 4 15  -LW      Time 4 10 sec  -LW         Exercise 5    Exercise Name 5 sl clam  -LW      Cueing 5 Verbal;Tactile  -LW      Sets 5 1 B  -LW      Reps 5 15  -LW         Exercise 6    Exercise Name 6 HL add  -LW      Cueing 6 Verbal;Tactile  -LW      Sets 6 1  -LW      Reps 6 10  -LW      Time 6 5 sec  -LW            User Key  (r) = Recorded By, (t) = Taken By, (c) = Cosigned By    Initials Name Provider Type    Stephanie Esposito, PT Physical Therapist                              Outcome Measure Options: Knee Outcome Score- ADL  Knee Outcome Survey Activities of Daily Living Scale  Symptoms: Pain: The symptom affects my activity slightly  Symptoms: Stiffness: The symptom affects my activity moderately  Symptoms: Swelling: I have the symptom, but it does not affect my activity  Symptoms: Giving way, buckling, or shifting of the knee: I do not have the symptom  Symptoms: Weakness: I do not have the symptom  Symptoms: Limping: I do not have the symptom  Functional Limitations with ADL's: Walk: Activity is somewhat difficult  Functional Limitations with ADL's: Go up stairs: Activity is somewhat difficult  Functional Limitations with ADL's: Go down stairs:  Activity is minimally difficult  Functional Limitations with ADL's: Stand: Activity is somewhat difficult  Functional Limitations with ADL's: Kneel on front of your knee: I am unable to  Functional Limitations with ADL's: Squat: Activity is very difficult  Functional Limitations with ADL's: Sit with your knee bent: I am unable to  Functional Limitations with ADL's: Rise from a chair: Activity is fairly difficult  Knee Outcome Summary ADL's Score: 57.14 %      Time Calculation:     Start Time: 1350  Stop Time: 1430  Time Calculation (min): 40 min  Total Timed Code Minutes- PT: 12 minute(s)  Timed Charges  53878 - PT Therapeutic Exercise Minutes: 12  Untimed Charges  PT Eval/Re-eval Minutes: 28  Total Minutes  Timed Charges Total Minutes: 12  Untimed Charges Total Minutes: 28   Total Minutes: 40     Therapy Charges for Today     Code Description Service Date Service Provider Modifiers Qty    13395029975 HC PT THER PROC EA 15 MIN 10/28/2022 Stephanie Parr, PT GP 1    07053429776 HC PT EVAL LOW COMPLEXITY 2 10/28/2022 Stephanie Parr, PT GP 1          PT G-Codes  Outcome Measure Options: Knee Outcome Score- ADL         Stephanie Parr PT  10/28/2022

## 2022-11-01 ENCOUNTER — APPOINTMENT (OUTPATIENT)
Dept: PHYSICAL THERAPY | Facility: HOSPITAL | Age: 77
End: 2022-11-01

## 2022-11-01 ENCOUNTER — PREP FOR SURGERY (OUTPATIENT)
Dept: OTHER | Facility: HOSPITAL | Age: 77
End: 2022-11-01

## 2022-11-01 ENCOUNTER — TREATMENT (OUTPATIENT)
Dept: PHYSICAL THERAPY | Facility: CLINIC | Age: 77
End: 2022-11-01

## 2022-11-01 DIAGNOSIS — M25.561 PAIN IN BOTH KNEES, UNSPECIFIED CHRONICITY: ICD-10-CM

## 2022-11-01 DIAGNOSIS — M17.12 PRIMARY OSTEOARTHRITIS OF LEFT KNEE: Primary | ICD-10-CM

## 2022-11-01 DIAGNOSIS — R53.1 WEAKNESS: Primary | ICD-10-CM

## 2022-11-01 DIAGNOSIS — M25.562 PAIN IN BOTH KNEES, UNSPECIFIED CHRONICITY: ICD-10-CM

## 2022-11-01 DIAGNOSIS — Z47.89 ORTHOPEDIC AFTERCARE: ICD-10-CM

## 2022-11-01 PROBLEM — M17.9 OA (OSTEOARTHRITIS) OF KNEE: Status: ACTIVE | Noted: 2022-11-01

## 2022-11-01 PROCEDURE — 97110 THERAPEUTIC EXERCISES: CPT | Performed by: PHYSICAL THERAPIST

## 2022-11-01 PROCEDURE — 97530 THERAPEUTIC ACTIVITIES: CPT | Performed by: PHYSICAL THERAPIST

## 2022-11-01 PROCEDURE — 97112 NEUROMUSCULAR REEDUCATION: CPT | Performed by: PHYSICAL THERAPIST

## 2022-11-01 RX ORDER — MELOXICAM 15 MG/1
15 TABLET ORAL ONCE
Status: CANCELLED | OUTPATIENT
Start: 2023-01-27 | End: 2022-11-01

## 2022-11-01 RX ORDER — CEFAZOLIN SODIUM IN 0.9 % NACL 3 G/100 ML
3 INTRAVENOUS SOLUTION, PIGGYBACK (ML) INTRAVENOUS ONCE
Status: CANCELLED | OUTPATIENT
Start: 2023-01-27 | End: 2022-11-01

## 2022-11-01 RX ORDER — PREGABALIN 75 MG/1
150 CAPSULE ORAL ONCE
Status: CANCELLED | OUTPATIENT
Start: 2023-01-27 | End: 2022-11-01

## 2022-11-01 RX ORDER — POVIDONE-IODINE 10 MG/ML
SOLUTION TOPICAL ONCE
Status: CANCELLED | OUTPATIENT
Start: 2023-01-27 | End: 2022-11-01

## 2022-11-01 RX ORDER — CHLORHEXIDINE GLUCONATE 500 MG/1
CLOTH TOPICAL 2 TIMES DAILY
Status: CANCELLED | OUTPATIENT
Start: 2022-11-01

## 2022-11-01 NOTE — PROGRESS NOTES
"Physical Therapy Daily Treatment Note      Patient: Jeff Bass   : 1945  Referring practitioner: ARCHIE Stanton  Date of Initial Visit: Type: THERAPY  Noted: 10/28/2022  Today's Date: 2022  Patient seen for 2 sessions       Visit Diagnoses:    ICD-10-CM ICD-9-CM   1. Weakness  R53.1 780.79   2. Orthopedic aftercare  Z47.89 V54.9   3. Pain in both knees, unspecified chronicity  M25.561 719.46    M25.562        Subjective   Continued adherence with HEP. Feels like he \"overdid it\" over the weekend.   LEFS     Objective   See Exercise, Manual, and Modality Logs for complete treatment.       Assessment/Plan    Subjectively, pt reports no increase of pain or discomfort with interventions performed today. Performed well with continued functional strengthening interventions. Continues to demonstrate good tolerance to exercise progressions. Continues to benefit from verbal/tactile cues to ensure proper form and technique for exercise performance.        Timed:         Manual Therapy:    0     mins  41446;     Therapeutic Exercise:    15     mins  79720;     Neuromuscular Nyla:    10    mins  55847;    Therapeutic Activity:     15     mins  74739;     Gait Trainin     mins  84406;     Ultrasound:     0     mins  34825;    Ionto                               0    mins   31098  Self Care                       0     mins   03994  Canalith Repos    0     mins 16314      Un-Timed:  Electrical Stimulation:    0     mins  66532 ( );  Dry Needling     0     mins self-pay  Traction     0     mins 39080      Timed Treatment:   40   mins   Total Treatment:     50   mins    Yessy Tello, PT  [place] License: [id number]                    " Chief Complaint   Patient presents with   • Pre-Op Exam     Dr. Sharron Araujo, 12/13/18, Left shoulder arthroscopic rotator cuff repair, subpectoralis biceps tenodesi        Medications: medications verified and updated  Refills needed today?  NO  Denies known Latex allergy or symptoms of Latex sensitivity.  Patient would like communication of their results via:      Cell Phone:   Telephone Information:   Mobile [phone]     Okay to leave a message containing results? Yes  Tobacco history: verified    Health Maintenance Summary     Topic Due On Due Status Completed On    Colorectal Cancer Screening - Colonoscopy Feb 10, 2020 Not Due Feb 10, 2015    IMMUNIZATION - DTaP/Tdap/Td Jul 11, 2023 Not Due Jul 11, 2013    Immunization-Influenza Aug 1, 2018 Overdue Nov 1, 2017    Hepatitis C Screening  Completed Oct 15, 2015    Immunization - MMR  Hidden           Patient is up to date, no discussion needed .              [facility] status addressed. Patient Active.

## 2022-11-03 ENCOUNTER — TREATMENT (OUTPATIENT)
Dept: PHYSICAL THERAPY | Facility: CLINIC | Age: 77
End: 2022-11-03

## 2022-11-03 DIAGNOSIS — Z47.89 ORTHOPEDIC AFTERCARE: ICD-10-CM

## 2022-11-03 DIAGNOSIS — R53.1 WEAKNESS: Primary | ICD-10-CM

## 2022-11-03 DIAGNOSIS — M25.561 PAIN IN BOTH KNEES, UNSPECIFIED CHRONICITY: ICD-10-CM

## 2022-11-03 DIAGNOSIS — M25.562 PAIN IN BOTH KNEES, UNSPECIFIED CHRONICITY: ICD-10-CM

## 2022-11-03 PROCEDURE — G0180 MD CERTIFICATION HHA PATIENT: HCPCS | Performed by: ORTHOPAEDIC SURGERY

## 2022-11-03 PROCEDURE — 97112 NEUROMUSCULAR REEDUCATION: CPT | Performed by: PHYSICAL THERAPIST

## 2022-11-03 PROCEDURE — 97530 THERAPEUTIC ACTIVITIES: CPT | Performed by: PHYSICAL THERAPIST

## 2022-11-03 PROCEDURE — 97110 THERAPEUTIC EXERCISES: CPT | Performed by: PHYSICAL THERAPIST

## 2022-11-03 NOTE — PROGRESS NOTES
Physical Therapy Daily Treatment Note    Patient: Jeff Bass   : 1945  Diagnosis/ICD-10 Code:  Weakness [R53.1]  Referring practitioner: ARCHIE Stanton  Date of Initial Visit: Type: THERAPY  Noted: 10/28/2022  Today's Date: 11/3/2022  Patient seen for 3 sessions         Jeff Bass reports: overdid it again this weekend so my R knee is sore today. Went up and down basement stairs.     Subjective     Objective   See Exercise, Manual, and Modality Logs for complete treatment.       Assessment/Plan  Subjectively, pt reports no increase of pain or discomfort with interventions performed today. Performed well with continued LE strengthening and mobility interventions. Continues to demonstrate fatigue with exercise in clinic. Continues to benefit from verbal/tactile cues to ensure proper form and technique for exercise performance.     Progress per Plan of Care           Manual Therapy:         mins  83116;  Therapeutic Exercise:    20     mins  64483;     Neuromuscular Nyla:    10    mins  33139;    Therapeutic Activity:     10     mins  13057;     Gait Training:           mins  14001;     Ultrasound:          mins  49400;    Electrical Stimulation:         mins  13472 ( );  Dry Needling          mins self-pay    Timed Treatment:   40   mins   Total Treatment:     40   mins    Latisha Lang PTA  Physical Therapist Assistant [id number]

## 2022-11-04 ENCOUNTER — APPOINTMENT (OUTPATIENT)
Dept: PHYSICAL THERAPY | Facility: HOSPITAL | Age: 77
End: 2022-11-04

## 2022-11-04 ENCOUNTER — TREATMENT (OUTPATIENT)
Dept: PHYSICAL THERAPY | Facility: CLINIC | Age: 77
End: 2022-11-04

## 2022-11-04 DIAGNOSIS — Z47.89 ORTHOPEDIC AFTERCARE: ICD-10-CM

## 2022-11-04 DIAGNOSIS — M25.562 PAIN IN BOTH KNEES, UNSPECIFIED CHRONICITY: ICD-10-CM

## 2022-11-04 DIAGNOSIS — R53.1 WEAKNESS: Primary | ICD-10-CM

## 2022-11-04 DIAGNOSIS — M25.561 PAIN IN BOTH KNEES, UNSPECIFIED CHRONICITY: ICD-10-CM

## 2022-11-04 PROCEDURE — 97110 THERAPEUTIC EXERCISES: CPT | Performed by: PHYSICAL THERAPIST

## 2022-11-04 PROCEDURE — 97530 THERAPEUTIC ACTIVITIES: CPT | Performed by: PHYSICAL THERAPIST

## 2022-11-04 PROCEDURE — 97140 MANUAL THERAPY 1/> REGIONS: CPT | Performed by: PHYSICAL THERAPIST

## 2022-11-04 NOTE — PROGRESS NOTES
Physical Therapy Daily Treatment Note      Patient: Jeff Bass   : 1945  Referring practitioner: ARCHIE Stanton  Date of Initial Visit: Type: THERAPY  Noted: 10/28/2022  Today's Date: 2022  Patient seen for 4 sessions       Visit Diagnoses:    ICD-10-CM ICD-9-CM   1. Weakness  R53.1 780.79   2. Orthopedic aftercare  Z47.89 V54.9   3. Pain in both knees, unspecified chronicity  M25.561 719.46    M25.562        Subjective   Feels like he is improving.     Objective   See Exercise, Manual, and Modality Logs for complete treatment.       Assessment/Plan    Subjectively, pt reports no increase of pain or discomfort with interventions performed today. Tried recumbent bike this session but pt stated he would not like to do this again. Will warm up with NuStep per pt request. Discussed propping heel on ottoman at home for long duration extension stretch.        Timed:         Manual Therapy:    8     mins  62108;     Therapeutic Exercise:    10     mins  08195;     Neuromuscular Nyla:    0    mins  71218;    Therapeutic Activity:     20     mins  34625;     Gait Trainin     mins  96734;     Ultrasound:     0     mins  32925;    Ionto                               0    mins   40471  Self Care                       0     mins   72344  Canalith Repos    0     mins 48106      Un-Timed:  Electrical Stimulation:    0     mins  19103 ( );  Dry Needling     0     mins self-pay  Traction     0     mins 15891      Timed Treatment:   38   mins   Total Treatment:     38   mins    Yessy Tello, PT  [place] License: [id number]

## 2022-11-07 ENCOUNTER — APPOINTMENT (OUTPATIENT)
Dept: PHYSICAL THERAPY | Facility: HOSPITAL | Age: 77
End: 2022-11-07

## 2022-11-07 ENCOUNTER — TREATMENT (OUTPATIENT)
Dept: PHYSICAL THERAPY | Facility: CLINIC | Age: 77
End: 2022-11-07

## 2022-11-07 DIAGNOSIS — M25.562 PAIN IN BOTH KNEES, UNSPECIFIED CHRONICITY: ICD-10-CM

## 2022-11-07 DIAGNOSIS — M25.561 PAIN IN BOTH KNEES, UNSPECIFIED CHRONICITY: ICD-10-CM

## 2022-11-07 DIAGNOSIS — R53.1 WEAKNESS: Primary | ICD-10-CM

## 2022-11-07 DIAGNOSIS — Z47.89 ORTHOPEDIC AFTERCARE: ICD-10-CM

## 2022-11-07 PROCEDURE — 97110 THERAPEUTIC EXERCISES: CPT | Performed by: PHYSICAL THERAPIST

## 2022-11-07 PROCEDURE — 97530 THERAPEUTIC ACTIVITIES: CPT | Performed by: PHYSICAL THERAPIST

## 2022-11-07 PROCEDURE — 97112 NEUROMUSCULAR REEDUCATION: CPT | Performed by: PHYSICAL THERAPIST

## 2022-11-07 NOTE — PROGRESS NOTES
Physical Therapy Daily Treatment Note    Patient: Jeff Bass   : 1945  Diagnosis/ICD-10 Code:  Weakness [R53.1]  Referring practitioner: ARCHIE Stanton  Date of Initial Visit: Type: THERAPY  Noted: 10/28/2022  Today's Date: 2022  Patient seen for 5 sessions         Jeff Bass reports: no new complaints.     Subjective     Objective   See Exercise, Manual, and Modality Logs for complete treatment.       Assessment/Plan  Subjectively, pt reports no increase of pain or discomfort with interventions performed today. Performed well with continued knee mobility and strengthening interventions. Continues to demonstrate fatigue with standing activities and lack of TKE bilaterally. Continues to benefit from verbal/tactile cues to ensure proper form and technique for exercise performance.     Progress per Plan of Care           Manual Therapy:         mins  81355;  Therapeutic Exercise:    25     mins  75653;     Neuromuscular Nyla:    10    mins  24050;    Therapeutic Activity:     10     mins  39482;     Gait Training:           mins  59128;     Ultrasound:          mins  91657;    Electrical Stimulation:         mins  64830 ( );  Dry Needling          mins self-pay    Timed Treatment:   45   mins   Total Treatment:     55   mins    Latisha Lang PTA  Physical Therapist Assistant [id number]

## 2022-11-09 ENCOUNTER — TREATMENT (OUTPATIENT)
Dept: PHYSICAL THERAPY | Facility: CLINIC | Age: 77
End: 2022-11-09

## 2022-11-09 ENCOUNTER — APPOINTMENT (OUTPATIENT)
Dept: PHYSICAL THERAPY | Facility: HOSPITAL | Age: 77
End: 2022-11-09

## 2022-11-09 DIAGNOSIS — M25.562 PAIN IN BOTH KNEES, UNSPECIFIED CHRONICITY: ICD-10-CM

## 2022-11-09 DIAGNOSIS — M25.561 PAIN IN BOTH KNEES, UNSPECIFIED CHRONICITY: ICD-10-CM

## 2022-11-09 DIAGNOSIS — Z47.89 ORTHOPEDIC AFTERCARE: ICD-10-CM

## 2022-11-09 DIAGNOSIS — R53.1 WEAKNESS: Primary | ICD-10-CM

## 2022-11-09 PROCEDURE — 97110 THERAPEUTIC EXERCISES: CPT | Performed by: PHYSICAL THERAPIST

## 2022-11-09 PROCEDURE — 97530 THERAPEUTIC ACTIVITIES: CPT | Performed by: PHYSICAL THERAPIST

## 2022-11-09 NOTE — PROGRESS NOTES
Physical Therapy Daily Treatment Note    Patient: Jeff Bass   : 1945  Diagnosis/ICD-10 Code:  Weakness [R53.1]  Referring practitioner: ARCHIE Stanton  Date of Initial Visit: Type: THERAPY  Noted: 10/28/2022  Today's Date: 2022  Patient seen for 6 sessions         Jeff Bass reports: no new complaints, I wish I could speed this process up.    Subjective     Objective   See Exercise, Manual, and Modality Logs for complete treatment.       Assessment/Plan  Subjectively, pt reports no increase of pain or discomfort with interventions performed today. Performed well with continued knee strengthening interventions with addition of leg press today. Continues to demonstrate difficulty with B TKE while standing, may benefit from hip flexor stretch.  Continues to benefit from verbal/tactile cues to ensure proper form and technique for exercise performance.     Progress per Plan of Care           Manual Therapy:         mins  27300;  Therapeutic Exercise:    23     mins  86362;     Neuromuscular Nyla:        mins  16718;    Therapeutic Activity:     15     mins  19043;     Gait Training:           mins  02909;     Ultrasound:          mins  30636;    Electrical Stimulation:         mins  39027 ( );  Dry Needling          mins self-pay    Timed Treatment:   38   mins   Total Treatment:     48   mins    Latisha Lang PTA  Physical Therapist Assistant [id number]

## 2022-11-11 ENCOUNTER — TREATMENT (OUTPATIENT)
Dept: PHYSICAL THERAPY | Facility: CLINIC | Age: 77
End: 2022-11-11

## 2022-11-11 DIAGNOSIS — M25.561 PAIN IN BOTH KNEES, UNSPECIFIED CHRONICITY: ICD-10-CM

## 2022-11-11 DIAGNOSIS — Z47.89 ORTHOPEDIC AFTERCARE: ICD-10-CM

## 2022-11-11 DIAGNOSIS — M25.562 PAIN IN BOTH KNEES, UNSPECIFIED CHRONICITY: ICD-10-CM

## 2022-11-11 DIAGNOSIS — R53.1 WEAKNESS: Primary | ICD-10-CM

## 2022-11-11 PROCEDURE — 97110 THERAPEUTIC EXERCISES: CPT | Performed by: PHYSICAL THERAPIST

## 2022-11-11 PROCEDURE — 97112 NEUROMUSCULAR REEDUCATION: CPT | Performed by: PHYSICAL THERAPIST

## 2022-11-11 PROCEDURE — 97530 THERAPEUTIC ACTIVITIES: CPT | Performed by: PHYSICAL THERAPIST

## 2022-11-11 NOTE — PROGRESS NOTES
Physical Therapy Daily Treatment Note    Patient: Jeff Bass   : 1945  Diagnosis/ICD-10 Code:  Weakness [R53.1]  Referring practitioner: ARCHIE Stanton  Date of Initial Visit: Type: THERAPY  Noted: 10/28/2022  Today's Date: 2022  Patient seen for 7 sessions         Jeff Bass reports: I felt great after last session, almost back to normal. That machine (leg press) was the best thing I have ever been on.     Subjective     Objective   See Exercise, Manual, and Modality Logs for complete treatment.       Assessment/Plan  Subjectively, pt reports no increase of pain or discomfort with interventions performed today. Performed well with continued knee mobility and strengthening interventions. Continues to demonstrate lack of full TKE in stance phase of gait bilaterally, L >R.  Continues to benefit from verbal/tactile cues to ensure proper form and technique for exercise performance.     Progress per Plan of Care           Manual Therapy:         mins  45356;  Therapeutic Exercise:    20     mins  12871;     Neuromuscular Nyla:    8    mins  48459;    Therapeutic Activity:     10     mins  78048;     Gait Training:           mins  83658;     Ultrasound:          mins  32031;    Electrical Stimulation:         mins  03825 ( );  Dry Needling          mins self-pay    Timed Treatment:   38   mins   Total Treatment:     48   mins    Latisha Lang PTA  Physical Therapist Assistant [id number]

## 2022-11-14 ENCOUNTER — APPOINTMENT (OUTPATIENT)
Dept: PHYSICAL THERAPY | Facility: HOSPITAL | Age: 77
End: 2022-11-14

## 2022-11-14 ENCOUNTER — TREATMENT (OUTPATIENT)
Dept: PHYSICAL THERAPY | Facility: CLINIC | Age: 77
End: 2022-11-14

## 2022-11-14 DIAGNOSIS — M25.561 PAIN IN BOTH KNEES, UNSPECIFIED CHRONICITY: ICD-10-CM

## 2022-11-14 DIAGNOSIS — M25.562 PAIN IN BOTH KNEES, UNSPECIFIED CHRONICITY: ICD-10-CM

## 2022-11-14 DIAGNOSIS — R53.1 WEAKNESS: Primary | ICD-10-CM

## 2022-11-14 DIAGNOSIS — Z47.89 ORTHOPEDIC AFTERCARE: ICD-10-CM

## 2022-11-14 PROCEDURE — 97530 THERAPEUTIC ACTIVITIES: CPT | Performed by: PHYSICAL THERAPIST

## 2022-11-14 PROCEDURE — 97110 THERAPEUTIC EXERCISES: CPT | Performed by: PHYSICAL THERAPIST

## 2022-11-14 NOTE — PROGRESS NOTES
Physical Therapy Daily Treatment Note    Patient: Jeff Bass   : 1945  Diagnosis/ICD-10 Code:  Weakness [R53.1]  Referring practitioner: ARCHIE Stanton  Date of Initial Visit: Type: THERAPY  Noted: 10/28/2022  Today's Date: 2022  Patient seen for 8 sessions         Jeff Bass reports: feeling better. Knees aren't hurting as much and starting to feel stronger.     Subjective     Objective   See Exercise, Manual, and Modality Logs for complete treatment.       Assessment/Plan  Subjectively, pt reports no increase of pain or discomfort with interventions performed today. Performed well with continued knee mobility and strengthening interventions. Continues to demonstrate extreme difficulty with sit to stand without using hands, able to complete with very elevated table. Continues to benefit from verbal/tactile cues to ensure proper form and technique for exercise performance.     Progress per Plan of Care           Manual Therapy:         mins  66965;  Therapeutic Exercise:    28     mins  27019;     Neuromuscular Nyla:        mins  67531;    Therapeutic Activity:     10     mins  49620;     Gait Training:           mins  45158;     Ultrasound:          mins  55863;    Electrical Stimulation:         mins  85876 ( );  Dry Needling          mins self-pay    Timed Treatment:   35   mins   Total Treatment:     45   mins    Latisha Lang PTA  Physical Therapist Assistant [id number]

## 2022-11-16 ENCOUNTER — TREATMENT (OUTPATIENT)
Dept: PHYSICAL THERAPY | Facility: CLINIC | Age: 77
End: 2022-11-16

## 2022-11-16 DIAGNOSIS — R53.1 WEAKNESS: Primary | ICD-10-CM

## 2022-11-16 DIAGNOSIS — Z47.89 ORTHOPEDIC AFTERCARE: ICD-10-CM

## 2022-11-16 DIAGNOSIS — M25.562 PAIN IN BOTH KNEES, UNSPECIFIED CHRONICITY: ICD-10-CM

## 2022-11-16 DIAGNOSIS — M25.561 PAIN IN BOTH KNEES, UNSPECIFIED CHRONICITY: ICD-10-CM

## 2022-11-16 PROCEDURE — 97110 THERAPEUTIC EXERCISES: CPT | Performed by: PHYSICAL THERAPIST

## 2022-11-16 PROCEDURE — 97530 THERAPEUTIC ACTIVITIES: CPT | Performed by: PHYSICAL THERAPIST

## 2022-11-16 NOTE — PROGRESS NOTES
Physical Therapy Daily Treatment Note      Patient: Jeff Bass   : 1945  Referring practitioner: ARCHIE Stanton  Date of Initial Visit: Type: THERAPY  Noted: 10/28/2022  Today's Date: 2022  Patient seen for 9 sessions       Visit Diagnoses:    ICD-10-CM ICD-9-CM   1. Weakness  R53.1 780.79   2. Orthopedic aftercare  Z47.89 V54.9   3. Pain in both knees, unspecified chronicity  M25.561 719.46    M25.562        Subjective   Pt reports steady improvement. Leg press feels really good.     Objective   See Exercise, Manual, and Modality Logs for complete treatment.       Assessment/Plan    Subjectively, pt reports no increase of pain or discomfort with interventions performed today. Performed well with continued functional strengthening interventions. Continues to demonstrate good tolerance to exercise progressions. Continues to benefit from verbal/tactile cues to ensure proper form and technique for exercise performance.        Timed:         Manual Therapy:    0     mins  79731;     Therapeutic Exercise:    15     mins  07198;     Neuromuscular Nyla:    0    mins  49203;    Therapeutic Activity:     15     mins  14767;     Gait Trainin     mins  54871;     Ultrasound:     0     mins  53599;    Ionto                               0    mins   79362  Self Care                       0     mins   02124  Canalith Repos    0     mins 96171      Un-Timed:  Electrical Stimulation:    0     mins  09201 ( );  Dry Needling     0     mins self-pay  Traction     0     mins 10663      Timed Treatment:   30   mins   Total Treatment:     40   mins    Yessy Tello, PT  [place] License: [id number]

## 2022-11-17 ENCOUNTER — APPOINTMENT (OUTPATIENT)
Dept: PHYSICAL THERAPY | Facility: HOSPITAL | Age: 77
End: 2022-11-17

## 2022-11-18 ENCOUNTER — TREATMENT (OUTPATIENT)
Dept: PHYSICAL THERAPY | Facility: CLINIC | Age: 77
End: 2022-11-18

## 2022-11-18 DIAGNOSIS — R53.1 WEAKNESS: Primary | ICD-10-CM

## 2022-11-18 DIAGNOSIS — Z47.89 ORTHOPEDIC AFTERCARE: ICD-10-CM

## 2022-11-18 DIAGNOSIS — M25.561 PAIN IN BOTH KNEES, UNSPECIFIED CHRONICITY: ICD-10-CM

## 2022-11-18 DIAGNOSIS — M25.562 PAIN IN BOTH KNEES, UNSPECIFIED CHRONICITY: ICD-10-CM

## 2022-11-18 PROCEDURE — 97110 THERAPEUTIC EXERCISES: CPT | Performed by: PHYSICAL THERAPIST

## 2022-11-18 NOTE — PROGRESS NOTES
Physical Therapy Daily Treatment Note      Patient: Jeff Bass   : 1945  Referring practitioner: ARCHIE Stanton  Date of Initial Visit: Type: THERAPY  Noted: 10/28/2022  Today's Date: 2022  Patient seen for 10 sessions       Visit Diagnoses:    ICD-10-CM ICD-9-CM   1. Weakness  R53.1 780.79   2. Orthopedic aftercare  Z47.89 V54.9   3. Pain in both knees, unspecified chronicity  M25.561 719.46    M25.562        Subjective Evaluation    History of Present Illness    Subjective comment: doing well. hoping to keep his good ROM       Objective   See Exercise, Manual, and Modality Logs for complete treatment.       Assessment & Plan     Assessment    Assessment details: Eager to have L knee TKA . L ROM is very good but needs strength. R knee lacking ext ROM and power but with great 136 flexion.  Added TKEs with cable weight today.   Will add balance work next week.   Consider prone TKE and hang to get final ext ROM          Timed:         Manual Therapy:         mins  74249;     Therapeutic Exercise:    45     mins  20259;     Neuromuscular Nyla:        mins  04668;    Therapeutic Activity:          mins  23626;     Gait Training:           mins  56776;     Ultrasound:          mins  74804;    Ionto                                   mins   44165  Self Care                            mins   34553  Canalith Repos         mins 94209      Un-Timed:  Electrical Stimulation:         mins  15363 ( );  Dry Needling          mins self-pay  Traction          mins 37890      Timed Treatment:   45   mins   Total Treatment:     45   mins    Zorre Zeno Kimura, PT [place] License: [id number]    [place] License:  [id number]

## 2022-11-21 ENCOUNTER — TREATMENT (OUTPATIENT)
Dept: PHYSICAL THERAPY | Facility: CLINIC | Age: 77
End: 2022-11-21

## 2022-11-21 ENCOUNTER — APPOINTMENT (OUTPATIENT)
Dept: PHYSICAL THERAPY | Facility: HOSPITAL | Age: 77
End: 2022-11-21

## 2022-11-21 DIAGNOSIS — M25.561 PAIN IN BOTH KNEES, UNSPECIFIED CHRONICITY: ICD-10-CM

## 2022-11-21 DIAGNOSIS — M25.562 PAIN IN BOTH KNEES, UNSPECIFIED CHRONICITY: ICD-10-CM

## 2022-11-21 DIAGNOSIS — Z47.89 ORTHOPEDIC AFTERCARE: ICD-10-CM

## 2022-11-21 DIAGNOSIS — R53.1 WEAKNESS: Primary | ICD-10-CM

## 2022-11-21 PROCEDURE — 97530 THERAPEUTIC ACTIVITIES: CPT | Performed by: PHYSICAL THERAPIST

## 2022-11-21 PROCEDURE — 97110 THERAPEUTIC EXERCISES: CPT | Performed by: PHYSICAL THERAPIST

## 2022-11-21 PROCEDURE — 97112 NEUROMUSCULAR REEDUCATION: CPT | Performed by: PHYSICAL THERAPIST

## 2022-11-21 NOTE — PROGRESS NOTES
Physical Therapy Re Certification Of Plan of Care  Patient: Jeff Bass   : 1945  Diagnosis/ICD-10 Code:  Weakness [R53.1]  Referring practitioner: ARCHIE Stanton  Date of Initial Visit: Type: THERAPY  Noted: 10/28/2022  Today's Date: 2022  Patient seen for 11 sessions         Visit Diagnoses:    ICD-10-CM ICD-9-CM   1. Weakness  R53.1 780.79   2. Orthopedic aftercare  Z47.89 V54.9   3. Pain in both knees, unspecified chronicity  M25.561 719.46    M25.562          Jeff Bass reports: his right knee is feeling good, left knee is very painful and limiting mobility.   Clinical Progress: improved  Home Program Compliance: Yes  Treatment has included: therapeutic exercise, neuromuscular re-education, therapeutic activity and cryotherapy      Subjective       Objective          Active Range of Motion   Left Knee   Flexion: 135 degrees   Extension: 5 degrees     Right Knee   Flexion: 138 degrees   Extension: 4 degrees     Strength/Myotome Testing     Left Hip   Planes of Motion   Flexion: 4  Extension: 4  Abduction: 4    Right Hip   Planes of Motion   Flexion: 4  Extension: 4  Abduction: 4          Assessment/Plan     SHORT TERM GOALS: 4 weeks  1. Patient will be independent with initial HEP for strength and mobility. (MET)  2. Patient will demonstrate 4/5 B hip strength to improve capacity for functional mobility including stairs and gait (MET)  3. Patient will demonstrate B knee extension AROM of at least 0 degrees and R knee flexion of 140 degrees to improve mobility necessary to perform daily activities including gait and stairs (PROGRESSING)    LONG TERM GOALS: 12 weeks  1. Patient will be independent with finalized HEP to maintain function and prevent return of symptoms. (NOT MET)  2. Patient will demonstrate 5/5 B hip and LE strength to improve capacity for functional mobility including stairs and gait (PROGRESSING)  3. Patient will score at least 80% on the KOS to demonstrate  improved functional mobility and decreased limitation on daily activities from knee pain and weakness (NOT MET)     Recommendations: Continue as planned  Timeframe: 1 month  Prognosis to achieve goals: good      Timed:         Manual Therapy:    0     mins  65420;     Therapeutic Exercise:    15     mins  96279;     Neuromuscular Nyla:    10    mins  95111;    Therapeutic Activity:     15     mins  25311;     Gait Trainin     mins  73833;     Ultrasound:     0     mins  16161;    Ionto                               0    mins   02535  Self Care                       0     mins   36790  Canalith Repos    0     mins 83979      Un-Timed:  Electrical Stimulation:    0     mins  53431 ( );  Dry Needling     0     mins self-pay  Traction     0     mins 87615  Re-Eval                           0    mins  12269      Timed Treatment:   40   mins   Total Treatment:     50   mins          PT: Yessy Tello PT     [place] License:  [id number]    Electronically signed by Yessy Tello PT, 22, 4:59 PM EST

## 2022-11-23 ENCOUNTER — APPOINTMENT (OUTPATIENT)
Dept: PHYSICAL THERAPY | Facility: HOSPITAL | Age: 77
End: 2022-11-23

## 2022-11-23 ENCOUNTER — TREATMENT (OUTPATIENT)
Dept: PHYSICAL THERAPY | Facility: CLINIC | Age: 77
End: 2022-11-23

## 2022-11-23 DIAGNOSIS — R53.1 WEAKNESS: Primary | ICD-10-CM

## 2022-11-23 DIAGNOSIS — M25.561 PAIN IN BOTH KNEES, UNSPECIFIED CHRONICITY: ICD-10-CM

## 2022-11-23 DIAGNOSIS — M25.562 PAIN IN BOTH KNEES, UNSPECIFIED CHRONICITY: ICD-10-CM

## 2022-11-23 DIAGNOSIS — Z47.89 ORTHOPEDIC AFTERCARE: ICD-10-CM

## 2022-11-23 PROCEDURE — 97112 NEUROMUSCULAR REEDUCATION: CPT | Performed by: PHYSICAL THERAPIST

## 2022-11-23 PROCEDURE — 97110 THERAPEUTIC EXERCISES: CPT | Performed by: PHYSICAL THERAPIST

## 2022-11-23 PROCEDURE — 97530 THERAPEUTIC ACTIVITIES: CPT | Performed by: PHYSICAL THERAPIST

## 2022-11-23 NOTE — PROGRESS NOTES
Physical Therapy Daily Treatment Note      Patient: Jeff Bass   : 1945  Referring practitioner: ARCHIE Stanton  Date of Initial Visit: Type: THERAPY  Noted: 10/28/2022  Today's Date: 2022  Patient seen for 12 sessions       Visit Diagnoses:    ICD-10-CM ICD-9-CM   1. Weakness  R53.1 780.79   2. Orthopedic aftercare  Z47.89 V54.9   3. Pain in both knees, unspecified chronicity  M25.561 719.46    M25.562        Subjective   He feels like he is making progress. Anxious to get left knee done. Has been incorporating prone hang at home as instructed.     Objective   See Exercise, Manual, and Modality Logs for complete treatment.       Assessment/Plan    Subjectively, pt reports no increase of pain or discomfort with interventions performed today. Performed well with continued functional strengthening interventions. Continues to demonstrate good tolerance to exercise progressions. Continues to benefit from verbal/tactile cues to ensure proper form and technique for exercise performance. Still lacking TKE, but improving.         Timed:         Manual Therapy:    0     mins  00932;     Therapeutic Exercise:    15     mins  85996;     Neuromuscular Nyla:    10    mins  03987;    Therapeutic Activity:     15     mins  19839;     Gait Trainin     mins  78903;     Ultrasound:     0     mins  25485;    Ionto                               0    mins   75404  Self Care                       0     mins   75407  Canalith Repos    0     mins 54847      Un-Timed:  Electrical Stimulation:    0     mins  18057 ( );  Dry Needling     0     mins self-pay  Traction     0     mins 67121      Timed Treatment:   40   mins   Total Treatment:     50   mins    Yessy Tello, PT  [place] License: [id number]

## 2022-11-28 ENCOUNTER — APPOINTMENT (OUTPATIENT)
Dept: PHYSICAL THERAPY | Facility: HOSPITAL | Age: 77
End: 2022-11-28

## 2022-11-28 ENCOUNTER — TREATMENT (OUTPATIENT)
Dept: PHYSICAL THERAPY | Facility: CLINIC | Age: 77
End: 2022-11-28

## 2022-11-28 DIAGNOSIS — Z47.89 ORTHOPEDIC AFTERCARE: ICD-10-CM

## 2022-11-28 DIAGNOSIS — M25.561 PAIN IN BOTH KNEES, UNSPECIFIED CHRONICITY: ICD-10-CM

## 2022-11-28 DIAGNOSIS — M25.562 PAIN IN BOTH KNEES, UNSPECIFIED CHRONICITY: ICD-10-CM

## 2022-11-28 DIAGNOSIS — R53.1 WEAKNESS: Primary | ICD-10-CM

## 2022-11-28 PROCEDURE — 97112 NEUROMUSCULAR REEDUCATION: CPT | Performed by: PHYSICAL THERAPIST

## 2022-11-28 PROCEDURE — 97530 THERAPEUTIC ACTIVITIES: CPT | Performed by: PHYSICAL THERAPIST

## 2022-11-28 PROCEDURE — 97110 THERAPEUTIC EXERCISES: CPT | Performed by: PHYSICAL THERAPIST

## 2022-11-28 NOTE — PROGRESS NOTES
Physical Therapy Daily Treatment Note      Patient: Jeff Bass   : 1945  Referring practitioner: No ref. provider found  Date of Initial Visit: Type: THERAPY  Noted: 10/28/2022  Today's Date: 2022  Patient seen for 13 sessions       Visit Diagnoses:    ICD-10-CM ICD-9-CM   1. Weakness  R53.1 780.79   2. Orthopedic aftercare  Z47.89 V54.9   3. Pain in both knees, unspecified chronicity  M25.561 719.46    M25.562          Jeff Bass reports: My (R) knee is doing better but my (L) knee is struggling.      Subjective     Objective   See Exercise, Manual, and Modality Logs for complete treatment.       Assessment & Plan     Assessment    Assessment details: The pt jose Tx well with increased reps of current exercises.  Reviewed safety for (L) knee with exercises for tolerance and not over doing it.  Pt verbalized understanding.      Plan  Plan details: Progress ROM / strengthening / stabilization / functional activity as tolerated            Timed:         Manual Therapy:         mins  25378;     Therapeutic Exercise:    20     mins  06618;     Neuromuscular Nyla:    10    mins  88362;    Therapeutic Activity:     15     mins  43286;     Gait Training:           mins  02924;     Ultrasound:          mins  86935;    Ionto                                   mins  55227  Self Care                            mins  08990  Traction          mins 30422  Canalith Repos         mins 25350      Un-Timed:  Electrical Stimulation:         mins  07786 ( );  Dry Needling          mins self-pay  Traction          mins 49899      Timed Treatment:   45   mins   Total Treatment:     57   mins    Parag Contreras PT  [place] License #: [id number]    Physical Therapist

## 2022-11-30 ENCOUNTER — TREATMENT (OUTPATIENT)
Dept: PHYSICAL THERAPY | Facility: CLINIC | Age: 77
End: 2022-11-30

## 2022-11-30 DIAGNOSIS — Z47.89 ORTHOPEDIC AFTERCARE: ICD-10-CM

## 2022-11-30 DIAGNOSIS — M25.561 PAIN IN BOTH KNEES, UNSPECIFIED CHRONICITY: ICD-10-CM

## 2022-11-30 DIAGNOSIS — M25.562 PAIN IN BOTH KNEES, UNSPECIFIED CHRONICITY: ICD-10-CM

## 2022-11-30 DIAGNOSIS — R53.1 WEAKNESS: Primary | ICD-10-CM

## 2022-11-30 PROCEDURE — 97530 THERAPEUTIC ACTIVITIES: CPT | Performed by: PHYSICAL THERAPIST

## 2022-11-30 PROCEDURE — 97110 THERAPEUTIC EXERCISES: CPT | Performed by: PHYSICAL THERAPIST

## 2022-12-02 ENCOUNTER — TREATMENT (OUTPATIENT)
Dept: PHYSICAL THERAPY | Facility: CLINIC | Age: 77
End: 2022-12-02

## 2022-12-02 DIAGNOSIS — R53.1 WEAKNESS: Primary | ICD-10-CM

## 2022-12-02 DIAGNOSIS — M25.561 PAIN IN BOTH KNEES, UNSPECIFIED CHRONICITY: ICD-10-CM

## 2022-12-02 DIAGNOSIS — Z47.89 ORTHOPEDIC AFTERCARE: ICD-10-CM

## 2022-12-02 DIAGNOSIS — M25.562 PAIN IN BOTH KNEES, UNSPECIFIED CHRONICITY: ICD-10-CM

## 2022-12-02 PROCEDURE — 97110 THERAPEUTIC EXERCISES: CPT | Performed by: PHYSICAL THERAPIST

## 2022-12-02 NOTE — PROGRESS NOTES
Physical Therapy Daily Treatment Note      Patient: Jeff Bass   : 1945  Referring practitioner: ARCHIE Stanton  Date of Initial Visit: Type: THERAPY  Noted: 10/28/2022  Today's Date: 2022  Patient seen for 15 sessions       Visit Diagnoses:    ICD-10-CM ICD-9-CM   1. Weakness  R53.1 780.79   2. Orthopedic aftercare  Z47.89 V54.9   3. Pain in both knees, unspecified chronicity  M25.561 719.46    M25.562        Subjective Evaluation    History of Present Illness    Subjective comment: admits he sits too long at his computer so getting stiff. work keeping him busy.        Objective   See Exercise, Manual, and Modality Logs for complete treatment.       Assessment & Plan     Assessment    Assessment details: Focus on knee ext both LEs with R -10 and L -20. Great flexion at 140 post TKA and also L LE.   Pain with leg press at first but stronger with tibia horizontal at top plate. No L leg press or step up today due to end stage OA and pain preventing this painfree right now. Still 8 weeks or more away from L TKA so trying to calm this knee down prior to surgery. Advised he get his brace and/or sleeve on his L knee, but work on ext.           Timed:         Manual Therapy:         mins  75570;     Therapeutic Exercise:    30     mins  29923;     Neuromuscular Nyla:        mins  98918;    Therapeutic Activity:          mins  17684;     Gait Training:           mins  28457;     Ultrasound:          mins  55539;    Ionto                                   mins   01049  Self Care                            mins   83820  Canalith Repos         mins 59177      Un-Timed:  Electrical Stimulation:         mins  78468 ( );  Dry Needling          mins self-pay  Traction          mins 35448      Timed Treatment:   30   mins   Total Treatment:     30   mins    Zorre Zeno Kimura, PT  [place] License: [id number]    [place] License:  [id number]

## 2022-12-05 ENCOUNTER — TELEPHONE (OUTPATIENT)
Dept: PHYSICAL THERAPY | Facility: CLINIC | Age: 77
End: 2022-12-05

## 2022-12-07 ENCOUNTER — TREATMENT (OUTPATIENT)
Dept: PHYSICAL THERAPY | Facility: CLINIC | Age: 77
End: 2022-12-07

## 2022-12-07 DIAGNOSIS — Z47.89 ORTHOPEDIC AFTERCARE: ICD-10-CM

## 2022-12-07 DIAGNOSIS — M25.561 PAIN IN BOTH KNEES, UNSPECIFIED CHRONICITY: ICD-10-CM

## 2022-12-07 DIAGNOSIS — R53.1 WEAKNESS: Primary | ICD-10-CM

## 2022-12-07 DIAGNOSIS — M25.562 PAIN IN BOTH KNEES, UNSPECIFIED CHRONICITY: ICD-10-CM

## 2022-12-07 PROCEDURE — 97110 THERAPEUTIC EXERCISES: CPT | Performed by: PHYSICAL THERAPIST

## 2022-12-07 PROCEDURE — 97530 THERAPEUTIC ACTIVITIES: CPT | Performed by: PHYSICAL THERAPIST

## 2022-12-07 NOTE — PROGRESS NOTES
Physical Therapy Daily Treatment Note      Patient: Jeff Bass   : 1945  Referring practitioner: ARCHIE Stanton  Date of Initial Visit: Type: THERAPY  Noted: 10/28/2022  Today's Date: 2022  Patient seen for 16 sessions       Visit Diagnoses:    ICD-10-CM ICD-9-CM   1. Weakness  R53.1 780.79   2. Orthopedic aftercare  Z47.89 V54.9   3. Pain in both knees, unspecified chronicity  M25.561 719.46    M25.562        Subjective   No new complaints.    Objective   See Exercise, Manual, and Modality Logs for complete treatment.       Assessment/Plan    Focused on strength and right knee extension today. Right knee extension PROM to 0 degrees. Gait is improving, fixed knee extension on left likely contributing to lack of TKE on right when walking.         Timed:         Manual Therapy:    0     mins  72434;     Therapeutic Exercise:    15     mins  95819;     Neuromuscular Nyla:    0    mins  89930;    Therapeutic Activity:     15     mins  89118;     Gait Trainin     mins  29980;     Ultrasound:     0     mins  74099;    Ionto                               0    mins   49853  Self Care                       0     mins   67901  Canalith Repos    0     mins 95543      Un-Timed:  Electrical Stimulation:    0     mins  80667 ( );  Dry Needling     0     mins self-pay  Traction     0     mins 85389      Timed Treatment:   30   mins   Total Treatment:     40   mins    Yessy Tello, PT  [place] License: [id number]

## 2022-12-09 ENCOUNTER — TREATMENT (OUTPATIENT)
Dept: PHYSICAL THERAPY | Facility: CLINIC | Age: 77
End: 2022-12-09

## 2022-12-09 DIAGNOSIS — M25.561 PAIN IN BOTH KNEES, UNSPECIFIED CHRONICITY: ICD-10-CM

## 2022-12-09 DIAGNOSIS — M25.562 PAIN IN BOTH KNEES, UNSPECIFIED CHRONICITY: ICD-10-CM

## 2022-12-09 DIAGNOSIS — Z47.89 ORTHOPEDIC AFTERCARE: ICD-10-CM

## 2022-12-09 DIAGNOSIS — R53.1 WEAKNESS: Primary | ICD-10-CM

## 2022-12-09 PROCEDURE — 97530 THERAPEUTIC ACTIVITIES: CPT | Performed by: PHYSICAL THERAPIST

## 2022-12-09 PROCEDURE — 97110 THERAPEUTIC EXERCISES: CPT | Performed by: PHYSICAL THERAPIST

## 2022-12-09 NOTE — PROGRESS NOTES
"Physical Therapy Daily Treatment Note      Patient: Jeff Bass   : 1945  Referring practitioner: ARCHIE Stanton  Date of Initial Visit: Type: THERAPY  Noted: 10/28/2022  Today's Date: 2022  Patient seen for 17 sessions       Visit Diagnoses:    ICD-10-CM ICD-9-CM   1. Weakness  R53.1 780.79   2. Orthopedic aftercare  Z47.89 V54.9   3. Pain in both knees, unspecified chronicity  M25.561 719.46    M25.562        Subjective   Has been working hard on getting his knee straight at home, maybe \"overdid it\" yesterday and had some pain at night.     Objective   See Exercise, Manual, and Modality Logs for complete treatment.       Assessment/Plan    Significant improvement noted in active knee extension, and with TKE during gait.         Timed:         Manual Therapy:    0     mins  15320;     Therapeutic Exercise:    15     mins  26513;     Neuromuscular Nyla:    0    mins  14850;    Therapeutic Activity:     23     mins  59254;     Gait Trainin     mins  68637;     Ultrasound:     0     mins  49028;    Ionto                               0    mins   65242  Self Care                       0     mins   09299  Canalith Repos    0     mins 54626      Un-Timed:  Electrical Stimulation:    0     mins  05459 ( );  Dry Needling     0     mins self-pay  Traction     0     mins 69100      Timed Treatment:   38   mins   Total Treatment:     48   mins    Yessy Tello, PT  [place] License: [id number]                  "

## 2022-12-12 ENCOUNTER — TREATMENT (OUTPATIENT)
Dept: PHYSICAL THERAPY | Facility: CLINIC | Age: 77
End: 2022-12-12

## 2022-12-12 DIAGNOSIS — M25.561 PAIN IN BOTH KNEES, UNSPECIFIED CHRONICITY: ICD-10-CM

## 2022-12-12 DIAGNOSIS — R53.1 WEAKNESS: Primary | ICD-10-CM

## 2022-12-12 DIAGNOSIS — Z47.89 ORTHOPEDIC AFTERCARE: ICD-10-CM

## 2022-12-12 DIAGNOSIS — M25.562 PAIN IN BOTH KNEES, UNSPECIFIED CHRONICITY: ICD-10-CM

## 2022-12-12 PROCEDURE — 97112 NEUROMUSCULAR REEDUCATION: CPT | Performed by: PHYSICAL THERAPIST

## 2022-12-12 PROCEDURE — 97530 THERAPEUTIC ACTIVITIES: CPT | Performed by: PHYSICAL THERAPIST

## 2022-12-12 PROCEDURE — 97110 THERAPEUTIC EXERCISES: CPT | Performed by: PHYSICAL THERAPIST

## 2022-12-12 NOTE — PROGRESS NOTES
Physical Therapy Daily Treatment Note      Patient: Jeff Bass   : 1945  Referring practitioner: ARCHIE Stanton  Date of Initial Visit: Type: THERAPY  Noted: 10/28/2022  Today's Date: 2022  Patient seen for 18 sessions       Visit Diagnoses:    ICD-10-CM ICD-9-CM   1. Weakness  R53.1 780.79   2. Orthopedic aftercare  Z47.89 V54.9   3. Pain in both knees, unspecified chronicity  M25.561 719.46    M25.562        Subjective   Knees feel stiff this morning.     Objective   See Exercise, Manual, and Modality Logs for complete treatment.       Assessment/Plan    Subjectively, pt reports no increase of pain or discomfort with interventions performed today. Pt reported feeling lightheaded when walking backward for cable walkaways, so held that exercise today.         Timed:         Manual Therapy:    0     mins  09041;     Therapeutic Exercise:    15     mins  17804;     Neuromuscular Nyla:    8    mins  71396;    Therapeutic Activity:     15     mins  64961;     Gait Trainin     mins  37811;     Ultrasound:     0     mins  67765;    Ionto                               0    mins   22406  Self Care                       0     mins   63664  Canalith Repos    0     mins 72953      Un-Timed:  Electrical Stimulation:    0     mins  33009 ( );  Dry Needling     0     mins self-pay  Traction     0     mins 60761      Timed Treatment:   38   mins   Total Treatment:     48   mins    Yessy Tello, PT  [place] License: [id number]

## 2022-12-13 ENCOUNTER — OFFICE VISIT (OUTPATIENT)
Dept: ORTHOPEDIC SURGERY | Facility: CLINIC | Age: 77
End: 2022-12-13

## 2022-12-13 VITALS — BODY MASS INDEX: 27.75 KG/M2 | HEIGHT: 77 IN | TEMPERATURE: 97.1 F | RESPIRATION RATE: 16 BRPM | WEIGHT: 235 LBS

## 2022-12-13 DIAGNOSIS — R52 PAIN: Primary | ICD-10-CM

## 2022-12-13 DIAGNOSIS — Z96.651 STATUS POST RIGHT KNEE REPLACEMENT: ICD-10-CM

## 2022-12-13 PROCEDURE — 99024 POSTOP FOLLOW-UP VISIT: CPT | Performed by: NURSE PRACTITIONER

## 2022-12-13 PROCEDURE — 73562 X-RAY EXAM OF KNEE 3: CPT | Performed by: NURSE PRACTITIONER

## 2022-12-13 RX ORDER — POLYETHYLENE GLYCOL 3350 17 G/17G
POWDER, FOR SOLUTION ORAL
COMMUNITY

## 2022-12-13 RX ORDER — AMMONIUM LACTATE 12 G/100G
LOTION TOPICAL EVERY 12 HOURS SCHEDULED
COMMUNITY

## 2022-12-13 NOTE — PROGRESS NOTES
Jeff Bass : 1945 MRN: [id number] DATE: 2022    DIAGNOSIS: 8 week follow up right total knee      SUBJECTIVE:Patient returns today for 8 week follow up of right total knee replacement. Patient reports doing well with no unusual complaints. Appears to be progressing appropriately. Reports his pain is very tolerable at this time. Still going to PT at [facility]. Reports he is not quite at 0 degrees on extension, but doing well with flexion. Denies any s/s of infection, and he is w/o any other significant complaints.     OBJECTIVE:   Exam:. The incision is well healed. No sign of infection. Range of motion is measured at 5 to 130. The calf is soft and nontender with a negative Homans sign. Strength is progressing and the patient is ambulating appropriately.    DIAGNOSTIC STUDIES  Xrays: 3 views of the right knee (AP, lateral, and sunrise) were ordered and reviewed for evaluation of recent knee replacement. They demonstrate a well positioned, well aligned knee replacement without complicating factors noted. In comparison with previous films there has been no change.    ASSESSMENT: 8 week status post right knee replacement.    PLAN: 1) Continue with PT exercises as prescribed   2) Is going to having his left knee replacement next month, will get both  knee's on the same schedule going forward.    ARCHIE Stanton  2022

## 2022-12-14 ENCOUNTER — TREATMENT (OUTPATIENT)
Dept: PHYSICAL THERAPY | Facility: CLINIC | Age: 77
End: 2022-12-14

## 2022-12-14 DIAGNOSIS — M25.561 PAIN IN BOTH KNEES, UNSPECIFIED CHRONICITY: ICD-10-CM

## 2022-12-14 DIAGNOSIS — M25.562 PAIN IN BOTH KNEES, UNSPECIFIED CHRONICITY: ICD-10-CM

## 2022-12-14 DIAGNOSIS — R53.1 WEAKNESS: Primary | ICD-10-CM

## 2022-12-14 DIAGNOSIS — Z47.89 ORTHOPEDIC AFTERCARE: ICD-10-CM

## 2022-12-14 PROCEDURE — 97110 THERAPEUTIC EXERCISES: CPT | Performed by: PHYSICAL THERAPIST

## 2022-12-14 PROCEDURE — 97530 THERAPEUTIC ACTIVITIES: CPT | Performed by: PHYSICAL THERAPIST

## 2022-12-14 NOTE — PROGRESS NOTES
Physical Therapy Daily Treatment Note      Patient: Jeff Bass   : 1945  Referring practitioner: ARCHIE Stanton  Date of Initial Visit: Type: THERAPY  Noted: 10/28/2022  Today's Date: 2022  Patient seen for 19 sessions       Visit Diagnoses:    ICD-10-CM ICD-9-CM   1. Weakness  R53.1 780.79   2. Orthopedic aftercare  Z47.89 V54.9   3. Pain in both knees, unspecified chronicity  M25.561 719.46    M25.562        Subjective   Feels like therapy is helping. Anxious to get left knee done.     Objective   See Exercise, Manual, and Modality Logs for complete treatment.       Assessment/Plan    Subjectively, pt reports no increase of pain or discomfort with interventions performed today. Performed well with continued functional strengthening interventions. Continues to demonstrate good tolerance to exercise progressions. Continues to benefit from verbal/tactile cues to ensure proper form and technique for exercise performance.        Timed:         Manual Therapy:    0     mins  45574;     Therapeutic Exercise:    15     mins  70579;     Neuromuscular Nyla:    0    mins  63939;    Therapeutic Activity:     15     mins  15370;     Gait Trainin     mins  23686;     Ultrasound:     0     mins  62787;    Ionto                               0    mins   42861  Self Care                       0     mins   52618  Canalith Repos    0     mins 26690      Un-Timed:  Electrical Stimulation:    0     mins  68029 ( );  Dry Needling     0     mins self-pay  Traction     0     mins 21421      Timed Treatment:   30   mins   Total Treatment:     40   mins    Yessy Tello, PT  [place] License: [id number]

## 2022-12-16 ENCOUNTER — TREATMENT (OUTPATIENT)
Dept: PHYSICAL THERAPY | Facility: CLINIC | Age: 77
End: 2022-12-16

## 2022-12-16 DIAGNOSIS — M25.562 PAIN IN BOTH KNEES, UNSPECIFIED CHRONICITY: ICD-10-CM

## 2022-12-16 DIAGNOSIS — Z47.89 ORTHOPEDIC AFTERCARE: ICD-10-CM

## 2022-12-16 DIAGNOSIS — M25.561 PAIN IN BOTH KNEES, UNSPECIFIED CHRONICITY: ICD-10-CM

## 2022-12-16 DIAGNOSIS — R53.1 WEAKNESS: Primary | ICD-10-CM

## 2022-12-16 PROCEDURE — 97112 NEUROMUSCULAR REEDUCATION: CPT | Performed by: PHYSICAL THERAPIST

## 2022-12-16 PROCEDURE — 97110 THERAPEUTIC EXERCISES: CPT | Performed by: PHYSICAL THERAPIST

## 2022-12-16 PROCEDURE — 97530 THERAPEUTIC ACTIVITIES: CPT | Performed by: PHYSICAL THERAPIST

## 2022-12-16 NOTE — PROGRESS NOTES
Physical Therapy Daily Treatment Note      Patient: Jeff Bass   : 1945  Referring practitioner: ARCHIE Stanton  Date of Initial Visit: Type: THERAPY  Noted: 10/28/2022  Today's Date: 2022  Patient seen for 20 sessions       Visit Diagnoses:    ICD-10-CM ICD-9-CM   1. Weakness  R53.1 780.79   2. Orthopedic aftercare  Z47.89 V54.9   3. Pain in both knees, unspecified chronicity  M25.561 719.46    M25.562        Subjective   Feeling good. Feels like he is getting stronger.     Objective   See Exercise, Manual, and Modality Logs for complete treatment.       Assessment/Plan    Subjectively, pt reports no increase of pain or discomfort with interventions performed today. Performed well with continued functional strengthening interventions. Continues to demonstrate good tolerance to exercise progressions. Continues to benefit from verbal/tactile cues to ensure proper form and technique for exercise performance.         Timed:         Manual Therapy:    0     mins  28203;     Therapeutic Exercise:    15     mins  87378;     Neuromuscular Nyla:    10    mins  44733;    Therapeutic Activity:     15     mins  27282;     Gait Trainin     mins  28246;     Ultrasound:     0     mins  47488;    Ionto                               0    mins   67241  Self Care                       0     mins   53256  Canalith Repos    0     mins 92181      Un-Timed:  Electrical Stimulation:    0     mins  74236 ( );  Dry Needling     0     mins self-pay  Traction     0     mins 89483      Timed Treatment:   40   mins   Total Treatment:     50   mins    Yessy Tello, PT  [place] License: [id number]

## 2022-12-19 ENCOUNTER — TREATMENT (OUTPATIENT)
Dept: PHYSICAL THERAPY | Facility: CLINIC | Age: 77
End: 2022-12-19

## 2022-12-19 DIAGNOSIS — M25.561 PAIN IN BOTH KNEES, UNSPECIFIED CHRONICITY: ICD-10-CM

## 2022-12-19 DIAGNOSIS — R53.1 WEAKNESS: Primary | ICD-10-CM

## 2022-12-19 DIAGNOSIS — M25.562 PAIN IN BOTH KNEES, UNSPECIFIED CHRONICITY: ICD-10-CM

## 2022-12-19 DIAGNOSIS — Z47.89 ORTHOPEDIC AFTERCARE: ICD-10-CM

## 2022-12-19 PROCEDURE — 97530 THERAPEUTIC ACTIVITIES: CPT | Performed by: PHYSICAL THERAPIST

## 2022-12-19 PROCEDURE — 97110 THERAPEUTIC EXERCISES: CPT | Performed by: PHYSICAL THERAPIST

## 2022-12-19 PROCEDURE — 97112 NEUROMUSCULAR REEDUCATION: CPT | Performed by: PHYSICAL THERAPIST

## 2022-12-19 NOTE — PROGRESS NOTES
Physical Therapy Daily Treatment Note      Patient: Jeff Bass   : 1945  Referring practitioner: ARCHIE Stanton  Date of Initial Visit: Type: THERAPY  Noted: 10/28/2022  Today's Date: 2022  Patient seen for 21 sessions       Visit Diagnoses:    ICD-10-CM ICD-9-CM   1. Weakness  R53.1 780.79   2. Orthopedic aftercare  Z47.89 V54.9   3. Pain in both knees, unspecified chronicity  M25.561 719.46    M25.562         Subjective   Feeling OK today.     Objective   See Exercise, Manual, and Modality Logs for complete treatment.       Assessment/Plan    Subjectively, pt reports no increase of pain or discomfort with interventions performed today. Right knee extension PROM is doing well, but pt continues to ambulate lacking TKE, likely in some part due to ROM limitation on the left.         Timed:         Manual Therapy:    0     mins  60405;     Therapeutic Exercise:    15     mins  74689;     Neuromuscular Nyla:    8    mins  02333;    Therapeutic Activity:     15     mins  42129;     Gait Trainin     mins  26184;     Ultrasound:     0     mins  62039;    Ionto                               0    mins   40556  Self Care                       0     mins   83818  Canalith Repos    0     mins 27694      Un-Timed:  Electrical Stimulation:    0     mins  29810 ( );  Dry Needling     0     mins self-pay  Traction     0     mins 58429      Timed Treatment:   38   mins   Total Treatment:     48   mins    Yessy Tello, REBECCA  [place] License: [id number]

## 2022-12-21 ENCOUNTER — TELEPHONE (OUTPATIENT)
Dept: UROLOGY | Facility: CLINIC | Age: 77
End: 2022-12-21

## 2022-12-21 ENCOUNTER — TELEPHONE (OUTPATIENT)
Dept: ONCOLOGY | Facility: CLINIC | Age: 77
End: 2022-12-21

## 2022-12-21 NOTE — TELEPHONE ENCOUNTER
Pt cancelled his Urology appt because he had a knee replacement.  She said he can call and reschedule himself if he wants to.

## 2022-12-21 NOTE — TELEPHONE ENCOUNTER
SPOKE TO EARLINE AT DR. WALKER'S OFFICE TO INFORM PT CX NEW PT APPT WITH DR. MOJICA FROM 12/20 DUE TO KNEE REPLACEMENT SURGERY/MS

## 2022-12-30 ENCOUNTER — OFFICE VISIT (OUTPATIENT)
Dept: ONCOLOGY | Facility: CLINIC | Age: 77
End: 2022-12-30

## 2022-12-30 ENCOUNTER — LAB (OUTPATIENT)
Dept: LAB | Facility: HOSPITAL | Age: 77
End: 2022-12-30
Payer: MEDICARE

## 2022-12-30 ENCOUNTER — TELEPHONE (OUTPATIENT)
Dept: PHYSICAL THERAPY | Facility: CLINIC | Age: 77
End: 2022-12-30

## 2022-12-30 VITALS
SYSTOLIC BLOOD PRESSURE: 124 MMHG | DIASTOLIC BLOOD PRESSURE: 75 MMHG | HEART RATE: 101 BPM | RESPIRATION RATE: 16 BRPM | OXYGEN SATURATION: 94 % | BODY MASS INDEX: 26.26 KG/M2 | TEMPERATURE: 97.1 F | WEIGHT: 222.4 LBS | HEIGHT: 77 IN

## 2022-12-30 DIAGNOSIS — C91.10 CHRONIC LYMPHOCYTIC LEUKEMIA: ICD-10-CM

## 2022-12-30 DIAGNOSIS — D64.9 ANEMIA, UNSPECIFIED TYPE: Primary | ICD-10-CM

## 2022-12-30 DIAGNOSIS — C61 MALIGNANT NEOPLASM OF PROSTATE: ICD-10-CM

## 2022-12-30 DIAGNOSIS — Z85.46 HISTORY OF PROSTATE CANCER: ICD-10-CM

## 2022-12-30 DIAGNOSIS — R31.9 HEMATURIA, UNSPECIFIED TYPE: ICD-10-CM

## 2022-12-30 LAB
ALBUMIN SERPL-MCNC: 4 G/DL (ref 3.5–5.2)
ALBUMIN/GLOB SERPL: 1.7 G/DL (ref 1.1–2.4)
ALP SERPL-CCNC: 84 U/L (ref 38–116)
ALT SERPL W P-5'-P-CCNC: <5 U/L (ref 0–41)
ANION GAP SERPL CALCULATED.3IONS-SCNC: 13.9 MMOL/L (ref 5–15)
AST SERPL-CCNC: 12 U/L (ref 0–40)
BASOPHILS # BLD AUTO: 0.06 10*3/MM3 (ref 0–0.2)
BASOPHILS NFR BLD AUTO: 0.6 % (ref 0–1.5)
BILIRUB SERPL-MCNC: 1.3 MG/DL (ref 0.2–1.2)
BUN SERPL-MCNC: 15 MG/DL (ref 6–20)
BUN/CREAT SERPL: 16.3 (ref 7.3–30)
CALCIUM SPEC-SCNC: 10 MG/DL (ref 8.5–10.2)
CHLORIDE SERPL-SCNC: 97 MMOL/L (ref 98–107)
CO2 SERPL-SCNC: 25.1 MMOL/L (ref 22–29)
CREAT SERPL-MCNC: 0.92 MG/DL (ref 0.7–1.3)
DEPRECATED RDW RBC AUTO: 44.2 FL (ref 37–54)
EGFRCR SERPLBLD CKD-EPI 2021: 84.6 ML/MIN/1.73
EOSINOPHIL # BLD AUTO: 0.18 10*3/MM3 (ref 0–0.4)
EOSINOPHIL NFR BLD AUTO: 1.7 % (ref 0.3–6.2)
ERYTHROCYTE [DISTWIDTH] IN BLOOD BY AUTOMATED COUNT: 14.5 % (ref 12.3–15.4)
FERRITIN SERPL-MCNC: 136 NG/ML (ref 30–400)
FOLATE SERPL-MCNC: 12.9 NG/ML (ref 4.78–24.2)
GLOBULIN UR ELPH-MCNC: 2.3 GM/DL (ref 1.8–3.5)
GLUCOSE SERPL-MCNC: 103 MG/DL (ref 74–124)
HCT VFR BLD AUTO: 35.5 % (ref 37.5–51)
HGB BLD-MCNC: 11.9 G/DL (ref 13–17.7)
IMM GRANULOCYTES # BLD AUTO: 0.05 10*3/MM3 (ref 0–0.05)
IMM GRANULOCYTES NFR BLD AUTO: 0.5 % (ref 0–0.5)
IRON 24H UR-MRATE: 41 MCG/DL (ref 59–158)
IRON SATN MFR SERPL: 13 % (ref 14–48)
LYMPHOCYTES # BLD AUTO: 3.34 10*3/MM3 (ref 0.7–3.1)
LYMPHOCYTES NFR BLD AUTO: 31.2 % (ref 19.6–45.3)
MCH RBC QN AUTO: 28.3 PG (ref 26.6–33)
MCHC RBC AUTO-ENTMCNC: 33.5 G/DL (ref 31.5–35.7)
MCV RBC AUTO: 84.5 FL (ref 79–97)
MONOCYTES # BLD AUTO: 1.97 10*3/MM3 (ref 0.1–0.9)
MONOCYTES NFR BLD AUTO: 18.4 % (ref 5–12)
NEUTROPHILS NFR BLD AUTO: 47.6 % (ref 42.7–76)
NEUTROPHILS NFR BLD AUTO: 5.09 10*3/MM3 (ref 1.7–7)
NRBC BLD AUTO-RTO: 0 /100 WBC (ref 0–0.2)
PLATELET # BLD AUTO: 155 10*3/MM3 (ref 140–450)
PMV BLD AUTO: 8.8 FL (ref 6–12)
POTASSIUM SERPL-SCNC: 4 MMOL/L (ref 3.5–4.7)
PROT SERPL-MCNC: 6.3 G/DL (ref 6.3–8)
PSA SERPL-MCNC: 0.11 NG/ML (ref 0–4)
RBC # BLD AUTO: 4.2 10*6/MM3 (ref 4.14–5.8)
SODIUM SERPL-SCNC: 136 MMOL/L (ref 134–145)
TIBC SERPL-MCNC: 309 MCG/DL (ref 249–505)
TRANSFERRIN SERPL-MCNC: 221 MG/DL (ref 200–360)
VIT B12 BLD-MCNC: 265 PG/ML (ref 211–946)
WBC NRBC COR # BLD: 10.69 10*3/MM3 (ref 3.4–10.8)

## 2022-12-30 PROCEDURE — 82746 ASSAY OF FOLIC ACID SERUM: CPT | Performed by: NURSE PRACTITIONER

## 2022-12-30 PROCEDURE — 99215 OFFICE O/P EST HI 40 MIN: CPT | Performed by: NURSE PRACTITIONER

## 2022-12-30 PROCEDURE — 82607 VITAMIN B-12: CPT | Performed by: NURSE PRACTITIONER

## 2022-12-30 PROCEDURE — 85025 COMPLETE CBC W/AUTO DIFF WBC: CPT

## 2022-12-30 PROCEDURE — 80053 COMPREHEN METABOLIC PANEL: CPT

## 2022-12-30 PROCEDURE — 84466 ASSAY OF TRANSFERRIN: CPT | Performed by: NURSE PRACTITIONER

## 2022-12-30 PROCEDURE — 84153 ASSAY OF PSA TOTAL: CPT | Performed by: NURSE PRACTITIONER

## 2022-12-30 PROCEDURE — 83540 ASSAY OF IRON: CPT | Performed by: NURSE PRACTITIONER

## 2022-12-30 PROCEDURE — 82728 ASSAY OF FERRITIN: CPT | Performed by: NURSE PRACTITIONER

## 2022-12-30 PROCEDURE — 36415 COLL VENOUS BLD VENIPUNCTURE: CPT

## 2022-12-30 RX ORDER — CIPROFLOXACIN 500 MG/1
500 TABLET, FILM COATED ORAL 2 TIMES DAILY
COMMUNITY
Start: 2022-12-29

## 2022-12-30 NOTE — PROGRESS NOTES
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     HISTORY OF PRESENT ILLNESS:  Patient is a 77 y.o. male with remote history of CLL whom we continue to follow now on an every 4-month basis.    He is seen back today having undergone right knee replacement with Dr. Ran Rachel in October.  He is now scheduled to undergo additional left knee replacement in January.  He has been continuing physical therapy with significant benefit.    Patient notes over the last month having lower abdominal pressure/discomfort and saw his PCP, Dr. Jovani Salomon, yesterday with urinalysis showing positive blood and protein.  Patient started on amoxicillin for acute cystitis.  Patient does not have a urologist reportedly though he does have a history back in 2016 of prostate cancer.  Per review of the EMR most recent PSA in April was normal at 0.2.    We discussed that he has new anemia today with hemoglobin down to 11.9.  The question is if he has been slowly bleeding in relation to cystitis for some time especially noting that the last month he has had symptoms.  We discussed evaluating his anemia further considering his hemoglobin was at 15 just 4 months ago.  He is in agreement.    He denies other concerns at this time.    Past Medical History:   Diagnosis Date   • Arthritis    • Benign prostatic hyperplasia     FOLLOWED BY DR. VIVIEN PATEL   • Biliary dyskinesia    • Bunion of right foot     GREAT TOE   • CLL (chronic lymphocytic leukemia) (HCC) 2016    FOLLOWED BY DR WALKER   • Colon polyps     FOLLOWED BY DR. NEHA HAM   • Constipation    • Degeneration of lumbar intervertebral disc    • Diverticulosis    • Erectile dysfunction    • Fracture of ankle 1975   • GERD (gastroesophageal reflux disease)    • Hallux valgus of left foot    • Hyperlipidemia     MIXED   • Hypertension    • Inguinal hernia    • Kidney stone 02/21/2009    SEEN AT [facility] ER   • Knee swelling 2018   •  Localized, primary osteoarthritis    • Lumbar radiculopathy    • Lumbar stenosis    • Lung mass     LEFT SIDE - SCAR TISSUE PER PATIENT    • Osteoarthritis of right knee    • Polyneuropathy    • Prostate CA (HCC) 03/2016    JACQUELYN 6, S/P XRT, FOLLOWED BY DR. VIVIEN PATEL, RADIATION SEEDS   • PVC (premature ventricular contraction)     PT STATES WORKED UP YEARS AGO BY [facility] FOR POSSIBLE MURMUR - NO FOLLOW UP REQUIRED    • RBBB    • Sensory hearing loss, bilateral    • Skin cancer     SQUAMOUS CELL CARCINOMA OF FACE   • Substernal goiter    • Thyromegaly 12/2016    ADMITTED TO [facility]   • Vitamin D deficiency      Past Surgical History:   Procedure Laterality Date   • BRONCHOSCOPY N/A 12/14/2016    Procedure: BRONCHOSCOPY WITH  BAL AND BIOPSY AND ENDOBRONCHIAL ULTRASOUND  AND NAVIGATION WITH BRUSHINGS AND BIOPSY AND BAL;  Surgeon: Pierre Manning MD;  Location: [facility] ENDOSCOPY;  Service:    • COLONOSCOPY N/A 03/13/2019    5 MM SESSILE TUBULAR ADENOMA POLYP IN TRANSVERSE, MULTIPLE SMALL AND LARGE DIVERTICULA IN SIGMOID, RESCOPE IN 5 YRS, DR. NEHA HAM AT [facility]   • COLONOSCOPY W/ POLYPECTOMY N/A 03/19/2015    3 TUBULAR ADENOMA POLYPS, 12 TUBULOVILLOUS POLYP RECTO-SIGMOID, DIVERTICULOSIS, RESCOPE IN 3 YRS, DR. NEHA HAM AT [facility]   • EYE SURGERY Bilateral     CATARACT EXTRACTION WITH LENS IMPLANT, DR. GOVEA   • FINGER NAIL SURGERY Right 2022    [facility]   • INGUINAL HERNIA REPAIR Left 11/02/2021    Procedure: LEFT OPEN INGUINAL HERNIA REPAIR;  Surgeon: Nixon Kolb MD;  Location: [facility];  Service: General;  Laterality: Left;   • PROSTATE RADIOACTIVE SEED IMPLANT N/A 09/06/2016    DR. HOA JARAMILLO AT [facility]   • PROSTATE SURGERY N/A 03/11/2016    BIOPSY: ADENOCARCINOMA, DR. ZAID IBARRA   • THYROID BIOPSY Bilateral 11/01/2017    NO B CELL OR T CELL LYMPHOMA, DONE AT [facility]   • TOTAL KNEE ARTHROPLASTY Right 10/12/2022    Procedure: TOTAL KNEE ARTHROPLASTY;  Surgeon: Ran Rachel MD;   [place] [facility] [place] [facility] [place]  Service: [place]opedics;  Laterality: Right;     Social History     Socioeconomic History   • Marital status:      Spouse name: No   • Years of education: College   Tobacco Use   • Smoking status: Never   • Smokeless tobacco: Never   Substance and Sexual Activity   • Alcohol use: No   • Drug use: Never   • Sexual activity: Yes     Partners: Female     Birth control/protection: None     Family History   Problem Relation Age of Onset   • Heart disease Father         Rheumatic heart disease   • Hypertension Father    • Osteoporosis Father    • Stroke Mother    • Osteoporosis Mother    • No Known Problems Daughter    • Malig Hyperthermia Neg Hx      Current Outpatient Medications on File Prior to Visit   Medication Sig   • alfuzosin (UROXATRAL) 10 MG 24 hr tablet Take 20 mg by mouth Every Night.   • ammonium lactate (LAC-HYDRIN) 12 % lotion Every 12 (Twelve) Hours.   • aspirin 81 MG EC tablet Take 1 tablet by mouth twice daily x 14 days; then take 1 tablet by mouth daily x 28 days   • ciprofloxacin (CIPRO) 500 MG tablet Take 500 mg by mouth 2 (Two) Times a Day.   • losartan-hydrochlorothiazide (HYZAAR) 100-25 MG per tablet Take 1 tablet by mouth Daily.   • Melatonin 10 MG tablet Take 10 mg by mouth Every Night.   • ondansetron (Zofran) 4 MG tablet Take 1 tablet by mouth Every 8 (Eight) Hours As Needed for Nausea or Vomiting for up to 10 doses.   • pantoprazole (PROTONIX) 40 MG EC tablet Take 40 mg by mouth Daily.   • polyethylene glycol (MIRALAX) 17 GM/SCOOP powder Miralax 17 gram/dose oral powder   1 scoop in the morning then as needed   • potassium chloride (K-DUR,KLOR-CON) 20 MEQ CR tablet Take 1 tablet by mouth Daily. (Patient taking differently: Take 20 mEq by mouth Daily. HOLD FOR ONE WEEK PRIOR TO SURGERY)   • pravastatin (PRAVACHOL) 20 MG tablet Take 20 mg by mouth Every Night.   • [DISCONTINUED] HYDROcodone-acetaminophen (NORCO) 7.5-325 MG per tablet Take 1-2 tablets by mouth  "every 4-6 hours as needed for pain.  Take 2 only when in severe pain.     Current Facility-Administered Medications on File Prior to Visit   Medication   • Chlorhexidine Gluconate Cloth 2 % pads   No Known Allergies              VITAL SIGNS:  Vitals:    12/30/22 1328   BP: 124/75   Pulse: 101   Resp: 16   Temp: 97.1 °F (36.2 °C)   TempSrc: Temporal   SpO2: 94%   Weight: 101 kg (222 lb 6.4 oz)   Height: 195.6 cm (77.01\")   PainSc: 0-No pain          PHYSICAL EXAMINATION:   GENERAL:  Well-developed, well-nourished in no acute distress.   SKIN:  Warm, dry without rashes, purpura or petechiae.  HEENT:  Normocephalic. PERRLA. EOMs intact.   Conjunctivae normal.  Hearing aids.  Mask in place.  CHEST: No respiratory distress.  Breathing unlabored.  CARDIAC:  Regular rate and rhythm without murmurs, rubs or gallops. Normal S1,S2.  EXTREMITIES: Mild swelling of left knee.  NEUROLOGICAL:  No focal neurological deficits.  PSYCHIATRIC:  Normal affect and mood.          LABORATORY DATA:  Results from last 7 days   Lab Units 12/30/22  1319   WBC 10*3/mm3 10.69   NEUTROS ABS 10*3/mm3 5.09   HEMOGLOBIN g/dL 11.9*   HEMATOCRIT % 35.5*   PLATELETS 10*3/mm3 155     Results from last 7 days   Lab Units 12/30/22  1319   SODIUM mmol/L 136   POTASSIUM mmol/L 4.0   CHLORIDE mmol/L 97*   CO2 mmol/L 25.1   BUN mg/dL 15   CREATININE mg/dL 0.92   CALCIUM mg/dL 10.0   ALBUMIN g/dL 4.0   BILIRUBIN mg/dL 1.3*   ALK PHOS U/L 84   ALT (SGPT) U/L <5   AST (SGOT) U/L 12   GLUCOSE mg/dL 103              ASSESSMENT:   1.  History of CLL  · Status posttreatment with Bendamustine/Rituxan in 2016  · Patient did have severe reaction to initial Rituxan dose requiring hospitalization or completion.  Did well thereafter.  · 12/30/2022 WBC remains normal at 10.6, 31% lymphs.  Platelets normal at 105,000.  No B symptoms or adenopathy.  No suggestion of recurrent disease.  Hemoglobin has acutely dropped however down to 11.9 (see further discussion below).    2.  " History of prostate cancer  · Treated at the [facility]  · Most recent PSA April 2022 = 0.203    3.  Acute cystitis  · Patient reports symptoms for at least 1 month of lower abdominal pain/pressure.  He did not see any obvious blood in the urine.  · Patient just yesterday, 12/29/2022, seen by Dr. Jovani Salomon, PCP who did urine dipstick showing positive protein, positive blood.  Patient started on amoxicillin.    4.  New anemia  · 12/30/2022 hemoglobin today 11.9.  This is a significant drop from 15.6 when seen 4 months ago.  · ?  Related to cystitis/hematuria  · We will check additional lab work today including ferritin, iron profile, B12, folate, and PSA level in light of his history of prostate cancer.    PLAN:  1. Reviewed with patient stability of his WBC count.  No evidence of recurrent CLL.  2. New anemia on lab work today as outlined.  Additional blood work to be performed ordered: Ferritin, iron profile, B12, folate, PSA  3. I will call the patient with any abnormal findings from additional blood work ordered today.  4. Patient will complete amoxicillin as ordered per Dr. Salomon for acute cystitis.  May need referral to urology depending.  5. Patient is otherwise already scheduled to follow-up with Dr. Schroeder in 2 weeks which will be good to reassess hemoglobin in light of the above.    I spent 58 minutes caring for Jeff on this date of service. This time includes time spent by me in the following activities: preparing for the visit, reviewing tests, obtaining and/or reviewing a separately obtained history, counseling and educating the patient/family/caregiver, ordering medications, tests, or procedures, referring and communicating with other health care professionals, documenting information in the medical record and care coordination

## 2023-01-03 DIAGNOSIS — C91.10 CHRONIC LYMPHOCYTIC LEUKEMIA: ICD-10-CM

## 2023-01-03 DIAGNOSIS — D64.9 ANEMIA, UNSPECIFIED TYPE: Primary | ICD-10-CM

## 2023-01-03 NOTE — PROGRESS NOTES
See office note from 12/30/2022.    Per discussion with Dr. Schroeder we will bring the patient in for B12 injection this week and otherwise repeat CBC, B12, reticulated hemoglobin when he sees the patient 1/13/2022.  Possibly give additional B12 injection that day.  Patient notified of plan and verbalized understanding.

## 2023-01-04 ENCOUNTER — INFUSION (OUTPATIENT)
Dept: ONCOLOGY | Facility: HOSPITAL | Age: 78
End: 2023-01-04
Payer: MEDICARE

## 2023-01-04 DIAGNOSIS — E53.8 B12 DEFICIENCY: Primary | ICD-10-CM

## 2023-01-04 PROCEDURE — 96372 THER/PROPH/DIAG INJ SC/IM: CPT

## 2023-01-04 PROCEDURE — 25010000002 CYANOCOBALAMIN PER 1000 MCG: Performed by: INTERNAL MEDICINE

## 2023-01-04 RX ORDER — CYANOCOBALAMIN 1000 UG/ML
1000 INJECTION, SOLUTION INTRAMUSCULAR; SUBCUTANEOUS ONCE
Status: COMPLETED | OUTPATIENT
Start: 2023-01-04 | End: 2023-01-04

## 2023-01-04 RX ORDER — CYANOCOBALAMIN 1000 UG/ML
1000 INJECTION, SOLUTION INTRAMUSCULAR; SUBCUTANEOUS ONCE
Status: CANCELLED | OUTPATIENT
Start: 2023-01-04

## 2023-01-04 RX ADMIN — CYANOCOBALAMIN 1000 MCG: 1000 INJECTION, SOLUTION INTRAMUSCULAR at 14:20

## 2023-01-12 ENCOUNTER — APPOINTMENT (OUTPATIENT)
Dept: PREADMISSION TESTING | Facility: HOSPITAL | Age: 78
End: 2023-01-12
Payer: MEDICARE

## 2023-01-13 ENCOUNTER — INFUSION (OUTPATIENT)
Dept: ONCOLOGY | Facility: HOSPITAL | Age: 78
End: 2023-01-13
Payer: MEDICARE

## 2023-01-13 ENCOUNTER — LAB (OUTPATIENT)
Dept: LAB | Facility: HOSPITAL | Age: 78
End: 2023-01-13
Payer: MEDICARE

## 2023-01-13 ENCOUNTER — OFFICE VISIT (OUTPATIENT)
Dept: ONCOLOGY | Facility: CLINIC | Age: 78
End: 2023-01-13
Payer: MEDICARE

## 2023-01-13 ENCOUNTER — HOSPITAL ENCOUNTER (OUTPATIENT)
Dept: PET IMAGING | Facility: HOSPITAL | Age: 78
Discharge: HOME | End: 2023-01-13
Admitting: INTERNAL MEDICINE
Payer: MEDICARE

## 2023-01-13 VITALS
BODY MASS INDEX: 25.93 KG/M2 | WEIGHT: 219.6 LBS | HEART RATE: 96 BPM | RESPIRATION RATE: 16 BRPM | TEMPERATURE: 98.2 F | OXYGEN SATURATION: 96 % | DIASTOLIC BLOOD PRESSURE: 64 MMHG | SYSTOLIC BLOOD PRESSURE: 101 MMHG | HEIGHT: 77 IN

## 2023-01-13 DIAGNOSIS — D64.9 ANEMIA, UNSPECIFIED TYPE: ICD-10-CM

## 2023-01-13 DIAGNOSIS — E53.8 B12 DEFICIENCY: Primary | ICD-10-CM

## 2023-01-13 DIAGNOSIS — C91.10 CHRONIC LYMPHOCYTIC LEUKEMIA: ICD-10-CM

## 2023-01-13 DIAGNOSIS — C91.10 CHRONIC LYMPHOCYTIC LEUKEMIA: Primary | ICD-10-CM

## 2023-01-13 DIAGNOSIS — R10.30 LOWER ABDOMINAL PAIN: ICD-10-CM

## 2023-01-13 LAB
BASOPHILS # BLD AUTO: 0.06 10*3/MM3 (ref 0–0.2)
BASOPHILS NFR BLD AUTO: 0.7 % (ref 0–1.5)
DEPRECATED RDW RBC AUTO: 45 FL (ref 37–54)
EOSINOPHIL # BLD AUTO: 0.2 10*3/MM3 (ref 0–0.4)
EOSINOPHIL NFR BLD AUTO: 2.3 % (ref 0.3–6.2)
ERYTHROCYTE [DISTWIDTH] IN BLOOD BY AUTOMATED COUNT: 14.6 % (ref 12.3–15.4)
HCT VFR BLD AUTO: 35.9 % (ref 37.5–51)
HGB BLD-MCNC: 12 G/DL (ref 13–17.7)
HGB RETIC QN AUTO: 31.9 PG (ref 29.8–36.1)
IMM GRANULOCYTES # BLD AUTO: 0.04 10*3/MM3 (ref 0–0.05)
IMM GRANULOCYTES NFR BLD AUTO: 0.5 % (ref 0–0.5)
IMM RETICS NFR: 17.1 % (ref 3–15.8)
LYMPHOCYTES # BLD AUTO: 2.94 10*3/MM3 (ref 0.7–3.1)
LYMPHOCYTES NFR BLD AUTO: 34.3 % (ref 19.6–45.3)
MCH RBC QN AUTO: 28.5 PG (ref 26.6–33)
MCHC RBC AUTO-ENTMCNC: 33.4 G/DL (ref 31.5–35.7)
MCV RBC AUTO: 85.3 FL (ref 79–97)
MONOCYTES # BLD AUTO: 1.37 10*3/MM3 (ref 0.1–0.9)
MONOCYTES NFR BLD AUTO: 16 % (ref 5–12)
NEUTROPHILS NFR BLD AUTO: 3.96 10*3/MM3 (ref 1.7–7)
NEUTROPHILS NFR BLD AUTO: 46.2 % (ref 42.7–76)
NRBC BLD AUTO-RTO: 0 /100 WBC (ref 0–0.2)
PLATELET # BLD AUTO: 138 10*3/MM3 (ref 140–450)
PMV BLD AUTO: 8.7 FL (ref 6–12)
RBC # BLD AUTO: 4.21 10*6/MM3 (ref 4.14–5.8)
RETICS # AUTO: 0.13 10*6/MM3 (ref 0.02–0.13)
RETICS/RBC NFR AUTO: 2.98 % (ref 0.7–1.9)
VIT B12 BLD-MCNC: 463 PG/ML (ref 211–946)
WBC NRBC COR # BLD: 8.57 10*3/MM3 (ref 3.4–10.8)

## 2023-01-13 PROCEDURE — 85025 COMPLETE CBC W/AUTO DIFF WBC: CPT

## 2023-01-13 PROCEDURE — 74177 CT ABD & PELVIS W/CONTRAST: CPT

## 2023-01-13 PROCEDURE — 96372 THER/PROPH/DIAG INJ SC/IM: CPT

## 2023-01-13 PROCEDURE — 25010000002 CYANOCOBALAMIN PER 1000 MCG: Performed by: INTERNAL MEDICINE

## 2023-01-13 PROCEDURE — 82607 VITAMIN B-12: CPT | Performed by: NURSE PRACTITIONER

## 2023-01-13 PROCEDURE — 25010000002 IOPAMIDOL 61 % SOLUTION: Performed by: INTERNAL MEDICINE

## 2023-01-13 PROCEDURE — 85046 RETICYTE/HGB CONCENTRATE: CPT

## 2023-01-13 PROCEDURE — 99215 OFFICE O/P EST HI 40 MIN: CPT | Performed by: INTERNAL MEDICINE

## 2023-01-13 PROCEDURE — 36415 COLL VENOUS BLD VENIPUNCTURE: CPT

## 2023-01-13 RX ORDER — TRIAMCINOLONE ACETONIDE 0.25 MG/G
CREAM TOPICAL
COMMUNITY
Start: 2023-01-11

## 2023-01-13 RX ORDER — CYANOCOBALAMIN 1000 UG/ML
1000 INJECTION, SOLUTION INTRAMUSCULAR; SUBCUTANEOUS ONCE
Status: COMPLETED | OUTPATIENT
Start: 2023-01-13 | End: 2023-01-13

## 2023-01-13 RX ADMIN — IOPAMIDOL 85 ML: 612 INJECTION, SOLUTION INTRAVENOUS at 14:34

## 2023-01-13 RX ADMIN — CYANOCOBALAMIN 1000 MCG: 1000 INJECTION, SOLUTION INTRAMUSCULAR at 14:40

## 2023-01-13 NOTE — PROGRESS NOTES
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     HISTORY OF PRESENT ILLNESS:  Patient is a 77 y.o. male with remote history of CLL whom we continue to follow now on an every 4-month basis.    On 01/13/2023 this 77-year-old white male who has history of CLL and is undergoing observation at this time who has been treated in the past with bendamustine and Rituxan. He is extremely sensitive to the effect of Rituxan and returns to the office for evaluation. For the last 3 weeks he has been having persistent suprapubic pain associated with throbbing sensation and discomfort to the point that the pain is there all of the time without coming and going like a colicky problem. Stools have been loose. He has had no obvious alterations in urination but he was seen by his Primary Physician, Jovani Salomon MD, and urinalysis documented some blood in the urine, he was placed on Cipro. He has taken already 10 days worth of it and the symptoms have not improved. The patient has not had any fever or chills but he has lost his appetite completely and he has lost almost 30 pounds of weight in this short period of time. He has not had any cough or shortness of breath. No palpitations. No nausea, vomiting, distention or jaundice. He has not had any swelling in his lower extremities. He feels fatigue and exhausted from abdominal pain, discomfort and lack of appetite.         Past Medical History:   Diagnosis Date   • Arthritis    • Benign prostatic hyperplasia     FOLLOWED BY DR. VIVIEN PATEL   • Biliary dyskinesia    • Bunion of right foot     GREAT TOE   • CLL (chronic lymphocytic leukemia) ([facility]) 2016    FOLLOWED BY DR WALKER   • Colon polyps     FOLLOWED BY DR. NEHA HAM   • Constipation    • Degeneration of lumbar intervertebral disc    • Diverticulosis    • Erectile dysfunction    • Fracture of ankle 1975   • GERD (gastroesophageal reflux disease)    • Hallux valgus of left  foot    • Hyperlipidemia     MIXED   • Hypertension    • Inguinal hernia    • Kidney stone 02/21/2009    SEEN AT [facility] ER   • Knee swelling 2018   • Localized, primary osteoarthritis    • Lumbar radiculopathy    • Lumbar stenosis    • Lung mass     LEFT SIDE - SCAR TISSUE PER PATIENT    • Osteoarthritis of right knee    • Polyneuropathy    • Prostate CA (HCC) 03/2016    JACQUELYN 6, S/P XRT, FOLLOWED BY DR. VIVIEN PATEL, RADIATION SEEDS   • PVC (premature ventricular contraction)     PT STATES WORKED UP YEARS AGO BY [facility] FOR POSSIBLE MURMUR - NO FOLLOW UP REQUIRED    • RBBB    • Sensory hearing loss, bilateral    • Skin cancer     SQUAMOUS CELL CARCINOMA OF FACE   • Substernal goiter    • Thyromegaly 12/2016    ADMITTED TO [facility]   • Vitamin D deficiency      Past Surgical History:   Procedure Laterality Date   • BRONCHOSCOPY N/A 12/14/2016    Procedure: BRONCHOSCOPY WITH  BAL AND BIOPSY AND ENDOBRONCHIAL ULTRASOUND  AND NAVIGATION WITH BRUSHINGS AND BIOPSY AND BAL;  Surgeon: Pierre Manning MD;  Location: [facility] ENDOSCOPY;  Service:    • COLONOSCOPY N/A 03/13/2019    5 MM SESSILE TUBULAR ADENOMA POLYP IN TRANSVERSE, MULTIPLE SMALL AND LARGE DIVERTICULA IN SIGMOID, RESCOPE IN 5 YRS, DR. NEHA HAM AT [facility]   • COLONOSCOPY W/ POLYPECTOMY N/A 03/19/2015    3 TUBULAR ADENOMA POLYPS, 12 TUBULOVILLOUS POLYP RECTO-SIGMOID, DIVERTICULOSIS, RESCOPE IN 3 YRS, DR. NEHA HAM AT [facility]   • EYE SURGERY Bilateral     CATARACT EXTRACTION WITH LENS IMPLANT, DR. GOVEA   • FINGER NAIL SURGERY Right 2022    KS[facility]   • INGUINAL HERNIA REPAIR Left 11/02/2021    Procedure: LEFT OPEN INGUINAL HERNIA REPAIR;  Surgeon: Nixon Kolb MD;  [facility];  Service: General;  Laterality: Left;   • PROSTATE RADIOACTIVE SEED IMPLANT N/A 09/06/2016    DR. HOA JARAMILLO AT [facility]   • PROSTATE SURGERY N/A 03/11/2016    BIOPSY: ADENOCARCINOMA, DR. ZAID IBARRA   • THYROID BIOPSY Bilateral 11/01/2017    NO B  CELL OR T CELL LYMPHOMA, DONE AT [facility]   • TOTAL KNEE ARTHROPLASTY Right 10/12/2022    Procedure: TOTAL KNEE ARTHROPLASTY;  Surgeon: Ran Rachel MD;  Location: [facility] OR [facility];  Service: Orthopedics;  Laterality: Right;     Social History     Socioeconomic History   • Marital status:      Spouse name: No   • Years of education: College   Tobacco Use   • Smoking status: Never   • Smokeless tobacco: Never   Substance and Sexual Activity   • Alcohol use: No   • Drug use: Never   • Sexual activity: Yes     Partners: Female     Birth control/protection: None     Family History   Problem Relation Age of Onset   • Heart disease Father         Rheumatic heart disease   • Hypertension Father    • Osteoporosis Father    • Stroke Mother    • Osteoporosis Mother    • No Known Problems Daughter    • Malig Hyperthermia Neg Hx      Current Outpatient Medications on File Prior to Visit   Medication Sig   • alfuzosin (UROXATRAL) 10 MG 24 hr tablet Take 20 mg by mouth Every Night.   • ammonium lactate (LAC-HYDRIN) 12 % lotion Every 12 (Twelve) Hours.   • aspirin 81 MG EC tablet Take 1 tablet by mouth twice daily x 14 days; then take 1 tablet by mouth daily x 28 days   • ciprofloxacin (CIPRO) 500 MG tablet Take 500 mg by mouth 2 (Two) Times a Day.   • losartan-hydrochlorothiazide (HYZAAR) 100-25 MG per tablet Take 1 tablet by mouth Daily.   • Melatonin 10 MG tablet Take 10 mg by mouth Every Night.   • ondansetron (Zofran) 4 MG tablet Take 1 tablet by mouth Every 8 (Eight) Hours As Needed for Nausea or Vomiting for up to 10 doses.   • pantoprazole (PROTONIX) 40 MG EC tablet Take 40 mg by mouth Daily.   • polyethylene glycol (MIRALAX) 17 GM/SCOOP powder Miralax 17 gram/dose oral powder   1 scoop in the morning then as needed   • potassium chloride (K-DUR,KLOR-CON) 20 MEQ CR tablet Take 1 tablet by mouth Daily. (Patient taking differently: Take 20 mEq by mouth Daily. HOLD FOR ONE WEEK PRIOR TO SURGERY)   • pravastatin (PRAVACHOL)  "20 MG tablet Take 20 mg by mouth Every Night.   • triamcinolone (KENALOG) 0.025 % cream      Current Facility-Administered Medications on File Prior to Visit   Medication   • Chlorhexidine Gluconate Cloth 2 % pads   No Known Allergies              VITAL SIGNS:  Vitals:    01/13/23 1307   BP: 101/64   Pulse: 96   Resp: 16   Temp: 98.2 °F (36.8 °C)   TempSrc: Temporal   SpO2: 96%   Weight: 99.6 kg (219 lb 9.6 oz)   Height: 195.6 cm (77.01\")   PainSc:   6   PainLoc: Abdomen  Comment: Lowe Abdomen pain for 2 weeks          PHYSICAL EXAMINATION:           GENERAL:  Well-developed, Patient  in no acute distress.   SKIN:  Warm, dry ,NO purpura ,no rash.  HEENT:  Pupils were equal and reactive to light and accomodation, conjunctivae noninjected,  normal visual acuity.   NECK:  Supple with good range of motion; no thyromegaly , no JVD or bruits,.No carotid artery pain, no carotid abnormal pulsation   LYMPHATICS:  No cervical, NO supraclavicular, NO axillary, NO inguinal adenopathies.  CARDIAC   normal rate , regular rhythm, without murmur,NO rubs NO S3 NO S4   LUNGS: normal breath sounds bilateral, no wheezing, NO rhonchi, NO crackles ,NO rubs.  VASCULAR VENOUS: no cyanosis, NO collateral circulation, NO varicosities, NO edema, NO palpable cords, NO pain,NO erythema, NO pigmentation of the skin.  ABDOMEN:  Soft, suprapubic pain,no hepatomegaly, no splenomegaly,no masses, no ascites, no collateral circulation,no distention.  EXTREMITIES  AND SPINE:  No clubbing, no cyanosis ,no deformities , no pain .No kyphosis,  no pain in spine, no pain in ribs , no pain in pelvic bone.  NEUROLOGICAL:  Patient was awake, alert, oriented to time, person and place.    LABORATORY DATA:  Results from last 7 days   Lab Units 01/13/23  1249   WBC 10*3/mm3 8.57   NEUTROS ABS 10*3/mm3 3.96   HEMOGLOBIN g/dL 12.0*   HEMATOCRIT % 35.9*   PLATELETS 10*3/mm3 138*                  ASSESSMENT:   1.  History of CLL  · Status posttreatment with " Bendamustine/Rituxan in 2016  · Patient did have severe reaction to initial Rituxan dose requiring hospitalization or completion.  Did well thereafter.  · 12/30/2022 WBC remains normal at 10.6, 31% lymphs.  Platelets normal at 105,000.  No B symptoms or adenopathy.  No suggestion of recurrent disease.  Hemoglobin has acutely dropped however down to 11.9 (see further discussion below).  On 01/13/2023 his white count, hemoglobin and platelets are not changing. His total lymphocyte count is very minimal and I do believe that his leukemia remains very quiet on clinical grounds with no need for intervention.    ·   2.  History of prostate cancer  · Treated at the [facility]  · Most recent PSA April 2022 = 0.203  On 01/13/2023 he has minimal urinary symptomatology comparing his PSA with 3 years ago was 0.8 today, actually a few days ago was 0.1. I doubt that all of the symptoms that he has are associated with prostate cancer. The blood in the urine has a different significance.     ·   3.  Acute cystitis  · Patient reports symptoms for at least 1 month of lower abdominal pain/pressure.  He did not see any obvious blood in the urine.  · Patient just yesterday, 12/29/2022, seen by Dr. Jovani Salomon, PCP who did urine dipstick showing positive protein, positive blood.  Patient started on amoxicillin.  On 01/13/2023 given the symptoms that he has it does not sound like cystitis. It was believed that that is the case. He has taken 10 days of Cipro. This medicine has taken away his appetite, he has lost 30 pounds of weight and I doubt that he has those symptoms of cystitis. The pain is deep in the pelvis, raises the question about the nature of this and even though he has no rebound in the abdomen neither distention I do believe that he needs to have a STAT CT scan of the abdomen and pelvis to be sure that there is not going to be diverticulitis or some other issue that is producing this problem.     ·   4.  New  anemia  · 12/30/2022 hemoglobin today 11.9.  This is a significant drop from 15.6 when seen 4 months ago.  · ?  Related to cystitis/hematuria  · We will check additional lab work today including ferritin, iron profile, B12, folate, and PSA level in light of his history of prostate cancer.  On 01/13/2023 the hemoglobin remains at 12, he had a B12 level of 265 receiving a B12 injection 3 weeks ago and a reticulated hemoglobin of 31 today. Normal platelet count. His recent ferritin and iron profile were acceptable. He has had a colonoscopy that was perfectly normal in 2019 by Hieu Lester MD.     RECOMMENDATIONS: I do believe that this patient needs to proceed with a STAT CT scan of the abdomen and pelvis today. We will call him with the report of this once that it becomes available. I asked him to discontinue the ciprofloxacin for now.    Depending on the findings on radiology today will define how to proceed in regard to follow up.     ADDENDUM: On 01/13/2023 at 5:13 in the afternoon I have talked with the patient about the radiological analysis of the CT scan of the abdomen and pelvis that has been done this afternoon. In summary he has unchanged multiple cysts in the liver that have nothing to do with his symptoms, he has a very significant enlargement of his spleen associated with his leukemia and he has retroperitoneal adenopathy that is probably the explanation of his abdominal pain. The aorta, the inferior vena cava, the pancreas and the kidneys are unremarkable with no hydronephrosis or evidence of inflammatory process or infection. The bladder was normal with no intra-bladder lesions, no obvious distention or inflammatory process and his prostate gland was enlarged with seeds implanted. There were no obvious inflammatory changes in the prostate to suggest any prostatitis. The bowel and small intestine was normal, the colon was unremarkable with no distention or inflammatory changes and the area of the  inguinal hernia on the left disclosed no alterations.     In summary I do believe that the pain of this patient is probably related to adenopathy related to leukemia and I encouraged him to discontinue the antibiotics and just take Tylenol for pain. We will need to call him on Monday next week. He will require reassessment by me next week and discussion about some other form to approach his leukemia therapy and this will be further discussed in toto. Obviously the opportunity will be for him to take [place]ence and clean his disease process in the best way and without having any further need for Rituxan at this point. Again this will be discussed with him next week. My appointment desk will be in charge of making an appointment for him to come.     ·

## 2023-01-16 ENCOUNTER — TELEPHONE (OUTPATIENT)
Dept: ONCOLOGY | Facility: CLINIC | Age: 78
End: 2023-01-16
Payer: MEDICARE

## 2023-01-16 DIAGNOSIS — M48.062 SPINAL STENOSIS OF LUMBAR REGION WITH NEUROGENIC CLAUDICATION: Primary | ICD-10-CM

## 2023-01-16 DIAGNOSIS — C91.10 CHRONIC LYMPHOCYTIC LEUKEMIA: ICD-10-CM

## 2023-01-16 DIAGNOSIS — C61 MALIGNANT NEOPLASM OF PROSTATE: ICD-10-CM

## 2023-01-16 NOTE — TELEPHONE ENCOUNTER
----- Message from Gerry Schroeder MD sent at 1/13/2023  5:15 PM EST -----  Read my note and addendum: new beti with me cbc cmp ldh b2 microglobulin flow cytometry and ua next week

## 2023-01-18 ENCOUNTER — LAB (OUTPATIENT)
Dept: LAB | Facility: HOSPITAL | Age: 78
End: 2023-01-18
Payer: MEDICARE

## 2023-01-18 ENCOUNTER — OFFICE VISIT (OUTPATIENT)
Dept: ONCOLOGY | Facility: CLINIC | Age: 78
End: 2023-01-18
Payer: MEDICARE

## 2023-01-18 VITALS
HEIGHT: 77 IN | BODY MASS INDEX: 26.2 KG/M2 | TEMPERATURE: 98 F | HEART RATE: 91 BPM | WEIGHT: 221.9 LBS | RESPIRATION RATE: 18 BRPM | DIASTOLIC BLOOD PRESSURE: 76 MMHG | SYSTOLIC BLOOD PRESSURE: 132 MMHG | OXYGEN SATURATION: 96 %

## 2023-01-18 DIAGNOSIS — C91.10 CHRONIC LYMPHOCYTIC LEUKEMIA: ICD-10-CM

## 2023-01-18 DIAGNOSIS — E53.8 B12 DEFICIENCY: ICD-10-CM

## 2023-01-18 DIAGNOSIS — C91.10 CHRONIC LYMPHOCYTIC LEUKEMIA: Primary | ICD-10-CM

## 2023-01-18 LAB
ALBUMIN SERPL-MCNC: 3.8 G/DL (ref 3.5–5.2)
ALBUMIN/GLOB SERPL: 1.7 G/DL (ref 1.1–2.4)
ALP SERPL-CCNC: 79 U/L (ref 38–116)
ALT SERPL W P-5'-P-CCNC: <5 U/L (ref 0–41)
ANION GAP SERPL CALCULATED.3IONS-SCNC: 10.4 MMOL/L (ref 5–15)
AST SERPL-CCNC: 10 U/L (ref 0–40)
B2 MICROGLOB SERPL-MCNC: 3.7 MG/L (ref 0.8–2.2)
BACTERIA UR QL AUTO: NEGATIVE /HPF
BASOPHILS # BLD AUTO: 0.05 10*3/MM3 (ref 0–0.2)
BASOPHILS NFR BLD AUTO: 0.6 % (ref 0–1.5)
BILIRUB SERPL-MCNC: 1.3 MG/DL (ref 0.2–1.2)
BILIRUB UR QL STRIP: NEGATIVE
BUN SERPL-MCNC: 14 MG/DL (ref 6–20)
BUN/CREAT SERPL: 15.4 (ref 7.3–30)
CALCIUM SPEC-SCNC: 9.5 MG/DL (ref 8.5–10.2)
CHLORIDE SERPL-SCNC: 101 MMOL/L (ref 98–107)
CLARITY UR: CLEAR
CO2 SERPL-SCNC: 24.6 MMOL/L (ref 22–29)
COLOR UR: YELLOW
CREAT SERPL-MCNC: 0.91 MG/DL (ref 0.7–1.3)
DEPRECATED RDW RBC AUTO: 46.1 FL (ref 37–54)
EGFRCR SERPLBLD CKD-EPI 2021: 85.7 ML/MIN/1.73
EOSINOPHIL # BLD AUTO: 0.25 10*3/MM3 (ref 0–0.4)
EOSINOPHIL NFR BLD AUTO: 3.1 % (ref 0.3–6.2)
ERYTHROCYTE [DISTWIDTH] IN BLOOD BY AUTOMATED COUNT: 14.7 % (ref 12.3–15.4)
GLOBULIN UR ELPH-MCNC: 2.2 GM/DL (ref 1.8–3.5)
GLUCOSE SERPL-MCNC: 113 MG/DL (ref 74–124)
GLUCOSE UR STRIP-MCNC: NEGATIVE MG/DL
HCT VFR BLD AUTO: 32.5 % (ref 37.5–51)
HGB BLD-MCNC: 10.7 G/DL (ref 13–17.7)
HGB UR QL STRIP.AUTO: ABNORMAL
HYALINE CASTS UR QL AUTO: NORMAL /LPF
IMM GRANULOCYTES # BLD AUTO: 0.02 10*3/MM3 (ref 0–0.05)
IMM GRANULOCYTES NFR BLD AUTO: 0.2 % (ref 0–0.5)
KETONES UR QL STRIP: NEGATIVE
LDH SERPL-CCNC: 293 U/L (ref 99–259)
LEUKOCYTE ESTERASE UR QL STRIP.AUTO: NEGATIVE
LYMPHOCYTES # BLD AUTO: 2.86 10*3/MM3 (ref 0.7–3.1)
LYMPHOCYTES NFR BLD AUTO: 35 % (ref 19.6–45.3)
MCH RBC QN AUTO: 28.2 PG (ref 26.6–33)
MCHC RBC AUTO-ENTMCNC: 32.9 G/DL (ref 31.5–35.7)
MCV RBC AUTO: 85.8 FL (ref 79–97)
MONOCYTES # BLD AUTO: 1.29 10*3/MM3 (ref 0.1–0.9)
MONOCYTES NFR BLD AUTO: 15.8 % (ref 5–12)
NEUTROPHILS NFR BLD AUTO: 3.69 10*3/MM3 (ref 1.7–7)
NEUTROPHILS NFR BLD AUTO: 45.3 % (ref 42.7–76)
NITRITE UR QL STRIP: NEGATIVE
NRBC BLD AUTO-RTO: 0 /100 WBC (ref 0–0.2)
PH UR STRIP.AUTO: 6 [PH] (ref 4.5–8)
PLATELET # BLD AUTO: 131 10*3/MM3 (ref 140–450)
PMV BLD AUTO: 8.8 FL (ref 6–12)
POTASSIUM SERPL-SCNC: 4.2 MMOL/L (ref 3.5–4.7)
PROT SERPL-MCNC: 6 G/DL (ref 6.3–8)
PROT UR QL STRIP: NEGATIVE
RBC # BLD AUTO: 3.79 10*6/MM3 (ref 4.14–5.8)
RBC # UR STRIP: NORMAL /HPF
REF LAB TEST METHOD: NORMAL
SODIUM SERPL-SCNC: 136 MMOL/L (ref 134–145)
SP GR UR STRIP: 1.01 (ref 1–1.03)
SQUAMOUS #/AREA URNS HPF: NORMAL /HPF
UROBILINOGEN UR QL STRIP: ABNORMAL
WBC # UR STRIP: NORMAL /HPF
WBC NRBC COR # BLD: 8.16 10*3/MM3 (ref 3.4–10.8)

## 2023-01-18 PROCEDURE — 88182 CELL MARKER STUDY: CPT

## 2023-01-18 PROCEDURE — 81001 URINALYSIS AUTO W/SCOPE: CPT

## 2023-01-18 PROCEDURE — 80053 COMPREHEN METABOLIC PANEL: CPT

## 2023-01-18 PROCEDURE — 36415 COLL VENOUS BLD VENIPUNCTURE: CPT

## 2023-01-18 PROCEDURE — 88185 FLOWCYTOMETRY/TC ADD-ON: CPT

## 2023-01-18 PROCEDURE — 99215 OFFICE O/P EST HI 40 MIN: CPT | Performed by: INTERNAL MEDICINE

## 2023-01-18 PROCEDURE — 83615 LACTATE (LD) (LDH) ENZYME: CPT

## 2023-01-18 PROCEDURE — 85025 COMPLETE CBC W/AUTO DIFF WBC: CPT

## 2023-01-18 PROCEDURE — 82232 ASSAY OF BETA-2 PROTEIN: CPT | Performed by: INTERNAL MEDICINE

## 2023-01-18 PROCEDURE — 88184 FLOWCYTOMETRY/ TC 1 MARKER: CPT

## 2023-01-18 PROCEDURE — 88377 M/PHMTRC ALYS ISHQUANT/SEMIQ: CPT

## 2023-01-18 RX ORDER — PREDNISONE 10 MG/1
20 TABLET ORAL DAILY
Qty: 7 TABLET | Refills: 0 | Status: SHIPPED | OUTPATIENT
Start: 2023-01-18

## 2023-01-18 NOTE — PROGRESS NOTES
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     HISTORY OF PRESENT ILLNESS:  Patient is a 77 y.o. male with remote history of CLL whom we continue to follow now on an every 4-month basis.    On 01/13/2023 this 77-year-old white male who has history of CLL and is undergoing observation at this time who has been treated in the past with bendamustine and Rituxan. He is extremely sensitive to the effect of Rituxan and returns to the office for evaluation. For the last 3 weeks he has been having persistent suprapubic pain associated with throbbing sensation and discomfort to the point that the pain is there all of the time without coming and going like a colicky problem. Stools have been loose. He has had no obvious alterations in urination but he was seen by his Primary Physician, Jovani Salomon MD, and urinalysis documented some blood in the urine, he was placed on Cipro. He has taken already 10 days worth of it and the symptoms have not improved. The patient has not had any fever or chills but he has lost his appetite completely and he has lost almost 30 pounds of weight in this short period of time. He has not had any cough or shortness of breath. No palpitations. No nausea, vomiting, distention or jaundice. He has not had any swelling in his lower extremities. He feels fatigue and exhausted from abdominal pain, discomfort and lack of appetite.   The patient returned to the office on 01/18/2023 after he has had radiological assessment of his chest, abdomen and pelvis for review today. He has discontinued the ciprofloxacin that he was taking last week that was making him sick and actually he feels better in the stomach with mild improvement in his appetite. He has lesser degree of abdominal pain. He has not had any frequency to urinate or hematuria at least microscopic. He has had proper bowel activity with no distention. He has no fever or chills but he feels very  fatigued and tired.     We have reviewed his CT scan today, please review below.     ·       Past Medical History:   Diagnosis Date   • Arthritis    • Benign prostatic hyperplasia     FOLLOWED BY DR. VIVIEN PATEL   • Biliary dyskinesia    • Bunion of right foot     GREAT TOE   • CLL (chronic lymphocytic leukemia) (HCC) 2016    FOLLOWED BY DR WALKER   • Colon polyps     FOLLOWED BY DR. NEHA HAM   • Constipation    • Degeneration of lumbar intervertebral disc    • Diverticulosis    • Erectile dysfunction    • Fracture of ankle 1975   • GERD (gastroesophageal reflux disease)    • Hallux valgus of left foot    • Hyperlipidemia     MIXED   • Hypertension    • Inguinal hernia    • Kidney stone 02/21/2009    SEEN AT [facility] ER   • Knee swelling 2018   • Localized, primary osteoarthritis    • Lumbar radiculopathy    • Lumbar stenosis    • Lung mass     LEFT SIDE - SCAR TISSUE PER PATIENT    • Osteoarthritis of right knee    • Polyneuropathy    • Prostate CA (HCC) 03/2016    JACQUELYN 6, S/P XRT, FOLLOWED BY DR. VIVIEN PATEL, RADIATION SEEDS   • PVC (premature ventricular contraction)     PT STATES WORKED UP YEARS AGO BY [facility] FOR POSSIBLE MURMUR - NO FOLLOW UP REQUIRED    • RBBB    • Sensory hearing loss, bilateral    • Skin cancer     SQUAMOUS CELL CARCINOMA OF FACE   • Substernal goiter    • Thyromegaly 12/2016    ADMITTED TO [facility]   • Vitamin D deficiency      Past Surgical History:   Procedure Laterality Date   • BRONCHOSCOPY N/A 12/14/2016    Procedure: BRONCHOSCOPY WITH  BAL AND BIOPSY AND ENDOBRONCHIAL ULTRASOUND  AND NAVIGATION WITH BRUSHINGS AND BIOPSY AND BAL;  Surgeon: Pierre Manning MD;  Location: [facility] ENDOSCOPY;  Service:    • COLONOSCOPY N/A 03/13/2019    5 MM SESSILE TUBULAR ADENOMA POLYP IN TRANSVERSE, MULTIPLE SMALL AND LARGE DIVERTICULA IN SIGMOID, RESCOPE IN 5 YRS, DR. NEHA HAM AT [facility]   • COLONOSCOPY W/ POLYPECTOMY N/A 03/19/2015    3 TUBULAR ADENOMA POLYPS, 12 TUBULOVILLOUS POLYP  RECTO-SIGMOID, DIVERTICULOSIS, RESCOPE IN 3 YRS, DR. NEHA HAM AT [facility]   • EYE SURGERY Bilateral     CATARACT EXTRACTION WITH LENS IMPLANT, DR. GOVEA   • FINGER NAIL SURGERY Right 2022    [facility]EN[facility]   • INGUINAL HERNIA REPAIR Left 11/02/2021    Procedure: LEFT OPEN INGUINAL HERNIA REPAIR;  Surgeon: Nixon Kolb MD;  Location: [facility];  Service: General;  Laterality: Left;   • PROSTATE RADIOACTIVE SEED IMPLANT N/A 09/06/2016    DR. HOA JARAMILLO AT [facility]   • PROSTATE SURGERY N/A 03/11/2016    BIOPSY: ADENOCARCINOMA, DR. ZAID IBARRA   • THYROID BIOPSY Bilateral 11/01/2017    NO B CELL OR T CELL LYMPHOMA, DONE AT [facility]   • TOTAL KNEE ARTHROPLASTY Right 10/12/2022    Procedure: TOTAL KNEE ARTHROPLASTY;  Surgeon: Ran Rachel MD;  Location: [facility];  Service: Orthopedics;  Laterality: Right;     Social History     Socioeconomic History   • Marital status:      Spouse name: No   • Years of education: College   Tobacco Use   • Smoking status: Never   • Smokeless tobacco: Never   Substance and Sexual Activity   • Alcohol use: No   • Drug use: Never   • Sexual activity: Yes     Partners: Female     Birth control/protection: None     Family History   Problem Relation Age of Onset   • Heart disease Father         Rheumatic heart disease   • Hypertension Father    • Osteoporosis Father    • Stroke Mother    • Osteoporosis Mother    • No Known Problems Daughter    • Malig Hyperthermia Neg Hx      Current Outpatient Medications on File Prior to Visit   Medication Sig   • alfuzosin (UROXATRAL) 10 MG 24 hr tablet Take 20 mg by mouth Every Night.   • ammonium lactate (LAC-HYDRIN) 12 % lotion Every 12 (Twelve) Hours.   • aspirin 81 MG EC tablet Take 1 tablet by mouth twice daily x 14 days; then take 1 tablet by mouth daily x 28 days   • ciprofloxacin (CIPRO) 500 MG tablet Take 500 mg by mouth 2 (Two) Times a Day.   • losartan-hydrochlorothiazide (HYZAAR) 100-25 MG per tablet Take 1  "tablet by mouth Daily.   • Melatonin 10 MG tablet Take 10 mg by mouth Every Night.   • ondansetron (Zofran) 4 MG tablet Take 1 tablet by mouth Every 8 (Eight) Hours As Needed for Nausea or Vomiting for up to 10 doses.   • pantoprazole (PROTONIX) 40 MG EC tablet Take 40 mg by mouth Daily.   • polyethylene glycol (MIRALAX) 17 GM/SCOOP powder Miralax 17 gram/dose oral powder   1 scoop in the morning then as needed   • potassium chloride (K-DUR,KLOR-CON) 20 MEQ CR tablet Take 1 tablet by mouth Daily. (Patient taking differently: Take 20 mEq by mouth Daily. HOLD FOR ONE WEEK PRIOR TO SURGERY)   • pravastatin (PRAVACHOL) 20 MG tablet Take 20 mg by mouth Every Night.   • triamcinolone (KENALOG) 0.025 % cream      Current Facility-Administered Medications on File Prior to Visit   Medication   • Chlorhexidine Gluconate Cloth 2 % pads   No Known Allergies              VITAL SIGNS:  Vitals:    01/18/23 1317   BP: 132/76   Pulse: 91   Resp: 18   Temp: 98 °F (36.7 °C)   TempSrc: Temporal   SpO2: 96%   Weight: 101 kg (221 lb 14.4 oz)   Height: 195.6 cm (77.01\")   PainSc: 0-No pain          PHYSICAL EXAMINATION:               GENERAL:  Well-developed, Patient  in no acute distress.   SKIN:  Warm, dry ,NO purpura ,no rash.  HEENT:  Pupils were equal and reactive to light and accomodation, conjunctivae noninjected,  normal visual acuity.   NECK:  Supple with good range of motion; no thyromegaly , no JVD or bruits,.No carotid artery pain, no carotid abnormal pulsation   LYMPHATICS:  No cervical, NO supraclavicular, NO axillary, NO inguinal adenopathies.  CARDIAC   normal rate , regular rhythm, without murmur,NO rubs NO S3 NO S4   LUNGS: normal breath sounds bilateral, no wheezing, NO rhonchi, NO crackles ,NO rubs.  VASCULAR VENOUS: no cyanosis, NO collateral circulation, NO varicosities, NO edema, NO palpable cords, NO pain,NO erythema, NO pigmentation of the skin.  ABDOMEN:  Soft, NO pain,no hepatomegaly, 6 cm blcm splenomegaly,no " masses, no ascites, no collateral circulation,no distention.  EXTREMITIES  AND SPINE:  No clubbing, no cyanosis ,no deformities , no pain .No kyphosis,  no pain in spine, no pain in ribs , no pain in pelvic bone.  NEUROLOGICAL:  Patient was awake, alert, oriented to time, person and place.      LABORATORY DATA:  Results from last 7 days   Lab Units 01/18/23  1312 01/13/23  1249   WBC 10*3/mm3 8.16 8.57   NEUTROS ABS 10*3/mm3 3.69 3.96   HEMOGLOBIN g/dL 10.7* 12.0*   HEMATOCRIT % 32.5* 35.9*   PLATELETS 10*3/mm3 131* 138*                  ASSESSMENT:   1.  History of CLL  · Status posttreatment with Bendamustine/Rituxan in 2016  · Patient did have severe reaction to initial Rituxan dose requiring hospitalization or completion.  Did well thereafter.  · 12/30/2022 WBC remains normal at 10.6, 31% lymphs.  Platelets normal at 105,000.  No B symptoms or adenopathy.  No suggestion of recurrent disease.  Hemoglobin has acutely dropped however down to 11.9 (see further discussion below).  On 01/13/2023 his white count, hemoglobin and platelets are not changing. His total lymphocyte count is very minimal and I do believe that his leukemia remains very quiet on clinical grounds with no need for intervention.  On 01/18/2023 I discussed with the patient the fact that his radiological assessment of the abdomen shows periaortic and pericaval lymphadenopathy. Most of the lymph nodes are between 2-3 cm in size but most dramatically his spleen has enlarged very substantially being huge and almost as big as his liver or bigger. He has no obvious alteration in the liver. His stomach, small intestine, pancreas, kidneys disclose no new abnormalities. There is no evidence of diverticulitis or bowel obstruction. There is no notion of hernias. There is no other notion of masses or ascites. He has multiple cysts in the liver no different than before that have been present for more than 15 years.     The patient's hemoglobin has dropped to  10.7, the platelet count has dropped to 131,000 and I do believe that this is representation of splenomegaly sequestration and CLL activity very substantially. Today we have a beta-2 microglobulin, LDH and uric acid pending along with a chemistry profile. A urinalysis today disclosed no evidence of infection and he has minimal microscopic hematuria with no significance. There is no proteinuria. There is no leukocyturia.     Given the above findings I discussed with the patient the fact that I need to rule out the possibility of Jon's transformation of his CLL and for this reason I have advised him to proceed with a PET scan in a few hours. Hopefully this will not be the case.     If the lymph nodes or spleen are extremely hot he will probably require a biopsy. If they have a light degree or moderate degree of SUV activity we will assume that leukemia is the reason and we will proceed with therapy with Calquence. I discussed with him the methodology of using this medicine taking it twice a day with side effects including atrial fibrillation and abnormal bleeding. He is no longer taking any PPI therefore there should not be a need for this medicine at that point. He will have formal education and consent for Calquence and the medicine will be delivered to his home in [place].     Once that we review the PET scan this will be discussed with him on the telephone. I am planning for him to remain on Calquence at least for the next 3-6 months and see how things evolve and planning to repeat radiological assessment in 3 months. I made him aware that sometimes people taking Calquence can have a rise in the blood count for several weeks after initiation of the medicine that eventually settles down and improves.     I also discussed with him that if the Calquence does not help the process we still have alternatives giving him Gazyva for example or Rituxan.     The patient is no longer requiring blood pressure medication  and I advised him to hold off on this until we see how things evolve.     In order to improve his appetite and help him to control the leukemia up to a certain point the patient will take 20 mg of prednisone everyday for the next 7 days, take 1 tablet in the morning and then discontinue. With that kind of dosing he will not need to have any taper.       ·   2.  History of prostate cancer  · Treated at the [facility]  · Most recent PSA April 2022 = 0.203  On 01/13/2023 he has minimal urinary symptomatology comparing his PSA with 3 years ago was 0.8 today, actually a few days ago was 0.1. I doubt that all of the symptoms that he has are associated with prostate cancer. The blood in the urine has a different significance.   On 01/18/2023 no issues pertinent to his prostate cancer. I see the seeds in his prostatic bed on the CT scan. His urinalysis is very much clean and his PSA has remained stable. No activity of this entity at this point.       ·   3.  Acute cystitis  · Patient reports symptoms for at least 1 month of lower abdominal pain/pressure.  He did not see any obvious blood in the urine.  · Patient just yesterday, 12/29/2022, seen by Dr. Jovani Salomon, PCP who did urine dipstick showing positive protein, positive blood.  Patient started on amoxicillin.  On 01/13/2023 given the symptoms that he has it does not sound like cystitis. It was believed that that is the case. He has taken 10 days of Cipro. This medicine has taken away his appetite, he has lost 30 pounds of weight and I doubt that he has those symptoms of cystitis. The pain is deep in the pelvis, raises the question about the nature of this and even though he has no rebound in the abdomen neither distention I do believe that he needs to have a STAT CT scan of the abdomen and pelvis to be sure that there is not going to be diverticulitis or some other issue that is producing this problem.   On 01/18/2023 I see no evidence of acute cystitis at this  time neither clinically or by urinalysis or radiologically. He is no longer taking ciprofloxacin.      ·   4.  New anemia  · 12/30/2022 hemoglobin today 11.9.  This is a significant drop from 15.6 when seen 4 months ago.  · ?  Related to cystitis/hematuria  · We will check additional lab work today including ferritin, iron profile, B12, folate, and PSA level in light of his history of prostate cancer.  On 01/13/2023 the hemoglobin remains at 12, he had a B12 level of 265 receiving a B12 injection 3 weeks ago and a reticulated hemoglobin of 31 today. Normal platelet count. His recent ferritin and iron profile were acceptable. He has had a colonoscopy that was perfectly normal in 2019 by Hieu Lester MD.   On 01/18/2023 I think the anemia and the thrombocytopenia is a reflection of splenomegaly, sequestration and activity of the CLL. I expect these numbers will improve once that we start to counteract the leukemia with Calquence. He will require repeat CBC, CMP, LDH, uric acid in 3 weeks.

## 2023-01-19 ENCOUNTER — DOCUMENTATION (OUTPATIENT)
Dept: PHARMACY | Facility: HOSPITAL | Age: 78
End: 2023-01-19
Payer: MEDICARE

## 2023-01-19 ENCOUNTER — SPECIALTY PHARMACY (OUTPATIENT)
Dept: PHARMACY | Facility: HOSPITAL | Age: 78
End: 2023-01-19
Payer: MEDICARE

## 2023-01-19 DIAGNOSIS — C91.10 CHRONIC LYMPHOCYTIC LEUKEMIA: Primary | ICD-10-CM

## 2023-01-19 NOTE — PROGRESS NOTES
Calquence #120/30 days approved from 1/19/2023 to until further notice-[employer]    Test claim through [facility] returned a very high copay of $4206.46. Assistance will be investigated.       Arminda Estrada  Specialty Pharmacy Technician

## 2023-01-19 NOTE — PROGRESS NOTES
Staff message rec from Dr Schroeder-Pt will need Calquence 200 mg twice per day. The dose has been clarified.    I have submitted the PA to [employer] through [employer]. I have attached the study that was provided to me regarding the 200 mg twice per day dosing for CLL. Currently waiting for a decision.    Birgit Canales, [facility] sent to Anneliese Torres RN; Arminda Estrada         Previous Messages       ----- Message -----   From: Gerry Schroeder MD   Sent: 1/19/2023   9:21 AM EST   To: Birgit Canales [facility]     yes   ----- Message -----   From: Birgit Canales RPH   Sent: 1/18/2023   2:33 PM EST   To: Gerry Schroeder MD, Arminda Estrada, *     Hi Dr. Schroeder,     I just want to confirm that you want 200 mg twice daily and not the standard 100 mg twice daily?     Just let us know, we might run into some insurance trouble.     Thank you,   Birgit   ----- Message -----   From: Arminda Estrada   Sent: 1/18/2023   2:16 PM EST   To: Gerry Schroeder MD, Anneliese Torres, ALEIDA, *     Thank you! I will work on him for delivery ASAP     ----- Message -----   From: Gerry Schroeder MD   Sent: 1/18/2023   2:07 PM EST   To: Arminda Estrada, Anneliese Torres, RN     He needs CALQUENCE 200 mg bid ASAP      Arminda Estrada  Specialty Pharmacy Technician

## 2023-01-20 ENCOUNTER — APPOINTMENT (OUTPATIENT)
Dept: LAB | Facility: HOSPITAL | Age: 78
End: 2023-01-20
Payer: MEDICARE

## 2023-01-20 ENCOUNTER — SPECIALTY PHARMACY (OUTPATIENT)
Dept: ONCOLOGY | Facility: HOSPITAL | Age: 78
End: 2023-01-20
Payer: MEDICARE

## 2023-01-20 ENCOUNTER — OFFICE VISIT (OUTPATIENT)
Dept: ONCOLOGY | Facility: CLINIC | Age: 78
End: 2023-01-20
Payer: MEDICARE

## 2023-01-20 VITALS
OXYGEN SATURATION: 95 % | WEIGHT: 222 LBS | DIASTOLIC BLOOD PRESSURE: 72 MMHG | TEMPERATURE: 98.2 F | BODY MASS INDEX: 26.21 KG/M2 | SYSTOLIC BLOOD PRESSURE: 145 MMHG | HEART RATE: 80 BPM | RESPIRATION RATE: 18 BRPM | HEIGHT: 77 IN

## 2023-01-20 VITALS — BODY MASS INDEX: 26.32 KG/M2 | TEMPERATURE: 98.2 F | WEIGHT: 222 LBS

## 2023-01-20 DIAGNOSIS — C91.10 CHRONIC LYMPHOCYTIC LEUKEMIA: Primary | ICD-10-CM

## 2023-01-20 PROCEDURE — 99212 OFFICE O/P EST SF 10 MIN: CPT | Performed by: NURSE PRACTITIONER

## 2023-01-20 RX ORDER — ONDANSETRON HYDROCHLORIDE 8 MG/1
8 TABLET, FILM COATED ORAL 3 TIMES DAILY PRN
Qty: 30 TABLET | Refills: 5 | Status: SHIPPED | OUTPATIENT
Start: 2023-01-20

## 2023-01-20 NOTE — PROGRESS NOTES
[facility] Hematology/Oncology Treatment Plan Summary    Name: Jeff Bass  [facility]# [id number]  MD: Dr. Schroeder    Diagnosis: CLL     Goal of chemotherapy: disease control    Treatment Medication(s) / Frequency and Dosing    1. Calquence 200 mg by mouth twice daily    Number of cycles: until disease progression or unacceptable toxicity    Starting on: medication receipt from [facility]     Follow-up Testing to be determined after TBD cycles by MD.     Items for home use: Imodium AD (for diarrhea)    Rx written for: [x] Nausea    [] Pre-Chemo   ondansetron 8 mg by mouth every 8 hours as needed for nausea   In basket message sent to Dr Schroeder to clarify if acyclovir or Bactrim is needed      Completing Pharmacist: Madeleine Whitfield, Pharmacy Intern             Date/time: 01/20/2023 13:49 EST    Please note: You will be seen by a provider frequently with your treatment plan. This plan may change depending on many factors, if so, this will be discussed with you by your physician.  Last update 12/2020.       Counseling Provided:  - Side effects reviewed with patient including: decreased WBC/increased risk for infection , decreased platelets/increased risk for bleed, decreased hemoglobin, diarrhea, headache and muscle/joint pain. Additional side effects covered in written handout.   - Reviewed proper administration: Discussed if the patient is handling their own medications, then they need to wash their hands properly after touching the medication. If a caregiver is assisting with handling medication, need to wear disposal gloves and wash hands properly afterwards. Patient was counseled to take Calquence with or without food, at the same time each day. Additional precautions: avoid grapefruit and grapefruit juice  - Provided information on prior storage of medication: Store medication safe away from children and pets, away from light, and at room temperature. If you use a pill box, use a separate pill box from  other medication.   - Provided information on safe handling of soiled linen and proper flush technique and reviewed proper disposal of medication.   - Reviewed what to do in the event of a missed dose: Do not take an extra dose or two doses at one time. Simply take your next dose at the regularly scheduled time.  - Reviewed expected goals/outcomes, contraindications and safety precautions, including when to seek medical care.  - Provided information on pregnancy considerations - women should not become pregnant and men should not get a partner pregnant while taking; men and women of childbearing age and potential should use effective contraception during and after therapy.    Provided patient with:   Education sheets about the medication, 24-hour clinic phone number and my contact information and instructions to call should additional questions arise.     Completed medication reconciliation today to assess for drug interactions.   Reviewed concomitant medications, allergies, labs, comorbidities/medical history, and immunization history.   Drug-drug interactions noted: Category X interaction with protonix (patient not currently taking)  Advised pt to call the clinic if any new medications are started so we can assess for drug-drug interactions     Wrap-up:  Discussed aforementioned material with patient in person, face-to-face, in clinic.   Chemo consents/CCA were signed at today's visit.   Medication availability: patient is aware [facility]  pharmacy will be contacting them to arrange delivery to [facility] office  Patient expressed understanding.   Patient demonstrates ability to self-administer medication. No barriers to adherence identified.  All questions and concerns addressed.     EKG in 4/22 QTc= 444 ms    Patient has asked if he can have a dental crown placed as well as wisdom teeth extractions- in basket message sent to Dr Schroeder.     Madeleine Whitfield, Pharmacy Intern  1/20/2023  13:49 EST

## 2023-01-20 NOTE — PROGRESS NOTES
TREATMENT  PREPARATION    Jeff Bass  [id number]  1945    Chief Complaint: Treatment preparation and needs assessment    History of present illness:  Jeff Bass is a 77 y.o. year old male who is here today for treatment preparation and needs assessment.  The patient has been diagnosed with   Encounter Diagnosis   Name Primary?   • Chronic lymphocytic leukemia (HCC) Yes    and is scheduled to begin treatment with:     Oncology History:    Oncology/Hematology History   Chronic lymphocytic leukemia (HCC)   1/31/2016 Initial Diagnosis    Chronic lymphocytic leukemia (CMS/HCC)     5/28/2020 - 6/11/2020 Chemotherapy    OP LYMPHOMA (CLL) RiTUXimab (Weekly X 4)     1/19/2023 -  Chemotherapy    OP CLL Acalabrutinib          The current medication list and allergy list were reviewed and reconciled.     Past Medical History, Past Surgical History, Social History, Family History have been reviewed and are without significant changes except as mentioned.    Physical Exam:    Vitals:    01/20/23 1303   BP: 145/72   Pulse: 80   Resp: 18   Temp: 98.2 °F (36.8 °C)   SpO2: 95%     Vitals:    01/20/23 1303   PainSc: 0-No pain        ECOG score: 0             Physical Exam      NEEDS ASSESSMENTS    Genetics  The patient's new diagnosis and family history have been reviewed for genetic counseling needs. A genetic referral is not recommended.     Psychosocial and Barriers to care  The patient has completed a PHQ-9 Depression Screening and the Distress Thermometer (DT) today.  PHQ-9 results show PHQ-2 Total Score: 0 PHQ-9 Total Score: PHQ-9 Total Score: 0     The patient scored their distress today as Distress Level: 5 on a scale of 0-10 with 0 being no distress and 10 being extreme distress. Problems marked by the patient as being an issue for them within the last week include   .      Results were reviewed along with psychosocial resources offered by our cancer center.  Our Supportive Oncology team will be flagged  for a score of 4 or above, and a same day call will be made for a score of 9 or 10.  A mental health referral is offered at that time. Patients who score less than 4 have been educated on our support services and can be referred to our  upon request.  The patient will be referred to our .       Nutrition  The patient has completed the malnutrition screening today. They scored Malnutrition Screening Tool  Have you recently lost weight without trying?  If yes, how much weight have you lost?: 0--> No  Have you been eating poorly because of a decreased appetite?: 1--> Yes  MST score: 1   with a score of 0-1 meaning not at risk in a score of 2 or greater meaning at risk.  Patients with a score of 3 or higher will be referred to our oncology dietitian for support. Patients beginning at risk treatment regimens or who have dietary concerns will also be referred to our oncology dietitian. The patient will be referred.    Functional Assessment  Persons who are age 70 or greater will be screened for qualification of a comprehensive geriatric assessment by our survivorship nurse practitioner.  Older adults with cancer face unique challenges. These may include an increased risk of drug reactions, financial burdens, and caregiver stress. The patient scored G8 Questionnaire  Has food intake declined over the past 3 months due to loss of appetite, digestive problems, chewing or swallowing difficulties?: Severe decrease in food intake  Weight loss during the last 3 months: Weight loss > 3 kg / 6/6 lbs  Mobility: Goes out  Neuropsychological Problems: No psychological problems  Body Mass Index (BMI (weight in kg) / (height in m2)): BMI 23 and > 23  Takes more than 3 medications a day: Yes, takes more than 3 prescription drugs per day  In comparison with other people of the same age, how does the patient consider his/her health status?: Not as good  Age: < 80  Total Score: 9 . Patients scoring 14 or lower  "will referred for an older adult functional assessment with the survivorship advanced practice registered nurse to ensure all needed support is provided as patients plan for their treatments. The patient will be referred.    Intravenous Access Assessment  The patient and I discussed planned intravenous chemo/biotherapy as well as other IV treatments that are often needed throughout the course of treatment. These may include, but are not limited to blood transfusions, antibiotics, and IV hydration. Discussed that depending on selected treatment and vein assessment, patient may require venous access device (VAD) which could include but not limited to a Mediport or PICC line. Risks and benefits of VADs reviewed. The patient will be treated via PIV.    Reproductive/Sexual Activity   People should avoid becoming pregnant and should not get a partner pregnant while undergoing chemo/biotherapy.  People of childbearing age should use effective contraception during active therapy. The best recommendation for all people is to use a barrier method for a minimum of 1 week after the last infusion of chemo/biotherapy to prevent your partner being exposed to byproducts from treatment medications in bodily fluids. Effective contraception should be discussed with your oncology team to make sure it is safe to take based on your diagnosis. Possible options include oral contraceptives, barrier methods. Chemo/biotherapy can change your ability to reproduce children in the future.  There are options for fertility preservation. NOT APPLICABLE    Advanced Care Planning  Advance Care Planning   The patient and I discussed advanced care planning, \"Conversations that Matter\".   This service is offered for development of advance directives with a certified ACP facilitator.  The patient does have an up-to-date advanced directive. This document is not on file with our office. The patient is not interested in an appointment with one of our " facilitators to create or update their advanced directives.     Smoking cessation  Tobacco Use: Low Risk    • Smoking Tobacco Use: Never   • Smokeless Tobacco Use: Never   • Passive Exposure: Not on file       Patient and I discussed their tobacco use history. Referral will not be made for smoking cessation.      Palliative Care  When appropriate, the patient and I discussed the availability palliative care services and when appropriate Hospice care. Palliative care is not the same as Hospice care which was explained to the patient.  The patient is not interested in additional information from our  on these services.     Survivorship   When appropriate, we discussed that we will refer the patient to survivorship clinic to discuss next steps following completion of planned treatment.  Reviewed this visit will include assessment of your physical, psychological, functional, and spiritual needs as a survivor and the need at attend this visit when scheduled.    TREATMENT EDUCATION    Today [facility] met with the patient to discuss the chemo/biotherapy regimen recommended for treatment of Chronic lymphocytic leukemia (HCC)  .  The patient was given explanation of treatment premed side effects including office policy that prohibits patients to drive if sedating medications are administered, MD explanation given regarding benefits, side effects, toxicities and goals of treatment.  The patient received a Chemotherapy/Biotherapy Plan Summary including diagnosis and explanation of specific treatment plan.      Assessment and Plan:    Diagnoses and all orders for this visit:    1. Chronic lymphocytic leukemia (HCC) (Primary)      No orders of the defined types were placed in this encounter.        1. The patient and I have reviewed their diagnosis and scheduled treatment plan. Needs assessment was completed where applicable including genetics, psychosocial needs, barriers to care, VAD evaluation, advanced care  planning, survivorship, and palliative care services where indicated. Referrals have been ordered as appropriate based upon evaluation today and patient desires.   2. Chemo/biotherapy teaching was completed today and consent obtained. See separate documentation for further details.  3. Adequate time was given to answer questions.  Patient made aware of their care team members and contact information if they have questions or problems throughout the treatment course.  4. Discussion held and written information provided describing frequency of office visits and ongoing monitoring throughout the treatment plan.     5. Reviewed with patient any prescribed medication sent to pharmacy.  Education provided regarding proper storage, safe handling, and proper disposal of unused medication.  6. Proper handling of body fluids and waste discussed and written information provided.  7. If appropriate, patient had pretreatment labs drawn today.    Learning assessment completed at initial patient encounter. See separate flowsheet. Chemo/biotherapy education comprehension assessed at today's visit.    I spent 15 minutes caring for Jeff on this date of service. This time includes time spent by me in the following activities: preparing for the visit, reviewing tests, obtaining and/or reviewing a separately obtained history, counseling and educating the patient/family/caregiver and documenting information in the medical record.     Lidia Guillory, APRN   01/23/23

## 2023-01-23 ENCOUNTER — SPECIALTY PHARMACY (OUTPATIENT)
Dept: PHARMACY | Facility: HOSPITAL | Age: 78
End: 2023-01-23
Payer: MEDICARE

## 2023-01-23 LAB — REF LAB TEST METHOD: NORMAL

## 2023-01-23 RX ORDER — ACYCLOVIR 400 MG/1
400 TABLET ORAL 2 TIMES DAILY
Qty: 60 TABLET | Refills: 5 | Status: SHIPPED | OUTPATIENT
Start: 2023-01-23

## 2023-01-23 RX ORDER — SULFAMETHOXAZOLE AND TRIMETHOPRIM 800; 160 MG/1; MG/1
1 TABLET ORAL 3 TIMES WEEKLY
Qty: 12 TABLET | Refills: 5 | Status: SHIPPED | OUTPATIENT
Start: 2023-01-23

## 2023-01-23 NOTE — PROGRESS NOTES
MTM Encounter    Called Mr. Bass to update him that Dr. Schroeder is okay with patient proceeding with dental work during Calquence therapy (crown placement and wisdom teeth removal).  Dr. Schroeder also wants to start acyclovir 400 mg by mouth twice daily and Bactrim DS 1 tab M/W/F for prophylaxis.  Patient is aware and expressed understanding.  He asked if he was able to take Balance of Nature and Super Beets supplement while on Calquence.  Spoke with the pharmacist and agreed these supplements were okay to continue.  Patient has no other questions or concerns at this time.    Madeleine Whitfield, Pharmacy Intern  1/23/23

## 2023-01-23 NOTE — PROGRESS NOTES
Specialty Note    From: Gerry Schroeder MD   Sent: 1/23/2023  11:14 AM EST   To: Madeleine Whitfield, Pharmacy Intern     yes   ----- Message -----   From: Madeleine Whitfield, Pharmacy Intern   Sent: 1/20/2023   8:59 AM EST   To: Gerry Schroeder MD, Kassy Conway, [facility], *     Good morning Dr. Schroeder,     We are planning on doing pharmacy education today for Mr. Bass regarding Calquence therapy.  I wanted to reach out and see if you wanted the patient on acyclovir for viral prophylaxis and/or bactrim for PJP prophylaxis.       Doses would be acyclovir 400 mg by mouth BID, and Bactrim DS 1 tab by mouth Mon/Wed/Fri.       Thank you for your help!     Madeleine

## 2023-01-24 ENCOUNTER — SPECIALTY PHARMACY (OUTPATIENT)
Dept: PHARMACY | Facility: HOSPITAL | Age: 78
End: 2023-01-24
Payer: MEDICARE

## 2023-01-24 NOTE — PROGRESS NOTES
Specialty Note ( Calquence)    Shipment has arrived from [facility] and [facility]  Mr Bass has been notified that he can pick this medication up and he stated he plans to come tomorrow before 1100.

## 2023-01-25 ENCOUNTER — SPECIALTY PHARMACY (OUTPATIENT)
Dept: PHARMACY | Facility: HOSPITAL | Age: 78
End: 2023-01-25
Payer: MEDICARE

## 2023-01-25 ENCOUNTER — HOSPITAL ENCOUNTER (OUTPATIENT)
Dept: PET IMAGING | Facility: HOSPITAL | Age: 78
Discharge: HOME | End: 2023-01-25
Payer: MEDICARE

## 2023-01-25 DIAGNOSIS — C91.10 CHRONIC LYMPHOCYTIC LEUKEMIA: Primary | ICD-10-CM

## 2023-01-25 DIAGNOSIS — C91.10 CHRONIC LYMPHOCYTIC LEUKEMIA: ICD-10-CM

## 2023-01-25 DIAGNOSIS — E53.8 B12 DEFICIENCY: ICD-10-CM

## 2023-01-25 LAB — GLUCOSE BLDC GLUCOMTR-MCNC: 100 MG/DL (ref 70–130)

## 2023-01-25 PROCEDURE — A9552 F18 FDG: HCPCS | Performed by: INTERNAL MEDICINE

## 2023-01-25 PROCEDURE — 78815 PET IMAGE W/CT SKULL-THIGH: CPT

## 2023-01-25 PROCEDURE — 82962 GLUCOSE BLOOD TEST: CPT

## 2023-01-25 PROCEDURE — 0 FLUDEOXYGLUCOSE F18 SOLUTION: Performed by: INTERNAL MEDICINE

## 2023-01-25 RX ADMIN — FLUDEOXYGLUCOSE F18 1 DOSE: 300 INJECTION INTRAVENOUS at 10:56

## 2023-01-25 NOTE — PROGRESS NOTES
MTM Encounter    Patient inquired about drug interaction between Calquence and pantoprazole after receiving Calquence capsules.  Due to Category X interaction, pharmacy recommended the patient take famotidine for GI upset instead of pantoprazole.  Instructed the patient to take famotidine 2 hours after Calquence to avoid the potential of an interaction.  Patient verbalized understanding and has no other questions or concerns at this time.    Madeleine Whitfield, Pharmacy Intern  1/25/23

## 2023-01-27 ENCOUNTER — TELEPHONE (OUTPATIENT)
Dept: ONCOLOGY | Facility: CLINIC | Age: 78
End: 2023-01-27
Payer: MEDICARE

## 2023-01-27 ENCOUNTER — APPOINTMENT (OUTPATIENT)
Dept: LAB | Facility: HOSPITAL | Age: 78
End: 2023-01-27
Payer: MEDICARE

## 2023-01-27 NOTE — TELEPHONE ENCOUNTER
Called patient and relayed message regarding Pepcid and Calquence as well as information from PET. Dr. Schroeder was looking for [employer] which he did not find. Pt v/u. Anneliese Torres RN

## 2023-01-30 ENCOUNTER — TELEPHONE (OUTPATIENT)
Dept: ONCOLOGY | Facility: CLINIC | Age: 78
End: 2023-01-30
Payer: MEDICARE

## 2023-01-30 NOTE — TELEPHONE ENCOUNTER
Returned call to patient who is reporting that since he started on the Calquence, over the weekend he experienced headaches. The headaches did not resolve till 4 am this morning.  He feels ok at this time.  He has not other symptoms.  No changes in vision, no weakness, no nausea or vomiting and no fevers.  He would like to be seen to discuss this.  He has an appointment in the am for lab work and I will send a message to  to discuss.  If he gets worse or develops other symptoms, he is to go to the ER.  He v/u.

## 2023-01-31 ENCOUNTER — APPOINTMENT (OUTPATIENT)
Dept: LAB | Facility: HOSPITAL | Age: 78
End: 2023-01-31
Payer: MEDICARE

## 2023-01-31 ENCOUNTER — SPECIALTY PHARMACY (OUTPATIENT)
Dept: PHARMACY | Facility: HOSPITAL | Age: 78
End: 2023-01-31
Payer: MEDICARE

## 2023-01-31 ENCOUNTER — DOCUMENTATION (OUTPATIENT)
Dept: PHARMACY | Facility: HOSPITAL | Age: 78
End: 2023-01-31
Payer: MEDICARE

## 2023-01-31 NOTE — PROGRESS NOTES
Specialty Pharmacy Refill Coordination Note     Jeff is a 77 y.o. male contacted today regarding refills of Calquence specialty medication(s).    Reviewed and verified with patient:       Specialty medication(s) and dose(s) confirmed: yes  Calquence 100 mg caps-2 caps twice per day      Delivery Questions    Flowsheet Row Most Recent Value   Delivery method Other (Comment)  [[facility]eline to [facility] 2/6/23-$0 copay with Free Voucher (15 day supply)]   Delivery address correct? Yes  [[facility] office]   Delivery phone number [phone]   Preferred delivery time? AM   Number of medications in delivery 1   Medication being filled and delivered Calquence   Doses left of specialty medications 7 days   Is there any medication that is due not being filled? No   Supplies needed? No supplies needed   Cooler needed? No   Do any medications need mixed or dated? No   Copay form of payment Payment plan already set up   Additional comments $0 cpay with Free Voucher   Questions or concerns for the pharmacist? No   Explain any questions or concerns for the pharmacist N/A   Are any medications first time fills? No  [Converted rx]        Calquence delivery coordinated to the [facility] office on 2/6/23 for  2/7 or 2/8. $0 copay this fill with the Voucher but it is only a 15 day supply for pt due to higher dose.      Follow-up: 15 day(s)     Arminda Estrada  Specialty Pharmacy Technician

## 2023-01-31 NOTE — PROGRESS NOTES
I have obtained a Calquence voucher for pt. This will give him an additional 15 day supply of Calquence. The voucher will only cover a Qty of 60.    I will have this supply shipped to the [facility] office and I will provide to pt when he is here on 2/7 or 2/8.    Arminda Estrada  Specialty Pharmacy Technician

## 2023-02-06 ENCOUNTER — DOCUMENTATION (OUTPATIENT)
Dept: PHARMACY | Facility: HOSPITAL | Age: 78
End: 2023-02-06
Payer: MEDICARE

## 2023-02-06 NOTE — PROGRESS NOTES
I have received Calquence from [facility] and placed it in the [facility].     Merline Brady - Care Coordinator   2/6/2023  13:37 EST

## 2023-02-07 ENCOUNTER — LAB (OUTPATIENT)
Dept: LAB | Facility: HOSPITAL | Age: 78
End: 2023-02-07
Payer: MEDICARE

## 2023-02-07 ENCOUNTER — DOCUMENTATION (OUTPATIENT)
Dept: PHARMACY | Facility: HOSPITAL | Age: 78
End: 2023-02-07
Payer: MEDICARE

## 2023-02-07 DIAGNOSIS — E53.8 B12 DEFICIENCY: ICD-10-CM

## 2023-02-07 DIAGNOSIS — C91.10 CHRONIC LYMPHOCYTIC LEUKEMIA: ICD-10-CM

## 2023-02-07 LAB
ALBUMIN SERPL-MCNC: 4.2 G/DL (ref 3.5–5.2)
ALBUMIN/GLOB SERPL: 2.1 G/DL (ref 1.1–2.4)
ALP SERPL-CCNC: 63 U/L (ref 38–116)
ALT SERPL W P-5'-P-CCNC: 5 U/L (ref 0–41)
ANION GAP SERPL CALCULATED.3IONS-SCNC: 12.5 MMOL/L (ref 5–15)
AST SERPL-CCNC: 10 U/L (ref 0–40)
BASOPHILS # BLD AUTO: 0.07 10*3/MM3 (ref 0–0.2)
BASOPHILS NFR BLD AUTO: 0.9 % (ref 0–1.5)
BILIRUB SERPL-MCNC: 0.8 MG/DL (ref 0.2–1.2)
BUN SERPL-MCNC: 18 MG/DL (ref 6–20)
BUN/CREAT SERPL: 19.1 (ref 7.3–30)
CALCIUM SPEC-SCNC: 9.7 MG/DL (ref 8.5–10.2)
CHLORIDE SERPL-SCNC: 103 MMOL/L (ref 98–107)
CO2 SERPL-SCNC: 24.5 MMOL/L (ref 22–29)
CREAT SERPL-MCNC: 0.94 MG/DL (ref 0.7–1.3)
DEPRECATED RDW RBC AUTO: 50.6 FL (ref 37–54)
EGFRCR SERPLBLD CKD-EPI 2021: 82.5 ML/MIN/1.73
EOSINOPHIL # BLD AUTO: 0.3 10*3/MM3 (ref 0–0.4)
EOSINOPHIL NFR BLD AUTO: 3.9 % (ref 0.3–6.2)
ERYTHROCYTE [DISTWIDTH] IN BLOOD BY AUTOMATED COUNT: 15.8 % (ref 12.3–15.4)
GLOBULIN UR ELPH-MCNC: 2 GM/DL (ref 1.8–3.5)
GLUCOSE SERPL-MCNC: 122 MG/DL (ref 74–124)
HCT VFR BLD AUTO: 33.9 % (ref 37.5–51)
HGB BLD-MCNC: 11 G/DL (ref 13–17.7)
IMM GRANULOCYTES # BLD AUTO: 0.02 10*3/MM3 (ref 0–0.05)
IMM GRANULOCYTES NFR BLD AUTO: 0.3 % (ref 0–0.5)
LDH SERPL-CCNC: 175 U/L (ref 99–259)
LYMPHOCYTES # BLD AUTO: 2.54 10*3/MM3 (ref 0.7–3.1)
LYMPHOCYTES NFR BLD AUTO: 33.3 % (ref 19.6–45.3)
MCH RBC QN AUTO: 28.7 PG (ref 26.6–33)
MCHC RBC AUTO-ENTMCNC: 32.4 G/DL (ref 31.5–35.7)
MCV RBC AUTO: 88.5 FL (ref 79–97)
MONOCYTES # BLD AUTO: 0.51 10*3/MM3 (ref 0.1–0.9)
MONOCYTES NFR BLD AUTO: 6.7 % (ref 5–12)
NEUTROPHILS NFR BLD AUTO: 4.18 10*3/MM3 (ref 1.7–7)
NEUTROPHILS NFR BLD AUTO: 54.9 % (ref 42.7–76)
NRBC BLD AUTO-RTO: 0 /100 WBC (ref 0–0.2)
PLATELET # BLD AUTO: 113 10*3/MM3 (ref 140–450)
PMV BLD AUTO: 9.2 FL (ref 6–12)
POTASSIUM SERPL-SCNC: 4.1 MMOL/L (ref 3.5–4.7)
PROT SERPL-MCNC: 6.2 G/DL (ref 6.3–8)
RBC # BLD AUTO: 3.83 10*6/MM3 (ref 4.14–5.8)
SODIUM SERPL-SCNC: 140 MMOL/L (ref 134–145)
URATE SERPL-MCNC: 6 MG/DL (ref 2.8–7.4)
WBC NRBC COR # BLD: 7.62 10*3/MM3 (ref 3.4–10.8)

## 2023-02-07 PROCEDURE — 84550 ASSAY OF BLOOD/URIC ACID: CPT

## 2023-02-07 PROCEDURE — 36415 COLL VENOUS BLD VENIPUNCTURE: CPT

## 2023-02-07 PROCEDURE — 83615 LACTATE (LD) (LDH) ENZYME: CPT

## 2023-02-07 PROCEDURE — 80053 COMPREHEN METABOLIC PANEL: CPT

## 2023-02-07 PROCEDURE — 85025 COMPLETE CBC W/AUTO DIFF WBC: CPT

## 2023-02-07 NOTE — PROGRESS NOTES
Calquence supply (15 day supply) from [facility] picked up by pt on 2/7/2023.     Arminda Estrada  Specialty Pharmacy Technician

## 2023-02-08 ENCOUNTER — LAB (OUTPATIENT)
Dept: LAB | Facility: HOSPITAL | Age: 78
End: 2023-02-08
Payer: MEDICARE

## 2023-02-08 ENCOUNTER — OFFICE VISIT (OUTPATIENT)
Dept: ONCOLOGY | Facility: CLINIC | Age: 78
End: 2023-02-08
Payer: MEDICARE

## 2023-02-08 VITALS
WEIGHT: 216.7 LBS | OXYGEN SATURATION: 97 % | HEIGHT: 77 IN | SYSTOLIC BLOOD PRESSURE: 115 MMHG | HEART RATE: 89 BPM | TEMPERATURE: 97.1 F | RESPIRATION RATE: 18 BRPM | BODY MASS INDEX: 25.59 KG/M2 | DIASTOLIC BLOOD PRESSURE: 64 MMHG

## 2023-02-08 DIAGNOSIS — Z79.899 HIGH RISK MEDICATION USE: ICD-10-CM

## 2023-02-08 DIAGNOSIS — C91.10 CHRONIC LYMPHOCYTIC LEUKEMIA: Primary | ICD-10-CM

## 2023-02-08 PROCEDURE — 99214 OFFICE O/P EST MOD 30 MIN: CPT | Performed by: INTERNAL MEDICINE

## 2023-02-08 NOTE — PROGRESS NOTES
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     HISTORY OF PRESENT ILLNESS:    On 02/08/2023 this 77-year-old male who has been seen a month ago with abdominal pain, fatigue, weight loss was found to have splenomegaly and retroperitoneal adenopathy in the background of his chronic lymphoid leukemia. He also had thrombocytopenia and mild anemia. Very quickly we performed radiological assessment that documented the anatomical alterations just mentioned and we performed also peripheral blood flow cytometry that documented a monoclonal population of lymphocytes. Very quickly at that point we decided that the patient needed to be reassessed with a PET scan to be sure that he was not going to have Jon's transformation of his CLL. Fortunately the PET scan was negative for such. Since then the patient was educated and initiated on Calquence and he is taking 2 tablets a day, 1 in the morning, 1 in the evening. This is triggering some headache that goes away with aspirin. Besides this his appetite is back in full speed. He has been able to gain back weight and his abdominal pain has disappeared altogether. He is not having any fever, chills, or night sweats. He has normal bowel activity, proper urination. He has not had any cardiovascular or respiratory issues. He was started as well on acyclovir prophylactically and Bactrim prophylactically. He is here today for review. The analysis of his peripheral blood flow cytometry is still pending from the [facility] in regard to molecular analysis and FISH testing.           Past Medical History:   Diagnosis Date   • Arthritis    • Benign prostatic hyperplasia     FOLLOWED BY DR. VIVIEN PATEL   • Biliary dyskinesia    • Bunion of right foot     GREAT TOE   • CLL (chronic lymphocytic leukemia) (HCC) 2016    FOLLOWED BY DR WALKER   •  Colon polyps     FOLLOWED BY DR. NEHA HAM   • Constipation    • Degeneration of lumbar intervertebral disc    • Diverticulosis    • Erectile dysfunction    • Fracture of ankle 1975   • GERD (gastroesophageal reflux disease)    • Hallux valgus of left foot    • Hyperlipidemia     MIXED   • Hypertension    • Inguinal hernia    • Kidney stone 02/21/2009    SEEN AT [facility] ER   • Knee swelling 2018   • Localized, primary osteoarthritis    • Lumbar radiculopathy    • Lumbar stenosis    • Lung mass     LEFT SIDE - SCAR TISSUE PER PATIENT    • Osteoarthritis of right knee    • Polyneuropathy    • Prostate CA (HCC) 03/2016    JACQUELYN 6, S/P XRT, FOLLOWED BY DR. VIVIEN PATEL, RADIATION SEEDS   • PVC (premature ventricular contraction)     PT STATES WORKED UP YEARS AGO BY [facility] FOR POSSIBLE MURMUR - NO FOLLOW UP REQUIRED    • RBBB    • Sensory hearing loss, bilateral    • Skin cancer     SQUAMOUS CELL CARCINOMA OF FACE   • Substernal goiter    • Thyromegaly 12/2016    ADMITTED TO [facility]   • Vitamin D deficiency      Past Surgical History:   Procedure Laterality Date   • BRONCHOSCOPY N/A 12/14/2016    Procedure: BRONCHOSCOPY WITH  BAL AND BIOPSY AND ENDOBRONCHIAL ULTRASOUND  AND NAVIGATION WITH BRUSHINGS AND BIOPSY AND BAL;  Surgeon: Pierre Manning MD;  Location: [facility] ENDOSCOPY;  Service:    • COLONOSCOPY N/A 03/13/2019    5 MM SESSILE TUBULAR ADENOMA POLYP IN TRANSVERSE, MULTIPLE SMALL AND LARGE DIVERTICULA IN SIGMOID, RESCOPE IN 5 YRS, DR. NEHA HAM AT [facility]   • COLONOSCOPY W/ POLYPECTOMY N/A 03/19/2015    3 TUBULAR ADENOMA POLYPS, 12 TUBULOVILLOUS POLYP RECTO-SIGMOID, DIVERTICULOSIS, RESCOPE IN 3 YRS, DR. NEHA HAM AT [facility]   • EYE SURGERY Bilateral     CATARACT EXTRACTION WITH LENS IMPLANT, DR. GOVEA   • FINGER NAIL SURGERY Right 2022    [facility]   • INGUINAL HERNIA REPAIR Left 11/02/2021    Procedure: LEFT OPEN INGUINAL HERNIA REPAIR;  Surgeon: Nixon Kolb MD;  Location: [facility] MAIN OR;  Service:  General;  Laterality: Left;   • PROSTATE RADIOACTIVE SEED IMPLANT N/A 09/06/2016    DR. HOA JARAMILLO AT [facility]   • PROSTATE SURGERY N/A 03/11/2016    BIOPSY: ADENOCARCINOMA, DR. ZAID IBARRA   • THYROID BIOPSY Bilateral 11/01/2017    NO B CELL OR T CELL LYMPHOMA, DONE AT [facility]   • TOTAL KNEE ARTHROPLASTY Right 10/12/2022    Procedure: TOTAL KNEE ARTHROPLASTY;  Surgeon: Ran Rachel MD;  Location[facility];  Service: Orthopedics;  Laterality: Right;     Social History     Socioeconomic History   • Marital status:      Spouse name: No   • Years of education: College   Tobacco Use   • Smoking status: Never   • Smokeless tobacco: Never   Substance and Sexual Activity   • Alcohol use: No   • Drug use: Never   • Sexual activity: Yes     Partners: Female     Birth control/protection: None     Family History   Problem Relation Age of Onset   • Heart disease Father         Rheumatic heart disease   • Hypertension Father    • Osteoporosis Father    • Stroke Mother    • Osteoporosis Mother    • No Known Problems Daughter    • Malig Hyperthermia Neg Hx      Current Outpatient Medications on File Prior to Visit   Medication Sig   • acalabrutinib (Calquence) 100 MG capsule capsule Take 2 capsules by mouth 2 (Two) Times a Day.   • acyclovir (Zovirax) 400 MG tablet Take 1 tablet by mouth 2 (Two) Times a Day.   • alfuzosin (UROXATRAL) 10 MG 24 hr tablet Take 20 mg by mouth Every Night.   • ammonium lactate (LAC-HYDRIN) 12 % lotion Every 12 (Twelve) Hours.   • aspirin 81 MG EC tablet Take 1 tablet by mouth twice daily x 14 days; then take 1 tablet by mouth daily x 28 days   • ciprofloxacin (CIPRO) 500 MG tablet Take 500 mg by mouth 2 (Two) Times a Day.   • losartan-hydrochlorothiazide (HYZAAR) 100-25 MG per tablet Take 1 tablet by mouth Daily.   • Melatonin 10 MG tablet Take 10 mg by mouth Every Night.   • NON FORMULARY Balance of Nature and Super Beets daily   • ondansetron (ZOFRAN) 8 MG tablet Take 1  "tablet by mouth 3 (Three) Times a Day As Needed for Nausea or Vomiting.   • pantoprazole (PROTONIX) 40 MG EC tablet Take 40 mg by mouth Daily.   • polyethylene glycol (MIRALAX) 17 GM/SCOOP powder Miralax 17 gram/dose oral powder   1 scoop in the morning then as needed   • potassium chloride (K-DUR,KLOR-CON) 20 MEQ CR tablet Take 1 tablet by mouth Daily. (Patient taking differently: Take 20 mEq by mouth Daily. HOLD FOR ONE WEEK PRIOR TO SURGERY)   • pravastatin (PRAVACHOL) 20 MG tablet Take 20 mg by mouth Every Night.   • predniSONE (DELTASONE) 10 MG tablet Take 2 tablets by mouth Daily. Take once in the morning   • sulfamethoxazole-trimethoprim (BACTRIM DS,SEPTRA DS) 800-160 MG per tablet Take 1 tablet by mouth 3 (Three) Times a Week. Take on Monday, Wednesday and Friday.   • triamcinolone (KENALOG) 0.025 % cream      Current Facility-Administered Medications on File Prior to Visit   Medication   • Chlorhexidine Gluconate Cloth 2 % pads   No Known Allergies              VITAL SIGNS:  Vitals:    02/08/23 1354   BP: 115/64   Pulse: 89   Resp: 18   Temp: 97.1 °F (36.2 °C)   TempSrc: Temporal   SpO2: 97%   Weight: 98.3 kg (216 lb 11.2 oz)   Height: 195.6 cm (77.01\")   PainSc: 0-No pain          PHYSICAL EXAMINATION:               GENERAL:  Well-developed, Patient  in no acute distress.   SKIN:  Warm, dry ,NO purpura ,no rash.  HEENT:  Pupils were equal and reactive to light and accomodation, conjunctivae noninjected,  normal visual acuity.   NECK:  Supple with good range of motion; no thyromegaly , no JVD or bruits,.No carotid artery pain, no carotid abnormal pulsation   LYMPHATICS:  No cervical, NO supraclavicular, NO axillary, NO inguinal adenopathies.  CARDIAC   normal rate , regular rhythm, without murmur,NO rubs NO S3 NO S4   LUNGS: normal breath sounds bilateral, no wheezing, NO rhonchi, NO crackles ,NO rubs.  VASCULAR VENOUS: no cyanosis, NO collateral circulation, NO varicosities, NO edema, NO palpable cords, NO " pain,NO erythema, NO pigmentation of the skin.  ABDOMEN:  Soft, NO pain,no hepatomegaly, no splenomegaly,no masses, no ascites, no collateral circulation,no distention.  EXTREMITIES  AND SPINE:  No clubbing, no cyanosis ,no deformities , no pain .No kyphosis,  no pain in spine, no pain in ribs , no pain in pelvic bone.  NEUROLOGICAL:  Patient was awake, alert, oriented to time, person and place.                LABORATORY DATA:  Results from last 7 days   Lab Units 02/07/23  1157   WBC 10*3/mm3 7.62   NEUTROS ABS 10*3/mm3 4.18   HEMOGLOBIN g/dL 11.0*   HEMATOCRIT % 33.9*   PLATELETS 10*3/mm3 113*     Results from last 7 days   Lab Units 02/07/23  1157   SODIUM mmol/L 140   POTASSIUM mmol/L 4.1   CHLORIDE mmol/L 103   CO2 mmol/L 24.5   BUN mg/dL 18   CREATININE mg/dL 0.94   CALCIUM mg/dL 9.7   ALBUMIN g/dL 4.2   BILIRUBIN mg/dL 0.8   ALK PHOS U/L 63   ALT (SGPT) U/L 5   AST (SGOT) U/L 10   GLUCOSE mg/dL 122              ASSESSMENT:   1.  History of CLL  · Status posttreatment with Bendamustine/Rituxan in 2016  · Patient did have severe reaction to initial Rituxan dose requiring hospitalization or completion.  Did well thereafter.  · 12/30/2022 WBC remains normal at 10.6, 31% lymphs.  Platelets normal at 105,000.  No B symptoms or adenopathy.  No suggestion of recurrent disease.  Hemoglobin has acutely dropped however down to 11.9 (see further discussion below).  On 01/13/2023 his white count, hemoglobin and platelets are not changing. His total lymphocyte count is very minimal and I do believe that his leukemia remains very quiet on clinical grounds with no need for intervention.  On 01/18/2023 I discussed with the patient the fact that his radiological assessment of the abdomen shows periaortic and pericaval lymphadenopathy. Most of the lymph nodes are between 2-3 cm in size but most dramatically his spleen has enlarged very substantially being huge and almost as big as his liver or bigger. He has no obvious  alteration in the liver. His stomach, small intestine, pancreas, kidneys disclose no new abnormalities. There is no evidence of diverticulitis or bowel obstruction. There is no notion of hernias. There is no other notion of masses or ascites. He has multiple cysts in the liver no different than before that have been present for more than 15 years.     The patient's hemoglobin has dropped to 10.7, the platelet count has dropped to 131,000 and I do believe that this is representation of splenomegaly sequestration and CLL activity very substantially. Today we have a beta-2 microglobulin, LDH and uric acid pending along with a chemistry profile. A urinalysis today disclosed no evidence of infection and he has minimal microscopic hematuria with no significance. There is no proteinuria. There is no leukocyturia.     Given the above findings I discussed with the patient the fact that I need to rule out the possibility of Jon's transformation of his CLL and for this reason I have advised him to proceed with a PET scan in a few hours. Hopefully this will not be the case.     If the lymph nodes or spleen are extremely hot he will probably require a biopsy. If they have a light degree or moderate degree of SUV activity we will assume that leukemia is the reason and we will proceed with therapy with Calquence. I discussed with him the methodology of using this medicine taking it twice a day with side effects including atrial fibrillation and abnormal bleeding. He is no longer taking any PPI therefore there should not be a need for this medicine at that point. He will have formal education and consent for Calquence and the medicine will be delivered to his home in [place].     Once that we review the PET scan this will be discussed with him on the telephone. I am planning for him to remain on Calquence at least for the next 3-6 months and see how things evolve and planning to repeat radiological assessment in 3 months. I  made him aware that sometimes people taking Calquence can have a rise in the blood count for several weeks after initiation of the medicine that eventually settles down and improves.     I also discussed with him that if the Calquence does not help the process we still have alternatives giving him Gazyva for example or Rituxan.     The patient is no longer requiring blood pressure medication and I advised him to hold off on this until we see how things evolve.     In order to improve his appetite and help him to control the leukemia up to a certain point the patient will take 20 mg of prednisone everyday for the next 7 days, take 1 tablet in the morning and then discontinue. With that kind of dosing he will not need to have any taper.   On 02/08/2023 I discussed with the patient the fact that his PET scan failed to document any significant SUV activity in the spleen or lymph nodes documented in his abdomen. This gave me the relief that we are not facing the possibility or Jon's transformation and for that reason I advised the patient to proceed with therapy with Calquence that he has initiated for the last few days. He has encountered side effect including  headache and I pointed out to him that this is a common side effect that typically is relieved drinking a little bit of coffee once or twice a day.he is using Bufferin with some relief but I pointed out to him that he needs to be very careful with this medicine because this can end up with GI bleeding, stomach irritation and abnormal bleeding in the first place.     So far the patient's hemoglobin has improved some, the platelet count is stable but it will take several days before the spleen shrinks and the platelet count normalizes. The total white count is normal. Again the molecular analysis of the peripheral blood flow cytometry has been requested from the [facility] in regard the FISH testing. Also we ordered IgH testing.    The goal will be to be seen in a  couple of weeks by nurse with laboratory parameter including CBC and visit with me in 1 month and 2 months. We will plan in 04/2023 to repeat radiological analysis of his abdomen. By then his spleen should be substantially reduced in size and intraabdominal adenopathy should be disappearing.     I expect that as the days go by the platelet count will improve and the hemoglobin will continue improving.        ·   2.  History of prostate cancer  · Treated at the [facility]  · Most recent PSA April 2022 = 0.203  On 01/13/2023 he has minimal urinary symptomatology comparing his PSA with 3 years ago was 0.8 today, actually a few days ago was 0.1. I doubt that all of the symptoms that he has are associated with prostate cancer. The blood in the urine has a different significance.   On 01/18/2023 no issues pertinent to his prostate cancer. I see the seeds in his prostatic bed on the CT scan. His urinalysis is very much clean and his PSA has remained stable. No activity of this entity at this point.   On 02/08/2023  no issues pertinent to this.         ·   3.  Acute cystitis  · Patient reports symptoms for at least 1 month of lower abdominal pain/pressure.  He did not see any obvious blood in the urine.  · Patient just yesterday, 12/29/2022, seen by Dr. Jovani Salomon, PCP who did urine dipstick showing positive protein, positive blood.  Patient started on amoxicillin.  On 01/13/2023 given the symptoms that he has it does not sound like cystitis. It was believed that that is the case. He has taken 10 days of Cipro. This medicine has taken away his appetite, he has lost 30 pounds of weight and I doubt that he has those symptoms of cystitis. The pain is deep in the pelvis, raises the question about the nature of this and even though he has no rebound in the abdomen neither distention I do believe that he needs to have a STAT CT scan of the abdomen and pelvis to be sure that there is not going to be diverticulitis or some  other issue that is producing this problem.   On 01/18/2023 I see no evidence of acute cystitis at this time neither clinically or by urinalysis or radiologically. He is no longer taking ciprofloxacin.  On 02/08/2023 no issues pertinent.        ·   4.  New anemia  · 12/30/2022 hemoglobin today 11.9.  This is a significant drop from 15.6 when seen 4 months ago.  · ?  Related to cystitis/hematuria  · We will check additional lab work today including ferritin, iron profile, B12, folate, and PSA level in light of his history of prostate cancer.  On 01/13/2023 the hemoglobin remains at 12, he had a B12 level of 265 receiving a B12 injection 3 weeks ago and a reticulated hemoglobin of 31 today. Normal platelet count. His recent ferritin and iron profile were acceptable. He has had a colonoscopy that was perfectly normal in 2019 by Hieu Lester MD.   On 01/18/2023 I think the anemia and the thrombocytopenia is a reflection of splenomegaly, sequestration and activity of the CLL. I expect these numbers will improve once that we start to counteract the leukemia with Calquence. He will require repeat CBC, CMP, LDH, uric acid in 3 weeks.   On 02/08/2023 we know that the anemia is a reflection of CLL activity, splenomegaly, and so forth. We expect that the hemoglobin should be corrected in the visit in 1 month and the splenomegaly should be reducing also correcting his platelet count.     The patient will be returning every couple of weeks for nurse visit and blood count and he will have visit with doctor in 1 month and 2 months. Plan in 04/2023 to repeat radiological assessment of his abdomen. I find no need for further PET scan. We will await the report of the molecular analysis of the peripheral blood from the [facility]. We requested this today.    ·

## 2023-02-14 ENCOUNTER — OFFICE VISIT (OUTPATIENT)
Dept: OTHER | Facility: HOSPITAL | Age: 78
End: 2023-02-14
Payer: MEDICARE

## 2023-02-14 VITALS
HEART RATE: 97 BPM | WEIGHT: 215.2 LBS | BODY MASS INDEX: 25.51 KG/M2 | TEMPERATURE: 97.4 F | DIASTOLIC BLOOD PRESSURE: 67 MMHG | OXYGEN SATURATION: 97 % | SYSTOLIC BLOOD PRESSURE: 141 MMHG | RESPIRATION RATE: 18 BRPM

## 2023-02-14 DIAGNOSIS — C91.10 CHRONIC LYMPHOCYTIC LEUKEMIA: Primary | ICD-10-CM

## 2023-02-14 PROCEDURE — 99215 OFFICE O/P EST HI 40 MIN: CPT | Performed by: NURSE PRACTITIONER

## 2023-02-14 PROCEDURE — G0463 HOSPITAL OUTPT CLINIC VISIT: HCPCS | Performed by: NURSE PRACTITIONER

## 2023-02-14 NOTE — PROGRESS NOTES
[facility] MULTIDISCIPLINARY CLINIC  SURVIVORSHIP VISIT: IN CLINIC  Older Adult Functional Assessment Initial Visit        Jeff Bass is a pleasant 77 y.o. male being followed by Gerry Schroeder MD for CLL. Reviewed today in Multidisciplinary Clinic, for initial older adult functional assessment visit.     HPI  This is a claritza 77-year-old gentleman with a recent right knee replacement January 2023.    He has a history of prostate cancer treated at the [facility] currently with minimal to no urine symptoms and last PSA of 12/30/2022 was 0.113.    He has been followed by Dr. Schroeder since about 2015.  He is status posttreatment with Bendamustine/Rituxan in 2016.     He was recently found to have splenomegaly and retroperitoneal adenopathy in the background of his chronic lymphoid leukemia with some thrombocytopenia and mild anemia.  Jon's transformation of his CLL has been ruled out.  He has recently been started on Calquence.     He was hopeful to have his left knee replacement surgery completed but this has now been delayed in hopes of getting some resolution of the splenomegaly with the Calquence and this is being coordinated with Dr. Schroeder    He is noticing some improvements and abdominal cramping and some mild improvements in his appetite.  He does have some stomach cramps in the morning but this sort of resolves as the morning goes on.  He has some very mild fatigue.    The patient maintains his own home independently.  He does have a daughter who lives in town.  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    Older Adult Functional Assessment:  The patient's G8 score is 11 today, indicating at risk of functional decline related to cancer treatment.     Patient is able to perform all instrumental activities of daily living independently.     How many hospitalizations have you had in the last year? ONE -  right knee replacement surgery   Because of a health problem, do you have difficulty pushing or pulling objects like a living room chair? no   Do you use any vision aids? no   Do you use any hearing aids? yes   Do you use any mobility aids? no   Do you feel unsteady when standing or walking? no   Do you worry about falling? yes   Have you had any falls in the last 6 months? no       History of autoimmune disorders? no   History of organ/stem cell transplant? no     Self-reported symptoms per ESAS listed below  Detailed Symptom Assessment  Symptom Distress  Pain Score: 2 (abdomen)   ESAS Tiredness Score: 2  ESAS Nausea Score: 2  ESAS Depression Score: No depression  ESAS Anxiety Score: No anxiety  ESAS Drowsiness Score: No drowsiness  ESAS Lack of Appetite Score: 3  ESAS Wellbeing Score: Best wellbeing  ESAS Dyspnea Score: No shortness of breath  ESAS Other Problem Score: Best possible response  ESAS Source of Information: patient      TREATMENT TOXICITY PREDICTIVE ASSESSMENT:    RAW SCORE REPORTING:  ECOG 0   G8 Questionnaire 11/17   Mini Nutritional Assessment (MNA) Screening score: 10/14  Assessment (if screen <11): 13.5/16  Total assessment: 23.5/30   Mini-Cog 5/5   Timed Up and Go (TUG) 3 meters (Goal <12 seconds): 13.37 seconds       TREATMENT HISTORY:     Oncology/Hematology History   Chronic lymphocytic leukemia (HCC)   1/31/2016 Initial Diagnosis    Chronic lymphocytic leukemia (CMS/HCC)     5/28/2020 - 6/11/2020 Chemotherapy    OP LYMPHOMA (CLL) RiTUXimab (Weekly X 4)     1/19/2023 -  Chemotherapy    OP CLL Acalabrutinib          Past Medical History:   Diagnosis Date   • Arthritis    • Benign prostatic hyperplasia     FOLLOWED BY DR. VIVIEN PATEL   • Biliary dyskinesia    • Bunion of right foot     GREAT TOE   • CLL (chronic lymphocytic leukemia) (HCC) 2016    FOLLOWED BY DR WALKER   • Colon polyps     FOLLOWED BY DR. NEHA HAM   • Constipation    • Degeneration of lumbar intervertebral disc    •  Diverticulosis    • Erectile dysfunction    • Fracture of ankle 1975   • GERD (gastroesophageal reflux disease)    • Hallux valgus of left foot    • Hyperlipidemia     MIXED   • Hypertension    • Inguinal hernia    • Kidney stone 02/21/2009    SEEN AT [facility] ER   • Knee swelling 2018   • Localized, primary osteoarthritis    • Lumbar radiculopathy    • Lumbar stenosis    • Lung mass     LEFT SIDE - SCAR TISSUE PER PATIENT    • Osteoarthritis of right knee    • Polyneuropathy    • Prostate CA (HCC) 03/2016    JACQUELYN 6, S/P XRT, FOLLOWED BY DR. VIVIEN PATEL, RADIATION SEEDS   • PVC (premature ventricular contraction)     PT STATES WORKED UP YEARS AGO BY [facility] FOR POSSIBLE MURMUR - NO FOLLOW UP REQUIRED    • RBBB    • Sensory hearing loss, bilateral    • Skin cancer     SQUAMOUS CELL CARCINOMA OF FACE   • Substernal goiter    • Thyromegaly 12/2016    ADMITTED TO [facility]   • Vitamin D deficiency        Past Surgical History:   Procedure Laterality Date   • BRONCHOSCOPY N/A 12/14/2016    Procedure: BRONCHOSCOPY WITH  BAL AND BIOPSY AND ENDOBRONCHIAL ULTRASOUND  AND NAVIGATION WITH BRUSHINGS AND BIOPSY AND BAL;  Surgeon: Pierre Manning MD;  Location: [facility] ENDOSCOPY;  Service:    • COLONOSCOPY N/A 03/13/2019    5 MM SESSILE TUBULAR ADENOMA POLYP IN TRANSVERSE, MULTIPLE SMALL AND LARGE DIVERTICULA IN SIGMOID, RESCOPE IN 5 YRS, DR. NEHA HAM AT [facility]   • COLONOSCOPY W/ POLYPECTOMY N/A 03/19/2015    3 TUBULAR ADENOMA POLYPS, 12 TUBULOVILLOUS POLYP RECTO-SIGMOID, DIVERTICULOSIS, RESCOPE IN 3 YRS, DR. NEHA HAM AT [facility]   • EYE SURGERY Bilateral     CATARACT EXTRACTION WITH LENS IMPLANT, DR. GOVEA   • FINGER NAIL SURGERY Right 2022    KSENIA   • INGUINAL HERNIA REPAIR Left 11/02/2021    Procedure: LEFT OPEN INGUINAL HERNIA REPAIR;  Surgeon: Nixon Kolb MD;  Location: [facility] MAIN OR;  Service: General;  Laterality: Left;   • PROSTATE RADIOACTIVE SEED IMPLANT N/A 09/06/2016    DR. HOA JARAMILLO AT  [facility]   • PROSTATE SURGERY N/A 03/11/2016    BIOPSY: ADENOCARCINOMA, DR. ZAID IBARRA   • THYROID BIOPSY Bilateral 11/01/2017    NO B CELL OR T CELL LYMPHOMA, DONE AT [facility]   • TOTAL KNEE ARTHROPLASTY Right 10/12/2022    Procedure: TOTAL KNEE ARTHROPLASTY;  Surgeon: Ran Rachel MD;  Location: [facility] OR [facility];  Service: Orthopedics;  Laterality: Right;       Social History     Socioeconomic History   • Marital status:      Spouse name: No   • Years of education: College   Tobacco Use   • Smoking status: Never   • Smokeless tobacco: Never   Substance and Sexual Activity   • Alcohol use: No   • Drug use: Never   • Sexual activity: Yes     Partners: Female     Birth control/protection: None         LDH   Date Value Ref Range Status   02/07/2023 175 99 - 259 U/L Final     Uric Acid   Date Value Ref Range Status   02/07/2023 6.0 2.8 - 7.4 mg/dL Final       Lab Results   Component Value Date    GLUCOSE 122 02/07/2023    BUN 18 02/07/2023    CREATININE 0.94 02/07/2023    EGFRIFNONA 90 10/29/2021    EGFRIFAFRI 103 02/05/2016    BCR 19.1 02/07/2023    K 4.1 02/07/2023    CO2 24.5 02/07/2023    CALCIUM 9.7 02/07/2023    PROTENTOTREF 7.0 02/05/2016    ALBUMIN 4.2 02/07/2023    LABIL2 1.6 02/05/2016    AST 10 02/07/2023    ALT 5 02/07/2023       CBC w/diff    CBC w/Diff 1/13/23 1/18/23 2/7/23   WBC 8.57 8.16 7.62   RBC 4.21 3.79 (A) 3.83 (A)   Hemoglobin 12.0 (A) 10.7 (A) 11.0 (A)   Hematocrit 35.9 (A) 32.5 (A) 33.9 (A)   MCV 85.3 85.8 88.5   MCH 28.5 28.2 28.7   MCHC 33.4 32.9 32.4   RDW 14.6 14.7 15.8 (A)   Platelets 138 (A) 131 (A) 113 (A)   Neutrophil Rel % 46.2 45.3 54.9   Immature Granulocyte Rel % 0.5 0.2 0.3   Lymphocyte Rel % 34.3 35.0 33.3   Monocyte Rel % 16.0 (A) 15.8 (A) 6.7   Eosinophil Rel % 2.3 3.1 3.9   Basophil Rel % 0.7 0.6 0.9   (A) Abnormal value              Allergies as of 02/14/2023   • (No Known Allergies)       MEDICATIONS:  Medication list reviewed today    Review of Systems    Constitutional: Positive for activity change (Due to left knee (pending knee replacement surgery)) and unexpected weight change (Loss). Negative for appetite change and fatigue (Very mild).   Respiratory: Negative for chest tightness and shortness of breath.    Cardiovascular: Negative for chest pain and leg swelling.   Gastrointestinal: Positive for abdominal pain (Cramping). Negative for blood in stool, constipation and diarrhea.   Genitourinary: Negative for dysuria and hematuria.   Musculoskeletal: Negative for arthralgias and myalgias.   Neurological: Negative for weakness and numbness.   Psychiatric/Behavioral: Negative for dysphoric mood and sleep disturbance. The patient is not nervous/anxious.        /67   Pulse 97   Temp 97.4 °F (36.3 °C) (Temporal)   Resp 18   Wt 97.6 kg (215 lb 3.2 oz)   SpO2 97% Comment: Room air  BMI 25.51 kg/m²     Wt Readings from Last 3 Encounters:   02/14/23 97.6 kg (215 lb 3.2 oz)   02/08/23 98.3 kg (216 lb 11.2 oz)   01/20/23 101 kg (222 lb)        Pain Score    02/14/23 0858   PainSc: 2  Comment: low abdomen       PHQ-9 Total Score: 1   GAD7 Total Score: 0   Distress Score: NA   Fatigue Severity Scale: NA      Physical Exam  Constitutional:       Appearance: Normal appearance. He is well-groomed.   HENT:      Head: Normocephalic and atraumatic.   Cardiovascular:      Rate and Rhythm: Normal rate and regular rhythm.   Pulmonary:      Effort: No tachypnea or respiratory distress.   Abdominal:      General: Abdomen is flat. There is no distension.   Musculoskeletal:      Right ankle: No swelling. No tenderness. Normal pulse.      Left ankle: No swelling. No tenderness. Normal pulse.   Skin:     General: Skin is warm and dry.   Neurological:      Mental Status: He is alert and oriented to person, place, and time.      Gait: Gait abnormal.   Psychiatric:         Attention and Perception: Attention and perception normal.         Mood and Affect: Mood and affect normal.          Speech: Speech normal.         Behavior: Behavior normal. Behavior is cooperative.         Thought Content: Thought content normal.           Advance Care Planning     Patient does have advance care planning complete, scanned into the medical record and dated January 23, 2023    Patient has designated a healthcare surrogate: His daughter Stephanie Andujar as his primary healthcare surrogate and his cousin Miroslava Gray is designated as his secondary healthcare surrogate.    He has spoken with his cousin Miroslava Young in the past and feels they share similar values regarding life-prolonging treatments however he notes he has not yet spoken to his daughter and he does have this on his radar to do.    We confirmed patient's wishes for life-prolonging treatment in the event of a sudden illness or accident that would leave him unable to speak for himself.  He states he would not want his life prolonged with artificial nutrition/hydration nor mechanical ventilation if there was no reasonable chance of him being able to recover the ability to know who he is and who he is with and maintain some independence in ADLs.    Brief discussion and written information provided regarding advance care planning and appropriateness for all healthy adults, choosing a healthcare surrogate.  I have provided him with the written healthcare surrogate information guide to help him navigate his conversations with his loved ones.          DISCUSSION HELD TODAY:   GOALS OF CARE:  1. Maintain independence      Problems identified:  1. Mobility: Timed up and go 13.37 seconds today, does have some stiffness in the right knee leading to some variance in his gait.  He has no history of falls in the last 6 months.  He does not subjectively feel unsteady when walking although he does have some concerns about falling but does try to take care.  He had a right knee replacement last year which has been extremely helpful now left knee replacement is on  hold until his current treatment is able to adequately control his leukemia and get some resolution of the splenomegaly.  We reviewed strategies for home safety.  Encouraged continued physical activity as tolerated right now is mostly walking  2. Cognition: Mini cog 5/5 today.  He stays extremely cognitively active.  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  We reviewed some basic strategies for preservation of cognitive function including optimization of nutrition, hydration, sleep, proximal management of changes in mood, continued abstinence from alcohol, regular physical activity  3. Nutrition: Screening positive for slight risk of malnutrition on MNA.  Does affirm some unintentional weight loss.  He has experienced some mild decreases in appetite.  He is doing some nutrition supplements totaling an additional 60 g of protein a day.  He does have some troubles with bowel regulation alternating between constipation and diarrhea, currently maintaining regular bowel movements with MiraLAX daily and omitting this when he runs into some loose stools as needed.  He has had some stomach cramping but this is beginning to resolve with treatment.  We reviewed the availability of oncology dietitian as needed.  He does belong to a country club and is able to stop there for meals when he is not up for preparing anything.  4. Mood: Screens negative for symptoms of depression and anxiety on PHQ-9 and VARSHA-7; self-reports 0/10 on dimensions of depression and anxiety on ESAS.  He has maintained extensive social connections.  He he lives in his own home on his own.  He has somebody to clean his house for him weekly and a friend who comes to visit from [place] who sometimes will cook when they are here.       Plan and recommendations:  1. 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  He is already begun treatment with Calquence and is enjoying some improvement in his abdominal pain/cramping symptoms.  2. We discussed leukemia and lymphoma Society, [place]'s club for peer based support  3. We discussed options for physical activity which can be tailored to his current needs at the [facility]'s [employer] program or violeta chi/Chi gong at [employer]  4. Provided scheduling information for massage/reiki  5. Reviewed availability of oncology dietitian, declines referral today but he has been given contact information  6. STEADI fall prevention materials provided and reviewed  7. Maintain physical, social and cognitive activities as able  8. We have not arranged follow-up at this time I have discussed I will see him at any point for additional resources information or support  9. Call my office as needed at [phone] for additional information, resources or support.      Diagnoses and all orders for this visit:    1. Chronic lymphocytic leukemia (HCC) (Primary)            I spent 45 minutes caring for this patient on this date of service by face-to-face counseling. This time includes time spent by me in the following activities: preparing for the visit, reviewing tests, obtaining and/or reviewing a separately obtained history, performing a medically appropriate examination and/or evaluation, counseling and educating the patient/family/caregiver, referring and communicating with other health care professionals, documenting information in the medical record and care coordination

## 2023-02-16 ENCOUNTER — SPECIALTY PHARMACY (OUTPATIENT)
Dept: PHARMACY | Facility: HOSPITAL | Age: 78
End: 2023-02-16
Payer: MEDICARE

## 2023-02-16 ENCOUNTER — DOCUMENTATION (OUTPATIENT)
Dept: OTHER | Facility: HOSPITAL | Age: 78
End: 2023-02-16
Payer: MEDICARE

## 2023-02-16 DIAGNOSIS — C91.10 CHRONIC LYMPHOCYTIC LEUKEMIA: Primary | ICD-10-CM

## 2023-02-16 LAB — DIAGNOSTIC IMP SPEC-IMP: NORMAL

## 2023-02-16 NOTE — PROGRESS NOTES
MTM telephone encounter re:adherence and side effects (Calquence)     Jeff reports starting Calquence on 1/25/23. Thus far, patient denies side effects, aside from headache, which is manageable and not bothersome to patient. Advised patient to alert office if this changes. . Thus far, patient reports no missed doses and no medication changes. Advised pt to call office if any changes. Patient expressed understanding. Patient also wants to make sure that his next shipment of Calquence will come on time; we will investigate assisstance status.     Patient tells me that he is having a hard time getting a refill of Bactrim DS from his [facility]. We will investigate this issue and reach back out to patient.         Birgit Canales [facility]  2/16/2023  13:00 EST

## 2023-02-16 NOTE — PROGRESS NOTES
Oncology Social Work  ..Distress Screening Follow-up    Diagnosis: CLL  Treatment:      Location of Distress Screening: Medical Oncology    Distress Level: 5 (1/23/2023  5:00 PM)    Physical Concerns:       Practical Problems:       Emotional Concerns:       Family Concerns:       Spiritual Concerns:        Interventions:   Chart reviewed.  Patient seen and assessed by ARCHIE Edwards who completed an Older Adult Functional Assessment.  No social service needs identified.    Will remain available if needs arise.       Comments:

## 2023-02-20 DIAGNOSIS — C91.10 CHRONIC LYMPHOCYTIC LEUKEMIA: ICD-10-CM

## 2023-02-22 ENCOUNTER — DOCUMENTATION (OUTPATIENT)
Dept: PHARMACY | Facility: HOSPITAL | Age: 78
End: 2023-02-22
Payer: MEDICARE

## 2023-02-22 ENCOUNTER — LAB (OUTPATIENT)
Dept: LAB | Facility: HOSPITAL | Age: 78
End: 2023-02-22
Payer: MEDICARE

## 2023-02-22 ENCOUNTER — CLINICAL SUPPORT (OUTPATIENT)
Dept: ONCOLOGY | Facility: HOSPITAL | Age: 78
End: 2023-02-22
Payer: MEDICARE

## 2023-02-22 DIAGNOSIS — C91.10 CHRONIC LYMPHOCYTIC LEUKEMIA: ICD-10-CM

## 2023-02-22 LAB
ALBUMIN SERPL-MCNC: 4.2 G/DL (ref 3.5–5.2)
ALBUMIN/GLOB SERPL: 2.2 G/DL (ref 1.1–2.4)
ALP SERPL-CCNC: 54 U/L (ref 38–116)
ALT SERPL W P-5'-P-CCNC: 6 U/L (ref 0–41)
ANION GAP SERPL CALCULATED.3IONS-SCNC: 9.7 MMOL/L (ref 5–15)
AST SERPL-CCNC: 10 U/L (ref 0–40)
BASOPHILS # BLD AUTO: 0.03 10*3/MM3 (ref 0–0.2)
BASOPHILS NFR BLD AUTO: 0.5 % (ref 0–1.5)
BILIRUB SERPL-MCNC: 0.6 MG/DL (ref 0.2–1.2)
BUN SERPL-MCNC: 14 MG/DL (ref 6–20)
BUN/CREAT SERPL: 18.7 (ref 7.3–30)
CALCIUM SPEC-SCNC: 9.3 MG/DL (ref 8.5–10.2)
CHLORIDE SERPL-SCNC: 106 MMOL/L (ref 98–107)
CO2 SERPL-SCNC: 23.3 MMOL/L (ref 22–29)
CREAT SERPL-MCNC: 0.75 MG/DL (ref 0.7–1.3)
DEPRECATED RDW RBC AUTO: 52.5 FL (ref 37–54)
EGFRCR SERPLBLD CKD-EPI 2021: 91.8 ML/MIN/1.73
EOSINOPHIL # BLD AUTO: 0.14 10*3/MM3 (ref 0–0.4)
EOSINOPHIL NFR BLD AUTO: 2.3 % (ref 0.3–6.2)
ERYTHROCYTE [DISTWIDTH] IN BLOOD BY AUTOMATED COUNT: 16.1 % (ref 12.3–15.4)
GLOBULIN UR ELPH-MCNC: 1.9 GM/DL (ref 1.8–3.5)
GLUCOSE SERPL-MCNC: 114 MG/DL (ref 74–124)
HCT VFR BLD AUTO: 36.2 % (ref 37.5–51)
HGB BLD-MCNC: 11.4 G/DL (ref 13–17.7)
IMM GRANULOCYTES # BLD AUTO: 0.02 10*3/MM3 (ref 0–0.05)
IMM GRANULOCYTES NFR BLD AUTO: 0.3 % (ref 0–0.5)
LDH SERPL-CCNC: 156 U/L (ref 99–259)
LYMPHOCYTES # BLD AUTO: 1.93 10*3/MM3 (ref 0.7–3.1)
LYMPHOCYTES NFR BLD AUTO: 31.8 % (ref 19.6–45.3)
MCH RBC QN AUTO: 28 PG (ref 26.6–33)
MCHC RBC AUTO-ENTMCNC: 31.5 G/DL (ref 31.5–35.7)
MCV RBC AUTO: 88.9 FL (ref 79–97)
MONOCYTES # BLD AUTO: 0.51 10*3/MM3 (ref 0.1–0.9)
MONOCYTES NFR BLD AUTO: 8.4 % (ref 5–12)
NEUTROPHILS NFR BLD AUTO: 3.44 10*3/MM3 (ref 1.7–7)
NEUTROPHILS NFR BLD AUTO: 56.7 % (ref 42.7–76)
NRBC BLD AUTO-RTO: 0 /100 WBC (ref 0–0.2)
PLATELET # BLD AUTO: 103 10*3/MM3 (ref 140–450)
PMV BLD AUTO: 8.9 FL (ref 6–12)
POTASSIUM SERPL-SCNC: 4 MMOL/L (ref 3.5–4.7)
PROT SERPL-MCNC: 6.1 G/DL (ref 6.3–8)
RBC # BLD AUTO: 4.07 10*6/MM3 (ref 4.14–5.8)
SODIUM SERPL-SCNC: 139 MMOL/L (ref 134–145)
URATE SERPL-MCNC: 4.6 MG/DL (ref 2.8–7.4)
WBC NRBC COR # BLD: 6.07 10*3/MM3 (ref 3.4–10.8)

## 2023-02-22 PROCEDURE — G0463 HOSPITAL OUTPT CLINIC VISIT: HCPCS

## 2023-02-22 PROCEDURE — 36415 COLL VENOUS BLD VENIPUNCTURE: CPT

## 2023-02-22 PROCEDURE — 83615 LACTATE (LD) (LDH) ENZYME: CPT

## 2023-02-22 PROCEDURE — 85025 COMPLETE CBC W/AUTO DIFF WBC: CPT

## 2023-02-22 PROCEDURE — 84550 ASSAY OF BLOOD/URIC ACID: CPT

## 2023-02-22 PROCEDURE — 80053 COMPREHEN METABOLIC PANEL: CPT

## 2023-02-22 NOTE — NURSING NOTE
Lab Results   Component Value Date    WBC 6.07 02/22/2023    HGB 11.4 (L) 02/22/2023    HCT 36.2 (L) 02/22/2023    MCV 88.9 02/22/2023     (L) 02/22/2023     Patient is here for lab review with RN.  CBC reviewed, counts are stable for this patient at this time. Patient has no complaints. Copy of labs given to patient and follow up appointment reviewed. Patient is instructed to call the office with any concerns or new symptoms prior to next visit. Patient verbalized understanding and discharged in stable condition.    Msg was sent to Omaira SHIN for  to call pt to go over Molecular testing results that MD was waiting for.

## 2023-02-22 NOTE — PROGRESS NOTES
I have received Calquence from [facility]-pt will  at his appt today, 2/22/2023.      Arminda Estrada  Specialty Pharmacy Technician

## 2023-02-24 ENCOUNTER — TELEPHONE (OUTPATIENT)
Dept: ONCOLOGY | Facility: CLINIC | Age: 78
End: 2023-02-24
Payer: MEDICARE

## 2023-02-24 NOTE — TELEPHONE ENCOUNTER
Called patient just to inform him Dr. Schroeder would be calling today to review results from testing. Pt v/u. Anneliese Torres RN

## 2023-03-01 ENCOUNTER — DOCUMENTATION (OUTPATIENT)
Dept: PHARMACY | Facility: HOSPITAL | Age: 78
End: 2023-03-01
Payer: MEDICARE

## 2023-03-01 NOTE — PROGRESS NOTES
I contacted [facility] [phone] to check the delivery status of the Calquence. Per Cruz-the rx is at the pharmacy and pt should be rec the delivery within 6-8 business days.    I will continue to follow.     Arminda Estrada  Specialty Pharmacy Technician

## 2023-03-03 ENCOUNTER — TELEPHONE (OUTPATIENT)
Dept: ONCOLOGY | Facility: CLINIC | Age: 78
End: 2023-03-03

## 2023-03-03 ENCOUNTER — SPECIALTY PHARMACY (OUTPATIENT)
Dept: PHARMACY | Facility: HOSPITAL | Age: 78
End: 2023-03-03
Payer: MEDICARE

## 2023-03-03 NOTE — PROGRESS NOTES
Specialty Note    [facility] called to verify Calquence dose of 200 mg po bid.  We discussed that higher than typical  dose was ordered due to risk of Richters transformation and that Birgit Canales, PharmD had researched an article supporting its use.

## 2023-03-06 ENCOUNTER — DOCUMENTATION (OUTPATIENT)
Dept: PHARMACY | Facility: HOSPITAL | Age: 78
End: 2023-03-06
Payer: MEDICARE

## 2023-03-06 NOTE — PROGRESS NOTES
Fax rec from [facility]-a shipment of Calquence has been shipped to [facility] home.     [employer] tracking # [id number]       Arminda Estrada  Specialty Pharmacy Technician

## 2023-03-07 ENCOUNTER — DOCUMENTATION (OUTPATIENT)
Dept: PHARMACY | Facility: HOSPITAL | Age: 78
End: 2023-03-07
Payer: MEDICARE

## 2023-03-07 NOTE — PROGRESS NOTES
[facility] was not able to deliver pts Calquence yesterday from [facility]. Another attempt will be made today and per tracking the package is out for delivery.    I attempted to contact pt and had to leave a VM. I informed him that [employer] will be delivering a package to him today.        Arminda Estrada  Specialty Pharmacy Technician

## 2023-03-08 ENCOUNTER — DOCUMENTATION (OUTPATIENT)
Dept: PHARMACY | Facility: HOSPITAL | Age: 78
End: 2023-03-08
Payer: MEDICARE

## 2023-03-08 NOTE — PROGRESS NOTES
Calquence supply from [facility] delivered to pt on 3/7/2023 at 5:30 pm.    "[employer] tracking # [id number]     Arminda Estrada  Specialty Pharmacy Technician

## 2023-03-14 ENCOUNTER — TELEPHONE (OUTPATIENT)
Dept: ONCOLOGY | Facility: CLINIC | Age: 78
End: 2023-03-14
Payer: MEDICARE

## 2023-03-14 NOTE — TELEPHONE ENCOUNTER
Patient called reporting peridental appointment for high pressure washing. Per Dr. Schroeder, patient should not have this done d/t risk of bleeding. Pt v/u and will cancel for now.

## 2023-03-17 ENCOUNTER — LAB (OUTPATIENT)
Dept: LAB | Facility: HOSPITAL | Age: 78
End: 2023-03-17
Payer: MEDICARE

## 2023-03-17 ENCOUNTER — OFFICE VISIT (OUTPATIENT)
Dept: ONCOLOGY | Facility: CLINIC | Age: 78
End: 2023-03-17
Payer: MEDICARE

## 2023-03-17 VITALS
OXYGEN SATURATION: 98 % | HEIGHT: 77 IN | DIASTOLIC BLOOD PRESSURE: 81 MMHG | HEART RATE: 80 BPM | TEMPERATURE: 98 F | RESPIRATION RATE: 16 BRPM | BODY MASS INDEX: 25.46 KG/M2 | SYSTOLIC BLOOD PRESSURE: 154 MMHG | WEIGHT: 215.6 LBS

## 2023-03-17 DIAGNOSIS — C91.10 CLL (CHRONIC LYMPHOCYTIC LEUKEMIA): Primary | ICD-10-CM

## 2023-03-17 DIAGNOSIS — C91.10 CHRONIC LYMPHOCYTIC LEUKEMIA: ICD-10-CM

## 2023-03-17 LAB
ALBUMIN SERPL-MCNC: 4.3 G/DL (ref 3.5–5.2)
ALBUMIN/GLOB SERPL: 2.2 G/DL (ref 1.1–2.4)
ALP SERPL-CCNC: 59 U/L (ref 38–116)
ALT SERPL W P-5'-P-CCNC: 7 U/L (ref 0–41)
ANION GAP SERPL CALCULATED.3IONS-SCNC: 11.2 MMOL/L (ref 5–15)
AST SERPL-CCNC: 13 U/L (ref 0–40)
BASOPHILS # BLD AUTO: 0.05 10*3/MM3 (ref 0–0.2)
BASOPHILS NFR BLD AUTO: 0.6 % (ref 0–1.5)
BILIRUB SERPL-MCNC: 0.5 MG/DL (ref 0.2–1.2)
BUN SERPL-MCNC: 17 MG/DL (ref 6–20)
BUN/CREAT SERPL: 20.5 (ref 7.3–30)
CALCIUM SPEC-SCNC: 9.6 MG/DL (ref 8.5–10.2)
CHLORIDE SERPL-SCNC: 102 MMOL/L (ref 98–107)
CO2 SERPL-SCNC: 24.8 MMOL/L (ref 22–29)
CREAT SERPL-MCNC: 0.83 MG/DL (ref 0.7–1.3)
DEPRECATED RDW RBC AUTO: 47.2 FL (ref 37–54)
EGFRCR SERPLBLD CKD-EPI 2021: 89 ML/MIN/1.73
EOSINOPHIL # BLD AUTO: 0.15 10*3/MM3 (ref 0–0.4)
EOSINOPHIL NFR BLD AUTO: 1.9 % (ref 0.3–6.2)
ERYTHROCYTE [DISTWIDTH] IN BLOOD BY AUTOMATED COUNT: 14.9 % (ref 12.3–15.4)
GLOBULIN UR ELPH-MCNC: 2 GM/DL (ref 1.8–3.5)
GLUCOSE SERPL-MCNC: 96 MG/DL (ref 74–124)
HCT VFR BLD AUTO: 35.8 % (ref 37.5–51)
HGB BLD-MCNC: 11.9 G/DL (ref 13–17.7)
IMM GRANULOCYTES # BLD AUTO: 0.03 10*3/MM3 (ref 0–0.05)
IMM GRANULOCYTES NFR BLD AUTO: 0.4 % (ref 0–0.5)
LYMPHOCYTES # BLD AUTO: 2.79 10*3/MM3 (ref 0.7–3.1)
LYMPHOCYTES NFR BLD AUTO: 35.4 % (ref 19.6–45.3)
MCH RBC QN AUTO: 28.5 PG (ref 26.6–33)
MCHC RBC AUTO-ENTMCNC: 33.2 G/DL (ref 31.5–35.7)
MCV RBC AUTO: 85.9 FL (ref 79–97)
MONOCYTES # BLD AUTO: 1.6 10*3/MM3 (ref 0.1–0.9)
MONOCYTES NFR BLD AUTO: 20.3 % (ref 5–12)
NEUTROPHILS NFR BLD AUTO: 3.27 10*3/MM3 (ref 1.7–7)
NEUTROPHILS NFR BLD AUTO: 41.4 % (ref 42.7–76)
NRBC BLD AUTO-RTO: 0 /100 WBC (ref 0–0.2)
PLATELET # BLD AUTO: 103 10*3/MM3 (ref 140–450)
PMV BLD AUTO: 9.5 FL (ref 6–12)
POTASSIUM SERPL-SCNC: 4.2 MMOL/L (ref 3.5–4.7)
PROT SERPL-MCNC: 6.3 G/DL (ref 6.3–8)
RBC # BLD AUTO: 4.17 10*6/MM3 (ref 4.14–5.8)
SODIUM SERPL-SCNC: 138 MMOL/L (ref 134–145)
WBC NRBC COR # BLD: 7.89 10*3/MM3 (ref 3.4–10.8)

## 2023-03-17 PROCEDURE — 1125F AMNT PAIN NOTED PAIN PRSNT: CPT | Performed by: INTERNAL MEDICINE

## 2023-03-17 PROCEDURE — 85025 COMPLETE CBC W/AUTO DIFF WBC: CPT

## 2023-03-17 PROCEDURE — 3077F SYST BP >= 140 MM HG: CPT | Performed by: INTERNAL MEDICINE

## 2023-03-17 PROCEDURE — 36415 COLL VENOUS BLD VENIPUNCTURE: CPT

## 2023-03-17 PROCEDURE — 80053 COMPREHEN METABOLIC PANEL: CPT

## 2023-03-17 PROCEDURE — 3079F DIAST BP 80-89 MM HG: CPT | Performed by: INTERNAL MEDICINE

## 2023-03-17 PROCEDURE — 99214 OFFICE O/P EST MOD 30 MIN: CPT | Performed by: INTERNAL MEDICINE

## 2023-03-17 NOTE — PROGRESS NOTES
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     HISTORY OF PRESENT ILLNESS:    On 03/17/2023 this 77-year-old white male returns to the office for follow-up of his relapsed CLL. He is undergoing therapy at this time orally successfully with good tolerance of his medication, Brukinsa, and having no significant side effects. He still has an element of abdominal pain, especially in the left upper quadrant in the morning when he first wakes up that goes away with Tylenol. He has good bowel activity as long as he takes MiraLAX. He has no passage of blood in the stool. Urination is with no difficulty. No hematuria. His appetite is acceptable. He has no nausea or vomiting. His weight is stable. He has not had any B symptoms including no fevers, chills, night sweats, pruritus or adenopathies. He has not had any significant fatigue. He is able to go to work on routine basis, go to the office and take care of his personal affairs with no difficulties.     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            Past Medical History:   Diagnosis Date    Arthritis     Benign prostatic hyperplasia     FOLLOWED BY DR. VIVIEN PATEL    Biliary dyskinesia     Bunion of right foot     GREAT TOE    CLL (chronic lymphocytic leukemia) (HCC) 2016    FOLLOWED BY DR WALKER    Colon polyps     FOLLOWED BY DR. NEHA HAM    Constipation     Degeneration of lumbar intervertebral disc     Diverticulosis     Erectile dysfunction     Fracture of ankle 1975    GERD (gastroesophageal reflux disease)     Hallux valgus of left foot     Hyperlipidemia     MIXED    Hypertension     Inguinal hernia     Kidney stone 02/21/2009    SEEN AT [facility] ER    Knee swelling 2018    Localized, primary osteoarthritis     Lumbar radiculopathy     Lumbar stenosis     Lung mass     LEFT SIDE - SCAR TISSUE PER PATIENT      Osteoarthritis of right knee     Polyneuropathy     Prostate CA (HCC) 03/2016    JACQUELYN 6, S/P XRT, FOLLOWED BY DR. VIVIEN PATEL, RADIATION SEEDS    PVC (premature ventricular contraction)     PT STATES WORKED UP YEARS AGO BY [facility] FOR POSSIBLE MURMUR - NO FOLLOW UP REQUIRED     RBBB     Sensory hearing loss, bilateral     Skin cancer     SQUAMOUS CELL CARCINOMA OF FACE    Substernal goiter     Thyromegaly 12/2016    ADMITTED TO [facility]    Vitamin D deficiency      Past Surgical History:   Procedure Laterality Date    BRONCHOSCOPY N/A 12/14/2016    Procedure: BRONCHOSCOPY WITH  BAL AND BIOPSY AND ENDOBRONCHIAL ULTRASOUND  AND NAVIGATION WITH BRUSHINGS AND BIOPSY AND BAL;  Surgeon: Pierre Manning MD;  Location: [facility] ENDOSCOPY;  Service:     COLONOSCOPY N/A 03/13/2019    5 MM SESSILE TUBULAR ADENOMA POLYP IN TRANSVERSE, MULTIPLE SMALL AND LARGE DIVERTICULA IN SIGMOID, RESCOPE IN 5 YRS, DR. NEHA HAM AT [facility]    COLONOSCOPY W/ POLYPECTOMY N/A 03/19/2015    3 TUBULAR ADENOMA POLYPS, 12 TUBULOVILLOUS POLYP RECTO-SIGMOID, DIVERTICULOSIS, RESCOPE IN 3 YRS, DR. NEHA HAM AT [facility]    EYE SURGERY Bilateral     CATARACT EXTRACTION WITH LENS IMPLANT, DR. GOVEA    FINGER NAIL SURGERY Right 2022    TORRES-PATEL    INGUINAL HERNIA REPAIR Left 11/02/2021    Procedure: LEFT OPEN INGUINAL HERNIA REPAIR;  Surgeon: Nixon Kolb MD;  Location: [facility] OR;  Service: General;  Laterality: Left;    PROSTATE RADIOACTIVE SEED IMPLANT N/A 09/06/2016    DR. HOA JARAMILLO AT [facility]    PROSTATE SURGERY N/A 03/11/2016    BIOPSY: ADENOCARCINOMA, DR. ZAID IBARRA    THYROID BIOPSY Bilateral 11/01/2017    NO B CELL OR T CELL LYMPHOMA, DONE AT [facility]    TOTAL KNEE ARTHROPLASTY Right 10/12/2022    Procedure: TOTAL KNEE ARTHROPLASTY;  Surgeon: Ran Rachel MD;  [place] [facility] [place] [facility]  Service: Orthopedics;  Laterality: Right;     Social History     Socioeconomic History    Marital status:      Spouse  name: No    Years of education: College   Tobacco Use    Smoking status: Never    Smokeless tobacco: Never   Substance and Sexual Activity    Alcohol use: No    Drug use: Never    Sexual activity: Yes     Partners: Female     Birth control/protection: None     Family History   Problem Relation Age of Onset    Heart disease Father         Rheumatic heart disease    Hypertension Father     Osteoporosis Father     Stroke Mother     Osteoporosis Mother     No Known Problems Daughter     Mallorna Hyperthermia Neg Hx      Current Outpatient Medications on File Prior to Visit   Medication Sig    acalabrutinib (Calquence) 100 MG capsule capsule Take 2 capsules by mouth 2 (Two) Times a Day.    acyclovir (Zovirax) 400 MG tablet Take 1 tablet by mouth 2 (Two) Times a Day.    alfuzosin (UROXATRAL) 10 MG 24 hr tablet Take 2 tablets by mouth Every Night.    ammonium lactate (LAC-HYDRIN) 12 % lotion Every 12 (Twelve) Hours.    aspirin 81 MG EC tablet Take 1 tablet by mouth twice daily x 14 days; then take 1 tablet by mouth daily x 28 days    ciprofloxacin (CIPRO) 500 MG tablet Take 1 tablet by mouth 2 (Two) Times a Day.    losartan-hydrochlorothiazide (HYZAAR) 100-25 MG per tablet Take 1 tablet by mouth Daily.    Melatonin 10 MG tablet Take 1 tablet by mouth Every Night.    NON FORMULARY Balance of Nature and Super Beets daily    ondansetron (ZOFRAN) 8 MG tablet Take 1 tablet by mouth 3 (Three) Times a Day As Needed for Nausea or Vomiting.    pantoprazole (PROTONIX) 40 MG EC tablet Take 1 tablet by mouth Daily.    polyethylene glycol (MIRALAX) 17 GM/SCOOP powder Miralax 17 gram/dose oral powder   1 scoop in the morning then as needed    potassium chloride (K-DUR,KLOR-CON) 20 MEQ CR tablet Take 1 tablet by mouth Daily. (Patient taking differently: Take 1 tablet by mouth Daily. HOLD FOR ONE WEEK PRIOR TO SURGERY)    pravastatin (PRAVACHOL) 20 MG tablet Take 1 tablet by mouth Every Night.    predniSONE (DELTASONE) 10 MG tablet Take 2  "tablets by mouth Daily. Take once in the morning    sulfamethoxazole-trimethoprim (BACTRIM DS,SEPTRA DS) 800-160 MG per tablet Take 1 tablet by mouth 3 (Three) Times a Week. Take on Monday, Wednesday and Friday.    triamcinolone (KENALOG) 0.025 % cream      Current Facility-Administered Medications on File Prior to Visit   Medication    Chlorhexidine Gluconate Cloth 2 % pads   No Known Allergies              VITAL SIGNS:  Vitals:    03/17/23 1620   BP: 154/81   Pulse: 80   Resp: 16   Temp: 98 °F (36.7 °C)   TempSrc: Temporal   SpO2: 98%   Weight: 97.8 kg (215 lb 9.6 oz)   Height: 195.6 cm (77.01\")   PainSc:   1          PHYSICAL EXAMINATION:               GENERAL:  Well-developed, Patient  in no acute distress.   SKIN:  Warm, dry ,NO purpura ,no rash.  HEENT:  Pupils were equal and reactive to light and accomodation, conjunctivae noninjected,  normal visual acuity.   NECK:  Supple with good range of motion; no thyromegaly , no JVD or bruits,.No carotid artery pain, no carotid abnormal pulsation   LYMPHATICS:  No cervical, NO supraclavicular, NO axillary, NO inguinal adenopathies.  CARDIAC   normal rate , regular rhythm, without murmur,NO rubs NO S3 NO S4   LUNGS: normal breath sounds bilateral, no wheezing, NO rhonchi, NO crackles ,NO rubs.  VASCULAR VENOUS: no cyanosis, NO collateral circulation, NO varicosities, NO edema, NO palpable cords, NO pain,NO erythema, NO pigmentation of the skin.  ABDOMEN:  Soft, NO pain,no hepatomegaly, no splenomegaly,no masses, no ascites, no collateral circulation,no distention.  EXTREMITIES  AND SPINE:  No clubbing, no cyanosis ,no deformities , no pain .No kyphosis,  no pain in spine, no pain in ribs , no pain in pelvic bone.  NEUROLOGICAL:  Patient was awake, alert, oriented to time, person and place.                LABORATORY DATA:  Results from last 7 days   Lab Units 03/17/23  1612   WBC 10*3/mm3 7.89   NEUTROS ABS 10*3/mm3 3.27   HEMOGLOBIN g/dL 11.9*   HEMATOCRIT % 35.8* "   PLATELETS 10*3/mm3 103*     Results from last 7 days   Lab Units 03/17/23  1612   SODIUM mmol/L 138   POTASSIUM mmol/L 4.2   CHLORIDE mmol/L 102   CO2 mmol/L 24.8   BUN mg/dL 17   CREATININE mg/dL 0.83   CALCIUM mg/dL 9.6   ALBUMIN g/dL 4.3   BILIRUBIN mg/dL 0.5   ALK PHOS U/L 59   ALT (SGPT) U/L 7   AST (SGOT) U/L 13   GLUCOSE mg/dL 96              ASSESSMENT:   1.  History of CLL  Status posttreatment with Bendamustine/Rituxan in 2016  Patient did have severe reaction to initial Rituxan dose requiring hospitalization or completion.  Did well thereafter.  12/30/2022 WBC remains normal at 10.6, 31% lymphs.  Platelets normal at 105,000.  No B symptoms or adenopathy.  No suggestion of recurrent disease.  Hemoglobin has acutely dropped however down to 11.9 (see further discussion below).  On 01/13/2023 his white count, hemoglobin and platelets are not changing. His total lymphocyte count is very minimal and I do believe that his leukemia remains very quiet on clinical grounds with no need for intervention.  On 01/18/2023 I discussed with the patient the fact that his radiological assessment of the abdomen shows periaortic and pericaval lymphadenopathy. Most of the lymph nodes are between 2-3 cm in size but most dramatically his spleen has enlarged very substantially being huge and almost as big as his liver or bigger. He has no obvious alteration in the liver. His stomach, small intestine, pancreas, kidneys disclose no new abnormalities. There is no evidence of diverticulitis or bowel obstruction. There is no notion of hernias. There is no other notion of masses or ascites. He has multiple cysts in the liver no different than before that have been present for more than 15 years.     The patient's hemoglobin has dropped to 10.7, the platelet count has dropped to 131,000 and I do believe that this is representation of splenomegaly sequestration and CLL activity very substantially. Today we have a beta-2 microglobulin,  LDH and uric acid pending along with a chemistry profile. A urinalysis today disclosed no evidence of infection and he has minimal microscopic hematuria with no significance. There is no proteinuria. There is no leukocyturia.     Given the above findings I discussed with the patient the fact that I need to rule out the possibility of Jon's transformation of his CLL and for this reason I have advised him to proceed with a PET scan in a few hours. Hopefully this will not be the case.     If the lymph nodes or spleen are extremely hot he will probably require a biopsy. If they have a light degree or moderate degree of SUV activity we will assume that leukemia is the reason and we will proceed with therapy with Calquence. I discussed with him the methodology of using this medicine taking it twice a day with side effects including atrial fibrillation and abnormal bleeding. He is no longer taking any PPI therefore there should not be a need for this medicine at that point. He will have formal education and consent for Calquence and the medicine will be delivered to his home in [place].     Once that we review the PET scan this will be discussed with him on the telephone. I am planning for him to remain on Calquence at least for the next 3-6 months and see how things evolve and planning to repeat radiological assessment in 3 months. I made him aware that sometimes people taking Calquence can have a rise in the blood count for several weeks after initiation of the medicine that eventually settles down and improves.     I also discussed with him that if the Calquence does not help the process we still have alternatives giving him Gazyva for example or Rituxan.     The patient is no longer requiring blood pressure medication and I advised him to hold off on this until we see how things evolve.     In order to improve his appetite and help him to control the leukemia up to a certain point the patient will take 20 mg of  prednisone everyday for the next 7 days, take 1 tablet in the morning and then discontinue. With that kind of dosing he will not need to have any taper.   On 02/08/2023 I discussed with the patient the fact that his PET scan failed to document any significant SUV activity in the spleen or lymph nodes documented in his abdomen. This gave me the relief that we are not facing the possibility or Jon's transformation and for that reason I advised the patient to proceed with therapy with Calquence that he has initiated for the last few days. He has encountered side effect including  headache and I pointed out to him that this is a common side effect that typically is relieved drinking a little bit of coffee once or twice a day.he is using Bufferin with some relief but I pointed out to him that he needs to be very careful with this medicine because this can end up with GI bleeding, stomach irritation and abnormal bleeding in the first place.     So far the patient's hemoglobin has improved some, the platelet count is stable but it will take several days before the spleen shrinks and the platelet count normalizes. The total white count is normal. Again the molecular analysis of the peripheral blood flow cytometry has been requested from the [facility] in regard the FISH testing. Also we ordered IgH testing.    The goal will be to be seen in a couple of weeks by nurse with laboratory parameter including CBC and visit with me in 1 month and 2 months. We will plan in 04/2023 to repeat radiological analysis of his abdomen. By then his spleen should be substantially reduced in size and intraabdominal adenopathy should be disappearing.     I expect that as the days go by the platelet count will improve and the hemoglobin will continue improving.  On 03/17/2023 the patient has been taking now his Brukinsa for almost 6 weeks. He has tolerated the medicine better than anticipated. He has not had any clinical bleeding or cardiac  irregularity. The patient's appetite has remained acceptable. He has not had any nausea or vomiting and today his heart rhythm is normal and his blood sugar is raising. Other issues important is that his hemoglobin has improved. His white count is stable. His total lymphocyte count is very well controlled and his platelet count is stable at 103.     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 I provided him the report of his testing. 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 This makes his prognosis a little bit worse in the long run but I pointed out to him that the medicine that he is taking is prime line for the condition that he has. Again, clinically his spleen is not palpable anymore. Therefore, I advised him to remain on his medicine at the same dosing twice a day. He will require laboratory testing every couple of weeks by nurse and I will review him in 1 month and 2 months. I want to review the status of his adenopathies and his spleen radiologically speaking in 2 months. I scheduled another PET scan. His spleen was very hot at that time of the diagnosis on 01/25/2023 and I want to review the anatomy of this and the SUV uptake of the spleen at that point.    Besides this he will require at some point a holiday from treatment to have a couple of teeth removed from his lower right side. He is not having infection yet. He raised the question if he will be able to have a cap and I told him that it should not be a problem at all. This can be done at any time. The removal will require holding off of his medication for at least 7 days in anticipation of any tooth removal and allow the places to heal and go back into the medication after that.            2.  History of prostate cancer  Treated at the [facility]  Most recent PSA April 2022 = 0.203  On 01/13/2023 he has minimal urinary symptomatology comparing his PSA with 3 years ago was 0.8 today, actually a few days ago was 0.1. I  doubt that all of the symptoms that he has are associated with prostate cancer. The blood in the urine has a different significance.   On 01/18/2023 no issues pertinent to his prostate cancer. I see the seeds in his prostatic bed on the CT scan. His urinalysis is very much clean and his PSA has remained stable. No activity of this entity at this point.   On 02/08/2023  no issues pertinent to this.   On 03/17/2023 no issues pertinent to his previous history of prostate cancer. His PSA has been measured and has remained normal.            3.  Acute cystitis  Patient reports symptoms for at least 1 month of lower abdominal pain/pressure.  He did not see any obvious blood in the urine.  Patient just yesterday, 12/29/2022, seen by Dr. Jovani Salomon, PCP who did urine dipstick showing positive protein, positive blood.  Patient started on amoxicillin.  On 01/13/2023 given the symptoms that he has it does not sound like cystitis. It was believed that that is the case. He has taken 10 days of Cipro. This medicine has taken away his appetite, he has lost 30 pounds of weight and I doubt that he has those symptoms of cystitis. The pain is deep in the pelvis, raises the question about the nature of this and even though he has no rebound in the abdomen neither distention I do believe that he needs to have a STAT CT scan of the abdomen and pelvis to be sure that there is not going to be diverticulitis or some other issue that is producing this problem.   On 01/18/2023 I see no evidence of acute cystitis at this time neither clinically or by urinalysis or radiologically. He is no longer taking ciprofloxacin.  On 02/08/2023 no issues pertinent.      On 03/17/2023 no symptoms of cystitis at this time whatsoever. This will be left alone.        4.  New anemia  12/30/2022 hemoglobin today 11.9.  This is a significant drop from 15.6 when seen 4 months ago.  ?  Related to cystitis/hematuria  We will check additional lab work today  including ferritin, iron profile, B12, folate, and PSA level in light of his history of prostate cancer.  On 01/13/2023 the hemoglobin remains at 12, he had a B12 level of 265 receiving a B12 injection 3 weeks ago and a reticulated hemoglobin of 31 today. Normal platelet count. His recent ferritin and iron profile were acceptable. He has had a colonoscopy that was perfectly normal in 2019 by Hieu Lester MD.   On 01/18/2023 I think the anemia and the thrombocytopenia is a reflection of splenomegaly, sequestration and activity of the CLL. I expect these numbers will improve once that we start to counteract the leukemia with Calquence. He will require repeat CBC, CMP, LDH, uric acid in 3 weeks.   On 02/08/2023 we know that the anemia is a reflection of CLL activity, splenomegaly, and so forth. We expect that the hemoglobin should be corrected in the visit in 1 month and the splenomegaly should be reducing also correcting his platelet count.     The patient will be returning every couple of weeks for nurse visit and blood count and he will have visit with doctor in 1 month and 2 months. Plan in 04/2023 to repeat radiological assessment of his abdomen. I find no need for further PET scan. We will await the report of the molecular analysis of the peripheral blood from the [facility]. We requested this today.  On 03/17/2023 the anemia is slowly improving, reflection of the treatment of his leukemia. The anemia was a reflection of leukemia involvement in the bone marrow and decreased production of red cells. The platelet count is reflection being low of splenomegaly and sequestration and probably bone marrow infiltration by leukemia.     I had a lengthy discussion with the patient about all these issues today. 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     The rest of the molecular analysis disclosed no bad situation in regard to prognosis.     The patient was thankful about the visit today and the simple  explanation.        High risk medication USE.

## 2023-03-19 LAB
B2 GLYCOPROT1 IGA SER-ACNC: <9 GPI IGA UNITS (ref 0–25)
B2 GLYCOPROT1 IGG SER-ACNC: 24 GPI IGG UNITS (ref 0–20)
B2 GLYCOPROT1 IGM SER-ACNC: <9 GPI IGM UNITS (ref 0–32)

## 2023-03-31 ENCOUNTER — INFUSION (OUTPATIENT)
Dept: ONCOLOGY | Facility: HOSPITAL | Age: 78
End: 2023-03-31
Payer: MEDICARE

## 2023-03-31 ENCOUNTER — LAB (OUTPATIENT)
Dept: LAB | Facility: HOSPITAL | Age: 78
End: 2023-03-31
Payer: MEDICARE

## 2023-03-31 ENCOUNTER — APPOINTMENT (OUTPATIENT)
Dept: ONCOLOGY | Facility: HOSPITAL | Age: 78
End: 2023-03-31
Payer: MEDICARE

## 2023-03-31 DIAGNOSIS — C91.10 CHRONIC LYMPHOCYTIC LEUKEMIA: ICD-10-CM

## 2023-03-31 LAB
BASOPHILS # BLD AUTO: 0.02 10*3/MM3 (ref 0–0.2)
BASOPHILS NFR BLD AUTO: 0.4 % (ref 0–1.5)
DEPRECATED RDW RBC AUTO: 46.5 FL (ref 37–54)
EOSINOPHIL # BLD AUTO: 0.02 10*3/MM3 (ref 0–0.4)
EOSINOPHIL NFR BLD AUTO: 0.4 % (ref 0.3–6.2)
ERYTHROCYTE [DISTWIDTH] IN BLOOD BY AUTOMATED COUNT: 15.3 % (ref 12.3–15.4)
HCT VFR BLD AUTO: 32.7 % (ref 37.5–51)
HGB BLD-MCNC: 11.2 G/DL (ref 13–17.7)
IMM GRANULOCYTES # BLD AUTO: 0.07 10*3/MM3 (ref 0–0.05)
IMM GRANULOCYTES NFR BLD AUTO: 1.3 % (ref 0–0.5)
LYMPHOCYTES # BLD AUTO: 1.79 10*3/MM3 (ref 0.7–3.1)
LYMPHOCYTES NFR BLD AUTO: 32.8 % (ref 19.6–45.3)
MCH RBC QN AUTO: 28.6 PG (ref 26.6–33)
MCHC RBC AUTO-ENTMCNC: 34.3 G/DL (ref 31.5–35.7)
MCV RBC AUTO: 83.4 FL (ref 79–97)
MONOCYTES # BLD AUTO: 1.29 10*3/MM3 (ref 0.1–0.9)
MONOCYTES NFR BLD AUTO: 23.7 % (ref 5–12)
NEUTROPHILS NFR BLD AUTO: 2.26 10*3/MM3 (ref 1.7–7)
NEUTROPHILS NFR BLD AUTO: 41.4 % (ref 42.7–76)
NRBC BLD AUTO-RTO: 0 /100 WBC (ref 0–0.2)
PLATELET # BLD AUTO: 113 10*3/MM3 (ref 140–450)
PMV BLD AUTO: 9.8 FL (ref 6–12)
RBC # BLD AUTO: 3.92 10*6/MM3 (ref 4.14–5.8)
WBC NRBC COR # BLD: 5.45 10*3/MM3 (ref 3.4–10.8)

## 2023-03-31 PROCEDURE — G0463 HOSPITAL OUTPT CLINIC VISIT: HCPCS

## 2023-03-31 PROCEDURE — 36415 COLL VENOUS BLD VENIPUNCTURE: CPT

## 2023-03-31 PROCEDURE — 85025 COMPLETE CBC W/AUTO DIFF WBC: CPT

## 2023-03-31 NOTE — NURSING NOTE
Pt presents for CBC review. He's taking Calquence daily w/ good tolerance. Pt has no complaints. Counts are stable for this patient at this time. Copy of labs given and pt's appts reviewed. He will return in 2 weeks as scheduled. He v/u.    Lab Results   Component Value Date    WBC 5.45 03/31/2023    HGB 11.2 (L) 03/31/2023    HCT 32.7 (L) 03/31/2023    MCV 83.4 03/31/2023     (L) 03/31/2023

## 2023-04-11 ENCOUNTER — SPECIALTY PHARMACY (OUTPATIENT)
Dept: PHARMACY | Facility: HOSPITAL | Age: 78
End: 2023-04-11
Payer: MEDICARE

## 2023-04-11 ENCOUNTER — DOCUMENTATION (OUTPATIENT)
Dept: PHARMACY | Facility: HOSPITAL | Age: 78
End: 2023-04-11
Payer: MEDICARE

## 2023-04-11 DIAGNOSIS — C91.10 CHRONIC LYMPHOCYTIC LEUKEMIA: ICD-10-CM

## 2023-04-11 NOTE — PROGRESS NOTES
Re: Refills of Oral Specialty Medication - Calquence (acalabrutinib)    • Drug-Drug Interactions: The current medication list was reviewed and there are no relevant drug-drug interactions.  • Medication Allergies: The patient has NKDA  • Review of Labs/Dose Adjustments: NO DOSE CHANGE - I reviewed the most recent note and labs and the patient will continue without any dose changes.  I sent refills as described below.    Drug: Calquence (acalabrutinib)  Strength: 100 mg  Directions: Take two capsules by mouth twice a day  Quantity: 120  Refills: 5  Pharmacy prescription sent to: [facility]    Name/Credentials: Birgit Canales, JobD, BCPS    4/11/2023  10:13 EDT    Completed independent double check on medication order/RX. Kassy Conway, RPrenny, BCOP

## 2023-04-11 NOTE — PROGRESS NOTES
Fax rec from [facility] requesting a new rx for pts Calquence. I have requested for the rx be sent to [facility].       Arminda Estrada  Specialty Pharmacy Technician

## 2023-04-13 ENCOUNTER — LAB (OUTPATIENT)
Dept: LAB | Facility: HOSPITAL | Age: 78
End: 2023-04-13
Payer: MEDICARE

## 2023-04-13 ENCOUNTER — OFFICE VISIT (OUTPATIENT)
Dept: ONCOLOGY | Facility: CLINIC | Age: 78
End: 2023-04-13
Payer: MEDICARE

## 2023-04-13 VITALS
TEMPERATURE: 96.9 F | SYSTOLIC BLOOD PRESSURE: 125 MMHG | HEIGHT: 77 IN | WEIGHT: 210.3 LBS | DIASTOLIC BLOOD PRESSURE: 66 MMHG | BODY MASS INDEX: 24.83 KG/M2 | HEART RATE: 79 BPM | OXYGEN SATURATION: 97 %

## 2023-04-13 DIAGNOSIS — E53.8 B12 DEFICIENCY: ICD-10-CM

## 2023-04-13 DIAGNOSIS — C91.10 CLL (CHRONIC LYMPHOCYTIC LEUKEMIA): ICD-10-CM

## 2023-04-13 DIAGNOSIS — C91.10 CHRONIC LYMPHOCYTIC LEUKEMIA: Primary | ICD-10-CM

## 2023-04-13 DIAGNOSIS — H93.8X1 CONGESTION OF RIGHT EAR: ICD-10-CM

## 2023-04-13 DIAGNOSIS — Z79.899 HIGH RISK MEDICATION USE: ICD-10-CM

## 2023-04-13 LAB
ALBUMIN SERPL-MCNC: 4 G/DL (ref 3.5–5.2)
ALBUMIN/GLOB SERPL: 1.9 G/DL (ref 1.1–2.4)
ALP SERPL-CCNC: 53 U/L (ref 38–116)
ALT SERPL W P-5'-P-CCNC: 6 U/L (ref 0–41)
ANION GAP SERPL CALCULATED.3IONS-SCNC: 11.2 MMOL/L (ref 5–15)
AST SERPL-CCNC: 13 U/L (ref 0–40)
BASOPHILS # BLD AUTO: 0.02 10*3/MM3 (ref 0–0.2)
BASOPHILS NFR BLD AUTO: 0.5 % (ref 0–1.5)
BILIRUB SERPL-MCNC: 0.6 MG/DL (ref 0.2–1.2)
BUN SERPL-MCNC: 19 MG/DL (ref 6–20)
BUN/CREAT SERPL: 22.1 (ref 7.3–30)
CALCIUM SPEC-SCNC: 9.1 MG/DL (ref 8.5–10.2)
CHLORIDE SERPL-SCNC: 103 MMOL/L (ref 98–107)
CO2 SERPL-SCNC: 22.8 MMOL/L (ref 22–29)
CREAT SERPL-MCNC: 0.86 MG/DL (ref 0.7–1.3)
DEPRECATED RDW RBC AUTO: 48.5 FL (ref 37–54)
EGFRCR SERPLBLD CKD-EPI 2021: 88.1 ML/MIN/1.73
EOSINOPHIL # BLD AUTO: 0.03 10*3/MM3 (ref 0–0.4)
EOSINOPHIL NFR BLD AUTO: 0.7 % (ref 0.3–6.2)
ERYTHROCYTE [DISTWIDTH] IN BLOOD BY AUTOMATED COUNT: 15.4 % (ref 12.3–15.4)
GLOBULIN UR ELPH-MCNC: 2.1 GM/DL (ref 1.8–3.5)
GLUCOSE SERPL-MCNC: 103 MG/DL (ref 74–124)
HCT VFR BLD AUTO: 34.9 % (ref 37.5–51)
HGB BLD-MCNC: 11.4 G/DL (ref 13–17.7)
IMM GRANULOCYTES # BLD AUTO: 0.02 10*3/MM3 (ref 0–0.05)
IMM GRANULOCYTES NFR BLD AUTO: 0.5 % (ref 0–0.5)
LYMPHOCYTES # BLD AUTO: 0.87 10*3/MM3 (ref 0.7–3.1)
LYMPHOCYTES NFR BLD AUTO: 20.8 % (ref 19.6–45.3)
MCH RBC QN AUTO: 27.9 PG (ref 26.6–33)
MCHC RBC AUTO-ENTMCNC: 32.7 G/DL (ref 31.5–35.7)
MCV RBC AUTO: 85.5 FL (ref 79–97)
MONOCYTES # BLD AUTO: 0.97 10*3/MM3 (ref 0.1–0.9)
MONOCYTES NFR BLD AUTO: 23.2 % (ref 5–12)
NEUTROPHILS NFR BLD AUTO: 2.28 10*3/MM3 (ref 1.7–7)
NEUTROPHILS NFR BLD AUTO: 54.3 % (ref 42.7–76)
NRBC BLD AUTO-RTO: 0 /100 WBC (ref 0–0.2)
PLATELET # BLD AUTO: 113 10*3/MM3 (ref 140–450)
PMV BLD AUTO: 9.2 FL (ref 6–12)
POTASSIUM SERPL-SCNC: 4 MMOL/L (ref 3.5–4.7)
PROT SERPL-MCNC: 6.1 G/DL (ref 6.3–8)
RBC # BLD AUTO: 4.08 10*6/MM3 (ref 4.14–5.8)
SODIUM SERPL-SCNC: 137 MMOL/L (ref 134–145)
WBC NRBC COR # BLD: 4.19 10*3/MM3 (ref 3.4–10.8)

## 2023-04-13 PROCEDURE — 80053 COMPREHEN METABOLIC PANEL: CPT

## 2023-04-13 PROCEDURE — 85025 COMPLETE CBC W/AUTO DIFF WBC: CPT

## 2023-04-13 PROCEDURE — 36415 COLL VENOUS BLD VENIPUNCTURE: CPT

## 2023-04-13 NOTE — PROGRESS NOTES
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 In the interim he has not had any clinical bleeding, no cardiac irregularity and he has tolerated the medicine extremely well. He has had occasional pain in the suprapubic area crampy in nature associated with constipation. Once that he relieves his bowel activity the pain goes away. His appetite is not the best, he has a very good breakfast, a very mild lunch and minimal dinner and he complains of being unable to gain weight. I think the answer is very straightforward for such a tall kimberlyn, he is not putting enough calories to him. Besides this he has not had any fever, chills, night sweats, pruritus, adenopathy or bleeding. No cough, sputum production or shortness of breath. Proper urination. No other new issues.             Past Medical History:   Diagnosis Date   • Arthritis    • Benign prostatic hyperplasia     FOLLOWED BY DR. VIVIEN PATEL   • Biliary dyskinesia    • Bunion of right foot     GREAT TOE   • CLL (chronic lymphocytic leukemia) 2016    FOLLOWED BY DR WALKER   • Colon polyps     FOLLOWED BY DR. NEHA HAM   • Constipation    • Degeneration of lumbar intervertebral disc    • Diverticulosis    • Erectile dysfunction    • Fracture of ankle 1975   • GERD (gastroesophageal reflux disease)    • Hallux valgus of left foot    • Hyperlipidemia     MIXED   • Hypertension    • Inguinal hernia    • Kidney stone 02/21/2009    SEEN AT [facility] ER   • Knee swelling 2018   • Localized, primary osteoarthritis    • Lumbar radiculopathy    • Lumbar stenosis    • Lung mass     LEFT SIDE - SCAR TISSUE PER PATIENT    • Osteoarthritis of right knee    •  Polyneuropathy    • Prostate CA 03/2016    JACQUELYN 6, S/P XRT, FOLLOWED BY DR. VIVIEN PATEL, RADIATION SEEDS   • PVC (premature ventricular contraction)     PT STATES WORKED UP YEARS AGO BY [facility] FOR POSSIBLE MURMUR - NO FOLLOW UP REQUIRED    • RBBB    • Sensory hearing loss, bilateral    • Skin cancer     SQUAMOUS CELL CARCINOMA OF FACE   • Substernal goiter    • Thyromegaly 12/2016    ADMITTED TO [facility]   • Vitamin D deficiency      Past Surgical History:   Procedure Laterality Date   • BRONCHOSCOPY N/A 12/14/2016    Procedure: BRONCHOSCOPY WITH  BAL AND BIOPSY AND ENDOBRONCHIAL ULTRASOUND  AND NAVIGATION WITH BRUSHINGS AND BIOPSY AND BAL;  Surgeon: Pierre Manning MD;  Location: [facility] ENDOSCOPY;  Service:    • COLONOSCOPY N/A 03/13/2019    5 MM SESSILE TUBULAR ADENOMA POLYP IN TRANSVERSE, MULTIPLE SMALL AND LARGE DIVERTICULA IN SIGMOID, RESCOPE IN 5 YRS, DR. NEHA HAM AT [facility]   • COLONOSCOPY W/ POLYPECTOMY N/A 03/19/2015    3 TUBULAR ADENOMA POLYPS, 12 TUBULOVILLOUS POLYP RECTO-SIGMOID, DIVERTICULOSIS, RESCOPE IN 3 YRS, DR. NEHA HAM AT [facility]   • EYE SURGERY Bilateral     CATARACT EXTRACTION WITH LENS IMPLANT, DR. GOVEA   • FINGER NAIL SURGERY Right 2022    [facility]   • INGUINAL HERNIA REPAIR Left 11/02/2021    Procedure: LEFT OPEN INGUINAL HERNIA REPAIR;  Surgeon: Nixon Kolb MD;  Location: [facility] OR;  Service: General;  Laterality: Left;   • PROSTATE RADIOACTIVE SEED IMPLANT N/A 09/06/2016    DR. HOA JARAMILLO AT [facility]   • PROSTATE SURGERY N/A 03/11/2016    BIOPSY: ADENOCARCINOMA, DR. ZAID IBARRA   • THYROID BIOPSY Bilateral 11/01/2017    NO B CELL OR T CELL LYMPHOMA, DONE AT [facility]   • TOTAL KNEE ARTHROPLASTY Right 10/12/2022    Procedure: TOTAL KNEE ARTHROPLASTY;  Surgeon: Ran Rachel MD;  Location: [facility] OR [facility];  Service: Orthopedics;  Laterality: Right;     Social History     Socioeconomic History   • Marital status:      Spouse name: No   • Years of  education: College   Tobacco Use   • Smoking status: Never   • Smokeless tobacco: Never   Substance and Sexual Activity   • Alcohol use: No   • Drug use: Never   • Sexual activity: Yes     Partners: Female     Birth control/protection: None     Family History   Problem Relation Age of Onset   • Heart disease Father         Rheumatic heart disease   • Hypertension Father    • Osteoporosis Father    • Stroke Mother    • Osteoporosis Mother    • No Known Problems Daughter    • Malig Hyperthermia Neg Hx      Current Outpatient Medications on File Prior to Visit   Medication Sig   • acalabrutinib (Calquence) 100 MG capsule capsule Take 2 capsules by mouth 2 (Two) Times a Day.   • acyclovir (Zovirax) 400 MG tablet Take 1 tablet by mouth 2 (Two) Times a Day.   • alfuzosin (UROXATRAL) 10 MG 24 hr tablet Take 2 tablets by mouth Every Night.   • ammonium lactate (LAC-HYDRIN) 12 % lotion Every 12 (Twelve) Hours.   • aspirin 81 MG EC tablet Take 1 tablet by mouth twice daily x 14 days; then take 1 tablet by mouth daily x 28 days   • ciprofloxacin (CIPRO) 500 MG tablet Take 1 tablet by mouth 2 (Two) Times a Day.   • losartan-hydrochlorothiazide (HYZAAR) 100-25 MG per tablet Take 1 tablet by mouth Daily.   • Melatonin 10 MG tablet Take 1 tablet by mouth Every Night.   • NON FORMULARY Balance of Nature and Super Beets daily   • ondansetron (ZOFRAN) 8 MG tablet Take 1 tablet by mouth 3 (Three) Times a Day As Needed for Nausea or Vomiting.   • pantoprazole (PROTONIX) 40 MG EC tablet Take 1 tablet by mouth Daily.   • polyethylene glycol (MIRALAX) 17 GM/SCOOP powder Miralax 17 gram/dose oral powder   1 scoop in the morning then as needed   • potassium chloride (K-DUR,KLOR-CON) 20 MEQ CR tablet Take 1 tablet by mouth Daily. (Patient taking differently: Take 1 tablet by mouth Daily. HOLD FOR ONE WEEK PRIOR TO SURGERY)   • pravastatin (PRAVACHOL) 20 MG tablet Take 1 tablet by mouth Every Night.   • predniSONE (DELTASONE) 10 MG tablet  "Take 2 tablets by mouth Daily. Take once in the morning   • sulfamethoxazole-trimethoprim (BACTRIM DS,SEPTRA DS) 800-160 MG per tablet Take 1 tablet by mouth 3 (Three) Times a Week. Take on Monday, Wednesday and Friday.   • triamcinolone (KENALOG) 0.025 % cream      Current Facility-Administered Medications on File Prior to Visit   Medication   • Chlorhexidine Gluconate Cloth 2 % pads   No Known Allergies              VITAL SIGNS:  Vitals:    04/13/23 1159   BP: 125/66   Pulse: 79   Temp: 96.9 °F (36.1 °C)   TempSrc: Temporal   SpO2: 97%   Weight: 95.4 kg (210 lb 4.8 oz)   Height: 195.6 cm (77.01\")   PainSc: 0-No pain          PHYSICAL EXAMINATION:                 GENERAL:  Well-developed, Patient  in no acute distress.   SKIN:  Warm, dry ,NO purpura ,no rash.  HEENT:  Pupils were equal and reactive to light and accomodation, conjunctivae noninjected,  normal visual acuity.   NECK:  Supple with good range of motion; no thyromegaly , no JVD or bruits,.No carotid artery pain, no carotid abnormal pulsation   LYMPHATICS:  No cervical, NO supraclavicular, NO axillary, NO inguinal adenopathies.  CARDIAC   normal rate , regular rhythm, without murmur,NO rubs NO S3 NO S4   LUNGS: normal breath sounds bilateral, no wheezing, NO rhonchi, NO crackles ,NO rubs.  VASCULAR VENOUS: no cyanosis, NO collateral circulation, NO varicosities, NO edema, NO palpable cords, NO pain,NO erythema, NO pigmentation of the skin.  ABDOMEN:  Soft, NO pain,no hepatomegaly,  splenomegaly,no masses, no ascites, no collateral circulation,no distention.Minimal discomfort in the suprapubic area where the sigmoid colon is located with no rebound, no guarding, no masses, no hernias. Surgical scar from left inguinal correction with no alterations. The liver is not palpable, the spleen was palpable 3 cm below the left costal margin.      EXTREMITIES  AND SPINE:  No clubbing, no cyanosis ,no deformities , left knee pain .No kyphosis,  no pain in spine, no " pain in ribs , no pain in pelvic bone.  NEUROLOGICAL:  Patient was awake, alert, oriented to time, person and place.                LABORATORY DATA:  Results from last 7 days   Lab Units 04/13/23  1127   WBC 10*3/mm3 4.19   NEUTROS ABS 10*3/mm3 2.28   HEMOGLOBIN g/dL 11.4*   HEMATOCRIT % 34.9*   PLATELETS 10*3/mm3 113*                  ASSESSMENT:   1.  History of CLL  · Status posttreatment with Bendamustine/Rituxan in 2016  · Patient did have severe reaction to initial Rituxan dose requiring hospitalization or completion.  Did well thereafter.  · 12/30/2022 WBC remains normal at 10.6, 31% lymphs.  Platelets normal at 105,000.  No B symptoms or adenopathy.  No suggestion of recurrent disease.  Hemoglobin has acutely dropped however down to 11.9 (see further discussion below).  • On 01/13/2023 his white count, hemoglobin and platelets are not changing. His total lymphocyte count is very minimal and I do believe that his leukemia remains very quiet on clinical grounds with no need for intervention.  • On 01/18/2023 I discussed with the patient the fact that his radiological assessment of the abdomen shows periaortic and pericaval lymphadenopathy. Most of the lymph nodes are between 2-3 cm in size but most dramatically his spleen has enlarged very substantially being huge and almost as big as his liver or bigger. He has no obvious alteration in the liver. His stomach, small intestine, pancreas, kidneys disclose no new abnormalities. There is no evidence of diverticulitis or bowel obstruction. There is no notion of hernias. There is no other notion of masses or ascites. He has multiple cysts in the liver no different than before that have been present for more than 15 years.     The patient's hemoglobin has dropped to 10.7, the platelet count has dropped to 131,000 and I do believe that this is representation of splenomegaly sequestration and CLL activity very substantially. Today we have a beta-2 microglobulin, LDH  and uric acid pending along with a chemistry profile. A urinalysis today disclosed no evidence of infection and he has minimal microscopic hematuria with no significance. There is no proteinuria. There is no leukocyturia.     Given the above findings I discussed with the patient the fact that I need to rule out the possibility of Jon's transformation of his CLL and for this reason I have advised him to proceed with a PET scan in a few hours. Hopefully this will not be the case.     If the lymph nodes or spleen are extremely hot he will probably require a biopsy. If they have a light degree or moderate degree of SUV activity we will assume that leukemia is the reason and we will proceed with therapy with Calquence. I discussed with him the methodology of using this medicine taking it twice a day with side effects including atrial fibrillation and abnormal bleeding. He is no longer taking any PPI therefore there should not be a need for this medicine at that point. He will have formal education and consent for Calquence and the medicine will be delivered to his home in [place].     Once that we review the PET scan this will be discussed with him on the telephone. I am planning for him to remain on Calquence at least for the next 3-6 months and see how things evolve and planning to repeat radiological assessment in 3 months. I made him aware that sometimes people taking Calquence can have a rise in the blood count for several weeks after initiation of the medicine that eventually settles down and improves.     I also discussed with him that if the Calquence does not help the process we still have alternatives giving him Gazyva for example or Rituxan.     The patient is no longer requiring blood pressure medication and I advised him to hold off on this until we see how things evolve.     In order to improve his appetite and help him to control the leukemia up to a certain point the patient will take 20 mg of  prednisone everyday for the next 7 days, take 1 tablet in the morning and then discontinue. With that kind of dosing he will not need to have any taper.   • On 02/08/2023 I discussed with the patient the fact that his PET scan failed to document any significant SUV activity in the spleen or lymph nodes documented in his abdomen. This gave me the relief that we are not facing the possibility or Jon's transformation and for that reason I advised the patient to proceed with therapy with Calquence that he has initiated for the last few days. He has encountered side effect including  headache and I pointed out to him that this is a common side effect that typically is relieved drinking a little bit of coffee once or twice a day.he is using Bufferin with some relief but I pointed out to him that he needs to be very careful with this medicine because this can end up with GI bleeding, stomach irritation and abnormal bleeding in the first place.     So far the patient's hemoglobin has improved some, the platelet count is stable but it will take several days before the spleen shrinks and the platelet count normalizes. The total white count is normal. Again the molecular analysis of the peripheral blood flow cytometry has been requested from the [facility] in regard the FISH testing. Also we ordered IgH testing.    The goal will be to be seen in a couple of weeks by nurse with laboratory parameter including CBC and visit with me in 1 month and 2 months. We will plan in 04/2023 to repeat radiological analysis of his abdomen. By then his spleen should be substantially reduced in size and intraabdominal adenopathy should be disappearing.     I expect that as the days go by the platelet count will improve and the hemoglobin will continue improving.  On 03/17/2023 the patient has been taking now his Brukinsa for almost 6 weeks. He has tolerated the medicine better than anticipated. He has not had any clinical bleeding or cardiac  irregularity. The patient's appetite has remained acceptable. He has not had any nausea or vomiting and today his heart rhythm is normal and his blood sugar is raising. Other issues important is that his hemoglobin has improved. His white count is stable. His total lymphocyte count is very well controlled and his platelet count is stable at 103.     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 I provided him the report of his testing. 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 This makes his prognosis a little bit worse in the long run but I pointed out to him that the medicine that he is taking is prime line for the condition that he has. Again, clinically his spleen is not palpable anymore. Therefore, I advised him to remain on his medicine at the same dosing twice a day. He will require laboratory testing every couple of weeks by nurse and I will review him in 1 month and 2 months. I want to review the status of his adenopathies and his spleen radiologically speaking in 2 months. I scheduled another PET scan. His spleen was very hot at that time of the diagnosis on 01/25/2023 and I want to review the anatomy of this and the SUV uptake of the spleen at that point.    Besides this he will require at some point a holiday from treatment to have a couple of teeth removed from his lower right side. He is not having infection yet. He raised the question if he will be able to have a cap and I told him that it should not be a problem at all. This can be done at any time. The removal will require holding off of his medication for at least 7 days in anticipation of any tooth removal and allow the places to heal and go back into the medication after that.  On 04/13/2023 the patient continues taking his Calquence with no limitations and no significant side effects. His cardiac auscultation today is normal, he has not had any clinical bleeding, he has not had any diarrhea, in fact he has been mildly  constipated. When constipation happens the patient develops some suprapubic pain probably related to sigmoid colon filling up with stools.     His spleen is clinically smaller today documented in the examination, the liver is not palpable, he has no peripheral adenopathy. His total lymphocyte count continues declining, the hemoglobin is stable and the platelet count is stable. I do believe that this patient is responding to this medicine and we advised him to remain on the medicine at the same dosing returning to see us back the first of next month. He will have radiological assessment in the form of PET scan a few days before and then review him after that.     He really wants to have his left knee replacement therapy at some point in the summer to be able to play pickle ball. We will work into his Calquence and his Orthopedic Medicine, Ran Rachel MD, in order to proceed with this at that point. This will be further discussed in the next visit.    •   •   •   •   ·   2.  History of prostate cancer  · Treated [facility] the [facility]  · Most recent PSA April 2022 = 0.203  • On 01/13/2023 he has minimal urinary symptomatology comparing his PSA with 3 years ago was 0.8 today, actually a few days ago was 0.1. I doubt that all of the symptoms that he has are associated with prostate cancer. The blood in the urine has a different significance.   • On 01/18/2023 no issues pertinent to his prostate cancer. I see the seeds in his prostatic bed on the CT scan. His urinalysis is very much clean and his PSA has remained stable. No activity of this entity at this point.   • On 02/08/2023  no issues pertinent to this.   On 03/17/2023 no issues pertinent to his previous history of prostate cancer. His PSA has been measured and has remained normal.  On 04/13/2023 no issues pertinent to this at this point.       •   •   ·   3.  New anemia  · 12/30/2022 hemoglobin today 11.9.  This is a significant drop from 15.6 when seen 4 months  ago.  · ?  Related to cystitis/hematuria  · We will check additional lab work today including ferritin, iron profile, B12, folate, and PSA level in light of his history of prostate cancer.  • On 01/13/2023 the hemoglobin remains at 12, he had a B12 level of 265 receiving a B12 injection 3 weeks ago and a reticulated hemoglobin of 31 today. Normal platelet count. His recent ferritin and iron profile were acceptable. He has had a colonoscopy that was perfectly normal in 2019 by Hieu Lester MD.   On 01/18/2023 I think the anemia and the thrombocytopenia is a reflection of splenomegaly, sequestration and activity of the CLL. I expect these numbers will improve once that we start to counteract the leukemia with Calquence. He will require repeat CBC, CMP, LDH, uric acid in 3 weeks.   • On 02/08/2023 we know that the anemia is a reflection of CLL activity, splenomegaly, and so forth. We expect that the hemoglobin should be corrected in the visit in 1 month and the splenomegaly should be reducing also correcting his platelet count.     The patient will be returning every couple of weeks for nurse visit and blood count and he will have visit with doctor in 1 month and 2 months. Plan in 04/2023 to repeat radiological assessment of his abdomen. I find no need for further PET scan. We will await the report of the molecular analysis of the peripheral blood from the [facility]. We requested this today.  On 03/17/2023 the anemia is slowly improving, reflection of the treatment of his leukemia. The anemia was a reflection of leukemia involvement in the bone marrow and decreased production of red cells. The platelet count is reflection being low of splenomegaly and sequestration and probably bone marrow infiltration by leukemia.     I had a lengthy discussion with the patient about all these issues today. 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     The rest of the molecular analysis disclosed no bad situation in  regard to prognosis.     The patient was thankful about the visit today and the simple explanation.  On 04/13/2023 the patient's hemoglobin remains stable, he has no notion of blood loss, his nutrition to me is marginal and I had a discussion with him about nutrition today that I think is very important not only from the point of view of his bowel activity but also from the point of view of proper nutrition and caloric ingestion. I do not think this patient according to what he reported to me is consuming more than 1200 calories a day and that explains to me why he loses weight. I pointed out to him that he needs to catch up in snacks, to have some cheese, to have some nuts, to have fruit in his diet that will help him to have better digestion and better bowel activity. I suggested some ice cream at nighttime as well.     In regard to his constipation I advised him to use a lower amount of MiraLax on a daily basis and that way his bowels remain regular on a daily basis.     His appointments will be kept the same for a visit in a month and the PET scan a few days before.     In a month he will have a CBC and CMP.     •   •   ·   High risk medication USE.

## 2023-04-14 ENCOUNTER — DOCUMENTATION (OUTPATIENT)
Dept: PHARMACY | Facility: HOSPITAL | Age: 78
End: 2023-04-14
Payer: MEDICARE

## 2023-04-14 NOTE — PROGRESS NOTES
Fax rec from [facility]-they have shipped a supply of Calquence to pts home address. It will arrive in 1-2 business days.    Arminda Estrada  Specialty Pharmacy Technician

## 2023-04-17 ENCOUNTER — TELEPHONE (OUTPATIENT)
Dept: ONCOLOGY | Facility: CLINIC | Age: 78
End: 2023-04-17
Payer: MEDICARE

## 2023-04-17 NOTE — TELEPHONE ENCOUNTER
Returned voicemail from patient c/o right ear congestion. Requesting a referral to our ENT in case they might get him in sooner than his current appt at [facility] which is scheduled for 5/1. Instructed patient to give me a call back if he has not heard back within a few days. Pt v/u. Anneliese Torres RN

## 2023-04-28 ENCOUNTER — CLINICAL SUPPORT (OUTPATIENT)
Dept: ONCOLOGY | Facility: HOSPITAL | Age: 78
End: 2023-04-28
Payer: MEDICARE

## 2023-04-28 ENCOUNTER — LAB (OUTPATIENT)
Dept: LAB | Facility: HOSPITAL | Age: 78
End: 2023-04-28
Payer: MEDICARE

## 2023-04-28 DIAGNOSIS — C91.10 CLL (CHRONIC LYMPHOCYTIC LEUKEMIA): ICD-10-CM

## 2023-04-28 LAB
BASOPHILS # BLD AUTO: 0.02 10*3/MM3 (ref 0–0.2)
BASOPHILS NFR BLD AUTO: 0.4 % (ref 0–1.5)
DEPRECATED RDW RBC AUTO: 45.1 FL (ref 37–54)
EOSINOPHIL # BLD AUTO: 0.05 10*3/MM3 (ref 0–0.4)
EOSINOPHIL NFR BLD AUTO: 1.1 % (ref 0.3–6.2)
ERYTHROCYTE [DISTWIDTH] IN BLOOD BY AUTOMATED COUNT: 14.7 % (ref 12.3–15.4)
HCT VFR BLD AUTO: 36.3 % (ref 37.5–51)
HGB BLD-MCNC: 11.9 G/DL (ref 13–17.7)
IMM GRANULOCYTES # BLD AUTO: 0.02 10*3/MM3 (ref 0–0.05)
IMM GRANULOCYTES NFR BLD AUTO: 0.4 % (ref 0–0.5)
LYMPHOCYTES # BLD AUTO: 1.07 10*3/MM3 (ref 0.7–3.1)
LYMPHOCYTES NFR BLD AUTO: 24 % (ref 19.6–45.3)
MCH RBC QN AUTO: 27.8 PG (ref 26.6–33)
MCHC RBC AUTO-ENTMCNC: 32.8 G/DL (ref 31.5–35.7)
MCV RBC AUTO: 84.8 FL (ref 79–97)
MONOCYTES # BLD AUTO: 0.79 10*3/MM3 (ref 0.1–0.9)
MONOCYTES NFR BLD AUTO: 17.7 % (ref 5–12)
NEUTROPHILS NFR BLD AUTO: 2.51 10*3/MM3 (ref 1.7–7)
NEUTROPHILS NFR BLD AUTO: 56.4 % (ref 42.7–76)
NRBC BLD AUTO-RTO: 0 /100 WBC (ref 0–0.2)
PLATELET # BLD AUTO: 110 10*3/MM3 (ref 140–450)
PMV BLD AUTO: 9.5 FL (ref 6–12)
RBC # BLD AUTO: 4.28 10*6/MM3 (ref 4.14–5.8)
WBC NRBC COR # BLD: 4.46 10*3/MM3 (ref 3.4–10.8)

## 2023-04-28 PROCEDURE — 36415 COLL VENOUS BLD VENIPUNCTURE: CPT

## 2023-04-28 PROCEDURE — 85025 COMPLETE CBC W/AUTO DIFF WBC: CPT

## 2023-04-28 PROCEDURE — G0463 HOSPITAL OUTPT CLINIC VISIT: HCPCS

## 2023-04-28 NOTE — NURSING NOTE
Pt here for CBC review.  Pt's labs continue to remain stable.  Pt's only complaint is nasal drainage that is also effecting ears and hearing.  Pt confirms he is being seen by ENT to address this.  Pt has no other concerns at this time. Pt given printed copy of labs and asked to call if needed.  Pt v/u.

## 2023-05-08 ENCOUNTER — HOSPITAL ENCOUNTER (OUTPATIENT)
Dept: PET IMAGING | Facility: HOSPITAL | Age: 78
Discharge: HOME | End: 2023-05-08
Payer: MEDICARE

## 2023-05-08 DIAGNOSIS — C91.10 CLL (CHRONIC LYMPHOCYTIC LEUKEMIA): ICD-10-CM

## 2023-05-08 LAB — GLUCOSE BLDC GLUCOMTR-MCNC: 94 MG/DL (ref 70–130)

## 2023-05-08 PROCEDURE — 0 FLUDEOXYGLUCOSE F18 SOLUTION: Performed by: INTERNAL MEDICINE

## 2023-05-08 PROCEDURE — 78815 PET IMAGE W/CT SKULL-THIGH: CPT

## 2023-05-08 PROCEDURE — 82948 REAGENT STRIP/BLOOD GLUCOSE: CPT

## 2023-05-08 PROCEDURE — A9552 F18 FDG: HCPCS | Performed by: INTERNAL MEDICINE

## 2023-05-08 RX ADMIN — FLUDEOXYGLUCOSE F18 1 DOSE: 300 INJECTION INTRAVENOUS at 08:29

## 2023-05-09 ENCOUNTER — OFFICE VISIT (OUTPATIENT)
Dept: ONCOLOGY | Facility: CLINIC | Age: 78
End: 2023-05-09
Payer: MEDICARE

## 2023-05-09 ENCOUNTER — LAB (OUTPATIENT)
Dept: LAB | Facility: HOSPITAL | Age: 78
End: 2023-05-09
Payer: MEDICARE

## 2023-05-09 VITALS
HEIGHT: 77 IN | SYSTOLIC BLOOD PRESSURE: 130 MMHG | WEIGHT: 214.5 LBS | BODY MASS INDEX: 25.33 KG/M2 | DIASTOLIC BLOOD PRESSURE: 66 MMHG | HEART RATE: 90 BPM | TEMPERATURE: 97.8 F | OXYGEN SATURATION: 94 %

## 2023-05-09 DIAGNOSIS — C91.10 CLL (CHRONIC LYMPHOCYTIC LEUKEMIA): ICD-10-CM

## 2023-05-09 DIAGNOSIS — C91.12 CLL (CHRONIC LYMPHOID LEUKEMIA) IN RELAPSE: Primary | ICD-10-CM

## 2023-05-09 LAB
ALBUMIN SERPL-MCNC: 3.9 G/DL (ref 3.5–5.2)
ALBUMIN/GLOB SERPL: 2 G/DL (ref 1.1–2.4)
ALP SERPL-CCNC: 62 U/L (ref 38–116)
ALT SERPL W P-5'-P-CCNC: 6 U/L (ref 0–41)
ANION GAP SERPL CALCULATED.3IONS-SCNC: 14.4 MMOL/L (ref 5–15)
AST SERPL-CCNC: 13 U/L (ref 0–40)
BASOPHILS # BLD AUTO: 0.02 10*3/MM3 (ref 0–0.2)
BASOPHILS NFR BLD AUTO: 0.4 % (ref 0–1.5)
BILIRUB SERPL-MCNC: 0.5 MG/DL (ref 0.2–1.2)
BUN SERPL-MCNC: 20 MG/DL (ref 6–20)
BUN/CREAT SERPL: 21.5 (ref 7.3–30)
CALCIUM SPEC-SCNC: 9.3 MG/DL (ref 8.5–10.2)
CHLORIDE SERPL-SCNC: 102 MMOL/L (ref 98–107)
CO2 SERPL-SCNC: 21.6 MMOL/L (ref 22–29)
CREAT SERPL-MCNC: 0.93 MG/DL (ref 0.7–1.3)
DEPRECATED RDW RBC AUTO: 43.5 FL (ref 37–54)
EGFRCR SERPLBLD CKD-EPI 2021: 83.5 ML/MIN/1.73
EOSINOPHIL # BLD AUTO: 0.07 10*3/MM3 (ref 0–0.4)
EOSINOPHIL NFR BLD AUTO: 1.2 % (ref 0.3–6.2)
ERYTHROCYTE [DISTWIDTH] IN BLOOD BY AUTOMATED COUNT: 14.3 % (ref 12.3–15.4)
GLOBULIN UR ELPH-MCNC: 2 GM/DL (ref 1.8–3.5)
GLUCOSE SERPL-MCNC: 139 MG/DL (ref 74–124)
HCT VFR BLD AUTO: 36.6 % (ref 37.5–51)
HGB BLD-MCNC: 11.9 G/DL (ref 13–17.7)
IMM GRANULOCYTES # BLD AUTO: 0.01 10*3/MM3 (ref 0–0.05)
IMM GRANULOCYTES NFR BLD AUTO: 0.2 % (ref 0–0.5)
LYMPHOCYTES # BLD AUTO: 1.33 10*3/MM3 (ref 0.7–3.1)
LYMPHOCYTES NFR BLD AUTO: 23.7 % (ref 19.6–45.3)
MCH RBC QN AUTO: 27.1 PG (ref 26.6–33)
MCHC RBC AUTO-ENTMCNC: 32.5 G/DL (ref 31.5–35.7)
MCV RBC AUTO: 83.4 FL (ref 79–97)
MONOCYTES # BLD AUTO: 1.84 10*3/MM3 (ref 0.1–0.9)
MONOCYTES NFR BLD AUTO: 32.7 % (ref 5–12)
NEUTROPHILS NFR BLD AUTO: 2.35 10*3/MM3 (ref 1.7–7)
NEUTROPHILS NFR BLD AUTO: 41.8 % (ref 42.7–76)
NRBC BLD AUTO-RTO: 0 /100 WBC (ref 0–0.2)
PLATELET # BLD AUTO: 95 10*3/MM3 (ref 140–450)
PMV BLD AUTO: 9.1 FL (ref 6–12)
POTASSIUM SERPL-SCNC: 4.3 MMOL/L (ref 3.5–4.7)
PROT SERPL-MCNC: 5.9 G/DL (ref 6.3–8)
RBC # BLD AUTO: 4.39 10*6/MM3 (ref 4.14–5.8)
SODIUM SERPL-SCNC: 138 MMOL/L (ref 134–145)
WBC NRBC COR # BLD: 5.62 10*3/MM3 (ref 3.4–10.8)

## 2023-05-09 PROCEDURE — 36415 COLL VENOUS BLD VENIPUNCTURE: CPT

## 2023-05-09 PROCEDURE — 80053 COMPREHEN METABOLIC PANEL: CPT

## 2023-05-09 PROCEDURE — 85025 COMPLETE CBC W/AUTO DIFF WBC: CPT

## 2023-05-09 NOTE — PROGRESS NOTES
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 The patient has not encountered any side effects of the medicine including no cardiac irregularity, no clinical bleeding. Tolerance to the medicine is excellent. He no longer has any abdominal pain. His appetite is very good. He has been able to gain weight. His bowel activity is normal. Urination is normal. He has not had any fever, chills, night sweats, pruritus. No peripheral adenopathy. Good level of energy. No cardiac or respiratory symptomatology.          Past Medical History:   Diagnosis Date   • Arthritis    • Benign prostatic hyperplasia     FOLLOWED BY DR. VIVIEN PATEL   • Biliary dyskinesia    • Bunion of right foot     GREAT TOE   • CLL (chronic lymphocytic leukemia) 2016    FOLLOWED BY DR WALKER   • Colon polyps     FOLLOWED BY DR. NEHA HAM   • Constipation    • Degeneration of lumbar intervertebral disc    • Diverticulosis    • Erectile dysfunction    • Fracture of ankle 1975   • GERD (gastroesophageal reflux disease)    • Hallux valgus of left foot    • Hyperlipidemia     MIXED   • Hypertension    • Inguinal hernia    • Kidney stone 02/21/2009    SEEN AT [facility] ER   • Knee swelling 2018   • Localized, primary osteoarthritis    • Lumbar radiculopathy    • Lumbar stenosis    • Lung mass     LEFT SIDE - SCAR TISSUE PER PATIENT    • Osteoarthritis of right knee    • Polyneuropathy    • Prostate CA 03/2016    JACQUELYN 6, S/P XRT, FOLLOWED BY DR. VIVIEN PATEL, RADIATION SEEDS   • PVC (premature ventricular contraction)     PT STATES WORKED UP YEARS AGO BY [facility] FOR POSSIBLE  MURMUR - NO FOLLOW UP REQUIRED    • RBBB    • Sensory hearing loss, bilateral    • Skin cancer     SQUAMOUS CELL CARCINOMA OF FACE   • Substernal goiter    • Thyromegaly 12/2016    ADMITTED TO [facility]   • Vitamin D deficiency      Past Surgical History:   Procedure Laterality Date   • BRONCHOSCOPY N/A 12/14/2016    Procedure: BRONCHOSCOPY WITH  BAL AND BIOPSY AND ENDOBRONCHIAL ULTRASOUND  AND NAVIGATION WITH BRUSHINGS AND BIOPSY AND BAL;  Surgeon: Pierre Manning MD;  Location: [facility] ENDOSCOPY;  Service:    • COLONOSCOPY N/A 03/13/2019    5 MM SESSILE TUBULAR ADENOMA POLYP IN TRANSVERSE, MULTIPLE SMALL AND LARGE DIVERTICULA IN SIGMOID, RESCOPE IN 5 YRS, DR. NEHA HAM AT [facility]   • COLONOSCOPY W/ POLYPECTOMY N/A 03/19/2015    3 TUBULAR ADENOMA POLYPS, 12 TUBULOVILLOUS POLYP RECTO-SIGMOID, DIVERTICULOSIS, RESCOPE IN 3 YRS, DR. NEHA HAM AT [facility]   • EYE SURGERY Bilateral     CATARACT EXTRACTION WITH LENS IMPLANT, DR. GOVEA   • FINGER NAIL SURGERY Right 2022    KSENIA   • INGUINAL HERNIA REPAIR Left 11/02/2021    Procedure: LEFT OPEN INGUINAL HERNIA REPAIR;  Surgeon: Nixon Kolb MD;  Location: [facility] OR;  Service: General;  Laterality: Left;   • PROSTATE RADIOACTIVE SEED IMPLANT N/A 09/06/2016    DR. HOA JARAMILLO AT [facility]   • PROSTATE SURGERY N/A 03/11/2016    BIOPSY: ADENOCARCINOMA, DR. ZAID IBARRA   • THYROID BIOPSY Bilateral 11/01/2017    NO B CELL OR T CELL LYMPHOMA, DONE AT [facility]   • TOTAL KNEE ARTHROPLASTY Right 10/12/2022    Procedure: TOTAL KNEE ARTHROPLASTY;  Surgeon: Ran Rachel MD;  Location: [facility] OR [facility];  Service: Orthopedics;  Laterality: Right;     Social History     Socioeconomic History   • Marital status:      Spouse name: No   • Years of education: College   Tobacco Use   • Smoking status: Never   • Smokeless tobacco: Never   Substance and Sexual Activity   • Alcohol use: No   • Drug use: Never   • Sexual activity: Yes     Partners: Female     Birth  control/protection: None     Family History   Problem Relation Age of Onset   • Heart disease Father         Rheumatic heart disease   • Hypertension Father    • Osteoporosis Father    • Stroke Mother    • Osteoporosis Mother    • No Known Problems Daughter    • Malig Hyperthermia Neg Hx      Current Outpatient Medications on File Prior to Visit   Medication Sig   • acalabrutinib (Calquence) 100 MG capsule capsule Take 2 capsules by mouth 2 (Two) Times a Day.   • acyclovir (Zovirax) 400 MG tablet Take 1 tablet by mouth 2 (Two) Times a Day.   • alfuzosin (UROXATRAL) 10 MG 24 hr tablet Take 2 tablets by mouth Every Night.   • ammonium lactate (LAC-HYDRIN) 12 % lotion Every 12 (Twelve) Hours.   • aspirin 81 MG EC tablet Take 1 tablet by mouth twice daily x 14 days; then take 1 tablet by mouth daily x 28 days   • ciprofloxacin (CIPRO) 500 MG tablet Take 1 tablet by mouth 2 (Two) Times a Day.   • losartan-hydrochlorothiazide (HYZAAR) 100-25 MG per tablet Take 1 tablet by mouth Daily.   • Melatonin 10 MG tablet Take 1 tablet by mouth Every Night.   • NON FORMULARY Balance of Nature and Super Beets daily   • ondansetron (ZOFRAN) 8 MG tablet Take 1 tablet by mouth 3 (Three) Times a Day As Needed for Nausea or Vomiting.   • pantoprazole (PROTONIX) 40 MG EC tablet Take 1 tablet by mouth Daily.   • polyethylene glycol (MIRALAX) 17 GM/SCOOP powder Miralax 17 gram/dose oral powder   1 scoop in the morning then as needed   • potassium chloride (K-DUR,KLOR-CON) 20 MEQ CR tablet Take 1 tablet by mouth Daily. (Patient taking differently: Take 1 tablet by mouth Daily. HOLD FOR ONE WEEK PRIOR TO SURGERY)   • pravastatin (PRAVACHOL) 20 MG tablet Take 1 tablet by mouth Every Night.   • predniSONE (DELTASONE) 10 MG tablet Take 2 tablets by mouth Daily. Take once in the morning   • sulfamethoxazole-trimethoprim (BACTRIM DS,SEPTRA DS) 800-160 MG per tablet Take 1 tablet by mouth 3 (Three) Times a Week. Take on Monday, Wednesday and Friday.  "  • triamcinolone (KENALOG) 0.025 % cream      Current Facility-Administered Medications on File Prior to Visit   Medication   • Chlorhexidine Gluconate Cloth 2 % pads   No Known Allergies              VITAL SIGNS:  Vitals:    05/09/23 1556   BP: 130/66   Pulse: 90   Temp: 97.8 °F (36.6 °C)   TempSrc: Temporal   SpO2: 94%   Weight: 97.3 kg (214 lb 8 oz)   Height: 195.6 cm (77.01\")   PainSc: 0-No pain          PHYSICAL EXAMINATION:               GENERAL:  Well-developed, Patient  in no acute distress.   SKIN:  Warm, dry ,NO purpura ,no rash.  HEENT:  Pupils were equal and reactive to light and accomodation, conjunctivae noninjected,  normal visual acuity.   NECK:  Supple with good range of motion; no thyromegaly , no JVD or bruits,.No carotid artery pain, no carotid abnormal pulsation   LYMPHATICS:  No cervical, NO supraclavicular, NO axillary, NO inguinal adenopathies.  CARDIAC   normal rate , regular rhythm, without murmur,NO rubs NO S3 NO S4   LUNGS: normal breath sounds bilateral, no wheezing, NO rhonchi, NO crackles ,NO rubs.  VASCULAR VENOUS: no cyanosis, NO collateral circulation, NO varicosities, NO edema, NO palpable cords, NO pain,NO erythema, NO pigmentation of the skin.  ABDOMEN:  Soft, NO pain,no hepatomegaly, 3 CM BLCM splenomegaly,no masses, no ascites, no collateral circulation,no distention.  EXTREMITIES  AND SPINE:  No clubbing, no cyanosis ,no deformities , no pain .No kyphosis,  no pain in spine, no pain in ribs , no pain in pelvic bone.  NEUROLOGICAL:  Patient was awake, alert, oriented to time, person and place.                  LABORATORY DATA:  Results from last 7 days   Lab Units 05/09/23  1542   WBC 10*3/mm3 5.62   NEUTROS ABS 10*3/mm3 2.35   HEMOGLOBIN g/dL 11.9*   HEMATOCRIT % 36.6*   PLATELETS 10*3/mm3 95*                F-18 FDG PET FROM SKULL BASE TO MID THIGH WITH PET/CT FUSION     HISTORY: Chronic lymphocytic leukemia, restaging     TECHNIQUE: Radiation dose reduction techniques were " utilized, including  automated exposure control and exposure modulation based on body size.   Blood glucose level at time of injection was 94 mg/dL.  6.3 mCi of F-18  FDG were injected and PET was performed from skull base to mid thigh. CT  was obtained for localization and attenuation correction. Time at  injection 0829. PET start time 1006.      Compared with PET CT 01/25/2023 and multiple CT abdomen and pelvis and  back to 01/19/2019; CT chest 03/24/2017     FINDINGS: As a point of reference, mediastinal blood pool demonstrates a  max SUV of approximately 2     No hilar, spinal or axillary adenopathy demonstrating uptake  significantly above that of mediastinal blood pool is present. There is  no significant pericardial effusion. Trace left pleural effusion is  present. No pulmonary consolidation or pneumothorax.     A few prominent and enlarged thoracic nodes are present with index nodes  as below:  *  Right posterior paratracheal node superiorly measuring 0.7 cm in  short axis dimension (previously 0.9 cm).  *  Left axillary node superiorly measuring 0.8 cm in short axis  dimension (previously 1.1 cm)     Abdominopelvic adenopathy is present with index nodes as below:  *  Portohepatic node along the anterior aspect of the infrahepatic IVC  demonstrates a max SUV of 3.8 and measures 1.5 cm in short axis  dimension, as before.  *  Left periaortic node concerning max SUV of 2.3 (previously 2.9) and  measuring 1.7 cm in short axis dimension, as before.     Radiation seeds are present prostate. There is colonic diverticulosis.  Small amount of free fluid is present in the abdomen and pelvis.     No focal FDG avid abnormality is seen within the liver, gallbladder,  pancreas, adrenal glands or kidneys. Multiple scattered hypodense  hepatic lesions are present throughout the liver, as before. Hypodense  right renal lesions demonstrating density less than 15 Hounsfield units  are likely benign per Bosniak 2019 criteria.  Subcentimeter renal lesions  are too small to characterize. No free intraperitoneal air is seen.     Spleen is severely enlarged, measuring 24.5 cm in length and max SUV 5  (previously 6.2).     No cervical adenopathy demonstrating uptake significantly above that of  mediastinal blood pool is present. There are more cervical nodes are  typically seen. Index left posterior cervical node at the level of the  hyoid measures 0.7 x 1.2 cm (previously 0.8 x 1.4 cm when measured in a  similar fashion). The thyroid, submandibular and parotid glands  demonstrate roughly symmetric uptake. Asymmetric goitrous enlargement of  the left thyroid gland is present, as before. A few scattered  subcentimeter pulmonary nodules, as before.     IMPRES N:  1.  Scattered adenopathy within the neck, chest, abdomen and pelvis, as  before. A few index lesions within the neck and chest are grossly stable  to minimally decreased in size since 01/25/2023. Index adenopathy within  the abdomen and pelvis are grossly unchanged.  2.  Severe hypermetabolic splenomegaly, as before. The degree of FDG  uptake appears of minimally decreased.  3.  Scattered sub-6 mm pulmonary nodules, as before.  4.  Other findings as above.          This report was finalized on 5/9/2023 10:19 AM by Dr. Raymond Oreilly M.D.     ASSESSMENT:   1.  History of CLL  · Status posttreatment with Bendamustine/Rituxan in 2016  · Patient did have severe reaction to initial Rituxan dose requiring hospitalization or completion.  Did well thereafter.  · 12/30/2022 WBC remains normal at 10.6, 31% lymphs.  Platelets normal at 105,000.  No B symptoms or adenopathy.  No suggestion of recurrent disease.  Hemoglobin has acutely dropped however down to 11.9 (see further discussion below).  • On 01/13/2023 his white count, hemoglobin and platelets are not changing. His total lymphocyte count is very minimal and I do believe that his leukemia remains very quiet on clinical grounds with no need  for intervention.  • On 01/18/2023 I discussed with the patient the fact that his radiological assessment of the abdomen shows periaortic and pericaval lymphadenopathy. Most of the lymph nodes are between 2-3 cm in size but most dramatically his spleen has enlarged very substantially being huge and almost as big as his liver or bigger. He has no obvious alteration in the liver. His stomach, small intestine, pancreas, kidneys disclose no new abnormalities. There is no evidence of diverticulitis or bowel obstruction. There is no notion of hernias. There is no other notion of masses or ascites. He has multiple cysts in the liver no different than before that have been present for more than 15 years.     The patient's hemoglobin has dropped to 10.7, the platelet count has dropped to 131,000 and I do believe that this is representation of splenomegaly sequestration and CLL activity very substantially. Today we have a beta-2 microglobulin, LDH and uric acid pending along with a chemistry profile. A urinalysis today disclosed no evidence of infection and he has minimal microscopic hematuria with no significance. There is no proteinuria. There is no leukocyturia.     Given the above findings I discussed with the patient the fact that I need to rule out the possibility of Jon's transformation of his CLL and for this reason I have advised him to proceed with a PET scan in a few hours. Hopefully this will not be the case.     If the lymph nodes or spleen are extremely hot he will probably require a biopsy. If they have a light degree or moderate degree of SUV activity we will assume that leukemia is the reason and we will proceed with therapy with Calquence. I discussed with him the methodology of using this medicine taking it twice a day with side effects including atrial fibrillation and abnormal bleeding. He is no longer taking any PPI therefore there should not be a need for this medicine at that point. He will have  formal education and consent for Calquence and the medicine will be delivered to his home in [place].     Once that we review the PET scan this will be discussed with him on the telephone. I am planning for him to remain on Calquence at least for the next 3-6 months and see how things evolve and planning to repeat radiological assessment in 3 months. I made him aware that sometimes people taking Calquence can have a rise in the blood count for several weeks after initiation of the medicine that eventually settles down and improves.     I also discussed with him that if the Calquence does not help the process we still have alternatives giving him Gazyva for example or Rituxan.     The patient is no longer requiring blood pressure medication and I advised him to hold off on this until we see how things evolve.     In order to improve his appetite and help him to control the leukemia up to a certain point the patient will take 20 mg of prednisone everyday for the next 7 days, take 1 tablet in the morning and then discontinue. With that kind of dosing he will not need to have any taper.   • On 02/08/2023 I discussed with the patient the fact that his PET scan failed to document any significant SUV activity in the spleen or lymph nodes documented in his abdomen. This gave me the relief that we are not facing the possibility or Jon's transformation and for that reason I advised the patient to proceed with therapy with Calquence that he has initiated for the last few days. He has encountered side effect including  headache and I pointed out to him that this is a common side effect that typically is relieved drinking a little bit of coffee once or twice a day.he is using Bufferin with some relief but I pointed out to him that he needs to be very careful with this medicine because this can end up with GI bleeding, stomach irritation and abnormal bleeding in the first place.     So far the patient's hemoglobin has  improved some, the platelet count is stable but it will take several days before the spleen shrinks and the platelet count normalizes. The total white count is normal. Again the molecular analysis of the peripheral blood flow cytometry has been requested from the [facility] in regard the FISH testing. Also we ordered IgH testing.    The goal will be to be seen in a couple of weeks by nurse with laboratory parameter including CBC and visit with me in 1 month and 2 months. We will plan in 04/2023 to repeat radiological analysis of his abdomen. By then his spleen should be substantially reduced in size and intraabdominal adenopathy should be disappearing.     I expect that as the days go by the platelet count will improve and the hemoglobin will continue improving.  On 03/17/2023 the patient has been taking now his Brukinsa for almost 6 weeks. He has tolerated the medicine better than anticipated. He has not had any clinical bleeding or cardiac irregularity. The patient's appetite has remained acceptable. He has not had any nausea or vomiting and today his heart rhythm is normal and his blood sugar is raising. Other issues important is that his hemoglobin has improved. His white count is stable. His total lymphocyte count is very well controlled and his platelet count is stable at 103.     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 I provided him the report of his testing. 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 This makes his prognosis a little bit worse in the long run but I pointed out to him that the medicine that he is taking is prime line for the condition that he has. Again, clinically his spleen is not palpable anymore. Therefore, I advised him to remain on his medicine at the same dosing twice a day. He will require laboratory testing every couple of weeks by nurse and I will review him in 1 month and 2 months. I want to review the status of his adenopathies and his spleen  radiologically speaking in 2 months. I scheduled another PET scan. His spleen was very hot at that time of the diagnosis on 01/25/2023 and I want to review the anatomy of this and the SUV uptake of the spleen at that point.    Besides this he will require at some point a holiday from treatment to have a couple of teeth removed from his lower right side. He is not having infection yet. He raised the question if he will be able to have a cap and I told him that it should not be a problem at all. This can be done at any time. The removal will require holding off of his medication for at least 7 days in anticipation of any tooth removal and allow the places to heal and go back into the medication after that.  On 04/13/2023 the patient continues taking his Calquence with no limitations and no significant side effects. His cardiac auscultation today is normal, he has not had any clinical bleeding, he has not had any diarrhea, in fact he has been mildly constipated. When constipation happens the patient develops some suprapubic pain probably related to sigmoid colon filling up with stools.     His spleen is clinically smaller today documented in the examination, the liver is not palpable, he has no peripheral adenopathy. His total lymphocyte count continues declining, the hemoglobin is stable and the platelet count is stable. I do believe that this patient is responding to this medicine and we advised him to remain on the medicine at the same dosing returning to see us back the first of next month. He will have radiological assessment in the form of PET scan a few days before and then review him after that.     He really wants to have his left knee replacement therapy at some point in the summer to be able to play pickle ball. We will work into his Calquence and his Orthopedic Medicine, Ran Rachel MD, in order to proceed with this at that point. This will be further discussed in the next visit.  On 05/09/2023 I reviewed  the patient on clinical grounds. Actually he feels terrific and he is functioning very well. He is worrying about his bad knee with osteoarthritis, the patient will be a completely functional individual. He has not had any B symptoms, any infections, any clinical bleeding, any palpitations on the Calquence. I reviewed today his PET scan that discloses minimal activity in intraabdominal and hilar lymph nodes. Spleen remains very active, hot and enlarged with no changes in comparison with how he was 3 months ago. There is no obvious bone marrow uptake or bone uptake and the liver has no uptake. This raises the question now that the patient has some excess of monocytes if the spleen is enlarged because of a different phenomenon, for example myelophthisis, or if the spleen is enlarged because of monocyte infiltration. I do not know the answer to that. I do believe that the patient feels well and I do not think anything will be hurt if the patient holds off on the Calquence now and reassess him with CBC, CMP and LDH in 3 weeks from now. I pointed out to him that I will not rule out the need for him to have a bone marrow test to be sure that he does not have a chronic myelomonocytic leukemia or have developed myelodysplastic syndrome as a complication of previous bendamustine administration in the past.     The patient will call if any issues arise while holding off on the Calquence.     I think so far he is doing fine.      •   •   •   •   ·   2.  History of prostate cancer  · Treated at the [facility]  · Most recent PSA April 2022 = 0.203  • On 01/13/2023 he has minimal urinary symptomatology comparing his PSA with 3 years ago was 0.8 today, actually a few days ago was 0.1. I doubt that all of the symptoms that he has are associated with prostate cancer. The blood in the urine has a different significance.   • On 01/18/2023 no issues pertinent to his prostate cancer. I see the seeds in his prostatic bed on the CT  scan. His urinalysis is very much clean and his PSA has remained stable. No activity of this entity at this point.   • On 02/08/2023  no issues pertinent to this.   On 03/17/2023 no issues pertinent to his previous history of prostate cancer. His PSA has been measured and has remained normal.  On 04/13/2023 no issues pertinent to this at this point.   On 05/09/2023 no issues pertinent to this.        •   •   ·   3.  New anemia  · 12/30/2022 hemoglobin today 11.9.  This is a significant drop from 15.6 when seen 4 months ago.  · ?  Related to cystitis/hematuria  · We will check additional lab work today including ferritin, iron profile, B12, folate, and PSA level in light of his history of prostate cancer.  • On 01/13/2023 the hemoglobin remains at 12, he had a B12 level of 265 receiving a B12 injection 3 weeks ago and a reticulated hemoglobin of 31 today. Normal platelet count. His recent ferritin and iron profile were acceptable. He has had a colonoscopy that was perfectly normal in 2019 by Hieu Lester MD.   On 01/18/2023 I think the anemia and the thrombocytopenia is a reflection of splenomegaly, sequestration and activity of the CLL. I expect these numbers will improve once that we start to counteract the leukemia with Calquence. He will require repeat CBC, CMP, LDH, uric acid in 3 weeks.   • On 02/08/2023 we know that the anemia is a reflection of CLL activity, splenomegaly, and so forth. We expect that the hemoglobin should be corrected in the visit in 1 month and the splenomegaly should be reducing also correcting his platelet count.     The patient will be returning every couple of weeks for nurse visit and blood count and he will have visit with doctor in 1 month and 2 months. Plan in 04/2023 to repeat radiological assessment of his abdomen. I find no need for further PET scan. We will await the report of the molecular analysis of the peripheral blood from the [facility]. We requested this today.  On  03/17/2023 the anemia is slowly improving, reflection of the treatment of his leukemia. The anemia was a reflection of leukemia involvement in the bone marrow and decreased production of red cells. The platelet count is reflection being low of splenomegaly and sequestration and probably bone marrow infiltration by leukemia.     I had a lengthy discussion with the patient about all these issues today. 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     The rest of the molecular analysis disclosed no bad situation in regard to prognosis.     The patient was thankful about the visit today and the simple explanation.  On 04/13/2023 the patient's hemoglobin remains stable, he has no notion of blood loss, his nutrition to me is marginal and I had a discussion with him about nutrition today that I think is very important not only from the point of view of his bowel activity but also from the point of view of proper nutrition and caloric ingestion. I do not think this patient according to what he reported to me is consuming more than 1200 calories a day and that explains to me why he loses weight. I pointed out to him that he needs to catch up in snacks, to have some cheese, to have some nuts, to have fruit in his diet that will help him to have better digestion and better bowel activity. I suggested some ice cream at nighttime as well.     In regard to his constipation I advised him to use a lower amount of MiraLax on a daily basis and that way his bowels remain regular on a daily basis.     His appointments will be kept the same for a visit in a month and the PET scan a few days before.     In a month he will have a CBC and CMP.   On 05/09/2023 the patient's platelet count remains low. I think it is a reflection of splenomegaly and sequestration and the hemoglobin remains at a level 9, reflection of splenomegaly and sequestration.     As posted above the patient has now monocyte excess. I do not know if this is  an issue pertinent to Calquence or maybe the patient has a 2nd clone that could be significant in regard to chronic myelomonocytic leukemia that could be arising from previous bendamustine administration. I asked him as posted above to hold off on the Calquence, review him back in 3 weeks and  if we need to pursue bone marrow testing. I think I will in case that the monocyte count remains elevated or higher. The patient is aware of that.     Nothing that I can do for his platelet count at this point given the splenomegaly and not too much that I can do in regard to the hemoglobin. We have measured ferritin, iron, TIBC and all those numbers were normal in the past. His diet is much better at this time.      •   •   ·   High risk medication USE.

## 2023-05-10 ENCOUNTER — DOCUMENTATION (OUTPATIENT)
Dept: PHARMACY | Facility: HOSPITAL | Age: 78
End: 2023-05-10
Payer: MEDICARE

## 2023-05-10 NOTE — PROGRESS NOTES
Staff message rec from [facility] Pharmacist-Dr Schroeder is holding pts Calquence for 3 weeks.    Birgit Canales [facility] sent to Arminda Estrada         Previous Messages       ----- Message -----   From: Gerry Schroeder MD   Sent: 5/9/2023   4:36 PM EDT   To: Kassy Conway RPH     HOLDING CALQUENCE FOR 3 WEEKS, NO SIDE EFFECTS MAYBE A SECOND LEUKEMIC CLONE      Arminda Estrada  Specialty Pharmacy Technician

## 2023-06-01 ENCOUNTER — TELEPHONE (OUTPATIENT)
Dept: ONCOLOGY | Facility: CLINIC | Age: 78
End: 2023-06-01

## 2023-06-01 NOTE — TELEPHONE ENCOUNTER
Patient left voicemail on my direct line c/o fatigue. Returned call. Patient states he is having some SOA w/ exertion, cough, and congestion. Denies fever. C/o hearburn that resolves after medication. Advised patient keep appt tomorrow so we could eval. Pt v/u. Anneliese Torres RN

## 2023-06-02 ENCOUNTER — OFFICE VISIT (OUTPATIENT)
Dept: ONCOLOGY | Facility: CLINIC | Age: 78
End: 2023-06-02

## 2023-06-02 ENCOUNTER — INFUSION (OUTPATIENT)
Dept: ONCOLOGY | Facility: HOSPITAL | Age: 78
End: 2023-06-02
Payer: MEDICARE

## 2023-06-02 ENCOUNTER — LAB (OUTPATIENT)
Dept: LAB | Facility: HOSPITAL | Age: 78
End: 2023-06-02
Payer: MEDICARE

## 2023-06-02 VITALS
HEIGHT: 77 IN | OXYGEN SATURATION: 94 % | SYSTOLIC BLOOD PRESSURE: 116 MMHG | DIASTOLIC BLOOD PRESSURE: 69 MMHG | TEMPERATURE: 98.7 F | WEIGHT: 207.9 LBS | HEART RATE: 112 BPM | BODY MASS INDEX: 24.55 KG/M2

## 2023-06-02 DIAGNOSIS — C91.10 CHRONIC LYMPHOCYTIC LEUKEMIA: Primary | ICD-10-CM

## 2023-06-02 DIAGNOSIS — J18.9 PNEUMONIA DUE TO INFECTIOUS ORGANISM, UNSPECIFIED LATERALITY, UNSPECIFIED PART OF LUNG: ICD-10-CM

## 2023-06-02 DIAGNOSIS — C91.10 CHRONIC LYMPHOCYTIC LEUKEMIA: ICD-10-CM

## 2023-06-02 DIAGNOSIS — C91.12 CLL (CHRONIC LYMPHOID LEUKEMIA) IN RELAPSE: ICD-10-CM

## 2023-06-02 LAB
ALBUMIN SERPL-MCNC: 3.5 G/DL (ref 3.5–5.2)
ALBUMIN/GLOB SERPL: 1.8 G/DL (ref 1.1–2.4)
ALP SERPL-CCNC: 60 U/L (ref 38–116)
ALT SERPL W P-5'-P-CCNC: 8 U/L (ref 0–41)
ANION GAP SERPL CALCULATED.3IONS-SCNC: 12.5 MMOL/L (ref 5–15)
AST SERPL-CCNC: 18 U/L (ref 0–40)
BASOPHILS # BLD AUTO: 0.07 10*3/MM3 (ref 0–0.2)
BASOPHILS NFR BLD AUTO: 0.3 % (ref 0–1.5)
BILIRUB SERPL-MCNC: 1.4 MG/DL (ref 0.2–1.2)
BUN SERPL-MCNC: 26 MG/DL (ref 6–20)
BUN/CREAT SERPL: 26.5 (ref 7.3–30)
CALCIUM SPEC-SCNC: 9.3 MG/DL (ref 8.5–10.2)
CHLORIDE SERPL-SCNC: 99 MMOL/L (ref 98–107)
CO2 SERPL-SCNC: 24.5 MMOL/L (ref 22–29)
CREAT SERPL-MCNC: 0.98 MG/DL (ref 0.7–1.3)
DEPRECATED RDW RBC AUTO: 45.6 FL (ref 37–54)
EGFRCR SERPLBLD CKD-EPI 2021: 78.4 ML/MIN/1.73
EOSINOPHIL # BLD AUTO: 0.07 10*3/MM3 (ref 0–0.4)
EOSINOPHIL NFR BLD AUTO: 0.3 % (ref 0.3–6.2)
ERYTHROCYTE [DISTWIDTH] IN BLOOD BY AUTOMATED COUNT: 15.7 % (ref 12.3–15.4)
GLOBULIN UR ELPH-MCNC: 2 GM/DL (ref 1.8–3.5)
GLUCOSE SERPL-MCNC: 111 MG/DL (ref 74–124)
HCT VFR BLD AUTO: 33.7 % (ref 37.5–51)
HGB BLD-MCNC: 10.8 G/DL (ref 13–17.7)
IMM GRANULOCYTES # BLD AUTO: 0.19 10*3/MM3 (ref 0–0.05)
IMM GRANULOCYTES NFR BLD AUTO: 0.8 % (ref 0–0.5)
LDH SERPL-CCNC: 1433 U/L (ref 99–259)
LYMPHOCYTES # BLD AUTO: 2.64 10*3/MM3 (ref 0.7–3.1)
LYMPHOCYTES NFR BLD AUTO: 11.3 % (ref 19.6–45.3)
MCH RBC QN AUTO: 26.1 PG (ref 26.6–33)
MCHC RBC AUTO-ENTMCNC: 32 G/DL (ref 31.5–35.7)
MCV RBC AUTO: 81.4 FL (ref 79–97)
MONOCYTES # BLD AUTO: 16.59 10*3/MM3 (ref 0.1–0.9)
MONOCYTES NFR BLD AUTO: 71.1 % (ref 5–12)
NEUTROPHILS NFR BLD AUTO: 16.2 % (ref 42.7–76)
NEUTROPHILS NFR BLD AUTO: 3.78 10*3/MM3 (ref 1.7–7)
NRBC BLD AUTO-RTO: 0 /100 WBC (ref 0–0.2)
PLATELET # BLD AUTO: 80 10*3/MM3 (ref 140–450)
PMV BLD AUTO: 8.8 FL (ref 6–12)
POTASSIUM SERPL-SCNC: 4.2 MMOL/L (ref 3.5–4.7)
PROT SERPL-MCNC: 5.5 G/DL (ref 6.3–8)
RBC # BLD AUTO: 4.14 10*6/MM3 (ref 4.14–5.8)
SODIUM SERPL-SCNC: 136 MMOL/L (ref 134–145)
WBC NRBC COR # BLD: 23.34 10*3/MM3 (ref 3.4–10.8)

## 2023-06-02 PROCEDURE — 96367 TX/PROPH/DG ADDL SEQ IV INF: CPT

## 2023-06-02 PROCEDURE — 96365 THER/PROPH/DIAG IV INF INIT: CPT

## 2023-06-02 PROCEDURE — 36415 COLL VENOUS BLD VENIPUNCTURE: CPT

## 2023-06-02 PROCEDURE — 25010000002 CEFTRIAXONE PER 250 MG: Performed by: INTERNAL MEDICINE

## 2023-06-02 PROCEDURE — 96361 HYDRATE IV INFUSION ADD-ON: CPT

## 2023-06-02 PROCEDURE — 85025 COMPLETE CBC W/AUTO DIFF WBC: CPT

## 2023-06-02 PROCEDURE — 83615 LACTATE (LD) (LDH) ENZYME: CPT

## 2023-06-02 PROCEDURE — 87040 BLOOD CULTURE FOR BACTERIA: CPT | Performed by: INTERNAL MEDICINE

## 2023-06-02 PROCEDURE — 80053 COMPREHEN METABOLIC PANEL: CPT

## 2023-06-02 RX ORDER — SODIUM CHLORIDE 9 MG/ML
1000 INJECTION, SOLUTION INTRAVENOUS CONTINUOUS
Status: DISCONTINUED | OUTPATIENT
Start: 2023-06-02 | End: 2023-06-02 | Stop reason: HOSPADM

## 2023-06-02 RX ORDER — SODIUM CHLORIDE 9 MG/ML
1000 INJECTION, SOLUTION INTRAVENOUS CONTINUOUS
Status: CANCELLED
Start: 2023-06-02

## 2023-06-02 RX ORDER — MORPHINE SULFATE 2 MG/ML
0.5 INJECTION, SOLUTION INTRAMUSCULAR; INTRAVENOUS AS NEEDED
OUTPATIENT
Start: 2023-06-02 | End: 2023-06-09

## 2023-06-02 RX ORDER — LORAZEPAM 2 MG/ML
0.5 INJECTION INTRAMUSCULAR
OUTPATIENT
Start: 2023-06-02

## 2023-06-02 RX ORDER — LEVOFLOXACIN 500 MG/1
500 TABLET, FILM COATED ORAL DAILY
Qty: 5 TABLET | Refills: 0 | Status: SHIPPED | OUTPATIENT
Start: 2023-06-02

## 2023-06-02 RX ADMIN — CEFTRIAXONE SODIUM 1 G: 1 INJECTION, POWDER, FOR SOLUTION INTRAMUSCULAR; INTRAVENOUS at 12:36

## 2023-06-02 RX ADMIN — SODIUM CHLORIDE 1000 ML: 9 INJECTION, SOLUTION INTRAVENOUS at 12:36

## 2023-06-02 NOTE — PROGRESS NOTES
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     HISTORY OF PRESENT ILLNESS:    On 06/02/2023 this 77-year-old male returns to the office for follow up after he has discontinued Brukinsa after the last visit given the lack of benefit of the medicine in regard to the control of his CLL. I wanted to see how he was going to feel in absence of the medicine. It happens to be that in the last 2 weeks the patient has lost her energy completely and he has been having cough with clear sputum production and shortness of breath with low grade temperature for the last few days. The fatigue is so tiresome that he is barely able to standup to shave himself. He is barely able to get of the house on his own and get dressed. He has had a good appetite, he has proper bowel activity, no abdominal pain or distention, normal urination in volume. He is trying to drink as much as he can. He has not developed any bone pain, joint pain or rash and no neurological symptomatology. The fatigue again is profound. Today we have documented that the patient has a leukocytosis with very atypical cells in peripheral circulation that suggests the development of acute leukemia. Please review below. Also the patient has developed thrombocytopenia and further anemia.           Past Medical History:   Diagnosis Date   • Arthritis    • Benign prostatic hyperplasia     FOLLOWED BY DR. VIVIEN PATEL   • Biliary dyskinesia    • Bunion of right foot     GREAT TOE   • CLL (chronic lymphocytic leukemia) 2016    FOLLOWED BY DR WALKER   • Colon polyps     FOLLOWED BY DR. NEHA HAM   • Constipation    • Degeneration of lumbar intervertebral disc    • Diverticulosis    • Erectile dysfunction    • Fracture of ankle 1975   • GERD (gastroesophageal reflux disease)    • Hallux valgus of left foot    • Hyperlipidemia      MIXED   • Hypertension    • Inguinal hernia    • Kidney stone 02/21/2009    SEEN AT [facility] ER   • Knee swelling 2018   • Localized, primary osteoarthritis    • Lumbar radiculopathy    • Lumbar stenosis    • Lung mass     LEFT SIDE - SCAR TISSUE PER PATIENT    • Osteoarthritis of right knee    • Polyneuropathy    • Prostate CA 03/2016    JACQUELYN 6, S/P XRT, FOLLOWED BY DR. VIVIEN PATEL, RADIATION SEEDS   • PVC (premature ventricular contraction)     PT STATES WORKED UP YEARS AGO BY [facility] FOR POSSIBLE MURMUR - NO FOLLOW UP REQUIRED    • RBBB    • Sensory hearing loss, bilateral    • Skin cancer     SQUAMOUS CELL CARCINOMA OF FACE   • Substernal goiter    • Thyromegaly 12/2016    ADMITTED TO [facility]   • Vitamin D deficiency      Past Surgical History:   Procedure Laterality Date   • BRONCHOSCOPY N/A 12/14/2016    Procedure: BRONCHOSCOPY WITH  BAL AND BIOPSY AND ENDOBRONCHIAL ULTRASOUND  AND NAVIGATION WITH BRUSHINGS AND BIOPSY AND BAL;  Surgeon: Pierre Manning MD;  Location: [facility] ENDOSCOPY;  Service:    • COLONOSCOPY N/A 03/13/2019    5 MM SESSILE TUBULAR ADENOMA POLYP IN TRANSVERSE, MULTIPLE SMALL AND LARGE DIVERTICULA IN SIGMOID, RESCOPE IN 5 YRS, DR. NEHA HAM AT [facility]   • COLONOSCOPY W/ POLYPECTOMY N/A 03/19/2015    3 TUBULAR ADENOMA POLYPS, 12 TUBULOVILLOUS POLYP RECTO-SIGMOID, DIVERTICULOSIS, RESCOPE IN 3 YRS, DR. NEHA HAM AT [facility]   • EYE SURGERY Bilateral     CATARACT EXTRACTION WITH LENS IMPLANT, DR. GOVEA   • FINGER NAIL SURGERY Right 2022    [facility]   • INGUINAL HERNIA REPAIR Left 11/02/2021    Procedure: LEFT OPEN INGUINAL HERNIA REPAIR;  Surgeon: Nixon Kolb MD;  Location: [facility] OR;  Service: General;  Laterality: Left;   • PROSTATE RADIOACTIVE SEED IMPLANT N/A 09/06/2016    DR. HOA JARAMILLO AT [facility]   • PROSTATE SURGERY N/A 03/11/2016    BIOPSY: ADENOCARCINOMA, DR. ZAID IBARRA   • THYROID BIOPSY Bilateral 11/01/2017    NO B CELL OR T CELL LYMPHOMA, DONE AT  [facility]   • TOTAL KNEE ARTHROPLASTY Right 10/12/2022    Procedure: TOTAL KNEE ARTHROPLASTY;  Surgeon: Ran Rachel MD;  Location: [facility] OR [facility];  Service: Orthopedics;  Laterality: Right;     Social History     Socioeconomic History   • Marital status:      Spouse name: No   • Years of education: College   Tobacco Use   • Smoking status: Never   • Smokeless tobacco: Never   Substance and Sexual Activity   • Alcohol use: No   • Drug use: Never   • Sexual activity: Yes     Partners: Female     Birth control/protection: None     Family History   Problem Relation Age of Onset   • Heart disease Father         Rheumatic heart disease   • Hypertension Father    • Osteoporosis Father    • Stroke Mother    • Osteoporosis Mother    • No Known Problems Daughter    • Malig Hyperthermia Neg Hx      Current Outpatient Medications on File Prior to Visit   Medication Sig   • acalabrutinib (Calquence) 100 MG capsule capsule Take 2 capsules by mouth 2 (Two) Times a Day.   • acyclovir (Zovirax) 400 MG tablet Take 1 tablet by mouth 2 (Two) Times a Day.   • alfuzosin (UROXATRAL) 10 MG 24 hr tablet Take 2 tablets by mouth Every Night.   • ammonium lactate (LAC-HYDRIN) 12 % lotion Every 12 (Twelve) Hours.   • aspirin 81 MG EC tablet Take 1 tablet by mouth twice daily x 14 days; then take 1 tablet by mouth daily x 28 days   • losartan-hydrochlorothiazide (HYZAAR) 100-25 MG per tablet Take 1 tablet by mouth Daily.   • Melatonin 10 MG tablet Take 1 tablet by mouth Every Night.   • NON FORMULARY Balance of Nature and Super Beets daily   • ondansetron (ZOFRAN) 8 MG tablet Take 1 tablet by mouth 3 (Three) Times a Day As Needed for Nausea or Vomiting.   • pantoprazole (PROTONIX) 40 MG EC tablet Take 1 tablet by mouth Daily.   • polyethylene glycol (MIRALAX) 17 GM/SCOOP powder Miralax 17 gram/dose oral powder   1 scoop in the morning then as needed   • potassium chloride (K-DUR,KLOR-CON) 20 MEQ CR tablet Take 1 tablet by mouth Daily.  "(Patient taking differently: Take 1 tablet by mouth Daily. HOLD FOR ONE WEEK PRIOR TO SURGERY)   • pravastatin (PRAVACHOL) 20 MG tablet Take 1 tablet by mouth Every Night.   • predniSONE (DELTASONE) 10 MG tablet Take 2 tablets by mouth Daily. Take once in the morning   • sulfamethoxazole-trimethoprim (BACTRIM DS,SEPTRA DS) 800-160 MG per tablet Take 1 tablet by mouth 3 (Three) Times a Week. Take on Monday, Wednesday and Friday.   • triamcinolone (KENALOG) 0.025 % cream    • [DISCONTINUED] ciprofloxacin (CIPRO) 500 MG tablet Take 1 tablet by mouth 2 (Two) Times a Day. (Patient not taking: Reported on 6/2/2023)     Current Facility-Administered Medications on File Prior to Visit   Medication   • Chlorhexidine Gluconate Cloth 2 % pads   No Known Allergies              VITAL SIGNS:  Vitals:    06/02/23 1126   BP: 116/69   Pulse: 112   Temp: 98.7 °F (37.1 °C)   TempSrc: Temporal   SpO2: 94%   Weight: 94.3 kg (207 lb 14.4 oz)   Height: 195.6 cm (77.01\")   PainSc:   1   PainLoc: Abdomen          PHYSICAL EXAMINATION:                   GENERAL:  Well-developed, Patient  in  acute distress. In  A wheel chair  SKIN:  Warm, dry ,NO purpura ,no rash.pale  HEENT:  Pupils were equal and reactive to light and accomodation, conjunctivae noninjected,  normal visual acuity.   NECK:  Supple with good range of motion; no thyromegaly , no JVD or bruits,.No carotid artery pain, no carotid abnormal pulsation   LYMPHATICS:  No cervical, NO supraclavicular, NO axillary, NO inguinal adenopathies.  CARDIAC   normal rate , regular rhythm, without murmur,NO rubs NO S3 NO S4   LUNGS: normal breath sounds bilateral, no wheezing, NO rhonchi, NO crackles ,NO rubs.  VASCULAR VENOUS: no cyanosis, NO collateral circulation, NO varicosities, NO edema, NO palpable cords, NO pain,NO erythema, NO pigmentation of the skin.  ABDOMEN:  Soft, NO pain,no hepatomegaly, 5 cm blcm splenomegaly,no masses, no ascites, no collateral circulation,no " distention.  EXTREMITIES  AND SPINE:  No clubbing, no cyanosis ,oa hands and knee deformities , no pain .No kyphosis,  no pain in spine, no pain in ribs , no pain in pelvic bone.  NEUROLOGICAL:  Patient was awake, alert, oriented to time, person and place.                    LABORATORY DATA:  Results from last 7 days   Lab Units 06/02/23  1049   WBC 10*3/mm3 23.34*   NEUTROS ABS 10*3/mm3 3.78   HEMOGLOBIN g/dL 10.8*   HEMATOCRIT % 33.7*   PLATELETS 10*3/mm3 80*                  ASSESSMENT:   1.  History of CLL  · Status posttreatment with Bendamustine/Rituxan in 2016  · Patient did have severe reaction to initial Rituxan dose requiring hospitalization or completion.  Did well thereafter.  · 12/30/2022 WBC remains normal at 10.6, 31% lymphs.  Platelets normal at 105,000.  No B symptoms or adenopathy.  No suggestion of recurrent disease.  Hemoglobin has acutely dropped however down to 11.9 (see further discussion below).  • On 01/13/2023 his white count, hemoglobin and platelets are not changing. His total lymphocyte count is very minimal and I do believe that his leukemia remains very quiet on clinical grounds with no need for intervention.  • On 01/18/2023 I discussed with the patient the fact that his radiological assessment of the abdomen shows periaortic and pericaval lymphadenopathy. Most of the lymph nodes are between 2-3 cm in size but most dramatically his spleen has enlarged very substantially being huge and almost as big as his liver or bigger. He has no obvious alteration in the liver. His stomach, small intestine, pancreas, kidneys disclose no new abnormalities. There is no evidence of diverticulitis or bowel obstruction. There is no notion of hernias. There is no other notion of masses or ascites. He has multiple cysts in the liver no different than before that have been present for more than 15 years.     The patient's hemoglobin has dropped to 10.7, the platelet count has dropped to 131,000 and I do  believe that this is representation of splenomegaly sequestration and CLL activity very substantially. Today we have a beta-2 microglobulin, LDH and uric acid pending along with a chemistry profile. A urinalysis today disclosed no evidence of infection and he has minimal microscopic hematuria with no significance. There is no proteinuria. There is no leukocyturia.     Given the above findings I discussed with the patient the fact that I need to rule out the possibility of Jon's transformation of his CLL and for this reason I have advised him to proceed with a PET scan in a few hours. Hopefully this will not be the case.     If the lymph nodes or spleen are extremely hot he will probably require a biopsy. If they have a light degree or moderate degree of SUV activity we will assume that leukemia is the reason and we will proceed with therapy with Calquence. I discussed with him the methodology of using this medicine taking it twice a day with side effects including atrial fibrillation and abnormal bleeding. He is no longer taking any PPI therefore there should not be a need for this medicine at that point. He will have formal education and consent for Calquence and the medicine will be delivered to his home in [place].     Once that we review the PET scan this will be discussed with him on the telephone. I am planning for him to remain on Calquence at least for the next 3-6 months and see how things evolve and planning to repeat radiological assessment in 3 months. I made him aware that sometimes people taking Calquence can have a rise in the blood count for several weeks after initiation of the medicine that eventually settles down and improves.     I also discussed with him that if the Calquence does not help the process we still have alternatives giving him Gazyva for example or Rituxan.     The patient is no longer requiring blood pressure medication and I advised him to hold off on this until we see how  things evolve.     In order to improve his appetite and help him to control the leukemia up to a certain point the patient will take 20 mg of prednisone everyday for the next 7 days, take 1 tablet in the morning and then discontinue. With that kind of dosing he will not need to have any taper.   • On 02/08/2023 I discussed with the patient the fact that his PET scan failed to document any significant SUV activity in the spleen or lymph nodes documented in his abdomen. This gave me the relief that we are not facing the possibility or Jon's transformation and for that reason I advised the patient to proceed with therapy with Calquence that he has initiated for the last few days. He has encountered side effect including  headache and I pointed out to him that this is a common side effect that typically is relieved drinking a little bit of coffee once or twice a day.he is using Bufferin with some relief but I pointed out to him that he needs to be very careful with this medicine because this can end up with GI bleeding, stomach irritation and abnormal bleeding in the first place.     So far the patient's hemoglobin has improved some, the platelet count is stable but it will take several days before the spleen shrinks and the platelet count normalizes. The total white count is normal. Again the molecular analysis of the peripheral blood flow cytometry has been requested from the [facility] in regard the FISH testing. Also we ordered IgH testing.    The goal will be to be seen in a couple of weeks by nurse with laboratory parameter including CBC and visit with me in 1 month and 2 months. We will plan in 04/2023 to repeat radiological analysis of his abdomen. By then his spleen should be substantially reduced in size and intraabdominal adenopathy should be disappearing.     I expect that as the days go by the platelet count will improve and the hemoglobin will continue improving.  On 03/17/2023 the patient has been taking  now his Brukinsa for almost 6 weeks. He has tolerated the medicine better than anticipated. He has not had any clinical bleeding or cardiac irregularity. The patient's appetite has remained acceptable. He has not had any nausea or vomiting and today his heart rhythm is normal and his blood sugar is raising. Other issues important is that his hemoglobin has improved. His white count is stable. His total lymphocyte count is very well controlled and his platelet count is stable at 103.     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 I provided him the report of his testing. 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 This makes his prognosis a little bit worse in the long run but I pointed out to him that the medicine that he is taking is prime line for the condition that he has. Again, clinically his spleen is not palpable anymore. Therefore, I advised him to remain on his medicine at the same dosing twice a day. He will require laboratory testing every couple of weeks by nurse and I will review him in 1 month and 2 months. I want to review the status of his adenopathies and his spleen radiologically speaking in 2 months. I scheduled another PET scan. His spleen was very hot at that time of the diagnosis on 01/25/2023 and I want to review the anatomy of this and the SUV uptake of the spleen at that point.    Besides this he will require at some point a holiday from treatment to have a couple of teeth removed from his lower right side. He is not having infection yet. He raised the question if he will be able to have a cap and I told him that it should not be a problem at all. This can be done at any time. The removal will require holding off of his medication for at least 7 days in anticipation of any tooth removal and allow the places to heal and go back into the medication after that.  On 04/13/2023 the patient continues taking his Calquence with no limitations and no significant side effects.  His cardiac auscultation today is normal, he has not had any clinical bleeding, he has not had any diarrhea, in fact he has been mildly constipated. When constipation happens the patient develops some suprapubic pain probably related to sigmoid colon filling up with stools.     His spleen is clinically smaller today documented in the examination, the liver is not palpable, he has no peripheral adenopathy. His total lymphocyte count continues declining, the hemoglobin is stable and the platelet count is stable. I do believe that this patient is responding to this medicine and we advised him to remain on the medicine at the same dosing returning to see us back the first of next month. He will have radiological assessment in the form of PET scan a few days before and then review him after that.     He really wants to have his left knee replacement therapy at some point in the summer to be able to play pickle ball. We will work into his Calquence and his Orthopedic Medicine, Ran Rachel MD, in order to proceed with this at that point. This will be further discussed in the next visit.  On 05/09/2023 I reviewed the patient on clinical grounds. Actually he feels terrific and he is functioning very well. He is worrying about his bad knee with osteoarthritis, the patient will be a completely functional individual. He has not had any B symptoms, any infections, any clinical bleeding, any palpitations on the Calquence. I reviewed today his PET scan that discloses minimal activity in intraabdominal and hilar lymph nodes. Spleen remains very active, hot and enlarged with no changes in comparison with how he was 3 months ago. There is no obvious bone marrow uptake or bone uptake and the liver has no uptake. This raises the question now that the patient has some excess of monocytes if the spleen is enlarged because of a different phenomenon, for example myelophthisis, or if the spleen is enlarged because of monocyte infiltration.  I do not know the answer to that. I do believe that the patient feels well and I do not think anything will be hurt if the patient holds off on the Calquence now and reassess him with CBC, CMP and LDH in 3 weeks from now. I pointed out to him that I will not rule out the need for him to have a bone marrow test to be sure that he does not have a chronic myelomonocytic leukemia or have developed myelodysplastic syndrome as a complication of previous bendamustine administration in the past.     The patient will call if any issues arise while holding off on the Calquence    I think so far he is doing fine.  On 06/02/2023 the patient has been seen in the office with profound fatigue for the last 2 weeks to the point that he has great deal of difficulty getting dressed by himself, getting shaved and things of this nature. This is the first time in his life as a patient in this office that he has come in in a wheelchair pushed by his friend. The patient is pale and what is more concerning is the leukocytosis with a white count of 23,000 and very atypical mononuclear cells in circulation by peripheral blood examination. The cells are large with minimal cytoplasm, granular nucleus vacuoles looking like immature cells and I would not be surprised if they suggest blasts. I do not see any rhiannon rods on them and they do not look like prolymphocytes neither. Therefore this explains why the differential in the white count is abnormal, explains the leukocytosis, and explains also the progressive thrombocytopenia and progressive splenomegaly.    We have collected peripheral blood flow cytometry today and expect the report of this this afternoon.     I would not be surprised if this patient needs to have bone marrow testing at some point next week.     I am very concerned that the patient in the presence of cough and decreased breath sounds in the right base also has pneumonia and he will be treated with IV fluids today normal saline  1000 cc and he will receive 1 gram of rocephin IV. Subsequently he will receive Levaquin 500 mg orally every day for the next 5 days.     The patient will return to the office next week, Tuesday to be reviewed. Hopefully the report of the flow cytometry called to me today will decide how to proceed. I would not be surprised if the patient needs to be admitted over the weekend to the hospital to proceed with bone marrow testing first thing on Monday and then definitive diagnosis. Obviously the question will be if he has developed acute leukemia as a complication of bendamustine administration and in the background of myelodysplasia what the treatment will be.     I pointed out to the patient all of these issues and he was ready to proceed.         •   •   •   •   ·   2.  History of prostate cancer  · Treated at the [facility]  · Most recent PSA April 2022 = 0.203  • On 01/13/2023 he has minimal urinary symptomatology comparing his PSA with 3 years ago was 0.8 today, actually a few days ago was 0.1. I doubt that all of the symptoms that he has are associated with prostate cancer. The blood in the urine has a different significance.   • On 01/18/2023 no issues pertinent to his prostate cancer. I see the seeds in his prostatic bed on the CT scan. His urinalysis is very much clean and his PSA has remained stable. No activity of this entity at this point.   • On 02/08/2023  no issues pertinent to this.   On 03/17/2023 no issues pertinent to his previous history of prostate cancer. His PSA has been measured and has remained normal.  On 04/13/2023 no issues pertinent to this at this point.   On 05/09/2023 no issues pertinent to this.  On 06/02/2023 no issues pertinent to this.           •   •   ·   3.  New anemia  · 12/30/2022 hemoglobin today 11.9.  This is a significant drop from 15.6 when seen 4 months ago.  · ?  Related to cystitis/hematuria  · We will check additional lab work today including ferritin, iron  profile, B12, folate, and PSA level in light of his history of prostate cancer.  • On 01/13/2023 the hemoglobin remains at 12, he had a B12 level of 265 receiving a B12 injection 3 weeks ago and a reticulated hemoglobin of 31 today. Normal platelet count. His recent ferritin and iron profile were acceptable. He has had a colonoscopy that was perfectly normal in 2019 by Hieu Lester MD.   On 01/18/2023 I think the anemia and the thrombocytopenia is a reflection of splenomegaly, sequestration and activity of the CLL. I expect these numbers will improve once that we start to counteract the leukemia with Calquence. He will require repeat CBC, CMP, LDH, uric acid in 3 weeks.   • On 02/08/2023 we know that the anemia is a reflection of CLL activity, splenomegaly, and so forth. We expect that the hemoglobin should be corrected in the visit in 1 month and the splenomegaly should be reducing also correcting his platelet count.     The patient will be returning every couple of weeks for nurse visit and blood count and he will have visit with doctor in 1 month and 2 months. Plan in 04/2023 to repeat radiological assessment of his abdomen. I find no need for further PET scan. We will await the report of the molecular analysis of the peripheral blood from the [facility]. We requested this today.  On 03/17/2023 the anemia is slowly improving, reflection of the treatment of his leukemia. The anemia was a reflection of leukemia involvement in the bone marrow and decreased production of red cells. The platelet count is reflection being low of splenomegaly and sequestration and probably bone marrow infiltration by leukemia.     I had a lengthy discussion with the patient about all these issues today. 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     The rest of the molecular analysis disclosed no bad situation in regard to prognosis.     The patient was thankful about the visit today and the simple explanation.  On  04/13/2023 the patient's hemoglobin remains stable, he has no notion of blood loss, his nutrition to me is marginal and I had a discussion with him about nutrition today that I think is very important not only from the point of view of his bowel activity but also from the point of view of proper nutrition and caloric ingestion. I do not think this patient according to what he reported to me is consuming more than 1200 calories a day and that explains to me why he loses weight. I pointed out to him that he needs to catch up in snacks, to have some cheese, to have some nuts, to have fruit in his diet that will help him to have better digestion and better bowel activity. I suggested some ice cream at nighttime as well.     In regard to his constipation I advised him to use a lower amount of MiraLax on a daily basis and that way his bowels remain regular on a daily basis.     His appointments will be kept the same for a visit in a month and the PET scan a few days before.     In a month he will have a CBC and CMP.   On 05/09/2023 the patient's platelet count remains low. I think it is a reflection of splenomegaly and sequestration and the hemoglobin remains at a level 9, reflection of splenomegaly and sequestration.     As posted above the patient has now monocyte excess. I do not know if this is an [facility] or maybe the patient has a 2nd clone that could be significant in regard to chronic myelomonocytic leukemia that could be arising from previous bendamustine administration. I asked him as posted above to hold off on the Calquence, review him back in 3 weeks and  if we need to pursue bone marrow testing. I think I will in case that the monocyte count remains elevated or higher. The patient is aware of that.     Nothing that I can do for his platelet count at this point given the splenomegaly and not too much that I can do in regard to the hemoglobin. We have measured ferritin, iron, TIBC and  all those numbers were normal in the past. His diet is much better at this time.  On 06/02/2023 the patient is very ill today and he will require IV fluids as posted above, IV antibiotics, oral antibiotics, peripheral blood flow cytometry, CMP, LDH and blood cultures. This patient has a high risk disease with high risk of symptomatology and high potential for new hospital admission.        •

## 2023-06-05 ENCOUNTER — TELEPHONE (OUTPATIENT)
Dept: ONCOLOGY | Facility: CLINIC | Age: 78
End: 2023-06-05
Payer: MEDICARE

## 2023-06-05 NOTE — TELEPHONE ENCOUNTER
Caller: Jeff Bass    Relationship: Self    Best call back number: [phone]     What is the best time to reach you: ASAP    Who are you requesting to speak with (clinical staff, provider,  specific staff member): CLINICAL - [facility]    What was the call regarding: PT IS NEEDING TO GET IN 06/06/23. PT STATES THAT DR. WALKER IS WANTING HIM TO GET IN 06/06/23. THIS HAS NOT BEEN SET UP ON THE SCHEDULE.     PT NEEDS TO KNOW WHEN TO COME IN TO ARRANGE TO GET HERE.     TRIED TO WT.     Is it okay if the provider responds through [employer]: NO - PLEASE CALL BACK.

## 2023-06-07 ENCOUNTER — LAB (OUTPATIENT)
Dept: LAB | Facility: HOSPITAL | Age: 78
End: 2023-06-07
Payer: MEDICARE

## 2023-06-07 ENCOUNTER — INFUSION (OUTPATIENT)
Dept: ONCOLOGY | Facility: HOSPITAL | Age: 78
End: 2023-06-07
Payer: MEDICARE

## 2023-06-07 ENCOUNTER — OFFICE VISIT (OUTPATIENT)
Dept: ONCOLOGY | Facility: CLINIC | Age: 78
End: 2023-06-07
Payer: MEDICARE

## 2023-06-07 ENCOUNTER — PREP FOR SURGERY (OUTPATIENT)
Dept: ONCOLOGY | Facility: HOSPITAL | Age: 78
End: 2023-06-07
Payer: MEDICARE

## 2023-06-07 VITALS
HEIGHT: 77 IN | HEART RATE: 103 BPM | SYSTOLIC BLOOD PRESSURE: 131 MMHG | OXYGEN SATURATION: 95 % | BODY MASS INDEX: 24.95 KG/M2 | WEIGHT: 211.3 LBS | TEMPERATURE: 98.2 F | DIASTOLIC BLOOD PRESSURE: 74 MMHG

## 2023-06-07 VITALS — HEART RATE: 102 BPM | DIASTOLIC BLOOD PRESSURE: 71 MMHG | SYSTOLIC BLOOD PRESSURE: 119 MMHG

## 2023-06-07 DIAGNOSIS — J18.9 PNEUMONIA DUE TO INFECTIOUS ORGANISM, UNSPECIFIED LATERALITY, UNSPECIFIED PART OF LUNG: Primary | ICD-10-CM

## 2023-06-07 DIAGNOSIS — J18.9 PNEUMONIA DUE TO INFECTIOUS ORGANISM, UNSPECIFIED LATERALITY, UNSPECIFIED PART OF LUNG: ICD-10-CM

## 2023-06-07 DIAGNOSIS — C91.10 CHRONIC LYMPHOCYTIC LEUKEMIA: Primary | ICD-10-CM

## 2023-06-07 DIAGNOSIS — C91.10 CHRONIC LYMPHOCYTIC LEUKEMIA: ICD-10-CM

## 2023-06-07 DIAGNOSIS — C91.10 CLL (CHRONIC LYMPHOCYTIC LEUKEMIA): Primary | ICD-10-CM

## 2023-06-07 LAB
BACTERIA SPEC AEROBE CULT: NORMAL
BACTERIA SPEC AEROBE CULT: NORMAL
BASOPHILS # BLD AUTO: 0.13 10*3/MM3 (ref 0–0.2)
BASOPHILS NFR BLD AUTO: 0.3 % (ref 0–1.5)
DEPRECATED RDW RBC AUTO: 48.6 FL (ref 37–54)
EOSINOPHIL # BLD AUTO: 0.14 10*3/MM3 (ref 0–0.4)
EOSINOPHIL NFR BLD AUTO: 0.3 % (ref 0.3–6.2)
ERYTHROCYTE [DISTWIDTH] IN BLOOD BY AUTOMATED COUNT: 17.1 % (ref 12.3–15.4)
HCT VFR BLD AUTO: 31.2 % (ref 37.5–51)
HGB BLD-MCNC: 10.1 G/DL (ref 13–17.7)
IMM GRANULOCYTES # BLD AUTO: 0.42 10*3/MM3 (ref 0–0.05)
IMM GRANULOCYTES NFR BLD AUTO: 1 % (ref 0–0.5)
LDH SERPL-CCNC: 1470 U/L (ref 99–259)
LYMPHOCYTES # BLD AUTO: 7.43 10*3/MM3 (ref 0.7–3.1)
LYMPHOCYTES NFR BLD AUTO: 16.8 % (ref 19.6–45.3)
MCH RBC QN AUTO: 26.3 PG (ref 26.6–33)
MCHC RBC AUTO-ENTMCNC: 32.4 G/DL (ref 31.5–35.7)
MCV RBC AUTO: 81.3 FL (ref 79–97)
MONOCYTES # BLD AUTO: 31.95 10*3/MM3 (ref 0.1–0.9)
MONOCYTES NFR BLD AUTO: 72.3 % (ref 5–12)
NEUTROPHILS NFR BLD AUTO: 4.14 10*3/MM3 (ref 1.7–7)
NEUTROPHILS NFR BLD AUTO: 9.3 % (ref 42.7–76)
NRBC BLD AUTO-RTO: 0 /100 WBC (ref 0–0.2)
PLATELET # BLD AUTO: 51 10*3/MM3 (ref 140–450)
PMV BLD AUTO: 10 FL (ref 6–12)
RBC # BLD AUTO: 3.84 10*6/MM3 (ref 4.14–5.8)
URATE SERPL-MCNC: 6.2 MG/DL (ref 2.8–7.4)
WBC NRBC COR # BLD: 44.21 10*3/MM3 (ref 3.4–10.8)

## 2023-06-07 PROCEDURE — 36415 COLL VENOUS BLD VENIPUNCTURE: CPT

## 2023-06-07 PROCEDURE — 25010000002 LORAZEPAM PER 2 MG: Performed by: INTERNAL MEDICINE

## 2023-06-07 PROCEDURE — 96374 THER/PROPH/DIAG INJ IV PUSH: CPT

## 2023-06-07 PROCEDURE — 96375 TX/PRO/DX INJ NEW DRUG ADDON: CPT

## 2023-06-07 PROCEDURE — 25010000002 MORPHINE SULFATE (PF) 2 MG/ML SOLUTION: Performed by: INTERNAL MEDICINE

## 2023-06-07 PROCEDURE — 85025 COMPLETE CBC W/AUTO DIFF WBC: CPT

## 2023-06-07 RX ORDER — MORPHINE SULFATE 2 MG/ML
1 INJECTION, SOLUTION INTRAMUSCULAR; INTRAVENOUS ONCE
Status: COMPLETED | OUTPATIENT
Start: 2023-06-07 | End: 2023-06-07

## 2023-06-07 RX ORDER — LEVOFLOXACIN 500 MG/1
500 TABLET, FILM COATED ORAL DAILY
Qty: 5 TABLET | Refills: 0 | Status: ON HOLD | OUTPATIENT
Start: 2023-06-07

## 2023-06-07 RX ORDER — MORPHINE SULFATE 2 MG/ML
1 INJECTION, SOLUTION INTRAMUSCULAR; INTRAVENOUS ONCE
Status: DISCONTINUED | OUTPATIENT
Start: 2023-06-07 | End: 2023-06-07 | Stop reason: HOSPADM

## 2023-06-07 RX ORDER — LORAZEPAM 2 MG/ML
1 INJECTION INTRAMUSCULAR ONCE
Status: COMPLETED | OUTPATIENT
Start: 2023-06-07 | End: 2023-06-07

## 2023-06-07 RX ORDER — LORAZEPAM 2 MG/ML
1 INJECTION INTRAMUSCULAR ONCE
Status: DISCONTINUED | OUTPATIENT
Start: 2023-06-07 | End: 2023-06-07 | Stop reason: HOSPADM

## 2023-06-07 RX ADMIN — LORAZEPAM 1 MG: 2 INJECTION INTRAMUSCULAR; INTRAVENOUS at 15:54

## 2023-06-07 RX ADMIN — MORPHINE SULFATE 1 MG: 2 INJECTION, SOLUTION INTRAMUSCULAR; INTRAVENOUS at 15:58

## 2023-06-07 NOTE — NURSING NOTE
Pt here for bone marrow biopsy. Consent signed and placed in scan bin.   1mg IV Ativan and 1mg IV Morphine ordered by Dr. Schroeder prior to biopsy procedure. Medications administered. Sterile procedure maintained. All specimens collected. Pt educated to take Tylenol tonight at home if needed. Pt also educated that he can shower tomorrow 6/8/23 and to change bandage to biopsy site on Friday 6/9/23. Pt v/u. Prior to discharge, pt's /71 and pain level 2/10. Pt declines SOB or dizziness. IV removed and pt discharged home via wheelchair.

## 2023-06-07 NOTE — PROGRESS NOTES
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  A \"time-out\" was called at   3:50 p.m.    Patient identity was confirmed by:  KYA WALKER MD confirmed name and  on ID band  Correct site confirmed as:  RIGHT posterior iliac crest  Procedure confirmed as:  BONE MARROW ASPIRATION AND BIOPSY.    At 3:55 p.m., 1% plain Xylocaine  8 CC was infiltrated into the skin, subcutaneous tissue and periosteum overlying the right posterior iliac BONE  . A #11 Jamshidi needle was introduced into the marrow cavity and suction was applied with a 20 cc syringe.  Aspirate material and a core biopsy were obtained.  The patient tolerated the procedure well.      THE SAMPLES OBTAINED WERE SENT FOR REGULAR PATHOLOGY, FLOW CYTOMETRY AND CHROMOSOME ANALYSIS, AND SPECIAL MOLECULAR ANALYSIS IF NECESSARY.    THE PATIENT DID NOT HAVE ANY ACUTE COMPLICATIONS.    SEDATION WAS UTILIZED BEFORE THE PROCEDURE INCLUDING 1 MG IV MORPHINE AND 0.5 MG IV ATIVAN, GIVEN BY NURSE IN IV SITE.    PATIENT WAS INSTRUCTED TO TAKE 2 TYLENOL TABLETS UPON GETTING HOME, AND IT WILL BE FINE TO PERFORM LIGHT  PHYSICAL ACTIVITY FOR THE NEXT 2 DAYS.    PATIENT WAS GIVEN A RETURN APPOINTMENT TO GO OVER THE REPORT OF THE PATHOLOGIC ANALYSIS OF THE SPECIMEN IN 10 DAYS.    PATIENT WAS ASKED TO CALL IF ANY OTHER PROBLEMS OR SIDE EFFECTS OF THE PROCEDURE.    KYA WALKER MD   "

## 2023-06-08 LAB — REF LAB TEST METHOD: NORMAL

## 2023-06-09 LAB — REF LAB TEST METHOD: NORMAL

## 2023-06-12 ENCOUNTER — DOCUMENTATION (OUTPATIENT)
Dept: PHARMACY | Facility: HOSPITAL | Age: 78
End: 2023-06-12
Payer: MEDICARE

## 2023-06-12 ENCOUNTER — SPECIALTY PHARMACY (OUTPATIENT)
Dept: PHARMACY | Facility: HOSPITAL | Age: 78
End: 2023-06-12
Payer: MEDICARE

## 2023-06-12 ENCOUNTER — LAB (OUTPATIENT)
Dept: LAB | Facility: HOSPITAL | Age: 78
End: 2023-06-12
Payer: MEDICARE

## 2023-06-12 ENCOUNTER — APPOINTMENT (OUTPATIENT)
Dept: CARDIOLOGY | Facility: HOSPITAL | Age: 78
End: 2023-06-12

## 2023-06-12 ENCOUNTER — HOSPITAL ENCOUNTER (INPATIENT)
Facility: HOSPITAL | Age: 78
LOS: 6 days | Discharge: HOME | End: 2023-06-18
Attending: INTERNAL MEDICINE | Admitting: INTERNAL MEDICINE

## 2023-06-12 ENCOUNTER — OFFICE VISIT (OUTPATIENT)
Dept: ONCOLOGY | Facility: CLINIC | Age: 78
End: 2023-06-12
Payer: MEDICARE

## 2023-06-12 VITALS
HEART RATE: 114 BPM | TEMPERATURE: 98 F | HEIGHT: 77 IN | OXYGEN SATURATION: 94 % | DIASTOLIC BLOOD PRESSURE: 64 MMHG | WEIGHT: 206.3 LBS | SYSTOLIC BLOOD PRESSURE: 115 MMHG | BODY MASS INDEX: 24.36 KG/M2

## 2023-06-12 DIAGNOSIS — C91.10 CHRONIC LYMPHOCYTIC LEUKEMIA: Primary | ICD-10-CM

## 2023-06-12 DIAGNOSIS — C91.10 CHRONIC LYMPHOCYTIC LEUKEMIA: ICD-10-CM

## 2023-06-12 DIAGNOSIS — C91.10 CLL (CHRONIC LYMPHOCYTIC LEUKEMIA): Primary | ICD-10-CM

## 2023-06-12 DIAGNOSIS — C91.10 CLL (CHRONIC LYMPHOCYTIC LEUKEMIA): ICD-10-CM

## 2023-06-12 PROBLEM — C85.97: Status: ACTIVE | Noted: 2023-06-12

## 2023-06-12 LAB
ALBUMIN SERPL-MCNC: 3.3 G/DL (ref 3.5–5.2)
ALBUMIN/GLOB SERPL: 1.6 G/DL (ref 1.1–2.4)
ALP SERPL-CCNC: 58 U/L (ref 38–116)
ALT SERPL W P-5'-P-CCNC: 6 U/L (ref 0–41)
ANION GAP SERPL CALCULATED.3IONS-SCNC: 11.5 MMOL/L (ref 5–15)
ANION GAP SERPL CALCULATED.3IONS-SCNC: 6.8 MMOL/L (ref 5–15)
AORTIC DIMENSIONLESS INDEX: 0.6 (DI)
AST SERPL-CCNC: 16 U/L (ref 0–40)
BASOPHILS # BLD AUTO: 0.14 10*3/MM3 (ref 0–0.2)
BASOPHILS NFR BLD AUTO: 0.2 % (ref 0–1.5)
BH CV ECHO LEFT VENTRICLE GLOBAL LONGITUDINAL STRAIN: -21.4 %
BH CV ECHO MEAS - AO MAX PG: 25.1 MMHG
BH CV ECHO MEAS - AO MEAN PG: 12 MMHG
BH CV ECHO MEAS - AO ROOT DIAM: 3.6 CM
BH CV ECHO MEAS - AO V2 MAX: 250.4 CM/SEC
BH CV ECHO MEAS - AO V2 VTI: 40.8 CM
BH CV ECHO MEAS - AVA(I,D): 2.29 CM2
BH CV ECHO MEAS - EDV(CUBED): 54.9 ML
BH CV ECHO MEAS - EDV(MOD-SP2): 190 ML
BH CV ECHO MEAS - EDV(MOD-SP4): 195 ML
BH CV ECHO MEAS - EF(MOD-BP): 60.2 %
BH CV ECHO MEAS - EF(MOD-SP2): 56.8 %
BH CV ECHO MEAS - EF(MOD-SP4): 63.6 %
BH CV ECHO MEAS - EF_3D-VOL: 59 %
BH CV ECHO MEAS - ESV(CUBED): 23.1 ML
BH CV ECHO MEAS - ESV(MOD-SP2): 82 ML
BH CV ECHO MEAS - ESV(MOD-SP4): 71 ML
BH CV ECHO MEAS - FS: 25 %
BH CV ECHO MEAS - IVS/LVPW: 1.2 CM
BH CV ECHO MEAS - IVSD: 1.2 CM
BH CV ECHO MEAS - LAT PEAK E' VEL: 12.8 CM/SEC
BH CV ECHO MEAS - LV DIASTOLIC VOL/BSA (35-75): 86.1 CM2
BH CV ECHO MEAS - LV MASS(C)D: 134.7 GRAMS
BH CV ECHO MEAS - LV MAX PG: 4.8 MMHG
BH CV ECHO MEAS - LV MEAN PG: 2.42 MMHG
BH CV ECHO MEAS - LV SYSTOLIC VOL/BSA (12-30): 31.3 CM2
BH CV ECHO MEAS - LV V1 MAX: 109.6 CM/SEC
BH CV ECHO MEAS - LV V1 VTI: 22.7 CM
BH CV ECHO MEAS - LVIDD: 3.8 CM
BH CV ECHO MEAS - LVIDS: 2.8 CM
BH CV ECHO MEAS - LVOT AREA: 4.1 CM2
BH CV ECHO MEAS - LVOT DIAM: 2.29 CM
BH CV ECHO MEAS - LVPWD: 1 CM
BH CV ECHO MEAS - MED PEAK E' VEL: 6.1 CM/SEC
BH CV ECHO MEAS - MV A DUR: 0.12 SEC
BH CV ECHO MEAS - MV A MAX VEL: 113.8 CM/SEC
BH CV ECHO MEAS - MV DEC SLOPE: 465.3 CM/SEC2
BH CV ECHO MEAS - MV DEC TIME: 0.22 MSEC
BH CV ECHO MEAS - MV E MAX VEL: 85.3 CM/SEC
BH CV ECHO MEAS - MV E/A: 0.75
BH CV ECHO MEAS - MV MAX PG: 7.3 MMHG
BH CV ECHO MEAS - MV MEAN PG: 2.9 MMHG
BH CV ECHO MEAS - MV P1/2T: 58.8 MSEC
BH CV ECHO MEAS - MV V2 VTI: 28.2 CM
BH CV ECHO MEAS - MVA(P1/2T): 3.7 CM2
BH CV ECHO MEAS - MVA(VTI): 3.3 CM2
BH CV ECHO MEAS - PA ACC TIME: 0.12 SEC
BH CV ECHO MEAS - PA V2 MAX: 114 CM/SEC
BH CV ECHO MEAS - RAP SYSTOLE: 8 MMHG
BH CV ECHO MEAS - RV MAX PG: 1.44 MMHG
BH CV ECHO MEAS - RV V1 MAX: 60 CM/SEC
BH CV ECHO MEAS - RV V1 VTI: 10.8 CM
BH CV ECHO MEAS - RVSP: 34 MMHG
BH CV ECHO MEAS - SI(MOD-SP2): 47.7 ML/M2
BH CV ECHO MEAS - SI(MOD-SP4): 54.7 ML/M2
BH CV ECHO MEAS - SV(LVOT): 93.7 ML
BH CV ECHO MEAS - SV(MOD-SP2): 108 ML
BH CV ECHO MEAS - SV(MOD-SP4): 124 ML
BH CV ECHO MEAS - TAPSE (>1.6): 2.17 CM
BH CV ECHO MEAS - TR MAX PG: 26 MMHG
BH CV ECHO MEAS - TR MAX VEL: 255.2 CM/SEC
BH CV ECHO MEASUREMENTS AVERAGE E/E' RATIO: 9.03
BH CV XLRA - RV BASE: 3.4 CM
BH CV XLRA - RV LENGTH: 9.7 CM
BH CV XLRA - RV MID: 3 CM
BH CV XLRA - TDI S': 14.5 CM/SEC
BILIRUB SERPL-MCNC: 1.6 MG/DL (ref 0.2–1.2)
BILIRUB UR QL STRIP: NEGATIVE
BUN SERPL-MCNC: 34 MG/DL (ref 6–20)
BUN SERPL-MCNC: 35 MG/DL (ref 8–23)
BUN/CREAT SERPL: 33 (ref 7.3–30)
BUN/CREAT SERPL: 36.5 (ref 7–25)
CALCIUM SPEC-SCNC: 12.4 MG/DL (ref 8.6–10.5)
CALCIUM SPEC-SCNC: 12.5 MG/DL (ref 8.5–10.2)
CHLORIDE SERPL-SCNC: 100 MMOL/L (ref 98–107)
CHLORIDE SERPL-SCNC: 101 MMOL/L (ref 98–107)
CLARITY UR: CLEAR
CO2 SERPL-SCNC: 26.5 MMOL/L (ref 22–29)
CO2 SERPL-SCNC: 28.2 MMOL/L (ref 22–29)
COLOR UR: YELLOW
CORTIS SERPL-MCNC: 25.3 MCG/DL
CREAT SERPL-MCNC: 0.96 MG/DL (ref 0.76–1.27)
CREAT SERPL-MCNC: 1.03 MG/DL (ref 0.7–1.3)
DEPRECATED RDW RBC AUTO: 54 FL (ref 37–54)
EGFRCR SERPLBLD CKD-EPI 2021: 73.9 ML/MIN/1.73
EGFRCR SERPLBLD CKD-EPI 2021: 80.4 ML/MIN/1.73
EOSINOPHIL # BLD AUTO: 0.17 10*3/MM3 (ref 0–0.4)
EOSINOPHIL NFR BLD AUTO: 0.3 % (ref 0.3–6.2)
ERYTHROCYTE [DISTWIDTH] IN BLOOD BY AUTOMATED COUNT: 18.4 % (ref 12.3–15.4)
GLOBULIN UR ELPH-MCNC: 2.1 GM/DL (ref 1.8–3.5)
GLUCOSE SERPL-MCNC: 105 MG/DL (ref 74–124)
GLUCOSE SERPL-MCNC: 99 MG/DL (ref 65–99)
GLUCOSE UR STRIP-MCNC: NEGATIVE MG/DL
HCT VFR BLD AUTO: 30.8 % (ref 37.5–51)
HGB BLD-MCNC: 9.5 G/DL (ref 13–17.7)
HGB UR QL STRIP.AUTO: NEGATIVE
IMM GRANULOCYTES # BLD AUTO: 0.7 10*3/MM3 (ref 0–0.05)
IMM GRANULOCYTES NFR BLD AUTO: 1.1 % (ref 0–0.5)
KETONES UR QL STRIP: NEGATIVE
LDH SERPL-CCNC: 1312 U/L (ref 99–259)
LEFT ATRIUM VOLUME INDEX: 29.4 ML/M2
LEUKOCYTE ESTERASE UR QL STRIP.AUTO: NEGATIVE
LYMPHOCYTES # BLD AUTO: 9.68 10*3/MM3 (ref 0.7–3.1)
LYMPHOCYTES NFR BLD AUTO: 15 % (ref 19.6–45.3)
MAXIMAL PREDICTED HEART RATE: 141 BPM
MCH RBC QN AUTO: 26.2 PG (ref 26.6–33)
MCHC RBC AUTO-ENTMCNC: 30.8 G/DL (ref 31.5–35.7)
MCV RBC AUTO: 85.1 FL (ref 79–97)
MONOCYTES # BLD AUTO: 49.88 10*3/MM3 (ref 0.1–0.9)
MONOCYTES NFR BLD AUTO: 77.3 % (ref 5–12)
NEUTROPHILS NFR BLD AUTO: 3.99 10*3/MM3 (ref 1.7–7)
NEUTROPHILS NFR BLD AUTO: 6.1 % (ref 42.7–76)
NITRITE UR QL STRIP: NEGATIVE
NRBC BLD AUTO-RTO: 0 /100 WBC (ref 0–0.2)
PH UR STRIP.AUTO: <=5 [PH] (ref 5–8)
PLATELET # BLD AUTO: 92 10*3/MM3 (ref 140–450)
PMV BLD AUTO: 9.7 FL (ref 6–12)
POTASSIUM SERPL-SCNC: 4.3 MMOL/L (ref 3.5–4.7)
POTASSIUM SERPL-SCNC: 4.5 MMOL/L (ref 3.5–5.2)
PROT SERPL-MCNC: 5.4 G/DL (ref 6.3–8)
PROT UR QL STRIP: NEGATIVE
RBC # BLD AUTO: 3.62 10*6/MM3 (ref 4.14–5.8)
SODIUM SERPL-SCNC: 136 MMOL/L (ref 136–145)
SODIUM SERPL-SCNC: 138 MMOL/L (ref 134–145)
SP GR UR STRIP: 1.02 (ref 1–1.03)
STRESS TARGET HR: 120 BPM
TSH SERPL DL<=0.05 MIU/L-ACNC: 4.2 UIU/ML (ref 0.27–4.2)
URATE SERPL-MCNC: 6.8 MG/DL (ref 2.8–7.4)
UROBILINOGEN UR QL STRIP: NORMAL
WBC NRBC COR # BLD: 64.56 10*3/MM3 (ref 3.4–10.8)

## 2023-06-12 PROCEDURE — 25510000001 PERFLUTREN (DEFINITY) 8.476 MG IN SODIUM CHLORIDE (PF) 0.9 % 10 ML INJECTION: Performed by: INTERNAL MEDICINE

## 2023-06-12 PROCEDURE — 36415 COLL VENOUS BLD VENIPUNCTURE: CPT

## 2023-06-12 PROCEDURE — 93306 TTE W/DOPPLER COMPLETE: CPT

## 2023-06-12 PROCEDURE — 94799 UNLISTED PULMONARY SVC/PX: CPT

## 2023-06-12 PROCEDURE — 83615 LACTATE (LD) (LDH) ENZYME: CPT

## 2023-06-12 PROCEDURE — 81003 URINALYSIS AUTO W/O SCOPE: CPT | Performed by: INTERNAL MEDICINE

## 2023-06-12 PROCEDURE — 93356 MYOCRD STRAIN IMG SPCKL TRCK: CPT | Performed by: INTERNAL MEDICINE

## 2023-06-12 PROCEDURE — 82533 TOTAL CORTISOL: CPT | Performed by: INTERNAL MEDICINE

## 2023-06-12 PROCEDURE — C1751 CATH, INF, PER/CENT/MIDLINE: HCPCS

## 2023-06-12 PROCEDURE — 93306 TTE W/DOPPLER COMPLETE: CPT | Performed by: INTERNAL MEDICINE

## 2023-06-12 PROCEDURE — 84550 ASSAY OF BLOOD/URIC ACID: CPT

## 2023-06-12 PROCEDURE — 93356 MYOCRD STRAIN IMG SPCKL TRCK: CPT

## 2023-06-12 PROCEDURE — 85025 COMPLETE CBC W/AUTO DIFF WBC: CPT

## 2023-06-12 PROCEDURE — 80053 COMPREHEN METABOLIC PANEL: CPT

## 2023-06-12 PROCEDURE — 84443 ASSAY THYROID STIM HORMONE: CPT | Performed by: INTERNAL MEDICINE

## 2023-06-12 RX ORDER — SODIUM CHLORIDE 0.9 % (FLUSH) 0.9 %
10 SYRINGE (ML) INJECTION EVERY 12 HOURS SCHEDULED
Status: CANCELLED | OUTPATIENT
Start: 2023-06-12

## 2023-06-12 RX ORDER — ONDANSETRON 4 MG/1
4 TABLET, FILM COATED ORAL EVERY 6 HOURS PRN
Status: DISCONTINUED | OUTPATIENT
Start: 2023-06-12 | End: 2023-06-18 | Stop reason: HOSPADM

## 2023-06-12 RX ORDER — ALLOPURINOL 100 MG/1
100 TABLET ORAL DAILY
Status: DISCONTINUED | OUTPATIENT
Start: 2023-06-12 | End: 2023-06-13

## 2023-06-12 RX ORDER — SODIUM CHLORIDE 0.9 % (FLUSH) 0.9 %
10 SYRINGE (ML) INJECTION AS NEEDED
Status: DISCONTINUED | OUTPATIENT
Start: 2023-06-12 | End: 2023-06-18 | Stop reason: HOSPADM

## 2023-06-12 RX ORDER — SODIUM CHLORIDE 0.9 % (FLUSH) 0.9 %
10 SYRINGE (ML) INJECTION EVERY 12 HOURS SCHEDULED
Status: DISCONTINUED | OUTPATIENT
Start: 2023-06-12 | End: 2023-06-18 | Stop reason: HOSPADM

## 2023-06-12 RX ORDER — SODIUM CHLORIDE 0.9 % (FLUSH) 0.9 %
10 SYRINGE (ML) INJECTION AS NEEDED
Status: CANCELLED | OUTPATIENT
Start: 2023-06-12

## 2023-06-12 RX ORDER — CALCIUM CARBONATE 500 MG/1
2 TABLET, CHEWABLE ORAL 2 TIMES DAILY PRN
Status: CANCELLED | OUTPATIENT
Start: 2023-06-12

## 2023-06-12 RX ORDER — TEMAZEPAM 15 MG/1
15 CAPSULE ORAL NIGHTLY PRN
Status: DISCONTINUED | OUTPATIENT
Start: 2023-06-12 | End: 2023-06-15

## 2023-06-12 RX ORDER — ALLOPURINOL 300 MG/1
300 TABLET ORAL DAILY
Status: CANCELLED | OUTPATIENT
Start: 2023-06-12

## 2023-06-12 RX ORDER — SODIUM CHLORIDE 9 MG/ML
75 INJECTION, SOLUTION INTRAVENOUS CONTINUOUS
Status: CANCELLED | OUTPATIENT
Start: 2023-06-12

## 2023-06-12 RX ORDER — ACETAMINOPHEN 325 MG/1
650 TABLET ORAL EVERY 4 HOURS PRN
Status: DISCONTINUED | OUTPATIENT
Start: 2023-06-12 | End: 2023-06-18 | Stop reason: HOSPADM

## 2023-06-12 RX ORDER — ONDANSETRON 4 MG/1
4 TABLET, FILM COATED ORAL EVERY 6 HOURS PRN
Status: CANCELLED | OUTPATIENT
Start: 2023-06-12

## 2023-06-12 RX ORDER — ALLOPURINOL 100 MG/1
100 TABLET ORAL DAILY
Status: CANCELLED | OUTPATIENT
Start: 2023-06-12

## 2023-06-12 RX ORDER — SODIUM CHLORIDE 9 MG/ML
125 INJECTION, SOLUTION INTRAVENOUS CONTINUOUS
Status: CANCELLED | OUTPATIENT
Start: 2023-06-12

## 2023-06-12 RX ORDER — SODIUM CHLORIDE 0.9 % (FLUSH) 0.9 %
20 SYRINGE (ML) INJECTION AS NEEDED
Status: DISCONTINUED | OUTPATIENT
Start: 2023-06-12 | End: 2023-06-18 | Stop reason: HOSPADM

## 2023-06-12 RX ORDER — ONDANSETRON 2 MG/ML
4 INJECTION INTRAMUSCULAR; INTRAVENOUS EVERY 6 HOURS PRN
Status: DISCONTINUED | OUTPATIENT
Start: 2023-06-12 | End: 2023-06-18 | Stop reason: HOSPADM

## 2023-06-12 RX ORDER — TEMAZEPAM 15 MG/1
15 CAPSULE ORAL NIGHTLY PRN
Status: CANCELLED | OUTPATIENT
Start: 2023-06-12

## 2023-06-12 RX ORDER — SODIUM CHLORIDE 9 MG/ML
50 INJECTION, SOLUTION INTRAVENOUS CONTINUOUS
Status: DISCONTINUED | OUTPATIENT
Start: 2023-06-12 | End: 2023-06-17

## 2023-06-12 RX ORDER — CALCIUM CARBONATE 500 MG/1
2 TABLET, CHEWABLE ORAL 2 TIMES DAILY PRN
Status: DISCONTINUED | OUTPATIENT
Start: 2023-06-12 | End: 2023-06-18 | Stop reason: HOSPADM

## 2023-06-12 RX ORDER — ACETAMINOPHEN 325 MG/1
650 TABLET ORAL EVERY 4 HOURS PRN
Status: CANCELLED | OUTPATIENT
Start: 2023-06-12

## 2023-06-12 RX ORDER — SODIUM CHLORIDE 9 MG/ML
40 INJECTION, SOLUTION INTRAVENOUS AS NEEDED
Status: DISCONTINUED | OUTPATIENT
Start: 2023-06-12 | End: 2023-06-18 | Stop reason: HOSPADM

## 2023-06-12 RX ORDER — ONDANSETRON 2 MG/ML
4 INJECTION INTRAMUSCULAR; INTRAVENOUS EVERY 6 HOURS PRN
Status: CANCELLED | OUTPATIENT
Start: 2023-06-12

## 2023-06-12 RX ORDER — SODIUM CHLORIDE 0.9 % (FLUSH) 0.9 %
20 SYRINGE (ML) INJECTION AS NEEDED
Status: CANCELLED | OUTPATIENT
Start: 2023-06-12

## 2023-06-12 RX ADMIN — Medication 10 ML: at 21:32

## 2023-06-12 RX ADMIN — TEMAZEPAM 15 MG: 15 CAPSULE ORAL at 21:36

## 2023-06-12 RX ADMIN — Medication 10 ML: at 18:19

## 2023-06-12 RX ADMIN — Medication 10 ML: at 21:33

## 2023-06-12 RX ADMIN — PERFLUTREN 2 ML: 6.52 INJECTION, SUSPENSION INTRAVENOUS at 17:20

## 2023-06-12 RX ADMIN — SODIUM CHLORIDE 75 ML/HR: 9 INJECTION, SOLUTION INTRAVENOUS at 21:37

## 2023-06-12 NOTE — PROGRESS NOTES
Staff message rec from [facility] Pharmacist-Pt has moved to IV therapy. He has been disenrolled from the SpRx Program for Calquence. I have notified [employer] of the medication discontinuation.       Arminda Estrada  Specialty Pharmacy Technician

## 2023-06-12 NOTE — SIGNIFICANT NOTE
"   06/12/23 1458   PICC Double Lumen 06/12/23 Right Basilic   Placement date: If unknown, DO NOT use \"Add Comment\" note/Placement time: If unknown, DO NOT use \"Add Comment\" note: 06/12/23 1456   Hand Hygiene Completed: Yes  Size (Fr): 4  Description (optional): power picc lot no [id number], exp 02-  Length ...   Site Assessment Clean;Dry;Intact   #1 Lumen Status Blood return noted;Capped;Flushed;Normal saline locked   #2 Lumen Status Blood return noted;Capped;Flushed;Normal saline locked   Length larry (cm) 44 cm   Line Care Connections checked and tightened   Extremity Circumference (cm) 27 cm   Dressing Type Border Dressing;Securing device;Antimicrobial dressing/disc   Dressing Status Clean;Dry;Intact   Dressing Change Due 06/19/23   Indication/Daily Review of Necessity caustic agents, chemo, vesicant agents   3 needles, 2 guidewires, and 1 scalpel accounted for.        "

## 2023-06-12 NOTE — CONSULTS
Nutrition Services    Patient Name:  Jeff Bass  YOB: 1945  MRN: [id number]  Admit Date:  6/12/2023  Assessment Date:  06/12/23    Comment: Consult per nurse admission screen and MST score 5    77yoM with chronic lymphoid leukemia admitted to [facility] for management of spleen enlargement, port placement and initiation of CHOP Rituxan chemotherapy.     Received consult for nutrition assessment per nurse admission screen and MST score 5. Regular diet initiated today per MD. Spoke with pt at bedside who reports 30-35 lb (13%) weight loss past 6 months 2/2 decreased appetite, fatigue and nausea after eating. Pt reports inability to tolerate most foods related to nausea. Reports mostly eating cheese omelettes and raisin bread 2 x daily with Premiere protein shakes TID. Pt asking about initiation of IV nutrition. RD explained that due to pt's functioning GI tract would likely benefit more from EN if he desires nutrition support. However, pt adamantly against tube feeds. Pt's spouse reports pt with little to no PO intake past 2 days related to appetite and fatigue. Pt agreeable to ONS TID with meals. Requested eggs with cheese on all meals. Will add to preferences. NFPE completed with severe wasting observed. Pt meets criteria for severe malnutrition related to chronic disease.    Recommendations:   Continue regular diet and encourage adequate PO intake. Pt requesting cheese omelettes on all meals.   Add Magic Cup (carola) BID with meals and Boost Plus (carola) TID with meals for additional nutrition support.     RD will continue to follow course for PO intake and tolerance.      CLINICAL NUTRITION ASSESSMENT      Reason for Assessment MST score 2+, Nurse Admission Screen     Diagnosis/Problem   Lymphoma of spleen    Medical/Surgical History Past Medical History:   Diagnosis Date    Arthritis     Benign prostatic hyperplasia     FOLLOWED BY DR. VIVIEN PATEL    Biliary dyskinesia     Bunion of right  foot     GREAT TOE    CLL (chronic lymphocytic leukemia) 2016    FOLLOWED BY DR WALKER    Colon polyps     FOLLOWED BY DR. NEHA HAM    Constipation     Degeneration of lumbar intervertebral disc     Diverticulosis     Erectile dysfunction     Fracture of ankle 1975    GERD (gastroesophageal reflux disease)     Hallux valgus of left foot     Hyperlipidemia     MIXED    Hypertension     Inguinal hernia     Kidney stone 02/21/2009    SEEN AT [facility] ER    Knee swelling 2018    Localized, primary osteoarthritis     Lumbar radiculopathy     Lumbar stenosis     Lung mass     LEFT SIDE - SCAR TISSUE PER PATIENT     Osteoarthritis of right knee     Polyneuropathy     Prostate CA 03/2016    JACQUELYN 6, S/P XRT, FOLLOWED BY DR. VIVIEN PATEL, RADIATION SEEDS    PVC (premature ventricular contraction)     PT STATES WORKED UP YEARS AGO BY [facility] FOR POSSIBLE MURMUR - NO FOLLOW UP REQUIRED     RBBB     Sensory hearing loss, bilateral     Skin cancer     SQUAMOUS CELL CARCINOMA OF FACE    Substernal goiter     Thyromegaly 12/2016    ADMITTED TO [facility]    Vitamin D deficiency        Past Surgical History:   Procedure Laterality Date    BRONCHOSCOPY N/A 12/14/2016    Procedure: BRONCHOSCOPY WITH  BAL AND BIOPSY AND ENDOBRONCHIAL ULTRASOUND  AND NAVIGATION WITH BRUSHINGS AND BIOPSY AND BAL;  Surgeon: Pierre Manning MD;  Location: [facility] ENDOSCOPY;  Service:     COLONOSCOPY N/A 03/13/2019    5 MM SESSILE TUBULAR ADENOMA POLYP IN TRANSVERSE, MULTIPLE SMALL AND LARGE DIVERTICULA IN SIGMOID, RESCOPE IN 5 YRS, DR. NEHA HAM AT [facility]    COLONOSCOPY W/ POLYPECTOMY N/A 03/19/2015    3 TUBULAR ADENOMA POLYPS, 12 TUBULOVILLOUS POLYP RECTO-SIGMOID, DIVERTICULOSIS, RESCOPE IN 3 YRS, DR. NEHA HAM AT [facility]    EYE SURGERY Bilateral     CATARACT EXTRACTION WITH LENS IMPLANT, DR. GOVEA    FINGER NAIL SURGERY Right 2022    TORRES-PATEL    INGUINAL HERNIA REPAIR Left 11/02/2021    Procedure: LEFT OPEN INGUINAL HERNIA REPAIR;  Surgeon:  "Nixon Kolb MD;  Location: [facility] OR;  Service: General;  Laterality: Left;    PROSTATE RADIOACTIVE SEED IMPLANT N/A 09/06/2016    DR. HOA JARAMILLO AT [facility]    PROSTATE SURGERY N/A 03/11/2016    BIOPSY: ADENOCARCINOMA, DR. ZAID IBARRA    THYROID BIOPSY Bilateral 11/01/2017    NO B CELL OR T CELL LYMPHOMA, DONE AT [facility]    TOTAL KNEE ARTHROPLASTY Right 10/12/2022    Procedure: TOTAL KNEE ARTHROPLASTY;  Surgeon: Ran Rachel MD;  Location: [facility];  Service: Orthopedics;  Laterality: Right;        Encounter Information        Nutrition History:     Food Preferences:    Supplements:    Factors Affecting Intake: decreased appetite, nausea     Anthropometrics        Current Height  Current Weight  BMI kg/m2 Height: 195.6 cm (77.01\")  Weight: 93.6 kg (206 lb 4.8 oz) (06/12/23 1115)  Body mass index is 24.46 kg/m².   Adjusted BMI (if applicable)    BMI Category Normal/Healthy (18.4 - 24.9)       Admission Weight 206 lb (93.6 kg)       Ideal Body Weight (IBW) 208 lb (94.5 kg)   Adjusted IBW (if applicable)        Usual Body Weight (UBW) 246 lb (8/2022); 235 lb (12/2022)   Weight Change/Trend Loss, Amount/Timeframe: 30 lb (13%) weight loss past 6 months per weight history and pt report        Weight History Wt Readings from Last 30 Encounters:   06/12/23 1115 93.6 kg (206 lb 4.8 oz)   06/12/23 1011 93.6 kg (206 lb 4.8 oz)   06/07/23 1530 95.8 kg (211 lb 4.8 oz)   06/02/23 1126 94.3 kg (207 lb 14.4 oz)   05/09/23 1556 97.3 kg (214 lb 8 oz)   04/13/23 1159 95.4 kg (210 lb 4.8 oz)   03/17/23 1620 97.8 kg (215 lb 9.6 oz)   02/14/23 0858 97.6 kg (215 lb 3.2 oz)   02/08/23 1354 98.3 kg (216 lb 11.2 oz)   01/20/23 1303 101 kg (222 lb)   01/20/23 1311 101 kg (222 lb)   01/18/23 1317 101 kg (221 lb 14.4 oz)   01/13/23 1307 99.6 kg (219 lb 9.6 oz)   12/30/22 1328 101 kg (222 lb 6.4 oz)   12/13/22 1451 107 kg (235 lb)   10/27/22 1148 107 kg (235 lb 11.2 oz)   10/12/22 1755 112 kg (246 lb 14.6 oz) "   10/06/22 1327 108 kg (239 lb)   09/28/22 1103 110 kg (242 lb 1.6 oz)   08/11/22 1410 112 kg (246 lb 12.8 oz)   06/16/22 1503 112 kg (247 lb)   04/22/22 1336 109 kg (239 lb 12.8 oz)   12/16/21 1341 112 kg (246 lb 4.8 oz)   11/02/21 0715 110 kg (241 lb 11.2 oz)   10/29/21 1623 112 kg (246 lb)   10/18/21 1308 111 kg (245 lb 9.6 oz)   08/13/21 1339 110 kg (242 lb 9.6 oz)   04/22/21 1552 109 kg (241 lb 1.6 oz)   01/20/21 1219 108 kg (238 lb 12.8 oz)   10/22/20 1112 102 kg (225 lb 3.2 oz)             Estimated/Assessed Needs        Current Weight  Weight: 93.6 kg (206 lb 4.8 oz) (06/12/23 1115)       Energy Requirements    Weight for Calculation 93.6 kg - admit weight    Method for Estimation  25 kcal/kg, 30 kcal/kg   EST Needs (kcal/day) 3109-3516 kcal       Protein Requirements    Weight for Calculation 93.6 kg   EST Protein Needs (g/kg) 1.2 - 1.5 gm/kg   EST Daily Needs (g/day) 112-140 gm        Fluid Requirements     Method for Estimation 30 mL/kg    EST Needs (mL/day) 2800     Tests/Procedures        Tests/Procedures Chemotherapy, CT scan     Labs       Pertinent Labs    Results from last 7 days   Lab Units 06/12/23  0959   SODIUM mmol/L 138   POTASSIUM mmol/L 4.3   CHLORIDE mmol/L 100   CO2 mmol/L 26.5   BUN mg/dL 34*   CREATININE mg/dL 1.03   CALCIUM mg/dL 12.5*   BILIRUBIN mg/dL 1.6*   ALK PHOS U/L 58   ALT (SGPT) U/L 6   AST (SGOT) U/L 16   GLUCOSE mg/dL 105     Results from last 7 days   Lab Units 06/12/23  0959   HEMOGLOBIN g/dL 9.5*   HEMATOCRIT % 30.8*   WBC 10*3/mm3 64.56*   ALBUMIN g/dL 3.3*     Results from last 7 days   Lab Units 06/12/23  0959 06/07/23  1533   PLATELETS 10*3/mm3 92* 51*     COVID19   Date Value Ref Range Status   10/30/2021 Not Detected Not Detected - Ref. Range Final     No results found for: HGBA1C       Medications           Scheduled Medications allopurinol, 100 mg, Oral, Daily  sodium chloride, 10 mL, Intravenous, Q12H       Infusions sodium chloride, 75 mL/hr       PRN  Medications   acetaminophen    calcium carbonate    ondansetron **OR** ondansetron    sodium chloride    temazepam     Physical Findings          Physical Appearance alert, generalized wasting, loss of muscle mass, loss of subcutaneous fat, oriented, room air   Oral/Mouth Cavity tooth or teeth missing   Edema  no edema   Gastrointestinal normoactive   Skin  skin intact   Tubes/Drains/Lines central line/PICC   NFPE Date Completed: 6/12     Malnutrition Severity Assessment      Patient meets criteria for : Severe Malnutrition (intake <50% est needs for at least past month, 13% weight loss past 6 months, severe subcutaneous fat and muscle wasting and measurably reduced functional capacity)  Malnutrition Type (last 8 hours)       Malnutrition Severity Assessment       Row Name 06/12/23 1534       Malnutrition Severity Assessment    Malnutrition Type Chronic Disease - Related Malnutrition      Row Name 06/12/23 1534       Insufficient Energy Intake     Insufficient Energy Intake Findings Severe    Insufficient Energy Intake  <50% of est. energy requirement for >or equal to 1 month      Row Name 06/12/23 1534       Unintentional Weight Loss     Unintentional Weight Loss Findings Severe    Unintentional Weight Loss  Weight loss greater than 10% in six months  30 lb (13%) weight loss past 6 months      Row Name 06/12/23 1534       Muscle Loss    Loss of Muscle Mass Findings Severe    [place] Region Severe - deep hollowing/scooping, lack of muscle to touch, facial bones well defined    Clavicle Bone Region Moderate - some protrusion in females, visible in males    Acromion Bone Region Severe - squared shoulders, bones, and acromion process protrusion prominent    Scapular Bone Region Severe - prominent bones, depressions easily visible between ribs, scapula, spine, shoulders    Dorsal Hand Region Severe - prominent depression    Patellar Region Severe - prominent bone, square looking, very little muscle definition      Row  Name 06/12/23 1534       Fat Loss    Subcutaneous Fat Loss Findings Severe    Orbital Region  Severe - pronounced hollowness/depression, dark circles, loose saggy skin    Upper Arm Region Severe - mostly skin, very little space between folds, fingers touch      Row Name 06/12/23 1534       Declining Functional Status    Declining Functional Status Findings Measurably Reduced      Row Name 06/12/23 1534       Criteria Met (Must meet criteria for severity in at least 2 of these categories: M Wasting, Fat Loss, Fluid, Secondary Signs, Wt. Status, Intake)    Patient meets criteria for  Severe Malnutrition  intake <50% est needs for at least past month, 13% weight loss past 6 months, severe subcutaneous fat and muscle wasting and measurably reduced functional capacity                       Current Nutrition Orders & Evaluation of Intake       Oral Nutrition     Food Allergies NKFA   Current PO Diet Diet: Regular/House Diet; Texture: Regular Texture (IDDSI 7); Fluid Consistency: Thin (IDDSI 0)   Supplement n/a   PO Evaluation     % PO Intake     # of Days Evaluated    --  PES STATEMENT / NUTRITION DIAGNOSIS      Nutrition Dx Problem  Problem: Malnutrition  Etiology: Medical Diagnosis and Factors Affecting Nutritiondecreased appetite, fatigue, nausea   Signs/Symptoms: PO intake, Report of Minimal PO Intake, NFPE Results, Unintended Weight Change, and Report/Observation    Comment:    --  NUTRITION INTERVENTION / PLAN OF CARE      Intervention Goal(s) Maintain nutrition status, Nutrition support treatment, Improved nutrition related labs, Provide information, Reduce/improve symptoms, Meet estimated needs, Disease management/therapy, Establish PO intake, Tolerate PO , Maintain weight, and No significant weight loss         RD Intervention/Action Encourage intake, Follow Tx Progress, Care plan reviewed, and Recommend/ordered:          Prescription/Orders:       PO Diet       Supplements Boost Plus TID with meals; Magic Cup  BID with meals       Snacks       Enteral Nutrition       Parenteral Nutrition    New Prescription Ordered? Yes   --      Monitor/Evaluation Per protocol, I&O, PO intake, Supplement intake, Pertinent labs, Weight, Skin status, GI status, Symptoms   Discharge Plan/Needs Pending clinical course   Education Will instruct as appropriate   --    RD to follow per protocol.      Electronically signed by:  Barb Baker RD  06/12/23 12:07 EDT

## 2023-06-12 NOTE — PROGRESS NOTES
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     HISTORY OF PRESENT ILLNESS:    On 06/12/2023, this 77-year-old male returns today to the office in a wheelchair, accompanied by his significant other in order to be reviewed.  In the meantime, after we reviewed him almost 10 days ago, he had profound fatigue, inability to function, weight loss, anorexia, nocturnal sweats, and profound deterioration of his performance status.  A peripheral blood flow cytometry disclosed atypical population of lymphocytes that were not the typical lymphocytes present in peripheral blood in patients who have chronic lymphoid leukemia. This triggered further testing including LDH that was in the 1400's, tremendously atypical for formal CLL.  This gain, to me, showed the possibility that this patient was transforming into Jon transformation of CLL and this triggered a bone marrow test last week that was performed in the office with no difficulties or consequences. I discussed the case with Dr. Baca, Pathologist at [facility], even though the bone marrow still shows population of lymphocytes that is atypical but not large.  I have reviewed the morphology of the bone marrow and to me it is extremely atypical in the first place.  His LDH remains highly elevated and the patient feels terrible.  All of this leads me to believe that his spleen enlargement that is so hot on the PET scan done recently contains Jon transformation of CLL and for this reason the patient will be admitted to the hospital today in order to proceed with further testing that includes echocardiogram, CT scan of the abdomen and pelvis, port placement, and chemotherapy administration equivalent to the treatment of patients who have large cell diffuse non-Hodgkin's lymphoma.  In other words, he will receive [facility] Rituxan  chemotherapy.      The symptomatology that I noted remains ongoing and is worse as time goes by, to the point that he is barely able to get out of the chair and the bed by himself.  He is not able to even get dressed himself.  He needs to have total support.  The patient's appetite is minimal.  He has no abdominal pain.  He has profound nocturnal sweats and low grade temperature.  He has not had any pain.  He has minimal cough and no shortness of breath.            Past Medical History:   Diagnosis Date   • Arthritis    • Benign prostatic hyperplasia     FOLLOWED BY DR. VIVIEN PATEL   • Biliary dyskinesia    • Bunion of right foot     GREAT TOE   • CLL (chronic lymphocytic leukemia) 2016    FOLLOWED BY DR WALKER   • Colon polyps     FOLLOWED BY DR. NEHA HAM   • Constipation    • Degeneration of lumbar intervertebral disc    • Diverticulosis    • Erectile dysfunction    • Fracture of ankle 1975   • GERD (gastroesophageal reflux disease)    • Hallux valgus of left foot    • Hyperlipidemia     MIXED   • Hypertension    • Inguinal hernia    • Kidney stone 02/21/2009    SEEN AT [facility] ER   • Knee swelling 2018   • Localized, primary osteoarthritis    • Lumbar radiculopathy    • Lumbar stenosis    • Lung mass     LEFT SIDE - SCAR TISSUE PER PATIENT    • Osteoarthritis of right knee    • Polyneuropathy    • Prostate CA 03/2016    JACQUELYN 6, S/P XRT, FOLLOWED BY DR. VIVIEN PATEL, RADIATION SEEDS   • PVC (premature ventricular contraction)     PT STATES WORKED UP YEARS AGO BY [facility] FOR POSSIBLE MURMUR - NO FOLLOW UP REQUIRED    • RBBB    • Sensory hearing loss, bilateral    • Skin cancer     SQUAMOUS CELL CARCINOMA OF FACE   • Substernal goiter    • Thyromegaly 12/2016    ADMITTED TO [facility]   • Vitamin D deficiency      Past Surgical History:   Procedure Laterality Date   • BRONCHOSCOPY N/A 12/14/2016    Procedure: BRONCHOSCOPY WITH  BAL AND BIOPSY AND ENDOBRONCHIAL ULTRASOUND  AND NAVIGATION WITH BRUSHINGS AND  BIOPSY AND BAL;  Surgeon: Pierre Manning MD;  [facility];  Service:    • COLONOSCOPY N/A 03/13/2019    5 MM SESSILE TUBULAR ADENOMA POLYP IN TRANSVERSE, MULTIPLE SMALL AND LARGE DIVERTICULA IN SIGMOID, RESCOPE IN 5 YRS, DR. NEHA HAM AT [facility]   • COLONOSCOPY W/ POLYPECTOMY N/A 03/19/2015    3 TUBULAR ADENOMA POLYPS, 12 TUBULOVILLOUS POLYP RECTO-SIGMOID, DIVERTICULOSIS, RESCOPE IN 3 YRS, DR. NEHA HAM AT [facility]   • EYE SURGERY Bilateral     CATARACT EXTRACTION WITH LENS IMPLANT, DR. GOVEA   • FINGER NAIL SURGERY Right 2022    [facility]   • INGUINAL HERNIA REPAIR Left 11/02/2021    Procedure: LEFT OPEN INGUINAL HERNIA REPAIR;  Surgeon: Nixon Kolb MD;  Location: [facility] MAIN OR;  Service: General;  Laterality: Left;   • PROSTATE RADIOACTIVE SEED IMPLANT N/A 09/06/2016    DR. HOA JARAMILLO AT [facility]   • PROSTATE SURGERY N/A 03/11/2016    BIOPSY: ADENOCARCINOMA, DR. ZAID IBARRA   • THYROID BIOPSY Bilateral 11/01/2017    NO B CELL OR T CELL LYMPHOMA, DONE AT [facility]   • TOTAL KNEE ARTHROPLASTY Right 10/12/2022    Procedure: TOTAL KNEE ARTHROPLASTY;  Surgeon: Ran Rachel MD;  [place] [facility] [place] [facility]  Service: Orthopedics;  Laterality: Right;     Social History     Socioeconomic History   • Marital status:      Spouse name: No   • Years of education: College   Tobacco Use   • Smoking status: Never   • Smokeless tobacco: Never   Substance and Sexual Activity   • Alcohol use: No   • Drug use: Never   • Sexual activity: Yes     Partners: Female     Birth control/protection: None     Family History   Problem Relation Age of Onset   • Heart disease Father         Rheumatic heart disease   • Hypertension Father    • Osteoporosis Father    • Stroke Mother    • Osteoporosis Mother    • No Known Problems Daughter    • Malig Hyperthermia Neg Hx      Current Outpatient Medications on File Prior to Visit   Medication Sig   • acalabrutinib (Calquence) 100 MG capsule capsule Take 2  "capsules by mouth 2 (Two) Times a Day.   • acyclovir (Zovirax) 400 MG tablet Take 1 tablet by mouth 2 (Two) Times a Day.   • alfuzosin (UROXATRAL) 10 MG 24 hr tablet Take 2 tablets by mouth Every Night.   • ammonium lactate (LAC-HYDRIN) 12 % lotion Every 12 (Twelve) Hours.   • aspirin 81 MG EC tablet Take 1 tablet by mouth twice daily x 14 days; then take 1 tablet by mouth daily x 28 days   • levoFLOXacin (LEVAQUIN) 500 MG tablet TAKE 1 TABLET BY MOUTH DAILY   • losartan-hydrochlorothiazide (HYZAAR) 100-25 MG per tablet Take 1 tablet by mouth Daily.   • Melatonin 10 MG tablet Take 1 tablet by mouth Every Night.   • NON FORMULARY Balance of Nature and Super Beets daily   • ondansetron (ZOFRAN) 8 MG tablet Take 1 tablet by mouth 3 (Three) Times a Day As Needed for Nausea or Vomiting.   • pantoprazole (PROTONIX) 40 MG EC tablet Take 1 tablet by mouth Daily.   • polyethylene glycol (MIRALAX) 17 GM/SCOOP powder Miralax 17 gram/dose oral powder   1 scoop in the morning then as needed   • potassium chloride (K-DUR,KLOR-CON) 20 MEQ CR tablet Take 1 tablet by mouth Daily. (Patient taking differently: Take 1 tablet by mouth Daily. HOLD FOR ONE WEEK PRIOR TO SURGERY)   • pravastatin (PRAVACHOL) 20 MG tablet Take 1 tablet by mouth Every Night.   • predniSONE (DELTASONE) 10 MG tablet Take 2 tablets by mouth Daily. Take once in the morning   • sulfamethoxazole-trimethoprim (BACTRIM DS,SEPTRA DS) 800-160 MG per tablet Take 1 tablet by mouth 3 (Three) Times a Week. Take on Monday, Wednesday and Friday.   • triamcinolone (KENALOG) 0.025 % cream      Current Facility-Administered Medications on File Prior to Visit   Medication   • Chlorhexidine Gluconate Cloth 2 % pads   No Known Allergies              VITAL SIGNS:  Vitals:    06/12/23 1011   BP: 115/64   Pulse: 114   Temp: 98 °F (36.7 °C)   TempSrc: Temporal   SpO2: 94%   Weight: 93.6 kg (206 lb 4.8 oz)   Height: 195.6 cm (77.01\")   PainSc: 0-No pain          PHYSICAL EXAMINATION: "                       GENERAL:  Well-developed, Patient  in  acute distress. In a wheel chair  SKIN:  Warm, dry ,NO purpura ,no rash.very pale, sweaty skin  HEENT:  Pupils were equal and reactive to light and accomodation, conjunctivae noninjected,  normal visual acuity.   NECK:  Supple with good range of motion; no thyromegaly , no JVD or bruits,.No carotid artery pain, no carotid abnormal pulsation   LYMPHATICS:  No cervical, NO supraclavicular, NO axillary, NO inguinal adenopathies.  CARDIAC   normal rate , regular rhythm, without murmur,NO rubs NO S3 NO S4   LUNGS: normal breath sounds bilateral, no wheezing, NO rhonchi, NO crackles ,NO rubs.  VASCULAR VENOUS: no cyanosis, NO collateral circulation, NO varicosities, NO edema, NO palpable cords, NO pain,NO erythema, NO pigmentation of the skin.  ABDOMEN:  Soft, NO pain,no hepatomegaly, 8 cm blcm splenomegaly,no masses, no ascites, no collateral circulation,no distention.  EXTREMITIES  AND SPINE:  No clubbing, no cyanosis ,no deformities , r knee pain .No kyphosis,  no pain in spine, no pain in ribs , no pain in pelvic bone.  NEUROLOGICAL:  Patient was awake, alert, oriented to time, person and place.                    LABORATORY DATA:  Results from last 7 days   Lab Units 06/12/23  0959 06/07/23  1533   WBC 10*3/mm3 64.56* 44.21*   NEUTROS ABS 10*3/mm3 3.99 4.14   HEMOGLOBIN g/dL 9.5* 10.1*   HEMATOCRIT % 30.8* 31.2*   PLATELETS 10*3/mm3 92* 51*             Contains abnormal data Lactate Dehydrogenase  Order: [id number]   Status: Final result      Visible to patient: Yes (not seen)      Next appt: None      Dx: CLL (chronic lymphoid leukemia) in re...     Specimen Information: Blood    0 Result Notes            Component  Ref Range & Units 5 d ago  (6/7/23) 10 d ago  (6/2/23) 3 mo ago  (2/22/23) 4 mo ago  (2/7/23) 4 mo ago  (1/18/23) 3 yr ago  (6/11/20) 3 yr ago  (6/4/20)   LDH  99 - 259 U/L 1,470 High   1,433 High   156  175  293 High   161  214    Resulting  Agency  CBC LAB  CBC LAB  CBC LAB  CBC LAB  CBC LAB  CBC LAB  CBC LAB              Specimen Collected: 06/07/23 15:50 EDT Last Resulted: 06/07/23 16:37 EDT              Flow Cytometry, Blood [[id number]] (Order [id number])  Order  Date: 6/2/2023 Department: [facility] HEMATOLOGY & ONCOLOGY [facility] Ordering/Authorizing: Gerry Schroeder MD     Reprint Order Requisition    Flow Cytometry, Blood (Order #[id number]) on 6/2/23        Flow Cytometry, Blood  Order: [id number]   Status: Final result      Visible to patient: Yes (not seen)      Next appt: None      Dx: Chronic lymphocytic leukemia     Specimen Information: Blood    0 Result Notes       Component  Ref Range & Units 10 d ago 4 mo ago   Reference Lab Report      Resulting Agency [facility]              Specimen Collected: 06/02/23 10:49 EDT Last Resulted: 06/08/23 15:16 EDT         Lab Flowsheet      Order Details      View Encounter      Lab and Collection Details      Routing      Result History     View Encounter Conversation           Scans on Order [id number]    Scan on 6/8/2023 1516 by Miroslava [place] F: Flow Cytometry,Blood         Result Care Coordination      Patient Communication     Add Comments   Add Notifications  Back to Top           Order Questions    Question Answer   Release to patient Routine Release                Provider Comment To Patient     Add Comments   Add Notifications        Routing History    Priority Sent On From To Message Type    6/8/2023  3:16 PM Lab, Background User Gerry Schroeder MD Results         All Reviewers List    Gerry Schroeder MD on 6/8/2023 17:46       Lab Component SmartPhrase Guide    Flow Cytometry, Blood (Order #[id number]) on 6/2/23            ASSESSMENT:   1.  History of CLL  · Status posttreatment with Bendamustine/Rituxan in 2016  · Patient did have severe reaction to initial Rituxan dose requiring hospitalization or completion.  Did well thereafter.  · 12/30/2022 WBC remains  normal at 10.6, 31% lymphs.  Platelets normal at 105,000.  No B symptoms or adenopathy.  No suggestion of recurrent disease.  Hemoglobin has acutely dropped however down to 11.9 (see further discussion below).  • On 01/13/2023 his white count, hemoglobin and platelets are not changing. His total lymphocyte count is very minimal and I do believe that his leukemia remains very quiet on clinical grounds with no need for intervention.  • On 01/18/2023 I discussed with the patient the fact that his radiological assessment of the abdomen shows periaortic and pericaval lymphadenopathy. Most of the lymph nodes are between 2-3 cm in size but most dramatically his spleen has enlarged very substantially being huge and almost as big as his liver or bigger. He has no obvious alteration in the liver. His stomach, small intestine, pancreas, kidneys disclose no new abnormalities. There is no evidence of diverticulitis or bowel obstruction. There is no notion of hernias. There is no other notion of masses or ascites. He has multiple cysts in the liver no different than before that have been present for more than 15 years.     The patient's hemoglobin has dropped to 10.7, the platelet count has dropped to 131,000 and I do believe that this is representation of splenomegaly sequestration and CLL activity very substantially. Today we have a beta-2 microglobulin, LDH and uric acid pending along with a chemistry profile. A urinalysis today disclosed no evidence of infection and he has minimal microscopic hematuria with no significance. There is no proteinuria. There is no leukocyturia.     Given the above findings I discussed with the patient the fact that I need to rule out the possibility of Jon's transformation of his CLL and for this reason I have advised him to proceed with a PET scan in a few hours. Hopefully this will not be the case.     If the lymph nodes or spleen are extremely hot he will probably require a biopsy. If they  have a light degree or moderate degree of SUV activity we will assume that leukemia is the reason and we will proceed with therapy with Calquence. I discussed with him the methodology of using this medicine taking it twice a day with side effects including atrial fibrillation and abnormal bleeding. He is no longer taking any PPI therefore there should not be a need for this medicine at that point. He will have formal education and consent for Calquence and the medicine will be delivered to his home in [place].     Once that we review the PET scan this will be discussed with him on the telephone. I am planning for him to remain on Calquence at least for the next 3-6 months and see how things evolve and planning to repeat radiological assessment in 3 months. I made him aware that sometimes people taking Calquence can have a rise in the blood count for several weeks after initiation of the medicine that eventually settles down and improves.     I also discussed with him that if the Calquence does not help the process we still have alternatives giving him Gazyva for example or Rituxan.     The patient is no longer requiring blood pressure medication and I advised him to hold off on this until we see how things evolve.     In order to improve his appetite and help him to control the leukemia up to a certain point the patient will take 20 mg of prednisone everyday for the next 7 days, take 1 tablet in the morning and then discontinue. With that kind of dosing he will not need to have any taper.   • On 02/08/2023 I discussed with the patient the fact that his PET scan failed to document any significant SUV activity in the spleen or lymph nodes documented in his abdomen. This gave me the relief that we are not facing the possibility or Jon's transformation and for that reason I advised the patient to proceed with therapy with Calquence that he has initiated for the last few days. He has encountered side effect  including  headache and I pointed out to him that this is a common side effect that typically is relieved drinking a little bit of coffee once or twice a day.he is using Bufferin with some relief but I pointed out to him that he needs to be very careful with this medicine because this can end up with GI bleeding, stomach irritation and abnormal bleeding in the first place.     So far the patient's hemoglobin has improved some, the platelet count is stable but it will take several days before the spleen shrinks and the platelet count normalizes. The total white count is normal. Again the molecular analysis of the peripheral blood flow cytometry has been requested from the [facility] in regard the FISH testing. Also we ordered IgH testing.    The goal will be to be seen in a couple of weeks by nurse with laboratory parameter including CBC and visit with me in 1 month and 2 months. We will plan in 04/2023 to repeat radiological analysis of his abdomen. By then his spleen should be substantially reduced in size and intraabdominal adenopathy should be disappearing.     I expect that as the days go by the platelet count will improve and the hemoglobin will continue improving.  On 03/17/2023 the patient has been taking now his Brukinsa for almost 6 weeks. He has tolerated the medicine better than anticipated. He has not had any clinical bleeding or cardiac irregularity. The patient's appetite has remained acceptable. He has not had any nausea or vomiting and today his heart rhythm is normal and his blood sugar is raising. Other issues important is that his hemoglobin has improved. His white count is stable. His total lymphocyte count is very well controlled and his platelet count is stable at 103.     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 I provided him the report of his testing. 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 This makes his prognosis a little bit worse in the long run but I pointed  out to him that the medicine that he is taking is prime line for the condition that he has. Again, clinically his spleen is not palpable anymore. Therefore, I advised him to remain on his medicine at the same dosing twice a day. He will require laboratory testing every couple of weeks by nurse and I will review him in 1 month and 2 months. I want to review the status of his adenopathies and his spleen radiologically speaking in 2 months. I scheduled another PET scan. His spleen was very hot at that time of the diagnosis on 01/25/2023 and I want to review the anatomy of this and the SUV uptake of the spleen at that point.    Besides this he will require at some point a holiday from treatment to have a couple of teeth removed from his lower right side. He is not having infection yet. He raised the question if he will be able to have a cap and I told him that it should not be a problem at all. This can be done at any time. The removal will require holding off of his medication for at least 7 days in anticipation of any tooth removal and allow the places to heal and go back into the medication after that.  On 04/13/2023 the patient continues taking his Calquence with no limitations and no significant side effects. His cardiac auscultation today is normal, he has not had any clinical bleeding, he has not had any diarrhea, in fact he has been mildly constipated. When constipation happens the patient develops some suprapubic pain probably related to sigmoid colon filling up with stools.     His spleen is clinically smaller today documented in the examination, the liver is not palpable, he has no peripheral adenopathy. His total lymphocyte count continues declining, the hemoglobin is stable and the platelet count is stable. I do believe that this patient is responding to this medicine and we advised him to remain on the medicine at the same dosing returning to see us back the first of next month. He will have radiological  assessment in the form of PET scan a few days before and then review him after that.     He really wants to have his left knee replacement therapy at some point in the summer to be able to play pickle ball. [facility] Medicine, Ran Rachel MD, in order to proceed with this at that point. This will be further discussed in the next visit.  On 05/09/2023 I reviewed the patient on clinical grounds. Actually he feels terrific and he is functioning very well. He is worrying about his bad knee with osteoarthritis, the patient will be a completely functional individual. He has not had any B symptoms, any infections, any clinical bleeding, [facility]. I reviewed today his PET scan that discloses minimal activity in intraabdominal and hilar lymph nodes. Spleen remains very active, hot and enlarged with no changes in comparison with how he was 3 months ago. There is no obvious bone marrow uptake or bone uptake and the liver has no uptake. This raises the question now that the patient has some excess of monocytes if the spleen is enlarged because of a different phenomenon, for example myelophthisis, or if the spleen is enlarged because of monocyte infiltration. I do not know the answer to that. I do believe that the patient feels well and I do not think anything will be hurt if the patient holds off on the Calquence now and reassess him with CBC, CMP and LDH in 3 weeks from now. I pointed out to him that I will not rule out the need for him to have a bone marrow test to be sure that he does not have a chronic myelomonocytic leukemia or have developed myelodysplastic syndrome as a complication of previous bendamustine administration in the past.     The patient will call if any issues arise while holding off on the Calquence    I think so far he is doing fine.  On 06/02/2023 the patient has been seen in the office with profound fatigue for the last 2 weeks to the  point that he has great deal of difficulty getting dressed by himself, getting shaved and things of this nature. This is the first time in his life as a patient in this office that he has come in in a wheelchair pushed by his friend. The patient is pale and what is more concerning is the leukocytosis with a white count of 23,000 and very atypical mononuclear cells in circulation by peripheral blood examination. The cells are large with minimal cytoplasm, granular nucleus vacuoles looking like immature cells and I would not be surprised if they suggest blasts. I do not see any rhiannon rods on them and they do not look like prolymphocytes neither. Therefore this explains why the differential in the white count is abnormal, explains the leukocytosis, and explains also the progressive thrombocytopenia and progressive splenomegaly.    We have collected peripheral blood flow cytometry today and expect the report of this this afternoon.     I would not be surprised if this patient needs to have bone marrow testing at some point next week.     I am very concerned that the patient in the presence of cough and decreased breath sounds in the right base also has pneumonia and he will be treated with IV fluids today normal saline 1000 cc and he will receive 1 gram of rocephin IV. Subsequently he will receive Levaquin 500 mg orally every day for the next 5 days.     The patient will return to the office next week, Tuesday to be reviewed. Hopefully the report of the flow cytometry called to me today will decide how to proceed. I would not be surprised if the patient needs to be admitted over the weekend to the hospital to proceed with bone marrow testing first thing on Monday and then definitive diagnosis. Obviously the question will be if he has developed acute leukemia as a complication of bendamustine administration and in the background of myelodysplasia what the treatment will be.     I pointed out to the patient all of these  issues and he was ready to proceed.   On 06/12/2023, the patient has been reviewed today.  He looks terrible, he feels terrible.  He is barely able to stand up to put his pants on.  He is not eating anything.  He has profound deterioration of performance status.  He has nocturnal sweats.  He has profound perspiration.  He has low-grade temperature.  He has some shortness of breath.  He has no obvious pain.      Clinically his spleen is enlarged 8 cm below the left costal margin.  He has lost a lot of muscle mass.  His white count has further elevated to 64,000.  His hemoglobin is lower.  His LDH is in the 1400 category.  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  On the basis of this, I would like to pursue hospitalization with echocardiogram, PICC line, port placement, and initiation of chemotherapy for CHOP Rituxan in the next couple of days.           •   •   •   •   ·   2.  History of prostate cancer  · Treated at the [facility]  · Most recent PSA April 2022 = 0.203  • On 01/13/2023 he has minimal urinary symptomatology comparing his PSA with 3 years ago was 0.8 today, actually a few days ago was 0.1. I doubt that all of the symptoms that he has are associated with prostate cancer. The blood in the urine has a different significance.   • On 01/18/2023 no issues pertinent to his prostate cancer. I see the seeds in his prostatic bed on the CT scan. His urinalysis is very much clean and his PSA has remained stable. No activity of this entity at this point.   • On 02/08/2023  no  issues pertinent to this.   On 03/17/2023 no issues pertinent to his previous history of prostate cancer. His PSA has been measured and has remained normal.  On 04/13/2023 no issues pertinent to this at this point.   On 05/09/2023 no issues pertinent to this.  On 06/02/2023 no issues pertinent to this.   On 06/12/2023, no issues pertinent.             •   •   ·   3.  New anemia  · 12/30/2022 hemoglobin today 11.9.  This is a significant drop from 15.6 when seen 4 months ago.  · ?  Related to cystitis/hematuria  · We will check additional lab work today including ferritin, iron profile, B12, folate, and PSA level in light of his history of prostate cancer.  • On 01/13/2023 the hemoglobin remains at 12, he had a B12 level of 265 receiving a B12 injection 3 weeks ago and a reticulated hemoglobin of 31 today. Normal platelet count. His recent ferritin and iron profile were acceptable. He has had a colonoscopy that was perfectly normal in 2019 by Hieu Lester MD.   On 01/18/2023 I think the anemia and the thrombocytopenia is a reflection of splenomegaly, sequestration and activity of the CLL. I expect these numbers will improve once that we start to counteract the leukemia with Calquence. He will require repeat CBC, CMP, LDH, uric acid in 3 weeks.   • On 02/08/2023 we know that the anemia is a reflection of CLL activity, splenomegaly, and so forth. We expect that the hemoglobin should be corrected in the visit in 1 month and the splenomegaly should be reducing also correcting his platelet count.     The patient will be returning every couple of weeks for nurse visit and blood count and he will have visit with doctor in 1 month and 2 months. Plan in 04/2023 to repeat radiological assessment of his abdomen. I find no need for further PET scan. We will await the report of the molecular analysis of the peripheral blood from the [facility]. We requested this today.  On 03/17/2023 the anemia is slowly improving, reflection of  the treatment of his leukemia. The anemia was a reflection of leukemia involvement in the bone marrow and decreased production of red cells. The platelet count is reflection being low of splenomegaly and sequestration and probably bone marrow infiltration by leukemia.     I had a lengthy discussion with the patient about all these issues today. 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     The rest of the molecular analysis disclosed no bad situation in regard to prognosis.     The patient was thankful about the visit today and the simple explanation.  On 04/13/2023 the patient's hemoglobin remains stable, he has no notion of blood loss, his nutrition to me is marginal and I had a discussion with him about nutrition today that I think is very important not only from the point of view of his bowel activity but also from the point of view of proper nutrition and caloric ingestion. I do not think this patient according to what he reported to me is consuming more than 1200 calories a day and that explains to me why he loses weight. I pointed out to him that he needs to catch up in snacks, to have some cheese, to have some nuts, to have fruit in his diet that will help him to have better digestion and better bowel activity. I suggested some ice cream at nighttime as well.     In regard to his constipation I advised him to use a lower amount of MiraLax on a daily basis and that way his bowels remain regular on a daily basis.     His appointments will be kept the same for a visit in a month and the PET scan a few days before.     In a month he will have a CBC and CMP.   On 05/09/2023 the patient's platelet count remains low. I think it is a reflection of splenomegaly and sequestration and the hemoglobin remains at a level 9, reflection of splenomegaly and sequestration.     As posted above the patient has now monocyte excess. I do not know if this is an issue pertinent to Calquence or maybe the patient has a  2nd clone that could be significant in regard to chronic myelomonocytic leukemia that could be arising from previous bendamustine administration. I asked him as posted above to hold off on the Calquence, review him back in 3 weeks and  if we need to pursue bone marrow testing. I think I will in case that the monocyte count remains elevated or higher. The patient is aware of that.     Nothing that I can do for his platelet count at this point given the splenomegaly and not too much that I can do in regard to the hemoglobin. We have measured ferritin, iron, TIBC and all those numbers were normal in the past. His diet is much better at this time.  On 06/02/2023 the patient is very ill today and he will require IV fluids as posted above, IV antibiotics, oral antibiotics, peripheral blood flow cytometry, CMP, LDH and blood cultures. This patient has a high risk disease with high risk of symptomatology and high potential for new hospital admission.  On 06/12/2023, I think the anemia is a representation of bone marrow loaded with lymphoma cells and splenomegaly with sequestration.  No evidence of hemolysis.  Chemistry profile and bilirubin will be done.  I would not be surprised if the patient, by the end of the week, needs to have a blood transfusion.           •     On 06/12/2023, I had a conversation with the patient and his significant other regarding his disease process.  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  His LDH is very elevated and I pointed this out to the pathologist reviewing the bone marrow sample.  Actually the bone marrow sample, in my opinion, was excellent morphologically and to me after reviewing bone marrows for 30 years it tells me a different story.  I gave the opportunity to put the patient along with the morphological analysis of the bone marrow together better than the pathologist.  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  His spleen has been hot on recent PET scan.  We discontinued his oral chemotherapy medicine more than a month ago because it was not helping him or maintaining him in any way or form.  I do believe that the patient needs to be admitted to the hospital for the following:    He will require radiological assessment of his chest, abdomen and pelvis.  He will require echocardiogram.  He will require port placement.   He will have formal allocation and consent for chemotherapy administration in the form of CHOP and Rituxan.   Will plan to deliver Rituxan on day 1 and CHOP on day 2.   The patient will require prophylaxis for tumor lysis syndrome.  He will require administration of allopurinol as early as today.  The patient will require blood products very likely by the end of the week.  I made him aware that the chances of this regimented chemotherapy working is not that high, but we still do not know what kind of response we will get unless we do it and there are no predictive factors regarding how the treatment is going to do.  I pointed out to him that I do not know how long he needs to stay in the hospital and I would not be surprised if it is at least 10 days.   I provided him a report of the bone marrow test, I provided him proper typical information of Jon transformation but he does not want to read it and he wants to go ahead and proceed with hospitalization today.

## 2023-06-13 ENCOUNTER — APPOINTMENT (OUTPATIENT)
Dept: CT IMAGING | Facility: HOSPITAL | Age: 78
End: 2023-06-13

## 2023-06-13 DIAGNOSIS — C91.10 CHRONIC LYMPHOCYTIC LEUKEMIA: Primary | ICD-10-CM

## 2023-06-13 PROBLEM — E43 SEVERE MALNUTRITION: Status: ACTIVE | Noted: 2023-06-13

## 2023-06-13 LAB
ALBUMIN SERPL ELPH-MCNC: 3.1 G/DL (ref 2.9–4.4)
ALBUMIN/GLOB SERPL: 1.5 {RATIO} (ref 0.7–1.7)
ALPHA1 GLOB SERPL ELPH-MCNC: 0.3 G/DL (ref 0–0.4)
ALPHA2 GLOB SERPL ELPH-MCNC: 0.6 G/DL (ref 0.4–1)
ANION GAP SERPL CALCULATED.3IONS-SCNC: 4.8 MMOL/L (ref 5–15)
B-GLOBULIN SERPL ELPH-MCNC: 0.6 G/DL (ref 0.7–1.3)
BASOPHILS # BLD MANUAL: 0.54 10*3/MM3 (ref 0–0.2)
BASOPHILS NFR BLD MANUAL: 1 % (ref 0–1.5)
BLASTS NFR BLD MANUAL: 35 % (ref 0–0)
BUN SERPL-MCNC: 29 MG/DL (ref 8–23)
BUN/CREAT SERPL: 28.2 (ref 7–25)
CALCIUM SPEC-SCNC: 10.9 MG/DL (ref 8.6–10.5)
CHLORIDE SERPL-SCNC: 105 MMOL/L (ref 98–107)
CO2 SERPL-SCNC: 29.2 MMOL/L (ref 22–29)
CREAT SERPL-MCNC: 1.03 MG/DL (ref 0.76–1.27)
DACRYOCYTES BLD QL SMEAR: ABNORMAL
DEPRECATED RDW RBC AUTO: 49 FL (ref 37–54)
EGFRCR SERPLBLD CKD-EPI 2021: 73.9 ML/MIN/1.73
EOSINOPHIL # BLD MANUAL: 0.54 10*3/MM3 (ref 0–0.4)
EOSINOPHIL NFR BLD MANUAL: 1 % (ref 0.3–6.2)
ERYTHROCYTE [DISTWIDTH] IN BLOOD BY AUTOMATED COUNT: 16.6 % (ref 12.3–15.4)
GAMMA GLOB SERPL ELPH-MCNC: 0.6 G/DL (ref 0.4–1.8)
GLOBULIN SER-MCNC: 2.1 G/DL (ref 2.2–3.9)
GLUCOSE SERPL-MCNC: 89 MG/DL (ref 65–99)
HCT VFR BLD AUTO: 24.1 % (ref 37.5–51)
HGB BLD-MCNC: 7.7 G/DL (ref 13–17.7)
HYPOCHROMIA BLD QL: ABNORMAL
IGA SERPL-MCNC: 51 MG/DL (ref 61–437)
IGG SERPL-MCNC: 596 MG/DL (ref 603–1613)
IGM SERPL-MCNC: 15 MG/DL (ref 15–143)
INTERPRETATION SERPL IEP-IMP: ABNORMAL
KAPPA LC FREE SER-MCNC: 34.2 MG/L (ref 3.3–19.4)
KAPPA LC FREE/LAMBDA FREE SER: 4.07 {RATIO} (ref 0.26–1.65)
LAB AP CASE REPORT: NORMAL
LAB AP CLINICAL INFORMATION: NORMAL
LABORATORY COMMENT REPORT: ABNORMAL
LAMBDA LC FREE SERPL-MCNC: 8.4 MG/L (ref 5.7–26.3)
LYMPHOCYTES # BLD MANUAL: 21 10*3/MM3 (ref 0.7–3.1)
LYMPHOCYTES NFR BLD MANUAL: 12 % (ref 5–12)
M PROTEIN SERPL ELPH-MCNC: ABNORMAL G/DL
MCH RBC QN AUTO: 26.3 PG (ref 26.6–33)
MCHC RBC AUTO-ENTMCNC: 32 G/DL (ref 31.5–35.7)
MCV RBC AUTO: 82.3 FL (ref 79–97)
MONOCYTES # BLD: 6.46 10*3/MM3 (ref 0.1–0.9)
NEUTROPHILS # BLD AUTO: 6.46 10*3/MM3 (ref 1.7–7)
NEUTROPHILS NFR BLD MANUAL: 12 % (ref 42.7–76)
NRBC SPEC MANUAL: 1 /100 WBC (ref 0–0.2)
PATH REPORT.FINAL DX SPEC: NORMAL
PATH REPORT.GROSS SPEC: NORMAL
PATHOLOGY REVIEW: YES
PLAT MORPH BLD: NORMAL
PLATELET # BLD AUTO: 62 10*3/MM3 (ref 140–450)
PMV BLD AUTO: 9.2 FL (ref 6–12)
POTASSIUM SERPL-SCNC: 4.5 MMOL/L (ref 3.5–5.2)
PROT SERPL-MCNC: 5.2 G/DL (ref 6–8.5)
RBC # BLD AUTO: 2.93 10*6/MM3 (ref 4.14–5.8)
REF LAB TEST METHOD: NORMAL
SODIUM SERPL-SCNC: 139 MMOL/L (ref 136–145)
VARIANT LYMPHS NFR BLD MANUAL: 3 % (ref 0–5)
VARIANT LYMPHS NFR BLD MANUAL: 36 % (ref 19.6–45.3)
WBC MORPH BLD: NORMAL
WBC NRBC COR # BLD: 53.84 10*3/MM3 (ref 3.4–10.8)

## 2023-06-13 PROCEDURE — 25510000001 IOPAMIDOL 61 % SOLUTION: Performed by: INTERNAL MEDICINE

## 2023-06-13 PROCEDURE — 85025 COMPLETE CBC W/AUTO DIFF WBC: CPT | Performed by: INTERNAL MEDICINE

## 2023-06-13 PROCEDURE — 71260 CT THORAX DX C+: CPT

## 2023-06-13 PROCEDURE — 25010000002 RITUXIMAB-ARRX 100 MG/10ML SOLUTION 10 ML VIAL: Performed by: INTERNAL MEDICINE

## 2023-06-13 PROCEDURE — 97162 PT EVAL MOD COMPLEX 30 MIN: CPT

## 2023-06-13 PROCEDURE — 25010000002 HYDROCORTISONE SOD SUC (PF) 100 MG RECONSTITUTED SOLUTION: Performed by: INTERNAL MEDICINE

## 2023-06-13 PROCEDURE — 25010000002 RITUXIMAB-ARRX 500 MG/50ML SOLUTION 50 ML VIAL: Performed by: INTERNAL MEDICINE

## 2023-06-13 PROCEDURE — 80048 BASIC METABOLIC PNL TOTAL CA: CPT | Performed by: INTERNAL MEDICINE

## 2023-06-13 PROCEDURE — 74177 CT ABD & PELVIS W/CONTRAST: CPT

## 2023-06-13 PROCEDURE — 25010000002 HYDROCORTISONE SOD SUC (PF) 100 MG RECONSTITUTED SOLUTION

## 2023-06-13 PROCEDURE — 85007 BL SMEAR W/DIFF WBC COUNT: CPT | Performed by: INTERNAL MEDICINE

## 2023-06-13 PROCEDURE — 97530 THERAPEUTIC ACTIVITIES: CPT

## 2023-06-13 PROCEDURE — 25010000002 DIPHENHYDRAMINE PER 50 MG: Performed by: INTERNAL MEDICINE

## 2023-06-13 PROCEDURE — 25010000002 ONDANSETRON PER 1 MG: Performed by: INTERNAL MEDICINE

## 2023-06-13 PROCEDURE — 63710000001 PREDNISONE PER 5 MG: Performed by: INTERNAL MEDICINE

## 2023-06-13 PROCEDURE — 0 MEPERIDINE PER 100 MG: Performed by: INTERNAL MEDICINE

## 2023-06-13 RX ORDER — DOXORUBICIN HYDROCHLORIDE 2 MG/ML
50 INJECTION, SOLUTION INTRAVENOUS ONCE
Status: CANCELLED | OUTPATIENT
Start: 2023-06-15

## 2023-06-13 RX ORDER — ALLOPURINOL 300 MG/1
300 TABLET ORAL DAILY
Status: DISCONTINUED | OUTPATIENT
Start: 2023-06-14 | End: 2023-06-18 | Stop reason: HOSPADM

## 2023-06-13 RX ORDER — ACETAMINOPHEN 325 MG/1
650 TABLET ORAL ONCE
Status: CANCELLED | OUTPATIENT
Start: 2023-06-14

## 2023-06-13 RX ORDER — DIPHENHYDRAMINE HYDROCHLORIDE 50 MG/ML
50 INJECTION INTRAMUSCULAR; INTRAVENOUS AS NEEDED
Status: CANCELLED | OUTPATIENT
Start: 2023-06-14

## 2023-06-13 RX ORDER — PALONOSETRON 0.05 MG/ML
0.25 INJECTION, SOLUTION INTRAVENOUS ONCE
Status: CANCELLED | OUTPATIENT
Start: 2023-06-15

## 2023-06-13 RX ORDER — ACETAMINOPHEN 325 MG/1
650 TABLET ORAL ONCE
OUTPATIENT
Start: 2023-07-05

## 2023-06-13 RX ORDER — PREDNISONE 50 MG/1
100 TABLET ORAL
Status: COMPLETED | OUTPATIENT
Start: 2023-06-13 | End: 2023-06-17

## 2023-06-13 RX ORDER — ONDANSETRON HYDROCHLORIDE 8 MG/1
8 TABLET, FILM COATED ORAL 3 TIMES DAILY PRN
Qty: 30 TABLET | Refills: 5 | Status: SHIPPED | OUTPATIENT
Start: 2023-06-13

## 2023-06-13 RX ORDER — PREDNISONE 50 MG/1
100 TABLET ORAL DAILY
Qty: 10 TABLET | Refills: 0 | Status: SHIPPED | OUTPATIENT
Start: 2023-06-13 | End: 2023-06-23

## 2023-06-13 RX ORDER — OLANZAPINE 5 MG/1
5 TABLET ORAL ONCE
Status: CANCELLED | OUTPATIENT
Start: 2023-06-15

## 2023-06-13 RX ORDER — FAMOTIDINE 10 MG/ML
20 INJECTION, SOLUTION INTRAVENOUS AS NEEDED
OUTPATIENT
Start: 2023-07-05

## 2023-06-13 RX ORDER — SODIUM CHLORIDE 9 MG/ML
250 INJECTION, SOLUTION INTRAVENOUS ONCE
Status: CANCELLED | OUTPATIENT
Start: 2023-06-14

## 2023-06-13 RX ORDER — DIPHENHYDRAMINE HYDROCHLORIDE 50 MG/ML
50 INJECTION INTRAMUSCULAR; INTRAVENOUS AS NEEDED
OUTPATIENT
Start: 2023-07-05

## 2023-06-13 RX ORDER — MEPERIDINE HYDROCHLORIDE 25 MG/ML
25 INJECTION INTRAMUSCULAR; INTRAVENOUS; SUBCUTANEOUS
Status: COMPLETED | OUTPATIENT
Start: 2023-06-13 | End: 2023-06-14

## 2023-06-13 RX ORDER — DOXORUBICIN HYDROCHLORIDE 2 MG/ML
50 INJECTION, SOLUTION INTRAVENOUS ONCE
OUTPATIENT
Start: 2023-07-05

## 2023-06-13 RX ORDER — MEPERIDINE HYDROCHLORIDE 25 MG/ML
25 INJECTION INTRAMUSCULAR; INTRAVENOUS; SUBCUTANEOUS
OUTPATIENT
Start: 2023-07-05

## 2023-06-13 RX ORDER — PALONOSETRON 0.05 MG/ML
0.25 INJECTION, SOLUTION INTRAVENOUS ONCE
OUTPATIENT
Start: 2023-07-05

## 2023-06-13 RX ORDER — FAMOTIDINE 10 MG/ML
20 INJECTION, SOLUTION INTRAVENOUS AS NEEDED
Status: COMPLETED | OUTPATIENT
Start: 2023-06-13 | End: 2023-06-14

## 2023-06-13 RX ORDER — FAMOTIDINE 10 MG/ML
20 INJECTION, SOLUTION INTRAVENOUS AS NEEDED
Status: CANCELLED | OUTPATIENT
Start: 2023-06-14

## 2023-06-13 RX ORDER — OLANZAPINE 5 MG/1
5 TABLET ORAL ONCE
OUTPATIENT
Start: 2023-07-05

## 2023-06-13 RX ORDER — SODIUM CHLORIDE 9 MG/ML
250 INJECTION, SOLUTION INTRAVENOUS ONCE
OUTPATIENT
Start: 2023-07-05

## 2023-06-13 RX ORDER — DIPHENHYDRAMINE HYDROCHLORIDE 50 MG/ML
50 INJECTION INTRAMUSCULAR; INTRAVENOUS AS NEEDED
Status: COMPLETED | OUTPATIENT
Start: 2023-06-13 | End: 2023-06-14

## 2023-06-13 RX ORDER — MEPERIDINE HYDROCHLORIDE 25 MG/ML
25 INJECTION INTRAMUSCULAR; INTRAVENOUS; SUBCUTANEOUS
Status: CANCELLED | OUTPATIENT
Start: 2023-06-14

## 2023-06-13 RX ORDER — SODIUM CHLORIDE 9 MG/ML
250 INJECTION, SOLUTION INTRAVENOUS ONCE
Status: COMPLETED | OUTPATIENT
Start: 2023-06-13 | End: 2023-06-13

## 2023-06-13 RX ORDER — ACETAMINOPHEN 325 MG/1
650 TABLET ORAL ONCE
Status: COMPLETED | OUTPATIENT
Start: 2023-06-13 | End: 2023-06-13

## 2023-06-13 RX ADMIN — DIPHENHYDRAMINE HYDROCHLORIDE 50 MG: 50 INJECTION, SOLUTION INTRAMUSCULAR; INTRAVENOUS at 14:27

## 2023-06-13 RX ADMIN — Medication 10 ML: at 21:40

## 2023-06-13 RX ADMIN — RITUXIMAB-ARRX 850 MG: 500 INJECTION, SOLUTION INTRAVENOUS at 15:14

## 2023-06-13 RX ADMIN — ALLOPURINOL 100 MG: 100 TABLET ORAL at 09:15

## 2023-06-13 RX ADMIN — Medication 10 ML: at 09:16

## 2023-06-13 RX ADMIN — ACETAMINOPHEN 650 MG: 325 TABLET, FILM COATED ORAL at 16:42

## 2023-06-13 RX ADMIN — SODIUM CHLORIDE 250 ML: 9 INJECTION, SOLUTION INTRAVENOUS at 15:14

## 2023-06-13 RX ADMIN — ONDANSETRON 4 MG: 2 INJECTION INTRAMUSCULAR; INTRAVENOUS at 16:51

## 2023-06-13 RX ADMIN — PREDNISONE 100 MG: 50 TABLET ORAL at 15:15

## 2023-06-13 RX ADMIN — IOPAMIDOL 85 ML: 612 INJECTION, SOLUTION INTRAVENOUS at 08:17

## 2023-06-13 RX ADMIN — SODIUM CHLORIDE 75 ML/HR: 9 INJECTION, SOLUTION INTRAVENOUS at 12:42

## 2023-06-13 RX ADMIN — HYDROCORTISONE SODIUM SUCCINATE 200 MG: 100 INJECTION, POWDER, FOR SOLUTION INTRAMUSCULAR; INTRAVENOUS at 16:39

## 2023-06-13 RX ADMIN — ACETAMINOPHEN 650 MG: 325 TABLET, FILM COATED ORAL at 14:26

## 2023-06-13 RX ADMIN — MEPERIDINE HYDROCHLORIDE 25 MG: 25 INJECTION INTRAMUSCULAR; INTRAVENOUS; SUBCUTANEOUS at 16:26

## 2023-06-13 NOTE — PLAN OF CARE
Goal Outcome Evaluation:  Plan of Care Reviewed With: patient        Progress: no change  Outcome Evaluation: sats decreased.  oxygen via nasal cannula placed.  sats returned to normal.  other VS stable.  picc line in place.  will continue to monitor.

## 2023-06-13 NOTE — NURSING NOTE
"Nursing Chemotherapy Verification    Chemotherapy Regimen:   Treatment Plans       Name Type Hold Status Plan Dates Plan Provider       Active    OP SUPPORTIVE Cyanocobalamin (Vitamin B12) Q28D ONCOLOGY SUPPORTIVE CARE 1 On Automatic Hold  1/4/2023 - Present Gerry Schroeder MD    OP CLL Acalabrutinib  ONCOLOGY TREATMENT On Automatic Hold  1/18/2023 - Present Gerry Schroeder MD    OP LYMPHOMA R-CHOP RiTUXimab / Cyclophosphamide / DOXOrubicin / VinCRIStine / PredniSONE ONCOLOGY TREATMENT 2 Not on Hold  6/12/2023 - Present Gerry Schroeder MD                      Current height and weight: 195.6 cm (77\") 92.8 kg (204 lb 9.4 oz)  Calculated BSA from current height and weight (Sonny): 2.26    Relevant Labs  Results from last 7 days   Lab Units 06/13/23  0644 06/12/23  0959 06/07/23  1533   WBC 10*3/mm3 53.84* 64.56* 44.21*   HEMOGLOBIN g/dL 7.7* 9.5* 10.1*   HEMATOCRIT % 24.1* 30.8* 31.2*   PLATELETS 10*3/mm3 62* 92* 51*     Lab Results   Component Value Date    NEUTROABS 6.46 06/13/2023       Results from last 7 days   Lab Units 06/13/23  0644 06/12/23  1157 06/12/23  0959   CREATININE mg/dL 1.03 0.96 1.03       Serum creatinine: 1.03 mg/dL 06/13/23 0644  Estimated creatinine clearance: 76.3 mL/min    Lab Results   Component Value Date    HEPBCAB Negative 12/07/2016       Verification attestation:  I have personally reviewed the planned regimen and its administration and dosing. I understand the potential side effects.The patient has been instructed on the regimen, potential side effects, and self care measures; the consent form has been completed. I have confirmed that the appropriate premedication, prehydration, post medication and/or emergency medications are ordered in addition to the chemotherapy.    I have independently verified that the height and weight is current and calculated the BSA. I have verified the doses with the planned regimen and have clarified any deviations with the physician (Dr. Schroeder). I have " confirmed the route of administration and patient IV access.    Nurse: Noris Breen RN  2nd verification Nurse: Ilana Williamson RN

## 2023-06-13 NOTE — PLAN OF CARE
Goal Outcome Evaluation: Patient is alert. Ambulates to the bathroom with assist of 1. Room air. PICC line in place. Echo done.

## 2023-06-13 NOTE — PROGRESS NOTES
Pharmacy Chemotherapy Education - Rituximab    Jeff Bass is a 77 y.o. male admitted with lymphoma. Patient to start rituximab therapy for lymphoma with plan to give cycle 1 while inpatient. Met with patient and family to discuss schedule and expected effects of rituximab.    Reviewed with patient common and clinically significant effects including the risk of infusion reactions, flu-like symptoms including low grade fever/chills in the first 24 hours, headache, cough or runny nose/sinusitis. Infection prevention precautions were reviewed including hand washing, food safety and the avoidance of crowds/use of mask; patient was advised to contact provider in the event of a sustained fever >100.4 for more than 1 hour.       Patient was counseled on when to notify a provider including in the event of the following:   - Infusion reactions, including the development of hives, phlebitis, SOA, dizziness, chills, swelling, palpitations or chest pain.   - Rare, but severe mucocutaneous reactions including a severe rash or mucositis; patient to notify MD in the event of unexplained rash, blisters, or mouth sores.     - PML - counseled to notify MD in the event of confusion, dizziness, changes in vision or other neurological complications.    Reassured patient that treatment is generally well tolerated and the above complications are extremely rare. Provided patient with handout regarding medication, including the FDA medication guide. Patient engaged in counseling throughout and asked appropriate questions as needed to confirm understanding. Patient without any further questions at this time; patient and family verbalized understanding.    Thank you for the opportunity to provide education on chemotherapy; please do not hesitate if further assistance is needed.    Alina Cordero, Pharm.D., [facility]  Oncology Pharmacy Specialist  [phone]

## 2023-06-13 NOTE — SIGNIFICANT NOTE
PT reported rigors, chills, temp of 100.5, nausea, feeling lightheaded. HR Increased, O2 dropped. RN/CN stopped Rituxin infusion, administered PRN reaction medication and called MD. Dr. Alexandre Schroeder ordered Hydrocortisone 200 mg, Tylenol and to hold infusion for 20 mins. Upon restart pts HR still elevated. Low grade temp noted, 100.5, pt extremely diaphoretic. RN paged MD regarding restart, awaiting call.

## 2023-06-13 NOTE — PROGRESS NOTES
Subjective       History of Present Illness    On 06/12/2023, this 77-year-old male returns today to the office in a wheelchair, accompanied by his significant other in order to be reviewed.  In the meantime, after we reviewed him almost 10 days ago, he had profound fatigue, inability to function, weight loss, anorexia, nocturnal sweats, and profound deterioration of his performance status.  A peripheral blood flow cytometry disclosed atypical population of lymphocytes that were not the typical lymphocytes present in peripheral blood in patients who have chronic lymphoid leukemia. This triggered further testing including LDH that was in the 1400's, tremendously atypical for formal CLL.  This gain, to me, showed the possibility that this patient was transforming into Jon transformation of CLL and this triggered a bone marrow test last week that was performed in the office with no difficulties or consequences. I discussed the case with Dr. Baca, Pathologist at [facility], even though the bone marrow still shows population of lymphocytes that is atypical but not large.  I have reviewed the morphology of the bone marrow and to me it is extremely atypical in the first place.  His LDH remains highly elevated and the patient feels terrible.  All of this leads me to believe that his spleen enlargement that is so hot on the PET scan done recently contains Jon transformation of CLL and for this reason the patient will be admitted to the hospital today in order to proceed with further testing that includes echocardiogram, CT scan of the abdomen and pelvis, port placement, and chemotherapy administration equivalent to the treatment of patients who have large cell diffuse non-Hodgkin's lymphoma.  In other words, he will receive CHOP Rituxan chemotherapy.       The symptomatology that I noted remains ongoing and is worse as time goes by, to the point that he is barely able to get out of the chair and the bed by  himself.  He is not able to even get dressed himself.  He needs to have total support.  The patient's appetite is minimal.  He has no abdominal pain.  He has profound nocturnal sweats and low grade temperature.  He has not had any pain.  He has minimal cough and no shortness of breath.     On 06/13/2023 the patient was further reviewed. Today he continues feeling terrible, profoundly fatigued and tired, not able to anything, prostrated in bed with no appetite. His calcium level remains elevated. He is receiving IV fluids. We will review the radiological assessment of his chest, abdomen and pelvis and his echocardiogram below. He already has been educated in regard to chemotherapy administration today. He will receive CHOP-Rituxan as originally planned for Jon's transformation of CLL.      Past Medical History, Past Surgical History, Social History, Family History have been reviewed and are without significant changes except as mentioned.    Review of Systems   Constitutional:  Positive for activity change, appetite change, chills, diaphoresis, fatigue, fever and unexpected weight change.   Respiratory:  Positive for cough and shortness of breath.    Cardiovascular: Negative.    Gastrointestinal: Negative.    Genitourinary: Negative.    Musculoskeletal: Negative.    Skin: Negative.    Neurological: Negative.    Hematological: Negative.    Psychiatric/Behavioral:  The patient is nervous/anxious.     A comprehensive 14 point review of systems was performed and was negative except as mentioned.    Medications:  The current medication list was reviewed in the EMR    ALLERGIES:  No Known Allergies    Objective      Vitals:    06/13/23 0732 06/13/23 1127 06/13/23 1428 06/13/23 1548   BP: 112/60 107/61 111/59 117/66   BP Location: Left arm Left arm Left arm Left arm   Patient Position: Lying Sitting Lying Lying   Pulse: 84 83 93 86   Resp: 18 16  16   Temp: 98 °F (36.7 °C) 98.8 °F (37.1 °C) 98.8 °F (37.1 °C) 98.2 °F  (36.8 °C)   TempSrc: Oral Oral Oral Oral   SpO2: 92% 99% 92% 91%   Weight:       Height:             6/12/2023    10:09 AM   Current Status   ECOG score 1       Physical Exam  Constitutional:       Appearance: He is ill-appearing.   HENT:      Head: Normocephalic.   Eyes:      General: No scleral icterus.  Cardiovascular:      Rate and Rhythm: Regular rhythm. Tachycardia present.      Pulses: Normal pulses.      Heart sounds: Normal heart sounds. No murmur heard.    No gallop.   Pulmonary:      Effort: Pulmonary effort is normal.      Breath sounds: Normal breath sounds.   Abdominal:      General: Abdomen is flat. There is no distension.      Palpations: Abdomen is soft. There is no mass.      Tenderness: There is no abdominal tenderness. There is no guarding or rebound.      Hernia: No hernia is present.      Comments: SPLEEN PALPABLE 14 CM BLCM, NO LIVER ENLARGEMENT, NO OTHER MASSES   Musculoskeletal:      Right lower leg: No edema.      Left lower leg: No edema.   Lymphadenopathy:      Cervical: No cervical adenopathy.   Skin:     General: Skin is warm.      Coloration: Skin is pale.   Neurological:      General: No focal deficit present.      Mental Status: He is alert.   Psychiatric:         Mood and Affect: Mood normal.         Behavior: Behavior normal.         Thought Content: Thought content normal.         Judgment: Judgment normal.       RECENT LABS:  Hematology WBC   Date Value Ref Range Status   06/13/2023 53.84 (C) 3.40 - 10.80 10*3/mm3 Final     RBC   Date Value Ref Range Status   06/13/2023 2.93 (L) 4.14 - 5.80 10*6/mm3 Final     Hemoglobin   Date Value Ref Range Status   06/13/2023 7.7 (L) 13.0 - 17.7 g/dL Final     Hematocrit   Date Value Ref Range Status   06/13/2023 24.1 (L) 37.5 - 51.0 % Final     Platelets   Date Value Ref Range Status   06/13/2023 62 (L) 140 - 450 10*3/mm3 Final      CT CHEST, ABDOMEN, AND PELVIS WITHOUT IV CONTRAST     HISTORY: Non-Hodgkin's lymphoma of the spleen, shortness  of air     TECHNIQUE: Radiation dose reduction techniques were utilized, including  automated exposure control and exposure modulation based on body size.   3 mm images were obtained through the chest, abdomen, and pelvis without  IV contrast.      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  There is a trace pericardial effusion, as before.     Thoracic adenopathy is present with index nodes as below:  *  Right posterior paratracheal node measuring 0.7 cm in short axis  dimension, as before.  *  Left axillary node measuring 1 cm short axis dimension (previously  0.8 cm on 05/08/2023).     Diffuse anasarca is present within the soft tissues. A right PICC  terminates within the superior vena cava. Small bilateral pleural  effusions with overlying presumed atelectasis, mildly increased in size  since 05/20/2023. There is mosaic perfusion within the lungs, as before.     Abdomen and pelvis:  There is massive splenomegaly measuring approximately 31.9 cm  (previously 23.7 cm on 01/13/2023 and 24.1 cm on 05/08/2023). There are  also new nodular hypodensities within the spleen. Index lesion within  the inferior aspect measures 1.3 cm.     There are no findings of small bowel obstruction. The appendix is  unremarkable. Subcentimeter hepatic lesions are too small to  characterize. Larger lesions have been seen throughout the liver on  multiple prior CTs dating back to 2019. The gallbladder, pancreas and  adrenal glands have an unremarkable noncontrast CT appearance. Hypodense  renal lesions imaging density less than 15 Hounsfield units are likely  benign per Bosniak 2019 criteria.  Subcentimeter renal lesions are too  small to characterize. Indeterminate density lesion within the inferior  pole left kidney measuring up to 1.1 cm grossly unchanged since  08/21/2019. There is no hydronephrosis.     Abdominopelvic adenopathy is present with index nodes as below:  *  Portohepatic node along the anterior aspect of the infrahepatic IVC  measures 2.1 cm in short axis dimension (previously 1.5 cm on  05/08/2023).  *  Left periaortic node measuring 2 cm in short axis dimension  (previously 1.7 cm).      There is colonic diverticulosis. Small amount of free intraperitoneal  fluid is present. No free intraperitoneal air is seen. Radiation seeds  are present within the prostate.     Bone windows: No suspicious lytic blastic osseous lesions.     IMPRESSION:  Impression:  1.  Please note evaluation is suboptimal without intravenous contrast.  2.  Massive splenomegaly which has increased in size since 05/08/2023.  Abdominopelvic adenopathy with index nodes which have increased in size  since 05/08/2023. Findings are most suggestive of lymphoproliferative  involvement. New hypodense areas within the spleen are present and may  represent focal lymphomatous lesions versus small areas of infarction.  Index left axillary node appears of minimally increased in size, raising  concern for lymphoproliferative involvement  3.  New anasarca throughout the visualized soft tissues, small amount  free intraperitoneal fluid and increase in size of the small bilateral  pleural effusions. While findings may be related to third spacing,  lymphoproliferative etiology cannot be excluded.  4.  Other findings as above.     This report was finalized on 6/13/2023 11:47 AM by Dr. Raymond Oreilly M.D.   Interpretation Summary ECHO         : Left ventricular systolic function is normal. Left ventricular ejection fraction appears to be 61 - 65%. Normal global longitudinal LV strain (GLS) = -21.4%. Left ventricle strain data was  reviewed by the physician and found to be accurate. Normal left ventricular cavity size and wall thickness noted. All left ventricular wall segments contract normally. Left ventricular diastolic function is consistent with (grade I) impaired relaxation.       Assessment & Plan .  History of CLL  Status posttreatment with Bendamustine/Rituxan in 2016  Patient did have severe reaction to initial Rituxan dose requiring hospitalization or completion.  Did well thereafter.  12/30/2022 WBC remains normal at 10.6, 31% lymphs.  Platelets normal at 105,000.  No B symptoms or adenopathy.  No suggestion of recurrent disease.  Hemoglobin has acutely dropped however down to 11.9 (see further discussion below).  On 01/13/2023 his white count, hemoglobin and platelets are not changing. His total lymphocyte count is very minimal and I do believe that his leukemia remains very quiet on clinical grounds with no need for intervention.  On 01/18/2023 I discussed with the patient the fact that his radiological assessment of the abdomen shows periaortic and pericaval lymphadenopathy. Most of the lymph nodes are between 2-3 cm in size but most dramatically his spleen has enlarged very substantially being huge and almost as big as his liver or bigger. He has no obvious alteration in the liver. His stomach, small intestine, pancreas, kidneys disclose no new abnormalities. There is no evidence of diverticulitis or bowel obstruction. There is no notion of hernias. There is no other notion of masses or ascites. He has multiple cysts in the liver no different than before that have been present for more than 15 years.      The patient's hemoglobin has dropped to 10.7, the platelet count has dropped to 131,000 and I do believe that this is representation of splenomegaly sequestration and CLL activity very substantially. Today we have a beta-2 microglobulin, LDH and uric acid pending along with a chemistry profile. A urinalysis today disclosed no  evidence of infection and he has minimal microscopic hematuria with no significance. There is no proteinuria. There is no leukocyturia.      Given the above findings I discussed with the patient the fact that I need to rule out the possibility of Jon's transformation of his CLL and for this reason I have advised him to proceed with a PET scan in a few hours. Hopefully this will not be the case.      If the lymph nodes or spleen are extremely hot he will probably require a biopsy. If they have a light degree or moderate degree of SUV activity we will assume that leukemia is the reason and we will proceed with therapy with Calquence. I discussed with him the methodology of using this medicine taking it twice a day with side effects including atrial fibrillation and abnormal bleeding. He is no longer taking any PPI therefore there should not be a need for this medicine at that point. He will have formal education and consent for Calquence and the medicine will be delivered to his home in [place].      Once that we review the PET scan this will be discussed with him on the telephone. I am planning for him to remain on Calquence at least for the next 3-6 months and see how things evolve and planning to repeat radiological assessment in 3 months. I made him aware that sometimes people taking Calquence can have a rise in the blood count for several weeks after initiation of the medicine that eventually settles down and improves.      I also discussed with him that if the Calquence does not help the process we still have alternatives giving him Gazyva for example or Rituxan.      The patient is no longer requiring blood pressure medication and I advised him to hold off on this until we see how things evolve.      In order to improve his appetite and help him to control the leukemia up to a certain point the patient will take 20 mg of prednisone everyday for the next 7 days, take 1 tablet in the morning and then  discontinue. With that kind of dosing he will not need to have any taper.   On 02/08/2023 I discussed with the patient the fact that his PET scan failed to document any significant SUV activity in the spleen or lymph nodes documented in his abdomen. This gave me the relief that we are not facing the possibility or Jon's transformation and for that reason I advised the patient to proceed with therapy with Calquence that he has initiated for the last few days. He has encountered side effect including  headache and I pointed out to him that this is a common side effect that typically is relieved drinking a little bit of coffee once or twice a day.he is using Bufferin with some relief but I pointed out to him that he needs to be very careful with this medicine because this can end up with GI bleeding, stomach irritation and abnormal bleeding in the first place.      So far the patient's hemoglobin has improved some, the platelet count is stable but it will take several days before the spleen shrinks and the platelet count normalizes. The total white count is normal. Again the molecular analysis of the peripheral blood flow cytometry has been requested from the [facility] in regard the FISH testing. Also we ordered IgH testing.     The goal will be to be seen in a couple of weeks by nurse with laboratory parameter including CBC and visit with me in 1 month and 2 months. We will plan in 04/2023 to repeat radiological analysis of his abdomen. By then his spleen should be substantially reduced in size and intraabdominal adenopathy should be disappearing.      I expect that as the days go by the platelet count will improve and the hemoglobin will continue improving.  On 03/17/2023 the patient has been taking now his Brukinsa for almost 6 weeks. He has tolerated the medicine better than anticipated. He has not had any clinical bleeding or cardiac irregularity. The patient's appetite has remained acceptable. He has not had  any nausea or vomiting and today his heart rhythm is normal and his blood sugar is raising. Other issues important is that his hemoglobin has improved. His white count is stable. His total lymphocyte count is very well controlled and his platelet count is stable at 103.      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 I provided him the report of his testing. 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 This makes his prognosis a little bit worse in the long run but I pointed out to him that the medicine that he is taking is prime line for the condition that he has. Again, clinically his spleen is not palpable anymore. Therefore, I advised him to remain on his medicine at the same dosing twice a day. He will require laboratory testing every couple of weeks by nurse and I will review him in 1 month and 2 months. I want to review the status of his adenopathies and his spleen radiologically speaking in 2 months. I scheduled another PET scan. His spleen was very hot at that time of the diagnosis on 01/25/2023 and I want to review the anatomy of this and the SUV uptake of the spleen at that point.     Besides this he will require at some point a holiday from treatment to have a couple of teeth removed from his lower right side. He is not having infection yet. He raised the question if he will be able to have a cap and I told him that it should not be a problem at all. This can be done at any time. The removal will require holding off of his medication for at least 7 days in anticipation of any tooth removal and allow the places to heal and go back into the medication after that.  On 04/13/2023 the patient continues taking his Calquence with no limitations and no significant side effects. His cardiac auscultation today is normal, he has not had any clinical bleeding, he has not had any diarrhea, in fact he has been mildly constipated. When constipation happens the patient develops some suprapubic  pain probably related to sigmoid colon filling up with stools.      His spleen is clinically smaller today documented in the examination, the liver is not palpable, he has no peripheral adenopathy. His total lymphocyte count continues declining, the hemoglobin is stable and the platelet count is stable. I do believe that this patient is responding to this medicine and we advised him to remain on the medicine at the same dosing returning to see us back the first of next month. He will have radiological assessment in the form of PET scan a few days before and then review him after that.      He really wants to have his left knee replacement therapy at some point in the summer to be able to play pickle ball. We will work into his [facility] and his Orthopedic Medicine, Ran Rachel MD, in order to proceed with this at that point. This will be further discussed in the next visit.  On 05/09/2023 I reviewed the patient on clinical grounds. Actually he feels terrific and he is functioning very well. He is worrying about his bad knee with osteoarthritis, the patient will be a completely functional individual. He has not had any B symptoms, any infections, any clinical bleeding, [facility] I reviewed today his PET scan that discloses minimal activity in intraabdominal and hilar lymph nodes. Spleen remains very active, hot and enlarged with no changes in comparison with how he was 3 months ago. There is no obvious bone marrow uptake or bone uptake and the liver has no uptake. This raises the question now that the patient has some excess of monocytes if the spleen is enlarged because of a different phenomenon, for example myelophthisis, or if the spleen is enlarged because of monocyte infiltration. I do not know the answer to that. I do believe that the patient feels well and I do not think anything will be hurt if the patient holds off on the Calquence now and reassess him with CBC, CMP and LDH in 3  weeks from now. I pointed out to him that I will not rule out the need for him to have a bone marrow test to be sure that he does not have a chronic myelomonocytic leukemia or have developed myelodysplastic syndrome as a complication of previous bendamustine administration in the past.      The patient will call if any issues arise while holding off on the Calquence     I think so far he is doing fine.  On 06/02/2023 the patient has been seen in the office with profound fatigue for the last 2 weeks to the point that he has great deal of difficulty getting dressed by himself, getting shaved and things of this nature. This is the first time in his life as a patient in this office that he has come in in a wheelchair pushed by his friend. The patient is pale and what is more concerning is the leukocytosis with a white count of 23,000 and very atypical mononuclear cells in circulation by peripheral blood examination. The cells are large with minimal cytoplasm, granular nucleus vacuoles looking like immature cells and I would not be surprised if they suggest blasts. I do not see any rhiannon rods on them and they do not look like prolymphocytes neither. Therefore this explains why the differential in the white count is abnormal, explains the leukocytosis, and explains also the progressive thrombocytopenia and progressive splenomegaly.     We have collected peripheral blood flow cytometry today and expect the report of this this afternoon.      I would not be surprised if this patient needs to have bone marrow testing at some point next week.      I am very concerned that the patient in the presence of cough and decreased breath sounds in the right base also has pneumonia and he will be treated with IV fluids today normal saline 1000 cc and he will receive 1 gram of rocephin IV. Subsequently he will receive Levaquin 500 mg orally every day for the next 5 days.      The patient will return to the office next week, Tuesday to be  reviewed. Hopefully the report of the flow cytometry called to me today will decide how to proceed. I would not be surprised if the patient needs to be admitted over the weekend to the hospital to proceed with bone marrow testing first thing on Monday and then definitive diagnosis. Obviously the question will be if he has developed acute leukemia as a complication of bendamustine administration and in the background of myelodysplasia what the treatment will be.      I pointed out to the patient all of these issues and he was ready to proceed.   On 06/12/2023, the patient has been reviewed today.  He looks terrible, he feels terrible.  He is barely able to stand up to put his pants on.  He is not eating anything.  He has profound deterioration of performance status.  He has nocturnal sweats.  He has profound perspiration.  He has low-grade temperature.  He has some shortness of breath.  He has no obvious pain.       Clinically his spleen is enlarged 8 cm below the left costal margin.  He has lost a lot of muscle mass.  His white count has further elevated to 64,000.  His hemoglobin is lower.  His LDH is in the 1400 category.  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  On the basis of this, I would like to pursue hospitalization with echocardiogram, PICC line, port placement, and initiation of chemotherapy for CHOP Rituxan in the next couple of days.                              2.  History of prostate cancer  Treated at the [facility]  Most recent PSA April 2022 = 0.203  On 01/13/2023  he has minimal urinary symptomatology comparing his PSA with 3 years ago was 0.8 today, actually a few days ago was 0.1. I doubt that all of the symptoms that he has are associated with prostate cancer. The blood in the urine has a different significance.   On 01/18/2023 no issues pertinent to his prostate cancer. I see the seeds in his prostatic bed on the CT scan. His urinalysis is very much clean and his PSA has remained stable. No activity of this entity at this point.   On 02/08/2023  no issues pertinent to this.   On 03/17/2023 no issues pertinent to his previous history of prostate cancer. His PSA has been measured and has remained normal.  On 04/13/2023 no issues pertinent to this at this point.   On 05/09/2023 no issues pertinent to this.  On 06/02/2023 no issues pertinent to this.   On 06/12/2023, no issues pertinent.                           3.  New anemia  12/30/2022 hemoglobin today 11.9.  This is a significant drop from 15.6 when seen 4 months ago.  ?  Related to cystitis/hematuria  We will check additional lab work today including ferritin, iron profile, B12, folate, and PSA level in light of his history of prostate cancer.  On 01/13/2023 the hemoglobin remains at 12, he had a B12 level of 265 receiving a B12 injection 3 weeks ago and a reticulated hemoglobin of 31 today. Normal platelet count. His recent ferritin and iron profile were acceptable. He has had a colonoscopy that was perfectly normal in 2019 by Hieu Lester MD.   On 01/18/2023 I think the anemia and the thrombocytopenia is a reflection of splenomegaly, sequestration and activity of the CLL. I expect these numbers will improve once that we start to counteract the leukemia with Calquence. He will require repeat CBC, CMP, LDH, uric acid in 3 weeks.   On 02/08/2023 we know that the anemia is a reflection of CLL activity, splenomegaly, and so forth. We expect that the hemoglobin should be corrected in the visit in 1 month and the  splenomegaly should be reducing also correcting his platelet count.      The patient will be returning every couple of weeks for nurse visit and blood count and he will have visit with doctor in 1 month and 2 months. Plan in 04/2023 to repeat radiological assessment of his abdomen. I find no need for further PET scan. We will await the report of the molecular analysis of the peripheral blood from the [facility]. We requested this today.  On 03/17/2023 the anemia is slowly improving, reflection of the treatment of his leukemia. The anemia was a reflection of leukemia involvement in the bone marrow and decreased production of red cells. The platelet count is reflection being low of splenomegaly and sequestration and probably bone marrow infiltration by leukemia.      I had a lengthy discussion with the patient about all these issues today. 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      The rest of the molecular analysis disclosed no bad situation in regard to prognosis.      The patient was thankful about the visit today and the simple explanation.  On 04/13/2023 the patient's hemoglobin remains stable, he has no notion of blood loss, his nutrition to me is marginal and I had a discussion with him about nutrition today that I think is very important not only from the point of view of his bowel activity but also from the point of view of proper nutrition and caloric ingestion. I do not think this patient according to what he reported to me is consuming more than 1200 calories a day and that explains to me why he loses weight. I pointed out to him that he needs to catch up in snacks, to have some cheese, to have some nuts, to have fruit in his diet that will help him to have better digestion and better bowel activity. I suggested some ice cream at nighttime as well.      In regard to his constipation I advised him to use a lower amount of MiraLax on a daily basis and that way his bowels remain regular on a  daily basis.      His appointments will be kept the same for a visit in a month and the PET scan a few days before.      In a month he will have a CBC and CMP.   On 05/09/2023 the patient's platelet count remains low. I think it is a reflection of splenomegaly and sequestration and the hemoglobin remains at a level 9, reflection of splenomegaly and sequestration.      As posted above the patient has now monocyte excess. I do not know if this [facility] or maybe the patient has a 2nd clone that could be significant in regard to chronic myelomonocytic leukemia that could be arising from previous bendamustine administration. I asked him as posted above to hold off on the Calquence, review him back in 3 weeks and  if we need to pursue bone marrow testing. I think I will in case that the monocyte count remains elevated or higher. The patient is aware of that.      Nothing that I can do for his platelet count at this point given the splenomegaly and not too much that I can do in regard to the hemoglobin. We have measured ferritin, iron, TIBC and all those numbers were normal in the past. His diet is much better at this time.  On 06/02/2023 the patient is very ill today and he will require IV fluids as posted above, IV antibiotics, oral antibiotics, peripheral blood flow cytometry, CMP, LDH and blood cultures. This patient has a high risk disease with high risk of symptomatology and high potential for new hospital admission.  On 06/12/2023, I think the anemia is a representation of bone marrow loaded with lymphoma cells and splenomegaly with sequestration.  No evidence of hemolysis.  Chemistry profile and bilirubin will be done.  I would not be surprised if the patient, by the end of the week, needs to have a blood transfusion.                     On 06/12/2023, I had a conversation with the patient and his significant other regarding his disease process.  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  His LDH is very elevated and I pointed this out to the pathologist reviewing the bone marrow sample.  Actually the bone marrow sample, in my opinion, was excellent morphologically and to me after reviewing bone marrows for 30 years it tells me a different story.  I gave the opportunity to put the patient along with the morphological analysis of the bone marrow together better than the pathologist.  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  His spleen has been hot on recent PET scan.  We discontinued his oral chemotherapy medicine more than a month ago because it was not helping him or maintaining him in any way or form.  I do believe that the patient needs to be admitted to the hospital for the following:     He will require radiological assessment of his chest, abdomen and pelvis.  He will require echocardiogram.  He will require port placement.   He will have formal allocation and consent for chemotherapy administration in the form of CHOP and Rituxan.   Will plan to deliver Rituxan on day 1 and CHOP on day 2.   The patient will require prophylaxis for tumor lysis syndrome.  He will require administration of allopurinol as early as today.  The patient will require blood products very likely by the end of the week.  I made him aware that the chances of this regimented chemotherapy working is not that high, but we still do not know what kind of response we will get unless we do it and there are no predictive factors regarding how the treatment is going to do.  I pointed out to him that I do not know how long he needs to stay in the hospital and I would not be surprised if it is at least 10 days.   I provided him a report of the bone marrow test, I provided him proper typical information of Jon transformation but he does not want to read it and he wants to go ahead and proceed with hospitalization today.       On  06/13/2023 I have reviewed the patient's issues with his daughter that is the first time that I met her along with his significant other and his granddaughter in the room. I discussed with him his echocardiogram that disclosed a normal left ventricular ejection fraction. I discussed with him the CT scan of the abdomen that shows a gigantic spleen, obviously documented clinically on examination and posted above. There is intraabdominal adenopathy that is dramatically growing and he has some component of ascites in the abdominal cavity. His LDH remains elevated. 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 As I discussed also today with pathologist at [facility], the peripheral blood is very abnormal and is not consistent with any form of CLL stability. There are only 2 kinds of cells in peripheral blood, abnormal cells and red cells. In the bone marrow the same features are present.     Under the present circumstances the patient has been advised to proceed with his Rituxan administration today. He already has a PICC line in place in the right upper extremity. He is receiving IV fluids abundantly in prevention of tumor lysis syndrome and he is receiving allopurinol 300 mg orally since yesterday.     The patient has tumor lysis syndrome laboratory testing ongoing as well.     We will plan to complete his Rituxan administration today. He has received Rituxan in the past in the office.     Also the patient will receive tomorrow the leftover part of chemotherapy medicines that will include the Adriamycin, the vincristine and the Cytoxan. He will initiate as well, prednisone medicine.     The goal is to see what happens to the white count, what happens to the LDH and what happens to the uric acid and phosphorus.     Hopefully this will cool off his disease process, will shrink the spleen that will be clinically documented very easily though and  the patient hopefully will have some improvement. If we do not see that turning around in the question of 5-7 days we know that the patient is lost and probably will have no purpose for any further intervention.     I discussed all these facts with the patient in the room. It took me 1 hour of my intervention today to go through these reports and all this educational situation.                                    6/13/2023      CC:

## 2023-06-13 NOTE — CASE MANAGEMENT/SOCIAL WORK
Discharge Planning Assessment  [facility]     Patient Name: Jeff Bass  MRN: [id number]  Today's Date: 6/13/2023    Admit Date: 6/12/2023    Plan: Patient to discharge home. Daughter to transport,   Discharge Needs Assessment       Row Name 06/13/23 1309       Living Environment    People in Home alone    Current Living Arrangements home    Potentially Unsafe Housing Conditions none    Primary Care Provided by self    Provides Primary Care For no one    Family Caregiver if Needed none    Quality of Family Relationships unable to assess    Able to Return to Prior Arrangements yes       Resource/Environmental Concerns    Resource/Environmental Concerns none       Transition Planning    Patient/Family Anticipates Transition to home    Patient/Family Anticipated Services at Transition none    Transportation Anticipated family or friend will provide       Discharge Needs Assessment    Readmission Within the Last 30 Days no previous admission in last 30 days    Equipment Currently Used at Home none    Anticipated Changes Related to Illness none    Equipment Needed After Discharge none    Provided Post Acute Provider List? N/A    Provided Post Acute Provider Quality & Resource List? N/A                   Discharge Plan       Row Name 06/13/23 1307       Plan    Plan Patient to discharge home. Daughter to transport,    Plan Comments IMM was given. CCP met with patient at bedside. Introduced self and explained role of CCP. Patient confirmed his information on her face sheet was accurate. Patient is enrolled into the [facility] program. Patient lives at home by himself. Patients PCP is Jovani Salomon. Patients home is three floors but there is an elevator in the home. Patient has never used HH or been to a SNF. Patient does not use any DME at home. Patients denies needs and stated that his daughter or friend can transport him at discharge. CCP to follow.                  Continued Care and Services - Admitted Since  6/12/2023    Coordination has not been started for this encounter.       Expected Discharge Date and Time       Expected Discharge Date Expected Discharge Time    Jun 14, 2023            Demographic Summary       Row Name 06/13/23 1309       General Information    Admission Type inpatient    Referral Source emergency department    Reason for Consult discharge planning    Preferred Language English                   Functional Status       Row Name 06/13/23 1308       Functional Status    Usual Activity Tolerance good    Current Activity Tolerance good       Functional Status, IADL    Medications independent    Meal Preparation independent    Housekeeping independent    Laundry independent    Shopping independent       Mental Status    General Appearance WDL WDL       Mental Status Summary    Recent Changes in Mental Status/Cognitive Functioning no changes       Employment/    Employment Status retired                   Psychosocial    No documentation.                  Abuse/Neglect    No documentation.                  Legal    No documentation.                  Substance Abuse    No documentation.                  Patient Forms    No documentation.

## 2023-06-13 NOTE — PLAN OF CARE
Goal Outcome Evaluation:  Plan of Care Reviewed With: patient           Outcome Evaluation: Pt is a 76 yo M who was admitted with generalized weakness, fatigue, anorexia, and weight loss. Pt has lyphoma of spleen and family reports a recent bout of PNA.  Pt presents to PT with impaired functional mobility and gait secondary to generalized weakness, impaired balance, and decreased activity tolerance. Pt's O2 sats remained 92-93 percent on room air during activity. Pt may benefit from skilled PT to address strength, mobility and gait.

## 2023-06-13 NOTE — THERAPY EVALUATION
Patient Name: Jeff Bass  : 1945    MRN: [id number]                              Today's Date: 2023       Admit Date: 2023    Visit Dx:     ICD-10-CM ICD-9-CM   1. Chronic lymphocytic leukemia  C91.10 204.10     Patient Active Problem List   Diagnosis    Health care maintenance    Benign prostatic hyperplasia    Chronic lymphocytic leukemia    Hypertension    Hypovitaminosis D    Hyperlipidemia    Hypogonadism in male    Vitamin D deficiency    Malignant neoplasm of prostate    Spinal stenosis of lumbar region with neurogenic claudication    Degeneration of lumbar intervertebral disc    Coronary arteriosclerosis    PVC (premature ventricular contraction)    History of colon polyps    Abdominal pain    Substernal goiter    Skin tag    Sensory hearing loss, bilateral    ED (erectile dysfunction) of organic origin    Constipation    Benign essential hypertension    Bladder outlet obstruction    GERD (gastroesophageal reflux disease)    Left inguinal hernia    Impacted cerumen    Hemangioma of liver    Abnormal finding on thyroid function test    Bursitis of right hip    Pain    Primary osteoarthritis of right knee    OA (osteoarthritis) of knee    B12 deficiency    High risk medication use    Pneumonia due to infectious organism    Lymphoma of spleen     Past Medical History:   Diagnosis Date    Arthritis     Benign prostatic hyperplasia     FOLLOWED BY DR. VIVIEN PATEL    Biliary dyskinesia     Bunion of right foot     GREAT TOE    CLL (chronic lymphocytic leukemia)     FOLLOWED BY DR WALKER    Colon polyps     FOLLOWED BY DR. NEHA HAM    Constipation     Degeneration of lumbar intervertebral disc     Diverticulosis     Erectile dysfunction     Fracture of ankle     GERD (gastroesophageal reflux disease)     Hallux valgus of left foot     Hyperlipidemia     MIXED    Hypertension     Inguinal hernia     Kidney stone 2009    SEEN AT [facility] ER    Knee swelling 2018     Localized, primary osteoarthritis     Lumbar radiculopathy     Lumbar stenosis     Lung mass     LEFT SIDE - SCAR TISSUE PER PATIENT     Osteoarthritis of right knee     Polyneuropathy     Prostate CA 03/2016    JACQUELYN 6, S/P XRT, FOLLOWED BY DR. VIVIEN PATEL, RADIATION SEEDS    PVC (premature ventricular contraction)     PT STATES WORKED UP YEARS AGO BY [facility] FOR POSSIBLE MURMUR - NO FOLLOW UP REQUIRED     RBBB     Sensory hearing loss, bilateral     Skin cancer     SQUAMOUS CELL CARCINOMA OF FACE    Substernal goiter     Thyromegaly 12/2016    ADMITTED TO [facility]    Vitamin D deficiency      Past Surgical History:   Procedure Laterality Date    BRONCHOSCOPY N/A 12/14/2016    Procedure: BRONCHOSCOPY WITH  BAL AND BIOPSY AND ENDOBRONCHIAL ULTRASOUND  AND NAVIGATION WITH BRUSHINGS AND BIOPSY AND BAL;  Surgeon: Pierre Manning MD;  Location: [facility] ENDOSCOPY;  Service:     COLONOSCOPY N/A 03/13/2019    5 MM SESSILE TUBULAR ADENOMA POLYP IN TRANSVERSE, MULTIPLE SMALL AND LARGE DIVERTICULA IN SIGMOID, RESCOPE IN 5 YRS, DR. NEHA HAM AT [facility]    COLONOSCOPY W/ POLYPECTOMY N/A 03/19/2015    3 TUBULAR ADENOMA POLYPS, 12 TUBULOVILLOUS POLYP RECTO-SIGMOID, DIVERTICULOSIS, RESCOPE IN 3 YRS, DR. NEHA HAM AT [facility]    EYE SURGERY Bilateral     CATARACT EXTRACTION WITH LENS IMPLANT, DR. GOVEA    FINGER NAIL SURGERY Right 2022    TORRES-PATEL    INGUINAL HERNIA REPAIR Left 11/02/2021    Procedure: LEFT OPEN INGUINAL HERNIA REPAIR;  Surgeon: Nixon Kolb MD;  Location: [facility] OR;  Service: General;  Laterality: Left;    PROSTATE RADIOACTIVE SEED IMPLANT N/A 09/06/2016    DR. HOA JARAMILLO AT [facility]    PROSTATE SURGERY N/A 03/11/2016    BIOPSY: ADENOCARCINOMA, DR. ZAID IBARRA    THYROID BIOPSY Bilateral 11/01/2017    NO B CELL OR T CELL LYMPHOMA, DONE AT [facility]    TOTAL KNEE ARTHROPLASTY Right 10/12/2022    Procedure: TOTAL KNEE ARTHROPLASTY;  Surgeon: Ran Rachel MD;  [place] [facility] [place] [facility]   Service: Orthopedics;  Laterality: Right;      General Information       Row Name 06/13/23 1425          Physical Therapy Time and Intention    Document Type evaluation  -     Mode of Treatment individual therapy;physical therapy  -       Row Name 06/13/23 1425          General Information    Prior Level of Function independent:;gait;transfer;bed mobility  no AD at baseline  -     Existing Precautions/Restrictions fall  -     Barriers to Rehab medically complex  -       Row Name 06/13/23 1425          Living Environment    People in Home spouse  -       Row Name 06/13/23 1425          Cognition    Orientation Status (Cognition) oriented x 3  -       Row Name 06/13/23 1425          Safety Issues, Functional Mobility    Impairments Affecting Function (Mobility) balance;strength;endurance/activity tolerance  -               User Key  (r) = Recorded By, (t) = Taken By, (c) = Cosigned By      Initials Name Provider Type     Mercedes Oswald, PT Physical Therapist                   Mobility       Row Name 06/13/23 1426          Bed Mobility    Bed Mobility supine-sit;sit-supine  -     Supine-Sit [place] (Bed Mobility) verbal cues;standby assist  -     Sit-Supine [place] (Bed Mobility) verbal cues;standby assist  -     Assistive Device (Bed Mobility) bed rails;head of bed elevated  -       Row Name 06/13/23 1426          Sit-Stand Transfer    Sit-Stand [place] (Transfers) verbal cues;nonverbal cues (demo/gesture);contact guard  -       Row Name 06/13/23 1426          Gait/Stairs (Locomotion)    [place] Level (Gait) verbal cues;contact guard;nonverbal cues (demo/gesture)  -     Distance in Feet (Gait) 25  -     Deviations/Abnormal Patterns (Gait) festinating/shuffling;gait speed decreased;stride length decreased  -     Bilateral Gait Deviations forward flexed posture  -     Comment, (Gait/Stairs) gait distance limited secondary to fatigue and weakness  -                User Key  (r) = Recorded By, (t) = Taken By, (c) = Cosigned By      Initials Name Provider Type     Mercedes Oswald PT Physical Therapist                   Obj/Interventions       [facility] Name 06/13/23 1429          Range of Motion Comprehensive    General Range of Motion no range of motion deficits identified  -CH       Row Name 06/13/23 1429          Strength Comprehensive (MMT)    Comment, General Manual Muscle Testing (MMT) Assessment generalized weakness noted with functional mobility  -CH       Row Name 06/13/23 1429          Balance    Balance Assessment standing static balance;standing dynamic balance  -     Static Standing Balance verbal cues;non-verbal cues (demo/gesture);contact guard  -     Dynamic Standing Balance verbal cues;non-verbal cues (demo/gesture);contact guard  -               User Key  (r) = Recorded By, (t) = Taken By, (c) = Cosigned By      Initials Name Provider Type     Mercedes Oswald, PT Physical Therapist                   Goals/Plan       Row Name 06/13/23 1433          Bed Mobility Goal 1 (PT)    Activity/Assistive Device (Bed Mobility Goal 1, PT) bed mobility activities, all  -CH     [place] Level/Cues Needed (Bed Mobility Goal 1, PT) supervision required  -CH     Time Frame (Bed Mobility Goal 1, PT) 1 week  -CH       Row Name 06/13/23 1433          Transfer Goal 1 (PT)    Activity/Assistive Device (Transfer Goal 1, PT) transfers, all  -CH     [place] Level/Cues Needed (Transfer Goal 1, PT) supervision required  -CH     Time Frame (Transfer Goal 1, PT) 1 week  -CH       Row Name 06/13/23 1433          Gait Training Goal 1 (PT)    Activity/Assistive Device (Gait Training Goal 1, PT) gait (walking locomotion)  -CH     [place] Level (Gait Training Goal 1, PT) supervision required  -CH     Distance (Gait Training Goal 1, PT) 150  -CH     Time Frame (Gait Training Goal 1, PT) 1 week  -[facility] Name 06/13/23 1433          Therapy Assessment/Plan (PT)     Planned Therapy Interventions (PT) balance training;bed mobility training;gait training;home exercise program;patient/family education;strengthening;transfer training  -               User Key  (r) = Recorded By, (t) = Taken By, (c) = Cosigned By      Initials Name Provider Type    Mercedes Gordon, PT Physical Therapist                   Clinical Impression       Row Name 06/13/23 1429          Pain    Pretreatment Pain Rating 0/10 - no pain  -     Posttreatment Pain Rating 0/10 - no pain  -       Row Name 06/13/23 1429          Plan of Care Review    Plan of Care Reviewed With patient  -     Outcome Evaluation Pt is a 78 yo M who was admitted with generalized weakness, fatigue, anorexia, and weight loss. Pt has lyphoma of spleen and family reports a recent bout of PNA.  Pt presents to PT with impaired functional mobility and gait secondary to generalized weakness, impaired balance, and decreased activity tolerance. Pt's O2 sats remained 92-93 percent on room air during activity. Pt may benefit from skilled PT to address strength, mobility and gait.  -       Row Name 06/13/23 1429          Therapy Assessment/Plan (PT)    Patient/Family Therapy Goals Statement (PT) to return to [facility]  -     Rehab Potential (PT) good, to achieve stated therapy goals  -     Criteria for Skilled Interventions Met (PT) skilled treatment is necessary  -     Therapy Frequency (PT) 5 times/wk  -       Row Name 06/13/23 1429          Positioning and Restraints    Pre-Treatment Position in bed  -     Post Treatment Position bed  -CH     In Bed supine;call light within reach;encouraged to call for assist;exit alarm on;with family/caregiver  -               User Key  (r) = Recorded By, (t) = Taken By, (c) = Cosigned By      Initials Name Provider Type    Mercedes Gordon, PT Physical Therapist                   Outcome Measures       Row Name 06/13/23 1434 06/13/23 0900       How much help from another person  do you currently need...    Turning from your back to your side while in flat bed without using bedrails? 3  -CH 3  -ME    Moving from lying on back to sitting on the side of a flat bed without bedrails? 3  -CH 3  -ME    Moving to and from a bed to a chair (including a wheelchair)? 3  -CH 3  -ME    Standing up from a chair using your arms (e.g., wheelchair, bedside chair)? 3  -CH 3  -ME    Climbing 3-5 steps with a railing? 2  -CH 2  -ME    To walk in hospital room? 3  -CH 3  -ME    AM-PAC 6 Clicks Score (PT) 17  - 17  -ME    Highest level of mobility 5 --> Static standing  - 5 --> Static standing  -ME      Row Name 06/13/23 1434          Functional Assessment    Outcome Measure Options AM-PAC 6 Clicks Basic Mobility (PT)  -               User Key  (r) = Recorded By, (t) = Taken By, (c) = Cosigned By      Initials Name Provider Type     Mercedes Oswald PT Physical Therapist    ME Ilana Williamson, RN Registered Nurse                                 Physical Therapy Education       Title: PT OT SLP Therapies (In Progress)       Topic: Physical Therapy (In Progress)       Point: Mobility training (Done)       Learning Progress Summary             Patient Acceptance, E,TB,D, VU,NR by  at 6/13/2023 1434                         Point: Home exercise program (Not Started)       Learner Progress:  Not documented in this visit.              Point: Body mechanics (Done)       Learning Progress Summary             Patient Acceptance, E,TB,D, VU,NR by  at 6/13/2023 1434                         Point: Precautions (Done)       Learning Progress Summary             Patient Acceptance, E,TB,D, VU,NR by  at 6/13/2023 1434                                         User Key       Initials Effective Dates Name Provider Type [facility] 06/16/21 -  Mercedes Oswald PT Physical Therapist PT                  PT Recommendation and Plan  Planned Therapy Interventions (PT): balance training, bed mobility training, gait  training, home exercise program, patient/family education, strengthening, transfer training  Plan of Care Reviewed With: patient  Outcome Evaluation: Pt is a 76 yo M who was admitted with generalized weakness, fatigue, anorexia, and weight loss. Pt has lyphoma of spleen and family reports a recent bout of PNA.  Pt presents to PT with impaired functional mobility and gait secondary to generalized weakness, impaired balance, and decreased activity tolerance. Pt's O2 sats remained 92-93 percent on room air during activity. Pt may benefit from skilled PT to address strength, mobility and gait.     Time Calculation:    PT Charges       Row Name 06/13/23 1144             Time Calculation    Start Time 1040  -      Stop Time 1054  -CH      Time Calculation (min) 14 min  -CH      PT Received On 06/13/23  -      PT - Next Appointment 06/14/23  -      PT Goal Re-Cert Due Date 06/20/23  -         Time Calculation- PT    Total Timed Code Minutes- PT 10 minute(s)  -CH         Timed Charges    71718 - PT Therapeutic Activity Minutes 10  -CH         Total Minutes    Timed Charges Total Minutes 10  -CH       Total Minutes 10  -CH                User Key  (r) = Recorded By, (t) = Taken By, (c) = Cosigned By      Initials Name Provider Type     Mercedes Oswald, PT Physical Therapist                  Therapy Charges for Today       Code Description Service Date Service Provider Modifiers Qty    [id number]  PT THERAPEUTIC ACT EA 15 MIN 6/13/2023 Mercedes Oswald, PT GP 1    [id number]  PT EVAL MOD COMPLEXITY 3 6/13/2023 Mercedes Oswald, PT GP 1            PT G-Codes  Outcome Measure Options: AM-PAC 6 Clicks Basic Mobility (PT)  AM-PAC 6 Clicks Score (PT): 17  PT Discharge Summary  Anticipated Discharge Disposition (PT): home with assist, home with home health    Mercedes Oswald, REBECCA  6/13/2023

## 2023-06-13 NOTE — PLAN OF CARE
Goal Outcome Evaluation:  Plan of Care Reviewed With: patient           Outcome Evaluation: CT chest, abd, and pelvis completed this morning. Rituxan started this afternoon. Reaction occurred involving cough, nausea, dizziness, tachycardia, rigors. Rescue meds given with improvement in symptoms. Rituxan being held at this time.

## 2023-06-14 LAB
ALBUMIN SERPL-MCNC: 3 G/DL (ref 3.5–5.2)
ALBUMIN/GLOB SERPL: 2 G/DL
ALP SERPL-CCNC: 50 U/L (ref 39–117)
ALT SERPL W P-5'-P-CCNC: 10 U/L (ref 1–41)
ANION GAP SERPL CALCULATED.3IONS-SCNC: 10 MMOL/L (ref 5–15)
ANION GAP SERPL CALCULATED.3IONS-SCNC: 9 MMOL/L (ref 5–15)
AST SERPL-CCNC: 26 U/L (ref 1–40)
BILIRUB SERPL-MCNC: 1.5 MG/DL (ref 0–1.2)
BLASTS NFR BLD MANUAL: 7 % (ref 0–0)
BUN SERPL-MCNC: 35 MG/DL (ref 8–23)
BUN SERPL-MCNC: 35 MG/DL (ref 8–23)
BUN/CREAT SERPL: 38.5 (ref 7–25)
BUN/CREAT SERPL: 39.3 (ref 7–25)
CALCIUM SPEC-SCNC: 10.1 MG/DL (ref 8.6–10.5)
CALCIUM SPEC-SCNC: 10.1 MG/DL (ref 8.6–10.5)
CHLORIDE SERPL-SCNC: 107 MMOL/L (ref 98–107)
CHLORIDE SERPL-SCNC: 107 MMOL/L (ref 98–107)
CO2 SERPL-SCNC: 26 MMOL/L (ref 22–29)
CO2 SERPL-SCNC: 27 MMOL/L (ref 22–29)
CREAT SERPL-MCNC: 0.89 MG/DL (ref 0.76–1.27)
CREAT SERPL-MCNC: 0.91 MG/DL (ref 0.76–1.27)
DACRYOCYTES BLD QL SMEAR: ABNORMAL
DEPRECATED RDW RBC AUTO: 49.5 FL (ref 37–54)
EGFRCR SERPLBLD CKD-EPI 2021: 85.7 ML/MIN/1.73
EGFRCR SERPLBLD CKD-EPI 2021: 87.2 ML/MIN/1.73
ERYTHROCYTE [DISTWIDTH] IN BLOOD BY AUTOMATED COUNT: 17.3 % (ref 12.3–15.4)
GLOBULIN UR ELPH-MCNC: 1.5 GM/DL
GLUCOSE SERPL-MCNC: 117 MG/DL (ref 65–99)
GLUCOSE SERPL-MCNC: 143 MG/DL (ref 65–99)
HCT VFR BLD AUTO: 27 % (ref 37.5–51)
HGB BLD-MCNC: 9 G/DL (ref 13–17.7)
HYPOCHROMIA BLD QL: ABNORMAL
LDH SERPL-CCNC: 1908 U/L (ref 135–225)
LYMPHOCYTES # BLD MANUAL: 1.62 10*3/MM3 (ref 0.7–3.1)
LYMPHOCYTES NFR BLD MANUAL: 7 % (ref 5–12)
MAGNESIUM SERPL-MCNC: 2.5 MG/DL (ref 1.6–2.4)
MCH RBC QN AUTO: 27 PG (ref 26.6–33)
MCHC RBC AUTO-ENTMCNC: 33.3 G/DL (ref 31.5–35.7)
MCV RBC AUTO: 81.1 FL (ref 79–97)
MONOCYTES # BLD: 0.63 10*3/MM3 (ref 0.1–0.9)
NEUTROPHILS # BLD AUTO: 6.13 10*3/MM3 (ref 1.7–7)
NEUTROPHILS NFR BLD MANUAL: 68 % (ref 42.7–76)
NRBC SPEC MANUAL: 1 /100 WBC (ref 0–0.2)
OVALOCYTES BLD QL SMEAR: ABNORMAL
PHOSPHATE SERPL-MCNC: 3 MG/DL (ref 2.5–4.5)
PHOSPHATE SERPL-MCNC: 4 MG/DL (ref 2.5–4.5)
PLAT MORPH BLD: NORMAL
PLATELET # BLD AUTO: 29 10*3/MM3 (ref 140–450)
PMV BLD AUTO: 9.5 FL (ref 6–12)
POTASSIUM SERPL-SCNC: 4.4 MMOL/L (ref 3.5–5.2)
POTASSIUM SERPL-SCNC: 4.4 MMOL/L (ref 3.5–5.2)
PROT SERPL-MCNC: 4.5 G/DL (ref 6–8.5)
RBC # BLD AUTO: 3.33 10*6/MM3 (ref 4.14–5.8)
SODIUM SERPL-SCNC: 143 MMOL/L (ref 136–145)
SODIUM SERPL-SCNC: 143 MMOL/L (ref 136–145)
URATE SERPL-MCNC: 7.8 MG/DL (ref 3.4–7)
URATE SERPL-MCNC: 8.1 MG/DL (ref 3.4–7)
VARIANT LYMPHS NFR BLD MANUAL: 15 % (ref 19.6–45.3)
VARIANT LYMPHS NFR BLD MANUAL: 3 % (ref 0–5)
WBC MORPH BLD: NORMAL
WBC NRBC COR # BLD: 9.01 10*3/MM3 (ref 3.4–10.8)

## 2023-06-14 PROCEDURE — 25010000002 DIPHENHYDRAMINE PER 50 MG: Performed by: INTERNAL MEDICINE

## 2023-06-14 PROCEDURE — 25010000002 ONDANSETRON PER 1 MG: Performed by: INTERNAL MEDICINE

## 2023-06-14 PROCEDURE — 84550 ASSAY OF BLOOD/URIC ACID: CPT | Performed by: INTERNAL MEDICINE

## 2023-06-14 PROCEDURE — 25010000002 FOSAPREPITANT PER 1 MG: Performed by: INTERNAL MEDICINE

## 2023-06-14 PROCEDURE — 25010000002 DEXAMETHASONE SODIUM PHOSPHATE 100 MG/10ML SOLUTION 10 ML VIAL: Performed by: INTERNAL MEDICINE

## 2023-06-14 PROCEDURE — 63710000001 PROMETHAZINE PER 12.5 MG: Performed by: INTERNAL MEDICINE

## 2023-06-14 PROCEDURE — 25010000002 VINCRISTINE PER 1 MG: Performed by: INTERNAL MEDICINE

## 2023-06-14 PROCEDURE — 84100 ASSAY OF PHOSPHORUS: CPT | Performed by: INTERNAL MEDICINE

## 2023-06-14 PROCEDURE — 25010000002 DOXORUBICIN PER 10 MG: Performed by: INTERNAL MEDICINE

## 2023-06-14 PROCEDURE — 0 MEPERIDINE PER 100 MG: Performed by: INTERNAL MEDICINE

## 2023-06-14 PROCEDURE — 80053 COMPREHEN METABOLIC PANEL: CPT | Performed by: INTERNAL MEDICINE

## 2023-06-14 PROCEDURE — 85025 COMPLETE CBC W/AUTO DIFF WBC: CPT | Performed by: INTERNAL MEDICINE

## 2023-06-14 PROCEDURE — 25010000002 HYDROCORTISONE SOD SUC (PF) 100 MG RECONSTITUTED SOLUTION: Performed by: INTERNAL MEDICINE

## 2023-06-14 PROCEDURE — 25010000002 CYCLOPHOSPHAMIDE PER 100 MG: Performed by: INTERNAL MEDICINE

## 2023-06-14 PROCEDURE — 83615 LACTATE (LD) (LDH) ENZYME: CPT | Performed by: INTERNAL MEDICINE

## 2023-06-14 PROCEDURE — 83735 ASSAY OF MAGNESIUM: CPT | Performed by: INTERNAL MEDICINE

## 2023-06-14 PROCEDURE — 85007 BL SMEAR W/DIFF WBC COUNT: CPT | Performed by: INTERNAL MEDICINE

## 2023-06-14 PROCEDURE — 63710000001 PREDNISONE PER 5 MG: Performed by: INTERNAL MEDICINE

## 2023-06-14 RX ORDER — OLANZAPINE 10 MG/1
5 TABLET ORAL NIGHTLY
Status: DISCONTINUED | OUTPATIENT
Start: 2023-06-14 | End: 2023-06-16

## 2023-06-14 RX ORDER — PROMETHAZINE HYDROCHLORIDE 12.5 MG/1
12.5 TABLET ORAL EVERY 6 HOURS PRN
Status: DISCONTINUED | OUTPATIENT
Start: 2023-06-14 | End: 2023-06-18 | Stop reason: HOSPADM

## 2023-06-14 RX ORDER — DOXORUBICIN HYDROCHLORIDE 2 MG/ML
25 INJECTION, SOLUTION INTRAVENOUS ONCE
Status: COMPLETED | OUTPATIENT
Start: 2023-06-14 | End: 2023-06-14

## 2023-06-14 RX ORDER — PROMETHAZINE HYDROCHLORIDE 6.25 MG/5ML
12.5 SYRUP ORAL EVERY 6 HOURS PRN
Status: DISCONTINUED | OUTPATIENT
Start: 2023-06-14 | End: 2023-06-18 | Stop reason: HOSPADM

## 2023-06-14 RX ORDER — CHOLECALCIFEROL (VITAMIN D3) 125 MCG
10 CAPSULE ORAL NIGHTLY PRN
Status: DISCONTINUED | OUTPATIENT
Start: 2023-06-14 | End: 2023-06-16

## 2023-06-14 RX ORDER — PROMETHAZINE HYDROCHLORIDE 12.5 MG/1
12.5 SUPPOSITORY RECTAL EVERY 6 HOURS PRN
Status: DISCONTINUED | OUTPATIENT
Start: 2023-06-14 | End: 2023-06-18 | Stop reason: HOSPADM

## 2023-06-14 RX ADMIN — SODIUM CHLORIDE 125 ML/HR: 9 INJECTION, SOLUTION INTRAVENOUS at 09:18

## 2023-06-14 RX ADMIN — DEXAMETHASONE SODIUM PHOSPHATE: 10 INJECTION, SOLUTION INTRAMUSCULAR; INTRAVENOUS at 15:08

## 2023-06-14 RX ADMIN — Medication 10 ML: at 09:11

## 2023-06-14 RX ADMIN — CYCLOPHOSPHAMIDE 1700 MG: 1 INJECTION, POWDER, FOR SOLUTION INTRAVENOUS; ORAL at 16:18

## 2023-06-14 RX ADMIN — PROMETHAZINE HYDROCHLORIDE 12.5 MG: 12.5 TABLET ORAL at 23:09

## 2023-06-14 RX ADMIN — ACETAMINOPHEN 650 MG: 325 TABLET, FILM COATED ORAL at 00:46

## 2023-06-14 RX ADMIN — SODIUM CHLORIDE 100 ML/HR: 9 INJECTION, SOLUTION INTRAVENOUS at 00:41

## 2023-06-14 RX ADMIN — Medication 10 MG: at 23:09

## 2023-06-14 RX ADMIN — Medication 10 ML: at 20:43

## 2023-06-14 RX ADMIN — HYDROCORTISONE SODIUM SUCCINATE 100 MG: 100 INJECTION, POWDER, FOR SOLUTION INTRAMUSCULAR; INTRAVENOUS at 00:52

## 2023-06-14 RX ADMIN — PREDNISONE 100 MG: 50 TABLET ORAL at 09:12

## 2023-06-14 RX ADMIN — Medication 10 ML: at 20:42

## 2023-06-14 RX ADMIN — FOSAPREPITANT 150 MG: 150 INJECTION, POWDER, LYOPHILIZED, FOR SOLUTION INTRAVENOUS at 14:51

## 2023-06-14 RX ADMIN — DIPHENHYDRAMINE HYDROCHLORIDE 50 MG: 50 INJECTION, SOLUTION INTRAMUSCULAR; INTRAVENOUS at 00:49

## 2023-06-14 RX ADMIN — MEPERIDINE HYDROCHLORIDE 25 MG: 25 INJECTION INTRAMUSCULAR; INTRAVENOUS; SUBCUTANEOUS at 00:38

## 2023-06-14 RX ADMIN — DOXORUBICIN HYDROCHLORIDE 28 MG: 2 INJECTION, SOLUTION INTRAVENOUS at 15:40

## 2023-06-14 RX ADMIN — SODIUM CHLORIDE 125 ML/HR: 9 INJECTION, SOLUTION INTRAVENOUS at 18:31

## 2023-06-14 RX ADMIN — ALLOPURINOL 300 MG: 300 TABLET ORAL at 09:12

## 2023-06-14 RX ADMIN — FAMOTIDINE 20 MG: 10 INJECTION INTRAVENOUS at 00:51

## 2023-06-14 RX ADMIN — VINCRISTINE SULFATE 1 MG: 1 INJECTION, SOLUTION INTRAVENOUS at 15:49

## 2023-06-14 NOTE — PLAN OF CARE
Goal Outcome Evaluation:           Progress: improving  Outcome Evaluation: Patient alert and oriented, on room air, assist x1 in the room, tolerating regular diet, no complaints of pain,   PICC line dressing changed and clean dry intact blood returned in both lumens, patient tolerated RCHOP today with no complications, normal saline still infusing 125ml/hr, labs being monitored closely for tumor lysis syndrome, vitals stable, plan of care continued

## 2023-06-14 NOTE — SIGNIFICANT NOTE
06/14/23 1516   OTHER   Discipline physical therapy assistant   Rehab Time/Intention   Session Not Performed other (see comments)  (Per RN, pt not approp for PT this PM. PT will follow up tomorrow.)

## 2023-06-14 NOTE — H&P
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     HISTORY OF PRESENT ILLNESS:     On 06/12/2023, this 77-year-old male returns today to the office in a wheelchair, accompanied by his significant other in order to be reviewed.  In the meantime, after we reviewed him almost 10 days ago, he had profound fatigue, inability to function, weight loss, anorexia, nocturnal sweats, and profound deterioration of his performance status.  A peripheral blood flow cytometry disclosed atypical population of lymphocytes that were not the typical lymphocytes present in peripheral blood in patients who have chronic lymphoid leukemia. This triggered further testing including LDH that was in the 1400's, tremendously atypical for formal CLL.  This gain, to me, showed the possibility that this patient was transforming into Jon transformation of CLL and this triggered a bone marrow test last week that was performed in the office with no difficulties or consequences. I discussed the case with Dr. Baca, Pathologist at [facility], even though the bone marrow still shows population of lymphocytes that is atypical but not large.  I have reviewed the morphology of the bone marrow and to me it is extremely atypical in the first place.  His LDH remains highly elevated and the patient feels terrible.  All of this leads me to believe that his spleen enlargement that is so hot on the PET scan done recently contains Jon transformation of CLL and for this reason the patient will be admitted to the hospital today in order to proceed with further testing that includes echocardiogram, CT scan of the abdomen and pelvis, port placement, and chemotherapy administration equivalent to the treatment of patients who have large cell diffuse non-Hodgkin's lymphoma.  In other words, he will receive [facility]  Rituxan chemotherapy.       The symptomatology that I noted remains ongoing and is worse as time goes by, to the point that he is barely able to get out of the chair and the bed by himself.  He is not able to even get dressed himself.  He needs to have total support.  The patient's appetite is minimal.  He has no abdominal pain.  He has profound nocturnal sweats and low grade temperature.  He has not had any pain.  He has minimal cough and no shortness of breath.                Medical History        Past Medical History:   Diagnosis Date    Arthritis      Benign prostatic hyperplasia       FOLLOWED BY DR. VIVIEN PATEL    Biliary dyskinesia      Bunion of right foot       GREAT TOE    CLL (chronic lymphocytic leukemia) 2016     FOLLOWED BY DR WALKER    Colon polyps       FOLLOWED BY DR. NEHA HAM    Constipation      Degeneration of lumbar intervertebral disc      Diverticulosis      Erectile dysfunction      Fracture of ankle 1975    GERD (gastroesophageal reflux disease)      Hallux valgus of left foot      Hyperlipidemia       MIXED    Hypertension      Inguinal hernia      Kidney stone 02/21/2009     SEEN AT [facility] ER    Knee swelling 2018    Localized, primary osteoarthritis      Lumbar radiculopathy      Lumbar stenosis      Lung mass       LEFT SIDE - SCAR TISSUE PER PATIENT     Osteoarthritis of right knee      Polyneuropathy      Prostate CA 03/2016     JACQUELYN 6, S/P XRT, FOLLOWED BY DR. VIVIEN PATEL, RADIATION SEEDS    PVC (premature ventricular contraction)       PT STATES WORKED UP YEARS AGO BY [facility] FOR POSSIBLE MURMUR - NO FOLLOW UP REQUIRED     RBBB      Sensory hearing loss, bilateral      Skin cancer       SQUAMOUS CELL CARCINOMA OF FACE    Substernal goiter      Thyromegaly 12/2016     ADMITTED TO [facility]    Vitamin D deficiency           Surgical History         Past Surgical History:   Procedure Laterality Date    BRONCHOSCOPY N/A 12/14/2016     Procedure: BRONCHOSCOPY WITH  BAL AND  BIOPSY AND ENDOBRONCHIAL ULTRASOUND  AND NAVIGATION WITH BRUSHINGS AND BIOPSY AND BAL;  Surgeon: Pierre Manning MD;  Location: [facility] ENDOSCOPY;  Service:     COLONOSCOPY N/A 03/13/2019     5 MM SESSILE TUBULAR ADENOMA POLYP IN TRANSVERSE, MULTIPLE SMALL AND LARGE DIVERTICULA IN SIGMOID, RESCOPE IN 5 YRS, DR. NEHA HAM AT [facility]    COLONOSCOPY W/ POLYPECTOMY N/A 03/19/2015     3 TUBULAR ADENOMA POLYPS, 12 TUBULOVILLOUS POLYP RECTO-SIGMOID, DIVERTICULOSIS, RESCOPE IN 3 YRS, DR. NEHA HAM AT [facility]    EYE SURGERY Bilateral       CATARACT EXTRACTION WITH LENS IMPLANT, DR. GOVEA    FINGER NAIL SURGERY Right 2022     TORRES-PATEL    INGUINAL HERNIA REPAIR Left 11/02/2021     Procedure: LEFT OPEN INGUINAL HERNIA REPAIR;  Surgeon: Nixon Kolb MD;  Location: [facility] MAIN OR;  Service: General;  Laterality: Left;    PROSTATE RADIOACTIVE SEED IMPLANT N/A 09/06/2016     DR. HOA JARAMILLO AT [facility]    PROSTATE SURGERY N/A 03/11/2016     BIOPSY: ADENOCARCINOMA, DR. ZAID IBARRA    THYROID BIOPSY Bilateral 11/01/2017     NO B CELL OR T CELL LYMPHOMA, DONE AT [facility]    TOTAL KNEE ARTHROPLASTY Right 10/12/2022     Procedure: TOTAL KNEE ARTHROPLASTY;  Surgeon: Ran Rachel MD;  [place]  [facility];  Service: Orthopedics;  Laterality: Right;         Social History   Social History            Socioeconomic History    Marital status:        Spouse name: No    Years of education: College   Tobacco Use    Smoking status: Never    Smokeless tobacco: Never   Substance and Sexual Activity    Alcohol use: No    Drug use: Never    Sexual activity: Yes       Partners: Female       Birth control/protection: None               Family History   Problem Relation Age of Onset    Heart disease Father           Rheumatic heart disease    Hypertension Father      Osteoporosis Father      Stroke Mother      Osteoporosis Mother      No Known Problems Daughter      Malig Hyperthermia Neg Hx             Current Outpatient  Medications on File Prior to Visit   Medication Sig    acalabrutinib (Calquence) 100 MG capsule capsule Take 2 capsules by mouth 2 (Two) Times a Day.    acyclovir (Zovirax) 400 MG tablet Take 1 tablet by mouth 2 (Two) Times a Day.    alfuzosin (UROXATRAL) 10 MG 24 hr tablet Take 2 tablets by mouth Every Night.    ammonium lactate (LAC-HYDRIN) 12 % lotion Every 12 (Twelve) Hours.    aspirin 81 MG EC tablet Take 1 tablet by mouth twice daily x 14 days; then take 1 tablet by mouth daily x 28 days    levoFLOXacin (LEVAQUIN) 500 MG tablet TAKE 1 TABLET BY MOUTH DAILY    losartan-hydrochlorothiazide (HYZAAR) 100-25 MG per tablet Take 1 tablet by mouth Daily.    Melatonin 10 MG tablet Take 1 tablet by mouth Every Night.    NON FORMULARY Balance of Nature and Super Beets daily    ondansetron (ZOFRAN) 8 MG tablet Take 1 tablet by mouth 3 (Three) Times a Day As Needed for Nausea or Vomiting.    pantoprazole (PROTONIX) 40 MG EC tablet Take 1 tablet by mouth Daily.    polyethylene glycol (MIRALAX) 17 GM/SCOOP powder Miralax 17 gram/dose oral powder   1 scoop in the morning then as needed    potassium chloride (K-DUR,KLOR-CON) 20 MEQ CR tablet Take 1 tablet by mouth Daily. (Patient taking differently: Take 1 tablet by mouth Daily. HOLD FOR ONE WEEK PRIOR TO SURGERY)    pravastatin (PRAVACHOL) 20 MG tablet Take 1 tablet by mouth Every Night.    predniSONE (DELTASONE) 10 MG tablet Take 2 tablets by mouth Daily. Take once in the morning    sulfamethoxazole-trimethoprim (BACTRIM DS,SEPTRA DS) 800-160 MG per tablet Take 1 tablet by mouth 3 (Three) Times a Week. Take on Monday, Wednesday and Friday.    triamcinolone (KENALOG) 0.025 % cream            Current Facility-Administered Medications on File Prior to Visit   Medication    Chlorhexidine Gluconate Cloth 2 % pads   No Known Allergies                VITAL SIGNS:  Vitals       Vitals:     06/12/23 1011   BP: 115/64   Pulse: 114   Temp: 98 °F (36.7 °C)   TempSrc: Temporal   SpO2:  "94%   Weight: 93.6 kg (206 lb 4.8 oz)   Height: 195.6 cm (77.01\")   PainSc: 0-No pain              PHYSICAL EXAMINATION:                                 GENERAL:  Well-developed, Patient  in  acute distress. In a wheel chair  SKIN:  Warm, dry ,NO purpura ,no rash.very pale, sweaty skin  HEENT:  Pupils were equal and reactive to light and accomodation, conjunctivae noninjected,  normal visual acuity.   NECK:  Supple with good range of motion; no thyromegaly , no JVD or bruits,.No carotid artery pain, no carotid abnormal pulsation   LYMPHATICS:  No cervical, NO supraclavicular, NO axillary, NO inguinal adenopathies.  CARDIAC   normal rate , regular rhythm, without murmur,NO rubs NO S3 NO S4   LUNGS: normal breath sounds bilateral, no wheezing, NO rhonchi, NO crackles ,NO rubs.  VASCULAR VENOUS: no cyanosis, NO collateral circulation, NO varicosities, NO edema, NO palpable cords, NO pain,NO erythema, NO pigmentation of the skin.  ABDOMEN:  Soft, NO pain,no hepatomegaly, 8 cm blcm splenomegaly,no masses, no ascites, no collateral circulation,no distention.  EXTREMITIES  AND SPINE:  No clubbing, no cyanosis ,no deformities , r knee pain .No kyphosis,  no pain in spine, no pain in ribs , no pain in pelvic bone.  NEUROLOGICAL:  Patient was awake, alert, oriented to time, person and place.                             LABORATORY DATA:        Results from last 7 days   Lab Units 06/12/23  0959 06/07/23  1533   WBC 10*3/mm3 64.56* 44.21*   NEUTROS ABS 10*3/mm3 3.99 4.14   HEMOGLOBIN g/dL 9.5* 10.1*   HEMATOCRIT % 30.8* 31.2*   PLATELETS 10*3/mm3 92* 51*              Contains abnormal data Lactate Dehydrogenase  Order: [id number]  Status: Final result     Visible to patient: Yes (not seen)     Next appt: None     Dx: CLL (chronic lymphoid leukemia) in re...     Specimen Information: Blood   0 Result Notes                    Component  Ref Range & Units 5 d ago  (6/7/23) 10 d ago  (6/2/23) 3 mo ago  (2/22/23) 4 mo ago  (2/7/23) 4 " mo ago  (1/18/23) 3 yr ago  (6/11/20) 3 yr ago  (6/4/20)   LDH  99 - 259 U/L 1,470 High   1,433 High   156  175  293 High   161  214    Resulting Agency  CBC LAB  CBC LAB  CBC LAB  CBC LAB  CBC LAB  CBC LAB  CBC LAB                Specimen Collected: 06/07/23 15:50 EDT Last Resulted: 06/07/23 16:37 EDT               Flow Cytometry, Blood [[id number]] (Order [id number])  Order  Date: 6/2/2023 Department: [facility] HEMATOLOGY & ONCOLOGY [facility] Ordering/Authorizing: Gerry Schroeder MD      Reprint Order Requisition     Flow Cytometry, Blood (Order #[id number]) on 6/2/23         Flow Cytometry, Blood  Order: [id number]  Status: Final result     Visible to patient: Yes (not seen)     Next appt: None     Dx: Chronic lymphocytic leukemia     Specimen Information: Blood   0 Result Notes          Component  Ref Range & Units 10 d ago 4 mo ago   Reference Lab Report       Resulting Agency [facility]PA CPA                     Specimen Collected: 06/02/23 10:49 EDT Last Resulted: 06/08/23 15:16 EDT        Lab Flowsheet     Order Details     View Encounter     Lab and Collection Details     Routing     Result History     View Encounter Conversation              Scans on Order [id number]     Scan on 6/8/2023 1516 by Miroslava Greenfield F: Flow Cytometry,Blood          Result Care Coordination       Patient Communication      Add Comments   Add Notifications  Back to Top            Order Questions     Question Answer   Release to patient Routine Release                   Provider Comment To Patient      Add Comments   Add Notifications          Routing History     Priority Sent On From To Message Type     6/8/2023  3:16 PM Lab, Background User Gerry Schroeder MD Results            All Reviewers List     Gerry Schroeder MD on 6/8/2023 17:46         Lab Component SmartPhrase Guide     Flow Cytometry, Blood (Order #[id number]) on 6/2/23               ASSESSMENT:   1.  History of CLL  Status posttreatment with  Bendamustine/Rituxan in 2016  Patient did have severe reaction to initial Rituxan dose requiring hospitalization or completion.  Did well thereafter.  12/30/2022 WBC remains normal at 10.6, 31% lymphs.  Platelets normal at 105,000.  No B symptoms or adenopathy.  No suggestion of recurrent disease.  Hemoglobin has acutely dropped however down to 11.9 (see further discussion below).  On 01/13/2023 his white count, hemoglobin and platelets are not changing. His total lymphocyte count is very minimal and I do believe that his leukemia remains very quiet on clinical grounds with no need for intervention.  On 01/18/2023 I discussed with the patient the fact that his radiological assessment of the abdomen shows periaortic and pericaval lymphadenopathy. Most of the lymph nodes are between 2-3 cm in size but most dramatically his spleen has enlarged very substantially being huge and almost as big as his liver or bigger. He has no obvious alteration in the liver. His stomach, small intestine, pancreas, kidneys disclose no new abnormalities. There is no evidence of diverticulitis or bowel obstruction. There is no notion of hernias. There is no other notion of masses or ascites. He has multiple cysts in the liver no different than before that have been present for more than 15 years.      The patient's hemoglobin has dropped to 10.7, the platelet count has dropped to 131,000 and I do believe that this is representation of splenomegaly sequestration and CLL activity very substantially. Today we have a beta-2 microglobulin, LDH and uric acid pending along with a chemistry profile. A urinalysis today disclosed no evidence of infection and he has minimal microscopic hematuria with no significance. There is no proteinuria. There is no leukocyturia.      Given the above findings I discussed with the patient the fact that I need to rule out the possibility of Jon's transformation of his CLL and for this reason I have advised him  to proceed with a PET scan in a few hours. Hopefully this will not be the case.      If the lymph nodes or spleen are extremely hot he will probably require a biopsy. If they have a light degree or moderate degree of SUV activity we will assume that leukemia is the reason and we will proceed with therapy with Calquence. I discussed with him the methodology of using this medicine taking it twice a day with side effects including atrial fibrillation and abnormal bleeding. He is no longer taking any PPI therefore there should not be a need for this medicine at that point. He will have formal education and consent for Calquence and the medicine will be delivered to his home in [place].      Once that we review the PET scan this will be discussed with him on the telephone. I am planning for him to remain on Calquence at least for the next 3-6 months and see how things evolve and planning to repeat radiological assessment in 3 months. I made him aware that sometimes people taking Calquence can have a rise in the blood count for several weeks after initiation of the medicine that eventually settles down and improves.      I also discussed with him that if the Calquence does not help the process we still have alternatives giving him Gazyva for example or Rituxan.      The patient is no longer requiring blood pressure medication and I advised him to hold off on this until we see how things evolve.      In order to improve his appetite and help him to control the leukemia up to a certain point the patient will take 20 mg of prednisone everyday for the next 7 days, take 1 tablet in the morning and then discontinue. With that kind of dosing he will not need to have any taper.   On 02/08/2023 I discussed with the patient the fact that his PET scan failed to document any significant SUV activity in the spleen or lymph nodes documented in his abdomen. This gave me the relief that we are not facing the possibility or Jon's  transformation and for that reason I advised the patient to proceed with therapy with Calquence that he has initiated for the last few days. He has encountered side effect including  headache and I pointed out to him that this is a common side effect that typically is relieved drinking a little bit of coffee once or twice a day.he is using Bufferin with some relief but I pointed out to him that he needs to be very careful with this medicine because this can end up with GI bleeding, stomach irritation and abnormal bleeding in the first place.      So far the patient's hemoglobin has improved some, the platelet count is stable but it will take several days before the spleen shrinks and the platelet count normalizes. The total white count is normal. Again the molecular analysis of the peripheral blood flow cytometry has been requested from the [facility] in regard the FISH testing. Also we ordered IgH testing.     The goal will be to be seen in a couple of weeks by nurse with laboratory parameter including CBC and visit with me in 1 month and 2 months. We will plan in 04/2023 to repeat radiological analysis of his abdomen. By then his spleen should be substantially reduced in size and intraabdominal adenopathy should be disappearing.      I expect that as the days go by the platelet count will improve and the hemoglobin will continue improving.  On 03/17/2023 the patient has been taking now his Brukinsa for almost 6 weeks. He has tolerated the medicine better than anticipated. He has not had any clinical bleeding or cardiac irregularity. The patient's appetite has remained acceptable. He has not had any nausea or vomiting and today his heart rhythm is normal and his blood sugar is raising. Other issues important is that his hemoglobin has improved. His white count is stable. His total lymphocyte count is very well controlled and his platelet count is stable at 103.      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 I provided him the report of his testing. 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 This makes his prognosis a little bit worse in the long run but I pointed out to him that the medicine that he is taking is prime line for the condition that he has. Again, clinically his spleen is not palpable anymore. Therefore, I advised him to remain on his medicine at the same dosing twice a day. He will require laboratory testing every couple of weeks by nurse and I will review him in 1 month and 2 months. I want to review the status of his adenopathies and his spleen radiologically speaking in 2 months. I scheduled another PET scan. His spleen was very hot at that time of the diagnosis on 01/25/2023 and I want to review the anatomy of this and the SUV uptake of the spleen at that point.     Besides this he will require at some point a holiday from treatment to have a couple of teeth removed from his lower right side. He is not having infection yet. He raised the question if he will be able to have a cap and I told him that it should not be a problem at all. This can be done at any time. The removal will require holding off of his medication for at least 7 days in anticipation of any tooth removal and allow the places to heal and go back into the medication after that.  On 04/13/2023 the patient continues taking his Calquence with no limitations and no significant side effects. His cardiac auscultation today is normal, he has not had any clinical bleeding, he has not had any diarrhea, in fact he has been mildly constipated. When constipation happens the patient develops some suprapubic pain probably related to sigmoid colon filling up with stools.      His spleen is clinically smaller today documented in the examination, the liver is not palpable, he has no peripheral adenopathy. His total lymphocyte count continues declining, the hemoglobin is stable and the platelet count is stable. I do believe that  this patient is responding to this medicine and we advised him to remain on the medicine at the same dosing returning to see us back the first of next month. He will have radiological assessment in the form of PET scan a few days before and then review him after that.      He really wants to have his left knee replacement therapy at some point in the summer to be able to play pickle ball. [facility], Ran Rachel MD, in order to proceed with this at that point. This will be further discussed in the next visit.  On 05/09/2023 I reviewed the patient on clinical grounds. Actually he feels terrific and he is functioning very well. He is worrying about his bad knee with osteoarthritis, the patient will be a completely functional individual. He has not had any B symptoms, any infections, any clinical bleeding, [facility] I reviewed today his PET scan that discloses minimal activity in intraabdominal and hilar lymph nodes. Spleen remains very active, hot and enlarged with no changes in comparison with how he was 3 months ago. There is no obvious bone marrow uptake or bone uptake and the liver has no uptake. This raises the question now that the patient has some excess of monocytes if the spleen is enlarged because of a different phenomenon, for example myelophthisis, or if the spleen is enlarged because of monocyte infiltration. I do not know the answer to that. I do believe that the patient feels well and I do not think anything will be hurt if the patient holds off on the Calquence now and reassess him with CBC, CMP and LDH in 3 weeks from now. I pointed out to him that I will not rule out the need for him to have a bone marrow test to be sure that he does not have a chronic myelomonocytic leukemia or have developed myelodysplastic syndrome as a complication of previous bendamustine administration in the past.      The patient will call if any issues  arise while holding off on the Calquence     I think so far he is doing fine.  On 06/02/2023 the patient has been seen in the office with profound fatigue for the last 2 weeks to the point that he has great deal of difficulty getting dressed by himself, getting shaved and things of this nature. This is the first time in his life as a patient in this office that he has come in in a wheelchair pushed by his friend. The patient is pale and what is more concerning is the leukocytosis with a white count of 23,000 and very atypical mononuclear cells in circulation by peripheral blood examination. The cells are large with minimal cytoplasm, granular nucleus vacuoles looking like immature cells and I would not be surprised if they suggest blasts. I do not see any rhiannon rods on them and they do not look like prolymphocytes neither. Therefore this explains why the differential in the white count is abnormal, explains the leukocytosis, and explains also the progressive thrombocytopenia and progressive splenomegaly.     We have collected peripheral blood flow cytometry today and expect the report of this this afternoon.      I would not be surprised if this patient needs to have bone marrow testing at some point next week.      I am very concerned that the patient in the presence of cough and decreased breath sounds in the right base also has pneumonia and he will be treated with IV fluids today normal saline 1000 cc and he will receive 1 gram of rocephin IV. Subsequently he will receive Levaquin 500 mg orally every day for the next 5 days.      The patient will return to the office next week, Tuesday to be reviewed. Hopefully the report of the flow cytometry called to me today will decide how to proceed. I would not be surprised if the patient needs to be admitted over the weekend to the hospital to proceed with bone marrow testing first thing on Monday and then definitive diagnosis. Obviously the question will be if he has  developed acute leukemia as a complication of bendamustine administration and in the background of myelodysplasia what the treatment will be.      I pointed out to the patient all of these issues and he was ready to proceed.   On 06/12/2023, the patient has been reviewed today.  He looks terrible, he feels terrible.  He is barely able to stand up to put his pants on.  He is not eating anything.  He has profound deterioration of performance status.  He has nocturnal sweats.  He has profound perspiration.  He has low-grade temperature.  He has some shortness of breath.  He has no obvious pain.       Clinically his spleen is enlarged 8 cm below the left costal margin.  He has lost a lot of muscle mass.  His white count has further elevated to 64,000.  His hemoglobin is lower.  His LDH is in the 1400 category.  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  On the basis of this, I would like to pursue hospitalization with echocardiogram, PICC line, port placement, and initiation of chemotherapy for CHOP Rituxan in the next couple of days.                              2.  History of prostate cancer  Treated at the [facility]  Most recent PSA April 2022 = 0.203  On 01/13/2023 he has minimal urinary symptomatology comparing his PSA with 3 years ago was 0.8 today, actually a few days ago was 0.1. I doubt that all of the symptoms that he has are associated with prostate cancer. The blood in the urine has a different significance.   On 01/18/2023 no issues pertinent to his prostate cancer. I see  the seeds in his prostatic bed on the CT scan. His urinalysis is very much clean and his PSA has remained stable. No activity of this entity at this point.   On 02/08/2023  no issues pertinent to this.   On 03/17/2023 no issues pertinent to his previous history of prostate cancer. His PSA has been measured and has remained normal.  On 04/13/2023 no issues pertinent to this at this point.   On 05/09/2023 no issues pertinent to this.  On 06/02/2023 no issues pertinent to this.   On 06/12/2023, no issues pertinent.                           3.  New anemia  12/30/2022 hemoglobin today 11.9.  This is a significant drop from 15.6 when seen 4 months ago.  ?  Related to cystitis/hematuria  We will check additional lab work today including ferritin, iron profile, B12, folate, and PSA level in light of his history of prostate cancer.  On 01/13/2023 the hemoglobin remains at 12, he had a B12 level of 265 receiving a B12 injection 3 weeks ago and a reticulated hemoglobin of 31 today. Normal platelet count. His recent ferritin and iron profile were acceptable. He has had a colonoscopy that was perfectly normal in 2019 by Hieu Lester MD.   On 01/18/2023 I think the anemia and the thrombocytopenia is a reflection of splenomegaly, sequestration and activity of the CLL. I expect these numbers will improve once that we start to counteract the leukemia with Calquence. He will require repeat CBC, CMP, LDH, uric acid in 3 weeks.   On 02/08/2023 we know that the anemia is a reflection of CLL activity, splenomegaly, and so forth. We expect that the hemoglobin should be corrected in the visit in 1 month and the splenomegaly should be reducing also correcting his platelet count.      The patient will be returning every couple of weeks for nurse visit and blood count and he will have visit with doctor in 1 month and 2 months. Plan in 04/2023 to repeat radiological assessment of his abdomen. I find no need for further PET scan. We will  await the report of the molecular analysis of the peripheral blood from the [facility]. We requested this today.  On 03/17/2023 the anemia is slowly improving, reflection of the treatment of his leukemia. The anemia was a reflection of leukemia involvement in the bone marrow and decreased production of red cells. The platelet count is reflection being low of splenomegaly and sequestration and probably bone marrow infiltration by leukemia.      I had a lengthy discussion with the patient about all these issues today. 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      The rest of the molecular analysis disclosed no bad situation in regard to prognosis.      The patient was thankful about the visit today and the simple explanation.  On 04/13/2023 the patient's hemoglobin remains stable, he has no notion of blood loss, his nutrition to me is marginal and I had a discussion with him about nutrition today that I think is very important not only from the point of view of his bowel activity but also from the point of view of proper nutrition and caloric ingestion. I do not think this patient according to what he reported to me is consuming more than 1200 calories a day and that explains to me why he loses weight. I pointed out to him that he needs to catch up in snacks, to have some cheese, to have some nuts, to have fruit in his diet that will help him to have better digestion and better bowel activity. I suggested some ice cream at nighttime as well.      In regard to his constipation I advised him to use a lower amount of MiraLax on a daily basis and that way his bowels remain regular on a daily basis.      His appointments will be kept the same for a visit in a month and the PET scan a few days before.      In a month he will have a CBC and CMP.   On 05/09/2023 the patient's platelet count remains low. I think it is a reflection of splenomegaly and sequestration and the hemoglobin remains at a level 9,  reflection of splenomegaly and sequestration.      As posted above the patient has now monocyte excess. I do not know if this is an [facility] or maybe the patient has a 2nd clone that could be significant in regard to chronic myelomonocytic leukemia that could be arising from previous bendamustine administration. I asked him as posted above to hold off on the Calquence, review him back in 3 weeks and  if we need to pursue bone marrow testing. I think I will in case that the monocyte count remains elevated or higher. The patient is aware of that.      Nothing that I can do for his platelet count at this point given the splenomegaly and not too much that I can do in regard to the hemoglobin. We have measured ferritin, iron, TIBC and all those numbers were normal in the past. His diet is much better at this time.  On 06/02/2023 the patient is very ill today and he will require IV fluids as posted above, IV antibiotics, oral antibiotics, peripheral blood flow cytometry, CMP, LDH and blood cultures. This patient has a high risk disease with high risk of symptomatology and high potential for new hospital admission.  On 06/12/2023, I think the anemia is a representation of bone marrow loaded with lymphoma cells and splenomegaly with sequestration.  No evidence of hemolysis.  Chemistry profile and bilirubin will be done.  I would not be surprised if the patient, by the end of the week, needs to have a blood transfusion.                     On 06/12/2023, I had a conversation with the patient and his significant other regarding his disease process.  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  His LDH is very elevated and I pointed this out to the pathologist reviewing the bone marrow sample.  Actually the bone marrow sample, in my opinion, was excellent morphologically and to me after reviewing bone marrows for 30 years it tells  me a different story.  I gave the opportunity to put the patient along with the morphological analysis of the bone marrow together better than the pathologist.  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  His spleen has been hot on recent PET scan.  We discontinued his oral chemotherapy medicine more than a month ago because it was not helping him or maintaining him in any way or form.  I do believe that the patient needs to be admitted to the hospital for the following:     He will require radiological assessment of his chest, abdomen and pelvis.  He will require echocardiogram.  He will require port placement.   He will have formal allocation and consent for chemotherapy administration in the form of CHOP and Rituxan.   Will plan to deliver Rituxan on day 1 and CHOP on day 2.   The patient will require prophylaxis for tumor lysis syndrome.  He will require administration of allopurinol as early as today.  The patient will require blood products very likely by the end of the week.  I made him aware that the chances of this regimented chemotherapy working is not that high, but we still do not know what kind of response we will get unless we do it and there are no predictive factors regarding how the treatment is going to do.  I pointed out to him that I do not know how long he needs to stay in the hospital and I would not be surprised if it is at least 10 days.   I provided him a report of the bone marrow test, I provided him proper typical information of Jon transformation but he does not want to read it and he wants to go ahead and proceed with hospitalization today.

## 2023-06-14 NOTE — PLAN OF CARE
Goal Outcome Evaluation:  Plan of Care Reviewed With: patient        Progress: improving  Outcome Evaluation: Pt had a reactoin to Rituxan during initial infusion, stopped and meds given. This RN restarted the infusion got the rate up to 300mg/hr and the patient reacted again. Infusion stopped again and more meds given. After symptoms of reaction improved RN started the infusion back up at a rate of 150mg/hr. No further titration was done and infusion was completed with no more side effects. PT resting comfortably. No needs voiced at this time.

## 2023-06-14 NOTE — PROGRESS NOTES
Pharmacy Chemotherapy Education - [facility]    Jeff Bass is a 77 y.o. 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 Patient to start [facility] therapy for lymphoma with plan to give cycle 1 while inpatient. Met with patient and family to discuss schedule and expected effects of CHOP (rituximab discussed on 6/13)    Reviewed with patient common and clinically significant effects including pancytopenia, nausea/vomiting, hair loss, cardiotoxicity, mucositis, taste changes, hemorraghic cystitis, neuropathy, and constipation. Infection prevention precautions were reviewed including hand washing, food safety and the avoidance of crowds/use of mask; patient was advised to contact provider in the event of a sustained fever >100.4 for more than 1 hour. Bleeding precautions including the use of soft bristle toothbrush, electric razor and precautions against falls were discussed. Importance of appropriate hygiene and self-care for nausea were reviewed.    Patient was counseled on when to notify a provider including in the event of the following:   - Signs and symptoms of infection, including temperature >100.4   - Signs and symptoms of serious bleeding including blood in the urine or stool   - Changes in urinary output or dysuria   - Frequent nausea/vomitting or diarrhea or severe abdominal pain   - Development of mouth sores or ulcerations   - Numbness or tingling in the fingers or toes    Provided patient with handout regarding medications, and reviewed general plan for chemotherapy administration (D1 Q21 days). Patient engaged in counseling throughout and asked appropriate questions as needed to confirm understanding. Family present in the room during counseling - family was also engaged in the counseling. Patient without any further questions at this time; patient and family verbalized understanding.    Thank you for the opportunity to provide education on chemotherapy; please do not hesitate if further assistance is  needed.    Alina Cordero, Pharm.D., [facility]  Oncology Pharmacy Specialist  [phone]

## 2023-06-15 LAB
ALBUMIN SERPL-MCNC: 2.2 G/DL (ref 3.5–5.2)
ALBUMIN/GLOB SERPL: 1.2 G/DL
ALP SERPL-CCNC: 44 U/L (ref 39–117)
ALT SERPL W P-5'-P-CCNC: 8 U/L (ref 1–41)
ANION GAP SERPL CALCULATED.3IONS-SCNC: 4.3 MMOL/L (ref 5–15)
ANISOCYTOSIS BLD QL: ABNORMAL
AST SERPL-CCNC: 11 U/L (ref 1–40)
BILIRUB SERPL-MCNC: 1 MG/DL (ref 0–1.2)
BUN SERPL-MCNC: 42 MG/DL (ref 8–23)
BUN/CREAT SERPL: 46.2 (ref 7–25)
BURR CELLS BLD QL SMEAR: ABNORMAL
CALCIUM SPEC-SCNC: 10.2 MG/DL (ref 8.6–10.5)
CHLORIDE SERPL-SCNC: 109 MMOL/L (ref 98–107)
CO2 SERPL-SCNC: 26.7 MMOL/L (ref 22–29)
CREAT SERPL-MCNC: 0.91 MG/DL (ref 0.76–1.27)
DEPRECATED RDW RBC AUTO: 51.1 FL (ref 37–54)
EGFRCR SERPLBLD CKD-EPI 2021: 85.7 ML/MIN/1.73
ERYTHROCYTE [DISTWIDTH] IN BLOOD BY AUTOMATED COUNT: 18.2 % (ref 12.3–15.4)
GLOBULIN UR ELPH-MCNC: 1.9 GM/DL
GLUCOSE SERPL-MCNC: 129 MG/DL (ref 65–99)
HCT VFR BLD AUTO: 25.1 % (ref 37.5–51)
HGB BLD-MCNC: 8.6 G/DL (ref 13–17.7)
HYPOCHROMIA BLD QL: ABNORMAL
LDH SERPL-CCNC: 1376 U/L (ref 135–225)
LYMPHOCYTES # BLD MANUAL: 1.71 10*3/MM3 (ref 0.7–3.1)
LYMPHOCYTES NFR BLD MANUAL: 2 % (ref 5–12)
MCH RBC QN AUTO: 27.4 PG (ref 26.6–33)
MCHC RBC AUTO-ENTMCNC: 34.3 G/DL (ref 31.5–35.7)
MCV RBC AUTO: 79.9 FL (ref 79–97)
MONOCYTES # BLD: 0.37 10*3/MM3 (ref 0.1–0.9)
NEUTROPHILS # BLD AUTO: 16.54 10*3/MM3 (ref 1.7–7)
NEUTROPHILS NFR BLD MANUAL: 88.8 % (ref 42.7–76)
PHOSPHATE SERPL-MCNC: 3.4 MG/DL (ref 2.5–4.5)
PLAT MORPH BLD: NORMAL
PLATELET # BLD AUTO: 37 10*3/MM3 (ref 140–450)
PMV BLD AUTO: 10.4 FL (ref 6–12)
POIKILOCYTOSIS BLD QL SMEAR: ABNORMAL
POTASSIUM SERPL-SCNC: 4.7 MMOL/L (ref 3.5–5.2)
PROT SERPL-MCNC: 4.1 G/DL (ref 6–8.5)
RBC # BLD AUTO: 3.14 10*6/MM3 (ref 4.14–5.8)
SMUDGE CELLS BLD QL SMEAR: ABNORMAL
SODIUM SERPL-SCNC: 140 MMOL/L (ref 136–145)
URATE SERPL-MCNC: 6.9 MG/DL (ref 3.4–7)
VARIANT LYMPHS NFR BLD MANUAL: 9.2 % (ref 19.6–45.3)
WBC NRBC COR # BLD: 18.63 10*3/MM3 (ref 3.4–10.8)

## 2023-06-15 PROCEDURE — 80053 COMPREHEN METABOLIC PANEL: CPT | Performed by: INTERNAL MEDICINE

## 2023-06-15 PROCEDURE — 83615 LACTATE (LD) (LDH) ENZYME: CPT | Performed by: INTERNAL MEDICINE

## 2023-06-15 PROCEDURE — 97530 THERAPEUTIC ACTIVITIES: CPT

## 2023-06-15 PROCEDURE — 25010000002 FILGRASTIM 480 MCG/0.8ML SOLUTION PREFILLED SYRINGE: Performed by: INTERNAL MEDICINE

## 2023-06-15 PROCEDURE — 84100 ASSAY OF PHOSPHORUS: CPT | Performed by: INTERNAL MEDICINE

## 2023-06-15 PROCEDURE — 63710000001 PROMETHAZINE PER 12.5 MG: Performed by: INTERNAL MEDICINE

## 2023-06-15 PROCEDURE — 84550 ASSAY OF BLOOD/URIC ACID: CPT | Performed by: INTERNAL MEDICINE

## 2023-06-15 PROCEDURE — 85025 COMPLETE CBC W/AUTO DIFF WBC: CPT | Performed by: INTERNAL MEDICINE

## 2023-06-15 PROCEDURE — 25010000002 ONDANSETRON PER 1 MG: Performed by: INTERNAL MEDICINE

## 2023-06-15 PROCEDURE — 85007 BL SMEAR W/DIFF WBC COUNT: CPT | Performed by: INTERNAL MEDICINE

## 2023-06-15 PROCEDURE — 63710000001 PREDNISONE PER 5 MG: Performed by: INTERNAL MEDICINE

## 2023-06-15 RX ADMIN — Medication 10 ML: at 09:44

## 2023-06-15 RX ADMIN — PREDNISONE 100 MG: 50 TABLET ORAL at 09:43

## 2023-06-15 RX ADMIN — SODIUM CHLORIDE 125 ML/HR: 9 INJECTION, SOLUTION INTRAVENOUS at 09:53

## 2023-06-15 RX ADMIN — Medication 10 ML: at 21:11

## 2023-06-15 RX ADMIN — ALLOPURINOL 300 MG: 300 TABLET ORAL at 09:43

## 2023-06-15 RX ADMIN — ONDANSETRON 4 MG: 2 INJECTION INTRAMUSCULAR; INTRAVENOUS at 09:43

## 2023-06-15 RX ADMIN — OLANZAPINE 5 MG: 10 TABLET ORAL at 21:12

## 2023-06-15 RX ADMIN — FILGRASTIM 480 MCG: 480 INJECTION, SOLUTION INTRAVENOUS; SUBCUTANEOUS at 16:51

## 2023-06-15 RX ADMIN — PROMETHAZINE HYDROCHLORIDE 12.5 MG: 12.5 TABLET ORAL at 06:35

## 2023-06-15 RX ADMIN — Medication 10 MG: at 21:20

## 2023-06-15 RX ADMIN — SODIUM CHLORIDE 125 ML/HR: 9 INJECTION, SOLUTION INTRAVENOUS at 03:11

## 2023-06-15 NOTE — PROGRESS NOTES
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     HISTORY OF PRESENT ILLNESS:    On 06/12/2023, this 77-year-old male returns today to the office in a wheelchair, accompanied by his significant other in order to be reviewed.  In the meantime, after we reviewed him almost 10 days ago, he had profound fatigue, inability to function, weight loss, anorexia, nocturnal sweats, and profound deterioration of his performance status.  A peripheral blood flow cytometry disclosed atypical population of lymphocytes that were not the typical lymphocytes present in peripheral blood in patients who have chronic lymphoid leukemia. This triggered further testing including LDH that was in the 1400's, tremendously atypical for formal CLL.  This gain, to me, showed the possibility that this patient was transforming into Jon transformation of CLL and this triggered a bone marrow test last week that was performed in the office with no difficulties or consequences. I discussed the case with Dr. Baca, Pathologist at [facility], even though the bone marrow still shows population of lymphocytes that is atypical but not large.  I have reviewed the morphology of the bone marrow and to me it is extremely atypical in the first place.  His LDH remains highly elevated and the patient feels terrible.  All of this leads me to believe that his spleen enlargement that is so hot on the PET scan done recently contains Jon transformation of CLL and for this reason the patient will be admitted to the hospital today in order to proceed with further testing that includes echocardiogram, CT scan of the abdomen and pelvis, port placement, and chemotherapy administration equivalent to the treatment of patients who have large cell diffuse non-Hodgkin's lymphoma.  In other words, he will receive [facility] Rituxan  chemotherapy.      The symptomatology that I noted remains ongoing and is worse as time goes by, to the point that he is barely able to get out of the chair and the bed by himself.  He is not able to even get dressed himself.  He needs to have total support.  The patient's appetite is minimal.  He has no abdominal pain.  He has profound nocturnal sweats and low grade temperature.  He has not had any pain.  He has minimal cough and no shortness of breath.      On 06/15/2023, the patient has completed yesterday his plan of chemotherapy for Jon's transformation of CLL. Actually today is the first day that he looks long-term decent. He has not had any hiccoughs. He had some of these last night controlled with Phenergan and he finally went to sleep and had a good night of sleep. He had a very good breakfast this morning. He has not had any nausea or vomiting. He has had some loose bowel activity because of the MiraLAX. Urination is ongoing. He is using a diaper. He has no pain. No fevers. Still very fatigued and tired. Receiving IV fluids with no difficulty through his PICC line.               Past Medical History:   Diagnosis Date    Arthritis     Benign prostatic hyperplasia     FOLLOWED BY DR. VIVIEN PATEL    Biliary dyskinesia     Bunion of right foot     GREAT TOE    CLL (chronic lymphocytic leukemia) 2016    FOLLOWED BY DR WALKER    Colon polyps     FOLLOWED BY DR. NEHA HAM    Constipation     Degeneration of lumbar intervertebral disc     Diverticulosis     Erectile dysfunction     Fracture of ankle 1975    GERD (gastroesophageal reflux disease)     Hallux valgus of left foot     Hyperlipidemia     MIXED    Hypertension     Inguinal hernia     Kidney stone 02/21/2009    SEEN AT [facility] ER    Knee swelling 2018    Localized, primary osteoarthritis     Lumbar radiculopathy     Lumbar stenosis     Lung mass     LEFT SIDE - SCAR TISSUE PER PATIENT     Osteoarthritis of right knee     Polyneuropathy     Prostate  CA 03/2016    JACQUELYN 6, S/P XRT, FOLLOWED BY DR. VIVIEN PATEL, RADIATION SEEDS    PVC (premature ventricular contraction)     PT STATES WORKED UP YEARS AGO BY [facility] FOR POSSIBLE MURMUR - NO FOLLOW UP REQUIRED     RBBB     Sensory hearing loss, bilateral     Skin cancer     SQUAMOUS CELL CARCINOMA OF FACE    Substernal goiter     Thyromegaly 12/2016    ADMITTED TO [facility]    Vitamin D deficiency      Past Surgical History:   Procedure Laterality Date    BRONCHOSCOPY N/A 12/14/2016    Procedure: BRONCHOSCOPY WITH  BAL AND BIOPSY AND ENDOBRONCHIAL ULTRASOUND  AND NAVIGATION WITH BRUSHINGS AND BIOPSY AND BAL;  Surgeon: Pierre Manning MD;  Location: [facility] ENDOSCOPY;  Service:     COLONOSCOPY N/A 03/13/2019    5 MM SESSILE TUBULAR ADENOMA POLYP IN TRANSVERSE, MULTIPLE SMALL AND LARGE DIVERTICULA IN SIGMOID, RESCOPE IN 5 YRS, DR. NEHA HAM AT [facility]    COLONOSCOPY W/ POLYPECTOMY N/A 03/19/2015    3 TUBULAR ADENOMA POLYPS, 12 TUBULOVILLOUS POLYP RECTO-SIGMOID, DIVERTICULOSIS, RESCOPE IN 3 YRS, DR. NEHA HAM AT [facility]    EYE SURGERY Bilateral     CATARACT EXTRACTION WITH LENS IMPLANT, DR. GOVEA    FINGER NAIL SURGERY Right 2022    TORRES-PATEL    INGUINAL HERNIA REPAIR Left 11/02/2021    Procedure: LEFT OPEN INGUINAL HERNIA REPAIR;  Surgeon: Nixon Kolb MD;  Location: [facility] MAIN OR;  Service: General;  Laterality: Left;    PROSTATE RADIOACTIVE SEED IMPLANT N/A 09/06/2016    DR. HOA JARAMILLO AT [facility]    PROSTATE SURGERY N/A 03/11/2016    BIOPSY: ADENOCARCINOMA, DR. ZAID IBARRA    THYROID BIOPSY Bilateral 11/01/2017    NO B CELL OR T CELL LYMPHOMA, DONE AT [facility]    TOTAL KNEE ARTHROPLASTY Right 10/12/2022    Procedure: TOTAL KNEE ARTHROPLASTY;  Surgeon: Ran Rachel MD;  Location: [facility] OR [facility];  Service: Orthopedics;  Laterality: Right;     Social History     Socioeconomic History    Marital status:      Spouse name: No    Years of education: College   Tobacco Use    Smoking status:  Never    Smokeless tobacco: Never   Vaping Use    Vaping Use: Never used   Substance and Sexual Activity    Alcohol use: No    Drug use: Never    Sexual activity: Yes     Partners: Female     Birth control/protection: None     Family History   Problem Relation Age of Onset    Heart disease Father         Rheumatic heart disease    Hypertension Father     Osteoporosis Father     Stroke Mother     Osteoporosis Mother     No Known Problems Daughter     M[place]orna Hyperthermia Neg Hx      Current Facility-Administered Medications on File Prior to Encounter   Medication    Chlorhexidine Gluconate Cloth 2 % pads     Current Outpatient Medications on File Prior to Encounter   Medication Sig    alfuzosin (UROXATRAL) 10 MG 24 hr tablet Take 2 tablets by mouth Every Night.    aspirin 81 MG EC tablet Take 1 tablet by mouth twice daily x 14 days; then take 1 tablet by mouth daily x 28 days    Melatonin 10 MG tablet Take 1 tablet by mouth Every Night.    NON FORMULARY Balance of Nature and Super Beets daily    pantoprazole (PROTONIX) 40 MG EC tablet Take 1 tablet by mouth Daily.    polyethylene glycol (MIRALAX) 17 GM/SCOOP powder Miralax 17 gram/dose oral powder   1 scoop in the morning then as needed    acalabrutinib (Calquence) 100 MG capsule capsule Take 2 capsules by mouth 2 (Two) Times a Day.    acyclovir (Zovirax) 400 MG tablet Take 1 tablet by mouth 2 (Two) Times a Day.    ammonium lactate (LAC-HYDRIN) 12 % lotion Every 12 (Twelve) Hours.    levoFLOXacin (LEVAQUIN) 500 MG tablet TAKE 1 TABLET BY MOUTH DAILY    losartan-hydrochlorothiazide (HYZAAR) 100-25 MG per tablet Take 1 tablet by mouth Daily.    potassium chloride (K-DUR,KLOR-CON) 20 MEQ CR tablet Take 1 tablet by mouth Daily. (Patient taking differently: Take 1 tablet by mouth Daily. HOLD FOR ONE WEEK PRIOR TO SURGERY)    pravastatin (PRAVACHOL) 20 MG tablet Take 1 tablet by mouth Every Night.    predniSONE (DELTASONE) 10 MG tablet Take 2 tablets by mouth Daily. Take  once in the morning    sulfamethoxazole-trimethoprim (BACTRIM DS,SEPTRA DS) 800-160 MG per tablet Take 1 tablet by mouth 3 (Three) Times a Week. Take on Monday, Wednesday and Friday.    triamcinolone (KENALOG) 0.025 % cream    No Known Allergies              VITAL SIGNS:  Vitals:    06/14/23 1743 06/14/23 1957 06/15/23 0551 06/15/23 0754   BP: 106/59 116/67 116/68 118/64   BP Location: Left arm Left arm Left arm Left arm   Patient Position: Lying Lying Lying Lying   Pulse: 79 80 73 79   Resp: 18 18 18 16   Temp: 98 °F (36.7 °C) 97.7 °F (36.5 °C) 95.5 °F (35.3 °C) 97 °F (36.1 °C)   TempSrc: Oral Oral Oral Oral   SpO2: 92% 90% 93% 93%   Weight:       Height:              PHYSICAL EXAMINATION:     His pale eyes and oral mucosa are normal. He has no peripheral adenopathy palpable. His lungs were clear. His heart is regular. His spleen is shrinking in size; it used to be almost 10 cm in size, now it is probably 6. It is softer than usual with no tenderness. No liver enlargement. No ascites. He has no edema in the extremities. He is very pale. He is talkative with no difficulties in that area today.                                   LABORATORY DATA:  Results from last 7 days   Lab Units 06/15/23  0627 06/14/23  0608 06/13/23  0644   WBC 10*3/mm3 18.63* 9.01 53.84*   NEUTROS ABS 10*3/mm3 16.54* 6.13 6.46   LYMPHS ABS 10*3/mm3 1.71 1.62 21.00*   HEMOGLOBIN g/dL 8.6* 9.0* 7.7*   HEMATOCRIT % 25.1* 27.0* 24.1*   PLATELETS 10*3/mm3 37* 29* 62*     Results from last 7 days   Lab Units 06/15/23  0627 06/14/23  1207 06/14/23  0608 06/12/23  1157 06/12/23  1059 06/12/23  0959   SODIUM mmol/L 140 143 143   < >  --  138   POTASSIUM mmol/L 4.7 4.4 4.4   < >  --  4.3   CHLORIDE mmol/L 109* 107 107   < >  --  100   CO2 mmol/L 26.7 26.0 27.0   < >  --  26.5   BUN mg/dL 42* 35* 35*   < >  --  34*   CREATININE mg/dL 0.91 0.89 0.91   < >  --  1.03   CALCIUM mg/dL 10.2 10.1 10.1   < >  --  12.5*   ALBUMIN g/dL 2.2*  --  3.0*  --  3.1 3.3*    BILIRUBIN mg/dL 1.0  --  1.5*  --   --  1.6*   ALK PHOS U/L 44  --  50  --   --  58   ALT (SGPT) U/L 8  --  10  --   --  6   AST (SGOT) U/L 11  --  26  --   --  16   GLUCOSE mg/dL 129* 143* 117*   < >  --  105   MAGNESIUM mg/dL  --  2.5*  --   --   --   --     < > = values in this interval not displayed.         Contains abnormal data Lactate Dehydrogenase  Order: [id number]  Status: Final result     Visible to patient: Yes (not seen)     Next appt: None     Dx: CLL (chronic lymphoid leukemia) in re...     Specimen Information: Blood   0 Result Notes            Component  Ref Range & Units 5 d ago  (6/7/23) 10 d ago  (6/2/23) 3 mo ago  (2/22/23) 4 mo ago  (2/7/23) 4 mo ago  (1/18/23) 3 yr ago  (6/11/20) 3 yr ago  (6/4/20)   LDH  99 - 259 U/L 1,470 High   1,433 High   156  175  293 High   161  214    Resulting Agency  CBC LAB  CBC LAB  CBC LAB  CBC LAB  CBC LAB  CBC LAB  CBC LAB              Specimen Collected: 06/07/23 15:50 EDT Last Resulted: 06/07/23 16:37 EDT              Flow Cytometry, Blood [[id number]] (Order [id number])  Order  Date: 6/2/2023 Department: [facility] HEMATOLOGY & ONCOLOGY [facility] Ordering/Authorizing: Gerry Schroeder MD     Reprint Order Requisition    Flow Cytometry, Blood (Order #[id number]) on 6/2/23        Flow Cytometry, Blood  Order: [id number]  Status: Final result     Visible to patient: Yes (not seen)     Next appt: None     Dx: Chronic lymphocytic leukemia     Specimen Information: Blood   0 Result Notes       Component  Ref Range & Units 10 d ago 4 mo ago   Reference Lab Report      Resulting Agency [facility]              Specimen Collected: 06/02/23 10:49 EDT Last Resulted: 06/08/23 15:16 EDT        Lab Flowsheet     Order Details     View Encounter     Lab and Collection Details     Routing     Result History     View Encounter Conversation           Scans on Order [id number]    Scan on 6/8/2023 1516 by Miroslava Greenfield: Flow Cytometry,Blood          Result Care Coordination      Patient Communication     Add Comments   Add Notifications  Back to Top           Order Questions    Question Answer   Release to patient Routine Release                Provider Comment To Patient     Add Comments   Add Notifications        Routing History    Priority Sent On From To Message Type    6/8/2023  3:16 PM Lab, Background User Gerry Schroeder MD Results         All Reviewers List    Gerry Schroeder MD on 6/8/2023 17:46       Lab Component SmartPhrase Guide    Flow Cytometry, Blood (Order #[id number]) on 6/2/23            ASSESSMENT:   1.  History of CLL  Status posttreatment with Bendamustine/Rituxan in 2016  Patient did have severe reaction to initial Rituxan dose requiring hospitalization or completion.  Did well thereafter.  12/30/2022 WBC remains normal at 10.6, 31% lymphs.  Platelets normal at 105,000.  No B symptoms or adenopathy.  No suggestion of recurrent disease.  Hemoglobin has acutely dropped however down to 11.9 (see further discussion below).  On 01/13/2023 his white count, hemoglobin and platelets are not changing. His total lymphocyte count is very minimal and I do believe that his leukemia remains very quiet on clinical grounds with no need for intervention.  On 01/18/2023 I discussed with the patient the fact that his radiological assessment of the abdomen shows periaortic and pericaval lymphadenopathy. Most of the lymph nodes are between 2-3 cm in size but most dramatically his spleen has enlarged very substantially being huge and almost as big as his liver or bigger. He has no obvious alteration in the liver. His stomach, small intestine, pancreas, kidneys disclose no new abnormalities. There is no evidence of diverticulitis or bowel obstruction. There is no notion of hernias. There is no other notion of masses or ascites. He has multiple cysts in the liver no different than before that have been present for more than 15 years.     The patient's  hemoglobin has dropped to 10.7, the platelet count has dropped to 131,000 and I do believe that this is representation of splenomegaly sequestration and CLL activity very substantially. Today we have a beta-2 microglobulin, LDH and uric acid pending along with a chemistry profile. A urinalysis today disclosed no evidence of infection and he has minimal microscopic hematuria with no significance. There is no proteinuria. There is no leukocyturia.     Given the above findings I discussed with the patient the fact that I need to rule out the possibility of Jon's transformation of his CLL and for this reason I have advised him to proceed with a PET scan in a few hours. Hopefully this will not be the case.     If the lymph nodes or spleen are extremely hot he will probably require a biopsy. If they have a light degree or moderate degree of SUV activity we will assume that leukemia is the reason and we will proceed with therapy with Calquence. I discussed with him the methodology of using this medicine taking it twice a day with side effects including atrial fibrillation and abnormal bleeding. He is no longer taking any PPI therefore there should not be a need for this medicine at that point. He will have formal education and consent for Calquence and the medicine will be delivered to his home in [place].     Once that we review the PET scan this will be discussed with him on the telephone. I am planning for him to remain on Calquence at least for the next 3-6 months and see how things evolve and planning to repeat radiological assessment in 3 months. I made him aware that sometimes people taking Calquence can have a rise in the blood count for several weeks after initiation of the medicine that eventually settles down and improves.     I also discussed with him that if the Calquence does not help the process we still have alternatives giving him Gazyva for example or Rituxan.     The patient is no longer requiring  blood pressure medication and I advised him to hold off on this until we see how things evolve.     In order to improve his appetite and help him to control the leukemia up to a certain point the patient will take 20 mg of prednisone everyday for the next 7 days, take 1 tablet in the morning and then discontinue. With that kind of dosing he will not need to have any taper.   On 02/08/2023 I discussed with the patient the fact that his PET scan failed to document any significant SUV activity in the spleen or lymph nodes documented in his abdomen. This gave me the relief that we are not facing the possibility or Jon's transformation and for that reason I advised the patient to proceed with therapy with Calquence that he has initiated for the last few days. He has encountered side effect including  headache and I pointed out to him that this is a common side effect that typically is relieved drinking a little bit of coffee once or twice a day.he is using Bufferin with some relief but I pointed out to him that he needs to be very careful with this medicine because this can end up with GI bleeding, stomach irritation and abnormal bleeding in the first place.     So far the patient's hemoglobin has improved some, the platelet count is stable but it will take several days before the spleen shrinks and the platelet count normalizes. The total white count is normal. Again the molecular analysis of the peripheral blood flow cytometry has been requested from the [facility] in regard the FISH testing. Also we ordered IgH testing.    The goal will be to be seen in a couple of weeks by nurse with laboratory parameter including CBC and visit with me in 1 month and 2 months. We will plan in 04/2023 to repeat radiological analysis of his abdomen. By then his spleen should be substantially reduced in size and intraabdominal adenopathy should be disappearing.     I expect that as the days go by the platelet count will improve and the  hemoglobin will continue improving.  On 03/17/2023 the patient has been taking now his Brukinsa for almost 6 weeks. He has tolerated the medicine better than anticipated. He has not had any clinical bleeding or cardiac irregularity. The patient's appetite has remained acceptable. He has not had any nausea or vomiting and today his heart rhythm is normal and his blood sugar is raising. Other issues important is that his hemoglobin has improved. His white count is stable. His total lymphocyte count is very well controlled and his platelet count is stable at 103.     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 I provided him the report of his testing. 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 This makes his prognosis a little bit worse in the long run but I pointed out to him that the medicine that he is taking is prime line for the condition that he has. Again, clinically his spleen is not palpable anymore. Therefore, I advised him to remain on his medicine at the same dosing twice a day. He will require laboratory testing every couple of weeks by nurse and I will review him in 1 month and 2 months. I want to review the status of his adenopathies and his spleen radiologically speaking in 2 months. I scheduled another PET scan. His spleen was very hot at that time of the diagnosis on 01/25/2023 and I want to review the anatomy of this and the SUV uptake of the spleen at that point.    Besides this he will require at some point a holiday from treatment to have a couple of teeth removed from his lower right side. He is not having infection yet. He raised the question if he will be able to have a cap and I told him that it should not be a problem at all. This can be done at any time. The removal will require holding off of his medication for at least 7 days in anticipation of any tooth removal and allow the places to heal and go back into the medication after that.  On 04/13/2023 the patient  continues taking his Calquence with no limitations and no significant side effects. His cardiac auscultation today is normal, he has not had any clinical bleeding, he has not had any diarrhea, in fact he has been mildly constipated. When constipation happens the patient develops some suprapubic pain probably related to sigmoid colon filling up with stools.     His spleen is clinically smaller today documented in the examination, the liver is not palpable, he has no peripheral adenopathy. His total lymphocyte count continues declining, the hemoglobin is stable and the platelet count is stable. I do believe that this patient is responding to this medicine and we advised him to remain on the medicine at the same dosing returning to see us back the first of next month. He will have radiological assessment in the form of PET scan a few days before and then review him after that.     He really wants to have his left knee replacement therapy at some point in the summer to be able to play pickle ball. We will work into his [facility] and his Orthopedic Medicine, Ran Rachel MD, in order to proceed with this at that point. This will be further discussed in the next visit.  On 05/09/2023 I reviewed the patient on clinical grounds. Actually he feels terrific and he is functioning very well. He is worrying about his bad knee with osteoarthritis, the patient will be a completely functional individual. He has not had any B symptoms, any infections, any clinical bleeding, [facility] I reviewed today his PET scan that discloses minimal activity in intraabdominal and hilar lymph nodes. Spleen remains very active, hot and enlarged with no changes in comparison with how he was 3 months ago. There is no obvious bone marrow uptake or bone uptake and the liver has no uptake. This raises the question now that the patient has some excess of monocytes if the spleen is enlarged because of a different phenomenon, for  example myelophthisis, or if the spleen is enlarged because of monocyte infiltration. I do not know the answer to that. I do believe that the patient feels well and I do not think anything will be hurt if the patient holds off on the Calquence now and reassess him with CBC, CMP and LDH in 3 weeks from now. I pointed out to him that I will not rule out the need for him to have a bone marrow test to be sure that he does not have a chronic myelomonocytic leukemia or have developed myelodysplastic syndrome as a complication of previous bendamustine administration in the past.     The patient will call if any issues arise while holding off on the Calquence    I think so far he is doing fine.  On 06/02/2023 the patient has been seen in the office with profound fatigue for the last 2 weeks to the point that he has great deal of difficulty getting dressed by himself, getting shaved and things of this nature. This is the first time in his life as a patient in this office that he has come in in a wheelchair pushed by his friend. The patient is pale and what is more concerning is the leukocytosis with a white count of 23,000 and very atypical mononuclear cells in circulation by peripheral blood examination. The cells are large with minimal cytoplasm, granular nucleus vacuoles looking like immature cells and I would not be surprised if they suggest blasts. I do not see any rhiannon rods on them and they do not look like prolymphocytes neither. Therefore this explains why the differential in the white count is abnormal, explains the leukocytosis, and explains also the progressive thrombocytopenia and progressive splenomegaly.    We have collected peripheral blood flow cytometry today and expect the report of this this afternoon.     I would not be surprised if this patient needs to have bone marrow testing at some point next week.     I am very concerned that the patient in the presence of cough and decreased breath sounds in the  right base also has pneumonia and he will be treated with IV fluids today normal saline 1000 cc and he will receive 1 gram of rocephin IV. Subsequently he will receive Levaquin 500 mg orally every day for the next 5 days.     The patient will return to the office next week, Tuesday to be reviewed. Hopefully the report of the flow cytometry called to me today will decide how to proceed. I would not be surprised if the patient needs to be admitted over the weekend to the hospital to proceed with bone marrow testing first thing on Monday and then definitive diagnosis. Obviously the question will be if he has developed acute leukemia as a complication of bendamustine administration and in the background of myelodysplasia what the treatment will be.     I pointed out to the patient all of these issues and he was ready to proceed.   On 06/12/2023, the patient has been reviewed today.  He looks terrible, he feels terrible.  He is barely able to stand up to put his pants on.  He is not eating anything.  He has profound deterioration of performance status.  He has nocturnal sweats.  He has profound perspiration.  He has low-grade temperature.  He has some shortness of breath.  He has no obvious pain.      Clinically his spleen is enlarged 8 cm below the left costal margin.  He has lost a lot of muscle mass.  His white count has further elevated to 64,000.  His hemoglobin is lower.  His LDH is in the 1400 category.  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  On the basis of this, I would like to  pursue hospitalization with echocardiogram, PICC line, port placement, and initiation of chemotherapy for CHOP Rituxan in the next couple of days.   On 06/15/2023, the patient was further reviewed after he has completed his chemotherapy with Rituxan 2 days ago and Cytoxan/Adriamycin/vincristine/prednisone yesterday. Prednisone is still ongoing. The patient actually feels different today. He feels a lot of strength. His appetite is good. He has no pain. He has no sweats and he feels better. His spleen seems to be softer and shrinking. Most importantly his white count that was almost in the 70,000s now is dramatically dropping and actually the differential also has modified in a positive way. In spite of this he has not had any evidence of tumor lysis syndrome and his LDH is also dropping substantially. Creatinine is stable. Potassium and phosphorus are stable. Uric acid with no significant elevation. Therefore today is going to be a day of IV fluids and symptom control more than anything else. I would not be surprised if he needs to have blood products tomorrow.     He also will initiate some Neupogen today.                       2.  History of prostate cancer  Treated at the [facility]  Most recent PSA April 2022 = 0.203  On 01/13/2023 he has minimal urinary symptomatology comparing his PSA with 3 years ago was 0.8 today, actually a few days ago was 0.1. I doubt that all of the symptoms that he has are associated with prostate cancer. The blood in the urine has a different significance.   On 01/18/2023 no issues pertinent to his prostate cancer. I see the seeds in his prostatic bed on the CT scan. His urinalysis is very much clean and his PSA has remained stable. No activity of this entity at this point.   On 02/08/2023  no issues pertinent to this.   On 03/17/2023 no issues pertinent to his previous history of prostate cancer. His PSA has been measured and has remained normal.  On 04/13/2023 no issues  pertinent to this at this point.   On 05/09/2023 no issues pertinent to this.  On 06/02/2023 no issues pertinent to this.   On 06/12/2023, no issues pertinent.   On 06/15/2023, no issues pertinent.                    3.  New anemia  12/30/2022 hemoglobin today 11.9.  This is a significant drop from 15.6 when seen 4 months ago.  ?  Related to cystitis/hematuria  We will check additional lab work today including ferritin, iron profile, B12, folate, and PSA level in light of his history of prostate cancer.  On 01/13/2023 the hemoglobin remains at 12, he had a B12 level of 265 receiving a B12 injection 3 weeks ago and a reticulated hemoglobin of 31 today. Normal platelet count. His recent ferritin and iron profile were acceptable. He has had a colonoscopy that was perfectly normal in 2019 by Hieu Lester MD.   On 01/18/2023 I think the anemia and the thrombocytopenia is a reflection of splenomegaly, sequestration and activity of the CLL. I expect these numbers will improve once that we start to counteract the leukemia with Calquence. He will require repeat CBC, CMP, LDH, uric acid in 3 weeks.   On 02/08/2023 we know that the anemia is a reflection of CLL activity, splenomegaly, and so forth. We expect that the hemoglobin should be corrected in the visit in 1 month and the splenomegaly should be reducing also correcting his platelet count.     The patient will be returning every couple of weeks for nurse visit and blood count and he will have visit with doctor in 1 month and 2 months. Plan in 04/2023 to repeat radiological assessment of his abdomen. I find no need for further PET scan. We will await the report of the molecular analysis of the peripheral blood from the [facility]. We requested this today.  On 03/17/2023 the anemia is slowly improving, reflection of the treatment of his leukemia. The anemia was a reflection of leukemia involvement in the bone marrow and decreased production of red cells. The platelet count  is reflection being low of splenomegaly and sequestration and probably bone marrow infiltration by leukemia.     I had a lengthy discussion with the patient about all these issues today. 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     The rest of the molecular analysis disclosed no bad situation in regard to prognosis.     The patient was thankful about the visit today and the simple explanation.  On 04/13/2023 the patient's hemoglobin remains stable, he has no notion of blood loss, his nutrition to me is marginal and I had a discussion with him about nutrition today that I think is very important not only from the point of view of his bowel activity but also from the point of view of proper nutrition and caloric ingestion. I do not think this patient according to what he reported to me is consuming more than 1200 calories a day and that explains to me why he loses weight. I pointed out to him that he needs to catch up in snacks, to have some cheese, to have some nuts, to have fruit in his diet that will help him to have better digestion and better bowel activity. I suggested some ice cream at nighttime as well.     In regard to his constipation I advised him to use a lower amount of MiraLax on a daily basis and that way his bowels remain regular on a daily basis.     His appointments will be kept the same for a visit in a month and the PET scan a few days before.     In a month he will have a CBC and CMP.   On 05/09/2023 the patient's platelet count remains low. I think it is a reflection of splenomegaly and sequestration and the hemoglobin remains at a level 9, reflection of splenomegaly and sequestration.     As posted above the patient has now monocyte excess. I do not know if this is an issue pertinent to Calquence or maybe the patient has a 2nd clone that could be significant in regard to chronic myelomonocytic leukemia that could be arising from previous bendamustine administration. I asked him  as posted above to hold off on the Calquence, review him back in 3 weeks and  if we need to pursue bone marrow testing. I think I will in case that the monocyte count remains elevated or higher. The patient is aware of that.     Nothing that I can do for his platelet count at this point given the splenomegaly and not too much that I can do in regard to the hemoglobin. We have measured ferritin, iron, TIBC and all those numbers were normal in the past. His diet is much better at this time.  On 06/02/2023 the patient is very ill today and he will require IV fluids as posted above, IV antibiotics, oral antibiotics, peripheral blood flow cytometry, CMP, LDH and blood cultures. This patient has a high risk disease with high risk of symptomatology and high potential for new hospital admission.  On 06/12/2023, I think the anemia is a representation of bone marrow loaded with lymphoma cells and splenomegaly with sequestration.  No evidence of hemolysis.  Chemistry profile and bilirubin will be done.  I would not be surprised if the patient, by the end of the week, needs to have a blood transfusion.   06/15/2023: The anemia is a manifestation of bone marrow dysfunction associated with Jon transformation and a bone marrow loaded with immature cells as documented by me a few days ago. I think once we clean the bone marrow he will restitute production of red cells as well as platelets and normal white cells. He will require blood products very likely tomorrow.                 On 06/12/2023, I had a conversation with the patient and his significant other regarding his disease process.  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  His LDH is very elevated and I pointed this out to the pathologist reviewing the bone marrow sample.  Actually the bone marrow sample, in my opinion, was excellent morphologically and to me after reviewing bone marrows for  30 years it tells me a different story.  I gave the opportunity to put the patient along with the morphological analysis of the bone marrow together better than the pathologist.  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  His spleen has been hot on recent PET scan.  We discontinued his oral chemotherapy medicine more than a month ago because it was not helping him or maintaining him in any way or form.  I do believe that the patient needs to be admitted to the hospital for the following:    He will require radiological assessment of his chest, abdomen and pelvis.  He will require echocardiogram.  He will require port placement.   He will have formal allocation and consent for chemotherapy administration in the form of CHOP and Rituxan.   Will plan to deliver Rituxan on day 1 and CHOP on day 2.   The patient will require prophylaxis for tumor lysis syndrome.  He will require administration of allopurinol as early as today.  The patient will require blood products very likely by the end of the week.  I made him aware that the chances of this regimented chemotherapy working is not that high, but we still do not know what kind of response we will get unless we do it and there are no predictive factors regarding how the treatment is going to do.  I pointed out to him that I do not know how long he needs to stay in the hospital and I would not be surprised if it is at least 10 days.   I provided him a report of the bone marrow test, I provided him proper typical information of Jon transformation but he does not want to read it and he wants to go ahead and proceed with hospitalization today.     On 06/15/2023 the patient has completed his plan of chemotherapy with Rituxan and CHOP. He remains on prednisone for the next 3 days or so. He has not encountered any tumor lysis syndrome. He looks better, his spleen is shrinking and a very dramatic modification in his white count and white count  differential. I think we have done a lot of cell killing. He has not had any tumor lysis syndrome. The patient remains very ill. He wants to go home as soon as possible. I pointed out to him and his daughter that this is not going to happen until he is ready to go. We are going to get him a list of different institutions taking care of patients at home, [facility] for example to be sure that that is accomplished.     We otherwise will monitor him. He probably will require blood products tomorrow. He also will initiate Neupogen with caution and monitoring spleen size and spleen tenderness very closely.     Otherwise, no plans for discharge soon. He still requires a lot of fine tuning in regard to all his problems and issues pertinent to his Jon transformation.

## 2023-06-15 NOTE — THERAPY TREATMENT NOTE
Patient Name: Jeff Bass  : 1945    MRN: [id number]                              Today's Date: 6/15/2023       Admit Date: 2023    Visit Dx:     ICD-10-CM ICD-9-CM   1. Chronic lymphocytic leukemia  C91.10 204.10     Patient Active Problem List   Diagnosis   • Health care maintenance   • Benign prostatic hyperplasia   • Chronic lymphocytic leukemia   • Hypertension   • Hypovitaminosis D   • Hyperlipidemia   • Hypogonadism in male   • Vitamin D deficiency   • Malignant neoplasm of prostate   • Spinal stenosis of lumbar region with neurogenic claudication   • Degeneration of lumbar intervertebral disc   • Coronary arteriosclerosis   • PVC (premature ventricular contraction)   • History of colon polyps   • Abdominal pain   • Substernal goiter   • Skin tag   • Sensory hearing loss, bilateral   • ED (erectile dysfunction) of organic origin   • Constipation   • Benign essential hypertension   • Bladder outlet obstruction   • GERD (gastroesophageal reflux disease)   • Left inguinal hernia   • Impacted cerumen   • Hemangioma of liver   • Abnormal finding on thyroid function test   • Bursitis of right hip   • Pain   • Primary osteoarthritis of right knee   • OA (osteoarthritis) of knee   • B12 deficiency   • High risk medication use   • Pneumonia due to infectious organism   • Lymphoma of spleen   • Severe Malnutrition (HCC)     Past Medical History:   Diagnosis Date   • Arthritis    • Benign prostatic hyperplasia     FOLLOWED BY DR. VIVIEN PATEL   • Biliary dyskinesia    • Bunion of right foot     GREAT TOE   • CLL (chronic lymphocytic leukemia) 2016    FOLLOWED BY DR WALKER   • Colon polyps     FOLLOWED BY DR. NEHA HAM   • Constipation    • Degeneration of lumbar intervertebral disc    • Diverticulosis    • Erectile dysfunction    • Fracture of ankle    • GERD (gastroesophageal reflux disease)    • Hallux valgus of left foot    • Hyperlipidemia     MIXED   • Hypertension    • Inguinal  hernia    • Kidney stone 02/21/2009    SEEN AT [facility] ER   • Knee swelling 2018   • Localized, primary osteoarthritis    • Lumbar radiculopathy    • Lumbar stenosis    • Lung mass     LEFT SIDE - SCAR TISSUE PER PATIENT    • Osteoarthritis of right knee    • Polyneuropathy    • Prostate CA 03/2016    JACQUELYN 6, S/P XRT, FOLLOWED BY DR. VIVIEN PATEL, RADIATION SEEDS   • PVC (premature ventricular contraction)     PT STATES WORKED UP YEARS AGO BY [facility] FOR POSSIBLE MURMUR - NO FOLLOW UP REQUIRED    • RBBB    • Sensory hearing loss, bilateral    • Skin cancer     SQUAMOUS CELL CARCINOMA OF FACE   • Substernal goiter    • Thyromegaly 12/2016    ADMITTED TO [facility]   • Vitamin D deficiency      Past Surgical History:   Procedure Laterality Date   • BRONCHOSCOPY N/A 12/14/2016    Procedure: BRONCHOSCOPY WITH  BAL AND BIOPSY AND ENDOBRONCHIAL ULTRASOUND  AND NAVIGATION WITH BRUSHINGS AND BIOPSY AND BAL;  Surgeon: Pierre Manning MD;  Location: [facility] ENDOSCOPY;  Service:    • COLONOSCOPY N/A 03/13/2019    5 MM SESSILE TUBULAR ADENOMA POLYP IN TRANSVERSE, MULTIPLE SMALL AND LARGE DIVERTICULA IN SIGMOID, RESCOPE IN 5 YRS, DR. NEHA HAM AT [facility]   • COLONOSCOPY W/ POLYPECTOMY N/A 03/19/2015    3 TUBULAR ADENOMA POLYPS, 12 TUBULOVILLOUS POLYP RECTO-SIGMOID, DIVERTICULOSIS, RESCOPE IN 3 YRS, DR. NEHA HAM AT [facility]   • EYE SURGERY Bilateral     CATARACT EXTRACTION WITH LENS IMPLANT, DR. GOVEA   • FINGER NAIL SURGERY Right 2022    KSENIA   • INGUINAL HERNIA REPAIR Left 11/02/2021    Procedure: LEFT OPEN INGUINAL HERNIA REPAIR;  Surgeon: Nixon Kolb MD;  Location: [facility] MAIN OR;  Service: General;  Laterality: Left;   • PROSTATE RADIOACTIVE SEED IMPLANT N/A 09/06/2016    DR. HOA JARAMILLO AT [facility]   • PROSTATE SURGERY N/A 03/11/2016    BIOPSY: ADENOCARCINOMA, DR. ZAID IBARRA   • THYROID BIOPSY Bilateral 11/01/2017    NO B CELL OR T CELL LYMPHOMA, DONE AT [facility]   • TOTAL KNEE ARTHROPLASTY Right  10/12/2022    Procedure: TOTAL KNEE ARTHROPLASTY;  Surgeon: Ran Rachel MD;  Location: [facility] OR [facility];  Service: Orthopedics;  Laterality: Right;      General Information     Row Name 06/15/23 1506          Physical Therapy Time and Intention    Document Type therapy note (daily note)  -PH     Mode of Treatment physical therapy  -PH     Row Name 06/15/23 1506          General Information    Existing Precautions/Restrictions fall  -PH     Barriers to Rehab medically complex  -PH     Row Name 06/15/23 1506          Safety Issues, Functional Mobility    Impairments Affecting Function (Mobility) balance;endurance/activity tolerance;strength  -PH     Comment, Safety Issues/Impairments (Mobility) gt belt and non skid socks donned  -PH           User Key  (r) = Recorded By, (t) = Taken By, (c) = Cosigned By    Initials Name Provider Type    PH Mikala Pearson PTA Physical Therapist Assistant               Mobility     Row Name 06/15/23 1507          Bed Mobility    Bed Mobility supine-sit  -PH     Supine-Sit [place] (Bed Mobility) standby assist  -PH     Assistive Device (Bed Mobility) bed rails;head of bed elevated  -PH     Row Name 06/15/23 1507          Sit-Stand Transfer    Sit-Stand [place] (Transfers) contact guard;verbal cues;nonverbal cues (demo/gesture)  -PH     Assistive Device (Sit-Stand Transfers) walker, front-wheeled  -PH     Row Name 06/15/23 1507          Gait/Stairs (Locomotion)    [place] Level (Gait) contact guard;verbal cues;nonverbal cues (demo/gesture);supervision  -PH     Assistive Device (Gait) walker, front-wheeled  -PH     Distance in Feet (Gait) 60'  -PH     Deviations/Abnormal Patterns (Gait) gait speed decreased;heir decreased  -PH     Bilateral Gait Deviations forward flexed posture  -PH     Comment, (Gait/Stairs) mildly slow w/ no overt LOB; improved w/ distance today  -PH           User Key  (r) = Recorded By, (t) = Taken By, (c) = Cosigned By    Initials Name  Provider Type     Mikala Pearson PTA Physical Therapist Assistant               Obj/Interventions     Row Name 06/15/23 1508          Balance    Balance Assessment sitting static balance;standing static balance  -PH     Static Sitting Balance modified independence  -PH     Static Standing Balance supervision  -PH     Position/Device Used, Standing Balance walker, front-wheeled  -PH     Comment, Balance steady w/ no overt LOB during gait and standing  -PH           User Key  (r) = Recorded By, (t) = Taken By, (c) = Cosigned By    Initials Name Provider Type     Mikala Pearson PTA Physical Therapist Assistant               Goals/Plan    No documentation.                Clinical Impression     Row Name 06/15/23 1508          Pain    Pretreatment Pain Rating 0/10 - no pain  -PH     Posttreatment Pain Rating 0/10 - no pain  -PH     Additional Documentation Pain Scale: Numbers Pre/Post-Treatment (Group)  -PH     Row Name 06/15/23 1502          Plan of Care Review    Plan of Care Reviewed With patient  -PH     Progress improving  -PH     Outcome Evaluation Pt seen by PT this date for tx. Pt in bed and sat up to EOB w/ SBA. Pt mod I at EOB. Pt stood w/ CGA and use of fww. Pt then amb improved distance of 60' req CGA/SV and use of fww. Pt UIC at end of session. Pt plans to return home after dc w/ assist of friend who will be staying pt. Rec HH PT after dc to address deficits.  -PH     Row Name 06/15/23 1500          Positioning and Restraints    Pre-Treatment Position in bed  -PH     Post Treatment Position chair  -PH     In Chair sitting;call light within reach;encouraged to call for assist;exit alarm on;with family/caregiver;notified nsg  -PH           User Key  (r) = Recorded By, (t) = Taken By, (c) = Cosigned By    Initials Name Provider Type     Mikala Pearson PTA Physical Therapist Assistant               Outcome Measures     Row Name 06/15/23 4136          How much help from another  person do you currently need...    Turning from your back to your side while in flat bed without using bedrails? 4  -PH     Moving from lying on back to sitting on the side of a flat bed without bedrails? 3  -PH     Moving to and from a bed to a chair (including a wheelchair)? 3  -PH     Standing up from a chair using your arms (e.g., wheelchair, bedside chair)? 3  -PH     Climbing 3-5 steps with a railing? 2  -PH     To walk in hospital room? 3  -PH     AM-PAC 6 Clicks Score (PT) 18  -PH     Highest level of mobility 6 --> Walked 10 steps or more  -PH     Row Name 06/15/23 1513          Functional Assessment    Outcome Measure Options AM-PAC 6 Clicks Basic Mobility (PT)  -           User Key  (r) = Recorded By, (t) = Taken By, (c) = Cosigned By    Initials Name Provider Type    Mikala Santos, PTA Physical Therapist Assistant                             Physical Therapy Education     Title: PT OT SLP Therapies (Done)     Topic: Physical Therapy (Done)     Point: Mobility training (Done)     Learning Progress Summary           Patient Acceptance, E,TB, VU,NR by  at 6/15/2023 1513    Acceptance, E, VU by  at 6/15/2023 1050    Acceptance, E,TB,D, VU,DU,NR by  at 6/14/2023 2335    Acceptance, E,TB,D, VU,NR by  at 6/13/2023 1434                   Point: Home exercise program (Done)     Learning Progress Summary           Patient Acceptance, E, VU by AW at 6/15/2023 1050    Acceptance, E,TB,D, VU,DU,NR by  at 6/14/2023 2335                   Point: Body mechanics (Done)     Learning Progress Summary           Patient Acceptance, E,TB, VU,NR by  at 6/15/2023 1513    Acceptance, E, VU by  at 6/15/2023 1050    Acceptance, E,TB,D, VU,DU,NR by  at 6/14/2023 2335    Acceptance, E,TB,D, VU,NR by  at 6/13/2023 1434                   Point: Precautions (Done)     Learning Progress Summary           Patient Acceptance, E,TB, VU,NR by  at 6/15/2023 1513    Acceptance, E, VU by AW at 6/15/2023 2224     Acceptance, E,TB,D, VU,DU,NR by  at 6/14/2023 2335    Acceptance, E,TB,D, VU,NR by  at 6/13/2023 1434                               User Key     Initials Effective Dates Name Provider Type Discipline     06/16/21 -  Mercedes Oswald, PT Physical Therapist PT     06/16/21 -  Carine Middleton, RN Registered Nurse Nurse     06/16/21 -  Mikala Pearson PTA Physical Therapist Assistant PT     06/02/23 -  Shyla Durham RN Registered Nurse Nurse              PT Recommendation and Plan     Plan of Care Reviewed With: patient  Progress: improving  Outcome Evaluation: Pt seen by PT this date for tx. Pt in bed and sat up to EOB w/ SBA. Pt mod I at EOB. Pt stood w/ CGA and use of fww. Pt then amb improved distance of 60' req CGA/SV and use of fww. Pt UIC at end of session. Pt plans to return home after dc w/ assist of friend who will be staying pt. Rec  PT after dc to address deficits.     Time Calculation:    PT Charges     Row Name 06/15/23 1514             Time Calculation    Start Time 1326  -PH      Stop Time 1341  -PH      Time Calculation (min) 15 min  -PH      PT Received On 06/15/23  -PH      PT - Next Appointment 06/16/23  -PH         Timed Charges    10475 - PT Therapeutic Activity Minutes 15  -PH         Total Minutes    Timed Charges Total Minutes 15  -PH       Total Minutes 15  -PH            User Key  (r) = Recorded By, (t) = Taken By, (c) = Cosigned By    Initials Name Provider Type     Mikala Pearson PTA Physical Therapist Assistant              Therapy Charges for Today     Code Description Service Date Service Provider Modifiers Qty    [id number]  PT THERAPEUTIC ACT EA 15 MIN 6/15/2023 Mikala Pearson PTA GP 1          PT G-Codes  Outcome Measure Options: AM-PAC 6 Clicks Basic Mobility (PT)  AM-PAC 6 Clicks Score (PT): 18  PT Discharge Summary  Anticipated Discharge Disposition (PT): home with home health, home with assist    Mikala Pearson  PTA  6/15/2023

## 2023-06-15 NOTE — PLAN OF CARE
Goal Outcome Evaluation:  Plan of Care Reviewed With: patient        Progress: improving  Outcome Evaluation: Pt seen by PT this date for tx. Pt in bed and sat up to EOB w/ SBA. Pt mod I at EOB. Pt stood w/ CGA and use of fww. Pt then amb improved distance of 60' req CGA/SV and use of fww. Pt UIC at end of session. Pt plans to return home after dc w/ assist of friend who will be staying pt. Rec  PT after dc to address deficits.

## 2023-06-15 NOTE — PLAN OF CARE
Problem: Adult Inpatient Plan of Care  Goal: Plan of Care Review  Outcome: Ongoing, Progressing  Flowsheets (Taken 6/15/2023 0540 by Carine Middleton, RN)  Progress: improving  Plan of Care Reviewed With: patient  Outcome Evaluation: VSS, A7O x4, up with assist to BR, no c/o pain or nausea, melatonin and phenegran ordered for sleep and hiccups, will CTM  Goal: Patient-Specific Goal (Individualized)  Outcome: Ongoing, Progressing  Goal: Absence of Hospital-Acquired Illness or Injury  Outcome: Ongoing, Progressing  Intervention: Identify and Manage Fall Risk  Recent Flowsheet Documentation  Taken 6/15/2023 0800 by Shyla Durham RN  Safety Promotion/Fall Prevention: safety round/check completed  Intervention: Prevent Skin Injury  Recent Flowsheet Documentation  Taken 6/15/2023 0800 by Shyla Durham RN  Body Position: position changed independently  Intervention: Prevent and Manage VTE (Venous Thromboembolism) Risk  Recent Flowsheet Documentation  Taken 6/15/2023 0800 by Shyla Durham RN  Activity Management: activity encouraged  Goal: Optimal Comfort and Wellbeing  Outcome: Ongoing, Progressing  Goal: Readiness for Transition of Care  Outcome: Ongoing, Progressing     Problem: Skin Injury Risk Increased  Goal: Skin Health and Integrity  Outcome: Ongoing, Progressing     Problem: Fall Injury Risk  Goal: Absence of Fall and Fall-Related Injury  Outcome: Ongoing, Progressing  Intervention: Identify and Manage Contributors  Recent Flowsheet Documentation  Taken 6/15/2023 0800 by Shyla Durham RN  Medication Review/Management: medications reviewed  Intervention: Promote Injury-Free Environment  Recent Flowsheet Documentation  Taken 6/15/2023 0800 by Shyla Durham RN  Safety Promotion/Fall Prevention: safety round/check completed     Problem: Coping Ineffective (Oncology Care)  Goal: Effective Coping  Outcome: Ongoing, Progressing     Problem: Fatigue (Oncology Care)  Goal: Improved Activity Tolerance  Outcome: Ongoing,  Progressing  Intervention: Promote Improved Energy  Recent Flowsheet Documentation  Taken 6/15/2023 0800 by Shyla Durham RN  Activity Management: activity encouraged  Sleep/Rest Enhancement: awakenings minimized     Problem: Oral Intake Altered (Oncology Care)  Goal: Optimal Oral Intake  Outcome: Ongoing, Progressing     Problem: Oral Mucositis (Oncology Care)  Goal: Improved Oral Mucous Membrane Integrity  Outcome: Ongoing, Progressing     Problem: Pain Acute (Oncology Care)  Goal: Optimal Pain Control  Outcome: Ongoing, Progressing  Intervention: Prevent or Manage Pain  Recent Flowsheet Documentation  Taken 6/15/2023 0800 by Shyla Durham RN  Sleep/Rest Enhancement: awakenings minimized  Medication Review/Management: medications reviewed   Goal Outcome Evaluation:

## 2023-06-15 NOTE — PLAN OF CARE
Goal Outcome Evaluation:  Plan of Care Reviewed With: patient        Progress: improving  Outcome Evaluation: VSS, A7O x4, up with assist to BR, no c/o pain or nausea, melatonin and phenegran ordered for sleep and hiccups, will CTM

## 2023-06-16 LAB
ALBUMIN SERPL-MCNC: 2.6 G/DL (ref 3.5–5.2)
ALBUMIN/GLOB SERPL: 2 G/DL
ALP SERPL-CCNC: 41 U/L (ref 39–117)
ALT SERPL W P-5'-P-CCNC: 8 U/L (ref 1–41)
ANION GAP SERPL CALCULATED.3IONS-SCNC: 7.4 MMOL/L (ref 5–15)
AST SERPL-CCNC: 11 U/L (ref 1–40)
BILIRUB SERPL-MCNC: 1.2 MG/DL (ref 0–1.2)
BUN SERPL-MCNC: 45 MG/DL (ref 8–23)
BUN/CREAT SERPL: 52.3 (ref 7–25)
BURR CELLS BLD QL SMEAR: ABNORMAL
CALCIUM SPEC-SCNC: 9.6 MG/DL (ref 8.6–10.5)
CHLORIDE SERPL-SCNC: 109 MMOL/L (ref 98–107)
CO2 SERPL-SCNC: 24.6 MMOL/L (ref 22–29)
CREAT SERPL-MCNC: 0.86 MG/DL (ref 0.76–1.27)
DEPRECATED RDW RBC AUTO: 53.8 FL (ref 37–54)
EGFRCR SERPLBLD CKD-EPI 2021: 88.1 ML/MIN/1.73
ERYTHROCYTE [DISTWIDTH] IN BLOOD BY AUTOMATED COUNT: 18.5 % (ref 12.3–15.4)
GLOBULIN UR ELPH-MCNC: 1.3 GM/DL
GLUCOSE SERPL-MCNC: 103 MG/DL (ref 65–99)
HCT VFR BLD AUTO: 24.4 % (ref 37.5–51)
HGB BLD-MCNC: 8.1 G/DL (ref 13–17.7)
HYPOCHROMIA BLD QL: ABNORMAL
LDH SERPL-CCNC: 1128 U/L (ref 135–225)
LYMPHOCYTES # BLD MANUAL: 6.95 10*3/MM3 (ref 0.7–3.1)
LYMPHOCYTES NFR BLD MANUAL: 8 % (ref 5–12)
MCH RBC QN AUTO: 26.9 PG (ref 26.6–33)
MCHC RBC AUTO-ENTMCNC: 33.2 G/DL (ref 31.5–35.7)
MCV RBC AUTO: 81.1 FL (ref 79–97)
METAMYELOCYTES NFR BLD MANUAL: 2 % (ref 0–0)
MONOCYTES # BLD: 1.46 10*3/MM3 (ref 0.1–0.9)
NEUTROPHILS # BLD AUTO: 9.51 10*3/MM3 (ref 1.7–7)
NEUTROPHILS NFR BLD MANUAL: 52 % (ref 42.7–76)
NRBC BLD AUTO-RTO: 0 /100 WBC (ref 0–0.2)
PHOSPHATE SERPL-MCNC: 3.5 MG/DL (ref 2.5–4.5)
PLAT MORPH BLD: NORMAL
PLATELET # BLD AUTO: 44 10*3/MM3 (ref 140–450)
PMV BLD AUTO: 9.6 FL (ref 6–12)
POTASSIUM SERPL-SCNC: 4.3 MMOL/L (ref 3.5–5.2)
PROT SERPL-MCNC: 3.9 G/DL (ref 6–8.5)
RBC # BLD AUTO: 3.01 10*6/MM3 (ref 4.14–5.8)
SCHISTOCYTES BLD QL SMEAR: ABNORMAL
SODIUM SERPL-SCNC: 141 MMOL/L (ref 136–145)
URATE SERPL-MCNC: 5.8 MG/DL (ref 3.4–7)
VARIANT LYMPHS NFR BLD MANUAL: 14 % (ref 0–5)
VARIANT LYMPHS NFR BLD MANUAL: 24 % (ref 19.6–45.3)
WBC MORPH BLD: NORMAL
WBC NRBC COR # BLD: 18.29 10*3/MM3 (ref 3.4–10.8)

## 2023-06-16 PROCEDURE — 63710000001 PREDNISONE PER 5 MG: Performed by: INTERNAL MEDICINE

## 2023-06-16 PROCEDURE — 85007 BL SMEAR W/DIFF WBC COUNT: CPT | Performed by: INTERNAL MEDICINE

## 2023-06-16 PROCEDURE — 25010000002 FILGRASTIM 480 MCG/0.8ML SOLUTION PREFILLED SYRINGE: Performed by: INTERNAL MEDICINE

## 2023-06-16 PROCEDURE — 84100 ASSAY OF PHOSPHORUS: CPT | Performed by: INTERNAL MEDICINE

## 2023-06-16 PROCEDURE — 84550 ASSAY OF BLOOD/URIC ACID: CPT | Performed by: INTERNAL MEDICINE

## 2023-06-16 PROCEDURE — 80053 COMPREHEN METABOLIC PANEL: CPT | Performed by: INTERNAL MEDICINE

## 2023-06-16 PROCEDURE — 97530 THERAPEUTIC ACTIVITIES: CPT

## 2023-06-16 PROCEDURE — 85025 COMPLETE CBC W/AUTO DIFF WBC: CPT | Performed by: INTERNAL MEDICINE

## 2023-06-16 PROCEDURE — 83615 LACTATE (LD) (LDH) ENZYME: CPT | Performed by: INTERNAL MEDICINE

## 2023-06-16 RX ORDER — POLYETHYLENE GLYCOL 3350 17 G/17G
17 POWDER, FOR SOLUTION ORAL DAILY
Status: DISCONTINUED | OUTPATIENT
Start: 2023-06-16 | End: 2023-06-18 | Stop reason: HOSPADM

## 2023-06-16 RX ORDER — CHOLECALCIFEROL (VITAMIN D3) 125 MCG
5 CAPSULE ORAL NIGHTLY
Status: DISCONTINUED | OUTPATIENT
Start: 2023-06-16 | End: 2023-06-18 | Stop reason: HOSPADM

## 2023-06-16 RX ORDER — TAMSULOSIN HYDROCHLORIDE 0.4 MG/1
0.4 CAPSULE ORAL DAILY
Status: DISCONTINUED | OUTPATIENT
Start: 2023-06-16 | End: 2023-06-18 | Stop reason: HOSPADM

## 2023-06-16 RX ORDER — ALPRAZOLAM 0.25 MG/1
0.25 TABLET ORAL
Status: DISCONTINUED | OUTPATIENT
Start: 2023-06-16 | End: 2023-06-18

## 2023-06-16 RX ADMIN — ALPRAZOLAM 0.25 MG: 0.25 TABLET ORAL at 17:06

## 2023-06-16 RX ADMIN — FILGRASTIM 480 MCG: 480 INJECTION, SOLUTION INTRAVENOUS; SUBCUTANEOUS at 16:39

## 2023-06-16 RX ADMIN — Medication 10 ML: at 08:30

## 2023-06-16 RX ADMIN — TAMSULOSIN HYDROCHLORIDE 0.4 MG: 0.4 CAPSULE ORAL at 21:58

## 2023-06-16 RX ADMIN — Medication 10 ML: at 21:57

## 2023-06-16 RX ADMIN — ALLOPURINOL 300 MG: 300 TABLET ORAL at 08:30

## 2023-06-16 RX ADMIN — SODIUM CHLORIDE 75 ML/HR: 9 INJECTION, SOLUTION INTRAVENOUS at 08:30

## 2023-06-16 RX ADMIN — Medication 5 MG: at 21:58

## 2023-06-16 RX ADMIN — POLYETHYLENE GLYCOL 3350 17 G: 17 POWDER, FOR SOLUTION ORAL at 13:15

## 2023-06-16 RX ADMIN — PREDNISONE 100 MG: 50 TABLET ORAL at 08:30

## 2023-06-16 NOTE — PLAN OF CARE
Problem: Adult Inpatient Plan of Care  Goal: Plan of Care Review  Outcome: Ongoing, Progressing  Flowsheets  Taken 6/16/2023 0943 by Shyla Durham RN  Progress: improving  Taken 6/15/2023 1508 by Mikala Pearson PTA  Plan of Care Reviewed With: patient  Goal: Patient-Specific Goal (Individualized)  Outcome: Ongoing, Progressing  Goal: Absence of Hospital-Acquired Illness or Injury  Outcome: Ongoing, Progressing  Intervention: Identify and Manage Fall Risk  Recent Flowsheet Documentation  Taken 6/16/2023 0800 by Shyla Durham RN  Safety Promotion/Fall Prevention:   activity supervised   nonskid shoes/slippers when out of bed  Intervention: Prevent Skin Injury  Recent Flowsheet Documentation  Taken 6/16/2023 0800 by Shyla Durham RN  Body Position: position changed independently  Intervention: Prevent and Manage VTE (Venous Thromboembolism) Risk  Recent Flowsheet Documentation  Taken 6/16/2023 0800 by Shyla Durham RN  Activity Management: activity encouraged  Goal: Optimal Comfort and Wellbeing  Outcome: Ongoing, Progressing  Goal: Readiness for Transition of Care  Outcome: Ongoing, Progressing     Problem: Skin Injury Risk Increased  Goal: Skin Health and Integrity  Outcome: Ongoing, Progressing     Problem: Fall Injury Risk  Goal: Absence of Fall and Fall-Related Injury  Outcome: Ongoing, Progressing  Intervention: Identify and Manage Contributors  Recent Flowsheet Documentation  Taken 6/16/2023 0800 by Shyla Durham RN  Medication Review/Management: medications reviewed  Intervention: Promote Injury-Free Environment  Recent Flowsheet Documentation  Taken 6/16/2023 0800 by Shyla Durham RN  Safety Promotion/Fall Prevention:   activity supervised   nonskid shoes/slippers when out of bed     Problem: Coping Ineffective (Oncology Care)  Goal: Effective Coping  Outcome: Ongoing, Progressing     Problem: Fatigue (Oncology Care)  Goal: Improved Activity Tolerance  Outcome: Ongoing,  Progressing  Intervention: Promote Improved Energy  Recent Flowsheet Documentation  Taken 6/16/2023 0800 by Shyla Durham RN  Activity Management: activity encouraged     Problem: Oral Intake Altered (Oncology Care)  Goal: Optimal Oral Intake  Outcome: Ongoing, Progressing     Problem: Oral Mucositis (Oncology Care)  Goal: Improved Oral Mucous Membrane Integrity  Outcome: Ongoing, Progressing     Problem: Pain Acute (Oncology Care)  Goal: Optimal Pain Control  Outcome: Ongoing, Progressing  Intervention: Prevent or Manage Pain  Recent Flowsheet Documentation  Taken 6/16/2023 0800 by Shyla Durham RN  Medication Review/Management: medications reviewed   Goal Outcome Evaluation:           Progress: improving

## 2023-06-16 NOTE — PROGRESS NOTES
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     HISTORY OF PRESENT ILLNESS:    On 06/12/2023, this 77-year-old male returns today to the office in a wheelchair, accompanied by his significant other in order to be reviewed.  In the meantime, after we reviewed him almost 10 days ago, he had profound fatigue, inability to function, weight loss, anorexia, nocturnal sweats, and profound deterioration of his performance status.  A peripheral blood flow cytometry disclosed atypical population of lymphocytes that were not the typical lymphocytes present in peripheral blood in patients who have chronic lymphoid leukemia. This triggered further testing including LDH that was in the 1400's, tremendously atypical for formal CLL.  This gain, to me, showed the possibility that this patient was transforming into Jon transformation of CLL and this triggered a bone marrow test last week that was performed in the office with no difficulties or consequences. I discussed the case with Dr. Baca, Pathologist at [facility], even though the bone marrow still shows population of lymphocytes that is atypical but not large.  I have reviewed the morphology of the bone marrow and to me it is extremely atypical in the first place.  His LDH remains highly elevated and the patient feels terrible.  All of this leads me to believe that his spleen enlargement that is so hot on the PET scan done recently contains Jon transformation of CLL and for this reason the patient will be admitted to the hospital today in order to proceed with further testing that includes echocardiogram, CT scan of the abdomen and pelvis, port placement, and chemotherapy administration equivalent to the treatment of patients who have large cell diffuse non-Hodgkin's lymphoma.  In other words, he will receive [facility] Rituxan  chemotherapy.      The symptomatology that I noted remains ongoing and is worse as time goes by, to the point that he is barely able to get out of the chair and the bed by himself.  He is not able to even get dressed himself.  He needs to have total support.  The patient's appetite is minimal.  He has no abdominal pain.  He has profound nocturnal sweats and low grade temperature.  He has not had any pain.  He has minimal cough and no shortness of breath.      On 06/16/2023 the patient was further reviewed.  He has had a very stressful night, not able to sleep properly.  He has been upset because he is not able to have normal bowel activity.  The prednisone is making him overactive and hyper and very anxious.  He completed his chemotherapy with no difficulties and his LDH is steadily dropping.  His white count also has been steadily dropping.      He has not had any fever or chills, minimal if any cough, no shortness of breath, no palpitations, no chest pain.  No swelling in his lower extremities.  He is up and about in a chair.  He had a good breakfast.            Past Medical History:   Diagnosis Date   • Arthritis    • Benign prostatic hyperplasia     FOLLOWED BY DR. VIVIEN PATEL   • Biliary dyskinesia    • Bunion of right foot     GREAT TOE   • CLL (chronic lymphocytic leukemia) 2016    FOLLOWED BY DR WALKER   • Colon polyps     FOLLOWED BY DR. NEHA HAM   • Constipation    • Degeneration of lumbar intervertebral disc    • Diverticulosis    • Erectile dysfunction    • Fracture of ankle 1975   • GERD (gastroesophageal reflux disease)    • Hallux valgus of left foot    • Hyperlipidemia     MIXED   • Hypertension    • Inguinal hernia    • Kidney stone 02/21/2009    SEEN AT [facility] ER   • Knee swelling 2018   • Localized, primary osteoarthritis    • Lumbar radiculopathy    • Lumbar stenosis    • Lung mass     LEFT SIDE - SCAR TISSUE PER PATIENT    • Osteoarthritis of right knee    • Polyneuropathy    • Prostate  CA 03/2016    JACQUELYN 6, S/P XRT, FOLLOWED BY DR. VIVIEN PATEL, RADIATION SEEDS   • PVC (premature ventricular contraction)     PT STATES WORKED UP YEARS AGO BY [facility] FOR POSSIBLE MURMUR - NO FOLLOW UP REQUIRED    • RBBB    • Sensory hearing loss, bilateral    • Skin cancer     SQUAMOUS CELL CARCINOMA OF FACE   • Substernal goiter    • Thyromegaly 12/2016    ADMITTED TO [facility]   • Vitamin D deficiency      Past Surgical History:   Procedure Laterality Date   • BRONCHOSCOPY N/A 12/14/2016    Procedure: BRONCHOSCOPY WITH  BAL AND BIOPSY AND ENDOBRONCHIAL ULTRASOUND  AND NAVIGATION WITH BRUSHINGS AND BIOPSY AND BAL;  Surgeon: Pierre Manning MD;  Location: [facility] ENDOSCOPY;  Service:    • COLONOSCOPY N/A 03/13/2019    5 MM SESSILE TUBULAR ADENOMA POLYP IN TRANSVERSE, MULTIPLE SMALL AND LARGE DIVERTICULA IN SIGMOID, RESCOPE IN 5 YRS, DR. NEHA HAM AT [facility]   • COLONOSCOPY W/ POLYPECTOMY N/A 03/19/2015    3 TUBULAR ADENOMA POLYPS, 12 TUBULOVILLOUS POLYP RECTO-SIGMOID, DIVERTICULOSIS, RESCOPE IN 3 YRS, DR. NEHA HAM AT [facility]   • EYE SURGERY Bilateral     CATARACT EXTRACTION WITH LENS IMPLANT, DR. GOVEA   • FINGER NAIL SURGERY Right 2022    [facility]EN[facility]   • INGUINAL HERNIA REPAIR Left 11/02/2021    Procedure: LEFT OPEN INGUINAL HERNIA REPAIR;  Surgeon: Nixon Kolb MD;  Location: [facility] OR;  Service: General;  Laterality: Left;   • PROSTATE RADIOACTIVE SEED IMPLANT N/A 09/06/2016    DR. HOA JARAMILLO AT [facility]   • PROSTATE SURGERY N/A 03/11/2016    BIOPSY: ADENOCARCINOMA, DR. ZAID IBARRA   • THYROID BIOPSY Bilateral 11/01/2017    NO B CELL OR T CELL LYMPHOMA, DONE AT [facility]   • TOTAL KNEE ARTHROPLASTY Right 10/12/2022    Procedure: TOTAL KNEE ARTHROPLASTY;  Surgeon: Ran Rachel MD;  Location: [facility] OR [facility] [place];  Service: Orthopedics;  Laterality: Right;     Social History     Socioeconomic History   • Marital status:      Spouse name: No   • Years of education: College   Tobacco Use    • Smoking status: Never   • Smokeless tobacco: Never   Vaping Use   • Vaping Use: Never used   Substance and Sexual Activity   • Alcohol use: No   • Drug use: Never   • Sexual activity: Yes     Partners: Female     Birth control/protection: None     Family History   Problem Relation Age of Onset   • Heart disease Father         Rheumatic heart disease   • Hypertension Father    • Osteoporosis Father    • Stroke Mother    • Osteoporosis Mother    • No Known Problems Daughter    • Malig Hyperthermia Neg Hx      Current Facility-Administered Medications on File Prior to Encounter   Medication   • Chlorhexidine Gluconate Cloth 2 % pads     Current Outpatient Medications on File Prior to Encounter   Medication Sig   • alfuzosin (UROXATRAL) 10 MG 24 hr tablet Take 2 tablets by mouth Every Night.   • aspirin 81 MG EC tablet Take 1 tablet by mouth twice daily x 14 days; then take 1 tablet by mouth daily x 28 days   • Melatonin 10 MG tablet Take 1 tablet by mouth Every Night.   • NON FORMULARY Balance of Nature and Super Beets daily   • pantoprazole (PROTONIX) 40 MG EC tablet Take 1 tablet by mouth Daily.   • polyethylene glycol (MIRALAX) 17 GM/SCOOP powder Miralax 17 gram/dose oral powder   1 scoop in the morning then as needed   • acalabrutinib (Calquence) 100 MG capsule capsule Take 2 capsules by mouth 2 (Two) Times a Day.   • acyclovir (Zovirax) 400 MG tablet Take 1 tablet by mouth 2 (Two) Times a Day.   • ammonium lactate (LAC-HYDRIN) 12 % lotion Every 12 (Twelve) Hours.   • levoFLOXacin (LEVAQUIN) 500 MG tablet TAKE 1 TABLET BY MOUTH DAILY   • losartan-hydrochlorothiazide (HYZAAR) 100-25 MG per tablet Take 1 tablet by mouth Daily.   • potassium chloride (K-DUR,KLOR-CON) 20 MEQ CR tablet Take 1 tablet by mouth Daily. (Patient taking differently: Take 1 tablet by mouth Daily. HOLD FOR ONE WEEK PRIOR TO SURGERY)   • pravastatin (PRAVACHOL) 20 MG tablet Take 1 tablet by mouth Every Night.   • predniSONE (DELTASONE) 10 MG  tablet Take 2 tablets by mouth Daily. Take once in the morning   • sulfamethoxazole-trimethoprim (BACTRIM DS,SEPTRA DS) 800-160 MG per tablet Take 1 tablet by mouth 3 (Three) Times a Week. Take on Monday, Wednesday and Friday.   • triamcinolone (KENALOG) 0.025 % cream    No Known Allergies              VITAL SIGNS:  Vitals:    06/15/23 1609 06/15/23 2014 06/16/23 0525 06/16/23 0741   BP: 112/65 127/71 113/64 116/61   BP Location: Left arm Left arm Left arm Left arm   Patient Position: Lying Lying Lying Lying   Pulse: 90 87 85 86   Resp: 16 16 16 17   Temp: 97.5 °F (36.4 °C) 97 °F (36.1 °C) 96.8 °F (36 °C) 97.3 °F (36.3 °C)   TempSrc: Oral Oral Oral Oral   SpO2: 95% 94% 95% 95%   Weight:   99.1 kg (218 lb 7.6 oz)    Height:              PHYSICAL EXAMINATION:   Eyes and oral mucosas were normal.  He was very pale.  No cervical or axillary adenopathy.  Lungs were clear.  Good breath sounds.  Heart was regular with no gallops, murmurs or rubs.  His spleen was softer and smaller, almost 5 cm below the left costal margin.  Liver nonpalpable.  No abdominal pain.  No inguinal adenopathy.  No edema in the lower extremities.  He was awake, alert and oriented to time and place and able to carry on a normal conversation.  Very anxious.                                    LABORATORY DATA:  Results from last 7 days   Lab Units 06/16/23  0607 06/15/23  0627 06/14/23  0608   WBC 10*3/mm3 18.29* 18.63* 9.01   NEUTROS ABS 10*3/mm3 9.51* 16.54* 6.13   LYMPHS ABS 10*3/mm3 6.95* 1.71 1.62   HEMOGLOBIN g/dL 8.1* 8.6* 9.0*   HEMATOCRIT % 24.4* 25.1* 27.0*   PLATELETS 10*3/mm3 44* 37* 29*     Results from last 7 days   Lab Units 06/16/23  0607 06/15/23  0627 06/14/23  1207 06/14/23  0608   SODIUM mmol/L 141 140 143 143   POTASSIUM mmol/L 4.3 4.7 4.4 4.4   CHLORIDE mmol/L 109* 109* 107 107   CO2 mmol/L 24.6 26.7 26.0 27.0   BUN mg/dL 45* 42* 35* 35*   CREATININE mg/dL 0.86 0.91 0.89 0.91   CALCIUM mg/dL 9.6 10.2 10.1 10.1   ALBUMIN g/dL 2.6*  2.2*  --  3.0*   BILIRUBIN mg/dL 1.2 1.0  --  1.5*   ALK PHOS U/L 41 44  --  50   ALT (SGPT) U/L 8 8  --  10   AST (SGOT) U/L 11 11  --  26   GLUCOSE mg/dL 103* 129* 143* 117*   MAGNESIUM mg/dL  --   --  2.5*  --          Contains abnormal data Lactate Dehydrogenase  Order: [id number]   Status: Final result      Visible to patient: Yes (not seen)      Next appt: None      Dx: CLL (chronic lymphoid leukemia) in re...     Specimen Information: Blood    0 Result Notes            Component  Ref Range & Units 5 d ago  (6/7/23) 10 d ago  (6/2/23) 3 mo ago  (2/22/23) 4 mo ago  (2/7/23) 4 mo ago  (1/18/23) 3 yr ago  (6/11/20) 3 yr ago  (6/4/20)   LDH  99 - 259 U/L 1,470 High   1,433 High   156  175  293 High   161  214    Resulting Agency  CBC LAB  CBC LAB  CBC LAB  CBC LAB  CBC LAB  CBC LAB  CBC LAB              Specimen Collected: 06/07/23 15:50 EDT Last Resulted: 06/07/23 16:37 EDT              Flow Cytometry, Blood [[id number]] (Order [id number])  Order  Date: 6/2/2023 Department: [facility] HEMATOLOGY & ONCOLOGY [facility] Ordering/Authorizing: Gerry Schroeder MD     Reprint Order Requisition    Flow Cytometry, Blood (Order #[id number]) on 6/2/23        Flow Cytometry, Blood  Order: [id number]   Status: Final result      Visible to patient: Yes (not seen)      Next appt: None      Dx: Chronic lymphocytic leukemia     Specimen Information: Blood    0 Result Notes       Component  Ref Range & Units 10 d ago 4 mo ago   Reference Lab Report      Resulting Agency [facility]              Specimen Collected: 06/02/23 10:49 EDT Last Resulted: 06/08/23 15:16 EDT         Lab Flowsheet      Order Details      View Encounter      Lab and Collection Details      Routing      Result History     View Encounter Conversation           Scans on Order [id number]    Scan on 6/8/2023 1516 by Miroslava Greenfield F: Flow Cytometry,Blood         Result Care Coordination      Patient Communication     Add Comments   Add  Notifications  Back to Top           Order Questions    Question Answer   Release to patient Routine Release                Provider Comment To Patient     Add Comments   Add Notifications        Routing History    Priority Sent On From To Message Type    6/8/2023  3:16 PM Lab, Background User Gerry Schroeder MD Results         All Reviewers List    Gerry Schroeder MD on 6/8/2023 17:46       Lab Component SmartPhrase Guide    Flow Cytometry, Blood (Order #[id number]) on 6/2/23            ASSESSMENT:   1.  History of CLL  · Status posttreatment with Bendamustine/Rituxan in 2016  · Patient did have severe reaction to initial Rituxan dose requiring hospitalization or completion.  Did well thereafter.  · 12/30/2022 WBC remains normal at 10.6, 31% lymphs.  Platelets normal at 105,000.  No B symptoms or adenopathy.  No suggestion of recurrent disease.  Hemoglobin has acutely dropped however down to 11.9 (see further discussion below).  • On 01/13/2023 his white count, hemoglobin and platelets are not changing. His total lymphocyte count is very minimal and I do believe that his leukemia remains very quiet on clinical grounds with no need for intervention.  • On 01/18/2023 I discussed with the patient the fact that his radiological assessment of the abdomen shows periaortic and pericaval lymphadenopathy. Most of the lymph nodes are between 2-3 cm in size but most dramatically his spleen has enlarged very substantially being huge and almost as big as his liver or bigger. He has no obvious alteration in the liver. His stomach, small intestine, pancreas, kidneys disclose no new abnormalities. There is no evidence of diverticulitis or bowel obstruction. There is no notion of hernias. There is no other notion of masses or ascites. He has multiple cysts in the liver no different than before that have been present for more than 15 years.     The patient's hemoglobin has dropped to 10.7, the platelet count has dropped to 131,000  and I do believe that this is representation of splenomegaly sequestration and CLL activity very substantially. Today we have a beta-2 microglobulin, LDH and uric acid pending along with a chemistry profile. A urinalysis today disclosed no evidence of infection and he has minimal microscopic hematuria with no significance. There is no proteinuria. There is no leukocyturia.     Given the above findings I discussed with the patient the fact that I need to rule out the possibility of Jon's transformation of his CLL and for this reason I have advised him to proceed with a PET scan in a few hours. Hopefully this will not be the case.     If the lymph nodes or spleen are extremely hot he will probably require a biopsy. If they have a light degree or moderate degree of SUV activity we will assume that leukemia is the reason and we will proceed with therapy with Calquence. I discussed with him the methodology of using this medicine taking it twice a day with side effects including atrial fibrillation and abnormal bleeding. He is no longer taking any PPI therefore there should not be a need for this medicine at that point. He will have formal education and consent for Calquence and the medicine will be delivered to his home in [place].     Once that we review the PET scan this will be discussed with him on the telephone. I am planning for him to remain on Calquence at least for the next 3-6 months and see how things evolve and planning to repeat radiological assessment in 3 months. I made him aware that sometimes people taking Calquence can have a rise in the blood count for several weeks after initiation of the medicine that eventually settles down and improves.     I also discussed with him that if the Calquence does not help the process we still have alternatives giving him Gazyva for example or Rituxan.     The patient is no longer requiring blood pressure medication and I advised him to hold off on this until we  see how things evolve.     In order to improve his appetite and help him to control the leukemia up to a certain point the patient will take 20 mg of prednisone everyday for the next 7 days, take 1 tablet in the morning and then discontinue. With that kind of dosing he will not need to have any taper.   • On 02/08/2023 I discussed with the patient the fact that his PET scan failed to document any significant SUV activity in the spleen or lymph nodes documented in his abdomen. This gave me the relief that we are not facing the possibility or Jon's transformation and for that reason I advised the patient to proceed with therapy with Calquence that he has initiated for the last few days. He has encountered side effect including  headache and I pointed out to him that this is a common side effect that typically is relieved drinking a little bit of coffee once or twice a day.he is using Bufferin with some relief but I pointed out to him that he needs to be very careful with this medicine because this can end up with GI bleeding, stomach irritation and abnormal bleeding in the first place.     So far the patient's hemoglobin has improved some, the platelet count is stable but it will take several days before the spleen shrinks and the platelet count normalizes. The total white count is normal. Again the molecular analysis of the peripheral blood flow cytometry has been requested from the [facility] in regard the FISH testing. Also we ordered IgH testing.    The goal will be to be seen in a couple of weeks by nurse with laboratory parameter including CBC and visit with me in 1 month and 2 months. We will plan in 04/2023 to repeat radiological analysis of his abdomen. By then his spleen should be substantially reduced in size and intraabdominal adenopathy should be disappearing.     I expect that as the days go by the platelet count will improve and the hemoglobin will continue improving.  On 03/17/2023 the patient has  been taking now his Brukinsa for almost 6 weeks. He has tolerated the medicine better than anticipated. He has not had any clinical bleeding or cardiac irregularity. The patient's appetite has remained acceptable. He has not had any nausea or vomiting and today his heart rhythm is normal and his blood sugar is raising. Other issues important is that his hemoglobin has improved. His white count is stable. His total lymphocyte count is very well controlled and his platelet count is stable at 103.     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 I provided him the report of his testing. 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 This makes his prognosis a little bit worse in the long run but I pointed out to him that the medicine that he is taking is prime line for the condition that he has. Again, clinically his spleen is not palpable anymore. Therefore, I advised him to remain on his medicine at the same dosing twice a day. He will require laboratory testing every couple of weeks by nurse and I will review him in 1 month and 2 months. I want to review the status of his adenopathies and his spleen radiologically speaking in 2 months. I scheduled another PET scan. His spleen was very hot at that time of the diagnosis on 01/25/2023 and I want to review the anatomy of this and the SUV uptake of the spleen at that point.    Besides this he will require at some point a holiday from treatment to have a couple of teeth removed from his lower right side. He is not having infection yet. He raised the question if he will be able to have a cap and I told him that it should not be a problem at all. This can be done at any time. The removal will require holding off of his medication for at least 7 days in anticipation of any tooth removal and allow the places to heal and go back into the medication after that.  • On 04/13/2023 the patient continues taking his Calquence with no limitations and no significant  side effects. His cardiac auscultation today is normal, he has not had any clinical bleeding, he has not had any diarrhea, in fact he has been mildly constipated. When constipation happens the patient develops some suprapubic pain probably related to sigmoid colon filling up with stools.     His spleen is clinically smaller today documented in the examination, the liver is not palpable, he has no peripheral adenopathy. His total lymphocyte count continues declining, the hemoglobin is stable and the platelet count is stable. I do believe that this patient is responding to this medicine and we advised him to remain on the medicine at the same dosing returning to see us back the first of next month. He will have radiological assessment in the form of PET scan a few days before and then review him after that.     He really wants to have his left knee replacement therapy at some point in the summer to be able to play pickle ball. We will work into his Calquence and his Orthopedic Medicine, Ran Rachel MD, in order to proceed with this at that point. This will be further discussed in the next visit.  • On 05/09/2023 I reviewed the patient on clinical grounds. Actually he feels terrific and he is functioning very well. He is worrying about his bad knee with osteoarthritis, the patient will be a completely functional individual. He has not had any B symptoms, any infections, any clinical bleeding, any palpitations on the Calquence. I reviewed today his PET scan that discloses minimal activity in intraabdominal and hilar lymph nodes. Spleen remains very active, hot and enlarged with no changes in comparison with how he was 3 months ago. There is no obvious bone marrow uptake or bone uptake and the liver has no uptake. This raises the question now that the patient has some excess of monocytes if the spleen is enlarged because of a different phenomenon, for example myelophthisis, or if the spleen is enlarged because of  monocyte infiltration. I do not know the answer to that. I do believe that the patient feels well and I do not think anything will be hurt if the patient holds off on the Calquence now and reassess him with CBC, CMP and LDH in 3 weeks from now. I pointed out to him that I will not rule out the need for him to have a bone marrow test to be sure that he does not have a chronic myelomonocytic leukemia or have developed myelodysplastic syndrome as a complication of previous bendamustine administration in the past.     The patient will call if any issues arise while holding off on the Calquence    I think so far he is doing fine.  • On 06/02/2023 the patient has been seen in the office with profound fatigue for the last 2 weeks to the point that he has great deal of difficulty getting dressed by himself, getting shaved and things of this nature. This is the first time in his life as a patient in this office that he has come in in a wheelchair pushed by his friend. The patient is pale and what is more concerning is the leukocytosis with a white count of 23,000 and very atypical mononuclear cells in circulation by peripheral blood examination. The cells are large with minimal cytoplasm, granular nucleus vacuoles looking like immature cells and I would not be surprised if they suggest blasts. I do not see any rhiannon rods on them and they do not look like prolymphocytes neither. Therefore this explains why the differential in the white count is abnormal, explains the leukocytosis, and explains also the progressive thrombocytopenia and progressive splenomegaly.    We have collected peripheral blood flow cytometry today and expect the report of this this afternoon.     I would not be surprised if this patient needs to have bone marrow testing at some point next week.     I am very concerned that the patient in the presence of cough and decreased breath sounds in the right base also has pneumonia and he will be treated with IV  fluids today normal saline 1000 cc and he will receive 1 gram of rocephin IV. Subsequently he will receive Levaquin 500 mg orally every day for the next 5 days.     The patient will return to the office next week, Tuesday to be reviewed. Hopefully the report of the flow cytometry called to me today will decide how to proceed. I would not be surprised if the patient needs to be admitted over the weekend to the hospital to proceed with bone marrow testing first thing on Monday and then definitive diagnosis. Obviously the question will be if he has developed acute leukemia as a complication of bendamustine administration and in the background of myelodysplasia what the treatment will be.     I pointed out to the patient all of these issues and he was ready to proceed.   On 06/12/2023, the patient has been reviewed today.  He looks terrible, he feels terrible.  He is barely able to stand up to put his pants on.  He is not eating anything.  He has profound deterioration of performance status.  He has nocturnal sweats.  He has profound perspiration.  He has low-grade temperature.  He has some shortness of breath.  He has no obvious pain.      Clinically his spleen is enlarged 8 cm below the left costal margin.  He has lost a lot of muscle mass.  His white count has further elevated to 64,000.  His hemoglobin is lower.  His LDH is in the 1400 category.  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  On the basis of this, I would like to pursue hospitalization with echocardiogram, PICC line, port  placement, and initiation of chemotherapy for CHOP Rituxan in the next couple of days.   On 06/16/2023, the patient was further reviewed.  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 He has handled the chemotherapy better than I expected.  Most importantly, his white count has dramatically came down.  The LDH is now half of what it was originally, and the spleen is coming down in size and is softer.  No abdominal pain.    He has no need for blood products.  The hemoglobin is low at 8, the platelet count is low but steady with no clinical bleeding.  We will continue him on IV fluids, decreasing the rate of them.    •   •   •   •   •   •   •   •   ·   2.  History of prostate cancer  · Treated at the [facility]  · Most recent PSA April 2022 = 0.203  • On 01/13/2023 he has minimal urinary symptomatology comparing his PSA with 3 years ago was 0.8 today, actually a few days ago was 0.1. I doubt that all of the symptoms that he has are associated with prostate cancer. The blood in the urine has a different significance.   • On 01/18/2023 no issues pertinent to his prostate cancer. I see the seeds in his prostatic bed on the CT scan. His urinalysis is very much clean and his PSA has remained stable. No activity of this entity at this point.   • On 02/08/2023  no issues pertinent to this.   On 03/17/2023 no issues pertinent to his previous history of prostate cancer. His PSA has been measured and has remained normal.  • On 04/13/2023 no issues pertinent to this at this point.   • On 05/09/2023 no issues pertinent to this.  • On 06/02/2023 no issues pertinent to this.   On 06/12/2023, no issues pertinent.   On 06/16/2023 no issues.        •   •   •   •     •   •   ·   3.  New anemia  · 12/30/2022 hemoglobin today 11.9.  This is a significant drop from 15.6 when seen 4 months ago.  · ?  Related to cystitis/hematuria  · We will check additional lab work today  including ferritin, iron profile, B12, folate, and PSA level in light of his history of prostate cancer.  • On 01/13/2023 the hemoglobin remains at 12, he had a B12 level of 265 receiving a B12 injection 3 weeks ago and a reticulated hemoglobin of 31 today. Normal platelet count. His recent ferritin and iron profile were acceptable. He has had a colonoscopy that was perfectly normal in 2019 by Hieu Lester MD.   On 01/18/2023 I think the anemia and the thrombocytopenia is a reflection of splenomegaly, sequestration and activity of the CLL. I expect these numbers will improve once that we start to counteract the leukemia with Calquence. He will require repeat CBC, CMP, LDH, uric acid in 3 weeks.   • On 02/08/2023 we know that the anemia is a reflection of CLL activity, splenomegaly, and so forth. We expect that the hemoglobin should be corrected in the visit in 1 month and the splenomegaly should be reducing also correcting his platelet count.     The patient will be returning every couple of weeks for nurse visit and blood count and he will have visit with doctor in 1 month and 2 months. Plan in 04/2023 to repeat radiological assessment of his abdomen. I find no need for further PET scan. We will await the report of the molecular analysis of the peripheral blood from the [facility]. We requested this today.  On 03/17/2023 the anemia is slowly improving, reflection of the treatment of his leukemia. The anemia was a reflection of leukemia involvement in the bone marrow and decreased production of red cells. The platelet count is reflection being low of splenomegaly and sequestration and probably bone marrow infiltration by leukemia.     I had a lengthy discussion with the patient about all these issues today. 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     The rest of the molecular analysis disclosed no bad situation in regard to prognosis.     The patient was thankful about the visit today and the simple  explanation.  • On 04/13/2023 the patient's hemoglobin remains stable, he has no notion of blood loss, his nutrition to me is marginal and I had a discussion with him about nutrition today that I think is very important not only from the point of view of his bowel activity but also from the point of view of proper nutrition and caloric ingestion. I do not think this patient according to what he reported to me is consuming more than 1200 calories a day and that explains to me why he loses weight. I pointed out to him that he needs to catch up in snacks, to have some cheese, to have some nuts, to have fruit in his diet that will help him to have better digestion and better bowel activity. I suggested some ice cream at nighttime as well.     In regard to his constipation I advised him to use a lower amount of MiraLax on a daily basis and that way his bowels remain regular on a daily basis.     His appointments will be kept the same for a visit in a month and the PET scan a few days before.     In a month he will have a CBC and CMP.   • On 05/09/2023 the patient's platelet count remains low. I think it is a reflection of splenomegaly and sequestration and the hemoglobin remains at a level 9, reflection of splenomegaly and sequestration.     As posted above the patient has now monocyte excess. I do not know if this is an issue pertinent to Calquence or maybe the patient has a 2nd clone that could be significant in regard to chronic myelomonocytic leukemia that could be arising from previous bendamustine administration. I asked him as posted above to hold off on the Calquence, review him back in 3 weeks and  if we need to pursue bone marrow testing. I think I will in case that the monocyte count remains elevated or higher. The patient is aware of that.     Nothing that I can do for his platelet count at this point given the splenomegaly and not too much that I can do in regard to the hemoglobin. We have measured  ferritin, iron, TIBC and all those numbers were normal in the past. His diet is much better at this time.  • On 2023 the patient is very ill today and he will require IV fluids as posted above, IV antibiotics, oral antibiotics, peripheral blood flow cytometry, CMP, LDH and blood cultures. This patient has a high risk disease with high risk of symptomatology and high potential for new hospital admission.  On 2023, I think the anemia is a representation of bone marrow loaded with lymphoma cells and splenomegaly with sequestration.  No evidence of hemolysis.  Chemistry profile and bilirubin will be done.  I would not be surprised if the patient, by the end of the week, needs to have a blood transfusion.   •   •   •   •   •     On 2023, I had a conversation with the patient and his significant other regarding his disease process.  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  His LDH is very elevated and I pointed this out to the pathologist reviewing the bone marrow sample.  Actually the bone marrow sample, in my opinion, was excellent morphologically and to me after reviewing bone marrows for 30 years it tells me a different story.  I gave the opportunity to put the patient along with the morphological analysis of the bone marrow together better than the pathologist.  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  His spleen has been hot on recent PET scan.  We discontinued his oral chemotherapy medicine more than a month ago because it was not helping him or maintaining him in any way or form.  I do believe that the patient needs to be admitted to the hospital for the followin. He will require radiological assessment of his chest, abdomen and pelvis.  2. He will require echocardiogram.  3. He will require port placement.   4. He will have formal allocation and consent for chemotherapy administration in  the form of CHOP and Rituxan.   5. Will plan to deliver Rituxan on day 1 and CHOP on day 2.   6. The patient will require prophylaxis for tumor lysis syndrome.  He will require administration of allopurinol as early as today.  7. The patient will require blood products very likely by the end of the week.  8. I made him aware that the chances of this regimented chemotherapy working is not that high, but we still do not know what kind of response we will get unless we do it and there are no predictive factors regarding how the treatment is going to do.  9. I pointed out to him that I do not know how long he needs to stay in the hospital and I would not be surprised if it is at least 10 days.   10. I provided him a report of the bone marrow test, I provided him proper typical information of Jon transformation but he does not want to read it and he wants to go ahead and proceed with hospitalization today.      On 06/16/2023, the patient was further reviewed.  His clinical status has been stated above.  He has been anxious.  He was given the prednisone utilization in the treatment of his disease, and I will put him on some Xanax twice a day.  Also, the patient will have something for insomnia and he will have some Phenergan in case that he has any hiccups.  He is getting ready to go home and I pointed out to him there is no way that he can go home under the present circumstances.      I discussed all of these facts with him.  He was able to listen to me, and his significant other was present in the room helping him to understand all of these facts.      The patient will be having decrease in the IV rate.  He will continue having his Phenergan for hiccups and he will have his medicine for insomnia.  He will remain on Zyprexa as part of his chemotherapy nausea regimen.      I also advised him to have some Xanax for anxiety.  He will have also some Flomax to minimize any bladder outlet obstruction that he normally takes  at home.

## 2023-06-16 NOTE — THERAPY TREATMENT NOTE
Patient Name: Jeff Bass  : 1945    MRN: [id number]                              Today's Date: 2023       Admit Date: 2023    Visit Dx:     ICD-10-CM ICD-9-CM   1. Chronic lymphocytic leukemia  C91.10 204.10     Patient Active Problem List   Diagnosis    Health care maintenance    Benign prostatic hyperplasia    Chronic lymphocytic leukemia    Hypertension    Hypovitaminosis D    Hyperlipidemia    Hypogonadism in male    Vitamin D deficiency    Malignant neoplasm of prostate    Spinal stenosis of lumbar region with neurogenic claudication    Degeneration of lumbar intervertebral disc    Coronary arteriosclerosis    PVC (premature ventricular contraction)    History of colon polyps    Abdominal pain    Substernal goiter    Skin tag    Sensory hearing loss, bilateral    ED (erectile dysfunction) of organic origin    Constipation    Benign essential hypertension    Bladder outlet obstruction    GERD (gastroesophageal reflux disease)    Left inguinal hernia    Impacted cerumen    Hemangioma of liver    Abnormal finding on thyroid function test    Bursitis of right hip    Pain    Primary osteoarthritis of right knee    OA (osteoarthritis) of knee    B12 deficiency    High risk medication use    Pneumonia due to infectious organism    Lymphoma of spleen    Severe Malnutrition (HCC)     Past Medical History:   Diagnosis Date    Arthritis     Benign prostatic hyperplasia     FOLLOWED BY DR. VIVIEN PATEL    Biliary dyskinesia     Bunion of right foot     GREAT TOE    CLL (chronic lymphocytic leukemia)     FOLLOWED BY DR WALKER    Colon polyps     FOLLOWED BY DR. NEHA HAM    Constipation     Degeneration of lumbar intervertebral disc     Diverticulosis     Erectile dysfunction     Fracture of ankle     GERD (gastroesophageal reflux disease)     Hallux valgus of left foot     Hyperlipidemia     MIXED    Hypertension     Inguinal hernia     Kidney stone 2009    SEEN AT [facility] ER     Knee swelling 2018    Localized, primary osteoarthritis     Lumbar radiculopathy     Lumbar stenosis     Lung mass     LEFT SIDE - SCAR TISSUE PER PATIENT     Osteoarthritis of right knee     Polyneuropathy     Prostate CA 03/2016    JACQUELYN 6, S/P XRT, FOLLOWED BY DR. VIVIEN PATEL, RADIATION SEEDS    PVC (premature ventricular contraction)     PT STATES WORKED UP YEARS AGO BY [facility] FOR POSSIBLE MURMUR - NO FOLLOW UP REQUIRED     RBBB     Sensory hearing loss, bilateral     Skin cancer     SQUAMOUS CELL CARCINOMA OF FACE    Substernal goiter     Thyromegaly 12/2016    ADMITTED TO [facility]    Vitamin D deficiency      Past Surgical History:   Procedure Laterality Date    BRONCHOSCOPY N/A 12/14/2016    Procedure: BRONCHOSCOPY WITH  BAL AND BIOPSY AND ENDOBRONCHIAL ULTRASOUND  AND NAVIGATION WITH BRUSHINGS AND BIOPSY AND BAL;  Surgeon: Pierre Manning MD;  Location: [facility] ENDOSCOPY;  Service:     COLONOSCOPY N/A 03/13/2019    5 MM SESSILE TUBULAR ADENOMA POLYP IN TRANSVERSE, MULTIPLE SMALL AND LARGE DIVERTICULA IN SIGMOID, RESCOPE IN 5 YRS, DR. NEHA HAM AT [facility]    COLONOSCOPY W/ POLYPECTOMY N/A 03/19/2015    3 TUBULAR ADENOMA POLYPS, 12 TUBULOVILLOUS POLYP RECTO-SIGMOID, DIVERTICULOSIS, RESCOPE IN 3 YRS, DR. NEHA HAM AT [facility]    EYE SURGERY Bilateral     CATARACT EXTRACTION WITH LENS IMPLANT, DR. GOVEA    FINGER NAIL SURGERY Right 2022    TORRES-PATEL    INGUINAL HERNIA REPAIR Left 11/02/2021    Procedure: LEFT OPEN INGUINAL HERNIA REPAIR;  Surgeon: Nixon Kolb MD;  Location: [facility] OR;  Service: General;  Laterality: Left;    PROSTATE RADIOACTIVE SEED IMPLANT N/A 09/06/2016    DR. HOA JARAMILLO AT [facility]    PROSTATE SURGERY N/A 03/11/2016    BIOPSY: ADENOCARCINOMA, DR. ZAID IBARRA    THYROID BIOPSY Bilateral 11/01/2017    NO B CELL OR T CELL LYMPHOMA, DONE AT [facility]    TOTAL KNEE ARTHROPLASTY Right 10/12/2022    Procedure: TOTAL KNEE ARTHROPLASTY;  Surgeon: Ran Rachel MD;   [place] [facility] [place] [facility]  Service: Orthopedics;  Laterality: Right;      General Information       Row Name 06/16/23 1017          Physical Therapy Time and Intention    Document Type therapy note (daily note)  -PH     Mode of Treatment physical therapy  -PH       Row Name 06/16/23 1017          General Information    Existing Precautions/Restrictions fall  -PH     Barriers to Rehab medically complex  -PH       Row Name 06/16/23 1017          Safety Issues, Functional Mobility    Impairments Affecting Function (Mobility) balance;endurance/activity tolerance;strength  -PH     Comment, Safety Issues/Impairments (Mobility) gt belt and non skid socks donned  -PH               User Key  (r) = Recorded By, (t) = Taken By, (c) = Cosigned By      Initials Name Provider Type    PH Mikala Pearson PTA Physical Therapist Assistant                   Mobility       Row Name 06/16/23 1017          Bed Mobility    Bed Mobility supine-sit  -PH     Supine-Sit Trigg (Bed Mobility) standby assist  -PH     Assistive Device (Bed Mobility) head of bed elevated;bed rails  -PH     Comment, (Bed Mobility) incr time  -PH       Row Name 06/16/23 1017          Sit-Stand Transfer    Sit-Stand Trigg (Transfers) contact guard;verbal cues  -PH     Assistive Device (Sit-Stand Transfers) walker, front-wheeled  -PH       Row Name 06/16/23 1017          Gait/Stairs (Locomotion)    Trigg Level (Gait) contact guard;standby assist  -PH     Assistive Device (Gait) walker, front-wheeled  -PH     Distance in Feet (Gait) 45'  -PH     Deviations/Abnormal Patterns (Gait) stride length decreased  -PH     Bilateral Gait Deviations forward flexed posture  -PH     Trigg Level (Stairs) unable to assess  -PH     Comment, (Gait/Stairs) mildly slow although fairly steady w/ no overt LOB; fatigue limiting distance  -PH               User Key  (r) = Recorded By, (t) = Taken By, (c) = Cosigned By      Initials Name Provider Type    PH  Mikala Pearson PTA Physical Therapist Assistant                   Obj/Interventions       Row Name 06/16/23 1019          Motor Skills    Therapeutic Exercise other (see comments)  Educ for and performed ther ex packet provided: BAP, LAQ, seated march, hip abd, SLR w/ use of gt belt for self assist, GS; x 10-15 reps  -PH       Row Name 06/16/23 1019          Balance    Balance Assessment sitting static balance;standing static balance  -PH     Static Sitting Balance modified independence  -PH     Dynamic Standing Balance standby assist  -PH     Position/Device Used, Standing Balance walker, front-wheeled  -PH               User Key  (r) = Recorded By, (t) = Taken By, (c) = Cosigned By      Initials Name Provider Type    PH Mikala Pearson PTA Physical Therapist Assistant                   Goals/Plan    No documentation.                  Clinical Impression       Row Name 06/16/23 1020          Pain    Pretreatment Pain Rating 0/10 - no pain  -PH     Posttreatment Pain Rating 0/10 - no pain  -PH     Additional Documentation Pain Scale: Numbers Pre/Post-Treatment (Group)  -PH       Row Name 06/16/23 1020          Plan of Care Review    Plan of Care Reviewed With patient  -PH     Progress no change  -PH     Outcome Evaluation Pt seen by PT this date for tx. Pt sat up to EOB w/ SBA. Pt stood w/ CGA and use of fww. Pt then amb 45' w/ SBA and use of fww. Pt's distance shorter today w/ pt limited by fatigue. Pt provided ther ex packet, educ for and performed B LE ther ex for strengthening. Pt verbally agreeable to performing at reg intervals. PT will prog as pt jose.  -PH       Row Name 06/16/23 1020          Positioning and Restraints    Pre-Treatment Position in bed  -PH     Post Treatment Position chair  -PH     In Chair sitting;call light within reach;encouraged to call for assist;exit alarm on;with family/caregiver  -PH               User Key  (r) = Recorded By, (t) = Taken By, (c) = Cosigned By       Initials Name Provider Type     Mikala Pearson PTA Physical Therapist Assistant                   Outcome Measures       Row Name 06/16/23 1022          How much help from another person do you currently need...    Turning from your back to your side while in flat bed without using bedrails? 4  -PH     Moving from lying on back to sitting on the side of a flat bed without bedrails? 3  -PH     Moving to and from a bed to a chair (including a wheelchair)? 3  -PH     Standing up from a chair using your arms (e.g., wheelchair, bedside chair)? 3  -PH     Climbing 3-5 steps with a railing? 2  -PH     To walk in hospital room? 3  -PH     AM-PAC 6 Clicks Score (PT) 18  -PH     Highest level of mobility 6 --> Walked 10 steps or more  -PH       Row Name 06/16/23 1022          Functional Assessment    Outcome Measure Options AM-PAC 6 Clicks Basic Mobility (PT)  -PH               User Key  (r) = Recorded By, (t) = Taken By, (c) = Cosigned By      Initials Name Provider Type     Mikala Pearson PTA Physical Therapist Assistant                                 Physical Therapy Education       Title: PT OT SLP Therapies (Done)       Topic: Physical Therapy (Done)       Point: Mobility training (Done)       Learning Progress Summary             Patient Acceptance, E,TB,D, VU,DU by  at 6/16/2023 1023    Acceptance, E, VU by AW at 6/16/2023 0944    Acceptance, E,TB, VU,NR by  at 6/15/2023 1513    Acceptance, E, VU by AW at 6/15/2023 1050    Acceptance, E,TB,D, VU,DU,NR by  at 6/14/2023 2335    Acceptance, E,TB,D, VU,NR by  at 6/13/2023 1434                         Point: Home exercise program (Done)       Learning Progress Summary             Patient Acceptance, E,TB,D, VU,DU by  at 6/16/2023 1023    Acceptance, E, VU by AW at 6/16/2023 0944    Acceptance, E, VU by  at 6/15/2023 1050    Acceptance, E,TB,D, VU,DU,NR by  at 6/14/2023 2335                         Point: Body mechanics (Done)        Learning Progress Summary             Patient Acceptance, E,TB,D, VU,DU by PH at 6/16/2023 1023    Acceptance, E, VU by AW at 6/16/2023 0944    Acceptance, E,TB, VU,NR by  at 6/15/2023 1513    Acceptance, E, VU by AW at 6/15/2023 1050    Acceptance, E,TB,D, VU,DU,NR by  at 6/14/2023 2335    Acceptance, E,TB,D, VU,NR by  at 6/13/2023 1434                         Point: Precautions (Done)       Learning Progress Summary             Patient Acceptance, E,TB,D, VU,DU by  at 6/16/2023 1023    Acceptance, E, VU by AW at 6/16/2023 0944    Acceptance, E,TB, VU,NR by  at 6/15/2023 1513    Acceptance, E, VU by AW at 6/15/2023 1050    Acceptance, E,TB,D, VU,DU,NR by  at 6/14/2023 2335    Acceptance, E,TB,D, VU,NR by  at 6/13/2023 1434                                         User Key       Initials Effective Dates Name Provider Type Discipline     06/16/21 -  Mercedes Oswald, PT Physical Therapist PT     06/16/21 -  Carnie Middleton, RN Registered Nurse Nurse     06/16/21 -  Mikala Pearson PTA Physical Therapist Assistant PT     06/02/23 -  Shyla Durham, ALEIDA Registered Nurse Nurse                  PT Recommendation and Plan     Plan of Care Reviewed With: patient  Progress: no change  Outcome Evaluation: Pt seen by PT this date for tx. Pt sat up to EOB w/ SBA. Pt stood w/ CGA and use of fww. Pt then amb 45' w/ SBA and use of fww. Pt's distance shorter today w/ pt limited by fatigue. Pt provided ther ex packet, educ for and performed B LE ther ex for strengthening. Pt verbally agreeable to performing at reg intervals. PT will prog as pt jose.     Time Calculation:    PT Charges       Row Name 06/16/23 1023             Time Calculation    Start Time 0959  -PH      Stop Time 1015  -PH      Time Calculation (min) 16 min  -PH      PT Received On 06/16/23  -      PT - Next Appointment 06/17/23  -PH         Timed Charges    03230 - PT Therapeutic Exercise Minutes 8  -PH      30247 - PT Therapeutic  Activity Minutes 8  -PH         Total Minutes    Timed Charges Total Minutes 16  -PH       Total Minutes 16  -PH                User Key  (r) = Recorded By, (t) = Taken By, (c) = Cosigned By      Initials Name Provider Type    Mikala Santos PTA Physical Therapist Assistant                  Therapy Charges for Today       Code Description Service Date Service Provider Modifiers Qty    42293516499  PT THERAPEUTIC ACT EA 15 MIN 6/15/2023 Mikala Pearson PTA GP 1    [id number]  PT THERAPEUTIC ACT EA 15 MIN 6/16/2023 Mikala Pearson PTA GP 1            PT G-Codes  Outcome Measure Options: AM-PAC 6 Clicks Basic Mobility (PT)  AM-PAC 6 Clicks Score (PT): 18  PT Discharge Summary  Anticipated Discharge Disposition (PT): home with home health, home with assist    Mikala Pearson PTA  6/16/2023

## 2023-06-16 NOTE — PLAN OF CARE
Goal Outcome Evaluation:  Plan of Care Reviewed With: patient        Progress: no change  Outcome Evaluation: Pt seen by PT this date for tx. Pt sat up to EOB w/ SBA. Pt stood w/ CGA and use of fww. Pt then amb 45' w/ SBA and use of fww. Pt's distance shorter today w/ pt limited by fatigue. Pt provided ther ex packet, educ for and performed B LE ther ex for strengthening. Pt verbally agreeable to performing at reg intervals. PT will prog as pt jose.

## 2023-06-16 NOTE — PROGRESS NOTES
"Nutrition Services    Patient Name:  Jeff Bass  YOB: 1945  MRN: [id number]  Admit Date:  6/12/2023  Assessment Date:  06/16/23    FOLLOW UP - CLINICAL NUTRITION    Encounter Information         Reason for Encounter RD f/u.       Current Issues Spoke with pt at bedside who reports appetite improving. He endorses greatly enjoying flavor of food offered. Reports PO intake % of meals. Denies any n/v at this time. Reported only drinking one Boost Plus daily and keeping the additional cartons at bedside for later use. Requested RD d/c ONS order as pt has several cartons in room. Informed pt that ONS available on floor upon request and encouraged pt to drinking 2 ONS daily. +2 BM past 24 hrs per I/Os.     Recommendations:   Continue regular diet and encourage adequate PO intake PRN.   Discontinue Boost Plus per pt request. Continue Magic Cup daily with lunch and dinner.     RD will continue to follow course for PO intake and tolerance.      Current Nutrition Orders & Evaluation of Intake       Oral Nutrition     Current PO Diet Diet: Regular/House Diet; Texture: Regular Texture (IDDSI 7); Fluid Consistency: Thin (IDDSI 0)   Supplement Boost Plus, Magic Cup, BID, TID   PO Evaluation     % PO Intake % x 3 meals     # of Days Evaluated 2    Factors Affecting Intake  decreased appetite   --  Anthropometrics          Height    Weight Height: 195.6 cm (77\")  Weight: 99.1 kg (218 lb 7.6 oz) (06/16/23 0525)    BMI kg/m2 Body mass index is 25.91 kg/m².  Overweight (25 - 29.9)    Weight trend Stable     Labs        Pertinent Labs Reviewed, listed below     Results from last 7 days   Lab Units 06/16/23  0607 06/15/23  0627 06/14/23  1207 06/14/23  0608   SODIUM mmol/L 141 140 143 143   POTASSIUM mmol/L 4.3 4.7 4.4 4.4   CHLORIDE mmol/L 109* 109* 107 107   CO2 mmol/L 24.6 26.7 26.0 27.0   BUN mg/dL 45* 42* 35* 35*   CREATININE mg/dL 0.86 0.91 0.89 0.91   CALCIUM mg/dL 9.6 10.2 10.1 10.1 "   BILIRUBIN mg/dL 1.2 1.0  --  1.5*   ALK PHOS U/L 41 44  --  50   ALT (SGPT) U/L 8 8  --  10   AST (SGOT) U/L 11 11  --  26   GLUCOSE mg/dL 103* 129* 143* 117*     Results from last 7 days   Lab Units 06/16/23  0607 06/15/23  0627 06/14/23  1207   MAGNESIUM mg/dL  --   --  2.5*   PHOSPHORUS mg/dL 3.5   < > 3.0   HEMOGLOBIN g/dL 8.1*   < >  --    HEMATOCRIT % 24.4*   < >  --    WBC 10*3/mm3 18.29*   < >  --    ALBUMIN g/dL 2.6*   < >  --     < > = values in this interval not displayed.     Results from last 7 days   Lab Units 06/16/23  0607 06/15/23  0627 06/14/23  0608 06/13/23  0644 06/12/23  0959   PLATELETS 10*3/mm3 44* 37* 29* 62* 92*     COVID19   Date Value Ref Range Status   10/30/2021 Not Detected Not Detected - Ref. Range Final     No results found for: HGBA1C       Medications            Scheduled Medications allopurinol, 300 mg, Oral, Daily  ALPRAZolam, 0.25 mg, Oral, Daily With Breakfast & Dinner  filgrastim (NEUPOGEN) injection, 480 mcg, Subcutaneous, Q PM  melatonin, 5 mg, Oral, Nightly  polyethylene glycol, 17 g, Oral, Daily  predniSONE, 100 mg, Oral, Daily With Breakfast  sodium chloride, 10 mL, Intravenous, Q12H  sodium chloride, 10 mL, Intravenous, Q12H  sodium chloride, 10 mL, Intravenous, Q12H  tamsulosin, 0.4 mg, Oral, Daily        Infusions sodium chloride, 50 mL/hr, Last Rate: 50 mL/hr (06/16/23 1224)        PRN Medications   acetaminophen    calcium carbonate    ondansetron **OR** ondansetron    promethazine **OR** promethazine **OR** promethazine    sodium chloride    sodium chloride    sodium chloride    sodium chloride     Physical Findings          Physical Appearance alert, anxious, loss of muscle mass, loss of subcutaneous fat, oriented, room air   Oral/Mouth Cavity tooth or teeth missing   Edema  lower extremity , 1+ (trace), 2+ (mild)   Gastrointestinal non-distended , last bowel movement:6/14   Skin  bruising   Tubes/Drains/Lines central line/PICC   NFPE Date Completed: 6/12    --  NUTRITION INTERVENTION / PLAN OF CARE  Intervention Goal         Intervention Goal(s) Maintain nutrition status, Nutrition support treatment, Improved nutrition related labs, Meet estimated needs, Disease management/therapy, Tolerate PO , Continue positive trend, Maintain weight, and No significant weight loss     Nutrition Intervention         RD Action Adjusted supplement, Encourage intake, Follow Tx Progress, Care plan reviewed, and Recommend/ordered:      Nutrition Prescription         Diet Prescription     Supplement Prescription Magic Cup, BID   EN/PN Prescription    New Prescription Ordered? Continue same per protocol   --  Monitor/Evaluation        Monitor Per protocol, I&O, PO intake, Supplement intake, Pertinent labs, Weight, Skin status, GI status, Symptoms   Discharge Needs Pending clinical course   Education Will instruct as appropriate   --    RD to follow up per protocol.    Electronically signed by:  Barb Baker RD  06/16/23 14:57 EDT

## 2023-06-17 LAB
ABO GROUP BLD: NORMAL
ALBUMIN SERPL-MCNC: 2.4 G/DL (ref 3.5–5.2)
ALBUMIN/GLOB SERPL: 1.8 G/DL
ALP SERPL-CCNC: 40 U/L (ref 39–117)
ALT SERPL W P-5'-P-CCNC: 6 U/L (ref 1–41)
ANION GAP SERPL CALCULATED.3IONS-SCNC: 7.6 MMOL/L (ref 5–15)
AST SERPL-CCNC: 9 U/L (ref 1–40)
BILIRUB SERPL-MCNC: 1.3 MG/DL (ref 0–1.2)
BLD GP AB SCN SERPL QL: NEGATIVE
BUN SERPL-MCNC: 42 MG/DL (ref 8–23)
BUN/CREAT SERPL: 67.7 (ref 7–25)
BURR CELLS BLD QL SMEAR: ABNORMAL
CALCIUM SPEC-SCNC: 8.6 MG/DL (ref 8.6–10.5)
CHLORIDE SERPL-SCNC: 110 MMOL/L (ref 98–107)
CO2 SERPL-SCNC: 24.4 MMOL/L (ref 22–29)
CREAT SERPL-MCNC: 0.62 MG/DL (ref 0.76–1.27)
DEPRECATED RDW RBC AUTO: 51.7 FL (ref 37–54)
EGFRCR SERPLBLD CKD-EPI 2021: 97.2 ML/MIN/1.73
ELLIPTOCYTES BLD QL SMEAR: ABNORMAL
ERYTHROCYTE [DISTWIDTH] IN BLOOD BY AUTOMATED COUNT: 18.1 % (ref 12.3–15.4)
GLOBULIN UR ELPH-MCNC: 1.3 GM/DL
GLUCOSE SERPL-MCNC: 115 MG/DL (ref 65–99)
HCT VFR BLD AUTO: 21.7 % (ref 37.5–51)
HGB BLD-MCNC: 7.1 G/DL (ref 13–17.7)
LDH SERPL-CCNC: 843 U/L (ref 135–225)
LYMPHOCYTES # BLD MANUAL: 1.42 10*3/MM3 (ref 0.7–3.1)
LYMPHOCYTES NFR BLD MANUAL: 2.1 % (ref 5–12)
MCH RBC QN AUTO: 26.8 PG (ref 26.6–33)
MCHC RBC AUTO-ENTMCNC: 32.7 G/DL (ref 31.5–35.7)
MCV RBC AUTO: 81.9 FL (ref 79–97)
MONOCYTES # BLD: 0.21 10*3/MM3 (ref 0.1–0.9)
NEUTROPHILS # BLD AUTO: 8.26 10*3/MM3 (ref 1.7–7)
NEUTROPHILS NFR BLD MANUAL: 83.5 % (ref 42.7–76)
OVALOCYTES BLD QL SMEAR: ABNORMAL
PHOSPHATE SERPL-MCNC: 3.1 MG/DL (ref 2.5–4.5)
PLAT MORPH BLD: NORMAL
PLATELET # BLD AUTO: 43 10*3/MM3 (ref 140–450)
PMV BLD AUTO: 10.4 FL (ref 6–12)
POIKILOCYTOSIS BLD QL SMEAR: ABNORMAL
POTASSIUM SERPL-SCNC: 4 MMOL/L (ref 3.5–5.2)
PROT SERPL-MCNC: 3.7 G/DL (ref 6–8.5)
RBC # BLD AUTO: 2.65 10*6/MM3 (ref 4.14–5.8)
RH BLD: NEGATIVE
SCHISTOCYTES BLD QL SMEAR: ABNORMAL
SODIUM SERPL-SCNC: 142 MMOL/L (ref 136–145)
T&S EXPIRATION DATE: NORMAL
URATE SERPL-MCNC: 5.8 MG/DL (ref 3.4–7)
VARIANT LYMPHS NFR BLD MANUAL: 14.4 % (ref 19.6–45.3)
WBC MORPH BLD: NORMAL
WBC NRBC COR # BLD: 9.89 10*3/MM3 (ref 3.4–10.8)

## 2023-06-17 PROCEDURE — 86900 BLOOD TYPING SEROLOGIC ABO: CPT | Performed by: INTERNAL MEDICINE

## 2023-06-17 PROCEDURE — 86900 BLOOD TYPING SEROLOGIC ABO: CPT

## 2023-06-17 PROCEDURE — 36430 TRANSFUSION BLD/BLD COMPNT: CPT

## 2023-06-17 PROCEDURE — 86923 COMPATIBILITY TEST ELECTRIC: CPT

## 2023-06-17 PROCEDURE — 86850 RBC ANTIBODY SCREEN: CPT | Performed by: INTERNAL MEDICINE

## 2023-06-17 PROCEDURE — 80053 COMPREHEN METABOLIC PANEL: CPT | Performed by: INTERNAL MEDICINE

## 2023-06-17 PROCEDURE — 84550 ASSAY OF BLOOD/URIC ACID: CPT | Performed by: INTERNAL MEDICINE

## 2023-06-17 PROCEDURE — 85007 BL SMEAR W/DIFF WBC COUNT: CPT | Performed by: INTERNAL MEDICINE

## 2023-06-17 PROCEDURE — 84100 ASSAY OF PHOSPHORUS: CPT | Performed by: INTERNAL MEDICINE

## 2023-06-17 PROCEDURE — 85025 COMPLETE CBC W/AUTO DIFF WBC: CPT | Performed by: INTERNAL MEDICINE

## 2023-06-17 PROCEDURE — 83615 LACTATE (LD) (LDH) ENZYME: CPT | Performed by: INTERNAL MEDICINE

## 2023-06-17 PROCEDURE — 63710000001 PROMETHAZINE PER 12.5 MG: Performed by: INTERNAL MEDICINE

## 2023-06-17 PROCEDURE — 86901 BLOOD TYPING SEROLOGIC RH(D): CPT

## 2023-06-17 PROCEDURE — 86901 BLOOD TYPING SEROLOGIC RH(D): CPT | Performed by: INTERNAL MEDICINE

## 2023-06-17 PROCEDURE — 63710000001 PREDNISONE PER 5 MG: Performed by: INTERNAL MEDICINE

## 2023-06-17 PROCEDURE — P9016 RBC LEUKOCYTES REDUCED: HCPCS

## 2023-06-17 PROCEDURE — 25010000002 FILGRASTIM 480 MCG/0.8ML SOLUTION PREFILLED SYRINGE: Performed by: INTERNAL MEDICINE

## 2023-06-17 RX ORDER — ACETAMINOPHEN 325 MG/1
650 TABLET ORAL ONCE
Status: COMPLETED | OUTPATIENT
Start: 2023-06-17 | End: 2023-06-17

## 2023-06-17 RX ORDER — ACETAMINOPHEN 160 MG/5ML
650 SOLUTION ORAL ONCE
Status: COMPLETED | OUTPATIENT
Start: 2023-06-17 | End: 2023-06-17

## 2023-06-17 RX ORDER — ACETAMINOPHEN 650 MG/1
650 SUPPOSITORY RECTAL ONCE
Status: COMPLETED | OUTPATIENT
Start: 2023-06-17 | End: 2023-06-17

## 2023-06-17 RX ADMIN — Medication 10 ML: at 21:20

## 2023-06-17 RX ADMIN — ALLOPURINOL 300 MG: 300 TABLET ORAL at 08:50

## 2023-06-17 RX ADMIN — SODIUM CHLORIDE 50 ML/HR: 9 INJECTION, SOLUTION INTRAVENOUS at 03:13

## 2023-06-17 RX ADMIN — FILGRASTIM 480 MCG: 480 INJECTION, SOLUTION INTRAVENOUS; SUBCUTANEOUS at 18:45

## 2023-06-17 RX ADMIN — Medication 10 ML: at 08:51

## 2023-06-17 RX ADMIN — ACETAMINOPHEN 650 MG: 325 TABLET ORAL at 15:48

## 2023-06-17 RX ADMIN — PREDNISONE 100 MG: 50 TABLET ORAL at 08:50

## 2023-06-17 RX ADMIN — PROMETHAZINE HYDROCHLORIDE 12.5 MG: 12.5 TABLET ORAL at 00:13

## 2023-06-17 RX ADMIN — ALPRAZOLAM 0.25 MG: 0.25 TABLET ORAL at 18:45

## 2023-06-17 RX ADMIN — ALPRAZOLAM 0.25 MG: 0.25 TABLET ORAL at 08:50

## 2023-06-17 NOTE — PROGRESS NOTES
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     HISTORY OF PRESENT ILLNESS:      On 06/17/2023 the patient has been reviewed. He had a relatively good night of sleep. He had good bowel activity and he is abundantly urinating. He has no pain. He has had breakfast and he has had no nausea. He feels otherwise better. He is less anxious like he was before yesterday because of the effect of Xanax. He had a good night of sleep.          Past Medical History:   Diagnosis Date    Arthritis     Benign prostatic hyperplasia     FOLLOWED BY DR. VIVIEN PATEL    Biliary dyskinesia     Bunion of right foot     GREAT TOE    CLL (chronic lymphocytic leukemia) 2016    FOLLOWED BY DR WALKER    Colon polyps     FOLLOWED BY DR. NEHA HAM    Constipation     Degeneration of lumbar intervertebral disc     Diverticulosis     Erectile dysfunction     Fracture of ankle 1975    GERD (gastroesophageal reflux disease)     Hallux valgus of left foot     Hyperlipidemia     MIXED    Hypertension     Inguinal hernia     Kidney stone 02/21/2009    SEEN AT [facility] ER    Knee swelling 2018    Localized, primary osteoarthritis     Lumbar radiculopathy     Lumbar stenosis     Lung mass     LEFT SIDE - SCAR TISSUE PER PATIENT     Osteoarthritis of right knee     Polyneuropathy     Prostate CA 03/2016    JACQUELYN 6, S/P XRT, FOLLOWED BY DR. VIVIEN PATEL, RADIATION SEEDS    PVC (premature ventricular contraction)     PT STATES WORKED UP YEARS AGO BY [facility] FOR POSSIBLE MURMUR - NO FOLLOW UP REQUIRED     RBBB     Sensory hearing loss, bilateral     Skin cancer     SQUAMOUS CELL CARCINOMA OF FACE    Substernal goiter     Thyromegaly 12/2016    ADMITTED TO [facility]    Vitamin D deficiency      Past Surgical History:   Procedure Laterality Date    BRONCHOSCOPY N/A 12/14/2016    Procedure: BRONCHOSCOPY WITH   BAL AND BIOPSY AND ENDOBRONCHIAL ULTRASOUND  AND NAVIGATION WITH BRUSHINGS AND BIOPSY AND BAL;  Surgeon: Pierre Manning MD;  Location: [facility] ENDOSCOPY;  Service:     COLONOSCOPY N/A 03/13/2019    5 MM SESSILE TUBULAR ADENOMA POLYP IN TRANSVERSE, MULTIPLE SMALL AND LARGE DIVERTICULA IN SIGMOID, RESCOPE IN 5 YRS, DR. NEHA HAM AT [facility]    COLONOSCOPY W/ POLYPECTOMY N/A 03/19/2015    3 TUBULAR ADENOMA POLYPS, 12 TUBULOVILLOUS POLYP RECTO-SIGMOID, DIVERTICULOSIS, RESCOPE IN 3 YRS, DR. NEHA HAM AT [facility]    EYE SURGERY Bilateral     CATARACT EXTRACTION WITH LENS IMPLANT, DR. GOVEA    FINGER NAIL SURGERY Right 2022    TORRES-PATEL    INGUINAL HERNIA REPAIR Left 11/02/2021    Procedure: LEFT OPEN INGUINAL HERNIA REPAIR;  Surgeon: Nixon Kolb MD;  Location: [facility] MAIN OR;  Service: General;  Laterality: Left;    PROSTATE RADIOACTIVE SEED IMPLANT N/A 09/06/2016    DR. HOA JARAMILLO AT [facility]    PROSTATE SURGERY N/A 03/11/2016    BIOPSY: ADENOCARCINOMA, DR. ZAID IBARRA    THYROID BIOPSY Bilateral 11/01/2017    NO B CELL OR T CELL LYMPHOMA, DONE AT [facility]    TOTAL KNEE ARTHROPLASTY Right 10/12/2022    Procedure: TOTAL KNEE ARTHROPLASTY;  Surgeon: Ran Rachel MD;  Location: [facility] OR [facility];  Service: Orthopedics;  Laterality: Right;     Social History     Socioeconomic History    Marital status:      Spouse name: No    Years of education: College   Tobacco Use    Smoking status: Never    Smokeless tobacco: Never   Vaping Use    Vaping Use: Never used   Substance and Sexual Activity    Alcohol use: No    Drug use: Never    Sexual activity: Yes     Partners: Female     Birth control/protection: None     Family History   Problem Relation Age of Onset    Heart disease Father         Rheumatic heart disease    Hypertension Father     Osteoporosis Father     Stroke Mother     Osteoporosis Mother     No Known Problems Daughter     Malig Hyperthermia Neg Hx      Current Facility-Administered  Medications on File Prior to Encounter   Medication    Chlorhexidine Gluconate Cloth 2 % pads     Current Outpatient Medications on File Prior to Encounter   Medication Sig    alfuzosin (UROXATRAL) 10 MG 24 hr tablet Take 2 tablets by mouth Every Night.    aspirin 81 MG EC tablet Take 1 tablet by mouth twice daily x 14 days; then take 1 tablet by mouth daily x 28 days    Melatonin 10 MG tablet Take 1 tablet by mouth Every Night.    NON FORMULARY Balance of Nature and Super Beets daily    pantoprazole (PROTONIX) 40 MG EC tablet Take 1 tablet by mouth Daily.    polyethylene glycol (MIRALAX) 17 GM/SCOOP powder Miralax 17 gram/dose oral powder   1 scoop in the morning then as needed    acalabrutinib (Calquence) 100 MG capsule capsule Take 2 capsules by mouth 2 (Two) Times a Day.    acyclovir (Zovirax) 400 MG tablet Take 1 tablet by mouth 2 (Two) Times a Day.    ammonium lactate (LAC-HYDRIN) 12 % lotion Every 12 (Twelve) Hours.    levoFLOXacin (LEVAQUIN) 500 MG tablet TAKE 1 TABLET BY MOUTH DAILY    losartan-hydrochlorothiazide (HYZAAR) 100-25 MG per tablet Take 1 tablet by mouth Daily.    potassium chloride (K-DUR,KLOR-CON) 20 MEQ CR tablet Take 1 tablet by mouth Daily. (Patient taking differently: Take 1 tablet by mouth Daily. HOLD FOR ONE WEEK PRIOR TO SURGERY)    pravastatin (PRAVACHOL) 20 MG tablet Take 1 tablet by mouth Every Night.    predniSONE (DELTASONE) 10 MG tablet Take 2 tablets by mouth Daily. Take once in the morning    sulfamethoxazole-trimethoprim (BACTRIM DS,SEPTRA DS) 800-160 MG per tablet Take 1 tablet by mouth 3 (Three) Times a Week. Take on Monday, Wednesday and Friday.    triamcinolone (KENALOG) 0.025 % cream    No Known Allergies              VITAL SIGNS:  Vitals:    06/16/23 1540 06/16/23 1930 06/17/23 0444 06/17/23 0811   BP: 120/65 123/63 113/61 118/61   BP Location: Left arm Left arm Left arm Left arm   Patient Position: Lying Lying Lying Lying   Pulse: 93 92 82 79   Resp: 17 18 16 18   Temp:  97.4 °F (36.3 °C) 97.3 °F (36.3 °C) 96.9 °F (36.1 °C) 97.1 °F (36.2 °C)   TempSrc: Oral Oral Oral Oral   SpO2: 95% 95% 95% 94%   Weight:   99.6 kg (219 lb 9.3 oz)    Height:              PHYSICAL EXAMINATION:     He was pale. Oral mucosas were normal. No jugular vein distention. Lungs were clear. Heart was regular. Abdomen and spleen still palpable, 5 cm below the left costal margin, softer, nontender. No liver enlargement, no abdominal masses, no tenderness. No inguinal or axillary adenopathies. No edema in lower extremities. He was talkative, oriented in time and place, carrying normal conversation, less anxious.                                    LABORATORY DATA:  Results from last 7 days   Lab Units 06/17/23  0548 06/16/23  0607 06/15/23  0627   WBC 10*3/mm3 9.89 18.29* 18.63*   NEUTROS ABS 10*3/mm3 8.26* 9.51* 16.54*   LYMPHS ABS 10*3/mm3 1.42 6.95* 1.71   HEMOGLOBIN g/dL 7.1* 8.1* 8.6*   HEMATOCRIT % 21.7* 24.4* 25.1*   PLATELETS 10*3/mm3 43* 44* 37*     Results from last 7 days   Lab Units 06/17/23  0548 06/16/23  0607 06/15/23  0627 06/14/23  1207   SODIUM mmol/L 142 141 140 143   POTASSIUM mmol/L 4.0 4.3 4.7 4.4   CHLORIDE mmol/L 110* 109* 109* 107   CO2 mmol/L 24.4 24.6 26.7 26.0   BUN mg/dL 42* 45* 42* 35*   CREATININE mg/dL 0.62* 0.86 0.91 0.89   CALCIUM mg/dL 8.6 9.6 10.2 10.1   ALBUMIN g/dL 2.4* 2.6* 2.2*  --    BILIRUBIN mg/dL 1.3* 1.2 1.0  --    ALK PHOS U/L 40 41 44  --    ALT (SGPT) U/L 6 8 8  --    AST (SGOT) U/L 9 11 11  --    GLUCOSE mg/dL 115* 103* 129* 143*   MAGNESIUM mg/dL  --   --   --  2.5*            ASSESSMENT:   1.  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 He reached a white count of almost 80,000 with a low hemoglobin, low platelets, LDH of 2000 and he has been treated with Rituxan-CHOP successfully. His white count has come down very dramatically to 9000 today. The hemoglobin is 7.1. The platelet count is 43,000 and sustained. There is no evidence of tumor  lysis syndrome. Creatinine remains at 62. The urine is elevated because of effect of prednisone administration. Nutrition is poor with a low albumin. Bilirubin minimally elevated. I think it is effect of medicine.     The patient I think has accomplished a major benefit in regard to the treatment of his disease. We do not know for how long the benefit is going to last. Obviously the patient will require continuation of Neupogen for recovery. So far he has not had any pain in the spleen while receiving Neupogen. Have to be careful in monitoring for splenic rupture.     His hemoglobin is low. He will require blood products today and his platelet count is steady. We will hold off on any Lovenox at this point given his low platelet count.     I think the patient is eating well. He has had stability of his BUN, creatinine and potassium, phosphorus, LDH and uric acid that substantially improved. In fact his LDH is half of what it was before initiation of the treatment. Given these facts I do believe that the patient will be able to discontinue IV fluids today and we will transfuse him 1 unit of red cells today. Flomax will be continued for bladder outlet obstruction, MiraLAX will be continued for constipation, and Xanax will be continued for anxiety.    Maybe if everything is okay the patient will be ready to go home tomorrow. The question will be leftover is how to bring him back for Neupogen administration to the office next week. That will require significant discussion tomorrow with the family. The patient has a lot of support at home and he does not require anybody to help him out. He has a significant other, his daughter and some friends that are willing to help him in the best of possibilities.     The patient is kind of going crazy and more anxious, building anxiety while in the hospital stay and I feel the need for release and bring him back for reassessment next week. This will be further discussed tomorrow.      In regard to his other comorbidities including his prostate cancer this will be left alone. This is not an issue at this point.

## 2023-06-17 NOTE — PLAN OF CARE
Goal Outcome Evaluation:  Plan of Care Reviewed With: patient        Progress: improving  Outcome Evaluation: A&O x4. Family at bedside. Vitals stable on room air. Feeling fatigued but no pain or fevers. 1 unit PRBC infusing now, tolerating well. Requested to get cleaned up this evening before bed.

## 2023-06-18 ENCOUNTER — READMISSION MANAGEMENT (OUTPATIENT)
Dept: CALL CENTER | Facility: HOSPITAL | Age: 78
End: 2023-06-18
Payer: MEDICARE

## 2023-06-18 VITALS
RESPIRATION RATE: 17 BRPM | SYSTOLIC BLOOD PRESSURE: 122 MMHG | HEIGHT: 77 IN | WEIGHT: 215.83 LBS | HEART RATE: 88 BPM | TEMPERATURE: 97.8 F | OXYGEN SATURATION: 94 % | BODY MASS INDEX: 25.48 KG/M2 | DIASTOLIC BLOOD PRESSURE: 64 MMHG

## 2023-06-18 LAB
ALBUMIN SERPL-MCNC: 2.4 G/DL (ref 3.5–5.2)
ALBUMIN/GLOB SERPL: 2 G/DL
ALP SERPL-CCNC: 38 U/L (ref 39–117)
ALT SERPL W P-5'-P-CCNC: 10 U/L (ref 1–41)
ANION GAP SERPL CALCULATED.3IONS-SCNC: 4.7 MMOL/L (ref 5–15)
AST SERPL-CCNC: 10 U/L (ref 1–40)
BH BB BLOOD EXPIRATION DATE: NORMAL
BH BB BLOOD TYPE BARCODE: 600
BH BB DISPENSE STATUS: NORMAL
BH BB PRODUCT CODE: NORMAL
BH BB UNIT NUMBER: NORMAL
BILIRUB SERPL-MCNC: 1.9 MG/DL (ref 0–1.2)
BUN SERPL-MCNC: 36 MG/DL (ref 8–23)
BUN/CREAT SERPL: 69.2 (ref 7–25)
CALCIUM SPEC-SCNC: 8.6 MG/DL (ref 8.6–10.5)
CHLORIDE SERPL-SCNC: 110 MMOL/L (ref 98–107)
CO2 SERPL-SCNC: 27.3 MMOL/L (ref 22–29)
CREAT SERPL-MCNC: 0.52 MG/DL (ref 0.76–1.27)
CROSSMATCH INTERPRETATION: NORMAL
DEPRECATED RDW RBC AUTO: 54.7 FL (ref 37–54)
EGFRCR SERPLBLD CKD-EPI 2021: 102.5 ML/MIN/1.73
ELLIPTOCYTES BLD QL SMEAR: ABNORMAL
ERYTHROCYTE [DISTWIDTH] IN BLOOD BY AUTOMATED COUNT: 18.2 % (ref 12.3–15.4)
GLOBULIN UR ELPH-MCNC: 1.2 GM/DL
GLUCOSE SERPL-MCNC: 107 MG/DL (ref 65–99)
HCT VFR BLD AUTO: 21.3 % (ref 37.5–51)
HGB BLD-MCNC: 6.9 G/DL (ref 13–17.7)
LDH SERPL-CCNC: 711 U/L (ref 135–225)
LYMPHOCYTES # BLD MANUAL: 1.13 10*3/MM3 (ref 0.7–3.1)
LYMPHOCYTES NFR BLD MANUAL: 3.1 % (ref 5–12)
MCH RBC QN AUTO: 27.3 PG (ref 26.6–33)
MCHC RBC AUTO-ENTMCNC: 32.4 G/DL (ref 31.5–35.7)
MCV RBC AUTO: 84.2 FL (ref 79–97)
MONOCYTES # BLD: 0.17 10*3/MM3 (ref 0.1–0.9)
NEUTROPHILS # BLD AUTO: 4.11 10*3/MM3 (ref 1.7–7)
NEUTROPHILS NFR BLD MANUAL: 76 % (ref 42.7–76)
OVALOCYTES BLD QL SMEAR: ABNORMAL
PHOSPHATE SERPL-MCNC: 3 MG/DL (ref 2.5–4.5)
PLAT MORPH BLD: NORMAL
PLATELET # BLD AUTO: 35 10*3/MM3 (ref 140–450)
PMV BLD AUTO: 10.3 FL (ref 6–12)
POIKILOCYTOSIS BLD QL SMEAR: ABNORMAL
POTASSIUM SERPL-SCNC: 4.1 MMOL/L (ref 3.5–5.2)
PROT SERPL-MCNC: 3.6 G/DL (ref 6–8.5)
RBC # BLD AUTO: 2.53 10*6/MM3 (ref 4.14–5.8)
SODIUM SERPL-SCNC: 142 MMOL/L (ref 136–145)
UNIT  ABO: NORMAL
UNIT  RH: NORMAL
URATE SERPL-MCNC: 5.1 MG/DL (ref 3.4–7)
VARIANT LYMPHS NFR BLD MANUAL: 20.8 % (ref 19.6–45.3)
WBC MORPH BLD: NORMAL
WBC NRBC COR # BLD: 5.41 10*3/MM3 (ref 3.4–10.8)

## 2023-06-18 PROCEDURE — 80053 COMPREHEN METABOLIC PANEL: CPT | Performed by: INTERNAL MEDICINE

## 2023-06-18 PROCEDURE — 84100 ASSAY OF PHOSPHORUS: CPT | Performed by: INTERNAL MEDICINE

## 2023-06-18 PROCEDURE — P9016 RBC LEUKOCYTES REDUCED: HCPCS

## 2023-06-18 PROCEDURE — 25010000002 FILGRASTIM 480 MCG/0.8ML SOLUTION PREFILLED SYRINGE: Performed by: INTERNAL MEDICINE

## 2023-06-18 PROCEDURE — 84550 ASSAY OF BLOOD/URIC ACID: CPT | Performed by: INTERNAL MEDICINE

## 2023-06-18 PROCEDURE — 86900 BLOOD TYPING SEROLOGIC ABO: CPT

## 2023-06-18 PROCEDURE — 83615 LACTATE (LD) (LDH) ENZYME: CPT | Performed by: INTERNAL MEDICINE

## 2023-06-18 PROCEDURE — 85025 COMPLETE CBC W/AUTO DIFF WBC: CPT | Performed by: INTERNAL MEDICINE

## 2023-06-18 PROCEDURE — 36430 TRANSFUSION BLD/BLD COMPNT: CPT

## 2023-06-18 PROCEDURE — 85007 BL SMEAR W/DIFF WBC COUNT: CPT | Performed by: INTERNAL MEDICINE

## 2023-06-18 RX ORDER — ACETAMINOPHEN 160 MG/5ML
650 SOLUTION ORAL ONCE
Status: COMPLETED | OUTPATIENT
Start: 2023-06-18 | End: 2023-06-18

## 2023-06-18 RX ORDER — CHOLECALCIFEROL (VITAMIN D3) 125 MCG
10 CAPSULE ORAL NIGHTLY
Qty: 60 TABLET | Refills: 0 | Status: SHIPPED | OUTPATIENT
Start: 2023-06-18

## 2023-06-18 RX ORDER — ACETAMINOPHEN 650 MG/1
650 SUPPOSITORY RECTAL ONCE
Status: COMPLETED | OUTPATIENT
Start: 2023-06-18 | End: 2023-06-18

## 2023-06-18 RX ORDER — TAMSULOSIN HYDROCHLORIDE 0.4 MG/1
0.4 CAPSULE ORAL DAILY
Qty: 30 CAPSULE | Refills: 2 | Status: SHIPPED | OUTPATIENT
Start: 2023-06-18

## 2023-06-18 RX ORDER — POLYETHYLENE GLYCOL 3350 17 G/17G
17 POWDER, FOR SOLUTION ORAL DAILY
Qty: 238 G | Refills: 3 | Status: SHIPPED | OUTPATIENT
Start: 2023-06-19

## 2023-06-18 RX ORDER — ONDANSETRON 4 MG/1
4 TABLET, FILM COATED ORAL EVERY 6 HOURS PRN
Qty: 30 TABLET | Refills: 0 | Status: SHIPPED | OUTPATIENT
Start: 2023-06-18

## 2023-06-18 RX ORDER — ACETAMINOPHEN 325 MG/1
650 TABLET ORAL ONCE
Status: COMPLETED | OUTPATIENT
Start: 2023-06-18 | End: 2023-06-18

## 2023-06-18 RX ORDER — CALCIUM CARBONATE 500 MG/1
2 TABLET, CHEWABLE ORAL 2 TIMES DAILY PRN
Qty: 1 TABLET | Refills: 0 | COMMUNITY
Start: 2023-06-18

## 2023-06-18 RX ORDER — ALLOPURINOL 300 MG/1
300 TABLET ORAL DAILY
Qty: 10 TABLET | Refills: 0 | Status: SHIPPED | OUTPATIENT
Start: 2023-06-19 | End: 2023-06-21 | Stop reason: SDUPTHER

## 2023-06-18 RX ADMIN — ACETAMINOPHEN 650 MG: 325 TABLET ORAL at 11:52

## 2023-06-18 RX ADMIN — Medication 10 ML: at 09:03

## 2023-06-18 RX ADMIN — FILGRASTIM 480 MCG: 480 INJECTION, SOLUTION INTRAVENOUS; SUBCUTANEOUS at 17:09

## 2023-06-18 RX ADMIN — ALPRAZOLAM 0.25 MG: 0.25 TABLET ORAL at 09:03

## 2023-06-18 RX ADMIN — ALLOPURINOL 300 MG: 300 TABLET ORAL at 09:03

## 2023-06-18 NOTE — NURSING NOTE
2units PRBC given to pt. Tolerated well. Very anxious to be discharged.    RN reviewed AVS with patient and his daughter. All questions answered and avs paper placed in chart.   PICC line clean/dry/intact at time of discharge.     Per Dr Schroeder, keep PICC line in place for future blood draws and blood products. There were no future appointments set up but per Dr Schroeder patient was instructed to watch for a phone call from [facility] office to set up time for tomorrow's injection.

## 2023-06-18 NOTE — DISCHARGE SUMMARY
NAME: Jeff Bass ADMIT: 2023   : 1945  PCP: Jovani Salomon MD    MRN: [id number] LOS: 6 days   AGE/SEX: 77 y.o. male  ROOM: 931     Date of Admission:  2023  Date of Discharge:  2023    PCP: Jovani Salomon MD    CHIEF COMPLAINTChronic lymphoid leukemia treated in the past with bendamustine/Rituxan.PROGRESSIVE DISEASE ON  CALQUENCE INITIATED, TRANSFORMED INTO JON'S LYMPHOMA  CHOP R DELIVERED.   This patient was seen in the office Monday this week after we had performed a bone marrow test on him the week before and the pathology report was not absolutely consistent with the clinical history. To my eyes and reviewing the case with the pathologist at [facility] documented that the patient had in the bone marrow just 2 kinds of cells, erythroid precursors and abnormal large lymphocytes. 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 The patient was admitted to the hospital to receive IV chemotherapy. In anticipation of that a PICC line was placed and tumor lysis medications were utilized to prevent this from happening. Please review the evolution of this patient below.            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       SECONDARY DIAGNOSES  Past Medical History:   Diagnosis Date    Arthritis     Benign prostatic hyperplasia     FOLLOWED BY DR. VIVIEN PATEL    Biliary dyskinesia     Bunion of right foot     GREAT TOE    CLL (chronic lymphocytic leukemia)     FOLLOWED BY DR WALKER    Colon polyps     FOLLOWED BY DR. NEHA HAM    Constipation     Degeneration of lumbar intervertebral disc     Diverticulosis      Erectile dysfunction     Fracture of ankle 1975    GERD (gastroesophageal reflux disease)     Hallux valgus of left foot     Hyperlipidemia     MIXED    Hypertension     Inguinal hernia     Kidney stone 02/21/2009    SEEN AT [facility]    Knee swelling 2018    Localized, primary osteoarthritis     Lumbar radiculopathy     Lumbar stenosis     Lung mass     LEFT SIDE - SCAR TISSUE PER PATIENT     Osteoarthritis of right knee     Polyneuropathy     Prostate CA 03/2016    JACQUELYN 6, S/P XRT, FOLLOWED BY DR. VIVIEN PATEL, RADIATION SEEDS    PVC (premature ventricular contraction)     PT STATES WORKED UP YEARS AGO BY [facility] FOR POSSIBLE MURMUR - NO FOLLOW UP REQUIRED     RBBB     Sensory hearing loss, bilateral     Skin cancer     SQUAMOUS CELL CARCINOMA OF FACE    Substernal goiter     Thyromegaly 12/2016    ADMITTED TO [facility]    Vitamin D deficiency        CONSULTS NONE  Consults       No orders found from 5/14/2023 to 6/13/2023.            PROCEDURES PERFORMEDThe patient after the hospital admission on the 1st of the week underwent a PICC line placement in right upper extremity and an echocardiogram. The echocardiogram disclosed a normal left ventricular ejection fraction. The PICC line was placed with no difficulties. Subsequently he underwent chemotherapy education and consent for CHOP-Rituxan in the background of Jon's transformation of chronic lymphoid leukemia. The patient underwent allopurinol prophylaxis and IV fluids. His LDH was almost 2000 at the time of the initiation of treatment and his white count was in 55,000 at that point. The spleen was almost 20 cm below the left costal margin. The patient underwent his plan of chemotherapy with Rituxan on day 1 and CHOP part of the treatment on day 2. He had no nausea or vomiting and no evidence of tumor lysis. In fact his LDH has come down to the 700 category and his white count has come from 55,000 to the 5000 category. On the other hand the patient developed  "significant anemia. He will require the transfusion of a total of 3 units during the hospitalization.     The patient has requested badly to go home today. Please review below.          HOSPITAL COURSEThe patient underwent chemotherapy treatment after preparation for this. This included again the echocardiogram posted above and the PICC line placement in the right upper extremity. He had no evidence of tumor lysis syndrome. His LDH elevated and subsequently came down. The potassium, the creatinine, and the phosphorus remain into the normal limits. The uric acid was controlled with allopurinol and never shankar above 8. The patient actually handled the treatment better than I expected. He required transfusion of total of 3 units including 2 units on 06/18/2023. The patient has begged incessantly to let him go home. I arranged for the daughter to take care of him at home and we will find a secondary institution for example [facility] to help him to stay at home. The patient on day 3 of chemotherapy initiated Neupogen 480 mcg subcutaneous daily and he will require the continuation of this for a total of 10 days. He was scheduled to come to the office for Neupogen administration on daily basis the rest of next week. He will require laboratory parameters on Tuesday and on Thursday of the following week. We will require as well measurement of the CBC, CMP twice a week to continue monitoring his LDH dropping. His spleen has dramatically decreased in size and also has decreased in consistency. It was hard as a rock and is now softer. He has not developed any pain on the spleen as a consequence of Neupogen administration given the potential for splenic rupture that he has.            PHYSICAL EXAM  Objective:  Vital signs: (most recent): Blood pressure 117/65, pulse 87, temperature 97.8 °F (36.6 °C), temperature source Oral, resp. rate 18, height 195.6 cm (77\"), weight 97.9 kg (215 lb 13.3 oz), SpO2 95 %.          Eyes and " oral mucosas were normal. He has no cervical adenopathy. Lungs were clear. Heart was regular with no gallops, murmurs or rubs. Spleen 8 cm below the left costal margin, soft with no pain. No liver enlargement. No abdominal masses. No rebound. No inguinal or axillary adenopathies. Minimal edema in his ankles. He was awake, alert and oriented in time and place, begging for me to let him go home. I was able to transfer from the bed to the chair per help by the nurse.      CONDITION ON DISCHARGE  Stable.VERY WEAK      DISCHARGE DISPOSITION     He will be home with the supervision of his daughter and granddaughter. I have sent to his daughter the list of home caregiving institutions in [place] including [facility], [facility], [facility], [facility], [facility], [facility], [facility], [facility] and [facility].     The patient has good insurance to be able to afford in the future 24 hours home care. He will require to be brought to the office on daily basis for Neupogen administration all next week and he will require CBC, CMP, LDH, uric acid on Tuesday and Thursday next week. We will keep the PICC line. He will require very likely blood products in the next week and the next 2 weeks as well.        DISCHARGE MEDICATIONS     Discharge Medications        New Medications        Instructions Start Date   ondansetron 8 MG tablet  Commonly known as: ZOFRAN   8 mg, Oral, 3 Times Daily PRN             ASK your doctor about these medications        Instructions Start Date   acalabrutinib 100 MG capsule capsule  Commonly known as: Calquence   200 mg, Oral, 2 Times Daily      acyclovir 400 MG tablet  Commonly known as: Zovirax   400 mg, Oral, 2 Times Daily      alfuzosin 10 MG 24 hr tablet  Commonly known as: UROXATRAL   20 mg, Oral, Nightly      ammonium lactate 12 % lotion  Commonly known as: LAC-HYDRIN   Every 12 Hours Scheduled      Aspirin Low Dose 81 MG EC tablet  Generic drug:  aspirin   Take 1 tablet by mouth twice daily x 14 days; then take 1 tablet by mouth daily x 28 days      levoFLOXacin 500 MG tablet  Commonly known as: LEVAQUIN   500 mg, Oral, Daily      losartan-hydrochlorothiazide 100-25 MG per tablet  Commonly known as: HYZAAR   1 tablet, Oral, Daily      Melatonin 10 MG tablet   10 mg, Oral, Nightly      NON FORMULARY   Balance of Nature and Super Beets daily      pantoprazole 40 MG EC tablet  Commonly known as: PROTONIX   40 mg, Oral, Daily      polyethylene glycol 17 GM/SCOOP powder  Commonly known as: MIRALAX   Miralax 17 gram/dose oral powder   1 scoop in the morning then as needed      potassium chloride 20 MEQ CR tablet  Commonly known as: K-DUR,KLOR-CON   20 mEq, Oral, Daily      pravastatin 20 MG tablet  Commonly known as: PRAVACHOL   20 mg, Oral, Nightly      predniSONE 10 MG tablet  Commonly known as: DELTASONE  Ask about: Which instructions should I use?   20 mg, Oral, Daily, Take once in the morning      predniSONE 50 MG tablet  Commonly known as: DELTASONE  Ask about: Which instructions should I use?   100 mg, Oral, Daily, Take with food.  Bring the first dose to chemotherapy appointment.      sulfamethoxazole-trimethoprim 800-160 MG per tablet  Commonly known as: BACTRIM DS,SEPTRA DS   1 tablet, Oral, 3 Times Weekly, Take on Monday, Wednesday and Friday.      triamcinolone 0.025 % cream  Commonly known as: KENALOG   No dose, route, or frequency recorded.              No future appointments.    TEST  RESULTS PENDING AT DISCHARGE NONE     TIME 2 HOURS    Gerry Schroeder MD    CONSULTING IN BLOOD DISORDERS AND CANCER.  [facility].  06/18/23  15:32 EDT

## 2023-06-19 ENCOUNTER — INFUSION (OUTPATIENT)
Dept: ONCOLOGY | Facility: HOSPITAL | Age: 78
End: 2023-06-19
Payer: MEDICARE

## 2023-06-19 VITALS
HEART RATE: 115 BPM | DIASTOLIC BLOOD PRESSURE: 70 MMHG | RESPIRATION RATE: 16 BRPM | OXYGEN SATURATION: 96 % | SYSTOLIC BLOOD PRESSURE: 112 MMHG | TEMPERATURE: 98 F

## 2023-06-19 DIAGNOSIS — C91.10 CHRONIC LYMPHOCYTIC LEUKEMIA: Primary | ICD-10-CM

## 2023-06-19 LAB
BASOPHILS # BLD AUTO: 0.02 10*3/MM3 (ref 0–0.2)
BASOPHILS NFR BLD AUTO: 0.6 % (ref 0–1.5)
BH BB BLOOD EXPIRATION DATE: NORMAL
BH BB BLOOD EXPIRATION DATE: NORMAL
BH BB BLOOD TYPE BARCODE: 600
BH BB BLOOD TYPE BARCODE: 600
BH BB DISPENSE STATUS: NORMAL
BH BB DISPENSE STATUS: NORMAL
BH BB PRODUCT CODE: NORMAL
BH BB PRODUCT CODE: NORMAL
BH BB UNIT NUMBER: NORMAL
BH BB UNIT NUMBER: NORMAL
CROSSMATCH INTERPRETATION: NORMAL
CROSSMATCH INTERPRETATION: NORMAL
DEPRECATED RDW RBC AUTO: 61.5 FL (ref 37–54)
EOSINOPHIL # BLD AUTO: 0.04 10*3/MM3 (ref 0–0.4)
EOSINOPHIL NFR BLD AUTO: 1.1 % (ref 0.3–6.2)
ERYTHROCYTE [DISTWIDTH] IN BLOOD BY AUTOMATED COUNT: 19.6 % (ref 12.3–15.4)
HCT VFR BLD AUTO: 29 % (ref 37.5–51)
HGB BLD-MCNC: 9.2 G/DL (ref 13–17.7)
IMM GRANULOCYTES # BLD AUTO: 0.42 10*3/MM3 (ref 0–0.05)
IMM GRANULOCYTES NFR BLD AUTO: 12 % (ref 0–0.5)
LYMPHOCYTES # BLD AUTO: 0.49 10*3/MM3 (ref 0.7–3.1)
LYMPHOCYTES NFR BLD AUTO: 14 % (ref 19.6–45.3)
MCH RBC QN AUTO: 27.7 PG (ref 26.6–33)
MCHC RBC AUTO-ENTMCNC: 31.7 G/DL (ref 31.5–35.7)
MCV RBC AUTO: 87.3 FL (ref 79–97)
MONOCYTES # BLD AUTO: 1.19 10*3/MM3 (ref 0.1–0.9)
MONOCYTES NFR BLD AUTO: 34 % (ref 5–12)
NEUTROPHILS NFR BLD AUTO: 1.34 10*3/MM3 (ref 1.7–7)
NEUTROPHILS NFR BLD AUTO: 38.3 % (ref 42.7–76)
NRBC BLD AUTO-RTO: 0 /100 WBC (ref 0–0.2)
PLATELET # BLD AUTO: 41 10*3/MM3 (ref 140–450)
PMV BLD AUTO: 10.5 FL (ref 6–12)
RBC # BLD AUTO: 3.32 10*6/MM3 (ref 4.14–5.8)
UNIT  ABO: NORMAL
UNIT  ABO: NORMAL
UNIT  RH: NORMAL
UNIT  RH: NORMAL
WBC NRBC COR # BLD: 3.5 10*3/MM3 (ref 3.4–10.8)

## 2023-06-19 PROCEDURE — 25010000002 FILGRASTIM 480 MCG/0.8ML SOLUTION PREFILLED SYRINGE: Performed by: INTERNAL MEDICINE

## 2023-06-19 PROCEDURE — 85025 COMPLETE CBC W/AUTO DIFF WBC: CPT

## 2023-06-19 PROCEDURE — 96372 THER/PROPH/DIAG INJ SC/IM: CPT

## 2023-06-19 RX ADMIN — FILGRASTIM 480 MCG: 480 INJECTION, SOLUTION INTRAVENOUS; SUBCUTANEOUS at 15:30

## 2023-06-19 NOTE — NURSING NOTE
Pt added for neupogen 480 mcg.  Labs collected via picc.  Dressing is due to be changed on 6/21 however pt is to see Dr Schroeder on 6/20 and requesting to have dressing changed at this visit.  Pt to be added to schedule for picc dressing change and neupogen 480 mcg following MD visit 6/20.

## 2023-06-19 NOTE — CASE MANAGEMENT/SOCIAL WORK
Case Management Discharge Note      Final Note: Home with family to transport. Orders reviewed, no further needs.    Provided Post Acute Provider List?: N/A  Provided Post Acute Provider Quality & Resource List?: N/A    Selected Continued Care - Discharged on 6/18/2023 Admission date: 6/12/2023 - Discharge disposition: Home or Self Care      Destination    No services have been selected for the patient.                Durable Medical Equipment    No services have been selected for the patient.                Dialysis/Infusion    No services have been selected for the patient.                Home Medical Care    No services have been selected for the patient.                Therapy    No services have been selected for the patient.                Community Resources    No services have been selected for the patient.                Community & DME    No services have been selected for the patient.                         Final Discharge Disposition Code: 01 - home or self-care

## 2023-06-19 NOTE — OUTREACH NOTE
Prep Survey      Flowsheet Row Responses   [facility] facility patient discharged from? [facility]   Is LACE score < 7 ? No   Eligibility Readm Mgmt   Discharge diagnosis oncology treatment for lymphoma of spleen   Does the patient have one of the following disease processes/diagnoses(primary or secondary)? Other   Does the patient have Home health ordered? No   Is there a DME ordered? No   Prep survey completed? Yes            Bridgette DIOR - Registered Nurse

## 2023-06-27 PROBLEM — D61.810 PANCYTOPENIA DUE TO CHEMOTHERAPY: Status: ACTIVE | Noted: 2023-06-27

## 2023-06-27 PROBLEM — R53.1 WEAKNESS: Status: ACTIVE | Noted: 2023-06-27

## 2023-06-27 PROBLEM — M62.3 IMMOBILITY SYNDROME: Status: ACTIVE | Noted: 2023-06-27

## 2023-06-28 LAB — REF LAB TEST METHOD: NORMAL

## 2023-07-06 LAB — REF LAB TEST METHOD: NORMAL

## 2023-07-25 ENCOUNTER — HOSPITAL ENCOUNTER (OUTPATIENT)
Dept: CT IMAGING | Facility: HOSPITAL | Age: 78
Discharge: HOME | End: 2023-07-25
Admitting: INTERNAL MEDICINE
Payer: MEDICARE

## 2023-07-25 DIAGNOSIS — C91.10 RICHTER'S SYNDROME: ICD-10-CM

## 2023-07-25 DIAGNOSIS — D61.810 PANCYTOPENIA DUE TO CHEMOTHERAPY: ICD-10-CM

## 2023-07-25 DIAGNOSIS — E53.8 B12 DEFICIENCY: ICD-10-CM

## 2023-07-25 DIAGNOSIS — M62.3 IMMOBILITY SYNDROME: ICD-10-CM

## 2023-07-25 DIAGNOSIS — K21.00 GASTROESOPHAGEAL REFLUX DISEASE WITH ESOPHAGITIS, UNSPECIFIED WHETHER HEMORRHAGE: ICD-10-CM

## 2023-07-25 DIAGNOSIS — Z79.899 HIGH RISK MEDICATION USE: ICD-10-CM

## 2023-07-25 PROCEDURE — 74177 CT ABD & PELVIS W/CONTRAST: CPT

## 2023-07-25 PROCEDURE — 71260 CT THORAX DX C+: CPT

## 2023-07-25 PROCEDURE — 25510000001 IOPAMIDOL 61 % SOLUTION: Performed by: INTERNAL MEDICINE

## 2023-07-25 RX ADMIN — IOPAMIDOL 100 ML: 612 INJECTION, SOLUTION INTRAVENOUS at 14:38

## 2023-08-01 ENCOUNTER — TELEPHONE (OUTPATIENT)
Dept: ONCOLOGY | Facility: CLINIC | Age: 78
End: 2023-08-01

## 2023-08-01 ENCOUNTER — LAB (OUTPATIENT)
Dept: LAB | Facility: HOSPITAL | Age: 78
End: 2023-08-01
Payer: MEDICARE

## 2023-08-01 ENCOUNTER — OFFICE VISIT (OUTPATIENT)
Dept: ONCOLOGY | Facility: CLINIC | Age: 78
End: 2023-08-01
Payer: MEDICARE

## 2023-08-01 VITALS
TEMPERATURE: 98.6 F | OXYGEN SATURATION: 97 % | DIASTOLIC BLOOD PRESSURE: 69 MMHG | HEIGHT: 77 IN | SYSTOLIC BLOOD PRESSURE: 134 MMHG | WEIGHT: 206.9 LBS | BODY MASS INDEX: 24.43 KG/M2 | HEART RATE: 99 BPM

## 2023-08-01 DIAGNOSIS — C91.10 RICHTER'S SYNDROME: ICD-10-CM

## 2023-08-01 DIAGNOSIS — E53.8 B12 DEFICIENCY: ICD-10-CM

## 2023-08-01 DIAGNOSIS — C91.10 RICHTER'S SYNDROME: Primary | ICD-10-CM

## 2023-08-01 DIAGNOSIS — D61.810 PANCYTOPENIA DUE TO CHEMOTHERAPY: ICD-10-CM

## 2023-08-01 DIAGNOSIS — Z79.899 HIGH RISK MEDICATION USE: ICD-10-CM

## 2023-08-01 DIAGNOSIS — K21.00 GASTROESOPHAGEAL REFLUX DISEASE WITH ESOPHAGITIS, UNSPECIFIED WHETHER HEMORRHAGE: ICD-10-CM

## 2023-08-01 DIAGNOSIS — M62.3 IMMOBILITY SYNDROME: ICD-10-CM

## 2023-08-01 LAB
ALBUMIN SERPL-MCNC: 3.6 G/DL (ref 3.5–5.2)
ALBUMIN/GLOB SERPL: 2.1 G/DL
ALP SERPL-CCNC: 42 U/L (ref 39–117)
ALT SERPL W P-5'-P-CCNC: 7 U/L (ref 1–41)
ANION GAP SERPL CALCULATED.3IONS-SCNC: 13.5 MMOL/L (ref 5–15)
AST SERPL-CCNC: 15 U/L (ref 1–40)
BASOPHILS # BLD AUTO: 0.05 10*3/MM3 (ref 0–0.2)
BASOPHILS NFR BLD AUTO: 1.4 % (ref 0–1.5)
BILIRUB SERPL-MCNC: 0.5 MG/DL (ref 0–1.2)
BUN SERPL-MCNC: 21 MG/DL (ref 8–23)
BUN/CREAT SERPL: 28.8 (ref 7–25)
CALCIUM SPEC-SCNC: 8.7 MG/DL (ref 8.6–10.5)
CHLORIDE SERPL-SCNC: 104 MMOL/L (ref 98–107)
CO2 SERPL-SCNC: 23.5 MMOL/L (ref 22–29)
CREAT SERPL-MCNC: 0.73 MG/DL (ref 0.7–1.3)
DEPRECATED RDW RBC AUTO: 58.9 FL (ref 37–54)
EGFRCR SERPLBLD CKD-EPI 2021: 92.5 ML/MIN/1.73
EOSINOPHIL # BLD AUTO: 0.02 10*3/MM3 (ref 0–0.4)
EOSINOPHIL NFR BLD AUTO: 0.6 % (ref 0.3–6.2)
ERYTHROCYTE [DISTWIDTH] IN BLOOD BY AUTOMATED COUNT: 17.5 % (ref 12.3–15.4)
GLOBULIN UR ELPH-MCNC: 1.7 GM/DL
GLUCOSE SERPL-MCNC: 91 MG/DL (ref 65–99)
HCT VFR BLD AUTO: 37 % (ref 37.5–51)
HGB BLD-MCNC: 12.4 G/DL (ref 13–17.7)
IMM GRANULOCYTES # BLD AUTO: 0.58 10*3/MM3 (ref 0–0.05)
IMM GRANULOCYTES NFR BLD AUTO: 16 % (ref 0–0.5)
LDH SERPL-CCNC: 179 U/L (ref 135–225)
LYMPHOCYTES # BLD AUTO: 0.49 10*3/MM3 (ref 0.7–3.1)
LYMPHOCYTES NFR BLD AUTO: 13.5 % (ref 19.6–45.3)
MCH RBC QN AUTO: 30.9 PG (ref 26.6–33)
MCHC RBC AUTO-ENTMCNC: 33.5 G/DL (ref 31.5–35.7)
MCV RBC AUTO: 92.3 FL (ref 79–97)
MONOCYTES # BLD AUTO: 0.48 10*3/MM3 (ref 0.1–0.9)
MONOCYTES NFR BLD AUTO: 13.3 % (ref 5–12)
NEUTROPHILS NFR BLD AUTO: 2 10*3/MM3 (ref 1.7–7)
NEUTROPHILS NFR BLD AUTO: 55.2 % (ref 42.7–76)
NRBC BLD AUTO-RTO: 0 /100 WBC (ref 0–0.2)
PLATELET # BLD AUTO: 100 10*3/MM3 (ref 140–450)
PMV BLD AUTO: 12.2 FL (ref 6–12)
POTASSIUM SERPL-SCNC: 4.2 MMOL/L (ref 3.5–5.2)
PROT SERPL-MCNC: 5.3 G/DL (ref 6–8.5)
RBC # BLD AUTO: 4.01 10*6/MM3 (ref 4.14–5.8)
SODIUM SERPL-SCNC: 141 MMOL/L (ref 136–145)
URATE SERPL-MCNC: 3.8 MG/DL (ref 3.4–7)
WBC NRBC COR # BLD: 3.62 10*3/MM3 (ref 3.4–10.8)

## 2023-08-01 PROCEDURE — 83615 LACTATE (LD) (LDH) ENZYME: CPT

## 2023-08-01 PROCEDURE — 36415 COLL VENOUS BLD VENIPUNCTURE: CPT

## 2023-08-01 PROCEDURE — 80053 COMPREHEN METABOLIC PANEL: CPT

## 2023-08-01 PROCEDURE — 85025 COMPLETE CBC W/AUTO DIFF WBC: CPT

## 2023-08-01 PROCEDURE — 84550 ASSAY OF BLOOD/URIC ACID: CPT

## 2023-08-02 ENCOUNTER — TELEPHONE (OUTPATIENT)
Dept: ONCOLOGY | Facility: CLINIC | Age: 78
End: 2023-08-02
Payer: MEDICARE

## 2023-08-08 ENCOUNTER — INFUSION (OUTPATIENT)
Dept: ONCOLOGY | Facility: HOSPITAL | Age: 78
End: 2023-08-08
Payer: MEDICARE

## 2023-08-08 ENCOUNTER — OFFICE VISIT (OUTPATIENT)
Dept: ONCOLOGY | Facility: CLINIC | Age: 78
End: 2023-08-08
Payer: MEDICARE

## 2023-08-08 VITALS
RESPIRATION RATE: 16 BRPM | DIASTOLIC BLOOD PRESSURE: 87 MMHG | SYSTOLIC BLOOD PRESSURE: 143 MMHG | OXYGEN SATURATION: 96 % | HEART RATE: 89 BPM | WEIGHT: 207.6 LBS | BODY MASS INDEX: 24.61 KG/M2 | TEMPERATURE: 97.8 F

## 2023-08-08 DIAGNOSIS — C91.10 CHRONIC LYMPHOCYTIC LEUKEMIA: ICD-10-CM

## 2023-08-08 DIAGNOSIS — Z79.899 HIGH RISK MEDICATION USE: ICD-10-CM

## 2023-08-08 DIAGNOSIS — D61.810 PANCYTOPENIA DUE TO CHEMOTHERAPY: ICD-10-CM

## 2023-08-08 DIAGNOSIS — C91.10 RICHTER'S SYNDROME: Primary | ICD-10-CM

## 2023-08-08 LAB
ALBUMIN SERPL-MCNC: 3.6 G/DL (ref 3.5–5.2)
ALBUMIN/GLOB SERPL: 2.6 G/DL
ALP SERPL-CCNC: 36 U/L (ref 39–117)
ALT SERPL W P-5'-P-CCNC: 10 U/L (ref 1–41)
ANION GAP SERPL CALCULATED.3IONS-SCNC: 14.7 MMOL/L (ref 5–15)
AST SERPL-CCNC: 22 U/L (ref 1–40)
BASOPHILS # BLD AUTO: 0.03 10*3/MM3 (ref 0–0.2)
BASOPHILS NFR BLD AUTO: 0.9 % (ref 0–1.5)
BILIRUB SERPL-MCNC: 0.6 MG/DL (ref 0–1.2)
BUN SERPL-MCNC: 18 MG/DL (ref 8–23)
BUN/CREAT SERPL: 28.6 (ref 7–25)
CALCIUM SPEC-SCNC: 8.7 MG/DL (ref 8.6–10.5)
CHLORIDE SERPL-SCNC: 103 MMOL/L (ref 98–107)
CO2 SERPL-SCNC: 22.3 MMOL/L (ref 22–29)
CREAT SERPL-MCNC: 0.63 MG/DL (ref 0.7–1.3)
DEPRECATED RDW RBC AUTO: 54.9 FL (ref 37–54)
EGFRCR SERPLBLD CKD-EPI 2021: 96.8 ML/MIN/1.73
EOSINOPHIL # BLD AUTO: 0.04 10*3/MM3 (ref 0–0.4)
EOSINOPHIL NFR BLD AUTO: 1.1 % (ref 0.3–6.2)
ERYTHROCYTE [DISTWIDTH] IN BLOOD BY AUTOMATED COUNT: 16.1 % (ref 12.3–15.4)
GLOBULIN UR ELPH-MCNC: 1.4 GM/DL
GLUCOSE SERPL-MCNC: 90 MG/DL (ref 65–99)
HCT VFR BLD AUTO: 38.3 % (ref 37.5–51)
HGB BLD-MCNC: 12.7 G/DL (ref 13–17.7)
IMM GRANULOCYTES # BLD AUTO: 0.28 10*3/MM3 (ref 0–0.05)
IMM GRANULOCYTES NFR BLD AUTO: 8 % (ref 0–0.5)
LYMPHOCYTES # BLD AUTO: 0.39 10*3/MM3 (ref 0.7–3.1)
LYMPHOCYTES NFR BLD AUTO: 11.2 % (ref 19.6–45.3)
MCH RBC QN AUTO: 30.6 PG (ref 26.6–33)
MCHC RBC AUTO-ENTMCNC: 33.2 G/DL (ref 31.5–35.7)
MCV RBC AUTO: 92.3 FL (ref 79–97)
MONOCYTES # BLD AUTO: 0.32 10*3/MM3 (ref 0.1–0.9)
MONOCYTES NFR BLD AUTO: 9.2 % (ref 5–12)
NEUTROPHILS NFR BLD AUTO: 2.43 10*3/MM3 (ref 1.7–7)
NEUTROPHILS NFR BLD AUTO: 69.6 % (ref 42.7–76)
NRBC BLD AUTO-RTO: 0 /100 WBC (ref 0–0.2)
PLATELET # BLD AUTO: 84 10*3/MM3 (ref 140–450)
PMV BLD AUTO: 9.1 FL (ref 6–12)
POTASSIUM SERPL-SCNC: 4 MMOL/L (ref 3.5–5.2)
PROT SERPL-MCNC: 5 G/DL (ref 6–8.5)
RBC # BLD AUTO: 4.15 10*6/MM3 (ref 4.14–5.8)
SODIUM SERPL-SCNC: 140 MMOL/L (ref 136–145)
WBC NRBC COR # BLD: 3.49 10*3/MM3 (ref 3.4–10.8)

## 2023-08-08 PROCEDURE — 63710000001 OLANZAPINE 5 MG TABLET: Performed by: INTERNAL MEDICINE

## 2023-08-08 PROCEDURE — 96367 TX/PROPH/DG ADDL SEQ IV INF: CPT

## 2023-08-08 PROCEDURE — 25010000002 HYDROCORTISONE SOD SUC (PF) 100 MG RECONSTITUTED SOLUTION: Performed by: NURSE PRACTITIONER

## 2023-08-08 PROCEDURE — 25010000002 VINCRISTINE PER 1 MG: Performed by: NURSE PRACTITIONER

## 2023-08-08 PROCEDURE — 96417 CHEMO IV INFUS EACH ADDL SEQ: CPT

## 2023-08-08 PROCEDURE — 96411 CHEMO IV PUSH ADDL DRUG: CPT

## 2023-08-08 PROCEDURE — 25010000002 RITUXIMAB 10 MG/ML SOLUTION 10 ML VIAL: Performed by: NURSE PRACTITIONER

## 2023-08-08 PROCEDURE — 25010000002 HYDROCORTISONE SOD SUC (PF) 100 MG RECONSTITUTED SOLUTION: Performed by: INTERNAL MEDICINE

## 2023-08-08 PROCEDURE — 96376 TX/PRO/DX INJ SAME DRUG ADON: CPT

## 2023-08-08 PROCEDURE — 63710000001 ACETAMINOPHEN 325 MG TABLET: Performed by: INTERNAL MEDICINE

## 2023-08-08 PROCEDURE — 25010000002 CYCLOPHOSPHAMIDE 1 GM/5ML SOLUTION 5 ML VIAL: Performed by: NURSE PRACTITIONER

## 2023-08-08 PROCEDURE — 80053 COMPREHEN METABOLIC PANEL: CPT

## 2023-08-08 PROCEDURE — 25010000002 PEGFILGRASTIM 6 MG/0.6ML PREFILLED SYRINGE KIT: Performed by: NURSE PRACTITIONER

## 2023-08-08 PROCEDURE — 96377 APPLICATON ON-BODY INJECTOR: CPT

## 2023-08-08 PROCEDURE — A9270 NON-COVERED ITEM OR SERVICE: HCPCS | Performed by: INTERNAL MEDICINE

## 2023-08-08 PROCEDURE — 25010000002 DOXORUBICIN PER 10 MG: Performed by: NURSE PRACTITIONER

## 2023-08-08 PROCEDURE — 25010000002 RITUXIMAB 10 MG/ML SOLUTION 50 ML VIAL: Performed by: NURSE PRACTITIONER

## 2023-08-08 PROCEDURE — 96413 CHEMO IV INFUSION 1 HR: CPT

## 2023-08-08 PROCEDURE — 96415 CHEMO IV INFUSION ADDL HR: CPT

## 2023-08-08 PROCEDURE — 25010000002 FOSAPREPITANT PER 1 MG: Performed by: INTERNAL MEDICINE

## 2023-08-08 PROCEDURE — 96375 TX/PRO/DX INJ NEW DRUG ADDON: CPT

## 2023-08-08 PROCEDURE — 25010000002 DEXAMETHASONE SODIUM PHOSPHATE 100 MG/10ML SOLUTION: Performed by: INTERNAL MEDICINE

## 2023-08-08 PROCEDURE — 25010000002 DIPHENHYDRAMINE PER 50 MG: Performed by: INTERNAL MEDICINE

## 2023-08-08 PROCEDURE — 25010000002 PALONOSETRON PER 25 MCG: Performed by: INTERNAL MEDICINE

## 2023-08-08 PROCEDURE — 85025 COMPLETE CBC W/AUTO DIFF WBC: CPT

## 2023-08-08 RX ORDER — DOXORUBICIN HYDROCHLORIDE 2 MG/ML
50 INJECTION, SOLUTION INTRAVENOUS ONCE
Status: COMPLETED | OUTPATIENT
Start: 2023-08-08 | End: 2023-08-08

## 2023-08-08 RX ORDER — OLANZAPINE 5 MG/1
5 TABLET ORAL ONCE
Status: COMPLETED | OUTPATIENT
Start: 2023-08-08 | End: 2023-08-08

## 2023-08-08 RX ORDER — PREDNISONE 20 MG/1
60 TABLET ORAL TAKE AS DIRECTED
Qty: 15 TABLET | Refills: 3 | Status: SHIPPED | OUTPATIENT
Start: 2023-08-08 | End: 2023-08-09 | Stop reason: SDUPTHER

## 2023-08-08 RX ORDER — PALONOSETRON 0.05 MG/ML
0.25 INJECTION, SOLUTION INTRAVENOUS ONCE
Status: COMPLETED | OUTPATIENT
Start: 2023-08-08 | End: 2023-08-08

## 2023-08-08 RX ORDER — FAMOTIDINE 10 MG/ML
20 INJECTION, SOLUTION INTRAVENOUS ONCE
Status: COMPLETED | OUTPATIENT
Start: 2023-08-08 | End: 2023-08-08

## 2023-08-08 RX ORDER — DOXORUBICIN HYDROCHLORIDE 2 MG/ML
50 INJECTION, SOLUTION INTRAVENOUS ONCE
Status: CANCELLED | OUTPATIENT
Start: 2023-08-08

## 2023-08-08 RX ORDER — DIPHENHYDRAMINE HYDROCHLORIDE 50 MG/ML
50 INJECTION INTRAMUSCULAR; INTRAVENOUS AS NEEDED
Status: CANCELLED | OUTPATIENT
Start: 2023-08-08

## 2023-08-08 RX ORDER — SODIUM CHLORIDE 9 MG/ML
250 INJECTION, SOLUTION INTRAVENOUS ONCE
Status: COMPLETED | OUTPATIENT
Start: 2023-08-08 | End: 2023-08-08

## 2023-08-08 RX ORDER — FAMOTIDINE 10 MG/ML
20 INJECTION, SOLUTION INTRAVENOUS AS NEEDED
Status: COMPLETED | OUTPATIENT
Start: 2023-08-08 | End: 2023-08-08

## 2023-08-08 RX ORDER — FAMOTIDINE 10 MG/ML
20 INJECTION, SOLUTION INTRAVENOUS AS NEEDED
Status: CANCELLED | OUTPATIENT
Start: 2023-08-08

## 2023-08-08 RX ORDER — ACETAMINOPHEN 325 MG/1
650 TABLET ORAL ONCE
Status: COMPLETED | OUTPATIENT
Start: 2023-08-08 | End: 2023-08-08

## 2023-08-08 RX ADMIN — PALONOSETRON 0.25 MG: 0.05 INJECTION, SOLUTION INTRAVENOUS at 14:59

## 2023-08-08 RX ADMIN — HYDROCORTISONE SODIUM SUCCINATE 100 MG: 100 INJECTION, POWDER, FOR SOLUTION INTRAMUSCULAR; INTRAVENOUS at 14:44

## 2023-08-08 RX ADMIN — FOSAPREPITANT DIMEGLUMINE 100 ML: 150 INJECTION, POWDER, LYOPHILIZED, FOR SOLUTION INTRAVENOUS at 11:06

## 2023-08-08 RX ADMIN — HYDROCORTISONE SODIUM SUCCINATE 200 MG: 100 INJECTION, POWDER, FOR SOLUTION INTRAMUSCULAR; INTRAVENOUS at 11:35

## 2023-08-08 RX ADMIN — FAMOTIDINE 20 MG: 10 INJECTION INTRAVENOUS at 10:47

## 2023-08-08 RX ADMIN — OLANZAPINE 5 MG: 5 TABLET, FILM COATED ORAL at 14:43

## 2023-08-08 RX ADMIN — DEXAMETHASONE SODIUM PHOSPHATE 12 MG: 10 INJECTION, SOLUTION INTRAMUSCULAR; INTRAVENOUS at 14:43

## 2023-08-08 RX ADMIN — SODIUM CHLORIDE 250 ML: 9 INJECTION, SOLUTION INTRAVENOUS at 10:47

## 2023-08-08 RX ADMIN — VINCRISTINE SULFATE 2 MG: 1 INJECTION, SOLUTION INTRAVENOUS at 15:16

## 2023-08-08 RX ADMIN — ACETAMINOPHEN 650 MG: 325 TABLET ORAL at 10:48

## 2023-08-08 RX ADMIN — PEGFILGRASTIM 6 MG: KIT SUBCUTANEOUS at 16:03

## 2023-08-08 RX ADMIN — FAMOTIDINE 20 MG: 10 INJECTION INTRAVENOUS at 12:50

## 2023-08-08 RX ADMIN — HYDROCORTISONE SODIUM SUCCINATE 100 MG: 100 INJECTION, POWDER, FOR SOLUTION INTRAMUSCULAR; INTRAVENOUS at 12:51

## 2023-08-08 RX ADMIN — CYCLOPHOSPHAMIDE 1580 MG: 200 INJECTION, SOLUTION INTRAVENOUS at 15:28

## 2023-08-08 RX ADMIN — RITUXIMAB 790 MG: 10 INJECTION, SOLUTION INTRAVENOUS at 11:38

## 2023-08-08 RX ADMIN — DIPHENHYDRAMINE HYDROCHLORIDE 50 MG: 50 INJECTION INTRAMUSCULAR; INTRAVENOUS at 10:47

## 2023-08-08 RX ADMIN — DOXORUBICIN HYDROCHLORIDE 53 MG: 2 INJECTION, SOLUTION INTRAVENOUS at 15:02

## 2023-08-08 NOTE — NURSING NOTE
1309 rituxan restarted feels back to normal.1359 fels like tongue swelling again and having trouble swallowing. Rituxan stopped. Bp  135/75 HR 95 O2 sat 95 %  no complaints of SOA. 1406 feeling better but not back to normal. Dr Schroeder in room 1440. Repeat solucortef 100 mg. Start adriamycin cytoxan and oncovin and when that is complete touch base with him about resuming the remaining Rituxan.

## 2023-08-08 NOTE — PROGRESS NOTES
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  Progressive disease January 2023 placed on Calquence.  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  He continues to report that he is feeling well and has remained more active.  His appetite has returned and he continues to gain weight.  Weight today is 207 pounds.  He continues to report regular bowel movements and regular urination.  Denies any abdominal pain, night sweats, fevers, or chills.  He denies any cough, shortness of breath, or sputum production.  Continues to have edema in his lower extremities that has improved however his right lower extremity remains slightly more edematous around his ankle than his left but his daughter reports that this is chronic for him.  He has tried compression socks previously.  He denies any pain in his calf.  No other concerns noted at this time.         Past Medical History:   Diagnosis Date    Arthritis     Benign prostatic hyperplasia     FOLLOWED BY DR. VIVIEN PATEL    Biliary dyskinesia     Bunion of right foot     GREAT TOE    CLL (chronic lymphocytic leukemia) 2016    FOLLOWED BY DR WALKER    Colon polyps     FOLLOWED BY DR. NEHA HAM    Constipation     Degeneration of lumbar intervertebral disc     Diverticulosis     Erectile dysfunction     Fracture of ankle 1975    GERD (gastroesophageal reflux disease)     Hallux valgus of left foot     Hyperlipidemia     MIXED    Hypertension     Inguinal hernia     Kidney stone 02/21/2009    SEEN AT [facility] ER    Knee swelling 2018    Localized, primary osteoarthritis     Lumbar radiculopathy     Lumbar stenosis     Lung mass     LEFT SIDE - SCAR TISSUE PER PATIENT     Osteoarthritis of  right knee     Polyneuropathy     Prostate CA 03/2016    JACQUELYN 6, S/P XRT, FOLLOWED BY DR. VIVIEN PATEL, RADIATION SEEDS    PVC (premature ventricular contraction)     PT STATES WORKED UP YEARS AGO BY [facility] FOR POSSIBLE MURMUR - NO FOLLOW UP REQUIRED     RBBB     Sensory hearing loss, bilateral     Skin cancer     SQUAMOUS CELL CARCINOMA OF FACE    Substernal goiter     Thyromegaly 12/2016    ADMITTED TO [facility]    Vitamin D deficiency      Past Surgical History:   Procedure Laterality Date    BRONCHOSCOPY N/A 12/14/2016    Procedure: BRONCHOSCOPY WITH  BAL AND BIOPSY AND ENDOBRONCHIAL ULTRASOUND  AND NAVIGATION WITH BRUSHINGS AND BIOPSY AND BAL;  Surgeon: Pierre Manning MD;  Location: [facility] ENDOSCOPY;  Service:     COLONOSCOPY N/A 03/13/2019    5 MM SESSILE TUBULAR ADENOMA POLYP IN TRANSVERSE, MULTIPLE SMALL AND LARGE DIVERTICULA IN SIGMOID, RESCOPE IN 5 YRS, DR. NEHA HAM AT [facility]    COLONOSCOPY W/ POLYPECTOMY N/A 03/19/2015    3 TUBULAR ADENOMA POLYPS, 12 TUBULOVILLOUS POLYP RECTO-SIGMOID, DIVERTICULOSIS, RESCOPE IN 3 YRS, DR. NEHA HAM AT [facility]    EYE SURGERY Bilateral     CATARACT EXTRACTION WITH LENS IMPLANT, DR. GOVEA    FINGER NAIL SURGERY Right 2022    TORRES-PATEL    INGUINAL HERNIA REPAIR Left 11/02/2021    Procedure: LEFT OPEN INGUINAL HERNIA REPAIR;  Surgeon: Nixon Kolb MD;  Location: [facility] OR;  Service: General;  Laterality: Left;    PROSTATE RADIOACTIVE SEED IMPLANT N/A 09/06/2016    DR. HOA JARAMILLO AT [facility]    PROSTATE SURGERY N/A 03/11/2016    BIOPSY: ADENOCARCINOMA, DR. ZAID IBARRA    THYROID BIOPSY Bilateral 11/01/2017    NO B CELL OR T CELL LYMPHOMA, DONE AT [facility]    TOTAL KNEE ARTHROPLASTY Right 10/12/2022    Procedure: TOTAL KNEE ARTHROPLASTY;  Surgeon: Ran Rachel MD;  Location: [facility] OR OSC;  Service: Orthopedics;  Laterality: Right;    VENOUS ACCESS DEVICE (PORT) INSERTION Right 7/6/2023    Procedure: INSERTION VENOUS ACCESS DEVICE;  Surgeon:  Nixon Kolb MD;  Location: [facility] OR [facility];  Service: General;  Laterality: Right;     Social History     Socioeconomic History    Marital status:      Spouse name: No    Years of education: College   Tobacco Use    Smoking status: Never    Smokeless tobacco: Never   Vaping Use    Vaping Use: Never used   Substance and Sexual Activity    Alcohol use: No    Drug use: Never    Sexual activity: Yes     Partners: Female     Birth control/protection: None     Family History   Problem Relation Age of Onset    Heart disease Father         Rheumatic heart disease    Hypertension Father     Osteoporosis Father     Stroke Mother     Osteoporosis Mother     No Known Problems Daughter     Malig Hyperthermia Neg Hx      Current Outpatient Medications on File Prior to Visit   Medication Sig    acyclovir (Zovirax) 400 MG tablet Take 1 tablet by mouth Daily.    allopurinol (ZYLOPRIM) 300 MG tablet Take 1 tablet by mouth Daily.    bacitracin 500 UNIT/GM ointment Apply 1 application topically to the appropriate area as directed 2 (Two) Times a Day.    calcium carbonate (TUMS) 500 MG chewable tablet Chew 2 tablets 2 (Two) Times a Day As Needed for Heartburn.    lidocaine-prilocaine (EMLA) 2.5-2.5 % cream Apply 1 application  topically to the appropriate area as directed Every 2 (Two) Hours As Needed for Mild Pain.    Melatonin 10 MG tablet Take 1 tablet by mouth Every Night.    ondansetron (Zofran) 4 MG tablet Take 1 tablet by mouth Every 6 (Six) Hours As Needed for Nausea or Vomiting for up to 10 doses.    polyethylene glycol (MIRALAX) 17 GM/SCOOP powder Dissolve 17 g (1 capful) in liquid and drink by mouth Daily.    pravastatin (PRAVACHOL) 20 MG tablet      Current Facility-Administered Medications on File Prior to Visit   Medication    Chlorhexidine Gluconate Cloth 2 % pads     No Known Allergies        VITAL SIGNS:  There were no vitals filed for this visit.  Vitals today include a blood pressure of 143/87, heart rate  87, respiratory rate 16, temperature 97.8 øF, oxygen 96%, weight 207 pounds         PHYSICAL EXAMINATION:     GENERAL:  Well-developed, Patient  in no acute distress.   SKIN:  Warm, dry ,NO purpura ,no rash.  HEENT:  Pupils were equal and reactive to light and accomodation, conjunctivae noninjected,  normal visual acuity.   NECK:  Supple with good range of motion; no thyromegaly , no JVD or bruits,.No carotid artery pain, no carotid abnormal pulsation   LYMPHATICS:  No cervical, NO supraclavicular, NO axillary, NO inguinal adenopathies.  CARDIAC   normal rate , regular rhythm, without murmur,NO rubs NO S3 NO S4   LUNGS: normal breath sounds bilateral, no wheezing, NO rhonchi, NO crackles ,NO rubs.PORT SITE IS NORMAL  VASCULAR VENOUS: no cyanosis, NO collateral circulation, NO varicosities, mild BILATERAL edema with moderate edema in right ankle, NO palpable cords, NO pain,NO erythema, NO pigmentation of the skin.  ABDOMEN:  Soft, NO pain,no hepatomegaly, 4 CM BLCM splenomegaly,no masses, no ascites, no collateral circulation,no distention.  EXTREMITIES  AND SPINE:  No clubbing, no cyanosis ,no deformities , no pain .No kyphosis,  no pain in spine, no pain in ribs , no pain in pelvic bone.  NEUROLOGICAL:  Patient was awake, alert, oriented to time, person and place.        LABORATORY DATA:  Results from last 7 days   Lab Units 08/08/23  0949   WBC 10*3/mm3 3.49   NEUTROS ABS 10*3/mm3 2.43   HEMOGLOBIN g/dL 12.7*   HEMATOCRIT % 38.3   PLATELETS 10*3/mm3 84*     Results from last 7 days   Lab Units 08/08/23  0949   SODIUM mmol/L 140   POTASSIUM mmol/L 4.0   CHLORIDE mmol/L 103   CO2 mmol/L 22.3   BUN mg/dL 18   CREATININE mg/dL 0.63*   CALCIUM mg/dL 8.7   ALBUMIN g/dL 3.6   BILIRUBIN mg/dL 0.6   ALK PHOS U/L 36*   ALT (SGPT) U/L 10   AST (SGOT) U/L 22   GLUCOSE mg/dL 90          T ABDOMEN PELVIS W CONTRAST-     INDICATION:   History of non-Hodgkin's lymphoma. Observation of malignant neoplasm.     TECHNIQUE:   CT of  the abdomen and pelvis with IV contrast. Coronal and sagittal  reconstructions were obtained.  Radiation dose reduction techniques  included automated exposure control or exposure modulation based on body  size. Count of known CT and cardiac nuc med studies performed in  previous 12 months: 5.      COMPARISON:   6/13/2023     FINDINGS:  Please see chest CT report dictated separately. There is atherosclerotic  disease with no aortic aneurysm. Gallbladder is grossly normal. No  biliary obstruction is seen. Redemonstrated is generalized anasarca.  Numerous hepatic cysts appear similar to prior. No definite liver  masses. Small bilateral renal cysts are also similar to prior. No  follow-up is indicated. The adrenal glands and pancreas appear normal.  The spleen remains enlarged. However, it is much improved from prior. It  now measures 16.7 cm in AP dimension, previously 20.8 cm. The spleen now  measures 21.8 cm in craniocaudal dimension, previously 31.4 cm.  Previously described small focal splenic hyperdensities are not  definitely seen on the current study. They may have resolved, or this  could be due to differences in contrast bolus timing.     Urinary bladder appears normal. Numerous metallic seeds are again noted  in the prostate gland. There is colonic diverticulosis without  diverticulitis or colitis. The appendix is normal. Gas-filled small  bowel loops suggest a mild generalized ileus. There is evidence of some  stasis in the distal ileum as well. Findings are present to some degree  on the prior exam. The stomach is normal. Left periaortic lymph node  measures 2.0 cm, stable. Previously described portohepatic lymph node is  poorly characterized today and is most likely smaller. No definite new  adenopathy. Trace amount of free fluid is similar to prior. There is  lumbar spine degenerative disease. No suspicious osseous lesions.     IMPRESSION:     1. Improved appearance of the spleen.  2. Stable left  periaortic adenopathy with improved portohepatic  adenopathy.  3. Stable mild anasarca with a stable trace amount of free fluid.  4. Mild small bowel ileus, also similar to prior.                 This report was finalized on 7/26/2023 2:17 AM by Dr. Jovani Newman,          ASSESSMENT:   1.  History of CLL, now with Jon's transformation  Status posttreatment with Bendamustine/Rituxan in 2016  Patient did have severe reaction to initial Rituxan dose requiring hospitalization or completion.  Did well thereafter.  12/30/2022 WBC remains normal at 10.6, 31% lymphs.  Platelets normal at 105,000.  No B symptoms or adenopathy.  No suggestion of recurrent disease.  Hemoglobin has acutely dropped however down to 11.9 (see further discussion below).  On 01/13/2023 his white count, hemoglobin and platelets are not changing. His total lymphocyte count is very minimal and I do believe that his leukemia remains very quiet on clinical grounds with no need for intervention.  1/13/2023 CT of the abdomen pelvis notes several enlarged peripancreatic and portal lymph nodes measuring up to 2.1 cm which are new.  New retroperitoneal lymphadenopathy with a musa mass to the left of the aorta measuring 3.0 x 3.5 cm.  Lymph nodes near the level of the iliac bifurcation measuring up to 2.1.  Multiple enlarged lymph nodes in the pelvis along both iliac chains measuring up to 1.1 x 2.7 cm in the left 1.3 x 4.4 cm in the right.  These lymph nodes are in the splenic enlargement area likely related to CLL.  1/25/2023 PET scan noting increase in size of his in the adenopathy in the chest abdomen pelvis as well as the chest.  Severe splenomegaly which is intensely FDG avid and highly concerning for leukemic involvement.  Plans for Calquence 200 mg twice daily which was initiated 2/8/2023  Molecular analysis of the peripheral blood flow cytometry has been requested from the [facility] in regard the FISH testing. Also we ordered IgH testing.   IgVH  "unmutated. This makes his prognosis a little bit worse in the long run  After 2 months of Calquence, clinical improvement in splenomegaly.  5/8/2020 3 repeat PET scan noting scattered adenopathy within the neck chest abdomen pelvis as before.  A few index lesions within the neck and chest are grossly stable.  Severe hypermetabolic splenomegaly as before the degree of FDG uptake appears minimally decreased.  Reevaluation 6/2/2023 with profound fatigue, inability to accomplish ADLs.  Worsening leukocytosis with white count of 23,000 and atypical mononuclear cells in circulation by peripheral blood examination.  Worsening thrombocytopenia and progressive splenomegaly  6/2/2023 flow cytometry on peripheral blood flow noting immunophenotype identifies a CD5 positive monoclonal kappa B-cell population representing 67% of total events.  Pathologist comment: Although the phenotype is similar to the patient's previous study, the percentage of disease is more than doubled.  The findings are worrisome for large cell (Jon's) or polymorphic transformation from the previous CLL.  6/7/2023 bone marrow biopsy including flow cytometry A monoclonal population is identified with lymphocyte region containing 32% of total events and the following immunophenotype positive CD46, CD6, bright CD19, bright CD20, HLA-DR bright surface kappa light chain, dim partial CD38   Negative CD4, CD8, CD10, CD11c, CD23. Pathology hypercellular bone marrow (50%) with involvement by malignant lymphoma with presenting 30% of bone marrow cellularity  Per Dr. Schroeder \"the pathology of the bone marrow recently done, even though does not document by flow cytometry any major changes consistent with his CLL, morphologically to my eyes, and I disagree with the Pathologist in certain fashion, shows 2 major populations of cells, number 1 precursors of red cells that were very obvious, and the second population was very monotonous population of largely looking " "lymphocytes that were very worrisome for diffuse large cell non-Hodgkin's lymphoma.\"  Recommendations for hospitalization, PICC line placement and initiation of CHOP Rituxan  6/12/2023, baseline echocardiogram with normal ejection fraction of 65%  6/13/2023, prior to initiation of chemotherapy WBC 53.8, hemoglobin 7.7, platelets 62,000.  LDH elevated at 1900  Rituxan initiated 6/23/2023  CHOP given 6/14/2023.  Vincristine given at a flat dose of 1 mg.  G-CSF with Neupogen 480 mcg given x8 days following cycle 1  Very dramatic response to first cycle of chemotherapy with decrease in LDH from 2000-400s 6/26/2023.  Also improvement in WBC  Technically due for cycle 2 chemotherapy today, 7/5/2023.  However, this will be delayed 1 week as the patient is having a Mediport placed tomorrow.  Continued improvement in LDH indicating ongoing response with  today.  On 07/11/2023 the patient was further reviewed. He has had a dramatic clinical comeback in regard to his energy level, appetite, and inability to function. He has been able to become self-independent at home and his daughter is giving him all the support that he needs. He is eating extremely well. He has lost a lot of weight related to water loss and hopefully he will start to gain muscle mass again in the next several weeks.   Today on clinical examination he has complete resolution of the nocturnal sweats. He has no peripheral adenopathy and the spleen is not clinically palpable anymore in his abdomen. His white count has further improved. His hemoglobin has further improved. His platelet count has almost normalized. The differential in the white count is dramatically different with predominance of segs and resolution of the terrible peripheral lymphocytosis.   His creatinine is stable. His liver functions are stable. Potassium is stable. Given all these facts the patient will proceed today with the 2nd cycle of CHOP-Rituxan and he will be supported with " Neulasta. We recalculated his BSA given the amount of weight loss associated with water loss that he has had and the new doses of chemotherapy medicines were adjusted accordingly.   He will require Neulasta administration today that will be deploying tomorrow and his daughter will be taught about how to proceed in this regard. This will obviate the lack of need to come for Neupogen injections every day. I made her aware though of the potentiality of the medicine to trigger headache, pain in the chest, pain in the rib cage, pelvic bone that will require Tylenol. He used to take high-dose Bufferin. I pointed out to him that he is not supposed to have Bufferin anymore  He will proceed with laboratory testing, CBC on weekly basis and he will have a CT scan of the chest, abdomen and pelvis in 2 weeks and I will review him back in 3 weeks. The plan will be to continue the CHOP-Rituxan administration after completion of 5-6 cycles. Eventually after the 3rd cycle he will require a cardiac reassessment with echocardiogram.  On 08/01/2023 the patient was reviewed. Clinically he is dramatically better. His mind is back to very significant improvement. His strength has improved. His nutrition has improved. His balance is not there yet but it is a big difference in him now than how he was 6 or 7 weeks ago. He is up and about. 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 He is eating like a hog and he is gaining strength with no pain and no B symptoms. His spleen has dramatically decreased. His white count is stable. Hemoglobin is improving. The platelet count is slowly improving. There is no peripheral blood lymphocytosis. His LDH has further improved and normalized to 179. This is coming out from 2000 at the time of the diagnosis. Therefore, I do believe that with the report that we have above, the CT scan that I have personally reviewed showing a very dramatic improvement in the size of his splenomegaly and substantial reduction in his  periaortic adenopathy the patient has had a dramatic improvement in regard to his Jon's transformation of CLL. My recommendation will be for him to have the next cycle of treatment in a few days according his schedule and have a new treatment 3 weeks later. On each one of those occasions the dose will remain about the same one that he received the previous dose. We have to modify numbers depending of the amount of weight loss that he has related to swollen legs. Also the patient will require Neulasta administration on each one of those cycles and he will require laboratory workup on weekly basis, monitoring CBC.   In the long run the goal will be to try to complete 6 cycles of chemotherapy and then put him on maintenance. I am planning also upon completion of 6 cycles to repeat his bone marrow testing.  8/8/2023: Patient has previously had an infusion reaction to Rituxan. Patient reviewed with Dr. Schroeder as his platelets are 84,000 today. We will proceed with Cycle 3 R-CHOP today and have patient return weekly for lab review to monitor counts closely. Patient to receive additional premedications as planned.     2.  History of prostate cancer  Treated at the [facility]  Most recent PSA April 2022 = 0.203  On 01/13/2023 he has minimal urinary symptomatology comparing his PSA with 3 years ago was 0.8 today, actually a few days ago was 0.1. I doubt that all of the symptoms that he has are associated with prostate cancer. The blood in the urine has a different significance.   On 01/18/2023 no issues pertinent to his prostate cancer. I see the seeds in his prostatic bed on the CT scan. His urinalysis is very much clean and his PSA has remained stable. No activity of this entity at this point.   On 03/17/2023 no issues pertinent to his previous history of prostate cancer. His PSA has been measured and has remained normal.  On 07/11/2023 no issues pertinent.  On 08/01/2023 no issues pertinent.  On 8/8/2023: no  issues pertinent    3.  Venous access  Right upper extremity PICC line placed for cycle 1 chemotherapy.  Follow Dr. Kolb for Mediport placement tomorrow, 7/6/2023  With having Mediport placed tomorrow, we will pull the PICC line today, right upper extremity PICC line removed per nursing  Port in right subclavian position looking extremely well. I will prescribe Emla cream for future access to minimize pain.  On 08/01/2023 his port is functioning perfectly fine with no pain.    4.  Multifactorial anemia secondary to underlying malignancy and chemotherapy  Transfusion support as needed  Hemoglobin today is improved at 9.9  On 07/11/2023 anemia is self correcting now that he probably has a much better clean bone marrow able to trigger normal erythropoiesis.  On 08/01/2023 anemia is substantially improved.  8/8/2023: Hemoglobin 12.7      5.  Prophylaxis  Continue on acyclovir 400 mg once daily  Continuing allopurinol 300 mg daily  On 07/11/2023 he remains on allopurinol and acyclovir with no viral infections. Eventually allopurinol will be discontinued before the 3rd cycle of chemotherapy.  On 08/01/2023 the patient remains on acyclovir prophylactically.      6.  Bilateral lower extremity swelling  Related to anasarca and immobility  Patient is not a good candidate for diuretics in light of his borderline hypotension and immobility, he is a falls risk.  Status compression and elevation.   On 07/11/2023 most of the swelling is resolved now not with water medication but with just proper nutrition and mobilization. I expect that this swelling will be completely gone for the next cycle. Given the persistency of the swelling as posted above I readjusted his chemotherapy administration medications to account for this.  On 08/01/2023 the patient remains with some swelling in his lower extremities. Encouraged elastic support that he does not want to wear.  8/8/2023: Bilateral lower extremity has improved. Right right does  appear more edematous than the left lower extremity. He denies any pain or tenderness. No palpable cords on exam. He has tried compression stockings in the past. Encouraged to utilize again.       PLAN:  Proceed with Cycle 3 R-CHOP today with additional premedications  Continue Neulasta  Return weekly for labs to monitor platelets closely.   Wear compression stockings for management of lower extremity edema.   Repeat bone marrow testing after completion of 6 cycles   Return in 3 weeks for NP follow up and Cycle 4 R-CHOP  Return in 6 weeks for MD follow up    Patient remains on high risk medication and requires close monitoring for toxicity.    Patient reviewed with Dr. Schroeder and he is in agreement with plan of care.     Adriana Quan, APRNt  08/08/2023

## 2023-08-08 NOTE — NURSING NOTE
1245 can't swallow  rituxan stopped. Bp 140/81 HR 94 pepcid 20 mg IV push 1250 solu cortef 100 mgo@ sat 97 %  1252 swallowing better no SOA  1256 still feels like can't swallow as well but better O2 sat 97%  bp 137/79 hr 97  Awake and alert   102 pm drinking coke stated feels better but not back to normal but close.

## 2023-08-09 ENCOUNTER — TELEPHONE (OUTPATIENT)
Dept: ONCOLOGY | Facility: CLINIC | Age: 78
End: 2023-08-09
Payer: MEDICARE

## 2023-08-09 RX ORDER — PREDNISONE 20 MG/1
60 TABLET ORAL TAKE AS DIRECTED
Qty: 60 TABLET | Refills: 2 | Status: SHIPPED | OUTPATIENT
Start: 2023-08-09

## 2023-08-09 NOTE — TELEPHONE ENCOUNTER
Called patient and informed him of new prednisone script sent to  [facility]. Called [facility] outpatient pharm and cancelled previous script w/ inaccurate signature line. Pt understands directions to take 60mg/day x 5 days after treatment. Anneliese Torres RN

## 2023-08-15 ENCOUNTER — CLINICAL SUPPORT (OUTPATIENT)
Dept: ONCOLOGY | Facility: HOSPITAL | Age: 78
End: 2023-08-15
Payer: MEDICARE

## 2023-08-15 ENCOUNTER — LAB (OUTPATIENT)
Dept: LAB | Facility: HOSPITAL | Age: 78
End: 2023-08-15
Payer: MEDICARE

## 2023-08-15 DIAGNOSIS — Z79.899 HIGH RISK MEDICATION USE: ICD-10-CM

## 2023-08-15 DIAGNOSIS — M62.3 IMMOBILITY SYNDROME: ICD-10-CM

## 2023-08-15 DIAGNOSIS — C91.10 RICHTER'S SYNDROME: ICD-10-CM

## 2023-08-15 DIAGNOSIS — D61.810 PANCYTOPENIA DUE TO CHEMOTHERAPY: ICD-10-CM

## 2023-08-15 LAB
BASOPHILS # BLD AUTO: 0 10*3/MM3 (ref 0–0.2)
BASOPHILS NFR BLD AUTO: 0 % (ref 0–1.5)
DEPRECATED RDW RBC AUTO: 46.1 FL (ref 37–54)
EOSINOPHIL # BLD AUTO: 0.01 10*3/MM3 (ref 0–0.4)
EOSINOPHIL NFR BLD AUTO: 2.7 % (ref 0.3–6.2)
ERYTHROCYTE [DISTWIDTH] IN BLOOD BY AUTOMATED COUNT: 14.5 % (ref 12.3–15.4)
HCT VFR BLD AUTO: 33.8 % (ref 37.5–51)
HGB BLD-MCNC: 12.1 G/DL (ref 13–17.7)
IMM GRANULOCYTES # BLD AUTO: 0 10*3/MM3 (ref 0–0.05)
IMM GRANULOCYTES NFR BLD AUTO: 0 % (ref 0–0.5)
LYMPHOCYTES # BLD AUTO: 0.17 10*3/MM3 (ref 0.7–3.1)
LYMPHOCYTES NFR BLD AUTO: 45.9 % (ref 19.6–45.3)
MCH RBC QN AUTO: 31.3 PG (ref 26.6–33)
MCHC RBC AUTO-ENTMCNC: 35.8 G/DL (ref 31.5–35.7)
MCV RBC AUTO: 87.3 FL (ref 79–97)
MONOCYTES # BLD AUTO: 0.06 10*3/MM3 (ref 0.1–0.9)
MONOCYTES NFR BLD AUTO: 16.2 % (ref 5–12)
NEUTROPHILS NFR BLD AUTO: 0.13 10*3/MM3 (ref 1.7–7)
NEUTROPHILS NFR BLD AUTO: 35.2 % (ref 42.7–76)
NRBC BLD AUTO-RTO: 0 /100 WBC (ref 0–0.2)
PLATELET # BLD AUTO: 59 10*3/MM3 (ref 140–450)
PMV BLD AUTO: 10.9 FL (ref 6–12)
RBC # BLD AUTO: 3.87 10*6/MM3 (ref 4.14–5.8)
WBC NRBC COR # BLD: 0.37 10*3/MM3 (ref 3.4–10.8)

## 2023-08-15 PROCEDURE — 85025 COMPLETE CBC W/AUTO DIFF WBC: CPT

## 2023-08-15 PROCEDURE — 36415 COLL VENOUS BLD VENIPUNCTURE: CPT

## 2023-08-15 RX ORDER — ONDANSETRON 4 MG/1
4 TABLET, FILM COATED ORAL EVERY 6 HOURS PRN
Qty: 10 TABLET | Refills: 0 | Status: SHIPPED | OUTPATIENT
Start: 2023-08-15

## 2023-08-15 RX ORDER — LEVOFLOXACIN 500 MG/1
500 TABLET, FILM COATED ORAL DAILY
Qty: 5 TABLET | Refills: 0 | Status: SHIPPED | OUTPATIENT
Start: 2023-08-15

## 2023-08-15 NOTE — TELEPHONE ENCOUNTER
Patient requesting refill on Zofran. Last prescribed for post-op nausea following port placement on 7/6/23. Okay to refill or defer to Dr. Schroeder?

## 2023-08-15 NOTE — PROGRESS NOTES
Patient is here for lab review with RN.  CBC reviewed, counts are stable for this patient at this time. Sent Levaquin per Dr. Schroeder. Patient has no complaints. Copy of labs given to patient and follow up appointment reviewed. Patient is instructed to call the office with any concerns or new symptoms prior to next visit. Patient verbalized understanding and discharged in stable condition.

## 2023-08-22 ENCOUNTER — CLINICAL SUPPORT (OUTPATIENT)
Dept: ONCOLOGY | Facility: HOSPITAL | Age: 78
End: 2023-08-22
Payer: MEDICARE

## 2023-08-22 ENCOUNTER — LAB (OUTPATIENT)
Dept: LAB | Facility: HOSPITAL | Age: 78
End: 2023-08-22
Payer: MEDICARE

## 2023-08-22 DIAGNOSIS — D61.810 PANCYTOPENIA DUE TO CHEMOTHERAPY: ICD-10-CM

## 2023-08-22 LAB
BASOPHILS # BLD AUTO: 0.06 10*3/MM3 (ref 0–0.2)
BASOPHILS NFR BLD AUTO: 1.1 % (ref 0–1.5)
DEPRECATED RDW RBC AUTO: 49.6 FL (ref 37–54)
EOSINOPHIL # BLD AUTO: 0.02 10*3/MM3 (ref 0–0.4)
EOSINOPHIL NFR BLD AUTO: 0.4 % (ref 0.3–6.2)
ERYTHROCYTE [DISTWIDTH] IN BLOOD BY AUTOMATED COUNT: 15.4 % (ref 12.3–15.4)
HCT VFR BLD AUTO: 35.4 % (ref 37.5–51)
HGB BLD-MCNC: 12.4 G/DL (ref 13–17.7)
IMM GRANULOCYTES # BLD AUTO: 0.94 10*3/MM3 (ref 0–0.05)
IMM GRANULOCYTES NFR BLD AUTO: 16.7 % (ref 0–0.5)
LYMPHOCYTES # BLD AUTO: 0.38 10*3/MM3 (ref 0.7–3.1)
LYMPHOCYTES NFR BLD AUTO: 6.8 % (ref 19.6–45.3)
MCH RBC QN AUTO: 31 PG (ref 26.6–33)
MCHC RBC AUTO-ENTMCNC: 35 G/DL (ref 31.5–35.7)
MCV RBC AUTO: 88.5 FL (ref 79–97)
MONOCYTES # BLD AUTO: 0.35 10*3/MM3 (ref 0.1–0.9)
MONOCYTES NFR BLD AUTO: 6.2 % (ref 5–12)
NEUTROPHILS NFR BLD AUTO: 3.87 10*3/MM3 (ref 1.7–7)
NEUTROPHILS NFR BLD AUTO: 68.8 % (ref 42.7–76)
NRBC BLD AUTO-RTO: 0 /100 WBC (ref 0–0.2)
PLATELET # BLD AUTO: 88 10*3/MM3 (ref 140–450)
PMV BLD AUTO: 10 FL (ref 6–12)
RBC # BLD AUTO: 4 10*6/MM3 (ref 4.14–5.8)
WBC NRBC COR # BLD: 5.62 10*3/MM3 (ref 3.4–10.8)

## 2023-08-22 PROCEDURE — 85025 COMPLETE CBC W/AUTO DIFF WBC: CPT

## 2023-08-22 NOTE — PROGRESS NOTES
Attempted to contacted patient via telephone for RN Review. No answer left voice mail message. CBC reviewed with Dr Schroeder, counts are stable for this patient at this time.     Lab Results   Component Value Date    WBC 5.62 08/22/2023    HGB 12.4 (L) 08/22/2023    HCT 35.4 (L) 08/22/2023    MCV 88.5 08/22/2023    PLT 88 (L) 08/22/2023        Per Dr Schroeder will decrease Adriamycin and Cytoxan by 20% due to Platelet level is low

## 2023-08-29 ENCOUNTER — OFFICE VISIT (OUTPATIENT)
Dept: ONCOLOGY | Facility: CLINIC | Age: 78
End: 2023-08-29
Payer: MEDICARE

## 2023-08-29 ENCOUNTER — INFUSION (OUTPATIENT)
Dept: ONCOLOGY | Facility: HOSPITAL | Age: 78
End: 2023-08-29
Payer: MEDICARE

## 2023-08-29 VITALS
BODY MASS INDEX: 24.41 KG/M2 | SYSTOLIC BLOOD PRESSURE: 160 MMHG | WEIGHT: 206.7 LBS | DIASTOLIC BLOOD PRESSURE: 79 MMHG | HEART RATE: 98 BPM | OXYGEN SATURATION: 97 % | TEMPERATURE: 97.1 F | HEIGHT: 77 IN | RESPIRATION RATE: 18 BRPM

## 2023-08-29 VITALS — HEART RATE: 90 BPM | SYSTOLIC BLOOD PRESSURE: 149 MMHG | DIASTOLIC BLOOD PRESSURE: 64 MMHG

## 2023-08-29 DIAGNOSIS — Z79.899 HIGH RISK MEDICATION USE: Primary | ICD-10-CM

## 2023-08-29 DIAGNOSIS — C85.17 B-CELL LYMPHOMA OF SPLEEN, UNSPECIFIED B-CELL LYMPHOMA TYPE: ICD-10-CM

## 2023-08-29 DIAGNOSIS — C91.10 RICHTER'S SYNDROME: ICD-10-CM

## 2023-08-29 DIAGNOSIS — C91.10 RICHTER'S SYNDROME: Primary | ICD-10-CM

## 2023-08-29 DIAGNOSIS — D61.810 PANCYTOPENIA DUE TO CHEMOTHERAPY: ICD-10-CM

## 2023-08-29 DIAGNOSIS — Z79.899 HIGH RISK MEDICATION USE: ICD-10-CM

## 2023-08-29 DIAGNOSIS — M62.3 IMMOBILITY SYNDROME: ICD-10-CM

## 2023-08-29 LAB
ALBUMIN SERPL-MCNC: 3.9 G/DL (ref 3.5–5.2)
ALBUMIN/GLOB SERPL: 2.6 G/DL
ALP SERPL-CCNC: 38 U/L (ref 39–117)
ALT SERPL W P-5'-P-CCNC: 10 U/L (ref 1–41)
ANION GAP SERPL CALCULATED.3IONS-SCNC: 10.6 MMOL/L (ref 5–15)
AST SERPL-CCNC: 22 U/L (ref 1–40)
BASOPHILS # BLD AUTO: 0.06 10*3/MM3 (ref 0–0.2)
BASOPHILS NFR BLD AUTO: 1.5 % (ref 0–1.5)
BILIRUB SERPL-MCNC: 0.6 MG/DL (ref 0–1.2)
BUN SERPL-MCNC: 19 MG/DL (ref 8–23)
BUN/CREAT SERPL: 26 (ref 7–25)
CALCIUM SPEC-SCNC: 9.1 MG/DL (ref 8.6–10.5)
CHLORIDE SERPL-SCNC: 105 MMOL/L (ref 98–107)
CO2 SERPL-SCNC: 24.4 MMOL/L (ref 22–29)
CREAT SERPL-MCNC: 0.73 MG/DL (ref 0.7–1.3)
DEPRECATED RDW RBC AUTO: 50.1 FL (ref 37–54)
EGFRCR SERPLBLD CKD-EPI 2021: 92.5 ML/MIN/1.73
EOSINOPHIL # BLD AUTO: 0.01 10*3/MM3 (ref 0–0.4)
EOSINOPHIL NFR BLD AUTO: 0.3 % (ref 0.3–6.2)
ERYTHROCYTE [DISTWIDTH] IN BLOOD BY AUTOMATED COUNT: 15.3 % (ref 12.3–15.4)
GLOBULIN UR ELPH-MCNC: 1.5 GM/DL
GLUCOSE SERPL-MCNC: 99 MG/DL (ref 65–99)
HCT VFR BLD AUTO: 34.9 % (ref 37.5–51)
HGB BLD-MCNC: 11.8 G/DL (ref 13–17.7)
IMM GRANULOCYTES # BLD AUTO: 0.37 10*3/MM3 (ref 0–0.05)
IMM GRANULOCYTES NFR BLD AUTO: 9.4 % (ref 0–0.5)
LDH SERPL-CCNC: 238 U/L (ref 135–225)
LYMPHOCYTES # BLD AUTO: 0.41 10*3/MM3 (ref 0.7–3.1)
LYMPHOCYTES NFR BLD AUTO: 10.4 % (ref 19.6–45.3)
MCH RBC QN AUTO: 30.8 PG (ref 26.6–33)
MCHC RBC AUTO-ENTMCNC: 33.8 G/DL (ref 31.5–35.7)
MCV RBC AUTO: 91.1 FL (ref 79–97)
MONOCYTES # BLD AUTO: 0.33 10*3/MM3 (ref 0.1–0.9)
MONOCYTES NFR BLD AUTO: 8.4 % (ref 5–12)
NEUTROPHILS NFR BLD AUTO: 2.76 10*3/MM3 (ref 1.7–7)
NEUTROPHILS NFR BLD AUTO: 70 % (ref 42.7–76)
NRBC BLD AUTO-RTO: 0 /100 WBC (ref 0–0.2)
PLATELET # BLD AUTO: 113 10*3/MM3 (ref 140–450)
PMV BLD AUTO: 8.7 FL (ref 6–12)
POTASSIUM SERPL-SCNC: 3.8 MMOL/L (ref 3.5–5.2)
PROT SERPL-MCNC: 5.4 G/DL (ref 6–8.5)
RBC # BLD AUTO: 3.83 10*6/MM3 (ref 4.14–5.8)
SODIUM SERPL-SCNC: 140 MMOL/L (ref 136–145)
URATE SERPL-MCNC: 4.8 MG/DL (ref 3.4–7)
WBC NRBC COR # BLD: 3.94 10*3/MM3 (ref 3.4–10.8)

## 2023-08-29 PROCEDURE — 96367 TX/PROPH/DG ADDL SEQ IV INF: CPT

## 2023-08-29 PROCEDURE — 25010000002 DEXAMETHASONE SODIUM PHOSPHATE 100 MG/10ML SOLUTION: Performed by: NURSE PRACTITIONER

## 2023-08-29 PROCEDURE — 25010000002 DIPHENHYDRAMINE PER 50 MG: Performed by: NURSE PRACTITIONER

## 2023-08-29 PROCEDURE — 25010000002 HYDROCORTISONE SOD SUC (PF) 100 MG RECONSTITUTED SOLUTION: Performed by: NURSE PRACTITIONER

## 2023-08-29 PROCEDURE — 25010000002 PEGFILGRASTIM 6 MG/0.6ML PREFILLED SYRINGE KIT: Performed by: NURSE PRACTITIONER

## 2023-08-29 PROCEDURE — 96417 CHEMO IV INFUS EACH ADDL SEQ: CPT

## 2023-08-29 PROCEDURE — 25010000002 DOXORUBICIN PER 10 MG: Performed by: NURSE PRACTITIONER

## 2023-08-29 PROCEDURE — 96415 CHEMO IV INFUSION ADDL HR: CPT

## 2023-08-29 PROCEDURE — 25010000002 RITUXIMAB 10 MG/ML SOLUTION 10 ML VIAL: Performed by: NURSE PRACTITIONER

## 2023-08-29 PROCEDURE — 84550 ASSAY OF BLOOD/URIC ACID: CPT

## 2023-08-29 PROCEDURE — 63710000001 ACETAMINOPHEN 325 MG TABLET: Performed by: NURSE PRACTITIONER

## 2023-08-29 PROCEDURE — 25010000002 FOSAPREPITANT PER 1 MG: Performed by: NURSE PRACTITIONER

## 2023-08-29 PROCEDURE — 83615 LACTATE (LD) (LDH) ENZYME: CPT

## 2023-08-29 PROCEDURE — 25010000002 PALONOSETRON PER 25 MCG: Performed by: NURSE PRACTITIONER

## 2023-08-29 PROCEDURE — A9270 NON-COVERED ITEM OR SERVICE: HCPCS | Performed by: NURSE PRACTITIONER

## 2023-08-29 PROCEDURE — 85025 COMPLETE CBC W/AUTO DIFF WBC: CPT

## 2023-08-29 PROCEDURE — 96411 CHEMO IV PUSH ADDL DRUG: CPT

## 2023-08-29 PROCEDURE — 25010000002 RITUXIMAB 10 MG/ML SOLUTION 50 ML VIAL: Performed by: NURSE PRACTITIONER

## 2023-08-29 PROCEDURE — 25010000002 VINCRISTINE PER 1 MG: Performed by: NURSE PRACTITIONER

## 2023-08-29 PROCEDURE — 96413 CHEMO IV INFUSION 1 HR: CPT

## 2023-08-29 PROCEDURE — 25010000002 CYCLOPHOSPHAMIDE 1 GM/5ML SOLUTION 5 ML VIAL: Performed by: NURSE PRACTITIONER

## 2023-08-29 PROCEDURE — 96377 APPLICATON ON-BODY INJECTOR: CPT

## 2023-08-29 PROCEDURE — 96375 TX/PRO/DX INJ NEW DRUG ADDON: CPT

## 2023-08-29 PROCEDURE — 80053 COMPREHEN METABOLIC PANEL: CPT

## 2023-08-29 RX ORDER — DOXORUBICIN HYDROCHLORIDE 2 MG/ML
40 INJECTION, SOLUTION INTRAVENOUS ONCE
Status: CANCELLED | OUTPATIENT
Start: 2023-08-29

## 2023-08-29 RX ORDER — MELOXICAM 15 MG/1
TABLET ORAL
COMMUNITY

## 2023-08-29 RX ORDER — PALONOSETRON 0.05 MG/ML
0.25 INJECTION, SOLUTION INTRAVENOUS ONCE
Status: COMPLETED | OUTPATIENT
Start: 2023-08-29 | End: 2023-08-29

## 2023-08-29 RX ORDER — SODIUM CHLORIDE 9 MG/ML
250 INJECTION, SOLUTION INTRAVENOUS ONCE
Status: COMPLETED | OUTPATIENT
Start: 2023-08-29 | End: 2023-08-29

## 2023-08-29 RX ORDER — OLANZAPINE 5 MG/1
5 TABLET ORAL ONCE
Status: CANCELLED | OUTPATIENT
Start: 2023-08-29

## 2023-08-29 RX ORDER — ALFUZOSIN HYDROCHLORIDE 10 MG/1
TABLET, EXTENDED RELEASE ORAL
COMMUNITY

## 2023-08-29 RX ORDER — HYDROCHLOROTHIAZIDE 25 MG/1
TABLET ORAL
COMMUNITY

## 2023-08-29 RX ORDER — AMMONIUM LACTATE 12 G/100G
CREAM TOPICAL
COMMUNITY

## 2023-08-29 RX ORDER — OLANZAPINE 5 MG/1
5 TABLET ORAL ONCE
Status: DISCONTINUED | OUTPATIENT
Start: 2023-08-29 | End: 2023-08-29 | Stop reason: HOSPADM

## 2023-08-29 RX ORDER — POTASSIUM CHLORIDE 20 MEQ/1
1 TABLET, EXTENDED RELEASE ORAL DAILY
COMMUNITY
Start: 2023-02-27

## 2023-08-29 RX ORDER — FAMOTIDINE 10 MG/ML
20 INJECTION, SOLUTION INTRAVENOUS AS NEEDED
Status: CANCELLED | OUTPATIENT
Start: 2023-08-29

## 2023-08-29 RX ORDER — LOSARTAN POTASSIUM AND HYDROCHLOROTHIAZIDE 25; 100 MG/1; MG/1
1 TABLET ORAL DAILY
COMMUNITY
Start: 2023-03-06

## 2023-08-29 RX ORDER — ACETAMINOPHEN 325 MG/1
650 TABLET ORAL ONCE
Status: COMPLETED | OUTPATIENT
Start: 2023-08-29 | End: 2023-08-29

## 2023-08-29 RX ORDER — PALONOSETRON 0.05 MG/ML
0.25 INJECTION, SOLUTION INTRAVENOUS ONCE
Status: CANCELLED | OUTPATIENT
Start: 2023-08-29

## 2023-08-29 RX ORDER — DIPHENHYDRAMINE HYDROCHLORIDE 50 MG/ML
50 INJECTION INTRAMUSCULAR; INTRAVENOUS AS NEEDED
Status: CANCELLED | OUTPATIENT
Start: 2023-08-29

## 2023-08-29 RX ORDER — SODIUM CHLORIDE 9 MG/ML
250 INJECTION, SOLUTION INTRAVENOUS ONCE
Status: CANCELLED | OUTPATIENT
Start: 2023-08-29

## 2023-08-29 RX ORDER — HYDROCODONE BITARTRATE AND ACETAMINOPHEN 5; 325 MG/1; MG/1
TABLET ORAL
COMMUNITY

## 2023-08-29 RX ORDER — FAMOTIDINE 10 MG/ML
20 INJECTION, SOLUTION INTRAVENOUS ONCE
Status: COMPLETED | OUTPATIENT
Start: 2023-08-29 | End: 2023-08-29

## 2023-08-29 RX ORDER — ACETAMINOPHEN 325 MG/1
650 TABLET ORAL ONCE
Status: CANCELLED | OUTPATIENT
Start: 2023-08-29

## 2023-08-29 RX ORDER — LOSARTAN POTASSIUM 100 MG/1
TABLET ORAL
COMMUNITY

## 2023-08-29 RX ORDER — TRIAMCINOLONE ACETONIDE 0.25 MG/G
CREAM TOPICAL
COMMUNITY

## 2023-08-29 RX ORDER — DOXORUBICIN HYDROCHLORIDE 2 MG/ML
40 INJECTION, SOLUTION INTRAVENOUS ONCE
Status: COMPLETED | OUTPATIENT
Start: 2023-08-29 | End: 2023-08-29

## 2023-08-29 RX ORDER — FAMOTIDINE 10 MG/ML
20 INJECTION, SOLUTION INTRAVENOUS ONCE
Status: CANCELLED | OUTPATIENT
Start: 2023-08-29

## 2023-08-29 RX ADMIN — CYCLOPHOSPHAMIDE 1260 MG: 200 INJECTION, SOLUTION INTRAVENOUS at 15:51

## 2023-08-29 RX ADMIN — DIPHENHYDRAMINE HYDROCHLORIDE 50 MG: 50 INJECTION, SOLUTION INTRAMUSCULAR; INTRAVENOUS at 10:33

## 2023-08-29 RX ADMIN — FAMOTIDINE 20 MG: 10 INJECTION INTRAVENOUS at 10:33

## 2023-08-29 RX ADMIN — PALONOSETRON HYDROCHLORIDE 0.25 MG: 0.25 INJECTION INTRAVENOUS at 15:16

## 2023-08-29 RX ADMIN — DEXAMETHASONE SODIUM PHOSPHATE 12 MG: 10 INJECTION, SOLUTION INTRAMUSCULAR; INTRAVENOUS at 15:16

## 2023-08-29 RX ADMIN — SODIUM CHLORIDE 250 ML: 9 INJECTION, SOLUTION INTRAVENOUS at 10:33

## 2023-08-29 RX ADMIN — ACETAMINOPHEN 650 MG: 325 TABLET ORAL at 10:33

## 2023-08-29 RX ADMIN — PEGFILGRASTIM 6 MG: KIT SUBCUTANEOUS at 15:46

## 2023-08-29 RX ADMIN — DOXORUBICIN HYDROCHLORIDE 42 MG: 2 INJECTION, SOLUTION INTRAVENOUS at 15:32

## 2023-08-29 RX ADMIN — FOSAPREPITANT 100 ML: 150 INJECTION, POWDER, LYOPHILIZED, FOR SOLUTION INTRAVENOUS at 10:50

## 2023-08-29 RX ADMIN — VINCRISTINE SULFATE 2 MG: 1 INJECTION, SOLUTION INTRAVENOUS at 15:41

## 2023-08-29 RX ADMIN — HYDROCORTISONE SODIUM SUCCINATE 200 MG: 100 INJECTION, POWDER, FOR SOLUTION INTRAMUSCULAR; INTRAVENOUS at 11:23

## 2023-08-29 RX ADMIN — RITUXIMAB 790 MG: 10 INJECTION, SOLUTION INTRAVENOUS at 11:25

## 2023-08-29 NOTE — PROGRESS NOTES
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  Progressive disease January 2023 placed on Calquence.  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  He reports he is feeling well.  He denies any nausea, bowel changes, fevers, or increased shortness of breath.  He continues to have slightly increased edema in his right ankle which is chronic.      Past Medical History:   Diagnosis Date    Arthritis     Benign prostatic hyperplasia     FOLLOWED BY DR. VIVIEN PATEL    Biliary dyskinesia     Bunion of right foot     GREAT TOE    CLL (chronic lymphocytic leukemia) 2016    FOLLOWED BY DR WALKER    Colon polyps     FOLLOWED BY DR. NEHA HAM    Constipation     Degeneration of lumbar intervertebral disc     Diverticulosis     Erectile dysfunction     Fracture of ankle 1975    GERD (gastroesophageal reflux disease)     Hallux valgus of left foot     Hyperlipidemia     MIXED    Hypertension     Inguinal hernia     Kidney stone 02/21/2009    SEEN AT [facility] ER    Knee swelling 2018    Localized, primary osteoarthritis     Lumbar radiculopathy     Lumbar stenosis     Lung mass     LEFT SIDE - SCAR TISSUE PER PATIENT     Osteoarthritis of right knee     Polyneuropathy     Prostate CA 03/2016    JACQUELYN 6, S/P XRT, FOLLOWED BY DR. VIVIEN PATEL, RADIATION SEEDS    PVC (premature ventricular contraction)     PT STATES WORKED UP YEARS AGO BY [facility] FOR POSSIBLE MURMUR - NO FOLLOW UP REQUIRED     RBBB     Sensory hearing loss, bilateral     Skin cancer     SQUAMOUS CELL CARCINOMA OF FACE    Substernal goiter     Thyromegaly 12/2016    ADMITTED TO [facility]    Vitamin D deficiency      Past Surgical History:   Procedure Laterality Date     BRONCHOSCOPY N/A 12/14/2016    Procedure: BRONCHOSCOPY WITH  BAL AND BIOPSY AND ENDOBRONCHIAL ULTRASOUND  AND NAVIGATION WITH BRUSHINGS AND BIOPSY AND BAL;  Surgeon: Pierre Manning MD;  Location: [facility] ENDOSCOPY;  Service:     COLONOSCOPY N/A 03/13/2019    5 MM SESSILE TUBULAR ADENOMA POLYP IN TRANSVERSE, MULTIPLE SMALL AND LARGE DIVERTICULA IN SIGMOID, RESCOPE IN 5 YRS, DR. NEHA HAM AT [facility]    COLONOSCOPY W/ POLYPECTOMY N/A 03/19/2015    3 TUBULAR ADENOMA POLYPS, 12 TUBULOVILLOUS POLYP RECTO-SIGMOID, DIVERTICULOSIS, RESCOPE IN 3 YRS, DR. NEHA HAM AT [facility]    EYE SURGERY Bilateral     CATARACT EXTRACTION WITH LENS IMPLANT, DR. GOVEA    FINGER NAIL SURGERY Right 2022    TORRES-PATEL    INGUINAL HERNIA REPAIR Left 11/02/2021    Procedure: LEFT OPEN INGUINAL HERNIA REPAIR;  Surgeon: Nixon Kolb MD;  Location: [facility] MAIN OR;  Service: General;  Laterality: Left;    PROSTATE RADIOACTIVE SEED IMPLANT N/A 09/06/2016    DR. HOA JARAMILLO AT [facility]    PROSTATE SURGERY N/A 03/11/2016    BIOPSY: ADENOCARCINOMA, DR. ZAID IBARRA    THYROID BIOPSY Bilateral 11/01/2017    NO B CELL OR T CELL LYMPHOMA, DONE AT [facility]    TOTAL KNEE ARTHROPLASTY Right 10/12/2022    Procedure: TOTAL KNEE ARTHROPLASTY;  Surgeon: Ran Rachel MD;  Location: [facility] OR [facility];  Service: Orthopedics;  Laterality: Right;    VENOUS ACCESS DEVICE (PORT) INSERTION Right 7/6/2023    Procedure: INSERTION VENOUS ACCESS DEVICE;  Surgeon: Nxion Kolb MD;  Location: [facility] OR [facility];  Service: General;  Laterality: Right;     Social History     Socioeconomic History    Marital status:      Spouse name: No    Years of education: College   Tobacco Use    Smoking status: Never    Smokeless tobacco: Never   Vaping Use    Vaping Use: Never used   Substance and Sexual Activity    Alcohol use: No    Drug use: Never    Sexual activity: Yes     Partners: Female     Birth control/protection: None     Family History   Problem Relation Age  of Onset    Heart disease Father         Rheumatic heart disease    Hypertension Father     Osteoporosis Father     Stroke Mother     Osteoporosis Mother     No Known Problems Daughter     Mallorna Hyperthermia Neg Hx      Current Outpatient Medications on File Prior to Visit   Medication Sig    acyclovir (Zovirax) 400 MG tablet Take 1 tablet by mouth Daily.    alfuzosin (UROXATRAL) 10 MG 24 hr tablet TAKE 2 TABLETS DAILY IMMEDIATELY AFTER MEAL.    allopurinol (ZYLOPRIM) 300 MG tablet Take 1 tablet by mouth Daily.    ammonium lactate (AMLACTIN) 12 % cream APPLY 1 APPLICATION EXTERNALLY TWICE A DAY    bacitracin 500 UNIT/GM ointment Apply 1 application topically to the appropriate area as directed 2 (Two) Times a Day.    calcium carbonate (TUMS) 500 MG chewable tablet Chew 2 tablets 2 (Two) Times a Day As Needed for Heartburn.    hydroCHLOROthiazide (HYDRODIURIL) 25 MG tablet Take 1 po QD    HYDROcodone-acetaminophen (NORCO) 5-325 MG per tablet     levoFLOXacin (Levaquin) 500 MG tablet Take 1 tablet by mouth Daily.    lidocaine-prilocaine (EMLA) 2.5-2.5 % cream Apply 1 application  topically to the appropriate area as directed Every 2 (Two) Hours As Needed for Mild Pain.    losartan (COZAAR) 100 MG tablet Take 1 po QD    losartan-hydrochlorothiazide (HYZAAR) 100-25 MG per tablet Take 1 tablet by mouth Daily.    Melatonin 10 MG tablet Take 1 tablet by mouth Every Night.    meloxicam (MOBIC) 15 MG tablet     ofloxacin (OCUFLOX) 0.3 % ophthalmic solution Place four drops into affected ear twice daily for 7 days    ondansetron (Zofran) 4 MG tablet Take 1 tablet by mouth Every 6 (Six) Hours As Needed for Nausea or Vomiting for up to 10 doses.    polyethylene glycol (MIRALAX) 17 GM/SCOOP powder Dissolve 17 g (1 capful) in liquid and drink by mouth Daily.    potassium chloride (K-DUR,KLOR-CON) 20 MEQ CR tablet Take 1 tablet by mouth Daily.    pravastatin (PRAVACHOL) 20 MG tablet     predniSONE (DELTASONE) 20 MG tablet Take 3  "tablets by mouth daily for 5 days following treatment day as directed.    triamcinolone (KENALOG) 0.025 % cream APPLY TO RASH AS NEEDED     Current Facility-Administered Medications on File Prior to Visit   Medication    Chlorhexidine Gluconate Cloth 2 % pads     No Known Allergies        VITAL SIGNS:  Vitals:    08/29/23 0938   BP: 160/79   Pulse: 98   Resp: 18   Temp: 97.1 øF (36.2 øC)   TempSrc: Temporal   SpO2: 97%   Weight: 93.8 kg (206 lb 11.2 oz)   Height: 195.6 cm (77.01\")   PainSc: 0-No pain            PHYSICAL EXAMINATION:     GENERAL:  Well-developed, Patient  in no acute distress.   SKIN:  Warm, dry ,NO purpura ,no rash.  HEENT:  Pupils were equal and reactive to light and accomodation, conjunctivae noninjected, EOMI.  NECK:  Supple with good range of motion; no thyromegaly , no JVD  LYMPHATICS:  No cervical, NO supraclavicular adenopathies.  CARDIAC   normal rate , regular rhythm, without murmur,NO rubs NO S3 NO S4   LUNGS: normal breath sounds bilateral, no wheezing, NO rhonchi, NO crackles ,NO rubs.PORT SITE IS NORMAL  VASCULAR VENOUS: no cyanosis, NO collateral circulation, NO varicosities, mild BILATERAL edema with moderate edema in right ankle, NO palpable cords, NO pain,NO erythema, NO pigmentation of the skin.  EXTREMITIES  AND SPINE:  No clubbing, no cyanosis ,no deformities , no pain .No kyphosis,  no pain in spine, no pain in ribs , no pain in pelvic bone.  NEUROLOGICAL:  Patient was awake, alert, oriented to time, person and place.        LABORATORY DATA:  Results from last 7 days   Lab Units 08/29/23  0925 08/22/23  1244   WBC 10*3/mm3 3.94 5.62   NEUTROS ABS 10*3/mm3 2.76 3.87   HEMOGLOBIN g/dL 11.8* 12.4*   HEMATOCRIT % 34.9* 35.4*   PLATELETS 10*3/mm3 113* 88*     Results from last 7 days   Lab Units 08/29/23  0925   SODIUM mmol/L 140   POTASSIUM mmol/L 3.8   CHLORIDE mmol/L 105   CO2 mmol/L 24.4   BUN mg/dL 19   CREATININE mg/dL 0.73   CALCIUM mg/dL 9.1   ALBUMIN g/dL 3.9   BILIRUBIN " mg/dL 0.6   ALK PHOS U/L 38*   ALT (SGPT) U/L 10   AST (SGOT) U/L 22   GLUCOSE mg/dL 99            T ABDOMEN PELVIS W CONTRAST-     INDICATION:   History of non-Hodgkin's lymphoma. Observation of malignant neoplasm.     TECHNIQUE:   CT of the abdomen and pelvis with IV contrast. Coronal and sagittal  reconstructions were obtained.  Radiation dose reduction techniques  included automated exposure control or exposure modulation based on body  size. Count of known CT and cardiac nuc med studies performed in  previous 12 months: 5.      COMPARISON:   6/13/2023     FINDINGS:  Please see chest CT report dictated separately. There is atherosclerotic  disease with no aortic aneurysm. Gallbladder is grossly normal. No  biliary obstruction is seen. Redemonstrated is generalized anasarca.  Numerous hepatic cysts appear similar to prior. No definite liver  masses. Small bilateral renal cysts are also similar to prior. No  follow-up is indicated. The adrenal glands and pancreas appear normal.  The spleen remains enlarged. However, it is much improved from prior. It  now measures 16.7 cm in AP dimension, previously 20.8 cm. The spleen now  measures 21.8 cm in craniocaudal dimension, previously 31.4 cm.  Previously described small focal splenic hyperdensities are not  definitely seen on the current study. They may have resolved, or this  could be due to differences in contrast bolus timing.     Urinary bladder appears normal. Numerous metallic seeds are again noted  in the prostate gland. There is colonic diverticulosis without  diverticulitis or colitis. The appendix is normal. Gas-filled small  bowel loops suggest a mild generalized ileus. There is evidence of some  stasis in the distal ileum as well. Findings are present to some degree  on the prior exam. The stomach is normal. Left periaortic lymph node  measures 2.0 cm, stable. Previously described portohepatic lymph node is  poorly characterized today and is most likely  smaller. No definite new  adenopathy. Trace amount of free fluid is similar to prior. There is  lumbar spine degenerative disease. No suspicious osseous lesions.     IMPRESSION:     1. Improved appearance of the spleen.  2. Stable left periaortic adenopathy with improved portohepatic  adenopathy.  3. Stable mild anasarca with a stable trace amount of free fluid.  4. Mild small bowel ileus, also similar to prior.                 This report was finalized on 7/26/2023 2:17 AM by Dr. Jovani Newman,          ASSESSMENT:   1.  History of CLL, now with Jon's transformation  Status posttreatment with Bendamustine/Rituxan in 2016  Patient did have severe reaction to initial Rituxan dose requiring hospitalization or completion.  Did well thereafter.  12/30/2022 WBC remains normal at 10.6, 31% lymphs.  Platelets normal at 105,000.  No B symptoms or adenopathy.  No suggestion of recurrent disease.  Hemoglobin has acutely dropped however down to 11.9 (see further discussion below).  On 01/13/2023 his white count, hemoglobin and platelets are not changing. His total lymphocyte count is very minimal and I do believe that his leukemia remains very quiet on clinical grounds with no need for intervention.  1/13/2023 CT of the abdomen pelvis notes several enlarged peripancreatic and portal lymph nodes measuring up to 2.1 cm which are new.  New retroperitoneal lymphadenopathy with a musa mass to the left of the aorta measuring 3.0 x 3.5 cm.  Lymph nodes near the level of the iliac bifurcation measuring up to 2.1.  Multiple enlarged lymph nodes in the pelvis along both iliac chains measuring up to 1.1 x 2.7 cm in the left 1.3 x 4.4 cm in the right.  These lymph nodes are in the splenic enlargement area likely related to CLL.  1/25/2023 PET scan noting increase in size of his in the adenopathy in the chest abdomen pelvis as well as the chest.  Severe splenomegaly which is intensely FDG avid and highly concerning for leukemic  "involvement.  Plans for Calquence 200 mg twice daily which was initiated 2/8/2023  Molecular analysis of the peripheral blood flow cytometry has been requested from the [facility] in regard the FISH testing. Also we ordered IgH testing.   IgVH unmutated. This makes his prognosis a little bit worse in the long run  After 2 months of Calquence, clinical improvement in splenomegaly.  5/8/2020 3 repeat PET scan noting scattered adenopathy within the neck chest abdomen pelvis as before.  A few index lesions within the neck and chest are grossly stable.  Severe hypermetabolic splenomegaly as before the degree of FDG uptake appears minimally decreased.  Reevaluation 6/2/2023 with profound fatigue, inability to accomplish ADLs.  Worsening leukocytosis with white count of 23,000 and atypical mononuclear cells in circulation by peripheral blood examination.  Worsening thrombocytopenia and progressive splenomegaly  6/2/2023 flow cytometry on peripheral blood flow noting immunophenotype identifies a CD5 positive monoclonal kappa B-cell population representing 67% of total events.  Pathologist comment: Although the phenotype is similar to the patient's previous study, the percentage of disease is more than doubled.  The findings are worrisome for large cell (Jon's) or polymorphic transformation from the previous CLL.  6/7/2023 bone marrow biopsy including flow cytometry A monoclonal population is identified with lymphocyte region containing 32% of total events and the following immunophenotype positive CD46, CD6, bright CD19, bright CD20, HLA-DR bright surface kappa light chain, dim partial CD38   Negative CD4, CD8, CD10, CD11c, CD23. Pathology hypercellular bone marrow (50%) with involvement by malignant lymphoma with presenting 30% of bone marrow cellularity  Per Dr. Schroeder \"the pathology of the bone marrow recently done, even though does not document by flow cytometry any major changes consistent with his CLL, " "morphologically to my eyes, and I disagree with the Pathologist in certain fashion, shows 2 major populations of cells, number 1 precursors of red cells that were very obvious, and the second population was very monotonous population of largely looking lymphocytes that were very worrisome for diffuse large cell non-Hodgkin's lymphoma.\"  Recommendations for hospitalization, PICC line placement and initiation of CHOP Rituxan  6/12/2023, baseline echocardiogram with normal ejection fraction of 65%  6/13/2023, prior to initiation of chemotherapy WBC 53.8, hemoglobin 7.7, platelets 62,000.  LDH elevated at 1900  Rituxan initiated 6/23/2023  CHOP given 6/14/2023.  Vincristine given at a flat dose of 1 mg.  G-CSF with Neupogen 480 mcg given x8 days following cycle 1  Very dramatic response to first cycle of chemotherapy with decrease in LDH from 2000-400s 6/26/2023.  Also improvement in WBC  Technically due for cycle 2 chemotherapy today, 7/5/2023.  However, this will be delayed 1 week as the patient is having a Mediport placed tomorrow.  Continued improvement in LDH indicating ongoing response with  today.  On 07/11/2023 the patient was further reviewed. He has had a dramatic clinical comeback in regard to his energy level, appetite, and inability to function. He has been able to become self-independent at home and his daughter is giving him all the support that he needs. He is eating extremely well. He has lost a lot of weight related to water loss and hopefully he will start to gain muscle mass again in the next several weeks.   Today on clinical examination he has complete resolution of the nocturnal sweats. He has no peripheral adenopathy and the spleen is not clinically palpable anymore in his abdomen. His white count has further improved. His hemoglobin has further improved. His platelet count has almost normalized. The differential in the white count is dramatically different with predominance of segs and " resolution of the terrible peripheral lymphocytosis.   His creatinine is stable. His liver functions are stable. Potassium is stable. Given all these facts the patient will proceed today with the 2nd cycle of CHOP-Rituxan and he will be supported with Neulasta. We recalculated his BSA given the amount of weight loss associated with water loss that he has had and the new doses of chemotherapy medicines were adjusted accordingly.   He will require Neulasta administration today that will be deploying tomorrow and his daughter will be taught about how to proceed in this regard. This will obviate the lack of need to come for Neupogen injections every day. I made her aware though of the potentiality of the medicine to trigger headache, pain in the chest, pain in the rib cage, pelvic bone that will require Tylenol. He used to take high-dose Bufferin. I pointed out to him that he is not supposed to have Bufferin anymore  He will proceed with laboratory testing, CBC on weekly basis and he will have a CT scan of the chest, abdomen and pelvis in 2 weeks and I will review him back in 3 weeks. The plan will be to continue the CHOP-Rituxan administration after completion of 5-6 cycles. Eventually after the 3rd cycle he will require a cardiac reassessment with echocardiogram.  On 08/01/2023 the patient was reviewed. Clinically he is dramatically better. His mind is back to very significant improvement. His strength has improved. His nutrition has improved. His balance is not there yet but it is a big difference in him now than how he was 6 or 7 weeks ago. He is up and about. 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 He is eating like a hog and he is gaining strength with no pain and no B symptoms. His spleen has dramatically decreased. His white count is stable. Hemoglobin is improving. The platelet count is slowly improving. There is no peripheral blood lymphocytosis. His LDH has further improved and normalized to 179. This is coming  out from 2000 at the time of the diagnosis. Therefore, I do believe that with the report that we have above, the CT scan that I have personally reviewed showing a very dramatic improvement in the size of his splenomegaly and substantial reduction in his periaortic adenopathy the patient has had a dramatic improvement in regard to his Jon's transformation of CLL. My recommendation will be for him to have the next cycle of treatment in a few days according his schedule and have a new treatment 3 weeks later. On each one of those occasions the dose will remain about the same one that he received the previous dose. We have to modify numbers depending of the amount of weight loss that he has related to swollen legs. Also the patient will require Neulasta administration on each one of those cycles and he will require laboratory workup on weekly basis, monitoring CBC.   In the long run the goal will be to try to complete 6 cycles of chemotherapy and then put him on maintenance. I am planning also upon completion of 6 cycles to repeat his bone marrow testing.  8/8/2023: Patient has previously had an infusion reaction to Rituxan. Patient reviewed with Dr. Schroeder as his platelets are 84,000 today. We will proceed with Cycle 3 R-CHOP today and have patient return weekly for lab review to monitor counts closely. Patient to receive additional premedications as planned.   8/29/2023 patient returns the office today for cycle 4 R-CHOP.  He is tolerating treatment well.  Platelets are improving at 113,000 today.  He denies any recent fevers, chills or bleeding.  Will return for weekly labs with review.    2.  History of prostate cancer  Treated at the [facility]  Most recent PSA April 2022 = 0.203  On 01/13/2023 he has minimal urinary symptomatology comparing his PSA with 3 years ago was 0.8 today, actually a few days ago was 0.1. I doubt that all of the symptoms that he has are associated with prostate cancer. The blood  in the urine has a different significance.   On 01/18/2023 no issues pertinent to his prostate cancer. I see the seeds in his prostatic bed on the CT scan. His urinalysis is very much clean and his PSA has remained stable. No activity of this entity at this point.   On 03/17/2023 no issues pertinent to his previous history of prostate cancer. His PSA has been measured and has remained normal.  On 07/11/2023 no issues pertinent.  On 08/01/2023 no issues pertinent.  On 8/8/2023: no issues pertinent    3.  Venous access  Right upper extremity PICC line placed for cycle 1 chemotherapy.  Follow Dr. Kolb for Mediport placement tomorrow, 7/6/2023  With having Mediport placed tomorrow, we will pull the PICC line today, right upper extremity PICC line removed per nursing  Port in right subclavian position looking extremely well. I will prescribe Emla cream for future access to minimize pain.  On 8/09/2023 his port is functioning perfectly fine with no pain.    4.  Multifactorial anemia secondary to underlying malignancy and chemotherapy  Transfusion support as needed  Hemoglobin today is improved at 9.9  On 07/11/2023 anemia is self correcting now that he probably has a much better clean bone marrow able to trigger normal erythropoiesis.  On 08/01/2023 anemia is substantially improved.  8/29/2023: Hemoglobin slightly declined at 11.8.      5.  Prophylaxis  Continue on acyclovir 400 mg once daily  Continuing allopurinol 300 mg daily  On 07/11/2023 he remains on allopurinol and acyclovir with no viral infections. Eventually allopurinol will be discontinued before the 3rd cycle of chemotherapy.  On 08/01/2023 the patient remains on acyclovir prophylactically.      6.  Bilateral lower extremity swelling  Related to anasarca and immobility  Patient is not a good candidate for diuretics in light of his borderline hypotension and immobility, he is a falls risk.  Status compression and elevation.   On 07/11/2023 most of the  swelling is resolved now not with water medication but with just proper nutrition and mobilization. I expect that this swelling will be completely gone for the next cycle. Given the persistency of the swelling as posted above I readjusted his chemotherapy administration medications to account for this.  On 08/01/2023 the patient remains with some swelling in his lower extremities. Encouraged elastic support that he does not want to wear.  8/8/2023: Bilateral lower extremity has improved. Right right does appear more edematous than the left lower extremity. He denies any pain or tenderness. No palpable cords on exam. He has tried compression stockings in the past. Encouraged to utilize again.       PLAN:  Proceed with cycle 4 R-CHOP with the same premedications as previous  Continue Neulasta  Patient may discontinue allopurinol.  Return weekly for labs to monitor platelets closely.   Wear compression stockings for management of lower extremity edema.   Repeat bone marrow testing after completion of 6 cycles   Return in 3 weeks for MD follow up    Patient remains on high risk medication and requires close monitoring for toxicity.        Lidia Guillory, APRN  08/29/2023

## 2023-08-29 NOTE — NURSING NOTE
Patient tolerated Cycle 4 of R CHOP with the additional premedications and was discharged in stable condition

## 2023-08-29 NOTE — PATIENT INSTRUCTIONS
Zyprexa is a premedication that we have been giving you with past treatments to help prevent nausea from chemotherapy. We have found that this is making patients very sleepy and as a practice we recommend patients take it before going to bed. Please take Zyprexa 5mg tonight before going to bed and call the office if you have any questions and or concerns :)

## 2023-09-05 ENCOUNTER — LAB (OUTPATIENT)
Dept: LAB | Facility: HOSPITAL | Age: 78
End: 2023-09-05
Payer: MEDICARE

## 2023-09-05 ENCOUNTER — CLINICAL SUPPORT (OUTPATIENT)
Dept: ONCOLOGY | Facility: HOSPITAL | Age: 78
End: 2023-09-05
Payer: MEDICARE

## 2023-09-05 DIAGNOSIS — K21.00 GASTROESOPHAGEAL REFLUX DISEASE WITH ESOPHAGITIS, UNSPECIFIED WHETHER HEMORRHAGE: ICD-10-CM

## 2023-09-05 DIAGNOSIS — D61.810 PANCYTOPENIA DUE TO CHEMOTHERAPY: ICD-10-CM

## 2023-09-05 DIAGNOSIS — Z79.899 HIGH RISK MEDICATION USE: ICD-10-CM

## 2023-09-05 DIAGNOSIS — C91.10 RICHTER'S SYNDROME: ICD-10-CM

## 2023-09-05 DIAGNOSIS — E53.8 B12 DEFICIENCY: ICD-10-CM

## 2023-09-05 DIAGNOSIS — M62.3 IMMOBILITY SYNDROME: ICD-10-CM

## 2023-09-05 LAB
BASOPHILS # BLD AUTO: 0.01 10*3/MM3 (ref 0–0.2)
BASOPHILS NFR BLD AUTO: 1.8 % (ref 0–1.5)
DEPRECATED RDW RBC AUTO: 47.4 FL (ref 37–54)
EOSINOPHIL # BLD AUTO: 0.06 10*3/MM3 (ref 0–0.4)
EOSINOPHIL NFR BLD AUTO: 10.7 % (ref 0.3–6.2)
ERYTHROCYTE [DISTWIDTH] IN BLOOD BY AUTOMATED COUNT: 14.9 % (ref 12.3–15.4)
HCT VFR BLD AUTO: 32.1 % (ref 37.5–51)
HGB BLD-MCNC: 11.5 G/DL (ref 13–17.7)
IMM GRANULOCYTES # BLD AUTO: 0.05 10*3/MM3 (ref 0–0.05)
IMM GRANULOCYTES NFR BLD AUTO: 8.9 % (ref 0–0.5)
LYMPHOCYTES # BLD AUTO: 0.26 10*3/MM3 (ref 0.7–3.1)
LYMPHOCYTES NFR BLD AUTO: 46.4 % (ref 19.6–45.3)
MCH RBC QN AUTO: 31 PG (ref 26.6–33)
MCHC RBC AUTO-ENTMCNC: 35.8 G/DL (ref 31.5–35.7)
MCV RBC AUTO: 86.5 FL (ref 79–97)
MONOCYTES # BLD AUTO: 0.08 10*3/MM3 (ref 0.1–0.9)
MONOCYTES NFR BLD AUTO: 14.3 % (ref 5–12)
NEUTROPHILS NFR BLD AUTO: 0.1 10*3/MM3 (ref 1.7–7)
NEUTROPHILS NFR BLD AUTO: 17.9 % (ref 42.7–76)
NRBC BLD AUTO-RTO: 0 /100 WBC (ref 0–0.2)
PLATELET # BLD AUTO: 80 10*3/MM3 (ref 140–450)
PMV BLD AUTO: 9.6 FL (ref 6–12)
RBC # BLD AUTO: 3.71 10*6/MM3 (ref 4.14–5.8)
WBC NRBC COR # BLD: 0.56 10*3/MM3 (ref 3.4–10.8)

## 2023-09-05 PROCEDURE — 85025 COMPLETE CBC W/AUTO DIFF WBC: CPT

## 2023-09-05 PROCEDURE — 36415 COLL VENOUS BLD VENIPUNCTURE: CPT

## 2023-09-05 RX ORDER — LEVOFLOXACIN 500 MG/1
500 TABLET, FILM COATED ORAL DAILY
Qty: 5 TABLET | Refills: 0 | Status: SHIPPED | OUTPATIENT
Start: 2023-09-05 | End: 2023-09-11

## 2023-09-05 NOTE — PROGRESS NOTES
Subjective     HISTORY OF PRESENT ILLNESS:     History of Present Illness  ***    Past Medical History, Past Surgical History, Social History, Family History have been reviewed and are without significant changes except as mentioned.    Review of Systems   A comprehensive 14 point review of systems was performed and was negative except as mentioned.    Medications:  The current medication list was reviewed in the EMR    ALLERGIES:  No Known Allergies    Objective      There were no vitals filed for this visit.      8/29/2023     9:22 AM   Current Status   ECOG score 0       Physical Exam  ***    RECENT LABS:  Hematology WBC   Date Value Ref Range Status   09/05/2023 0.56 (C) 3.40 - 10.80 10*3/mm3 Final     RBC   Date Value Ref Range Status   09/05/2023 3.71 (L) 4.14 - 5.80 10*6/mm3 Final     Hemoglobin   Date Value Ref Range Status   09/05/2023 11.5 (L) 13.0 - 17.7 g/dL Final     Hematocrit   Date Value Ref Range Status   09/05/2023 32.1 (L) 37.5 - 51.0 % Final     Platelets   Date Value Ref Range Status   09/05/2023 80 (L) 140 - 450 10*3/mm3 Final              Assessment & Plan   ***                  9/5/2023      CC:

## 2023-09-12 ENCOUNTER — LAB (OUTPATIENT)
Dept: LAB | Facility: HOSPITAL | Age: 78
End: 2023-09-12
Payer: MEDICARE

## 2023-09-12 ENCOUNTER — CLINICAL SUPPORT (OUTPATIENT)
Dept: ONCOLOGY | Facility: HOSPITAL | Age: 78
End: 2023-09-12
Payer: MEDICARE

## 2023-09-12 DIAGNOSIS — C91.10 RICHTER'S SYNDROME: ICD-10-CM

## 2023-09-12 DIAGNOSIS — D61.810 PANCYTOPENIA DUE TO CHEMOTHERAPY: ICD-10-CM

## 2023-09-12 DIAGNOSIS — M62.3 IMMOBILITY SYNDROME: ICD-10-CM

## 2023-09-12 DIAGNOSIS — Z79.899 HIGH RISK MEDICATION USE: ICD-10-CM

## 2023-09-12 LAB
ALBUMIN SERPL-MCNC: 3.8 G/DL (ref 3.5–5.2)
ALBUMIN/GLOB SERPL: 2.1 G/DL
ALP SERPL-CCNC: 48 U/L (ref 39–117)
ALT SERPL W P-5'-P-CCNC: 6 U/L (ref 1–41)
ANION GAP SERPL CALCULATED.3IONS-SCNC: 13.5 MMOL/L (ref 5–15)
AST SERPL-CCNC: 16 U/L (ref 1–40)
BASOPHILS # BLD AUTO: 0.07 10*3/MM3 (ref 0–0.2)
BASOPHILS NFR BLD AUTO: 1.5 % (ref 0–1.5)
BILIRUB SERPL-MCNC: 0.7 MG/DL (ref 0–1.2)
BUN SERPL-MCNC: 15 MG/DL (ref 8–23)
BUN/CREAT SERPL: 19.2 (ref 7–25)
CALCIUM SPEC-SCNC: 9 MG/DL (ref 8.6–10.5)
CHLORIDE SERPL-SCNC: 105 MMOL/L (ref 98–107)
CO2 SERPL-SCNC: 25.5 MMOL/L (ref 22–29)
CREAT SERPL-MCNC: 0.78 MG/DL (ref 0.7–1.3)
DEPRECATED RDW RBC AUTO: 49.1 FL (ref 37–54)
EGFRCR SERPLBLD CKD-EPI 2021: 90.7 ML/MIN/1.73
EOSINOPHIL # BLD AUTO: 0.07 10*3/MM3 (ref 0–0.4)
EOSINOPHIL NFR BLD AUTO: 1.5 % (ref 0.3–6.2)
ERYTHROCYTE [DISTWIDTH] IN BLOOD BY AUTOMATED COUNT: 15.2 % (ref 12.3–15.4)
GLOBULIN UR ELPH-MCNC: 1.8 GM/DL
GLUCOSE SERPL-MCNC: 97 MG/DL (ref 65–99)
HCT VFR BLD AUTO: 35.3 % (ref 37.5–51)
HGB BLD-MCNC: 11.9 G/DL (ref 13–17.7)
IMM GRANULOCYTES # BLD AUTO: 1.12 10*3/MM3 (ref 0–0.05)
IMM GRANULOCYTES NFR BLD AUTO: 23.7 % (ref 0–0.5)
LYMPHOCYTES # BLD AUTO: 0.48 10*3/MM3 (ref 0.7–3.1)
LYMPHOCYTES NFR BLD AUTO: 10.1 % (ref 19.6–45.3)
MCH RBC QN AUTO: 30.1 PG (ref 26.6–33)
MCHC RBC AUTO-ENTMCNC: 33.7 G/DL (ref 31.5–35.7)
MCV RBC AUTO: 89.1 FL (ref 79–97)
MONOCYTES # BLD AUTO: 0.43 10*3/MM3 (ref 0.1–0.9)
MONOCYTES NFR BLD AUTO: 9.1 % (ref 5–12)
NEUTROPHILS NFR BLD AUTO: 2.56 10*3/MM3 (ref 1.7–7)
NEUTROPHILS NFR BLD AUTO: 54.1 % (ref 42.7–76)
NRBC BLD AUTO-RTO: 0 /100 WBC (ref 0–0.2)
PLATELET # BLD AUTO: 117 10*3/MM3 (ref 140–450)
PMV BLD AUTO: 10.3 FL (ref 6–12)
POTASSIUM SERPL-SCNC: 4.3 MMOL/L (ref 3.5–5.2)
PROT SERPL-MCNC: 5.6 G/DL (ref 6–8.5)
RBC # BLD AUTO: 3.96 10*6/MM3 (ref 4.14–5.8)
SODIUM SERPL-SCNC: 144 MMOL/L (ref 136–145)
WBC NRBC COR # BLD: 4.73 10*3/MM3 (ref 3.4–10.8)

## 2023-09-12 PROCEDURE — 80053 COMPREHEN METABOLIC PANEL: CPT

## 2023-09-12 PROCEDURE — 85025 COMPLETE CBC W/AUTO DIFF WBC: CPT

## 2023-09-12 PROCEDURE — 36415 COLL VENOUS BLD VENIPUNCTURE: CPT

## 2023-09-12 NOTE — PROGRESS NOTES
Lab Results   Component Value Date    WBC 4.73 09/12/2023    HGB 11.9 (L) 09/12/2023    HCT 35.3 (L) 09/12/2023    MCV 89.1 09/12/2023     (L) 09/12/2023     Pt here for labs and RN review. Counts are recovering well and pt denies any fevers, cough, SOA or s/s bleeding. Was having some minor constipation, but has been utilizing miralax with relief. Denies any questions or concerns at this time. Copy of labs given and pt d/c with family member.

## 2023-09-19 ENCOUNTER — OFFICE VISIT (OUTPATIENT)
Dept: ONCOLOGY | Facility: CLINIC | Age: 78
End: 2023-09-19
Payer: MEDICARE

## 2023-09-19 ENCOUNTER — INFUSION (OUTPATIENT)
Dept: ONCOLOGY | Facility: HOSPITAL | Age: 78
End: 2023-09-19
Payer: MEDICARE

## 2023-09-19 ENCOUNTER — TELEPHONE (OUTPATIENT)
Dept: ONCOLOGY | Facility: CLINIC | Age: 78
End: 2023-09-19
Payer: MEDICARE

## 2023-09-19 VITALS
DIASTOLIC BLOOD PRESSURE: 73 MMHG | TEMPERATURE: 98.2 F | HEIGHT: 77 IN | OXYGEN SATURATION: 97 % | SYSTOLIC BLOOD PRESSURE: 132 MMHG | WEIGHT: 207.8 LBS | BODY MASS INDEX: 24.54 KG/M2 | HEART RATE: 107 BPM

## 2023-09-19 DIAGNOSIS — C91.10 RICHTER'S SYNDROME: Primary | ICD-10-CM

## 2023-09-19 DIAGNOSIS — I42.9 CARDIOMYOPATHY, UNSPECIFIED TYPE: ICD-10-CM

## 2023-09-19 DIAGNOSIS — D61.810 PANCYTOPENIA DUE TO CHEMOTHERAPY: ICD-10-CM

## 2023-09-19 DIAGNOSIS — Z45.2 ENCOUNTER FOR ADJUSTMENT OR MANAGEMENT OF VASCULAR ACCESS DEVICE: Primary | ICD-10-CM

## 2023-09-19 DIAGNOSIS — Z79.899 HIGH RISK MEDICATION USE: ICD-10-CM

## 2023-09-19 DIAGNOSIS — M62.3 IMMOBILITY SYNDROME: ICD-10-CM

## 2023-09-19 LAB
ALBUMIN SERPL-MCNC: 3.9 G/DL (ref 3.5–5.2)
ALBUMIN/GLOB SERPL: 2.4 G/DL
ALP SERPL-CCNC: 45 U/L (ref 39–117)
ALT SERPL W P-5'-P-CCNC: 8 U/L (ref 1–41)
ANION GAP SERPL CALCULATED.3IONS-SCNC: 13.4 MMOL/L (ref 5–15)
AST SERPL-CCNC: 21 U/L (ref 1–40)
BASOPHILS # BLD AUTO: 0.08 10*3/MM3 (ref 0–0.2)
BASOPHILS NFR BLD AUTO: 1.5 % (ref 0–1.5)
BILIRUB SERPL-MCNC: 0.8 MG/DL (ref 0–1.2)
BUN SERPL-MCNC: 13 MG/DL (ref 8–23)
BUN/CREAT SERPL: 17.8 (ref 7–25)
CALCIUM SPEC-SCNC: 9.1 MG/DL (ref 8.6–10.5)
CHLORIDE SERPL-SCNC: 105 MMOL/L (ref 98–107)
CO2 SERPL-SCNC: 23.6 MMOL/L (ref 22–29)
CREAT SERPL-MCNC: 0.73 MG/DL (ref 0.7–1.3)
DEPRECATED RDW RBC AUTO: 49.1 FL (ref 37–54)
EGFRCR SERPLBLD CKD-EPI 2021: 92.5 ML/MIN/1.73
EOSINOPHIL # BLD AUTO: 0.02 10*3/MM3 (ref 0–0.4)
EOSINOPHIL NFR BLD AUTO: 0.4 % (ref 0.3–6.2)
ERYTHROCYTE [DISTWIDTH] IN BLOOD BY AUTOMATED COUNT: 15.3 % (ref 12.3–15.4)
GLOBULIN UR ELPH-MCNC: 1.6 GM/DL
GLUCOSE SERPL-MCNC: 110 MG/DL (ref 65–99)
HCT VFR BLD AUTO: 33.9 % (ref 37.5–51)
HGB BLD-MCNC: 11.5 G/DL (ref 13–17.7)
IMM GRANULOCYTES # BLD AUTO: 0.45 10*3/MM3 (ref 0–0.05)
IMM GRANULOCYTES NFR BLD AUTO: 8.2 % (ref 0–0.5)
LDH SERPL-CCNC: 290 U/L (ref 135–225)
LYMPHOCYTES # BLD AUTO: 0.39 10*3/MM3 (ref 0.7–3.1)
LYMPHOCYTES NFR BLD AUTO: 7.1 % (ref 19.6–45.3)
MCH RBC QN AUTO: 30 PG (ref 26.6–33)
MCHC RBC AUTO-ENTMCNC: 33.9 G/DL (ref 31.5–35.7)
MCV RBC AUTO: 88.5 FL (ref 79–97)
MONOCYTES # BLD AUTO: 0.44 10*3/MM3 (ref 0.1–0.9)
MONOCYTES NFR BLD AUTO: 8.1 % (ref 5–12)
NEUTROPHILS NFR BLD AUTO: 4.08 10*3/MM3 (ref 1.7–7)
NEUTROPHILS NFR BLD AUTO: 74.7 % (ref 42.7–76)
NRBC BLD AUTO-RTO: 0 /100 WBC (ref 0–0.2)
NT-PROBNP SERPL-MCNC: 347 PG/ML (ref 0–1800)
PLATELET # BLD AUTO: 106 10*3/MM3 (ref 140–450)
PMV BLD AUTO: 9.8 FL (ref 6–12)
POTASSIUM SERPL-SCNC: 3.8 MMOL/L (ref 3.5–5.2)
PROT SERPL-MCNC: 5.5 G/DL (ref 6–8.5)
RBC # BLD AUTO: 3.83 10*6/MM3 (ref 4.14–5.8)
SODIUM SERPL-SCNC: 142 MMOL/L (ref 136–145)
WBC NRBC COR # BLD: 5.46 10*3/MM3 (ref 3.4–10.8)

## 2023-09-19 PROCEDURE — 36415 COLL VENOUS BLD VENIPUNCTURE: CPT | Performed by: INTERNAL MEDICINE

## 2023-09-19 PROCEDURE — 83880 ASSAY OF NATRIURETIC PEPTIDE: CPT | Performed by: INTERNAL MEDICINE

## 2023-09-19 PROCEDURE — 80053 COMPREHEN METABOLIC PANEL: CPT

## 2023-09-19 PROCEDURE — 25010000002 HEPARIN LOCK FLUSH PER 10 UNITS: Performed by: INTERNAL MEDICINE

## 2023-09-19 PROCEDURE — 85025 COMPLETE CBC W/AUTO DIFF WBC: CPT

## 2023-09-19 PROCEDURE — 83615 LACTATE (LD) (LDH) ENZYME: CPT | Performed by: INTERNAL MEDICINE

## 2023-09-19 PROCEDURE — 36591 DRAW BLOOD OFF VENOUS DEVICE: CPT

## 2023-09-19 RX ORDER — HEPARIN SODIUM (PORCINE) LOCK FLUSH IV SOLN 100 UNIT/ML 100 UNIT/ML
500 SOLUTION INTRAVENOUS AS NEEDED
OUTPATIENT
Start: 2023-09-19

## 2023-09-19 RX ORDER — SODIUM CHLORIDE 0.9 % (FLUSH) 0.9 %
10 SYRINGE (ML) INJECTION AS NEEDED
OUTPATIENT
Start: 2023-09-19

## 2023-09-19 RX ORDER — HEPARIN SODIUM (PORCINE) LOCK FLUSH IV SOLN 100 UNIT/ML 100 UNIT/ML
500 SOLUTION INTRAVENOUS ONCE
Status: COMPLETED | OUTPATIENT
Start: 2023-09-19 | End: 2023-09-19

## 2023-09-19 RX ADMIN — Medication 500 UNITS: at 10:16

## 2023-09-19 NOTE — PROGRESS NOTES
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  Progressive disease January 2023 placed on Calquence.  Further progression with Jon's transformation and initiating CHOP Rituxan 6/14/2023    HISTORY OF PRESENT ILLNESS:    On 09/19/2023 this 77-year-old patient who has Jon's transformation of CLL in 05/2023 initiated CHOP/Rituxan has completed already 4 cycles of treatment with dramatic improvement clinically and by laboratory testing. His LDH has normalized and his white count has normalized in regard to the differential, hemoglobin has improved and platelet count has improved. His large splenomegaly clinically documented has receded. Physically the patient feels the best today than he has felt in 4 months or so. His appetite has improved, he is eating very good diet, his weight is stable, his bowel activity is normal as long as he takes MiraLax and urination is ongoing with no difficulties. The swelling in his lower extremities has resolved almost altogether. He has no fevers, chills or sweats, no pruritus, no adenopathy. Energy level is very acceptable. He has not encountered any cough, sputum production or shortness of breath. He is not having any cancer related pain. His ECOG performance status has improved from 3 at the time of the hospital admission for initiation of CHOP/Rituxan to ECOG performance status of 1.         Past Medical History:   Diagnosis Date    Arthritis     Benign prostatic hyperplasia     FOLLOWED BY DR. VIVIEN PATEL    Biliary dyskinesia     Bunion of right foot     GREAT TOE    CLL (chronic lymphocytic leukemia) 2016    FOLLOWED BY DR WALKER    Colon polyps     FOLLOWED BY DR. NEHA HAM    Constipation     Degeneration of lumbar intervertebral disc     Diverticulosis     Erectile dysfunction     Fracture of ankle 1975    GERD (gastroesophageal reflux disease)     Hallux valgus of left foot     Hyperlipidemia     MIXED     Hypertension     Inguinal hernia     Kidney stone 02/21/2009    SEEN AT [facility] ER    Knee swelling 2018    Localized, primary osteoarthritis     Lumbar radiculopathy     Lumbar stenosis     Lung mass     LEFT SIDE - SCAR TISSUE PER PATIENT     Osteoarthritis of right knee     Polyneuropathy     Prostate CA 03/2016    JACQUELYN 6, S/P XRT, FOLLOWED BY DR. VIVIEN PATEL, RADIATION SEEDS    PVC (premature ventricular contraction)     PT STATES WORKED UP YEARS AGO BY [facility] FOR POSSIBLE MURMUR - NO FOLLOW UP REQUIRED     RBBB     Sensory hearing loss, bilateral     Skin cancer     SQUAMOUS CELL CARCINOMA OF FACE    Substernal goiter     Thyromegaly 12/2016    ADMITTED TO [facility]    Vitamin D deficiency      Past Surgical History:   Procedure Laterality Date    BRONCHOSCOPY N/A 12/14/2016    Procedure: BRONCHOSCOPY WITH  BAL AND BIOPSY AND ENDOBRONCHIAL ULTRASOUND  AND NAVIGATION WITH BRUSHINGS AND BIOPSY AND BAL;  Surgeon: Pierre Manning MD;  Location: [facility] ENDOSCOPY;  Service:     COLONOSCOPY N/A 03/13/2019    5 MM SESSILE TUBULAR ADENOMA POLYP IN TRANSVERSE, MULTIPLE SMALL AND LARGE DIVERTICULA IN SIGMOID, RESCOPE IN 5 YRS, DR. NEHA HAM AT [facility]    COLONOSCOPY W/ POLYPECTOMY N/A 03/19/2015    3 TUBULAR ADENOMA POLYPS, 12 TUBULOVILLOUS POLYP RECTO-SIGMOID, DIVERTICULOSIS, RESCOPE IN 3 YRS, DR. NEHA HAM AT [facility]    EYE SURGERY Bilateral     CATARACT EXTRACTION WITH LENS IMPLANT, DR. GOVEA    FINGER NAIL SURGERY Right 2022    TORRES-PATEL    INGUINAL HERNIA REPAIR Left 11/02/2021    Procedure: LEFT OPEN INGUINAL HERNIA REPAIR;  Surgeon: Nixon Kolb MD;  Location: [facility] OR;  Service: General;  Laterality: Left;    PROSTATE RADIOACTIVE SEED IMPLANT N/A 09/06/2016    DR. HOA JARAMILLO AT [facility]    PROSTATE SURGERY N/A 03/11/2016    BIOPSY: ADENOCARCINOMA, DR. ZAID IBARRA    THYROID BIOPSY Bilateral 11/01/2017    NO B CELL OR T CELL LYMPHOMA, DONE AT [facility]    TOTAL KNEE ARTHROPLASTY Right  10/12/2022    Procedure: TOTAL KNEE ARTHROPLASTY;  Surgeon: Ran Rachel MD;  Location: [facility] OR [facility] [place] [facility];  Service: Orthopedics;  Laterality: Right;    VENOUS ACCESS DEVICE (PORT) INSERTION Right 7/6/2023    Procedure: INSERTION VENOUS ACCESS DEVICE;  Surgeon: Nixon Kolb MD;  Location:  [facility] OR [facility] [place];  Service: General;  Laterality: Right;     Social History     Socioeconomic History    Marital status:      Spouse name: No    Years of education: College   Tobacco Use    Smoking status: Never    Smokeless tobacco: Never   Vaping Use    Vaping Use: Never used   Substance and Sexual Activity    Alcohol use: No    Drug use: Never    Sexual activity: Yes     Partners: Female     Birth control/protection: None     Family History   Problem Relation Age of Onset    Heart disease Father         Rheumatic heart disease    Hypertension Father     Osteoporosis Father     Stroke Mother     Osteoporosis Mother     No Known Problems Daughter     Malig Hyperthermia Neg Hx      Current Outpatient Medications on File Prior to Visit   Medication Sig    acyclovir (Zovirax) 400 MG tablet Take 1 tablet by mouth Daily.    alfuzosin (UROXATRAL) 10 MG 24 hr tablet TAKE 2 TABLETS DAILY IMMEDIATELY AFTER MEAL.    allopurinol (ZYLOPRIM) 300 MG tablet Take 1 tablet by mouth Daily.    ammonium lactate (AMLACTIN) 12 % cream APPLY 1 APPLICATION EXTERNALLY TWICE A DAY    bacitracin 500 UNIT/GM ointment Apply 1 application topically to the appropriate area as directed 2 (Two) Times a Day.    calcium carbonate (TUMS) 500 MG chewable tablet Chew 2 tablets 2 (Two) Times a Day As Needed for Heartburn.    hydroCHLOROthiazide (HYDRODIURIL) 25 MG tablet Take 1 po QD    HYDROcodone-acetaminophen (NORCO) 5-325 MG per tablet     lidocaine-prilocaine (EMLA) 2.5-2.5 % cream Apply 1 application  topically to the appropriate area as directed Every 2 (Two) Hours As Needed for Mild Pain.    losartan (COZAAR) 100 MG tablet Take 1 po QD     "losartan-hydrochlorothiazide (HYZAAR) 100-25 MG per tablet Take 1 tablet by mouth Daily.    Melatonin 10 MG tablet Take 1 tablet by mouth Every Night.    meloxicam (MOBIC) 15 MG tablet     ofloxacin (OCUFLOX) 0.3 % ophthalmic solution Place four drops into affected ear twice daily for 7 days    ondansetron (Zofran) 4 MG tablet Take 1 tablet by mouth Every 6 (Six) Hours As Needed for Nausea or Vomiting for up to 10 doses.    polyethylene glycol (MIRALAX) 17 GM/SCOOP powder Dissolve 17 g (1 capful) in liquid and drink by mouth Daily.    potassium chloride (K-DUR,KLOR-CON) 20 MEQ CR tablet Take 1 tablet by mouth Daily.    pravastatin (PRAVACHOL) 20 MG tablet     predniSONE (DELTASONE) 20 MG tablet Take 3 tablets by mouth daily for 5 days following treatment day as directed.    triamcinolone (KENALOG) 0.025 % cream APPLY TO RASH AS NEEDED     Current Facility-Administered Medications on File Prior to Visit   Medication    Chlorhexidine Gluconate Cloth 2 % pads     No Known Allergies        VITAL SIGNS:  Vitals:    09/19/23 0933   BP: 132/73   Pulse: 107   Temp: 98.2 °F (36.8 °C)   TempSrc: Temporal   SpO2: 97%   Weight: 94.3 kg (207 lb 12.8 oz)   Height: 195.6 cm (77.01\")   PainSc: 0-No pain            PHYSICAL EXAMINATION:           GENERAL:  Well-developed, Patient  in no acute distress.   SKIN:  Warm, dry ,NO purpura ,no rash.  HEENT:  Pupils were equal and reactive to light and accomodation, conjunctivae noninjected,  normal visual acuity.   NECK:  Supple with good range of motion; no thyromegaly , no JVD or bruits,.No carotid artery pain, no carotid abnormal pulsation   LYMPHATICS:  No cervical, NO supraclavicular, NO axillary, NO inguinal adenopathies.  CARDIAC   RAPID rate , regular rhythm, without murmur,NO rubs NO S3 NO S4   LUNGS: normal breath sounds bilateral, no wheezing, NO rhonchi, NO crackles ,NO rubs.  VASCULAR VENOUS: no cyanosis, NO collateral circulation, NO varicosities, NO edema, NO palpable cords, " NO pain,NO erythema, NO pigmentation of the skin.  ABDOMEN:  Soft, NO pain,no hepatomegaly, no splenomegaly,no masses, no ascites, no collateral circulation,no distention.  EXTREMITIES  AND SPINE:  No clubbing, no cyanosis ,no deformities , no pain .No kyphosis,  no pain in spine, no pain in ribs , no pain in pelvic bone.  NEUROLOGICAL:  Patient was awake, alert, oriented to time, person and place.        LABORATORY DATA:  Results from last 7 days   Lab Units 09/19/23  0917   WBC 10*3/mm3 5.46   NEUTROS ABS 10*3/mm3 4.08   HEMOGLOBIN g/dL 11.5*   HEMATOCRIT % 33.9*   PLATELETS 10*3/mm3 106*     Results from last 7 days   Lab Units 09/19/23  0917   SODIUM mmol/L 142   POTASSIUM mmol/L 3.8   CHLORIDE mmol/L 105   CO2 mmol/L 23.6   BUN mg/dL 13   CREATININE mg/dL 0.73   CALCIUM mg/dL 9.1   ALBUMIN g/dL 3.9   BILIRUBIN mg/dL 0.8   ALK PHOS U/L 45   ALT (SGPT) U/L 8   AST (SGOT) U/L 21   GLUCOSE mg/dL 110*         ASSESSMENT:   1.  History of CLL, now with Jon's transformation  Status posttreatment with Bendamustine/Rituxan in 2016  Patient did have severe reaction to initial Rituxan dose requiring hospitalization or completion.  Did well thereafter.  12/30/2022 WBC remains normal at 10.6, 31% lymphs.  Platelets normal at 105,000.  No B symptoms or adenopathy.  No suggestion of recurrent disease.  Hemoglobin has acutely dropped however down to 11.9 (see further discussion below).  On 01/13/2023 his white count, hemoglobin and platelets are not changing. His total lymphocyte count is very minimal and I do believe that his leukemia remains very quiet on clinical grounds with no need for intervention.  1/13/2023 CT of the abdomen pelvis notes several enlarged peripancreatic and portal lymph nodes measuring up to 2.1 cm which are new.  New retroperitoneal lymphadenopathy with a musa mass to the left of the aorta measuring 3.0 x 3.5 cm.  Lymph nodes near the level of the iliac bifurcation measuring up to 2.1.  Multiple  enlarged lymph nodes in the pelvis along both iliac chains measuring up to 1.1 x 2.7 cm in the left 1.3 x 4.4 cm in the right.  These lymph nodes are in the splenic enlargement area likely related to CLL.  1/25/2023 PET scan noting increase in size of his in the adenopathy in the chest abdomen pelvis as well as the chest.  Severe splenomegaly which is intensely FDG avid and highly concerning for leukemic involvement.  Plans for Calquence 200 mg twice daily which was initiated 2/8/2023  Molecular analysis of the peripheral blood flow cytometry has been requested from the [facility] in regard the FISH testing. Also we ordered IgH testing.   IgVH unmutated. This makes his prognosis a little bit worse in the long run  After 2 months of Calquence, clinical improvement in splenomegaly.  5/8/2020 3 repeat PET scan noting scattered adenopathy within the neck chest abdomen pelvis as before.  A few index lesions within the neck and chest are grossly stable.  Severe hypermetabolic splenomegaly as before the degree of FDG uptake appears minimally decreased.  Reevaluation 6/2/2023 with profound fatigue, inability to accomplish ADLs.  Worsening leukocytosis with white count of 23,000 and atypical mononuclear cells in circulation by peripheral blood examination.  Worsening thrombocytopenia and progressive splenomegaly  6/2/2023 flow cytometry on peripheral blood flow noting immunophenotype identifies a CD5 positive monoclonal kappa B-cell population representing 67% of total events.  Pathologist comment: Although the phenotype is similar to the patient's previous study, the percentage of disease is more than doubled.  The findings are worrisome for large cell (Jon's) or polymorphic transformation from the previous CLL.  6/7/2023 bone marrow biopsy including flow cytometry A monoclonal population is identified with lymphocyte region containing 32% of total events and the following immunophenotype positive CD46, CD6, bright CD19,  "bright CD20, HLA-DR bright surface kappa light chain, dim partial CD38   Negative CD4, CD8, CD10, CD11c, CD23. Pathology hypercellular bone marrow (50%) with involvement by malignant lymphoma with presenting 30% of bone marrow cellularity  Per Dr. Schroeder \"the pathology of the bone marrow recently done, even though does not document by flow cytometry any major changes consistent with his CLL, morphologically to my eyes, and I disagree with the Pathologist in certain fashion, shows 2 major populations of cells, number 1 precursors of red cells that were very obvious, and the second population was very monotonous population of largely looking lymphocytes that were very worrisome for diffuse large cell non-Hodgkin's lymphoma.\"  Recommendations for hospitalization, PICC line placement and initiation of CHOP Rituxan  6/12/2023, baseline echocardiogram with normal ejection fraction of 65%  6/13/2023, prior to initiation of chemotherapy WBC 53.8, hemoglobin 7.7, platelets 62,000.  LDH elevated at 1900  Rituxan initiated 6/23/2023  CHOP given 6/14/2023.  Vincristine given at a flat dose of 1 mg.  G-CSF with Neupogen 480 mcg given x8 days following cycle 1  Very dramatic response to first cycle of chemotherapy with decrease in LDH from 2000-400s 6/26/2023.  Also improvement in WBC  Technically due for cycle 2 chemotherapy today, 7/5/2023.  However, this will be delayed 1 week as the patient is having a Mediport placed tomorrow.  Continued improvement in LDH indicating ongoing response with  today.  On 07/11/2023 the patient was further reviewed. He has had a dramatic clinical comeback in regard to his energy level, appetite, and inability to function. He has been able to become self-independent at home and his daughter is giving him all the support that he needs. He is eating extremely well. He has lost a lot of weight related to water loss and hopefully he will start to gain muscle mass again in the next several " weeks.   Today on clinical examination he has complete resolution of the nocturnal sweats. He has no peripheral adenopathy and the spleen is not clinically palpable anymore in his abdomen. His white count has further improved. His hemoglobin has further improved. His platelet count has almost normalized. The differential in the white count is dramatically different with predominance of segs and resolution of the terrible peripheral lymphocytosis.   His creatinine is stable. His liver functions are stable. Potassium is stable. Given all these facts the patient will proceed today with the 2nd cycle of CHOP-Rituxan and he will be supported with Neulasta. We recalculated his BSA given the amount of weight loss associated with water loss that he has had and the new doses of chemotherapy medicines were adjusted accordingly.   He will require Neulasta administration today that will be deploying tomorrow and his daughter will be taught about how to proceed in this regard. This will obviate the lack of need to come for Neupogen injections every day. I made her aware though of the potentiality of the medicine to trigger headache, pain in the chest, pain in the rib cage, pelvic bone that will require Tylenol. He used to take high-dose Bufferin. I pointed out to him that he is not supposed to have Bufferin anymore  He will proceed with laboratory testing, CBC on weekly basis and he will have a CT scan of the chest, abdomen and pelvis in 2 weeks and I will review him back in 3 weeks. The plan will be to continue the CHOP-Rituxan administration after completion of 5-6 cycles. Eventually after the 3rd cycle he will require a cardiac reassessment with echocardiogram.  On 08/01/2023 the patient was reviewed. Clinically he is dramatically better. His mind is back to very significant improvement. His strength has improved. His nutrition has improved. His balance is not there yet but it is a big difference in him now than how he was 6  or 7 weeks ago. He is up and about. 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 He is eating like a hog and he is gaining strength with no pain and no B symptoms. His spleen has dramatically decreased. His white count is stable. Hemoglobin is improving. The platelet count is slowly improving. There is no peripheral blood lymphocytosis. His LDH has further improved and normalized to 179. This is coming out from 2000 at the time of the diagnosis. Therefore, I do believe that with the report that we have above, the CT scan that I have personally reviewed showing a very dramatic improvement in the size of his splenomegaly and substantial reduction in his periaortic adenopathy the patient has had a dramatic improvement in regard to his Jon's transformation of CLL. My recommendation will be for him to have the next cycle of treatment in a few days according his schedule and have a new treatment 3 weeks later. On each one of those occasions the dose will remain about the same one that he received the previous dose. We have to modify numbers depending of the amount of weight loss that he has related to swollen legs. Also the patient will require Neulasta administration on each one of those cycles and he will require laboratory workup on weekly basis, monitoring CBC.   In the long run the goal will be to try to complete 6 cycles of chemotherapy and then put him on maintenance. I am planning also upon completion of 6 cycles to repeat his bone marrow testing.  8/8/2023: Patient has previously had an infusion reaction to Rituxan. Patient reviewed with Dr. Schroeder as his platelets are 84,000 today. We will proceed with Cycle 3 R-CHOP today and have patient return weekly for lab review to monitor counts closely. Patient to receive additional premedications as planned.   8/29/2023 patient returns the office today for cycle 4 R-CHOP.  He is tolerating treatment well.  Platelets are improving at 113,000 today.  He denies any recent  fevers, chills or bleeding.  Will return for weekly labs with review.  On 09/19/2023 the patient feels terrific. He is doing a lot of walking, he has 3 meals a day, a lot of snacks. He has gained weight. His ECOG performance status dramatically improved. 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 Clinically he has no peripheral adenopathy or hepatosplenomegaly. His large spleen is no longer evident at least clinically. His LDH has remained down very substantially. His white count differential has dramatically improved to normalization. His platelet count remains relatively low at 107 with no clinical bleeding.     He is not experiencing any B symptoms anymore.    In my opinion he has reached very dramatic response to CHOP/Rituxan, he has undergone 4 cycles. He is supposed to have another cycle today and I advised him to consider recheck and reassessment in regard toxicity with an echocardiogram especially knowing that his heart rate is a little bit on the fast side. I will check a proBNP as well today.    In regard to his Jon's transformation I want to know what is the status of the spleen that was the hottest organ at the time of his diagnosis of Jon's transformation. The low platelet count tells me that probably the spleen is enlarged and I do not know what the SUV activity is. In the past we have talked with the patient about consideration of splenectomy that he is not going to consider unless that that is the only reason in my statement.     In the meantime I advised the patient to continue enjoying his freedom and enjoying his ability to function in absence of pain or B symptoms.    I went ahead and requested a proBNP today and we will go ahead and do a PET scan in days and do an echocardiogram in days. I want to know what is his ejection fraction.     I will review him back in a week. Depending on where we are we will decide how to  further proceed in regard to continuation of the same chemotherapy regimen or modifying the regimen according to needs.         2.  History of prostate cancer  Treated at the [facility]  Most recent PSA April 2022 = 0.203  On 01/13/2023 he has minimal urinary symptomatology comparing his PSA with 3 years ago was 0.8 today, actually a few days ago was 0.1. I doubt that all of the symptoms that he has are associated with prostate cancer. The blood in the urine has a different significance.   On 01/18/2023 no issues pertinent to his prostate cancer. I see the seeds in his prostatic bed on the CT scan. His urinalysis is very much clean and his PSA has remained stable. No activity of this entity at this point.   On 03/17/2023 no issues pertinent to his previous history of prostate cancer. His PSA has been measured and has remained normal.  On 07/11/2023 no issues pertinent.  On 08/01/2023 no issues pertinent.  On 8/8/2023: no issues pertinent  On 09/19/2023 no issues pertinent.      3.  Venous access  Right upper extremity PICC line placed for cycle 1 chemotherapy.  Follow Dr. Kolb for Mediport placement tomorrow, 7/6/2023  With having Mediport placed tomorrow, we will pull the PICC line today, right upper extremity PICC line removed per nursing  Port in right subclavian position looking extremely well. I will prescribe Emla cream for future access to minimize pain.  On 8/09/2023 his port is functioning perfectly fine with no pain.  On 09/19/2023 no issues pertinent.      4.  Multifactorial anemia secondary to underlying malignancy and chemotherapy  Transfusion support as needed  Hemoglobin today is improved at 9.9  On 07/11/2023 anemia is self correcting now that he probably has a much better clean bone marrow able to trigger normal erythropoiesis.  On 08/01/2023 anemia is substantially improved.  8/29/2023: Hemoglobin slightly declined at 11.8.  On 09/19/2023 no issues pertinent.        5.   Prophylaxis  Continue on acyclovir 400 mg once daily  Continuing allopurinol 300 mg daily  On 2023 he remains on allopurinol and acyclovir with no viral infections. Eventually allopurinol will be discontinued before the 3rd cycle of chemotherapy.  On 2023 the patient remains on acyclovir prophylactically.    On 2023 he remains on acyclovir. The patient is not receiving any Bactrim.       6.  Bilateral lower extremity swelling  Related to anasarca and immobility  Patient is not a good candidate for diuretics in light of his borderline hypotension and immobility, he is a falls risk.  Status compression and elevation.   On 2023 most of the swelling is resolved now not with water medication but with just proper nutrition and mobilization. I expect that this swelling will be completely gone for the next cycle. Given the persistency of the swelling as posted above I readjusted his chemotherapy administration medications to account for this.  On 2023 the patient remains with some swelling in his lower extremities. Encouraged elastic support that he does not want to wear.  2023: Bilateral lower extremity has improved. Right right does appear more edematous than the left lower extremity. He denies any pain or tenderness. No palpable cords on exam. He has tried compression stockings in the past. Encouraged to utilize again.   On  complete resolution of the swelling in his lower extremities.        PLAN:  On 2023 the patient looks fantastic. Today is the best visit that he has had in almost 5-6 months. Spleen is not clinically palpable, the heart rate is a little bit faster than usual, he is not volume contracted, he has no fever and his blood pressure is appropriate. Therefore I do believe that the patient needs to have the followin. We would like to review a PET scan to be done in days in order to review the patient back in a week and see what is the status of his  disease and especially his spleen that was a very large organ with very significant SUV activity at the time of his CLL progression.   2. I would like given his minimal tachycardia and pending to review a proBNP that has been ordered today to obtain an echocardiogram. I would not be surprised if he needs to be seen by cardio oncology. In the meantime the patient will remain on his metoprolol losartan and hydrochlorothiazide.     His port will be accessed today given the fact that he will not receive any chemotherapy waiting to review his tests along with his daughter next week.         Patient remains on high risk medication and requires close monitoring for toxicity.        Gerry Schroeder MD  09/19/2023

## 2023-09-21 ENCOUNTER — HOSPITAL ENCOUNTER (OUTPATIENT)
Dept: CARDIOLOGY | Facility: HOSPITAL | Age: 78
Discharge: HOME | End: 2023-09-21
Admitting: INTERNAL MEDICINE
Payer: MEDICARE

## 2023-09-21 VITALS
HEART RATE: 90 BPM | BODY MASS INDEX: 24.44 KG/M2 | HEIGHT: 77 IN | DIASTOLIC BLOOD PRESSURE: 73 MMHG | SYSTOLIC BLOOD PRESSURE: 132 MMHG | WEIGHT: 207 LBS

## 2023-09-21 DIAGNOSIS — C91.10 RICHTER'S SYNDROME: ICD-10-CM

## 2023-09-21 DIAGNOSIS — D61.810 PANCYTOPENIA DUE TO CHEMOTHERAPY: ICD-10-CM

## 2023-09-21 DIAGNOSIS — M62.3 IMMOBILITY SYNDROME: ICD-10-CM

## 2023-09-21 DIAGNOSIS — Z79.899 HIGH RISK MEDICATION USE: ICD-10-CM

## 2023-09-21 LAB
AORTIC ARCH: 3.5 CM
ASCENDING AORTA: 3.7 CM
BH CV ECHO LEFT VENTRICLE GLOBAL LONGITUDINAL STRAIN: -22.2 %
BH CV ECHO MEAS - ACS: 1.48 CM
BH CV ECHO MEAS - AI P1/2T: 309 MSEC
BH CV ECHO MEAS - AO MAX PG: 14.9 MMHG
BH CV ECHO MEAS - AO MEAN PG: 9 MMHG
BH CV ECHO MEAS - AO ROOT DIAM: 4.1 CM
BH CV ECHO MEAS - AO V2 MAX: 193 CM/SEC
BH CV ECHO MEAS - AO V2 VTI: 30.4 CM
BH CV ECHO MEAS - AVA(I,D): 3.5 CM2
BH CV ECHO MEAS - EDV(CUBED): 185.2 ML
BH CV ECHO MEAS - EDV(MOD-SP2): 229 ML
BH CV ECHO MEAS - EDV(MOD-SP4): 227 ML
BH CV ECHO MEAS - EF(MOD-BP): 60.9 %
BH CV ECHO MEAS - EF(MOD-SP2): 61.6 %
BH CV ECHO MEAS - EF(MOD-SP4): 59.5 %
BH CV ECHO MEAS - EF_3D-VOL: 58 %
BH CV ECHO MEAS - ESV(CUBED): 69.9 ML
BH CV ECHO MEAS - ESV(MOD-SP2): 88 ML
BH CV ECHO MEAS - ESV(MOD-SP4): 92 ML
BH CV ECHO MEAS - FS: 27.7 %
BH CV ECHO MEAS - IVS/LVPW: 1.1 CM
BH CV ECHO MEAS - IVSD: 1.1 CM
BH CV ECHO MEAS - LA 3D VOL INDEX: 42
BH CV ECHO MEAS - LAT PEAK E' VEL: 18.4 CM/SEC
BH CV ECHO MEAS - LV DIASTOLIC VOL/BSA (35-75): 100 CM2
BH CV ECHO MEAS - LV MASS(C)D: 241.3 GRAMS
BH CV ECHO MEAS - LV MAX PG: 7.4 MMHG
BH CV ECHO MEAS - LV MEAN PG: 4 MMHG
BH CV ECHO MEAS - LV SYSTOLIC VOL/BSA (12-30): 40.5 CM2
BH CV ECHO MEAS - LV V1 MAX: 136 CM/SEC
BH CV ECHO MEAS - LV V1 VTI: 19 CM
BH CV ECHO MEAS - LVIDD: 5.7 CM
BH CV ECHO MEAS - LVIDS: 4.1 CM
BH CV ECHO MEAS - LVOT AREA: 5.6 CM2
BH CV ECHO MEAS - LVOT DIAM: 2.7 CM
BH CV ECHO MEAS - LVPWD: 1 CM
BH CV ECHO MEAS - MED PEAK E' VEL: 8.5 CM/SEC
BH CV ECHO MEAS - MV A DUR: 0.18 SEC
BH CV ECHO MEAS - MV A MAX VEL: 116 CM/SEC
BH CV ECHO MEAS - MV DEC SLOPE: 160.4 CM/SEC2
BH CV ECHO MEAS - MV DEC TIME: 0.15 SEC
BH CV ECHO MEAS - MV E MAX VEL: 53.1 CM/SEC
BH CV ECHO MEAS - MV E/A: 0.46
BH CV ECHO MEAS - MV MAX PG: 6.4 MMHG
BH CV ECHO MEAS - MV MEAN PG: 2.34 MMHG
BH CV ECHO MEAS - MV P1/2T: 112.4 MSEC
BH CV ECHO MEAS - MV V2 VTI: 21.4 CM
BH CV ECHO MEAS - MVA(P1/2T): 1.96 CM2
BH CV ECHO MEAS - MVA(VTI): 5 CM2
BH CV ECHO MEAS - PA ACC TIME: 0.12 SEC
BH CV ECHO MEAS - PA V2 MAX: 93.7 CM/SEC
BH CV ECHO MEAS - PULM A REVS DUR: 0.16 SEC
BH CV ECHO MEAS - PULM A REVS VEL: 35.5 CM/SEC
BH CV ECHO MEAS - PULM DIAS VEL: 36.3 CM/SEC
BH CV ECHO MEAS - PULM S/D: 1.39
BH CV ECHO MEAS - PULM SYS VEL: 50.5 CM/SEC
BH CV ECHO MEAS - QP/QS: 0.5
BH CV ECHO MEAS - RAP SYSTOLE: 3 MMHG
BH CV ECHO MEAS - RV MAX PG: 1.79 MMHG
BH CV ECHO MEAS - RV V1 MAX: 66.8 CM/SEC
BH CV ECHO MEAS - RV V1 VTI: 12.4 CM
BH CV ECHO MEAS - RVOT DIAM: 2.35 CM
BH CV ECHO MEAS - RVSP: 25 MMHG
BH CV ECHO MEAS - SI(MOD-SP2): 62.1 ML/M2
BH CV ECHO MEAS - SI(MOD-SP4): 59.5 ML/M2
BH CV ECHO MEAS - SUP REN AO DIAM: 2.1 CM
BH CV ECHO MEAS - SV(LVOT): 106.8 ML
BH CV ECHO MEAS - SV(MOD-SP2): 141 ML
BH CV ECHO MEAS - SV(MOD-SP4): 135 ML
BH CV ECHO MEAS - SV(RVOT): 53.8 ML
BH CV ECHO MEAS - TAPSE (>1.6): 2.8 CM
BH CV ECHO MEAS - TR MAX PG: 21.6 MMHG
BH CV ECHO MEAS - TR MAX VEL: 232.3 CM/SEC
BH CV ECHO MEASUREMENTS AVERAGE E/E' RATIO: 3.95
BH CV XLRA - RV BASE: 3.5 CM
BH CV XLRA - RV LENGTH: 9 CM
BH CV XLRA - RV MID: 3.3 CM
BH CV XLRA - TDI S': 25 CM/SEC
LEFT ATRIUM VOLUME INDEX: 37.2 ML/M2
SINUS: 3.9 CM
STJ: 3 CM

## 2023-09-21 PROCEDURE — 93356 MYOCRD STRAIN IMG SPCKL TRCK: CPT

## 2023-09-21 PROCEDURE — 25510000001 PERFLUTREN (DEFINITY) 8.476 MG IN SODIUM CHLORIDE (PF) 0.9 % 10 ML INJECTION: Performed by: INTERNAL MEDICINE

## 2023-09-21 PROCEDURE — 93306 TTE W/DOPPLER COMPLETE: CPT

## 2023-09-21 RX ADMIN — PERFLUTREN 2 ML: 6.52 INJECTION, SUSPENSION INTRAVENOUS at 14:46

## 2023-09-22 ENCOUNTER — HOSPITAL ENCOUNTER (OUTPATIENT)
Dept: PET IMAGING | Facility: HOSPITAL | Age: 78
Discharge: HOME | End: 2023-09-22
Payer: MEDICARE

## 2023-09-22 DIAGNOSIS — M62.3 IMMOBILITY SYNDROME: ICD-10-CM

## 2023-09-22 DIAGNOSIS — Z79.899 HIGH RISK MEDICATION USE: ICD-10-CM

## 2023-09-22 DIAGNOSIS — C91.10 RICHTER'S SYNDROME: ICD-10-CM

## 2023-09-22 DIAGNOSIS — D61.810 PANCYTOPENIA DUE TO CHEMOTHERAPY: ICD-10-CM

## 2023-09-22 LAB — GLUCOSE BLDC GLUCOMTR-MCNC: 98 MG/DL (ref 70–130)

## 2023-09-22 PROCEDURE — 78815 PET IMAGE W/CT SKULL-THIGH: CPT

## 2023-09-22 PROCEDURE — 0 FLUDEOXYGLUCOSE F18 SOLUTION: Performed by: INTERNAL MEDICINE

## 2023-09-22 PROCEDURE — 82948 REAGENT STRIP/BLOOD GLUCOSE: CPT

## 2023-09-22 PROCEDURE — A9552 F18 FDG: HCPCS | Performed by: INTERNAL MEDICINE

## 2023-09-22 RX ADMIN — FLUDEOXYGLUCOSE F18 1 DOSE: 300 INJECTION INTRAVENOUS at 09:22

## 2023-09-27 ENCOUNTER — LAB (OUTPATIENT)
Dept: ONCOLOGY | Facility: HOSPITAL | Age: 78
End: 2023-09-27
Payer: MEDICARE

## 2023-09-27 ENCOUNTER — OFFICE VISIT (OUTPATIENT)
Dept: ONCOLOGY | Facility: CLINIC | Age: 78
End: 2023-09-27
Payer: MEDICARE

## 2023-09-27 VITALS
RESPIRATION RATE: 18 BRPM | SYSTOLIC BLOOD PRESSURE: 148 MMHG | HEART RATE: 80 BPM | WEIGHT: 207.2 LBS | TEMPERATURE: 98.2 F | BODY MASS INDEX: 24.46 KG/M2 | DIASTOLIC BLOOD PRESSURE: 78 MMHG | OXYGEN SATURATION: 98 % | HEIGHT: 77 IN

## 2023-09-27 DIAGNOSIS — D61.810 PANCYTOPENIA DUE TO CHEMOTHERAPY: ICD-10-CM

## 2023-09-27 DIAGNOSIS — C91.10 RICHTER'S SYNDROME: ICD-10-CM

## 2023-09-27 DIAGNOSIS — M62.3 IMMOBILITY SYNDROME: ICD-10-CM

## 2023-09-27 DIAGNOSIS — Z79.899 HIGH RISK MEDICATION USE: ICD-10-CM

## 2023-09-27 DIAGNOSIS — C91.10 RICHTER'S SYNDROME: Primary | ICD-10-CM

## 2023-09-27 DIAGNOSIS — Z45.2 ENCOUNTER FOR ADJUSTMENT OR MANAGEMENT OF VASCULAR ACCESS DEVICE: Primary | ICD-10-CM

## 2023-09-27 LAB
ALBUMIN SERPL-MCNC: 3.6 G/DL (ref 3.5–5.2)
ALBUMIN/GLOB SERPL: 2.3 G/DL
ALP SERPL-CCNC: 42 U/L (ref 39–117)
ALT SERPL W P-5'-P-CCNC: 8 U/L (ref 1–41)
ANION GAP SERPL CALCULATED.3IONS-SCNC: 12.4 MMOL/L (ref 5–15)
AST SERPL-CCNC: 19 U/L (ref 1–40)
BASOPHILS # BLD AUTO: 0.05 10*3/MM3 (ref 0–0.2)
BASOPHILS NFR BLD AUTO: 1.5 % (ref 0–1.5)
BILIRUB SERPL-MCNC: 0.8 MG/DL (ref 0–1.2)
BUN SERPL-MCNC: 11 MG/DL (ref 8–23)
BUN/CREAT SERPL: 15.3 (ref 7–25)
CALCIUM SPEC-SCNC: 9 MG/DL (ref 8.6–10.5)
CHLORIDE SERPL-SCNC: 103 MMOL/L (ref 98–107)
CO2 SERPL-SCNC: 23.6 MMOL/L (ref 22–29)
CREAT SERPL-MCNC: 0.72 MG/DL (ref 0.7–1.3)
DEPRECATED RDW RBC AUTO: 50.1 FL (ref 37–54)
EGFRCR SERPLBLD CKD-EPI 2021: 92.4 ML/MIN/1.73
EOSINOPHIL # BLD AUTO: 0.12 10*3/MM3 (ref 0–0.4)
EOSINOPHIL NFR BLD AUTO: 3.6 % (ref 0.3–6.2)
ERYTHROCYTE [DISTWIDTH] IN BLOOD BY AUTOMATED COUNT: 15.4 % (ref 12.3–15.4)
GLOBULIN UR ELPH-MCNC: 1.6 GM/DL
GLUCOSE SERPL-MCNC: 102 MG/DL (ref 65–99)
HCT VFR BLD AUTO: 34.2 % (ref 37.5–51)
HGB BLD-MCNC: 11.6 G/DL (ref 13–17.7)
IMM GRANULOCYTES # BLD AUTO: 0.22 10*3/MM3 (ref 0–0.05)
IMM GRANULOCYTES NFR BLD AUTO: 6.7 % (ref 0–0.5)
LDH SERPL-CCNC: 264 U/L (ref 135–225)
LYMPHOCYTES # BLD AUTO: 0.39 10*3/MM3 (ref 0.7–3.1)
LYMPHOCYTES NFR BLD AUTO: 11.9 % (ref 19.6–45.3)
MCH RBC QN AUTO: 30.3 PG (ref 26.6–33)
MCHC RBC AUTO-ENTMCNC: 33.9 G/DL (ref 31.5–35.7)
MCV RBC AUTO: 89.3 FL (ref 79–97)
MONOCYTES # BLD AUTO: 0.32 10*3/MM3 (ref 0.1–0.9)
MONOCYTES NFR BLD AUTO: 9.7 % (ref 5–12)
NEUTROPHILS NFR BLD AUTO: 2.19 10*3/MM3 (ref 1.7–7)
NEUTROPHILS NFR BLD AUTO: 66.6 % (ref 42.7–76)
NRBC BLD AUTO-RTO: 0 /100 WBC (ref 0–0.2)
PLATELET # BLD AUTO: 126 10*3/MM3 (ref 140–450)
PMV BLD AUTO: 10 FL (ref 6–12)
POTASSIUM SERPL-SCNC: 3.6 MMOL/L (ref 3.5–5.2)
PROT SERPL-MCNC: 5.2 G/DL (ref 6–8.5)
RBC # BLD AUTO: 3.83 10*6/MM3 (ref 4.14–5.8)
SODIUM SERPL-SCNC: 139 MMOL/L (ref 136–145)
WBC NRBC COR # BLD: 3.29 10*3/MM3 (ref 3.4–10.8)

## 2023-09-27 PROCEDURE — 25010000002 HEPARIN LOCK FLUSH PER 10 UNITS: Performed by: INTERNAL MEDICINE

## 2023-09-27 PROCEDURE — 85025 COMPLETE CBC W/AUTO DIFF WBC: CPT

## 2023-09-27 PROCEDURE — 83615 LACTATE (LD) (LDH) ENZYME: CPT

## 2023-09-27 PROCEDURE — 80053 COMPREHEN METABOLIC PANEL: CPT

## 2023-09-27 PROCEDURE — 36591 DRAW BLOOD OFF VENOUS DEVICE: CPT

## 2023-09-27 RX ORDER — HEPARIN SODIUM (PORCINE) LOCK FLUSH IV SOLN 100 UNIT/ML 100 UNIT/ML
500 SOLUTION INTRAVENOUS AS NEEDED
OUTPATIENT
Start: 2023-09-27

## 2023-09-27 RX ORDER — HEPARIN SODIUM (PORCINE) LOCK FLUSH IV SOLN 100 UNIT/ML 100 UNIT/ML
500 SOLUTION INTRAVENOUS AS NEEDED
Status: DISCONTINUED | OUTPATIENT
Start: 2023-09-27 | End: 2023-09-27 | Stop reason: HOSPADM

## 2023-09-27 RX ORDER — SODIUM CHLORIDE 0.9 % (FLUSH) 0.9 %
10 SYRINGE (ML) INJECTION AS NEEDED
OUTPATIENT
Start: 2023-09-27

## 2023-09-27 RX ADMIN — HEPARIN 500 UNITS: 100 SYRINGE at 12:29

## 2023-09-27 NOTE — PROGRESS NOTES
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  Progressive disease January 2023 placed on Calquence.  Further progression with Jon's transformation and initiating CHOP Rituxan 6/14/2023    HISTORY OF PRESENT ILLNESS:    On 09/19/2023 this 77-year-old patient who has Jon's transformation of CLL in 05/2023 initiated CHOP/Rituxan has completed already 4 cycles of treatment with dramatic improvement clinically and by laboratory testing. His LDH has normalized and his white count has normalized in regard to the differential, hemoglobin has improved and platelet count has improved. His large splenomegaly clinically documented has receded. Physically the patient feels the best today than he has felt in 4 months or so. His appetite has improved, he is eating very good diet, his weight is stable, his bowel activity is normal as long as he takes MiraLax and urination is ongoing with no difficulties. The swelling in his lower extremities has resolved almost altogether. He has no fevers, chills or sweats, no pruritus, no adenopathy. Energy level is very acceptable. He has not encountered any cough, sputum production or shortness of breath. He is not having any cancer related pain. His ECOG performance status has improved from 3 at the time of the hospital admission for initiation of CHOP/Rituxan to ECOG performance status of 1.     I brought the patient on 09/27/2023 for review. In the interim of time, he has had a PET scan and echocardiogram that will be described below. On the other hand, the patient is feeling fantastic. His energy level continues bursting. His appetite is terrific. He has gained more weight. He has minimal if any swelling in his legs and his strength has improved. He has not had any fever, chills, sweats, pruritus, adenopathies, or abdominal pain. Proper bowel activity, proper urination. No cardiac or respiratory issues. ECOG performance status  1.        Past Medical History:   Diagnosis Date    Arthritis     Benign prostatic hyperplasia     FOLLOWED BY DR. VIVIEN PATEL    Biliary dyskinesia     Bunion of right foot     GREAT TOE    CLL (chronic lymphocytic leukemia) 2016    FOLLOWED BY DR WALKER    Colon polyps     FOLLOWED BY DR. NEHA HAM    Constipation     Degeneration of lumbar intervertebral disc     Diverticulosis     Erectile dysfunction     Fracture of ankle 1975    GERD (gastroesophageal reflux disease)     Hallux valgus of left foot     Hyperlipidemia     MIXED    Hypertension     Inguinal hernia     Kidney stone 02/21/2009    SEEN AT [facility] ER    Knee swelling 2018    Localized, primary osteoarthritis     Lumbar radiculopathy     Lumbar stenosis     Lung mass     LEFT SIDE - SCAR TISSUE PER PATIENT     Osteoarthritis of right knee     Polyneuropathy     Prostate CA 03/2016    JACQUELYN 6, S/P XRT, FOLLOWED BY DR. VIVIEN PATEL, RADIATION SEEDS    PVC (premature ventricular contraction)     PT STATES WORKED UP YEARS AGO BY [facility] FOR POSSIBLE MURMUR - NO FOLLOW UP REQUIRED     RBBB     Sensory hearing loss, bilateral     Skin cancer     SQUAMOUS CELL CARCINOMA OF FACE    Substernal goiter     Thyromegaly 12/2016    ADMITTED TO [facility]    Vitamin D deficiency      Past Surgical History:   Procedure Laterality Date    BRONCHOSCOPY N/A 12/14/2016    Procedure: BRONCHOSCOPY WITH  BAL AND BIOPSY AND ENDOBRONCHIAL ULTRASOUND  AND NAVIGATION WITH BRUSHINGS AND BIOPSY AND BAL;  Surgeon: Pierre Manning MD;  Location: [facility] ENDOSCOPY;  Service:     COLONOSCOPY N/A 03/13/2019    5 MM SESSILE TUBULAR ADENOMA POLYP IN TRANSVERSE, MULTIPLE SMALL AND LARGE DIVERTICULA IN SIGMOID, RESCOPE IN 5 YRS, DR. NEHA HAM AT [facility]    COLONOSCOPY W/ POLYPECTOMY N/A 03/19/2015    3 TUBULAR ADENOMA POLYPS, 12 TUBULOVILLOUS POLYP RECTO-SIGMOID, DIVERTICULOSIS, RESCOPE IN 3 YRS, DR. NEHA HAM AT [facility]    EYE SURGERY Bilateral     CATARACT EXTRACTION WITH LENS  IMPLANT, DR. GOVEA    FINGER NAIL SURGERY Right 2022    TORRES-PATEL    INGUINAL HERNIA REPAIR Left 11/02/2021    Procedure: LEFT OPEN INGUINAL HERNIA REPAIR;  Surgeon: Nixon Kolb MD;  Location: [facility] MAIN OR;  Service: General;  Laterality: Left;    PROSTATE RADIOACTIVE SEED IMPLANT N/A 09/06/2016    DR. HOA JARAMILLO AT [facility]    PROSTATE SURGERY N/A 03/11/2016    BIOPSY: ADENOCARCINOMA, DR. ZAID IBARRA    THYROID BIOPSY Bilateral 11/01/2017    NO B CELL OR T CELL LYMPHOMA, DONE AT [facility]    TOTAL KNEE ARTHROPLASTY Right 10/12/2022    Procedure: TOTAL KNEE ARTHROPLASTY;  Surgeon: Ran Rachel MD;  Location:  [facility];  Service: Orthopedics;  Laterality: Right;    VENOUS ACCESS DEVICE (PORT) INSERTION Right 7/6/2023    Procedure: INSERTION VENOUS ACCESS DEVICE;  Surgeon: Nixon Kolb MD;  Location:  [facility];  Service: General;  Laterality: Right;     Social History     Socioeconomic History    Marital status:      Spouse name: No    Years of education: College   Tobacco Use    Smoking status: Never    Smokeless tobacco: Never   Vaping Use    Vaping Use: Never used   Substance and Sexual Activity    Alcohol use: No    Drug use: Never    Sexual activity: Yes     Partners: Female     Birth control/protection: None     Family History   Problem Relation Age of Onset    Heart disease Father         Rheumatic heart disease    Hypertension Father     Osteoporosis Father     Stroke Mother     Osteoporosis Mother     No Known Problems Daughter     Malig Hyperthermia Neg Hx      Current Outpatient Medications on File Prior to Visit   Medication Sig    acyclovir (Zovirax) 400 MG tablet Take 1 tablet by mouth Daily.    alfuzosin (UROXATRAL) 10 MG 24 hr tablet TAKE 2 TABLETS DAILY IMMEDIATELY AFTER MEAL.    allopurinol (ZYLOPRIM) 300 MG tablet Take 1 tablet by mouth Daily.    ammonium lactate (AMLACTIN) 12 % cream APPLY 1 APPLICATION EXTERNALLY TWICE A DAY    bacitracin 500 UNIT/GM  "ointment Apply 1 application topically to the appropriate area as directed 2 (Two) Times a Day.    calcium carbonate (TUMS) 500 MG chewable tablet Chew 2 tablets 2 (Two) Times a Day As Needed for Heartburn.    hydroCHLOROthiazide (HYDRODIURIL) 25 MG tablet Take 1 po QD    HYDROcodone-acetaminophen (NORCO) 5-325 MG per tablet     lidocaine-prilocaine (EMLA) 2.5-2.5 % cream Apply 1 application  topically to the appropriate area as directed Every 2 (Two) Hours As Needed for Mild Pain.    losartan (COZAAR) 100 MG tablet Take 1 po QD    losartan-hydrochlorothiazide (HYZAAR) 100-25 MG per tablet Take 1 tablet by mouth Daily.    Melatonin 10 MG tablet Take 1 tablet by mouth Every Night.    meloxicam (MOBIC) 15 MG tablet     ofloxacin (OCUFLOX) 0.3 % ophthalmic solution Place four drops into affected ear twice daily for 7 days    ondansetron (Zofran) 4 MG tablet Take 1 tablet by mouth Every 6 (Six) Hours As Needed for Nausea or Vomiting for up to 10 doses.    polyethylene glycol (MIRALAX) 17 GM/SCOOP powder Dissolve 17 g (1 capful) in liquid and drink by mouth Daily.    potassium chloride (K-DUR,KLOR-CON) 20 MEQ CR tablet Take 1 tablet by mouth Daily.    pravastatin (PRAVACHOL) 20 MG tablet     predniSONE (DELTASONE) 20 MG tablet Take 3 tablets by mouth daily for 5 days following treatment day as directed.    triamcinolone (KENALOG) 0.025 % cream APPLY TO RASH AS NEEDED     Current Facility-Administered Medications on File Prior to Visit   Medication    Chlorhexidine Gluconate Cloth 2 % pads     No Known Allergies        VITAL SIGNS:  Vitals:    09/27/23 1239   BP: 148/78   Pulse: 80   Resp: 18   Temp: 98.2 °F (36.8 °C)   TempSrc: Temporal   SpO2: 98%   Weight: 94 kg (207 lb 3.2 oz)   Height: 195.6 cm (77.01\")   PainSc: 0-No pain            PHYSICAL EXAMINATION:           GENERAL:  Well-developed, Patient  in no acute distress.   SKIN:  Warm, dry ,NO purpura ,no rash.  HEENT:  Pupils were equal and reactive to light and " accomodation, conjunctivae noninjected,  normal visual acuity.   NECK:  Supple with good range of motion; no thyromegaly , no JVD or bruits,.No carotid artery pain, no carotid abnormal pulsation   LYMPHATICS:  No cervical, NO supraclavicular, NO axillary, NO inguinal adenopathies.  CARDIAC   RAPID rate , regular rhythm, without murmur,NO rubs NO S3 NO S4   LUNGS: normal breath sounds bilateral, no wheezing, NO rhonchi, NO crackles ,NO rubs.  VASCULAR VENOUS: no cyanosis, NO collateral circulation, NO varicosities, NO edema, NO palpable cords, NO pain,NO erythema, NO pigmentation of the skin.  ABDOMEN:  Soft, NO pain,no hepatomegaly, no splenomegaly,no masses, no ascites, no collateral circulation,no distention.  EXTREMITIES  AND SPINE:  No clubbing, no cyanosis ,no deformities , no pain .No kyphosis,  no pain in spine, no pain in ribs , no pain in pelvic bone.  NEUROLOGICAL:  Patient was awake, alert, oriented to time, person and place.  Clinical examination has been repeated on 09/27/2023 by Dr. Schroeder with no changes in the posted exam above.          LABORATORY DATA:  Results from last 7 days   Lab Units 09/27/23  1220   WBC 10*3/mm3 3.29*   NEUTROS ABS 10*3/mm3 2.19   HEMOGLOBIN g/dL 11.6*   HEMATOCRIT % 34.2*   PLATELETS 10*3/mm3 126*     Results from last 7 days   Lab Units 09/27/23  1220   SODIUM mmol/L 139   POTASSIUM mmol/L 3.6   CHLORIDE mmol/L 103   CO2 mmol/L 23.6   BUN mg/dL 11   CREATININE mg/dL 0.72   CALCIUM mg/dL 9.0   ALBUMIN g/dL 3.6   BILIRUBIN mg/dL 0.8   ALK PHOS U/L 42   ALT (SGPT) U/L 8   AST (SGOT) U/L 19   GLUCOSE mg/dL 102*     NM PET/CT Skull Base to Mid Thigh (09/22/2023 10:40)   Adult Transthoracic Echo Complete W/ Cont if Necessary Per Protocol (09/21/2023 14:45)   ASSESSMENT:   1.  History of CLL, now with Jon's transformation  Status posttreatment with Bendamustine/Rituxan in 2016  Patient did have severe reaction to initial Rituxan dose requiring hospitalization or completion.   Did well thereafter.  12/30/2022 WBC remains normal at 10.6, 31% lymphs.  Platelets normal at 105,000.  No B symptoms or adenopathy.  No suggestion of recurrent disease.  Hemoglobin has acutely dropped however down to 11.9 (see further discussion below).  On 01/13/2023 his white count, hemoglobin and platelets are not changing. His total lymphocyte count is very minimal and I do believe that his leukemia remains very quiet on clinical grounds with no need for intervention.  1/13/2023 CT of the abdomen pelvis notes several enlarged peripancreatic and portal lymph nodes measuring up to 2.1 cm which are new.  New retroperitoneal lymphadenopathy with a musa mass to the left of the aorta measuring 3.0 x 3.5 cm.  Lymph nodes near the level of the iliac bifurcation measuring up to 2.1.  Multiple enlarged lymph nodes in the pelvis along both iliac chains measuring up to 1.1 x 2.7 cm in the left 1.3 x 4.4 cm in the right.  These lymph nodes are in the splenic enlargement area likely related to CLL.  1/25/2023 PET scan noting increase in size of his in the adenopathy in the chest abdomen pelvis as well as the chest.  Severe splenomegaly which is intensely FDG avid and highly concerning for leukemic involvement.  Plans for Calquence 200 mg twice daily which was initiated 2/8/2023  Molecular analysis of the peripheral blood flow cytometry has been requested from the [facility] in regard the FISH testing. Also we ordered IgH testing.   IgVH unmutated. This makes his prognosis a little bit worse in the long run  After 2 months of Calquence, clinical improvement in splenomegaly.  5/8/2020 3 repeat PET scan noting scattered adenopathy within the neck chest abdomen pelvis as before.  A few index lesions within the neck and chest are grossly stable.  Severe hypermetabolic splenomegaly as before the degree of FDG uptake appears minimally decreased.  Reevaluation 6/2/2023 with profound fatigue, inability to accomplish ADLs.  Worsening  "leukocytosis with white count of 23,000 and atypical mononuclear cells in circulation by peripheral blood examination.  Worsening thrombocytopenia and progressive splenomegaly  6/2/2023 flow cytometry on peripheral blood flow noting immunophenotype identifies a CD5 positive monoclonal kappa B-cell population representing 67% of total events.  Pathologist comment: Although the phenotype is similar to the patient's previous study, the percentage of disease is more than doubled.  The findings are worrisome for large cell (Jon's) or polymorphic transformation from the previous CLL.  6/7/2023 bone marrow biopsy including flow cytometry A monoclonal population is identified with lymphocyte region containing 32% of total events and the following immunophenotype positive CD46, CD6, bright CD19, bright CD20, HLA-DR bright surface kappa light chain, dim partial CD38   Negative CD4, CD8, CD10, CD11c, CD23. Pathology hypercellular bone marrow (50%) with involvement by malignant lymphoma with presenting 30% of bone marrow cellularity  Per Dr. Schroeder \"the pathology of the bone marrow recently done, even though does not document by flow cytometry any major changes consistent with his CLL, morphologically to my eyes, and I disagree with the Pathologist in certain fashion, shows 2 major populations of cells, number 1 precursors of red cells that were very obvious, and the second population was very monotonous population of largely looking lymphocytes that were very worrisome for diffuse large cell non-Hodgkin's lymphoma.\"  Recommendations for hospitalization, PICC line placement and initiation of CHOP Rituxan  6/12/2023, baseline echocardiogram with normal ejection fraction of 65%  6/13/2023, prior to initiation of chemotherapy WBC 53.8, hemoglobin 7.7, platelets 62,000.  LDH elevated at 1900  Rituxan initiated 6/23/2023  CHOP given 6/14/2023.  Vincristine given at a flat dose of 1 mg.  G-CSF with Neupogen 480 mcg given x8 days " following cycle 1  Very dramatic response to first cycle of chemotherapy with decrease in LDH from 2000-400s 6/26/2023.  Also improvement in WBC  Technically due for cycle 2 chemotherapy today, 7/5/2023.  However, this will be delayed 1 week as the patient is having a Mediport placed tomorrow.  Continued improvement in LDH indicating ongoing response with  today.  On 07/11/2023 the patient was further reviewed. He has had a dramatic clinical comeback in regard to his energy level, appetite, and inability to function. He has been able to become self-independent at home and his daughter is giving him all the support that he needs. He is eating extremely well. He has lost a lot of weight related to water loss and hopefully he will start to gain muscle mass again in the next several weeks.   Today on clinical examination he has complete resolution of the nocturnal sweats. He has no peripheral adenopathy and the spleen is not clinically palpable anymore in his abdomen. His white count has further improved. His hemoglobin has further improved. His platelet count has almost normalized. The differential in the white count is dramatically different with predominance of segs and resolution of the terrible peripheral lymphocytosis.   His creatinine is stable. His liver functions are stable. Potassium is stable. Given all these facts the patient will proceed today with the 2nd cycle of CHOP-Rituxan and he will be supported with Neulasta. We recalculated his BSA given the amount of weight loss associated with water loss that he has had and the new doses of chemotherapy medicines were adjusted accordingly.   He will require Neulasta administration today that will be deploying tomorrow and his daughter will be taught about how to proceed in this regard. This will obviate the lack of need to come for Neupogen injections every day. I made her aware though of the potentiality of the medicine to trigger headache, pain in the  chest, pain in the rib cage, pelvic bone that will require Tylenol. He used to take high-dose Bufferin. I pointed out to him that he is not supposed to have Bufferin anymore  He will proceed with laboratory testing, CBC on weekly basis and he will have a CT scan of the chest, abdomen and pelvis in 2 weeks and I will review him back in 3 weeks. The plan will be to continue the CHOP-Rituxan administration after completion of 5-6 cycles. Eventually after the 3rd cycle he will require a cardiac reassessment with echocardiogram.  On 08/01/2023 the patient was reviewed. Clinically he is dramatically better. His mind is back to very significant improvement. His strength has improved. His nutrition has improved. His balance is not there yet but it is a big difference in him now than how he was 6 or 7 weeks ago. He is up and about. 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 He is eating like a hog and he is gaining strength with no pain and no B symptoms. His spleen has dramatically decreased. His white count is stable. Hemoglobin is improving. The platelet count is slowly improving. There is no peripheral blood lymphocytosis. His LDH has further improved and normalized to 179. This is coming out from 2000 at the time of the diagnosis. Therefore, I do believe that with the report that we have above, the CT scan that I have personally reviewed showing a very dramatic improvement in the size of his splenomegaly and substantial reduction in his periaortic adenopathy the patient has had a dramatic improvement in regard to his Jon's transformation of CLL. My recommendation will be for him to have the next cycle of treatment in a few days according his schedule and have a new treatment 3 weeks later. On each one of those occasions the dose will remain about the same one that he received the previous dose. We have to modify numbers depending of the amount of weight loss that he has related to swollen legs. Also the patient will require  Neulasta administration on each one of those cycles and he will require laboratory workup on weekly basis, monitoring CBC.   In the long run the goal will be to try to complete 6 cycles of chemotherapy and then put him on maintenance. I am planning also upon completion of 6 cycles to repeat his bone marrow testing.  8/8/2023: Patient has previously had an infusion reaction to Rituxan. Patient reviewed with Dr. Schroeder as his platelets are 84,000 today. We will proceed with Cycle 3 R-CHOP today and have patient return weekly for lab review to monitor counts closely. Patient to receive additional premedications as planned.   8/29/2023 patient returns the office today for cycle 4 R-CHOP.  He is tolerating treatment well.  Platelets are improving at 113,000 today.  He denies any recent fevers, chills or bleeding.  Will return for weekly labs with review.  On 09/19/2023 the patient feels terrific. He is doing a lot of walking, he has 3 meals a day, a lot of snacks. He has gained weight. His ECOG performance status dramatically improved. 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 Clinically he has no peripheral adenopathy or hepatosplenomegaly. His large spleen is no longer evident at least clinically. His LDH has remained down very substantially. His white count differential has dramatically improved to normalization. His platelet count remains relatively low at 107 with no clinical bleeding.     He is not experiencing any B symptoms anymore.    In my opinion he has reached very dramatic response to CHOP/Rituxan, he has undergone 4 cycles. He is supposed to have another cycle today and I advised him to consider recheck and reassessment in regard toxicity with an echocardiogram especially knowing that his heart rate is a little bit on the fast side. I will check a proBNP as well today.    In regard to his Jon's transformation I want to know what is  the status of the spleen that was the hottest organ at the time of his diagnosis of Jon's transformation. The low platelet count tells me that probably the spleen is enlarged and I do not know what the SUV activity is. In the past we have talked with the patient about consideration of splenectomy that he is not going to consider unless that that is the only reason in my statement.     In the meantime I advised the patient to continue enjoying his freedom and enjoying his ability to function in absence of pain or B symptoms.    I went ahead and requested a proBNP today and we will go ahead and do a PET scan in days and do an echocardiogram in days. I want to know what is his ejection fraction.     I will review him back in a week. Depending on where we are we will decide how to further proceed in regard to continuation of the same chemotherapy regimen or modifying the regimen according to needs.     On 09/27/2023 I have reviewed with the patient and his daughter in the room, the PET scan that shows a dramatic degree of decrease in the size of the spleen and now the spleen is almost photopenic. Comparing the spleen now and before is very dramatic. The patient has no B symptoms. His white count has recovered. Hemoglobin is stable. Platelet count remains low and think is sequestration because of splenomegaly. His LDH has normalized. I do believe that I recommend and especially knowing that his echocardiogram has not shown any detrimental action of Adriamycin and cardiac function after reviewing this and posted above, the patient will proceed with 1 more cycle of CHOP-Rituxan at the previous dose and thereafter I will review him back in a month to establish the need to go back and proceed with further management of his condition. Strong consideration will be given to go back to medicine that he was taking before his Jon's transformation. He will require frequent assessment though in regard to clinical  symptomatology to be sure that the Jon's transformation does not wake up again. He will require in the next few weeks weekly CBC and CMP.        2.  History of prostate cancer  Treated at the [facility]  Most recent PSA April 2022 = 0.203  On 01/13/2023 he has minimal urinary symptomatology comparing his PSA with 3 years ago was 0.8 today, actually a few days ago was 0.1. I doubt that all of the symptoms that he has are associated with prostate cancer. The blood in the urine has a different significance.   On 01/18/2023 no issues pertinent to his prostate cancer. I see the seeds in his prostatic bed on the CT scan. His urinalysis is very much clean and his PSA has remained stable. No activity of this entity at this point.   On 03/17/2023 no issues pertinent to his previous history of prostate cancer. His PSA has been measured and has remained normal.  On 07/11/2023 no issues pertinent.  On 08/01/2023 no issues pertinent.  On 8/8/2023: no issues pertinent  On 09/19/2023 no issues pertinent.  On 09/27/2023 no issues pertinent.        3.  Venous access  Right upper extremity PICC line placed for cycle 1 chemotherapy.  Follow Dr. Klob for Mediport placement tomorrow, 7/6/2023  With having Mediport placed tomorrow, we will pull the PICC line today, right upper extremity PICC line removed per nursing  Port in right subclavian position looking extremely well. I will prescribe Emla cream for future access to minimize pain.  On 8/09/2023 his port is functioning perfectly fine with no pain.  On 09/19/2023 no issues pertinent.    On 09/27/2023 his port remains functional with no difficulties whatsoever and will be utilized for further administration of chemotherapy in the next week.      4.  Multifactorial anemia secondary to underlying malignancy and chemotherapy  Transfusion support as needed  Hemoglobin today is improved at 9.9  On 07/11/2023 anemia is self correcting now that he probably has a much better  clean bone marrow able to trigger normal erythropoiesis.  On 08/01/2023 anemia is substantially improved.  8/29/2023: Hemoglobin slightly declined at 11.8.  On 09/19/2023 no issues pertinent.  On 09/27/2023 the patient's hemoglobin remains at an excellent level of 11 and he has no notion of blood loss. Nutrition with dramatic improvement.          5.  Prophylaxis  Continue on acyclovir 400 mg once daily  Continuing allopurinol 300 mg daily  On 07/11/2023 he remains on allopurinol and acyclovir with no viral infections. Eventually allopurinol will be discontinued before the 3rd cycle of chemotherapy.  On 08/01/2023 the patient remains on acyclovir prophylactically.    On 09/19/2023 he remains on acyclovir. The patient is not receiving any Bactrim.     On 09/27/2023 he will remain on acyclovir prophylaxis. He will continue receiving his allopurinol as well.      6.  Bilateral lower extremity swelling  Related to anasarca and immobility  Patient is not a good candidate for diuretics in light of his borderline hypotension and immobility, he is a falls risk.  Status compression and elevation.   On 07/11/2023 most of the swelling is resolved now not with water medication but with just proper nutrition and mobilization. I expect that this swelling will be completely gone for the next cycle. Given the persistency of the swelling as posted above I readjusted his chemotherapy administration medications to account for this.  On 08/01/2023 the patient remains with some swelling in his lower extremities. Encouraged elastic support that he does not want to wear.  8/8/2023: Bilateral lower extremity has improved. Right right does appear more edematous than the left lower extremity. He denies any pain or tenderness. No palpable cords on exam. He has tried compression stockings in the past. Encouraged to utilize again.   On 09/19/023 complete resolution of the swelling in his lower extremities.  On 09/27/2023 now that his nutrition  has normalized and his albumin level as recovered, the patient's swelling has disappeared altogether.          PLAN:  On 2023 the patient looks fantastic. Today is the best visit that he has had in almost 5-6 months. Spleen is not clinically palpable, the heart rate is a little bit faster than usual, he is not volume contracted, he has no fever and his blood pressure is appropriate. Therefore I do believe that the patient needs to have the followin. We would like to review a PET scan to be done in days in order to review the patient back in a week and see what is the status of his disease and especially his spleen that was a very large organ with very significant SUV activity at the time of his CLL progression.   2. I would like given his minimal tachycardia and pending to review a proBNP that has been ordered today to obtain an echocardiogram. I would not be surprised if he needs to be seen by cardio oncology. In the meantime the patient will remain on his metoprolol losartan and hydrochlorothiazide.     His port will be accessed today given the fact that he will not receive any chemotherapy waiting to review his tests along with his daughter next week.   On 2023 I discussed with the patient and his daughter the fact that I would like to give him 1 more cycle of CHOP-Rituxan as early as next week. The doses of the medicine will remain the same. The patient will require thereafter weekly laboratory testing. He will require being supported with Neulasta as well.     I am planning to see him back in a month, review his clinical status and then at some point have a discussion about Calquence or Brukinsa utilization at that time.     I reviewed the PET scan personally with the patient and his daughter in the room and the above is my interpretation. I personally reviewed the echocardiogram and has been posted above showing no evidence of heart damage in regard to Adriamycin administration. The patient  will remain on his metoprolol for cardiac protection as well as losartan.          Patient remains on high risk medication and requires close monitoring for toxicity.        Gerry Schroeder MD  09/27/2023

## 2023-09-29 RX ORDER — SODIUM CHLORIDE 9 MG/ML
250 INJECTION, SOLUTION INTRAVENOUS ONCE
OUTPATIENT
Start: 2023-10-03

## 2023-09-29 RX ORDER — ACETAMINOPHEN 325 MG/1
650 TABLET ORAL ONCE
OUTPATIENT
Start: 2023-10-03

## 2023-09-29 RX ORDER — DIPHENHYDRAMINE HYDROCHLORIDE 50 MG/ML
50 INJECTION INTRAMUSCULAR; INTRAVENOUS AS NEEDED
OUTPATIENT
Start: 2023-10-03

## 2023-09-29 RX ORDER — DOXORUBICIN HYDROCHLORIDE 2 MG/ML
40 INJECTION, SOLUTION INTRAVENOUS ONCE
OUTPATIENT
Start: 2023-10-03

## 2023-09-29 RX ORDER — PALONOSETRON 0.05 MG/ML
0.25 INJECTION, SOLUTION INTRAVENOUS ONCE
OUTPATIENT
Start: 2023-10-03

## 2023-09-29 RX ORDER — FAMOTIDINE 10 MG/ML
20 INJECTION, SOLUTION INTRAVENOUS ONCE
OUTPATIENT
Start: 2023-10-03

## 2023-09-29 RX ORDER — MEPERIDINE HYDROCHLORIDE 25 MG/ML
25 INJECTION INTRAMUSCULAR; INTRAVENOUS; SUBCUTANEOUS
OUTPATIENT
Start: 2023-10-03

## 2023-09-29 RX ORDER — FAMOTIDINE 10 MG/ML
20 INJECTION, SOLUTION INTRAVENOUS AS NEEDED
OUTPATIENT
Start: 2023-10-03

## 2023-10-03 ENCOUNTER — INFUSION (OUTPATIENT)
Dept: ONCOLOGY | Facility: HOSPITAL | Age: 78
End: 2023-10-03
Payer: MEDICARE

## 2023-10-03 VITALS
SYSTOLIC BLOOD PRESSURE: 118 MMHG | HEART RATE: 113 BPM | WEIGHT: 209.4 LBS | TEMPERATURE: 98.7 F | BODY MASS INDEX: 24.83 KG/M2 | OXYGEN SATURATION: 97 % | RESPIRATION RATE: 16 BRPM | DIASTOLIC BLOOD PRESSURE: 64 MMHG

## 2023-10-03 DIAGNOSIS — C91.10 RICHTER'S SYNDROME: Primary | ICD-10-CM

## 2023-10-03 DIAGNOSIS — Z45.2 ENCOUNTER FOR ADJUSTMENT OR MANAGEMENT OF VASCULAR ACCESS DEVICE: ICD-10-CM

## 2023-10-03 LAB
ALBUMIN SERPL-MCNC: 3.7 G/DL (ref 3.5–5.2)
ALBUMIN/GLOB SERPL: 2.5 G/DL
ALP SERPL-CCNC: 40 U/L (ref 39–117)
ALT SERPL W P-5'-P-CCNC: 9 U/L (ref 1–41)
ANION GAP SERPL CALCULATED.3IONS-SCNC: 12.2 MMOL/L (ref 5–15)
AST SERPL-CCNC: 19 U/L (ref 1–40)
BASOPHILS # BLD AUTO: 0.03 10*3/MM3 (ref 0–0.2)
BASOPHILS NFR BLD AUTO: 1.2 % (ref 0–1.5)
BILIRUB SERPL-MCNC: 0.9 MG/DL (ref 0–1.2)
BUN SERPL-MCNC: 14 MG/DL (ref 8–23)
BUN/CREAT SERPL: 19.4 (ref 7–25)
CALCIUM SPEC-SCNC: 9.1 MG/DL (ref 8.6–10.5)
CHLORIDE SERPL-SCNC: 104 MMOL/L (ref 98–107)
CO2 SERPL-SCNC: 24.8 MMOL/L (ref 22–29)
CREAT SERPL-MCNC: 0.72 MG/DL (ref 0.7–1.3)
DEPRECATED RDW RBC AUTO: 49.1 FL (ref 37–54)
EGFRCR SERPLBLD CKD-EPI 2021: 92.4 ML/MIN/1.73
EOSINOPHIL # BLD AUTO: 0.22 10*3/MM3 (ref 0–0.4)
EOSINOPHIL NFR BLD AUTO: 8.5 % (ref 0.3–6.2)
ERYTHROCYTE [DISTWIDTH] IN BLOOD BY AUTOMATED COUNT: 14.9 % (ref 12.3–15.4)
GLOBULIN UR ELPH-MCNC: 1.5 GM/DL
GLUCOSE SERPL-MCNC: 114 MG/DL (ref 65–99)
HCT VFR BLD AUTO: 36.8 % (ref 37.5–51)
HGB BLD-MCNC: 12.4 G/DL (ref 13–17.7)
IMM GRANULOCYTES # BLD AUTO: 0.17 10*3/MM3 (ref 0–0.05)
IMM GRANULOCYTES NFR BLD AUTO: 6.6 % (ref 0–0.5)
LYMPHOCYTES # BLD AUTO: 0.45 10*3/MM3 (ref 0.7–3.1)
LYMPHOCYTES NFR BLD AUTO: 17.4 % (ref 19.6–45.3)
MCH RBC QN AUTO: 30.2 PG (ref 26.6–33)
MCHC RBC AUTO-ENTMCNC: 33.7 G/DL (ref 31.5–35.7)
MCV RBC AUTO: 89.5 FL (ref 79–97)
MONOCYTES # BLD AUTO: 0.25 10*3/MM3 (ref 0.1–0.9)
MONOCYTES NFR BLD AUTO: 9.7 % (ref 5–12)
NEUTROPHILS NFR BLD AUTO: 1.46 10*3/MM3 (ref 1.7–7)
NEUTROPHILS NFR BLD AUTO: 56.6 % (ref 42.7–76)
NRBC BLD AUTO-RTO: 0 /100 WBC (ref 0–0.2)
PLATELET # BLD AUTO: 114 10*3/MM3 (ref 140–450)
PMV BLD AUTO: 10.1 FL (ref 6–12)
POTASSIUM SERPL-SCNC: 3.8 MMOL/L (ref 3.5–5.2)
PROT SERPL-MCNC: 5.2 G/DL (ref 6–8.5)
RBC # BLD AUTO: 4.11 10*6/MM3 (ref 4.14–5.8)
SODIUM SERPL-SCNC: 141 MMOL/L (ref 136–145)
WBC NRBC COR # BLD: 2.58 10*3/MM3 (ref 3.4–10.8)

## 2023-10-03 PROCEDURE — 25010000002 DIPHENHYDRAMINE PER 50 MG: Performed by: INTERNAL MEDICINE

## 2023-10-03 PROCEDURE — 85025 COMPLETE CBC W/AUTO DIFF WBC: CPT

## 2023-10-03 PROCEDURE — 96411 CHEMO IV PUSH ADDL DRUG: CPT

## 2023-10-03 PROCEDURE — 96367 TX/PROPH/DG ADDL SEQ IV INF: CPT

## 2023-10-03 PROCEDURE — 25010000002 VINCRISTINE PER 1 MG: Performed by: INTERNAL MEDICINE

## 2023-10-03 PROCEDURE — 25010000002 CYCLOPHOSPHAMIDE 1 GM/5ML SOLUTION 5 ML VIAL: Performed by: INTERNAL MEDICINE

## 2023-10-03 PROCEDURE — A9270 NON-COVERED ITEM OR SERVICE: HCPCS | Performed by: INTERNAL MEDICINE

## 2023-10-03 PROCEDURE — 25810000003 SODIUM CHLORIDE 0.9 % SOLUTION 250 ML FLEX CONT: Performed by: INTERNAL MEDICINE

## 2023-10-03 PROCEDURE — 96417 CHEMO IV INFUS EACH ADDL SEQ: CPT

## 2023-10-03 PROCEDURE — 25010000002 HEPARIN LOCK FLUSH PER 10 UNITS: Performed by: INTERNAL MEDICINE

## 2023-10-03 PROCEDURE — 25010000002 PALONOSETRON PER 25 MCG: Performed by: INTERNAL MEDICINE

## 2023-10-03 PROCEDURE — 96377 APPLICATON ON-BODY INJECTOR: CPT

## 2023-10-03 PROCEDURE — 25810000003 SODIUM CHLORIDE 0.9 % SOLUTION: Performed by: INTERNAL MEDICINE

## 2023-10-03 PROCEDURE — 80053 COMPREHEN METABOLIC PANEL: CPT

## 2023-10-03 PROCEDURE — 96375 TX/PRO/DX INJ NEW DRUG ADDON: CPT

## 2023-10-03 PROCEDURE — 96415 CHEMO IV INFUSION ADDL HR: CPT

## 2023-10-03 PROCEDURE — 96413 CHEMO IV INFUSION 1 HR: CPT

## 2023-10-03 PROCEDURE — 25010000002 RITUXIMAB 10 MG/ML SOLUTION 50 ML VIAL: Performed by: INTERNAL MEDICINE

## 2023-10-03 PROCEDURE — 25010000002 FOSAPREPITANT PER 1 MG: Performed by: INTERNAL MEDICINE

## 2023-10-03 PROCEDURE — 25010000002 DEXAMETHASONE SODIUM PHOSPHATE 100 MG/10ML SOLUTION: Performed by: INTERNAL MEDICINE

## 2023-10-03 PROCEDURE — 25010000002 PEGFILGRASTIM 6 MG/0.6ML PREFILLED SYRINGE KIT: Performed by: INTERNAL MEDICINE

## 2023-10-03 PROCEDURE — 25010000002 HYDROCORTISONE SOD SUC (PF) 100 MG RECONSTITUTED SOLUTION: Performed by: INTERNAL MEDICINE

## 2023-10-03 PROCEDURE — 63710000001 ACETAMINOPHEN 325 MG TABLET: Performed by: INTERNAL MEDICINE

## 2023-10-03 PROCEDURE — 25010000002 RITUXIMAB 10 MG/ML SOLUTION 10 ML VIAL: Performed by: INTERNAL MEDICINE

## 2023-10-03 PROCEDURE — 25010000002 DOXORUBICIN PER 10 MG: Performed by: INTERNAL MEDICINE

## 2023-10-03 RX ORDER — FAMOTIDINE 10 MG/ML
20 INJECTION, SOLUTION INTRAVENOUS ONCE
Status: COMPLETED | OUTPATIENT
Start: 2023-10-03 | End: 2023-10-03

## 2023-10-03 RX ORDER — ACETAMINOPHEN 325 MG/1
650 TABLET ORAL ONCE
Status: COMPLETED | OUTPATIENT
Start: 2023-10-03 | End: 2023-10-03

## 2023-10-03 RX ORDER — SODIUM CHLORIDE 0.9 % (FLUSH) 0.9 %
10 SYRINGE (ML) INJECTION AS NEEDED
OUTPATIENT
Start: 2023-10-03

## 2023-10-03 RX ORDER — HEPARIN SODIUM (PORCINE) LOCK FLUSH IV SOLN 100 UNIT/ML 100 UNIT/ML
500 SOLUTION INTRAVENOUS AS NEEDED
Status: DISCONTINUED | OUTPATIENT
Start: 2023-10-03 | End: 2023-10-03 | Stop reason: HOSPADM

## 2023-10-03 RX ORDER — SODIUM CHLORIDE 0.9 % (FLUSH) 0.9 %
10 SYRINGE (ML) INJECTION AS NEEDED
Status: DISCONTINUED | OUTPATIENT
Start: 2023-10-03 | End: 2023-10-03 | Stop reason: HOSPADM

## 2023-10-03 RX ORDER — DOXORUBICIN HYDROCHLORIDE 2 MG/ML
40 INJECTION, SOLUTION INTRAVENOUS ONCE
Status: COMPLETED | OUTPATIENT
Start: 2023-10-03 | End: 2023-10-03

## 2023-10-03 RX ORDER — SODIUM CHLORIDE 9 MG/ML
250 INJECTION, SOLUTION INTRAVENOUS ONCE
Status: COMPLETED | OUTPATIENT
Start: 2023-10-03 | End: 2023-10-03

## 2023-10-03 RX ORDER — HEPARIN SODIUM (PORCINE) LOCK FLUSH IV SOLN 100 UNIT/ML 100 UNIT/ML
500 SOLUTION INTRAVENOUS AS NEEDED
OUTPATIENT
Start: 2023-10-03

## 2023-10-03 RX ORDER — PALONOSETRON 0.05 MG/ML
0.25 INJECTION, SOLUTION INTRAVENOUS ONCE
Status: COMPLETED | OUTPATIENT
Start: 2023-10-03 | End: 2023-10-03

## 2023-10-03 RX ADMIN — PEGFILGRASTIM 6 MG: KIT SUBCUTANEOUS at 14:11

## 2023-10-03 RX ADMIN — DIPHENHYDRAMINE HYDROCHLORIDE 50 MG: 50 INJECTION, SOLUTION INTRAMUSCULAR; INTRAVENOUS at 09:28

## 2023-10-03 RX ADMIN — PALONOSETRON HYDROCHLORIDE 0.25 MG: 0.25 INJECTION INTRAVENOUS at 09:27

## 2023-10-03 RX ADMIN — HYDROCORTISONE SODIUM SUCCINATE 200 MG: 100 INJECTION, POWDER, FOR SOLUTION INTRAMUSCULAR; INTRAVENOUS at 10:23

## 2023-10-03 RX ADMIN — VINCRISTINE SULFATE 2 MG: 1 INJECTION, SOLUTION INTRAVENOUS at 13:42

## 2023-10-03 RX ADMIN — FOSAPREPITANT 100 ML: 150 INJECTION, POWDER, LYOPHILIZED, FOR SOLUTION INTRAVENOUS at 09:48

## 2023-10-03 RX ADMIN — SODIUM CHLORIDE 250 ML: 9 INJECTION, SOLUTION INTRAVENOUS at 09:23

## 2023-10-03 RX ADMIN — Medication 500 UNITS: at 14:29

## 2023-10-03 RX ADMIN — DOXORUBICIN HYDROCHLORIDE 42 MG: 2 INJECTION, SOLUTION INTRAVENOUS at 13:34

## 2023-10-03 RX ADMIN — ACETAMINOPHEN 650 MG: 325 TABLET ORAL at 09:20

## 2023-10-03 RX ADMIN — FAMOTIDINE 20 MG: 10 INJECTION INTRAVENOUS at 09:26

## 2023-10-03 RX ADMIN — Medication 10 ML: at 14:29

## 2023-10-03 RX ADMIN — DEXAMETHASONE SODIUM PHOSPHATE 12 MG: 10 INJECTION, SOLUTION INTRAMUSCULAR; INTRAVENOUS at 13:11

## 2023-10-03 RX ADMIN — CYCLOPHOSPHAMIDE 1260 MG: 200 INJECTION, SOLUTION INTRAVENOUS at 13:55

## 2023-10-03 RX ADMIN — RITUXIMAB 790 MG: 10 INJECTION, SOLUTION INTRAVENOUS at 10:26

## 2023-10-10 ENCOUNTER — CLINICAL SUPPORT (OUTPATIENT)
Dept: ONCOLOGY | Facility: HOSPITAL | Age: 78
End: 2023-10-10
Payer: MEDICARE

## 2023-10-10 ENCOUNTER — LAB (OUTPATIENT)
Dept: LAB | Facility: HOSPITAL | Age: 78
End: 2023-10-10
Payer: MEDICARE

## 2023-10-10 DIAGNOSIS — C91.10 RICHTER'S SYNDROME: ICD-10-CM

## 2023-10-10 LAB
ALBUMIN SERPL-MCNC: 3.5 G/DL (ref 3.5–5.2)
ALBUMIN/GLOB SERPL: 2.7 G/DL
ALP SERPL-CCNC: 38 U/L (ref 39–117)
ALT SERPL W P-5'-P-CCNC: <5 U/L (ref 1–41)
ANION GAP SERPL CALCULATED.3IONS-SCNC: 10.9 MMOL/L (ref 5–15)
AST SERPL-CCNC: 8 U/L (ref 1–40)
BASOPHILS # BLD AUTO: 0.01 10*3/MM3 (ref 0–0.2)
BASOPHILS NFR BLD AUTO: 1.7 % (ref 0–1.5)
BILIRUB SERPL-MCNC: 0.9 MG/DL (ref 0–1.2)
BUN SERPL-MCNC: 15 MG/DL (ref 8–23)
BUN/CREAT SERPL: 19.5 (ref 7–25)
CALCIUM SPEC-SCNC: 8.8 MG/DL (ref 8.6–10.5)
CHLORIDE SERPL-SCNC: 101 MMOL/L (ref 98–107)
CO2 SERPL-SCNC: 27.1 MMOL/L (ref 22–29)
CREAT SERPL-MCNC: 0.77 MG/DL (ref 0.7–1.3)
DEPRECATED RDW RBC AUTO: 45.1 FL (ref 37–54)
EGFRCR SERPLBLD CKD-EPI 2021: 90.5 ML/MIN/1.73
EOSINOPHIL # BLD AUTO: 0.16 10*3/MM3 (ref 0–0.4)
EOSINOPHIL NFR BLD AUTO: 27.1 % (ref 0.3–6.2)
ERYTHROCYTE [DISTWIDTH] IN BLOOD BY AUTOMATED COUNT: 14 % (ref 12.3–15.4)
GLOBULIN UR ELPH-MCNC: 1.3 GM/DL
GLUCOSE SERPL-MCNC: 116 MG/DL (ref 65–99)
HCT VFR BLD AUTO: 34.3 % (ref 37.5–51)
HGB BLD-MCNC: 11.7 G/DL (ref 13–17.7)
IMM GRANULOCYTES # BLD AUTO: 0.05 10*3/MM3 (ref 0–0.05)
IMM GRANULOCYTES NFR BLD AUTO: 8.5 % (ref 0–0.5)
LDH SERPL-CCNC: 155 U/L (ref 135–225)
LYMPHOCYTES # BLD AUTO: 0.27 10*3/MM3 (ref 0.7–3.1)
LYMPHOCYTES NFR BLD AUTO: 45.8 % (ref 19.6–45.3)
MCH RBC QN AUTO: 30.2 PG (ref 26.6–33)
MCHC RBC AUTO-ENTMCNC: 34.1 G/DL (ref 31.5–35.7)
MCV RBC AUTO: 88.4 FL (ref 79–97)
MONOCYTES # BLD AUTO: 0.07 10*3/MM3 (ref 0.1–0.9)
MONOCYTES NFR BLD AUTO: 11.9 % (ref 5–12)
NEUTROPHILS NFR BLD AUTO: 0.03 10*3/MM3 (ref 1.7–7)
NEUTROPHILS NFR BLD AUTO: 5 % (ref 42.7–76)
NRBC BLD AUTO-RTO: 0 /100 WBC (ref 0–0.2)
PLATELET # BLD AUTO: 60 10*3/MM3 (ref 140–450)
PMV BLD AUTO: 10 FL (ref 6–12)
POTASSIUM SERPL-SCNC: 3.6 MMOL/L (ref 3.5–5.2)
PROT SERPL-MCNC: 4.8 G/DL (ref 6–8.5)
RBC # BLD AUTO: 3.88 10*6/MM3 (ref 4.14–5.8)
SODIUM SERPL-SCNC: 139 MMOL/L (ref 136–145)
WBC NRBC COR # BLD: 0.59 10*3/MM3 (ref 3.4–10.8)

## 2023-10-10 PROCEDURE — 36415 COLL VENOUS BLD VENIPUNCTURE: CPT

## 2023-10-10 PROCEDURE — 80053 COMPREHEN METABOLIC PANEL: CPT

## 2023-10-10 PROCEDURE — 85025 COMPLETE CBC W/AUTO DIFF WBC: CPT

## 2023-10-10 PROCEDURE — 83615 LACTATE (LD) (LDH) ENZYME: CPT

## 2023-10-10 RX ORDER — LEVOFLOXACIN 500 MG/1
500 TABLET, FILM COATED ORAL DAILY
Qty: 5 TABLET | Refills: 0 | Status: SHIPPED | OUTPATIENT
Start: 2023-10-10

## 2023-10-10 NOTE — PROGRESS NOTES
Patient is here for lab review with RN.  CBC reviewed with Dr. Schroeder. Order for Levaquin placed. Verified pharm.  Patient has no complaints or signs of infection. Copy of labs given to patient and follow up appointment reviewed. Patient is instructed to call the office with any concerns or new symptoms prior to next visit. Patient verbalized understanding and discharged in stable condition. Anneliese Torres RN

## 2023-10-16 ENCOUNTER — TELEPHONE (OUTPATIENT)
Dept: ONCOLOGY | Facility: CLINIC | Age: 78
End: 2023-10-16
Payer: MEDICARE

## 2023-10-16 DIAGNOSIS — R19.7 DIARRHEA OF PRESUMED INFECTIOUS ORIGIN: Primary | ICD-10-CM

## 2023-10-16 DIAGNOSIS — D61.818 OTHER PANCYTOPENIA: ICD-10-CM

## 2023-10-16 NOTE — TELEPHONE ENCOUNTER
Called patient in response to voicemail left on direct line reporting diarrhea since starting the Levaquin last week. Discussed w/ Dr. Schroeder who will order a stool sample. Advised he take imodium per Dr. Schroeder as well as probiotics and yogurt. Patient v/u. Anneliese Torres RN

## 2023-10-17 ENCOUNTER — LAB (OUTPATIENT)
Dept: LAB | Facility: HOSPITAL | Age: 78
End: 2023-10-17
Payer: MEDICARE

## 2023-10-17 ENCOUNTER — CLINICAL SUPPORT (OUTPATIENT)
Dept: ONCOLOGY | Facility: HOSPITAL | Age: 78
End: 2023-10-17
Payer: MEDICARE

## 2023-10-17 DIAGNOSIS — C91.10 RICHTER'S SYNDROME: ICD-10-CM

## 2023-10-17 LAB
ALBUMIN SERPL-MCNC: 3.8 G/DL (ref 3.5–5.2)
ALBUMIN/GLOB SERPL: 2.1 G/DL
ALP SERPL-CCNC: 53 U/L (ref 39–117)
ALT SERPL W P-5'-P-CCNC: 9 U/L (ref 1–41)
ANION GAP SERPL CALCULATED.3IONS-SCNC: 11.4 MMOL/L (ref 5–15)
AST SERPL-CCNC: 19 U/L (ref 1–40)
BASOPHILS # BLD AUTO: 0.04 10*3/MM3 (ref 0–0.2)
BASOPHILS NFR BLD AUTO: 1.1 % (ref 0–1.5)
BILIRUB SERPL-MCNC: 0.6 MG/DL (ref 0–1.2)
BUN SERPL-MCNC: 11 MG/DL (ref 8–23)
BUN/CREAT SERPL: 13.4 (ref 7–25)
CALCIUM SPEC-SCNC: 9 MG/DL (ref 8.6–10.5)
CHLORIDE SERPL-SCNC: 103 MMOL/L (ref 98–107)
CO2 SERPL-SCNC: 25.6 MMOL/L (ref 22–29)
CREAT SERPL-MCNC: 0.82 MG/DL (ref 0.7–1.3)
DEPRECATED RDW RBC AUTO: 46.3 FL (ref 37–54)
EGFRCR SERPLBLD CKD-EPI 2021: 88.8 ML/MIN/1.73
EOSINOPHIL # BLD AUTO: 0.04 10*3/MM3 (ref 0–0.4)
EOSINOPHIL NFR BLD AUTO: 1.1 % (ref 0.3–6.2)
ERYTHROCYTE [DISTWIDTH] IN BLOOD BY AUTOMATED COUNT: 14.5 % (ref 12.3–15.4)
GLOBULIN UR ELPH-MCNC: 1.8 GM/DL
GLUCOSE SERPL-MCNC: 91 MG/DL (ref 65–99)
HCT VFR BLD AUTO: 37.1 % (ref 37.5–51)
HGB BLD-MCNC: 12.6 G/DL (ref 13–17.7)
IMM GRANULOCYTES # BLD AUTO: 0.48 10*3/MM3 (ref 0–0.05)
IMM GRANULOCYTES NFR BLD AUTO: 13 % (ref 0–0.5)
LDH SERPL-CCNC: 181 U/L (ref 135–225)
LYMPHOCYTES # BLD AUTO: 0.52 10*3/MM3 (ref 0.7–3.1)
LYMPHOCYTES NFR BLD AUTO: 14.1 % (ref 19.6–45.3)
MCH RBC QN AUTO: 29.6 PG (ref 26.6–33)
MCHC RBC AUTO-ENTMCNC: 34 G/DL (ref 31.5–35.7)
MCV RBC AUTO: 87.1 FL (ref 79–97)
MONOCYTES # BLD AUTO: 0.35 10*3/MM3 (ref 0.1–0.9)
MONOCYTES NFR BLD AUTO: 9.5 % (ref 5–12)
NEUTROPHILS NFR BLD AUTO: 2.27 10*3/MM3 (ref 1.7–7)
NEUTROPHILS NFR BLD AUTO: 61.2 % (ref 42.7–76)
NRBC BLD AUTO-RTO: 0 /100 WBC (ref 0–0.2)
PLATELET # BLD AUTO: 114 10*3/MM3 (ref 140–450)
PMV BLD AUTO: 10.2 FL (ref 6–12)
POTASSIUM SERPL-SCNC: 3.7 MMOL/L (ref 3.5–5.2)
PROT SERPL-MCNC: 5.6 G/DL (ref 6–8.5)
RBC # BLD AUTO: 4.26 10*6/MM3 (ref 4.14–5.8)
SODIUM SERPL-SCNC: 140 MMOL/L (ref 136–145)
WBC NRBC COR # BLD: 3.7 10*3/MM3 (ref 3.4–10.8)

## 2023-10-17 PROCEDURE — 80053 COMPREHEN METABOLIC PANEL: CPT

## 2023-10-17 PROCEDURE — 36415 COLL VENOUS BLD VENIPUNCTURE: CPT

## 2023-10-17 PROCEDURE — 83615 LACTATE (LD) (LDH) ENZYME: CPT

## 2023-10-17 PROCEDURE — 85025 COMPLETE CBC W/AUTO DIFF WBC: CPT

## 2023-10-17 NOTE — PROGRESS NOTES
Patient is here for lab review with RN.  CBC reviewed, counts are stable for this patient at this time. Patient has no complaints. Copy of labs given to patient and follow up appointment reviewed. Patient is instructed to call the office with any concerns or new symptoms prior to next visit. Patient verbalized understanding and discharged in stable condition.     Patient was given stool specimen container for diarrhea though he states it has improved significantly. No longer having loose stools. Anneliese Torres RN

## 2023-10-20 ENCOUNTER — LAB (OUTPATIENT)
Dept: LAB | Facility: HOSPITAL | Age: 78
End: 2023-10-20
Payer: MEDICARE

## 2023-10-20 DIAGNOSIS — D61.818 OTHER PANCYTOPENIA: ICD-10-CM

## 2023-10-20 DIAGNOSIS — R19.7 DIARRHEA OF PRESUMED INFECTIOUS ORIGIN: ICD-10-CM

## 2023-10-20 LAB
ADV 40+41 DNA STL QL NAA+NON-PROBE: NOT DETECTED
ASTRO TYP 1-8 RNA STL QL NAA+NON-PROBE: NOT DETECTED
C CAYETANENSIS DNA STL QL NAA+NON-PROBE: NOT DETECTED
C COLI+JEJ+UPSA DNA STL QL NAA+NON-PROBE: NOT DETECTED
C DIFF TOX GENS STL QL NAA+PROBE: NEGATIVE
CRYPTOSP DNA STL QL NAA+NON-PROBE: NOT DETECTED
E HISTOLYT DNA STL QL NAA+NON-PROBE: NOT DETECTED
EAEC PAA PLAS AGGR+AATA ST NAA+NON-PRB: NOT DETECTED
EC STX1+STX2 GENES STL QL NAA+NON-PROBE: NOT DETECTED
EPEC EAE GENE STL QL NAA+NON-PROBE: NOT DETECTED
ETEC LTA+ST1A+ST1B TOX ST NAA+NON-PROBE: NOT DETECTED
G LAMBLIA DNA STL QL NAA+NON-PROBE: NOT DETECTED
NOROVIRUS GI+II RNA STL QL NAA+NON-PROBE: NOT DETECTED
P SHIGELLOIDES DNA STL QL NAA+NON-PROBE: NOT DETECTED
RVA RNA STL QL NAA+NON-PROBE: NOT DETECTED
S ENT+BONG DNA STL QL NAA+NON-PROBE: NOT DETECTED
SAPO I+II+IV+V RNA STL QL NAA+NON-PROBE: NOT DETECTED
SHIGELLA SP+EIEC IPAH ST NAA+NON-PROBE: NOT DETECTED
V CHOL+PARA+VUL DNA STL QL NAA+NON-PROBE: NOT DETECTED
V CHOLERAE DNA STL QL NAA+NON-PROBE: NOT DETECTED
Y ENTEROCOL DNA STL QL NAA+NON-PROBE: NOT DETECTED

## 2023-10-20 PROCEDURE — 87507 IADNA-DNA/RNA PROBE TQ 12-25: CPT | Performed by: INTERNAL MEDICINE

## 2023-10-20 PROCEDURE — 87493 C DIFF AMPLIFIED PROBE: CPT | Performed by: INTERNAL MEDICINE

## 2023-10-24 ENCOUNTER — CLINICAL SUPPORT (OUTPATIENT)
Dept: ONCOLOGY | Facility: HOSPITAL | Age: 78
End: 2023-10-24
Payer: MEDICARE

## 2023-10-24 ENCOUNTER — LAB (OUTPATIENT)
Dept: LAB | Facility: HOSPITAL | Age: 78
End: 2023-10-24
Payer: MEDICARE

## 2023-10-24 DIAGNOSIS — C91.10 RICHTER'S SYNDROME: Primary | ICD-10-CM

## 2023-10-24 LAB
ALBUMIN SERPL-MCNC: 3.9 G/DL (ref 3.5–5.2)
ALBUMIN/GLOB SERPL: 2.1 G/DL
ALP SERPL-CCNC: 51 U/L (ref 39–117)
ALT SERPL W P-5'-P-CCNC: 7 U/L (ref 1–41)
ANION GAP SERPL CALCULATED.3IONS-SCNC: 13.2 MMOL/L (ref 5–15)
AST SERPL-CCNC: 15 U/L (ref 1–40)
BASOPHILS # BLD AUTO: 0.08 10*3/MM3 (ref 0–0.2)
BASOPHILS NFR BLD AUTO: 1.4 % (ref 0–1.5)
BILIRUB SERPL-MCNC: 0.6 MG/DL (ref 0–1.2)
BUN SERPL-MCNC: 14 MG/DL (ref 8–23)
BUN/CREAT SERPL: 19.2 (ref 7–25)
CALCIUM SPEC-SCNC: 9.2 MG/DL (ref 8.6–10.5)
CHLORIDE SERPL-SCNC: 105 MMOL/L (ref 98–107)
CO2 SERPL-SCNC: 24.8 MMOL/L (ref 22–29)
CREAT SERPL-MCNC: 0.73 MG/DL (ref 0.7–1.3)
DEPRECATED RDW RBC AUTO: 44.9 FL (ref 37–54)
EGFRCR SERPLBLD CKD-EPI 2021: 92 ML/MIN/1.73
EOSINOPHIL # BLD AUTO: 0 10*3/MM3 (ref 0–0.4)
EOSINOPHIL NFR BLD AUTO: 0 % (ref 0.3–6.2)
ERYTHROCYTE [DISTWIDTH] IN BLOOD BY AUTOMATED COUNT: 14.5 % (ref 12.3–15.4)
GLOBULIN UR ELPH-MCNC: 1.9 GM/DL
GLUCOSE SERPL-MCNC: 100 MG/DL (ref 65–99)
HCT VFR BLD AUTO: 35.7 % (ref 37.5–51)
HGB BLD-MCNC: 12.2 G/DL (ref 13–17.7)
IMM GRANULOCYTES # BLD AUTO: 0.55 10*3/MM3 (ref 0–0.05)
IMM GRANULOCYTES NFR BLD AUTO: 10 % (ref 0–0.5)
LDH SERPL-CCNC: 195 U/L (ref 135–225)
LYMPHOCYTES # BLD AUTO: 0.58 10*3/MM3 (ref 0.7–3.1)
LYMPHOCYTES NFR BLD AUTO: 10.5 % (ref 19.6–45.3)
MCH RBC QN AUTO: 29.8 PG (ref 26.6–33)
MCHC RBC AUTO-ENTMCNC: 34.2 G/DL (ref 31.5–35.7)
MCV RBC AUTO: 87.1 FL (ref 79–97)
MONOCYTES # BLD AUTO: 0.64 10*3/MM3 (ref 0.1–0.9)
MONOCYTES NFR BLD AUTO: 11.6 % (ref 5–12)
NEUTROPHILS NFR BLD AUTO: 3.67 10*3/MM3 (ref 1.7–7)
NEUTROPHILS NFR BLD AUTO: 66.5 % (ref 42.7–76)
NRBC BLD AUTO-RTO: 0 /100 WBC (ref 0–0.2)
PLATELET # BLD AUTO: 186 10*3/MM3 (ref 140–450)
PMV BLD AUTO: 9.8 FL (ref 6–12)
POTASSIUM SERPL-SCNC: 3.9 MMOL/L (ref 3.5–5.2)
PROT SERPL-MCNC: 5.8 G/DL (ref 6–8.5)
RBC # BLD AUTO: 4.1 10*6/MM3 (ref 4.14–5.8)
SODIUM SERPL-SCNC: 143 MMOL/L (ref 136–145)
WBC NRBC COR # BLD: 5.52 10*3/MM3 (ref 3.4–10.8)

## 2023-10-24 PROCEDURE — 80053 COMPREHEN METABOLIC PANEL: CPT

## 2023-10-24 PROCEDURE — 85025 COMPLETE CBC W/AUTO DIFF WBC: CPT

## 2023-11-02 ENCOUNTER — LAB (OUTPATIENT)
Dept: LAB | Facility: HOSPITAL | Age: 78
End: 2023-11-02
Payer: MEDICARE

## 2023-11-02 ENCOUNTER — OFFICE VISIT (OUTPATIENT)
Dept: ONCOLOGY | Facility: CLINIC | Age: 78
End: 2023-11-02
Payer: MEDICARE

## 2023-11-02 VITALS
DIASTOLIC BLOOD PRESSURE: 75 MMHG | SYSTOLIC BLOOD PRESSURE: 131 MMHG | BODY MASS INDEX: 24.5 KG/M2 | OXYGEN SATURATION: 98 % | HEIGHT: 77 IN | TEMPERATURE: 97.3 F | WEIGHT: 207.5 LBS | HEART RATE: 87 BPM

## 2023-11-02 DIAGNOSIS — C91.10 RICHTER'S SYNDROME: ICD-10-CM

## 2023-11-02 DIAGNOSIS — C83.87: Primary | ICD-10-CM

## 2023-11-02 DIAGNOSIS — E55.9 HYPOVITAMINOSIS D: ICD-10-CM

## 2023-11-02 DIAGNOSIS — D61.810 PANCYTOPENIA DUE TO CHEMOTHERAPY: ICD-10-CM

## 2023-11-02 PROBLEM — E43 SEVERE MALNUTRITION: Status: RESOLVED | Noted: 2023-06-13 | Resolved: 2023-11-02

## 2023-11-02 PROBLEM — N32.0 BLADDER OUTLET OBSTRUCTION: Status: RESOLVED | Noted: 2019-08-16 | Resolved: 2023-11-02

## 2023-11-02 PROBLEM — Z79.899 HIGH RISK MEDICATION USE: Status: RESOLVED | Noted: 2023-02-08 | Resolved: 2023-11-02

## 2023-11-02 PROBLEM — R53.1 WEAKNESS: Status: RESOLVED | Noted: 2023-06-27 | Resolved: 2023-11-02

## 2023-11-02 PROBLEM — M62.3 IMMOBILITY SYNDROME: Status: RESOLVED | Noted: 2023-06-27 | Resolved: 2023-11-02

## 2023-11-02 PROBLEM — R10.9 ABDOMINAL PAIN: Status: RESOLVED | Noted: 2019-01-19 | Resolved: 2023-11-02

## 2023-11-02 LAB
ALBUMIN SERPL-MCNC: 4 G/DL (ref 3.5–5.2)
ALBUMIN/GLOB SERPL: 2.7 G/DL
ALP SERPL-CCNC: 51 U/L (ref 39–117)
ALT SERPL W P-5'-P-CCNC: 9 U/L (ref 1–41)
ANION GAP SERPL CALCULATED.3IONS-SCNC: 10.9 MMOL/L (ref 5–15)
AST SERPL-CCNC: 16 U/L (ref 1–40)
BASOPHILS # BLD AUTO: 0.06 10*3/MM3 (ref 0–0.2)
BASOPHILS NFR BLD AUTO: 1.4 % (ref 0–1.5)
BILIRUB SERPL-MCNC: 0.5 MG/DL (ref 0–1.2)
BUN SERPL-MCNC: 18 MG/DL (ref 8–23)
BUN/CREAT SERPL: 22.5 (ref 7–25)
CALCIUM SPEC-SCNC: 8.9 MG/DL (ref 8.6–10.5)
CHLORIDE SERPL-SCNC: 103 MMOL/L (ref 98–107)
CO2 SERPL-SCNC: 26.1 MMOL/L (ref 22–29)
CREAT SERPL-MCNC: 0.8 MG/DL (ref 0.7–1.3)
DEPRECATED RDW RBC AUTO: 48.1 FL (ref 37–54)
EGFRCR SERPLBLD CKD-EPI 2021: 89.5 ML/MIN/1.73
EOSINOPHIL # BLD AUTO: 0.09 10*3/MM3 (ref 0–0.4)
EOSINOPHIL NFR BLD AUTO: 2.1 % (ref 0.3–6.2)
ERYTHROCYTE [DISTWIDTH] IN BLOOD BY AUTOMATED COUNT: 15 % (ref 12.3–15.4)
GLOBULIN UR ELPH-MCNC: 1.5 GM/DL
GLUCOSE SERPL-MCNC: 105 MG/DL (ref 65–99)
HCT VFR BLD AUTO: 37.7 % (ref 37.5–51)
HGB BLD-MCNC: 12.8 G/DL (ref 13–17.7)
IMM GRANULOCYTES # BLD AUTO: 0.25 10*3/MM3 (ref 0–0.05)
IMM GRANULOCYTES NFR BLD AUTO: 6 % (ref 0–0.5)
LDH SERPL-CCNC: 206 U/L (ref 135–225)
LYMPHOCYTES # BLD AUTO: 0.7 10*3/MM3 (ref 0.7–3.1)
LYMPHOCYTES NFR BLD AUTO: 16.7 % (ref 19.6–45.3)
MCH RBC QN AUTO: 29.9 PG (ref 26.6–33)
MCHC RBC AUTO-ENTMCNC: 34 G/DL (ref 31.5–35.7)
MCV RBC AUTO: 88.1 FL (ref 79–97)
MONOCYTES # BLD AUTO: 0.6 10*3/MM3 (ref 0.1–0.9)
MONOCYTES NFR BLD AUTO: 14.3 % (ref 5–12)
NEUTROPHILS NFR BLD AUTO: 2.5 10*3/MM3 (ref 1.7–7)
NEUTROPHILS NFR BLD AUTO: 59.5 % (ref 42.7–76)
NRBC BLD AUTO-RTO: 0 /100 WBC (ref 0–0.2)
PLATELET # BLD AUTO: 160 10*3/MM3 (ref 140–450)
PMV BLD AUTO: 9.8 FL (ref 6–12)
POTASSIUM SERPL-SCNC: 4.1 MMOL/L (ref 3.5–5.2)
PROT SERPL-MCNC: 5.5 G/DL (ref 6–8.5)
RBC # BLD AUTO: 4.28 10*6/MM3 (ref 4.14–5.8)
SODIUM SERPL-SCNC: 140 MMOL/L (ref 136–145)
WBC NRBC COR # BLD: 4.2 10*3/MM3 (ref 3.4–10.8)

## 2023-11-02 PROCEDURE — 83615 LACTATE (LD) (LDH) ENZYME: CPT

## 2023-11-02 PROCEDURE — 36415 COLL VENOUS BLD VENIPUNCTURE: CPT

## 2023-11-02 PROCEDURE — 85025 COMPLETE CBC W/AUTO DIFF WBC: CPT

## 2023-11-02 PROCEDURE — 80053 COMPREHEN METABOLIC PANEL: CPT

## 2023-11-02 NOTE — PROGRESS NOTES
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  Progressive disease January 2023 placed on Calquence.  Further progression with Jon's transformation and initiating CHOP Rituxan 6/14/2023, COMPLETED 10/23 5 CYCLES , ACHIEVED A VERY GOOD RESPONSE CLINICALLY, BIO CHEMICALLY BY LDH MEASUREMENT AND RADIOLOGICALLY BY PET SCAN.    HISTORY OF PRESENT ILLNESS:      On 11/02/2023, this 78-year-old male who has Jon's transformation of CLL and completed 5 cycles of chemotherapy, CHOP-Rituxan has recovered blood counts successfully and returns for follow-up. At this time, the patient is feeling terrific, gaining weight, gaining strength, able to go back to the office and work and function without any major limitations. history is eating like a hog. He has gained weight. He has no nausea, vomiting, diarrhea, or abdominal pain. He has proper urination. He has no fevers or chills. No sweats, pruritis or adenopathies. No fatigue. His hair is growing back and he has not had any cardiac or respiratory issues. The patient otherwise feels perfectly fine. He is here to review his laboratory parameters after the previous cycle of chemotherapy and review all his medications and make a plan to define how the follow-up is going to be.        Past Medical History:   Diagnosis Date    Arthritis     Benign prostatic hyperplasia     FOLLOWED BY DR. VIVIEN PATEL    Biliary dyskinesia     Bunion of right foot     GREAT TOE    CLL (chronic lymphocytic leukemia) 2016    FOLLOWED BY DR WALKER    Colon polyps     FOLLOWED BY DR. NEHA HAM    Constipation     Degeneration of lumbar intervertebral disc     Diverticulosis     Erectile dysfunction     Fracture of ankle 1975    GERD (gastroesophageal reflux disease)     Hallux valgus of left foot     Hyperlipidemia     MIXED    Hypertension     Inguinal hernia     Kidney stone 02/21/2009    SEEN AT [facility] ER    Knee swelling 2018    Localized,  primary osteoarthritis     Lumbar radiculopathy     Lumbar stenosis     Lung mass     LEFT SIDE - SCAR TISSUE PER PATIENT     Osteoarthritis of right knee     Polyneuropathy     Prostate CA 03/2016    JACQUELYN 6, S/P XRT, FOLLOWED BY DR. VIVIEN PATEL, RADIATION SEEDS    PVC (premature ventricular contraction)     PT STATES WORKED UP YEARS AGO BY [facility] FOR POSSIBLE MURMUR - NO FOLLOW UP REQUIRED     RBBB     Sensory hearing loss, bilateral     Skin cancer     SQUAMOUS CELL CARCINOMA OF FACE    Substernal goiter     Thyromegaly 12/2016    ADMITTED TO [facility]    Vitamin D deficiency      Past Surgical History:   Procedure Laterality Date    BRONCHOSCOPY N/A 12/14/2016    Procedure: BRONCHOSCOPY WITH  BAL AND BIOPSY AND ENDOBRONCHIAL ULTRASOUND  AND NAVIGATION WITH BRUSHINGS AND BIOPSY AND BAL;  Surgeon: Pierre Manning MD;  Location: [facility] ENDOSCOPY;  Service:     COLONOSCOPY N/A 03/13/2019    5 MM SESSILE TUBULAR ADENOMA POLYP IN TRANSVERSE, MULTIPLE SMALL AND LARGE DIVERTICULA IN SIGMOID, RESCOPE IN 5 YRS, DR. NEHA HAM AT [facility]    COLONOSCOPY W/ POLYPECTOMY N/A 03/19/2015    3 TUBULAR ADENOMA POLYPS, 12 TUBULOVILLOUS POLYP RECTO-SIGMOID, DIVERTICULOSIS, RESCOPE IN 3 YRS, DR. NEHA HAM AT [facility]    EYE SURGERY Bilateral     CATARACT EXTRACTION WITH LENS IMPLANT, DR. GOVEA    FINGER NAIL SURGERY Right 2022    TORRES-PATEL    INGUINAL HERNIA REPAIR Left 11/02/2021    Procedure: LEFT OPEN INGUINAL HERNIA REPAIR;  Surgeon: Nixon Kolb MD;  Location: [facility] OR;  Service: General;  Laterality: Left;    PROSTATE RADIOACTIVE SEED IMPLANT N/A 09/06/2016    DR. HOA JARAMILLO AT [facility]    PROSTATE SURGERY N/A 03/11/2016    BIOPSY: ADENOCARCINOMA, DR. ZAID IBARRA    THYROID BIOPSY Bilateral 11/01/2017    NO B CELL OR T CELL LYMPHOMA, DONE AT [facility]    TOTAL KNEE ARTHROPLASTY Right 10/12/2022    Procedure: TOTAL KNEE ARTHROPLASTY;  Surgeon: Ran Rachel MD;  [place] [facility] [place] [facility] [place]  Service:  [place]thopedics;  Laterality: Right;    VENOUS ACCESS DEVICE (PORT) INSERTION Right 7/6/2023    Procedure: INSERTION VENOUS ACCESS DEVICE;  Surgeon: Nixon oKlb MD;  [place] [facility] [place] [facility] [place]  Service: General;  Laterality: Right;     Social History     Socioeconomic History    Marital status:      Spouse name: No    Years of education: College   Tobacco Use    Smoking status: Never    Smokeless tobacco: Never   Vaping Use    Vaping Use: Never used   Substance and Sexual Activity    Alcohol use: No    Drug use: Never    Sexual activity: Yes     Partners: Female     Birth control/protection: None     Family History   Problem Relation Age of Onset    Heart disease Father         Rheumatic heart disease    Hypertension Father     Osteoporosis Father     Stroke Mother     Osteoporosis Mother     No Known Problems Daughter     Malig Hyperthermia Neg Hx      Current Outpatient Medications on File Prior to Visit   Medication Sig    acyclovir (Zovirax) 400 MG tablet Take 1 tablet by mouth Daily.    alfuzosin (UROXATRAL) 10 MG 24 hr tablet TAKE 2 TABLETS DAILY IMMEDIATELY AFTER MEAL.    allopurinol (ZYLOPRIM) 300 MG tablet Take 1 tablet by mouth Daily.    ammonium lactate (AMLACTIN) 12 % cream APPLY 1 APPLICATION EXTERNALLY TWICE A DAY    bacitracin 500 UNIT/GM ointment Apply 1 application topically to the appropriate area as directed 2 (Two) Times a Day.    calcium carbonate (TUMS) 500 MG chewable tablet Chew 2 tablets 2 (Two) Times a Day As Needed for Heartburn.    hydroCHLOROthiazide (HYDRODIURIL) 25 MG tablet Take 1 po QD    HYDROcodone-acetaminophen (NORCO) 5-325 MG per tablet     levoFLOXacin (Levaquin) 500 MG tablet Take 1 tablet by mouth Daily.    lidocaine-prilocaine (EMLA) 2.5-2.5 % cream Apply 1 application  topically to the appropriate area as directed Every 2 (Two) Hours As Needed for Mild Pain.    losartan (COZAAR) 100 MG tablet Take 1 po QD    losartan-hydrochlorothiazide (HYZAAR) 100-25 MG per  "tablet Take 1 tablet by mouth Daily.    Melatonin 10 MG tablet Take 1 tablet by mouth Every Night.    meloxicam (MOBIC) 15 MG tablet     ofloxacin (OCUFLOX) 0.3 % ophthalmic solution Place four drops into affected ear twice daily for 7 days    ondansetron (Zofran) 4 MG tablet Take 1 tablet by mouth Every 6 (Six) Hours As Needed for Nausea or Vomiting for up to 10 doses.    polyethylene glycol (MIRALAX) 17 GM/SCOOP powder Dissolve 17 g (1 capful) in liquid and drink by mouth Daily.    potassium chloride (K-DUR,KLOR-CON) 20 MEQ CR tablet Take 1 tablet by mouth Daily.    pravastatin (PRAVACHOL) 20 MG tablet     predniSONE (DELTASONE) 20 MG tablet Take 3 tablets by mouth daily for 5 days following treatment day as directed.    triamcinolone (KENALOG) 0.025 % cream APPLY TO RASH AS NEEDED     Current Facility-Administered Medications on File Prior to Visit   Medication    Chlorhexidine Gluconate Cloth 2 % pads     No Known Allergies        VITAL SIGNS:  Vitals:    11/02/23 1603   BP: 131/75   Pulse: 87   Temp: 97.3 °F (36.3 °C)   TempSrc: Temporal   SpO2: 98%   Weight: 94.1 kg (207 lb 8 oz)   Height: 195.6 cm (77.01\")   PainSc: 0-No pain              PHYSICAL EXAMINATION:               GENERAL:  Well-developed, Patient  in no acute distress.   SKIN:  Warm, dry ,NO purpura ,no rash.  HEENT:  Pupils were equal and reactive to light and accomodation, conjunctivae noninjected,  normal visual acuity.   NECK:  Supple with good range of motion; no thyromegaly , no JVD or bruits,.No carotid artery pain, no carotid abnormal pulsation   LYMPHATICS:  No cervical, NO supraclavicular, NO axillary, NO inguinal adenopathies.  CARDIAC   normal rate , regular rhythm, without murmur,NO rubs NO S3 NO S4   LUNGS: normal breath sounds bilateral, no wheezing, NO rhonchi, NO crackles ,NO rubs.  VASCULAR VENOUS: no cyanosis, NO collateral circulation, NO varicosities, NO edema, NO palpable cords, NO pain,NO erythema, NO pigmentation of the " skin.  ABDOMEN:  Soft, NO pain,no hepatomegaly, 6 cm blcm splenomegaly,no masses, no ascites, no collateral circulation,no distention.  EXTREMITIES  AND SPINE:  No clubbing, no cyanosis ,no deformities , no pain .No kyphosis,  no pain in spine, no pain in ribs , no pain in pelvic bone.  NEUROLOGICAL:  Patient was awake, alert, oriented to time, person and place.          LABORATORY DATA:  Results from last 7 days   Lab Units 11/02/23  1547   WBC 10*3/mm3 4.20   NEUTROS ABS 10*3/mm3 2.50   HEMOGLOBIN g/dL 12.8*   HEMATOCRIT % 37.7   PLATELETS 10*3/mm3 160       Results from last 7 days   Lab Units 11/02/23  1547   SODIUM mmol/L 140   POTASSIUM mmol/L 4.1   CHLORIDE mmol/L 103   CO2 mmol/L 26.1   BUN mg/dL 18   CREATININE mg/dL 0.80   CALCIUM mg/dL 8.9   ALBUMIN g/dL 4.0   BILIRUBIN mg/dL 0.5   ALK PHOS U/L 51   ALT (SGPT) U/L 9   AST (SGOT) U/L 16   GLUCOSE mg/dL 105*     Lactate Dehydrogenase  Order: [id number]  Status: Final result       Visible to patient: No (scheduled for 11/2/2023  5:17 PM)       Next appt: 11/30/2023 at 01:15 PM in Oncology ([facility] PORT [facility]AIR)       Dx: Jon's syndrome    Specimen Information: Blood   0 Result Notes            Component  Ref Range & Units 15:47  (11/2/23) 9 d ago  (10/24/23) 2 wk ago  (10/17/23) 3 wk ago  (10/10/23) 1 mo ago  (9/27/23) 1 mo ago  (9/19/23) 2 mo ago  (8/29/23)   LDH  135 - 225 U/L 206 195 181 155 264 High  290 High  238 High    Resulting Agency  CBC LAB  CBC LAB  CBC LAB  CBC LAB  CBC LAB  CBC LAB  CBC LAB              Specimen Collected: 11/02/23 15:47 EDT Last Resulted: 11/02/23 16:17 EDT             ASSESSMENT:   1.  History of CLL, now with Jon's transformation  Status posttreatment with Bendamustine/Rituxan in 2016  Patient did have severe reaction to initial Rituxan dose requiring hospitalization or completion.  Did well thereafter.  12/30/2022 WBC remains normal at 10.6, 31% lymphs.  Platelets normal at 105,000.  No B symptoms or  adenopathy.  No suggestion of recurrent disease.  Hemoglobin has acutely dropped however down to 11.9 (see further discussion below).  On 01/13/2023 his white count, hemoglobin and platelets are not changing. His total lymphocyte count is very minimal and I do believe that his leukemia remains very quiet on clinical grounds with no need for intervention.  1/13/2023 CT of the abdomen pelvis notes several enlarged peripancreatic and portal lymph nodes measuring up to 2.1 cm which are new.  New retroperitoneal lymphadenopathy with a musa mass to the left of the aorta measuring 3.0 x 3.5 cm.  Lymph nodes near the level of the iliac bifurcation measuring up to 2.1.  Multiple enlarged lymph nodes in the pelvis along both iliac chains measuring up to 1.1 x 2.7 cm in the left 1.3 x 4.4 cm in the right.  These lymph nodes are in the splenic enlargement area likely related to CLL.  1/25/2023 PET scan noting increase in size of his in the adenopathy in the chest abdomen pelvis as well as the chest.  Severe splenomegaly which is intensely FDG avid and highly concerning for leukemic involvement.  Plans for Calquence 200 mg twice daily which was initiated 2/8/2023  Molecular analysis of the peripheral blood flow cytometry has been requested from the [facility] in regard the FISH testing. Also we ordered IgH testing.   IgVH unmutated. This makes his prognosis a little bit worse in the long run  After 2 months of Calquence, clinical improvement in splenomegaly.  5/8/2020 3 repeat PET scan noting scattered adenopathy within the neck chest abdomen pelvis as before.  A few index lesions within the neck and chest are grossly stable.  Severe hypermetabolic splenomegaly as before the degree of FDG uptake appears minimally decreased.  Reevaluation 6/2/2023 with profound fatigue, inability to accomplish ADLs.  Worsening leukocytosis with white count of 23,000 and atypical mononuclear cells in circulation by peripheral blood examination.   "Worsening thrombocytopenia and progressive splenomegaly  6/2/2023 flow cytometry on peripheral blood flow noting immunophenotype identifies a CD5 positive monoclonal kappa B-cell population representing 67% of total events.  Pathologist comment: Although the phenotype is similar to the patient's previous study, the percentage of disease is more than doubled.  The findings are worrisome for large cell (Jon's) or polymorphic transformation from the previous CLL.  6/7/2023 bone marrow biopsy including flow cytometry A monoclonal population is identified with lymphocyte region containing 32% of total events and the following immunophenotype positive CD46, CD6, bright CD19, bright CD20, HLA-DR bright surface kappa light chain, dim partial CD38   Negative CD4, CD8, CD10, CD11c, CD23. Pathology hypercellular bone marrow (50%) with involvement by malignant lymphoma with presenting 30% of bone marrow cellularity  Per Dr. Schroeder \"the pathology of the bone marrow recently done, even though does not document by flow cytometry any major changes consistent with his CLL, morphologically to my eyes, and I disagree with the Pathologist in certain fashion, shows 2 major populations of cells, number 1 precursors of red cells that were very obvious, and the second population was very monotonous population of largely looking lymphocytes that were very worrisome for diffuse large cell non-Hodgkin's lymphoma.\"  Recommendations for hospitalization, PICC line placement and initiation of CHOP Rituxan  6/12/2023, baseline echocardiogram with normal ejection fraction of 65%  6/13/2023, prior to initiation of chemotherapy WBC 53.8, hemoglobin 7.7, platelets 62,000.  LDH elevated at 1900  Rituxan initiated 6/23/2023  CHOP given 6/14/2023.  Vincristine given at a flat dose of 1 mg.  G-CSF with Neupogen 480 mcg given x8 days following cycle 1  Very dramatic response to first cycle of chemotherapy with decrease in LDH from 2000-400s 6/26/2023.  " Also improvement in WBC  Technically due for cycle 2 chemotherapy today, 7/5/2023.  However, this will be delayed 1 week as the patient is having a Mediport placed tomorrow.  Continued improvement in LDH indicating ongoing response with  today.  On 07/11/2023 the patient was further reviewed. He has had a dramatic clinical comeback in regard to his energy level, appetite, and inability to function. He has been able to become self-independent at home and his daughter is giving him all the support that he needs. He is eating extremely well. He has lost a lot of weight related to water loss and hopefully he will start to gain muscle mass again in the next several weeks.   Today on clinical examination he has complete resolution of the nocturnal sweats. He has no peripheral adenopathy and the spleen is not clinically palpable anymore in his abdomen. His white count has further improved. His hemoglobin has further improved. His platelet count has almost normalized. The differential in the white count is dramatically different with predominance of segs and resolution of the terrible peripheral lymphocytosis.   His creatinine is stable. His liver functions are stable. Potassium is stable. Given all these facts the patient will proceed today with the 2nd cycle of CHOP-Rituxan and he will be supported with Neulasta. We recalculated his BSA given the amount of weight loss associated with water loss that he has had and the new doses of chemotherapy medicines were adjusted accordingly.   He will require Neulasta administration today that will be deploying tomorrow and his daughter will be taught about how to proceed in this regard. This will obviate the lack of need to come for Neupogen injections every day. I made her aware though of the potentiality of the medicine to trigger headache, pain in the chest, pain in the rib cage, pelvic bone that will require Tylenol. He used to take high-dose Bufferin. I pointed out to  him that he is not supposed to have Bufferin anymore  He will proceed with laboratory testing, CBC on weekly basis and he will have a CT scan of the chest, abdomen and pelvis in 2 weeks and I will review him back in 3 weeks. The plan will be to continue the CHOP-Rituxan administration after completion of 5-6 cycles. Eventually after the 3rd cycle he will require a cardiac reassessment with echocardiogram.  On 08/01/2023 the patient was reviewed. Clinically he is dramatically better. His mind is back to very significant improvement. His strength has improved. His nutrition has improved. His balance is not there yet but it is a big difference in him now than how he was 6 or 7 weeks ago. He is up and about. 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 He is eating like a hog and he is gaining strength with no pain and no B symptoms. His spleen has dramatically decreased. His white count is stable. Hemoglobin is improving. The platelet count is slowly improving. There is no peripheral blood lymphocytosis. His LDH has further improved and normalized to 179. This is coming out from 2000 at the time of the diagnosis. Therefore, I do believe that with the report that we have above, the CT scan that I have personally reviewed showing a very dramatic improvement in the size of his splenomegaly and substantial reduction in his periaortic adenopathy the patient has had a dramatic improvement in regard to his Jon's transformation of CLL. My recommendation will be for him to have the next cycle of treatment in a few days according his schedule and have a new treatment 3 weeks later. On each one of those occasions the dose will remain about the same one that he received the previous dose. We have to modify numbers depending of the amount of weight loss that he has related to swollen legs. Also the patient will require Neulasta administration on each one of those cycles and he will require laboratory workup on weekly basis, monitoring  CBC.   In the long run the goal will be to try to complete 6 cycles of chemotherapy and then put him on maintenance. I am planning also upon completion of 6 cycles to repeat his bone marrow testing.  8/8/2023: Patient has previously had an infusion reaction to Rituxan. Patient reviewed with Dr. Schroeder as his platelets are 84,000 today. We will proceed with Cycle 3 R-CHOP today and have patient return weekly for lab review to monitor counts closely. Patient to receive additional premedications as planned.   8/29/2023 patient returns the office today for cycle 4 R-CHOP.  He is tolerating treatment well.  Platelets are improving at 113,000 today.  He denies any recent fevers, chills or bleeding.  Will return for weekly labs with review.  On 09/19/2023 the patient feels terrific. He is doing a lot of walking, he has 3 meals a day, a lot of snacks. He has gained weight. His ECOG performance status dramatically improved. 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 Clinically he has no peripheral adenopathy or hepatosplenomegaly. His large spleen is no longer evident at least clinically. His LDH has remained down very substantially. His white count differential has dramatically improved to normalization. His platelet count remains relatively low at 107 with no clinical bleeding.     He is not experiencing any B symptoms anymore.    In my opinion he has reached very dramatic response to CHOP/Rituxan, he has undergone 4 cycles. He is supposed to have another cycle today and I advised him to consider recheck and reassessment in regard toxicity with an echocardiogram especially knowing that his heart rate is a little bit on the fast side. I will check a proBNP as well today.    In regard to his Jon's transformation I want to know what is the status of the spleen that was the hottest organ at the time of his diagnosis of Jon's transformation. The low  platelet count tells me that probably the spleen is enlarged and I do not know what the SUV activity is. In the past we have talked with the patient about consideration of splenectomy that he is not going to consider unless that that is the only reason in my statement.     In the meantime I advised the patient to continue enjoying his freedom and enjoying his ability to function in absence of pain or B symptoms.    I went ahead and requested a proBNP today and we will go ahead and do a PET scan in days and do an echocardiogram in days. I want to know what is his ejection fraction.     I will review him back in a week. Depending on where we are we will decide how to further proceed in regard to continuation of the same chemotherapy regimen or modifying the regimen according to needs.     On 09/27/2023 I have reviewed with the patient and his daughter in the room, the PET scan that shows a dramatic degree of decrease in the size of the spleen and now the spleen is almost photopenic. Comparing the spleen now and before is very dramatic. The patient has no B symptoms. His white count has recovered. Hemoglobin is stable. Platelet count remains low and think is sequestration because of splenomegaly. His LDH has normalized. I do believe that I recommend and especially knowing that his echocardiogram has not shown any detrimental action of Adriamycin and cardiac function after reviewing this and posted above, the patient will proceed with 1 more cycle of CHOP-Rituxan at the previous dose and thereafter I will review him back in a month to establish the need to go back and proceed with further management of his condition. Strong consideration will be given to go back to medicine that he was taking before his Jon's transformation. He will require frequent assessment though in regard to clinical symptomatology to be sure that the Jon's transformation does not wake up again. He will require in the next few weeks weekly  CBC and CMP.  On 11/02/2023, the patient has recovered completely from the toxicity of chemotherapy with CHOP and Rituxan. He is functioning at a much better level with an ECOG performance status close to 0 going back to the office, walking around, and doing all sorts of activities with no limitations. He has no pain, no bleeding. No fevers or chills. No sweats. His spleen is still minimally palpable. White count, hemoglobin, platelets, white count differential, chemistry profile and LDH are completely normal. His LDH was a marker of his disease process being in the 2000 category at the time of induction of his treatment. Therefore, according to recommendations, the patient will be watched in absence of any other intervention. I am not going to proceed with any Brukinsa administration at this time, neither do I find the need to repeat bone marrow testing on him at this time. We have had recent cardiac assessment and recent radiological assessment with PET scan that was sufficient to me. Planning to review him back on monthly basis with CBC, CMP, LDH and port flush.          2.  History of prostate cancer  Treated at the [facility]  Most recent PSA April 2022 = 0.203  On 01/13/2023 he has minimal urinary symptomatology comparing his PSA with 3 years ago was 0.8 today, actually a few days ago was 0.1. I doubt that all of the symptoms that he has are associated with prostate cancer. The blood in the urine has a different significance.   On 01/18/2023 no issues pertinent to his prostate cancer. I see the seeds in his prostatic bed on the CT scan. His urinalysis is very much clean and his PSA has remained stable. No activity of this entity at this point.   On 03/17/2023 no issues pertinent to his previous history of prostate cancer. His PSA has been measured and has remained normal.  On 07/11/2023 no issues pertinent.  On 08/01/2023 no issues pertinent.  On 8/8/2023: no issues pertinent  On 09/19/2023 no issues  pertinent.  On 09/27/2023 no issues pertinent.  On 11/02/2023, no issues pertinent.          3.  Venous access  Right upper extremity PICC line placed for cycle 1 chemotherapy.  Follow Dr. Kolb for Mediport placement tomorrow, 7/6/2023  With having Mediport placed tomorrow, we will pull the PICC line today, right upper extremity PICC line removed per nursing  Port in right subclavian position looking extremely well. I will prescribe Emla cream for future access to minimize pain.  On 8/09/2023 his port is functioning perfectly fine with no pain.  On 09/19/2023 no issues pertinent.    On 09/27/2023 his port remains functional with no difficulties whatsoever and will be utilized for further administration of chemotherapy in the next week.  On 11/02/2023, port will remain maintained on monthly basis with port flush.        4.  Multifactorial anemia secondary to underlying malignancy and chemotherapy  Transfusion support as needed  Hemoglobin today is improved at 9.9  On 07/11/2023 anemia is self correcting now that he probably has a much better clean bone marrow able to trigger normal erythropoiesis.  On 08/01/2023 anemia is substantially improved.  8/29/2023: Hemoglobin slightly declined at 11.8.  On 09/19/2023 no issues pertinent.  On 09/27/2023 the patient's hemoglobin remains at an excellent level of 11 and he has no notion of blood loss. Nutrition with dramatic improvement.  On 11/02/2023, anemia resolved.            5.  Prophylaxis  Continue on acyclovir 400 mg once daily  Continuing allopurinol 300 mg daily  On 07/11/2023 he remains on allopurinol and acyclovir with no viral infections. Eventually allopurinol will be discontinued before the 3rd cycle of chemotherapy.  On 08/01/2023 the patient remains on acyclovir prophylactically.    On 09/19/2023 he remains on acyclovir. The patient is not receiving any Bactrim.     On 09/27/2023 he will remain on acyclovir prophylaxis. He will continue receiving his  allopurinol as well.  On 11/02/2023, he will remain on acyclovir prophylactically for another 3 months.        6.  Bilateral lower extremity swelling  Related to anasarca and immobility  Patient is not a good candidate for diuretics in light of his borderline hypotension and immobility, he is a falls risk.  Status compression and elevation.   On 07/11/2023 most of the swelling is resolved now not with water medication but with just proper nutrition and mobilization. I expect that this swelling will be completely gone for the next cycle. Given the persistency of the swelling as posted above I readjusted his chemotherapy administration medications to account for this.  On 08/01/2023 the patient remains with some swelling in his lower extremities. Encouraged elastic support that he does not want to wear.  8/8/2023: Bilateral lower extremity has improved. Right right does appear more edematous than the left lower extremity. He denies any pain or tenderness. No palpable cords on exam. He has tried compression stockings in the past. Encouraged to utilize again.   On 09/19/023 complete resolution of the swelling in his lower extremities.  On 09/27/2023 now that his nutrition has normalized and his albumin level as recovered, the patient's swelling has disappeared altogether.      On 11/02/2023, edema in lower extremities is completely resolved.        PLAN:  As posted above the patient will remain in observation returning to see us back on monthly basis with port flush, CBC, CMP, LDH. Planning eventually in 3 months for radiological assessment of his abdomen in the form of a CT scan to monitor spleen size.     As posted above, I am not planning to give him any Brukinsa unless the laboratory parameters change or clinical circumstances change.     The patient looks terrific and hopefully he will get a lot more miles out of this treatment for a good while in absence of any other intervention.    Finally, I went through his  medication list. I discontinued a multitude of medicines that he is no longer using. He remains taking all of his vitamin D and his calcium supplement. Acyclovir will be continued.          Gerry Schroeder MD  11/02/2023

## 2023-11-30 ENCOUNTER — OFFICE VISIT (OUTPATIENT)
Dept: ONCOLOGY | Facility: CLINIC | Age: 78
End: 2023-11-30

## 2023-11-30 ENCOUNTER — INFUSION (OUTPATIENT)
Dept: ONCOLOGY | Facility: HOSPITAL | Age: 78
End: 2023-11-30
Payer: MEDICARE

## 2023-11-30 VITALS
BODY MASS INDEX: 25.29 KG/M2 | HEIGHT: 77 IN | WEIGHT: 214.2 LBS | DIASTOLIC BLOOD PRESSURE: 71 MMHG | OXYGEN SATURATION: 96 % | TEMPERATURE: 98.6 F | SYSTOLIC BLOOD PRESSURE: 169 MMHG | HEART RATE: 74 BPM

## 2023-11-30 DIAGNOSIS — D61.810 PANCYTOPENIA DUE TO CHEMOTHERAPY: ICD-10-CM

## 2023-11-30 DIAGNOSIS — Z45.2 ENCOUNTER FOR ADJUSTMENT OR MANAGEMENT OF VASCULAR ACCESS DEVICE: Primary | ICD-10-CM

## 2023-11-30 DIAGNOSIS — C91.10 RICHTER'S SYNDROME: ICD-10-CM

## 2023-11-30 DIAGNOSIS — C91.10 RICHTER'S SYNDROME: Primary | ICD-10-CM

## 2023-11-30 LAB
ALBUMIN SERPL-MCNC: 3.7 G/DL (ref 3.5–5.2)
ALBUMIN/GLOB SERPL: 2.1 G/DL
ALP SERPL-CCNC: 49 U/L (ref 39–117)
ALT SERPL W P-5'-P-CCNC: 7 U/L (ref 1–41)
ANION GAP SERPL CALCULATED.3IONS-SCNC: 9.8 MMOL/L (ref 5–15)
AST SERPL-CCNC: 18 U/L (ref 1–40)
BASOPHILS # BLD AUTO: 0.03 10*3/MM3 (ref 0–0.2)
BASOPHILS NFR BLD AUTO: 1 % (ref 0–1.5)
BILIRUB SERPL-MCNC: 0.7 MG/DL (ref 0–1.2)
BUN SERPL-MCNC: 17 MG/DL (ref 8–23)
BUN/CREAT SERPL: 23.9 (ref 7–25)
CALCIUM SPEC-SCNC: 9 MG/DL (ref 8.6–10.5)
CHLORIDE SERPL-SCNC: 106 MMOL/L (ref 98–107)
CO2 SERPL-SCNC: 25.2 MMOL/L (ref 22–29)
CREAT SERPL-MCNC: 0.71 MG/DL (ref 0.7–1.3)
DEPRECATED RDW RBC AUTO: 42.5 FL (ref 37–54)
EGFRCR SERPLBLD CKD-EPI 2021: 92.7 ML/MIN/1.73
EOSINOPHIL # BLD AUTO: 0.12 10*3/MM3 (ref 0–0.4)
EOSINOPHIL NFR BLD AUTO: 4.2 % (ref 0.3–6.2)
ERYTHROCYTE [DISTWIDTH] IN BLOOD BY AUTOMATED COUNT: 13.8 % (ref 12.3–15.4)
GLOBULIN UR ELPH-MCNC: 1.8 GM/DL
GLUCOSE SERPL-MCNC: 116 MG/DL (ref 65–99)
HCT VFR BLD AUTO: 37.8 % (ref 37.5–51)
HGB BLD-MCNC: 13.2 G/DL (ref 13–17.7)
IMM GRANULOCYTES # BLD AUTO: 0.17 10*3/MM3 (ref 0–0.05)
IMM GRANULOCYTES NFR BLD AUTO: 5.9 % (ref 0–0.5)
LDH SERPL-CCNC: 250 U/L (ref 135–225)
LYMPHOCYTES # BLD AUTO: 1.19 10*3/MM3 (ref 0.7–3.1)
LYMPHOCYTES NFR BLD AUTO: 41.6 % (ref 19.6–45.3)
MCH RBC QN AUTO: 29.5 PG (ref 26.6–33)
MCHC RBC AUTO-ENTMCNC: 34.9 G/DL (ref 31.5–35.7)
MCV RBC AUTO: 84.6 FL (ref 79–97)
MONOCYTES # BLD AUTO: 0.36 10*3/MM3 (ref 0.1–0.9)
MONOCYTES NFR BLD AUTO: 12.6 % (ref 5–12)
NEUTROPHILS NFR BLD AUTO: 0.99 10*3/MM3 (ref 1.7–7)
NEUTROPHILS NFR BLD AUTO: 34.7 % (ref 42.7–76)
NRBC BLD AUTO-RTO: 0 /100 WBC (ref 0–0.2)
PLATELET # BLD AUTO: 100 10*3/MM3 (ref 140–450)
PMV BLD AUTO: 10.6 FL (ref 6–12)
POTASSIUM SERPL-SCNC: 3.6 MMOL/L (ref 3.5–5.2)
PROT SERPL-MCNC: 5.5 G/DL (ref 6–8.5)
RBC # BLD AUTO: 4.47 10*6/MM3 (ref 4.14–5.8)
SODIUM SERPL-SCNC: 141 MMOL/L (ref 136–145)
WBC NRBC COR # BLD AUTO: 2.86 10*3/MM3 (ref 3.4–10.8)

## 2023-11-30 PROCEDURE — 88185 FLOWCYTOMETRY/TC ADD-ON: CPT

## 2023-11-30 PROCEDURE — 83615 LACTATE (LD) (LDH) ENZYME: CPT

## 2023-11-30 PROCEDURE — 88184 FLOWCYTOMETRY/ TC 1 MARKER: CPT

## 2023-11-30 PROCEDURE — 36591 DRAW BLOOD OFF VENOUS DEVICE: CPT

## 2023-11-30 PROCEDURE — 88182 CELL MARKER STUDY: CPT

## 2023-11-30 PROCEDURE — 80053 COMPREHEN METABOLIC PANEL: CPT

## 2023-11-30 PROCEDURE — 85025 COMPLETE CBC W/AUTO DIFF WBC: CPT

## 2023-11-30 PROCEDURE — 25010000002 HEPARIN LOCK FLUSH PER 10 UNITS: Performed by: INTERNAL MEDICINE

## 2023-11-30 RX ORDER — HEPARIN SODIUM (PORCINE) LOCK FLUSH IV SOLN 100 UNIT/ML 100 UNIT/ML
500 SOLUTION INTRAVENOUS AS NEEDED
Status: DISCONTINUED | OUTPATIENT
Start: 2023-11-30 | End: 2023-11-30 | Stop reason: HOSPADM

## 2023-11-30 RX ORDER — SODIUM CHLORIDE 0.9 % (FLUSH) 0.9 %
10 SYRINGE (ML) INJECTION AS NEEDED
OUTPATIENT
Start: 2023-11-30

## 2023-11-30 RX ORDER — HEPARIN SODIUM (PORCINE) LOCK FLUSH IV SOLN 100 UNIT/ML 100 UNIT/ML
500 SOLUTION INTRAVENOUS AS NEEDED
OUTPATIENT
Start: 2023-11-30

## 2023-11-30 RX ORDER — SODIUM CHLORIDE 0.9 % (FLUSH) 0.9 %
10 SYRINGE (ML) INJECTION AS NEEDED
Status: DISCONTINUED | OUTPATIENT
Start: 2023-11-30 | End: 2023-11-30 | Stop reason: HOSPADM

## 2023-11-30 RX ADMIN — Medication 10 ML: at 12:49

## 2023-11-30 RX ADMIN — Medication 500 UNITS: at 12:49

## 2023-11-30 NOTE — PROGRESS NOTES
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  Progressive disease January 2023 placed on Calquence.  Further progression with Jon's transformation and initiating CHOP Rituxan 6/14/2023, COMPLETED 10/23 5 CYCLES , ACHIEVED A VERY GOOD RESPONSE CLINICALLY, BIO CHEMICALLY BY LDH MEASUREMENT AND RADIOLOGICALLY BY PET SCAN.    HISTORY OF PRESENT ILLNESS:   The patient is a 78 y.o. male with the above-mentioned history, who returns the office today for monthly follow-up and lab review.  He remains in observation following the completion of CHOP Rituxan.  He reports he is overall feeling well.  He denies fevers or chills, signs or symptoms of infection.  He is eating and drinking adequately.  He has no new pain.  He has no B symptoms including night sweats.  He reports he has some fatigue though overall this is manageable.  He reports he has not yet returned to his prechemotherapy baseline.  He denies signs or symptoms of bleeding.    Past Medical History:   Diagnosis Date    Arthritis     Benign prostatic hyperplasia     FOLLOWED BY DR. VIVIEN PATEL    Biliary dyskinesia     Bunion of right foot     GREAT TOE    Bursitis of right hip 03/15/2018    CLL (chronic lymphocytic leukemia) 2016    FOLLOWED BY DR WALKER    Colon polyps     FOLLOWED BY DR. NEHA HAM    Constipation     Degeneration of lumbar intervertebral disc     Diverticulosis     Erectile dysfunction     Fracture of ankle 1975    GERD (gastroesophageal reflux disease)     Hallux valgus of left foot     Hyperlipidemia     MIXED    Hypertension     Inguinal hernia     Kidney stone 02/21/2009    SEEN AT [facility] ER    Knee swelling 2018    Localized, primary osteoarthritis     Lumbar radiculopathy     Lumbar stenosis     Lung mass     LEFT SIDE - SCAR TISSUE PER PATIENT     Osteoarthritis of right knee     Polyneuropathy     Prostate CA 03/2016    JACQUELYN 6, S/P XRT, FOLLOWED BY DR. VIVIEN PATEL,  RADIATION SEEDS    PVC (premature ventricular contraction)     PT STATES WORKED UP YEARS AGO BY [facility] FOR POSSIBLE MURMUR - NO FOLLOW UP REQUIRED     RBBB     Sensory hearing loss, bilateral     Skin cancer     SQUAMOUS CELL CARCINOMA OF FACE    Substernal goiter     Thyromegaly 12/2016    ADMITTED TO [facility]    Vitamin D deficiency      Past Surgical History:   Procedure Laterality Date    BRONCHOSCOPY N/A 12/14/2016    Procedure: BRONCHOSCOPY WITH  BAL AND BIOPSY AND ENDOBRONCHIAL ULTRASOUND  AND NAVIGATION WITH BRUSHINGS AND BIOPSY AND BAL;  Surgeon: Pierre Manning MD;  Location: [facility] ENDOSCOPY;  Service:     COLONOSCOPY N/A 03/13/2019    5 MM SESSILE TUBULAR ADENOMA POLYP IN TRANSVERSE, MULTIPLE SMALL AND LARGE DIVERTICULA IN SIGMOID, RESCOPE IN 5 YRS, DR. NEHA HAM AT [facility]    COLONOSCOPY W/ POLYPECTOMY N/A 03/19/2015    3 TUBULAR ADENOMA POLYPS, 12 TUBULOVILLOUS POLYP RECTO-SIGMOID, DIVERTICULOSIS, RESCOPE IN 3 YRS, DR. NEHA HAM AT [facility]    EYE SURGERY Bilateral     CATARACT EXTRACTION WITH LENS IMPLANT, DR. GOVEA    FINGER NAIL SURGERY Right 2022    TORRES-PATEL    INGUINAL HERNIA REPAIR Left 11/02/2021    Procedure: LEFT OPEN INGUINAL HERNIA REPAIR;  Surgeon: Nixon Kolb MD;  Location: [facility] MAIN OR;  Service: General;  Laterality: Left;    PROSTATE RADIOACTIVE SEED IMPLANT N/A 09/06/2016    DR. HOA JARAMILLO AT [facility]    PROSTATE SURGERY N/A 03/11/2016    BIOPSY: ADENOCARCINOMA, DR. ZAID IBARRA    THYROID BIOPSY Bilateral 11/01/2017    NO B CELL OR T CELL LYMPHOMA, DONE AT [facility]    TOTAL KNEE ARTHROPLASTY Right 10/12/2022    Procedure: TOTAL KNEE ARTHROPLASTY;  Surgeon: Ran Rachel MD;  Location: [facility] OR [facility];  Service: Orthopedics;  Laterality: Right;    VENOUS ACCESS DEVICE (PORT) INSERTION Right 7/6/2023    Procedure: INSERTION VENOUS ACCESS DEVICE;  Surgeon: Nixon Kolb MD;  Location[facility];  Service: General;  Laterality: Right;     Social History  "    Socioeconomic History    Marital status:      Spouse name: No    Years of education: College   Tobacco Use    Smoking status: Never    Smokeless tobacco: Never   Vaping Use    Vaping Use: Never used   Substance and Sexual Activity    Alcohol use: No    Drug use: Never    Sexual activity: Yes     Partners: Female     Birth control/protection: None     Family History   Problem Relation Age of Onset    Heart disease Father         Rheumatic heart disease    Hypertension Father     Osteoporosis Father     Stroke Mother     Osteoporosis Mother     No Known Problems Daughter     Malig Hyperthermia Neg Hx      Current Outpatient Medications on File Prior to Visit   Medication Sig    acyclovir (Zovirax) 400 MG tablet Take 1 tablet by mouth Daily.    ammonium lactate (AMLACTIN) 12 % cream APPLY 1 APPLICATION EXTERNALLY TWICE A DAY    calcium carbonate (TUMS) 500 MG chewable tablet Chew 2 tablets 2 (Two) Times a Day As Needed for Heartburn.    lidocaine-prilocaine (EMLA) 2.5-2.5 % cream Apply 1 application  topically to the appropriate area as directed Every 2 (Two) Hours As Needed for Mild Pain.    Melatonin 10 MG tablet Take 1 tablet by mouth Every Night.     No current facility-administered medications on file prior to visit.     No Known Allergies        VITAL SIGNS:  Vitals:    11/30/23 1319   BP: 169/71   Pulse: 74   Temp: 98.6 °F (37 °C)   TempSrc: Temporal   SpO2: 96%   Weight: 97.2 kg (214 lb 3.2 oz)   Height: 195.6 cm (77.01\")   PainSc: 0-No pain          PHYSICAL EXAMINATION:   GENERAL:  Well-developed, well-nourished in no acute distress.   SKIN:  Warm, dry without rashes, purpura or petechiae.  HEAD:  Normocephalic.  EYES:  Pupils equal, round.  EOMs intact.  Conjunctivae normal.  EARS:  Hearing intact.  NOSE:  Septum midline.  No excoriations or nasal discharge.  LYMPHATICS:  No cervical, supraclavicular, axillary or adenopathy.  CHEST:  Lungs clear to auscultation. Good airflow.  CARDIAC:  Regular " rate and rhythm without murmurs. Normal S1,S2.  ABDOMEN:  Soft, nontender with no organomegaly or masses. Bowel sounds present  EXTREMITIES:  No clubbing, cyanosis or edema.  NEUROLOGICAL: No focal neurological deficits.  PSYCHIATRIC:  Normal affect and mood.        LABORATORY DATA:  Results from last 7 days   Lab Units 11/30/23  1249   WBC 10*3/mm3 2.86*   NEUTROS ABS 10*3/mm3 0.99*   HEMOGLOBIN g/dL 13.2   HEMATOCRIT % 37.8   PLATELETS 10*3/mm3 100*     Results from last 7 days   Lab Units 11/30/23  1249   SODIUM mmol/L 141   POTASSIUM mmol/L 3.6   CHLORIDE mmol/L 106   CO2 mmol/L 25.2   BUN mg/dL 17   CREATININE mg/dL 0.71   CALCIUM mg/dL 9.0   ALBUMIN g/dL 3.7   BILIRUBIN mg/dL 0.7   ALK PHOS U/L 49   ALT (SGPT) U/L 7   AST (SGOT) U/L 18   GLUCOSE mg/dL 116*           ASSESSMENT:   1.  History of CLL, now with Jon's transformation  Status posttreatment with Bendamustine/Rituxan in 2016  Patient did have severe reaction to initial Rituxan dose requiring hospitalization or completion.  Did well thereafter.  12/30/2022 WBC remains normal at 10.6, 31% lymphs.  Platelets normal at 105,000.  No B symptoms or adenopathy.  No suggestion of recurrent disease.  Hemoglobin has acutely dropped however down to 11.9 (see further discussion below).  On 01/13/2023 his white count, hemoglobin and platelets are not changing. His total lymphocyte count is very minimal and I do believe that his leukemia remains very quiet on clinical grounds with no need for intervention.  1/13/2023 CT of the abdomen pelvis notes several enlarged peripancreatic and portal lymph nodes measuring up to 2.1 cm which are new.  New retroperitoneal lymphadenopathy with a musa mass to the left of the aorta measuring 3.0 x 3.5 cm.  Lymph nodes near the level of the iliac bifurcation measuring up to 2.1.  Multiple enlarged lymph nodes in the pelvis along both iliac chains measuring up to 1.1 x 2.7 cm in the left 1.3 x 4.4 cm in the right.  These lymph  nodes are in the splenic enlargement area likely related to CLL.  1/25/2023 PET scan noting increase in size of his in the adenopathy in the chest abdomen pelvis as well as the chest.  Severe splenomegaly which is intensely FDG avid and highly concerning for leukemic involvement.  Plans for Calquence 200 mg twice daily which was initiated 2/8/2023  Molecular analysis of the peripheral blood flow cytometry has been requested from the [facility] in regard the FISH testing. Also we ordered IgH testing.   IgVH unmutated. This makes his prognosis a little bit worse in the long run  After 2 months of Calquence, clinical improvement in splenomegaly.  5/8/2020 3 repeat PET scan noting scattered adenopathy within the neck chest abdomen pelvis as before.  A few index lesions within the neck and chest are grossly stable.  Severe hypermetabolic splenomegaly as before the degree of FDG uptake appears minimally decreased.  Reevaluation 6/2/2023 with profound fatigue, inability to accomplish ADLs.  Worsening leukocytosis with white count of 23,000 and atypical mononuclear cells in circulation by peripheral blood examination.  Worsening thrombocytopenia and progressive splenomegaly  6/2/2023 flow cytometry on peripheral blood flow noting immunophenotype identifies a CD5 positive monoclonal kappa B-cell population representing 67% of total events.  Pathologist comment: Although the phenotype is similar to the patient's previous study, the percentage of disease is more than doubled.  The findings are worrisome for large cell (Jon's) or polymorphic transformation from the previous CLL.  6/7/2023 bone marrow biopsy including flow cytometry A monoclonal population is identified with lymphocyte region containing 32% of total events and the following immunophenotype positive CD46, CD6, bright CD19, bright CD20, HLA-DR bright surface kappa light chain, dim partial CD38   Negative CD4, CD8, CD10, CD11c, CD23. Pathology hypercellular bone  "marrow (50%) with involvement by malignant lymphoma with presenting 30% of bone marrow cellularity  Per Dr. Schroeder \"the pathology of the bone marrow recently done, even though does not document by flow cytometry any major changes consistent with his CLL, morphologically to my eyes, and I disagree with the Pathologist in certain fashion, shows 2 major populations of cells, number 1 precursors of red cells that were very obvious, and the second population was very monotonous population of largely looking lymphocytes that were very worrisome for diffuse large cell non-Hodgkin's lymphoma.\"  Recommendations for hospitalization, PICC line placement and initiation of CHOP Rituxan  6/12/2023, baseline echocardiogram with normal ejection fraction of 65%  6/13/2023, prior to initiation of chemotherapy WBC 53.8, hemoglobin 7.7, platelets 62,000.  LDH elevated at 1900  Rituxan initiated 6/23/2023  CHOP given 6/14/2023.  Vincristine given at a flat dose of 1 mg.  G-CSF with Neupogen 480 mcg given x8 days following cycle 1  Very dramatic response to first cycle of chemotherapy with decrease in LDH from 2000-400s 6/26/2023.  Also improvement in WBC  Technically due for cycle 2 chemotherapy today, 7/5/2023.  However, this will be delayed 1 week as the patient is having a Mediport placed tomorrow.  Continued improvement in LDH indicating ongoing response with  today.  On 07/11/2023 the patient was further reviewed. He has had a dramatic clinical comeback in regard to his energy level, appetite, and inability to function. He has been able to become self-independent at home and his daughter is giving him all the support that he needs. He is eating extremely well. He has lost a lot of weight related to water loss and hopefully he will start to gain muscle mass again in the next several weeks.   On 08/01/2023 the patient was reviewed. Clinically he is dramatically better. His spleen has dramatically decreased.   7/25/2023 CT of " the chest abdomen pelvis noting improvement in the spleen with stable left periaortic adenopathy and improvement in yasmeen hepatic adenopathy.  In the long run the goal will be to try to complete 6 cycles of chemotherapy and then put him on maintenance. I am planning also upon completion of 6 cycles to repeat his bone marrow testing.  8/8/2023: We will proceed with Cycle 3 R-CHOP and have patient return weekly for lab review to monitor counts closely. Patient to receive additional premedications as planned.   8/29/2023 patient returns the office today for cycle 4 R-CHOP.    9/19/2023 due for cycle 5 CHOP Rituxan.  This was delayed due to tachycardia and need for echocardiogram  9/22/2023 PET scan noting improvement in FDG activity of this bleeding though persistent severe splenomegaly.  Echocardiogram has not shown any detrimental action of Adriamycin and cardiac function after reviewing this and posted above, the patient will proceed with 1 more cycle of CHOP-Rituxan at the previous dose. Strong consideration will be given to go back to medicine that he was taking before his Jon's transformation.  Evaluation by Dr. Schroeder on 11/02/2023, the patient has recovered completely from the toxicity of chemotherapy with CHOP and Rituxan. He is functioning at a much better level with an ECOG performance status close to 0 going back to the office, walking around, and doing all sorts of activities with no limitations. He has no pain, no bleeding. His LDH was a marker of his disease process being in the 2000 category at the time of induction of his treatment. Therefore, according to recommendations, the patient will be watched in absence of any other intervention.  Dr. Schroeder is not going to proceed with any Brukinsa administration at this time.  Monthly follow-up 11/30/2023 with a slight change in labs with WBC declined at 2.86, hemoglobin normal at 13.2, platelets 100,000.  LDH has slightly increased at 250.  This was all  reviewed with Dr. Schroeder who recommended flow cytometry though continuing in observation.  Clinically, the patient has no B symptoms and continues to feel very well.      2.  History of prostate cancer  Treated at the [facility]  Most recent PSA April 2022 = 0.203  On 01/13/2023 he has minimal urinary symptomatology comparing his PSA with 3 years ago was 0.8 today, actually a few days ago was 0.1. I doubt that all of the symptoms that he has are associated with prostate cancer. The blood in the urine has a different significance.   On 01/18/2023 no issues pertinent to his prostate cancer. I see the seeds in his prostatic bed on the CT scan. His urinalysis is very much clean and his PSA has remained stable. No activity of this entity at this point.   On 03/17/2023 no issues pertinent to his previous history of prostate cancer. His PSA has been measured and has remained normal.  On 11/30/2023, no issues pertinent.    3.  Venous access  Right upper extremity PICC line placed for cycle 1 chemotherapy.  Follow Dr. Kolb for Mediport placement tomorrow, 7/6/2023  With having Mediport placed tomorrow, we will pull the PICC line today, right upper extremity PICC line removed per nursing  Port in right subclavian position looking extremely well. I will prescribe Emla cream for future access to minimize pain.  On 8/09/2023 his port is functioning perfectly fine with no pain.  On 09/27/2023 his port remains functional with no difficulties whatsoever and will be utilized for further administration of chemotherapy in the next week.  On 11/02/2023, port will remain maintained on monthly basis with port flush.    4.  Multifactorial anemia secondary to underlying malignancy and chemotherapy  Transfusion support as needed  Hemoglobin today is improved at 9.9  On 07/11/2023 anemia is self correcting now that he probably has a much better clean bone marrow able to trigger normal erythropoiesis.  On 08/01/2023 anemia is  substantially improved.  8/29/2023: Hemoglobin slightly declined at 11.8.  11/30/2023 hemoglobin is normal at 13.2    5.  Prophylaxis  Continue on acyclovir 400 mg once daily  Continuing allopurinol 300 mg daily  On 07/11/2023 he remains on allopurinol and acyclovir with no viral infections. Eventually allopurinol will be discontinued before the 3rd cycle of chemotherapy.  On 08/01/2023 the patient remains on acyclovir prophylactically.  On 09/19/2023 he remains on acyclovir. The patient is not receiving any Bactrim.     6.  Bilateral lower extremity swelling  Related to anasarca and immobility  Patient is not a good candidate for diuretics in light of his borderline hypotension and immobility, he is a falls risk.  Status compression and elevation.   On 07/11/2023 most of the swelling is resolved now not with water medication but with just proper nutrition and mobilization. I expect that this swelling will be completely gone for the next cycle. Given the persistency of the swelling as posted above I readjusted his chemotherapy administration medications to account for this.  On 08/01/2023 the patient remains with some swelling in his lower extremities. Encouraged elastic support that he does not want to wear.  8/8/2023: Bilateral lower extremity has improved. Right right does appear more edematous than the left lower extremity. He denies any pain or tenderness. No palpable cords on exam. He has tried compression stockings in the past. Encouraged to utilize again.   11/30/2023 he was without lower extremity swelling      PLAN:  The patient will remain in observation regarding his previous malignancy  With decline of blood counts today we did review neutropenic precautions and calling for infectious symptoms, fevers or chills  We will send peripheral blood flow cytometry  Continue prophylactic acyclovir  Return monthly for CBC, CMP, LDH, MD follow-up and Mediport flush    Today's lab work and plan of care was  discussed reviewed with Dr. Schroeder who is in agreement    Anneliese Cardona, APRN  11/30/2023

## 2023-12-04 LAB — REF LAB TEST METHOD: NORMAL

## 2023-12-29 ENCOUNTER — INFUSION (OUTPATIENT)
Dept: ONCOLOGY | Facility: HOSPITAL | Age: 78
End: 2023-12-29
Payer: MEDICARE

## 2023-12-29 ENCOUNTER — OFFICE VISIT (OUTPATIENT)
Dept: ONCOLOGY | Facility: CLINIC | Age: 78
End: 2023-12-29
Payer: MEDICARE

## 2023-12-29 VITALS
DIASTOLIC BLOOD PRESSURE: 87 MMHG | WEIGHT: 215.1 LBS | BODY MASS INDEX: 25.4 KG/M2 | SYSTOLIC BLOOD PRESSURE: 173 MMHG | OXYGEN SATURATION: 98 % | HEART RATE: 78 BPM | TEMPERATURE: 98.4 F | HEIGHT: 77 IN

## 2023-12-29 DIAGNOSIS — C91.10 CLL (CHRONIC LYMPHOCYTIC LEUKEMIA): Primary | ICD-10-CM

## 2023-12-29 DIAGNOSIS — Z45.2 ENCOUNTER FOR ADJUSTMENT OR MANAGEMENT OF VASCULAR ACCESS DEVICE: Primary | ICD-10-CM

## 2023-12-29 DIAGNOSIS — C83.87: ICD-10-CM

## 2023-12-29 DIAGNOSIS — D61.810 PANCYTOPENIA DUE TO CHEMOTHERAPY: ICD-10-CM

## 2023-12-29 DIAGNOSIS — E55.9 HYPOVITAMINOSIS D: ICD-10-CM

## 2023-12-29 DIAGNOSIS — C91.10 RICHTER'S SYNDROME: ICD-10-CM

## 2023-12-29 LAB
ALBUMIN SERPL-MCNC: 4.1 G/DL (ref 3.5–5.2)
ALBUMIN/GLOB SERPL: 2.6 G/DL
ALP SERPL-CCNC: 65 U/L (ref 39–117)
ALT SERPL W P-5'-P-CCNC: 7 U/L (ref 1–41)
ANION GAP SERPL CALCULATED.3IONS-SCNC: 9.7 MMOL/L (ref 5–15)
AST SERPL-CCNC: 17 U/L (ref 1–40)
BASOPHILS # BLD AUTO: 0.02 10*3/MM3 (ref 0–0.2)
BASOPHILS NFR BLD AUTO: 0.8 % (ref 0–1.5)
BILIRUB SERPL-MCNC: 0.7 MG/DL (ref 0–1.2)
BUN SERPL-MCNC: 13 MG/DL (ref 8–23)
BUN/CREAT SERPL: 18.8 (ref 7–25)
CALCIUM SPEC-SCNC: 9.1 MG/DL (ref 8.6–10.5)
CHLORIDE SERPL-SCNC: 103 MMOL/L (ref 98–107)
CO2 SERPL-SCNC: 28.3 MMOL/L (ref 22–29)
CREAT SERPL-MCNC: 0.69 MG/DL (ref 0.76–1.27)
DEPRECATED RDW RBC AUTO: 39.4 FL (ref 37–54)
EGFRCR SERPLBLD CKD-EPI 2021: 93.6 ML/MIN/1.73
EOSINOPHIL # BLD AUTO: 0.09 10*3/MM3 (ref 0–0.4)
EOSINOPHIL NFR BLD AUTO: 3.4 % (ref 0.3–6.2)
ERYTHROCYTE [DISTWIDTH] IN BLOOD BY AUTOMATED COUNT: 13.2 % (ref 12.3–15.4)
GLOBULIN UR ELPH-MCNC: 1.6 GM/DL
GLUCOSE SERPL-MCNC: 104 MG/DL (ref 65–99)
HCT VFR BLD AUTO: 41 % (ref 37.5–51)
HGB BLD-MCNC: 14.2 G/DL (ref 13–17.7)
IMM GRANULOCYTES # BLD AUTO: 0.1 10*3/MM3 (ref 0–0.05)
IMM GRANULOCYTES NFR BLD AUTO: 3.8 % (ref 0–0.5)
LYMPHOCYTES # BLD AUTO: 1.4 10*3/MM3 (ref 0.7–3.1)
LYMPHOCYTES NFR BLD AUTO: 52.6 % (ref 19.6–45.3)
MCH RBC QN AUTO: 28.8 PG (ref 26.6–33)
MCHC RBC AUTO-ENTMCNC: 34.6 G/DL (ref 31.5–35.7)
MCV RBC AUTO: 83.2 FL (ref 79–97)
MONOCYTES # BLD AUTO: 0.35 10*3/MM3 (ref 0.1–0.9)
MONOCYTES NFR BLD AUTO: 13.2 % (ref 5–12)
NEUTROPHILS NFR BLD AUTO: 0.7 10*3/MM3 (ref 1.7–7)
NEUTROPHILS NFR BLD AUTO: 26.2 % (ref 42.7–76)
NRBC BLD AUTO-RTO: 0 /100 WBC (ref 0–0.2)
PLATELET # BLD AUTO: 127 10*3/MM3 (ref 140–450)
PMV BLD AUTO: 10 FL (ref 6–12)
POTASSIUM SERPL-SCNC: 3.3 MMOL/L (ref 3.5–5.2)
PROT SERPL-MCNC: 5.7 G/DL (ref 6–8.5)
RBC # BLD AUTO: 4.93 10*6/MM3 (ref 4.14–5.8)
SODIUM SERPL-SCNC: 141 MMOL/L (ref 136–145)
WBC NRBC COR # BLD AUTO: 2.66 10*3/MM3 (ref 3.4–10.8)

## 2023-12-29 PROCEDURE — 99214 OFFICE O/P EST MOD 30 MIN: CPT | Performed by: INTERNAL MEDICINE

## 2023-12-29 PROCEDURE — 36415 COLL VENOUS BLD VENIPUNCTURE: CPT | Performed by: INTERNAL MEDICINE

## 2023-12-29 PROCEDURE — 1159F MED LIST DOCD IN RCRD: CPT | Performed by: INTERNAL MEDICINE

## 2023-12-29 PROCEDURE — 36591 DRAW BLOOD OFF VENOUS DEVICE: CPT

## 2023-12-29 PROCEDURE — 88185 FLOWCYTOMETRY/TC ADD-ON: CPT | Performed by: INTERNAL MEDICINE

## 2023-12-29 PROCEDURE — 3077F SYST BP >= 140 MM HG: CPT | Performed by: INTERNAL MEDICINE

## 2023-12-29 PROCEDURE — 80053 COMPREHEN METABOLIC PANEL: CPT

## 2023-12-29 PROCEDURE — 3079F DIAST BP 80-89 MM HG: CPT | Performed by: INTERNAL MEDICINE

## 2023-12-29 PROCEDURE — 1126F AMNT PAIN NOTED NONE PRSNT: CPT | Performed by: INTERNAL MEDICINE

## 2023-12-29 PROCEDURE — 25010000002 HEPARIN LOCK FLUSH PER 10 UNITS: Performed by: INTERNAL MEDICINE

## 2023-12-29 PROCEDURE — 88184 FLOWCYTOMETRY/ TC 1 MARKER: CPT | Performed by: INTERNAL MEDICINE

## 2023-12-29 PROCEDURE — 85025 COMPLETE CBC W/AUTO DIFF WBC: CPT

## 2023-12-29 PROCEDURE — 88182 CELL MARKER STUDY: CPT | Performed by: INTERNAL MEDICINE

## 2023-12-29 RX ORDER — SODIUM CHLORIDE 0.9 % (FLUSH) 0.9 %
10 SYRINGE (ML) INJECTION AS NEEDED
OUTPATIENT
Start: 2023-12-29

## 2023-12-29 RX ORDER — SODIUM CHLORIDE 0.9 % (FLUSH) 0.9 %
10 SYRINGE (ML) INJECTION AS NEEDED
Status: DISCONTINUED | OUTPATIENT
Start: 2023-12-29 | End: 2023-12-29 | Stop reason: HOSPADM

## 2023-12-29 RX ORDER — HEPARIN SODIUM (PORCINE) LOCK FLUSH IV SOLN 100 UNIT/ML 100 UNIT/ML
500 SOLUTION INTRAVENOUS AS NEEDED
OUTPATIENT
Start: 2023-12-29

## 2023-12-29 RX ORDER — HEPARIN SODIUM (PORCINE) LOCK FLUSH IV SOLN 100 UNIT/ML 100 UNIT/ML
500 SOLUTION INTRAVENOUS AS NEEDED
Status: DISCONTINUED | OUTPATIENT
Start: 2023-12-29 | End: 2023-12-29 | Stop reason: HOSPADM

## 2023-12-29 RX ADMIN — Medication 500 UNITS: at 12:56

## 2023-12-29 RX ADMIN — Medication 10 ML: at 12:56

## 2023-12-29 NOTE — PROGRESS NOTES
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  Progressive disease January 2023 placed on Calquence.  Further progression with Jon's transformation and initiating CHOP Rituxan 6/14/2023, COMPLETED 10/23 5 CYCLES , ACHIEVED A VERY GOOD RESPONSE CLINICALLY, BIO CHEMICALLY BY LDH MEASUREMENT AND RADIOLOGICALLY BY PET SCAN.    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 Clinically, his energy level is not back to what it used to be before all these events but he is feeling substantially better. He is going to the office once a week and working from home. He is riding his electrical bike. He is eating extremely well. His weight is stable. His bowel activity is normal. Urination is normal. He had complete resolution of the swelling in his lower extremities. No cardiac or respiratory issues. No fevers, chills, infection or bleeding.      Past Medical History:   Diagnosis Date    Arthritis     Benign prostatic hyperplasia     FOLLOWED BY DR. VIVIEN PATEL    Biliary dyskinesia     Bunion of right foot     GREAT TOE    Bursitis of right hip 03/15/2018    CLL (chronic lymphocytic leukemia) 2016    FOLLOWED BY DR WALKER    Colon polyps     FOLLOWED BY DR. NEHA HAM    Constipation     Degeneration of lumbar intervertebral disc     Diverticulosis     Erectile dysfunction     Fracture of ankle 1975    GERD (gastroesophageal reflux disease)     Hallux valgus of left foot     Hyperlipidemia     MIXED    Hypertension     Inguinal hernia     Kidney stone 02/21/2009    SEEN AT [facility] ER    Knee swelling 2018    Localized, primary osteoarthritis     Lumbar radiculopathy     Lumbar stenosis     Lung mass     LEFT SIDE - SCAR TISSUE PER PATIENT     Osteoarthritis of right knee     Polyneuropathy     Prostate CA 03/2016     JACQUELYN 6, S/P XRT, FOLLOWED BY DR. VIVIEN PATEL, RADIATION SEEDS    PVC (premature ventricular contraction)     PT STATES WORKED UP YEARS AGO BY [facility] FOR POSSIBLE MURMUR - NO FOLLOW UP REQUIRED     RBBB     Sensory hearing loss, bilateral     Skin cancer     SQUAMOUS CELL CARCINOMA OF FACE    Substernal goiter     Thyromegaly 12/2016    ADMITTED TO [facility]    Vitamin D deficiency      Past Surgical History:   Procedure Laterality Date    BRONCHOSCOPY N/A 12/14/2016    Procedure: BRONCHOSCOPY WITH  BAL AND BIOPSY AND ENDOBRONCHIAL ULTRASOUND  AND NAVIGATION WITH BRUSHINGS AND BIOPSY AND BAL;  Surgeon: Pierre Manning MD;  Location: [facility] ENDOSCOPY;  Service:     COLONOSCOPY N/A 03/13/2019    5 MM SESSILE TUBULAR ADENOMA POLYP IN TRANSVERSE, MULTIPLE SMALL AND LARGE DIVERTICULA IN SIGMOID, RESCOPE IN 5 YRS, DR. NEHA HAM AT [facility]    COLONOSCOPY W/ POLYPECTOMY N/A 03/19/2015    3 TUBULAR ADENOMA POLYPS, 12 TUBULOVILLOUS POLYP RECTO-SIGMOID, DIVERTICULOSIS, RESCOPE IN 3 YRS, DR. NEHA HAM AT [facility]    EYE SURGERY Bilateral     CATARACT EXTRACTION WITH LENS IMPLANT, DR. GOVEA    FINGER NAIL SURGERY Right 2022    TORRES-PATEL    INGUINAL HERNIA REPAIR Left 11/02/2021    Procedure: LEFT OPEN INGUINAL HERNIA REPAIR;  Surgeon: Nixon Kolb MD;  Location: [facility] MAIN OR;  Service: General;  Laterality: Left;    PROSTATE RADIOACTIVE SEED IMPLANT N/A 09/06/2016    DR. HOA JARAMILLO AT [facility]    PROSTATE SURGERY N/A 03/11/2016    BIOPSY: ADENOCARCINOMA, DR. ZAID IBARRA    THYROID BIOPSY Bilateral 11/01/2017    NO B CELL OR T CELL LYMPHOMA, DONE AT [facility]    TOTAL KNEE ARTHROPLASTY Right 10/12/2022    Procedure: TOTAL KNEE ARTHROPLASTY;  Surgeon: Ran Rachel MD;  Location:  [facility];  Service: Orthopedics;  Laterality: Right;    VENOUS ACCESS DEVICE (PORT) INSERTION Right 7/6/2023    Procedure: INSERTION VENOUS ACCESS DEVICE;  Surgeon: Nixon Kolb MD;  [facility];  Service:  "General;  Laterality: Right;     Social History     Socioeconomic History    Marital status:      Spouse name: No    Years of education: College   Tobacco Use    Smoking status: Never    Smokeless tobacco: Never   Vaping Use    Vaping Use: Never used   Substance and Sexual Activity    Alcohol use: No    Drug use: Never    Sexual activity: Yes     Partners: Female     Birth control/protection: None     Family History   Problem Relation Age of Onset    Heart disease Father         Rheumatic heart disease    Hypertension Father     Osteoporosis Father     Stroke Mother     Osteoporosis Mother     No Known Problems Daughter     Malig Hyperthermia Neg Hx      Current Outpatient Medications on File Prior to Visit   Medication Sig    acyclovir (Zovirax) 400 MG tablet Take 1 tablet by mouth Daily.    ammonium lactate (AMLACTIN) 12 % cream APPLY 1 APPLICATION EXTERNALLY TWICE A DAY    calcium carbonate (TUMS) 500 MG chewable tablet Chew 2 tablets 2 (Two) Times a Day As Needed for Heartburn.    lidocaine-prilocaine (EMLA) 2.5-2.5 % cream Apply 1 application  topically to the appropriate area as directed Every 2 (Two) Hours As Needed for Mild Pain.    Melatonin 10 MG tablet Take 1 tablet by mouth Every Night.     No current facility-administered medications on file prior to visit.     No Known Allergies        VITAL SIGNS:  Vitals:    12/29/23 1335   BP: 173/87   Pulse: 78   Temp: 98.4 °F (36.9 °C)   TempSrc: Temporal   SpO2: 98%   Weight: 97.6 kg (215 lb 1.6 oz)   Height: 195.6 cm (77.01\")   PainSc: 0-No pain          PHYSICAL EXAMINATION:           GENERAL:  Well-developed, Patient  in no acute distress.   SKIN:  Warm, dry ,NO purpura ,no rash.  HEENT:  Pupils were equal and reactive to light and accomodation, conjunctivae noninjected,  normal visual acuity.   NECK:  Supple with good range of motion; no thyromegaly , no JVD or bruits,.No carotid artery pain, no carotid abnormal pulsation   LYMPHATICS:  No cervical, " NO supraclavicular, NO axillary, NO inguinal adenopathies.  CARDIAC   normal rate , regular rhythm, without murmur,NO rubs NO S3 NO S4   LUNGS: normal breath sounds bilateral, no wheezing, NO rhonchi, NO crackles ,NO rubs.  VASCULAR VENOUS: no cyanosis, NO collateral circulation, NO varicosities, NO edema, NO palpable cords, NO pain,NO erythema, NO pigmentation of the skin.  ABDOMEN:  Soft, NO pain,no hepatomegaly, no splenomegaly,no masses, no ascites, no collateral circulation,no distention.  EXTREMITIES  AND SPINE:  No clubbing, no cyanosis ,no deformities , no pain .No kyphosis,  no pain in spine, no pain in ribs , no pain in pelvic bone.  NEUROLOGICAL:  Patient was awake, alert, oriented to time, person and place.      LABORATORY DATA:  Results from last 7 days   Lab Units 12/29/23  1256   WBC 10*3/mm3 2.66*   NEUTROS ABS 10*3/mm3 0.70*   HEMOGLOBIN g/dL 14.2   HEMATOCRIT % 41.0   PLATELETS 10*3/mm3 127*     Results from last 7 days   Lab Units 12/29/23  1256   SODIUM mmol/L 141   POTASSIUM mmol/L 3.3*   CHLORIDE mmol/L 103   CO2 mmol/L 28.3   BUN mg/dL 13   CREATININE mg/dL 0.69*   CALCIUM mg/dL 9.1   ALBUMIN g/dL 4.1   BILIRUBIN mg/dL 0.7   ALK PHOS U/L 65   ALT (SGPT) U/L 7   AST (SGOT) U/L 17   GLUCOSE mg/dL 104*           ASSESSMENT:   1.  History of CLL, now with Jon's transformation  Status posttreatment with Bendamustine/Rituxan in 2016  Patient did have severe reaction to initial Rituxan dose requiring hospitalization or completion.  Did well thereafter.  12/30/2022 WBC remains normal at 10.6, 31% lymphs.  Platelets normal at 105,000.  No B symptoms or adenopathy.  No suggestion of recurrent disease.  Hemoglobin has acutely dropped however down to 11.9 (see further discussion below).  On 01/13/2023 his white count, hemoglobin and platelets are not changing. His total lymphocyte count is very minimal and I do believe that his leukemia remains very quiet on clinical grounds with no need for  intervention.  1/13/2023 CT of the abdomen pelvis notes several enlarged peripancreatic and portal lymph nodes measuring up to 2.1 cm which are new.  New retroperitoneal lymphadenopathy with a musa mass to the left of the aorta measuring 3.0 x 3.5 cm.  Lymph nodes near the level of the iliac bifurcation measuring up to 2.1.  Multiple enlarged lymph nodes in the pelvis along both iliac chains measuring up to 1.1 x 2.7 cm in the left 1.3 x 4.4 cm in the right.  These lymph nodes are in the splenic enlargement area likely related to CLL.  1/25/2023 PET scan noting increase in size of his in the adenopathy in the chest abdomen pelvis as well as the chest.  Severe splenomegaly which is intensely FDG avid and highly concerning for leukemic involvement.  Plans for Calquence 200 mg twice daily which was initiated 2/8/2023  Molecular analysis of the peripheral blood flow cytometry has been requested from the [facility] in regard the FISH testing. Also we ordered IgH testing.   IgVH unmutated. This makes his prognosis a little bit worse in the long run  After 2 months of Calquence, clinical improvement in splenomegaly.  5/8/2020 3 repeat PET scan noting scattered adenopathy within the neck chest abdomen pelvis as before.  A few index lesions within the neck and chest are grossly stable.  Severe hypermetabolic splenomegaly as before the degree of FDG uptake appears minimally decreased.  Reevaluation 6/2/2023 with profound fatigue, inability to accomplish ADLs.  Worsening leukocytosis with white count of 23,000 and atypical mononuclear cells in circulation by peripheral blood examination.  Worsening thrombocytopenia and progressive splenomegaly  6/2/2023 flow cytometry on peripheral blood flow noting immunophenotype identifies a CD5 positive monoclonal kappa B-cell population representing 67% of total events.  Pathologist comment: Although the phenotype is similar to the patient's previous study, the percentage of disease is more  "than doubled.  The findings are worrisome for large cell (Jon's) or polymorphic transformation from the previous CLL.  6/7/2023 bone marrow biopsy including flow cytometry A monoclonal population is identified with lymphocyte region containing 32% of total events and the following immunophenotype positive CD46, CD6, bright CD19, bright CD20, HLA-DR bright surface kappa light chain, dim partial CD38   Negative CD4, CD8, CD10, CD11c, CD23. Pathology hypercellular bone marrow (50%) with involvement by malignant lymphoma with presenting 30% of bone marrow cellularity  Per Dr. Schroeder \"the pathology of the bone marrow recently done, even though does not document by flow cytometry any major changes consistent with his CLL, morphologically to my eyes, and I disagree with the Pathologist in certain fashion, shows 2 major populations of cells, number 1 precursors of red cells that were very obvious, and the second population was very monotonous population of largely looking lymphocytes that were very worrisome for diffuse large cell non-Hodgkin's lymphoma.\"  Recommendations for hospitalization, PICC line placement and initiation of CHOP Rituxan  6/12/2023, baseline echocardiogram with normal ejection fraction of 65%  6/13/2023, prior to initiation of chemotherapy WBC 53.8, hemoglobin 7.7, platelets 62,000.  LDH elevated at 1900  Rituxan initiated 6/23/2023  CHOP given 6/14/2023.  Vincristine given at a flat dose of 1 mg.  G-CSF with Neupogen 480 mcg given x8 days following cycle 1  Very dramatic response to first cycle of chemotherapy with decrease in LDH from 2000-400s 6/26/2023.  Also improvement in WBC  Technically due for cycle 2 chemotherapy today, 7/5/2023.  However, this will be delayed 1 week as the patient is having a Mediport placed tomorrow.  Continued improvement in LDH indicating ongoing response with  today.  On 07/11/2023 the patient was further reviewed. He has had a dramatic clinical comeback in " regard to his energy level, appetite, and inability to function. He has been able to become self-independent at home and his daughter is giving him all the support that he needs. He is eating extremely well. He has lost a lot of weight related to water loss and hopefully he will start to gain muscle mass again in the next several weeks.   On 08/01/2023 the patient was reviewed. Clinically he is dramatically better. His spleen has dramatically decreased.   7/25/2023 CT of the chest abdomen pelvis noting improvement in the spleen with stable left periaortic adenopathy and improvement in yasmeen hepatic adenopathy.  In the long run the goal will be to try to complete 6 cycles of chemotherapy and then put him on maintenance. I am planning also upon completion of 6 cycles to repeat his bone marrow testing.  8/8/2023: We will proceed with Cycle 3 R-CHOP and have patient return weekly for lab review to monitor counts closely. Patient to receive additional premedications as planned.   8/29/2023 patient returns the office today for cycle 4 R-CHOP.    9/19/2023 due for cycle 5 CHOP Rituxan.  This was delayed due to tachycardia and need for echocardiogram  9/22/2023 PET scan noting improvement in FDG activity of this bleeding though persistent severe splenomegaly.  Echocardiogram has not shown any detrimental action of Adriamycin and cardiac function after reviewing this and posted above, the patient will proceed with 1 more cycle of CHOP-Rituxan at the previous dose. Strong consideration will be given to go back to medicine that he was taking before his Jon's transformation.  Evaluation by Dr. Schroeder on 11/02/2023, the patient has recovered completely from the toxicity of chemotherapy with CHOP and Rituxan. He is functioning at a much better level with an ECOG performance status close to 0 going back to the office, walking around, and doing all sorts of activities with no limitations. He has no pain, no bleeding. His LDH  was a marker of his disease process being in the 2000 category at the time of induction of his treatment. Therefore, according to recommendations, the patient will be watched in absence of any other intervention.  Dr. Schroeder is not going to proceed with any Brukinsa administration at this time.  Monthly follow-up 11/30/2023 with a slight change in labs with WBC declined at 2.86, hemoglobin normal at 13.2, platelets 100,000.  LDH has slightly increased at 250.  This was all reviewed with Dr. Schroeder who recommended flow cytometry though continuing in observation.  Clinically, the patient has no B symptoms and continues to feel very well.  On 12/29/2023, the patient feels substantially better. I have no evidence of palpable adenopathy or hepatosplenomegaly on clinical examination. His neutrophil count is 700. He still has an excess of lymphocytes. His LDH is pending. A month ago we obtained a peripheral blood flow cytometry that failed to document any monoclonal population of lymphocytes. We requested another one today and this will be reviewed with the patient on the telephone on Monday. I am afraid that maybe his disease is slowly progressing back and that is why the need for flow cytometry has arose today. In any event we will review his peripheral blood. We will review his LDH. Given the circumstances, the patient will be advised and called at home. Depending of what the flow shows and the peripheral blood analysis shows, we will decide if the patient needs to proceed with further intervention including consideration of Gazyva and chlorambucil for example.     Plan to see him back in the office in a month but this could change depending of the circumstances and the flow cytometry report.        2.  History of prostate cancer  Treated at the [facility]  Most recent PSA April 2022 = 0.203  On 01/13/2023 he has minimal urinary symptomatology comparing his PSA with 3 years ago was 0.8 today, actually a few days  ago was 0.1. I doubt that all of the symptoms that he has are associated with prostate cancer. The blood in the urine has a different significance.   On 01/18/2023 no issues pertinent to his prostate cancer. I see the seeds in his prostatic bed on the CT scan. His urinalysis is very much clean and his PSA has remained stable. No activity of this entity at this point.   On 03/17/2023 no issues pertinent to his previous history of prostate cancer. His PSA has been measured and has remained normal.  On 11/30/2023, no issues pertinent.  On 12/29/2023, no issues pertinent to this at this time.      3.  Venous access  Right upper extremity PICC line placed for cycle 1 chemotherapy.  Follow Dr. Kolb for Mediport placement tomorrow, 7/6/2023  With having Mediport placed tomorrow, we will pull the PICC line today, right upper extremity PICC line removed per nursing  Port in right subclavian position looking extremely well. I will prescribe Emla cream for future access to minimize pain.  On 8/09/2023 his port is functioning perfectly fine with no pain.  On 09/27/2023 his port remains functional with no difficulties whatsoever and will be utilized for further administration of chemotherapy in the next week.  On 11/02/2023, port will remain maintained on monthly basis with port flush.  On 12/29/2023, port remains efficient and ready for access. It has been flushed today.       4.  Multifactorial anemia secondary to underlying malignancy and chemotherapy  Transfusion support as needed  Hemoglobin today is improved at 9.9  On 07/11/2023 anemia is self correcting now that he probably has a much better clean bone marrow able to trigger normal erythropoiesis.  On 08/01/2023 anemia is substantially improved.  8/29/2023: Hemoglobin slightly declined at 11.8.  11/30/2023 hemoglobin is normal at 13.2  On 12/29/2023, his hemoglobin has further improved and normalized now into the 14 g category. No notion of hemolysis or blood loss.  This probably indicates proper bone marrow function as well.      5.  Prophylaxis  Continue on acyclovir 400 mg once daily  Continuing allopurinol 300 mg daily  On 07/11/2023 he remains on allopurinol and acyclovir with no viral infections. Eventually allopurinol will be discontinued before the 3rd cycle of chemotherapy.  On 08/01/2023 the patient remains on acyclovir prophylactically.  On 09/19/2023 he remains on acyclovir. The patient is not receiving any Bactrim.   On 12/29/2023, he remains on acyclovir prophylactically. No fever blister developed or any other lesion.      6.  Bilateral lower extremity swelling  Related to anasarca and immobility  Patient is not a good candidate for diuretics in light of his borderline hypotension and immobility, he is a falls risk.  Status compression and elevation.   On 07/11/2023 most of the swelling is resolved now not with water medication but with just proper nutrition and mobilization. I expect that this swelling will be completely gone for the next cycle. Given the persistency of the swelling as posted above I readjusted his chemotherapy administration medications to account for this.  On 08/01/2023 the patient remains with some swelling in his lower extremities. Encouraged elastic support that he does not want to wear.  8/8/2023: Bilateral lower extremity has improved. Right right does appear more edematous than the left lower extremity. He denies any pain or tenderness. No palpable cords on exam. He has tried compression stockings in the past. Encouraged to utilize again.   11/30/2023 he was without lower extremity swelling    On 12/29/2023, complete resolution of the swelling in his lower extremities  by his nutrition became completely back to normality. No swelling in legs whatsoever today.      PLAN:  On 12/29/2023, each one of the numerals have been discussed in detail above. The patient will be called at home next week with the report of the flow cytometry and the  analysis of peripheral blood by me.     Tentative appointment for 1 month visit or sooner depending of the circumstances.      Gerry Schroeder MD  12/29/2023

## 2024-01-01 ENCOUNTER — APPOINTMENT (OUTPATIENT)
Dept: GENERAL RADIOLOGY | Facility: HOSPITAL | Age: 79
DRG: 841 | End: 2024-01-01
Payer: MEDICARE

## 2024-01-01 ENCOUNTER — HOME CARE VISIT (OUTPATIENT)
Dept: HOME HEALTH SERVICES | Facility: HOME HEALTHCARE | Age: 79
End: 2024-01-01
Payer: MEDICARE

## 2024-01-01 ENCOUNTER — TELEPHONE (OUTPATIENT)
Dept: ONCOLOGY | Facility: CLINIC | Age: 79
End: 2024-01-01
Payer: MEDICARE

## 2024-01-01 ENCOUNTER — TRANSCRIBE ORDERS (OUTPATIENT)
Dept: HOME HEALTH SERVICES | Facility: HOME HEALTHCARE | Age: 79
End: 2024-01-01
Payer: MEDICARE

## 2024-01-01 ENCOUNTER — DOCUMENTATION (OUTPATIENT)
Dept: HOME HEALTH SERVICES | Facility: HOME HEALTHCARE | Age: 79
End: 2024-01-01
Payer: MEDICARE

## 2024-01-01 ENCOUNTER — READMISSION MANAGEMENT (OUTPATIENT)
Dept: CALL CENTER | Facility: HOSPITAL | Age: 79
End: 2024-01-01
Payer: MEDICARE

## 2024-01-01 ENCOUNTER — HOSPITAL ENCOUNTER (INPATIENT)
Facility: HOSPITAL | Age: 79
LOS: 1 days | DRG: 841 | End: 2024-04-11
Attending: EMERGENCY MEDICINE | Admitting: INTERNAL MEDICINE
Payer: MEDICARE

## 2024-01-01 ENCOUNTER — HOME HEALTH ADMISSION (OUTPATIENT)
Dept: HOME HEALTH SERVICES | Facility: HOME HEALTHCARE | Age: 79
End: 2024-01-01
Payer: MEDICARE

## 2024-01-01 VITALS
SYSTOLIC BLOOD PRESSURE: 99 MMHG | RESPIRATION RATE: 30 BRPM | TEMPERATURE: 102.9 F | HEIGHT: 77 IN | OXYGEN SATURATION: 90 % | DIASTOLIC BLOOD PRESSURE: 71 MMHG | WEIGHT: 231.48 LBS | BODY MASS INDEX: 27.33 KG/M2

## 2024-01-01 VITALS
DIASTOLIC BLOOD PRESSURE: 58 MMHG | RESPIRATION RATE: 24 BRPM | TEMPERATURE: 97.7 F | SYSTOLIC BLOOD PRESSURE: 109 MMHG | HEART RATE: 97 BPM | OXYGEN SATURATION: 92 %

## 2024-01-01 VITALS
DIASTOLIC BLOOD PRESSURE: 60 MMHG | SYSTOLIC BLOOD PRESSURE: 142 MMHG | TEMPERATURE: 100.9 F | OXYGEN SATURATION: 88 % | HEART RATE: 112 BPM | RESPIRATION RATE: 30 BRPM

## 2024-01-01 DIAGNOSIS — E87.20 LACTIC ACIDOSIS: ICD-10-CM

## 2024-01-01 DIAGNOSIS — A41.9 SEPSIS, DUE TO UNSPECIFIED ORGANISM, UNSPECIFIED WHETHER ACUTE ORGAN DYSFUNCTION PRESENT: Primary | ICD-10-CM

## 2024-01-01 DIAGNOSIS — Z85.9 HISTORY OF CANCER: ICD-10-CM

## 2024-01-01 DIAGNOSIS — D84.9 IMMUNOCOMPROMISED: ICD-10-CM

## 2024-01-01 DIAGNOSIS — C83.30 DIFFUSE LARGE CELL NON-HODGKIN'S LYMPHOMA: Primary | ICD-10-CM

## 2024-01-01 DIAGNOSIS — R50.9 FEVER IN ADULT: ICD-10-CM

## 2024-01-01 LAB
ALBUMIN SERPL-MCNC: 2.4 G/DL (ref 3.5–5.2)
ALBUMIN SERPL-MCNC: 3 G/DL (ref 3.5–5.2)
ALBUMIN SERPL-MCNC: 3.2 G/DL (ref 3.5–5.2)
ALBUMIN/GLOB SERPL: 1.6 G/DL
ALBUMIN/GLOB SERPL: 2.1 G/DL
ALBUMIN/GLOB SERPL: 3 G/DL
ALP SERPL-CCNC: 118 U/L (ref 39–117)
ALP SERPL-CCNC: 123 U/L (ref 39–117)
ALP SERPL-CCNC: 83 U/L (ref 39–117)
ALT SERPL W P-5'-P-CCNC: 12 U/L (ref 1–41)
ALT SERPL W P-5'-P-CCNC: 14 U/L (ref 1–41)
ALT SERPL W P-5'-P-CCNC: 9 U/L (ref 1–41)
ANION GAP SERPL CALCULATED.3IONS-SCNC: 21.1 MMOL/L (ref 5–15)
ANION GAP SERPL CALCULATED.3IONS-SCNC: 23 MMOL/L (ref 5–15)
ANION GAP SERPL CALCULATED.3IONS-SCNC: 23.6 MMOL/L (ref 5–15)
AST SERPL-CCNC: 37 U/L (ref 1–40)
AST SERPL-CCNC: 39 U/L (ref 1–40)
AST SERPL-CCNC: 42 U/L (ref 1–40)
B PARAPERT DNA SPEC QL NAA+PROBE: NOT DETECTED
B PERT DNA SPEC QL NAA+PROBE: NOT DETECTED
BACTERIA UR QL AUTO: ABNORMAL /HPF
BILIRUB SERPL-MCNC: 2.7 MG/DL (ref 0–1.2)
BILIRUB SERPL-MCNC: 2.8 MG/DL (ref 0–1.2)
BILIRUB SERPL-MCNC: 3 MG/DL (ref 0–1.2)
BILIRUB UR QL STRIP: NEGATIVE
BLASTS NFR BLD MANUAL: 17 % (ref 0–0)
BLASTS NFR BLD MANUAL: 59 % (ref 0–0)
BUN SERPL-MCNC: 28 MG/DL (ref 8–23)
BUN SERPL-MCNC: 29 MG/DL (ref 8–23)
BUN SERPL-MCNC: 31 MG/DL (ref 8–23)
BUN/CREAT SERPL: 24 (ref 7–25)
BUN/CREAT SERPL: 25.4 (ref 7–25)
BUN/CREAT SERPL: 25.9 (ref 7–25)
C PNEUM DNA NPH QL NAA+NON-PROBE: NOT DETECTED
CALCIUM SPEC-SCNC: 7.8 MG/DL (ref 8.6–10.5)
CALCIUM SPEC-SCNC: 7.8 MG/DL (ref 8.6–10.5)
CALCIUM SPEC-SCNC: 8.9 MG/DL (ref 8.6–10.5)
CHLORIDE SERPL-SCNC: 102 MMOL/L (ref 98–107)
CHLORIDE SERPL-SCNC: 102 MMOL/L (ref 98–107)
CHLORIDE SERPL-SCNC: 99 MMOL/L (ref 98–107)
CLARITY UR: ABNORMAL
CO2 SERPL-SCNC: 18 MMOL/L (ref 22–29)
CO2 SERPL-SCNC: 18.4 MMOL/L (ref 22–29)
CO2 SERPL-SCNC: 19.9 MMOL/L (ref 22–29)
COLOR UR: ABNORMAL
CREAT SERPL-MCNC: 1.08 MG/DL (ref 0.76–1.27)
CREAT SERPL-MCNC: 1.14 MG/DL (ref 0.76–1.27)
CREAT SERPL-MCNC: 1.29 MG/DL (ref 0.76–1.27)
D-LACTATE SERPL-SCNC: 10.4 MMOL/L (ref 0.5–2)
D-LACTATE SERPL-SCNC: 10.7 MMOL/L (ref 0.5–2)
D-LACTATE SERPL-SCNC: 11.4 MMOL/L (ref 0.5–2)
DEPRECATED RDW RBC AUTO: 59.3 FL (ref 37–54)
DEPRECATED RDW RBC AUTO: 60.7 FL (ref 37–54)
EGFRCR SERPLBLD CKD-EPI 2021: 56.1 ML/MIN/1.73
EGFRCR SERPLBLD CKD-EPI 2021: 65 ML/MIN/1.73
EGFRCR SERPLBLD CKD-EPI 2021: 69.4 ML/MIN/1.73
EOSINOPHIL # BLD MANUAL: 2.49 10*3/MM3 (ref 0–0.4)
EOSINOPHIL NFR BLD MANUAL: 3 % (ref 0.3–6.2)
ERYTHROCYTE [DISTWIDTH] IN BLOOD BY AUTOMATED COUNT: 19.6 % (ref 12.3–15.4)
ERYTHROCYTE [DISTWIDTH] IN BLOOD BY AUTOMATED COUNT: 19.6 % (ref 12.3–15.4)
FLUAV SUBTYP SPEC NAA+PROBE: NOT DETECTED
FLUBV RNA ISLT QL NAA+PROBE: NOT DETECTED
GEN 5 2HR TROPONIN T REFLEX: 71 NG/L
GLOBULIN UR ELPH-MCNC: 1 GM/DL
GLOBULIN UR ELPH-MCNC: 1.5 GM/DL
GLOBULIN UR ELPH-MCNC: 1.5 GM/DL
GLUCOSE BLDC GLUCOMTR-MCNC: 136 MG/DL (ref 70–130)
GLUCOSE BLDC GLUCOMTR-MCNC: 76 MG/DL (ref 70–130)
GLUCOSE SERPL-MCNC: 114 MG/DL (ref 65–99)
GLUCOSE SERPL-MCNC: 75 MG/DL (ref 65–99)
GLUCOSE SERPL-MCNC: 91 MG/DL (ref 65–99)
GLUCOSE UR STRIP-MCNC: NEGATIVE MG/DL
HADV DNA SPEC NAA+PROBE: NOT DETECTED
HCOV 229E RNA SPEC QL NAA+PROBE: NOT DETECTED
HCOV HKU1 RNA SPEC QL NAA+PROBE: NOT DETECTED
HCOV NL63 RNA SPEC QL NAA+PROBE: NOT DETECTED
HCOV OC43 RNA SPEC QL NAA+PROBE: NOT DETECTED
HCT VFR BLD AUTO: 25.8 % (ref 37.5–51)
HCT VFR BLD AUTO: 29.3 % (ref 37.5–51)
HGB BLD-MCNC: 8.3 G/DL (ref 13–17.7)
HGB BLD-MCNC: 9.1 G/DL (ref 13–17.7)
HGB UR QL STRIP.AUTO: NEGATIVE
HMPV RNA NPH QL NAA+NON-PROBE: NOT DETECTED
HPIV1 RNA ISLT QL NAA+PROBE: NOT DETECTED
HPIV2 RNA SPEC QL NAA+PROBE: NOT DETECTED
HPIV3 RNA NPH QL NAA+PROBE: NOT DETECTED
HPIV4 P GENE NPH QL NAA+PROBE: NOT DETECTED
HYALINE CASTS UR QL AUTO: ABNORMAL /LPF
KETONES UR QL STRIP: ABNORMAL
LEUKOCYTE ESTERASE UR QL STRIP.AUTO: ABNORMAL
LIPASE SERPL-CCNC: 24 U/L (ref 13–60)
LYMPHOCYTES # BLD MANUAL: 14.16 10*3/MM3 (ref 0.7–3.1)
LYMPHOCYTES # BLD MANUAL: 36.5 10*3/MM3 (ref 0.7–3.1)
LYMPHOCYTES NFR BLD MANUAL: 11 % (ref 5–12)
LYMPHOCYTES NFR BLD MANUAL: 5 % (ref 5–12)
M PNEUMO IGG SER IA-ACNC: NOT DETECTED
MAGNESIUM SERPL-MCNC: 1.5 MG/DL (ref 1.6–2.4)
MAGNESIUM SERPL-MCNC: 1.8 MG/DL (ref 1.6–2.4)
MCH RBC QN AUTO: 26.8 PG (ref 26.6–33)
MCH RBC QN AUTO: 28.4 PG (ref 26.6–33)
MCHC RBC AUTO-ENTMCNC: 31.1 G/DL (ref 31.5–35.7)
MCHC RBC AUTO-ENTMCNC: 32.2 G/DL (ref 31.5–35.7)
MCV RBC AUTO: 86.4 FL (ref 79–97)
MCV RBC AUTO: 88.4 FL (ref 79–97)
MONOCYTES # BLD: 5.06 10*3/MM3 (ref 0.1–0.9)
MONOCYTES # BLD: 9.12 10*3/MM3 (ref 0.1–0.9)
MRSA DNA SPEC QL NAA+PROBE: NORMAL
NEUTROPHILS # BLD AUTO: 20.74 10*3/MM3 (ref 1.7–7)
NEUTROPHILS # BLD AUTO: 22.25 10*3/MM3 (ref 1.7–7)
NEUTROPHILS NFR BLD MANUAL: 22 % (ref 42.7–76)
NEUTROPHILS NFR BLD MANUAL: 25 % (ref 42.7–76)
NITRITE UR QL STRIP: NEGATIVE
NRBC BLD AUTO-RTO: 0.7 /100 WBC (ref 0–0.2)
NT-PROBNP SERPL-MCNC: 1309 PG/ML (ref 0–1800)
NT-PROBNP SERPL-MCNC: 1513 PG/ML (ref 0–1800)
PH UR STRIP.AUTO: <=5 [PH] (ref 5–8)
PHOSPHATE SERPL-MCNC: 3.1 MG/DL (ref 2.5–4.5)
PLAT MORPH BLD: NORMAL
PLAT MORPH BLD: NORMAL
PLATELET # BLD AUTO: 81 10*3/MM3 (ref 140–450)
PLATELET # BLD AUTO: 83 10*3/MM3 (ref 140–450)
PMV BLD AUTO: 10.2 FL (ref 6–12)
PMV BLD AUTO: 10.4 FL (ref 6–12)
POTASSIUM SERPL-SCNC: 3.3 MMOL/L (ref 3.5–5.2)
POTASSIUM SERPL-SCNC: 3.6 MMOL/L (ref 3.5–5.2)
POTASSIUM SERPL-SCNC: 4.2 MMOL/L (ref 3.5–5.2)
PROCALCITONIN SERPL-MCNC: 3.93 NG/ML (ref 0–0.25)
PROT SERPL-MCNC: 3.9 G/DL (ref 6–8.5)
PROT SERPL-MCNC: 4 G/DL (ref 6–8.5)
PROT SERPL-MCNC: 4.7 G/DL (ref 6–8.5)
PROT UR QL STRIP: ABNORMAL
RBC # BLD AUTO: 2.92 10*6/MM3 (ref 4.14–5.8)
RBC # BLD AUTO: 3.39 10*6/MM3 (ref 4.14–5.8)
RBC # UR STRIP: ABNORMAL /HPF
RBC MORPH BLD: NORMAL
RBC MORPH BLD: NORMAL
REF LAB TEST METHOD: ABNORMAL
RHINOVIRUS RNA SPEC NAA+PROBE: NOT DETECTED
RSV RNA NPH QL NAA+NON-PROBE: NOT DETECTED
S PYO AG THROAT QL: NEGATIVE
SARS-COV-2 RNA NPH QL NAA+NON-PROBE: NOT DETECTED
SODIUM SERPL-SCNC: 141 MMOL/L (ref 136–145)
SODIUM SERPL-SCNC: 143 MMOL/L (ref 136–145)
SODIUM SERPL-SCNC: 143 MMOL/L (ref 136–145)
SP GR UR STRIP: 1.02 (ref 1–1.03)
SQUAMOUS #/AREA URNS HPF: ABNORMAL /HPF
TROPONIN T DELTA: 2 NG/L
TROPONIN T SERPL HS-MCNC: 69 NG/L
TROPONIN T SERPL HS-MCNC: 71 NG/L
TROPONIN T SERPL HS-MCNC: 75 NG/L
URATE SERPL-MCNC: 6.2 MG/DL (ref 3.4–7)
UROBILINOGEN UR QL STRIP: ABNORMAL
VARIANT LYMPHS NFR BLD MANUAL: 14 % (ref 19.6–45.3)
VARIANT LYMPHS NFR BLD MANUAL: 15 % (ref 19.6–45.3)
VARIANT LYMPHS NFR BLD MANUAL: 29 % (ref 0–5)
WBC # UR STRIP: ABNORMAL /HPF
WBC MORPH BLD: NORMAL
WBC MORPH BLD: NORMAL
WBC NRBC COR # BLD AUTO: 101.15 10*3/MM3 (ref 3.4–10.8)
WBC NRBC COR # BLD AUTO: 82.95 10*3/MM3 (ref 3.4–10.8)

## 2024-01-01 PROCEDURE — 83690 ASSAY OF LIPASE: CPT | Performed by: EMERGENCY MEDICINE

## 2024-01-01 PROCEDURE — 83735 ASSAY OF MAGNESIUM: CPT | Performed by: INTERNAL MEDICINE

## 2024-01-01 PROCEDURE — G0151 HHCP-SERV OF PT,EA 15 MIN: HCPCS

## 2024-01-01 PROCEDURE — 25810000003 LACTATED RINGERS PER 1000 ML: Performed by: INTERNAL MEDICINE

## 2024-01-01 PROCEDURE — 25010000002 VANCOMYCIN 10 G RECONSTITUTED SOLUTION: Performed by: STUDENT IN AN ORGANIZED HEALTH CARE EDUCATION/TRAINING PROGRAM

## 2024-01-01 PROCEDURE — 25810000003 SODIUM CHLORIDE 0.9 % SOLUTION: Performed by: INTERNAL MEDICINE

## 2024-01-01 PROCEDURE — 82948 REAGENT STRIP/BLOOD GLUCOSE: CPT

## 2024-01-01 PROCEDURE — 93005 ELECTROCARDIOGRAM TRACING: CPT | Performed by: INTERNAL MEDICINE

## 2024-01-01 PROCEDURE — 80053 COMPREHEN METABOLIC PANEL: CPT | Performed by: INTERNAL MEDICINE

## 2024-01-01 PROCEDURE — 85007 BL SMEAR W/DIFF WBC COUNT: CPT | Performed by: INTERNAL MEDICINE

## 2024-01-01 PROCEDURE — 80053 COMPREHEN METABOLIC PANEL: CPT | Performed by: EMERGENCY MEDICINE

## 2024-01-01 PROCEDURE — 25810000003 SODIUM CHLORIDE 0.9 % SOLUTION: Performed by: STUDENT IN AN ORGANIZED HEALTH CARE EDUCATION/TRAINING PROGRAM

## 2024-01-01 PROCEDURE — 84145 PROCALCITONIN (PCT): CPT | Performed by: EMERGENCY MEDICINE

## 2024-01-01 PROCEDURE — 81001 URINALYSIS AUTO W/SCOPE: CPT | Performed by: EMERGENCY MEDICINE

## 2024-01-01 PROCEDURE — 99223 1ST HOSP IP/OBS HIGH 75: CPT | Performed by: STUDENT IN AN ORGANIZED HEALTH CARE EDUCATION/TRAINING PROGRAM

## 2024-01-01 PROCEDURE — 87641 MR-STAPH DNA AMP PROBE: CPT | Performed by: INTERNAL MEDICINE

## 2024-01-01 PROCEDURE — 83605 ASSAY OF LACTIC ACID: CPT | Performed by: EMERGENCY MEDICINE

## 2024-01-01 PROCEDURE — G0299 HHS/HOSPICE OF RN EA 15 MIN: HCPCS

## 2024-01-01 PROCEDURE — 36415 COLL VENOUS BLD VENIPUNCTURE: CPT

## 2024-01-01 PROCEDURE — 87081 CULTURE SCREEN ONLY: CPT | Performed by: EMERGENCY MEDICINE

## 2024-01-01 PROCEDURE — 99223 1ST HOSP IP/OBS HIGH 75: CPT | Performed by: INTERNAL MEDICINE

## 2024-01-01 PROCEDURE — 25010000002 PHENYLEPHRINE 10 MG/ML SOLUTION: Performed by: INTERNAL MEDICINE

## 2024-01-01 PROCEDURE — 85025 COMPLETE CBC W/AUTO DIFF WBC: CPT | Performed by: INTERNAL MEDICINE

## 2024-01-01 PROCEDURE — 83880 ASSAY OF NATRIURETIC PEPTIDE: CPT | Performed by: INTERNAL MEDICINE

## 2024-01-01 PROCEDURE — 87040 BLOOD CULTURE FOR BACTERIA: CPT | Performed by: EMERGENCY MEDICINE

## 2024-01-01 PROCEDURE — 25010000002 PIPERACILLIN SOD-TAZOBACTAM PER 1 G: Performed by: EMERGENCY MEDICINE

## 2024-01-01 PROCEDURE — 25010000002 MORPHINE PER 10 MG: Performed by: INTERNAL MEDICINE

## 2024-01-01 PROCEDURE — 25810000003 SODIUM CHLORIDE 0.9 % SOLUTION: Performed by: EMERGENCY MEDICINE

## 2024-01-01 PROCEDURE — 0202U NFCT DS 22 TRGT SARS-COV-2: CPT | Performed by: EMERGENCY MEDICINE

## 2024-01-01 PROCEDURE — 84100 ASSAY OF PHOSPHORUS: CPT | Performed by: EMERGENCY MEDICINE

## 2024-01-01 PROCEDURE — 84484 ASSAY OF TROPONIN QUANT: CPT | Performed by: INTERNAL MEDICINE

## 2024-01-01 PROCEDURE — 25010000002 LORAZEPAM PER 2 MG: Performed by: EMERGENCY MEDICINE

## 2024-01-01 PROCEDURE — 83735 ASSAY OF MAGNESIUM: CPT | Performed by: EMERGENCY MEDICINE

## 2024-01-01 PROCEDURE — 71045 X-RAY EXAM CHEST 1 VIEW: CPT

## 2024-01-01 PROCEDURE — 87880 STREP A ASSAY W/OPTIC: CPT | Performed by: EMERGENCY MEDICINE

## 2024-01-01 PROCEDURE — 85025 COMPLETE CBC W/AUTO DIFF WBC: CPT | Performed by: EMERGENCY MEDICINE

## 2024-01-01 PROCEDURE — 93010 ELECTROCARDIOGRAM REPORT: CPT | Performed by: INTERNAL MEDICINE

## 2024-01-01 PROCEDURE — 99291 CRITICAL CARE FIRST HOUR: CPT

## 2024-01-01 PROCEDURE — 83880 ASSAY OF NATRIURETIC PEPTIDE: CPT | Performed by: EMERGENCY MEDICINE

## 2024-01-01 PROCEDURE — 84550 ASSAY OF BLOOD/URIC ACID: CPT | Performed by: EMERGENCY MEDICINE

## 2024-01-01 PROCEDURE — 25810000003 LACTATED RINGERS SOLUTION: Performed by: EMERGENCY MEDICINE

## 2024-01-01 PROCEDURE — 85007 BL SMEAR W/DIFF WBC COUNT: CPT | Performed by: EMERGENCY MEDICINE

## 2024-01-01 RX ORDER — MORPHINE SULFATE 2 MG/ML
4 INJECTION, SOLUTION INTRAMUSCULAR; INTRAVENOUS
Status: DISCONTINUED | OUTPATIENT
Start: 2024-01-01 | End: 2024-01-01 | Stop reason: HOSPADM

## 2024-01-01 RX ORDER — ACETAMINOPHEN 650 MG/1
650 SUPPOSITORY RECTAL EVERY 4 HOURS PRN
Status: DISCONTINUED | OUTPATIENT
Start: 2024-01-01 | End: 2024-01-01 | Stop reason: HOSPADM

## 2024-01-01 RX ORDER — TEMAZEPAM 15 MG/1
15 CAPSULE ORAL NIGHTLY PRN
Status: CANCELLED | OUTPATIENT
Start: 2024-01-01 | End: 2024-07-05

## 2024-01-01 RX ORDER — BISACODYL 5 MG/1
5 TABLET, DELAYED RELEASE ORAL DAILY PRN
Status: DISCONTINUED | OUTPATIENT
Start: 2024-01-01 | End: 2024-01-01 | Stop reason: HOSPADM

## 2024-01-01 RX ORDER — HYDROMORPHONE HYDROCHLORIDE 2 MG/ML
1.5 INJECTION, SOLUTION INTRAMUSCULAR; INTRAVENOUS; SUBCUTANEOUS
Status: DISCONTINUED | OUTPATIENT
Start: 2024-01-01 | End: 2024-01-01 | Stop reason: HOSPADM

## 2024-01-01 RX ORDER — LORAZEPAM 2 MG/ML
2 INJECTION INTRAMUSCULAR
Status: DISCONTINUED | OUTPATIENT
Start: 2024-01-01 | End: 2024-01-01 | Stop reason: HOSPADM

## 2024-01-01 RX ORDER — SODIUM CHLORIDE 9 MG/ML
40 INJECTION, SOLUTION INTRAVENOUS AS NEEDED
Status: DISCONTINUED | OUTPATIENT
Start: 2024-01-01 | End: 2024-01-01 | Stop reason: HOSPADM

## 2024-01-01 RX ORDER — LORAZEPAM 2 MG/ML
0.5 INJECTION INTRAMUSCULAR
Status: DISCONTINUED | OUTPATIENT
Start: 2024-01-01 | End: 2024-01-01 | Stop reason: HOSPADM

## 2024-01-01 RX ORDER — ALLOPURINOL 300 MG/1
300 TABLET ORAL DAILY
Status: CANCELLED | OUTPATIENT
Start: 2024-01-01

## 2024-01-01 RX ORDER — DIPHENOXYLATE HYDROCHLORIDE AND ATROPINE SULFATE 2.5; .025 MG/1; MG/1
1 TABLET ORAL
Status: DISCONTINUED | OUTPATIENT
Start: 2024-01-01 | End: 2024-01-01 | Stop reason: HOSPADM

## 2024-01-01 RX ORDER — SODIUM CHLORIDE 0.9 % (FLUSH) 0.9 %
10 SYRINGE (ML) INJECTION EVERY 12 HOURS SCHEDULED
Status: DISCONTINUED | OUTPATIENT
Start: 2024-01-01 | End: 2024-01-01 | Stop reason: HOSPADM

## 2024-01-01 RX ORDER — SODIUM CHLORIDE 0.9 % (FLUSH) 0.9 %
10 SYRINGE (ML) INJECTION AS NEEDED
Status: DISCONTINUED | OUTPATIENT
Start: 2024-01-01 | End: 2024-01-01 | Stop reason: HOSPADM

## 2024-01-01 RX ORDER — NITROGLYCERIN 0.4 MG/1
0.4 TABLET SUBLINGUAL
Status: DISCONTINUED | OUTPATIENT
Start: 2024-01-01 | End: 2024-01-01 | Stop reason: SDUPTHER

## 2024-01-01 RX ORDER — GLYCOPYRROLATE 0.2 MG/ML
0.4 INJECTION INTRAMUSCULAR; INTRAVENOUS
Status: DISCONTINUED | OUTPATIENT
Start: 2024-01-01 | End: 2024-01-01 | Stop reason: HOSPADM

## 2024-01-01 RX ORDER — SODIUM CHLORIDE, SODIUM LACTATE, POTASSIUM CHLORIDE, CALCIUM CHLORIDE 600; 310; 30; 20 MG/100ML; MG/100ML; MG/100ML; MG/100ML
100 INJECTION, SOLUTION INTRAVENOUS CONTINUOUS
Status: DISCONTINUED | OUTPATIENT
Start: 2024-01-01 | End: 2024-01-01

## 2024-01-01 RX ORDER — PHENYLEPHRINE HCL IN 0.9% NACL 0.5 MG/5ML
.5-3 SYRINGE (ML) INTRAVENOUS
Status: DISCONTINUED | OUTPATIENT
Start: 2024-01-01 | End: 2024-01-01

## 2024-01-01 RX ORDER — ACETAMINOPHEN 325 MG/1
650 TABLET ORAL EVERY 4 HOURS PRN
Status: DISCONTINUED | OUTPATIENT
Start: 2024-01-01 | End: 2024-01-01 | Stop reason: HOSPADM

## 2024-01-01 RX ORDER — MORPHINE SULFATE 2 MG/ML
1 INJECTION, SOLUTION INTRAMUSCULAR; INTRAVENOUS
Status: DISCONTINUED | OUTPATIENT
Start: 2024-01-01 | End: 2024-01-01 | Stop reason: HOSPADM

## 2024-01-01 RX ORDER — ACETAMINOPHEN 160 MG/5ML
650 SOLUTION ORAL EVERY 4 HOURS PRN
Status: DISCONTINUED | OUTPATIENT
Start: 2024-01-01 | End: 2024-01-01 | Stop reason: HOSPADM

## 2024-01-01 RX ORDER — KETOROLAC TROMETHAMINE 15 MG/ML
15 INJECTION, SOLUTION INTRAMUSCULAR; INTRAVENOUS EVERY 6 HOURS PRN
Status: DISCONTINUED | OUTPATIENT
Start: 2024-01-01 | End: 2024-01-01 | Stop reason: HOSPADM

## 2024-01-01 RX ORDER — LORAZEPAM 2 MG/ML
1 INJECTION INTRAMUSCULAR
Status: DISCONTINUED | OUTPATIENT
Start: 2024-01-01 | End: 2024-01-01 | Stop reason: HOSPADM

## 2024-01-01 RX ORDER — MORPHINE SULFATE 2 MG/ML
2 INJECTION, SOLUTION INTRAMUSCULAR; INTRAVENOUS
Status: DISCONTINUED | OUTPATIENT
Start: 2024-01-01 | End: 2024-01-01 | Stop reason: HOSPADM

## 2024-01-01 RX ORDER — ACETAMINOPHEN 650 MG/1
650 SUPPOSITORY RECTAL EVERY 4 HOURS PRN
Status: DISCONTINUED | OUTPATIENT
Start: 2024-01-01 | End: 2024-01-01 | Stop reason: SDUPTHER

## 2024-01-01 RX ORDER — OXAZEPAM 10 MG
10 CAPSULE ORAL 3 TIMES DAILY PRN
Status: CANCELLED | OUTPATIENT
Start: 2024-01-01

## 2024-01-01 RX ORDER — ACETAMINOPHEN 500 MG
1000 TABLET ORAL ONCE
Status: COMPLETED | OUTPATIENT
Start: 2024-01-01 | End: 2024-01-01

## 2024-01-01 RX ORDER — TEMAZEPAM 15 MG/1
15 CAPSULE ORAL NIGHTLY PRN
Status: DISCONTINUED | OUTPATIENT
Start: 2024-01-01 | End: 2024-01-01 | Stop reason: HOSPADM

## 2024-01-01 RX ORDER — ONDANSETRON 4 MG/1
8 TABLET, FILM COATED ORAL 3 TIMES DAILY PRN
Status: CANCELLED | OUTPATIENT
Start: 2024-01-01

## 2024-01-01 RX ORDER — ALLOPURINOL 300 MG/1
300 TABLET ORAL DAILY
Status: DISCONTINUED | OUTPATIENT
Start: 2024-01-01 | End: 2024-01-01

## 2024-01-01 RX ORDER — BISACODYL 10 MG
10 SUPPOSITORY, RECTAL RECTAL DAILY PRN
Status: DISCONTINUED | OUTPATIENT
Start: 2024-01-01 | End: 2024-01-01 | Stop reason: HOSPADM

## 2024-01-01 RX ORDER — ONDANSETRON 2 MG/ML
4 INJECTION INTRAMUSCULAR; INTRAVENOUS EVERY 6 HOURS PRN
Status: DISCONTINUED | OUTPATIENT
Start: 2024-01-01 | End: 2024-01-01 | Stop reason: HOSPADM

## 2024-01-01 RX ORDER — MORPHINE SULFATE 2 MG/ML
4 INJECTION, SOLUTION INTRAMUSCULAR; INTRAVENOUS ONCE
Status: DISCONTINUED | OUTPATIENT
Start: 2024-01-01 | End: 2024-01-01 | Stop reason: HOSPADM

## 2024-01-01 RX ORDER — MORPHINE SULFATE 20 MG/ML
10 SOLUTION ORAL
Status: DISCONTINUED | OUTPATIENT
Start: 2024-01-01 | End: 2024-01-01 | Stop reason: HOSPADM

## 2024-01-01 RX ORDER — ONDANSETRON 4 MG/1
4 TABLET, ORALLY DISINTEGRATING ORAL EVERY 6 HOURS PRN
Status: DISCONTINUED | OUTPATIENT
Start: 2024-01-01 | End: 2024-01-01 | Stop reason: HOSPADM

## 2024-01-01 RX ORDER — FAMOTIDINE 20 MG/1
20 TABLET, FILM COATED ORAL DAILY
Status: CANCELLED | OUTPATIENT
Start: 2024-01-01

## 2024-01-01 RX ORDER — FOLIC ACID 1 MG/1
1 TABLET ORAL DAILY
Status: DISCONTINUED | OUTPATIENT
Start: 2024-01-01 | End: 2024-01-01

## 2024-01-01 RX ORDER — ALUMINA, MAGNESIA, AND SIMETHICONE 2400; 2400; 240 MG/30ML; MG/30ML; MG/30ML
15 SUSPENSION ORAL EVERY 6 HOURS PRN
Status: DISCONTINUED | OUTPATIENT
Start: 2024-01-01 | End: 2024-01-01 | Stop reason: HOSPADM

## 2024-01-01 RX ORDER — LORAZEPAM 2 MG/ML
1 INJECTION INTRAMUSCULAR ONCE
Status: COMPLETED | OUTPATIENT
Start: 2024-01-01 | End: 2024-01-01

## 2024-01-01 RX ORDER — SODIUM CHLORIDE, SODIUM LACTATE, POTASSIUM CHLORIDE, CALCIUM CHLORIDE 600; 310; 30; 20 MG/100ML; MG/100ML; MG/100ML; MG/100ML
150 INJECTION, SOLUTION INTRAVENOUS CONTINUOUS
Status: DISCONTINUED | OUTPATIENT
Start: 2024-01-01 | End: 2024-01-01

## 2024-01-01 RX ORDER — NITROGLYCERIN 0.4 MG/1
0.4 TABLET SUBLINGUAL
Status: DISCONTINUED | OUTPATIENT
Start: 2024-01-01 | End: 2024-01-01

## 2024-01-01 RX ORDER — GLYCOPYRROLATE 0.2 MG/ML
0.2 INJECTION INTRAMUSCULAR; INTRAVENOUS
Status: DISCONTINUED | OUTPATIENT
Start: 2024-01-01 | End: 2024-01-01 | Stop reason: HOSPADM

## 2024-01-01 RX ORDER — VANCOMYCIN 2 GRAM/500 ML IN 0.9 % SODIUM CHLORIDE INTRAVENOUS
20 ONCE
Status: COMPLETED | OUTPATIENT
Start: 2024-01-01 | End: 2024-01-01

## 2024-01-01 RX ORDER — HYDROMORPHONE HYDROCHLORIDE 1 MG/ML
0.5 INJECTION, SOLUTION INTRAMUSCULAR; INTRAVENOUS; SUBCUTANEOUS
Status: DISCONTINUED | OUTPATIENT
Start: 2024-01-01 | End: 2024-01-01 | Stop reason: HOSPADM

## 2024-01-01 RX ORDER — MORPHINE SULFATE 10 MG/ML
6 INJECTION INTRAMUSCULAR; INTRAVENOUS; SUBCUTANEOUS
Status: DISCONTINUED | OUTPATIENT
Start: 2024-01-01 | End: 2024-01-01 | Stop reason: HOSPADM

## 2024-01-01 RX ORDER — LORAZEPAM 2 MG/ML
0.5 CONCENTRATE ORAL
Status: DISCONTINUED | OUTPATIENT
Start: 2024-01-01 | End: 2024-01-01 | Stop reason: HOSPADM

## 2024-01-01 RX ORDER — MORPHINE SULFATE 20 MG/ML
5 SOLUTION ORAL
Status: DISCONTINUED | OUTPATIENT
Start: 2024-01-01 | End: 2024-01-01 | Stop reason: HOSPADM

## 2024-01-01 RX ORDER — MORPHINE SULFATE 20 MG/ML
20 SOLUTION ORAL
Status: DISCONTINUED | OUTPATIENT
Start: 2024-01-01 | End: 2024-01-01 | Stop reason: HOSPADM

## 2024-01-01 RX ORDER — DEXTROSE MONOHYDRATE 25 G/50ML
INJECTION, SOLUTION INTRAVENOUS
Status: COMPLETED
Start: 2024-01-01 | End: 2024-01-01

## 2024-01-01 RX ORDER — POLYETHYLENE GLYCOL 3350 17 G/17G
17 POWDER, FOR SOLUTION ORAL DAILY PRN
Status: DISCONTINUED | OUTPATIENT
Start: 2024-01-01 | End: 2024-01-01 | Stop reason: HOSPADM

## 2024-01-01 RX ORDER — LORAZEPAM 2 MG/ML
2 CONCENTRATE ORAL
Status: DISCONTINUED | OUTPATIENT
Start: 2024-01-01 | End: 2024-01-01 | Stop reason: HOSPADM

## 2024-01-01 RX ORDER — FAMOTIDINE 20 MG/1
20 TABLET, FILM COATED ORAL DAILY
Status: DISCONTINUED | OUTPATIENT
Start: 2024-01-01 | End: 2024-01-01

## 2024-01-01 RX ORDER — DEXTROSE MONOHYDRATE 25 G/50ML
50 INJECTION, SOLUTION INTRAVENOUS
Status: DISCONTINUED | OUTPATIENT
Start: 2024-01-01 | End: 2024-01-01 | Stop reason: HOSPADM

## 2024-01-01 RX ORDER — AMOXICILLIN 250 MG
2 CAPSULE ORAL 2 TIMES DAILY
Status: DISCONTINUED | OUTPATIENT
Start: 2024-01-01 | End: 2024-01-01 | Stop reason: HOSPADM

## 2024-01-01 RX ORDER — LORAZEPAM 2 MG/ML
1 CONCENTRATE ORAL
Status: DISCONTINUED | OUTPATIENT
Start: 2024-01-01 | End: 2024-01-01 | Stop reason: HOSPADM

## 2024-01-01 RX ORDER — FOLIC ACID 1 MG/1
1 TABLET ORAL DAILY
Status: CANCELLED | OUTPATIENT
Start: 2024-01-01

## 2024-01-01 RX ADMIN — DEXTROSE MONOHYDRATE 50 ML: 25 INJECTION, SOLUTION INTRAVENOUS at 16:43

## 2024-01-01 RX ADMIN — LORAZEPAM 1 MG: 2 INJECTION INTRAMUSCULAR; INTRAVENOUS at 14:41

## 2024-01-01 RX ADMIN — PHENYLEPHRINE HYDROCHLORIDE 0.5 MCG/KG/MIN: 50 INJECTION INTRAVENOUS at 16:49

## 2024-01-01 RX ADMIN — ACETAMINOPHEN 650 MG: 650 SUPPOSITORY RECTAL at 16:17

## 2024-01-01 RX ADMIN — SODIUM CHLORIDE, POTASSIUM CHLORIDE, SODIUM LACTATE AND CALCIUM CHLORIDE 1000 ML: 600; 310; 30; 20 INJECTION, SOLUTION INTRAVENOUS at 14:58

## 2024-01-01 RX ADMIN — SODIUM CHLORIDE, POTASSIUM CHLORIDE, SODIUM LACTATE AND CALCIUM CHLORIDE 100 ML/HR: 600; 310; 30; 20 INJECTION, SOLUTION INTRAVENOUS at 15:42

## 2024-01-01 RX ADMIN — Medication 10 ML: at 14:42

## 2024-01-01 RX ADMIN — SODIUM CHLORIDE 1000 ML: 9 INJECTION, SOLUTION INTRAVENOUS at 12:26

## 2024-01-01 RX ADMIN — SODIUM CHLORIDE 1000 ML: 9 INJECTION, SOLUTION INTRAVENOUS at 13:08

## 2024-01-01 RX ADMIN — MORPHINE SULFATE 4 MG: 2 INJECTION, SOLUTION INTRAMUSCULAR; INTRAVENOUS at 18:07

## 2024-01-01 RX ADMIN — VANCOMYCIN HYDROCHLORIDE 2000 MG: 10 INJECTION, POWDER, LYOPHILIZED, FOR SOLUTION INTRAVENOUS at 15:23

## 2024-01-01 RX ADMIN — ACETAMINOPHEN 1000 MG: 500 TABLET ORAL at 12:26

## 2024-01-01 RX ADMIN — PIPERACILLIN SODIUM AND TAZOBACTAM SODIUM 3.38 G: 3; .375 INJECTION, POWDER, LYOPHILIZED, FOR SOLUTION INTRAVENOUS at 13:35

## 2024-01-05 LAB — REF LAB TEST METHOD: NORMAL

## 2024-01-17 ENCOUNTER — TELEPHONE (OUTPATIENT)
Dept: ONCOLOGY | Facility: CLINIC | Age: 79
End: 2024-01-17
Payer: MEDICARE

## 2024-01-17 NOTE — TELEPHONE ENCOUNTER
I have called this patient on 2 different occasions leaving a voice mail messages and personally today to notify him one more time that the flow cytometry in his peripheral blood done 10 days ago disclosed no evidence of monoclonal population of cells that would suggest recurrence of leukemia or lymphoma. The morphological analysis by me neither the flow cytometry documented any other new abnormality.    The patient physically is feeling terrific, his appetite is great, energy level is good, he has no fevers or any other concerns and he will return to see me back by the end of the month.

## 2024-01-25 ENCOUNTER — INFUSION (OUTPATIENT)
Dept: ONCOLOGY | Facility: HOSPITAL | Age: 79
End: 2024-01-25
Payer: MEDICARE

## 2024-01-25 DIAGNOSIS — Z45.2 ENCOUNTER FOR ADJUSTMENT OR MANAGEMENT OF VASCULAR ACCESS DEVICE: Primary | ICD-10-CM

## 2024-01-25 RX ORDER — HEPARIN SODIUM (PORCINE) LOCK FLUSH IV SOLN 100 UNIT/ML 100 UNIT/ML
500 SOLUTION INTRAVENOUS AS NEEDED
Status: DISCONTINUED | OUTPATIENT
Start: 2024-01-25 | End: 2024-01-25 | Stop reason: HOSPADM

## 2024-01-25 RX ORDER — SODIUM CHLORIDE 0.9 % (FLUSH) 0.9 %
10 SYRINGE (ML) INJECTION AS NEEDED
Status: DISCONTINUED | OUTPATIENT
Start: 2024-01-25 | End: 2024-01-25 | Stop reason: HOSPADM

## 2024-01-25 RX ORDER — SODIUM CHLORIDE 0.9 % (FLUSH) 0.9 %
10 SYRINGE (ML) INJECTION AS NEEDED
OUTPATIENT
Start: 2024-01-25

## 2024-01-25 RX ORDER — HEPARIN SODIUM (PORCINE) LOCK FLUSH IV SOLN 100 UNIT/ML 100 UNIT/ML
500 SOLUTION INTRAVENOUS AS NEEDED
OUTPATIENT
Start: 2024-01-25

## 2024-02-01 NOTE — NURSING NOTE
"Nursing Chemotherapy Verification    Chemotherapy Regimen:   Treatment Plans       Name Type Hold Status Plan Dates Plan Provider       Active    OP SUPPORTIVE Cyanocobalamin (Vitamin B12) Q28D ONCOLOGY SUPPORTIVE CARE 1 On Automatic Hold  1/4/2023 - Present Gerry Schroeder MD    OP LYMPHOMA R-CHOP RiTUXimab / Cyclophosphamide / DOXOrubicin / VinCRIStine / PredniSONE ONCOLOGY TREATMENT 2 Not on Hold  6/12/2023 - Present Gerry Schroeder MD                      Current height and weight: 195.6 cm (77\") 92.8 kg (204 lb 9.4 oz)  Calculated BSA from current height and weight (Sonny): 2.26    Relevant Labs  Results from last 7 days   Lab Units 06/14/23  0608 06/13/23  0644 06/12/23  0959   WBC 10*3/mm3 9.01 53.84* 64.56*   HEMOGLOBIN g/dL 9.0* 7.7* 9.5*   HEMATOCRIT % 27.0* 24.1* 30.8*   PLATELETS 10*3/mm3 29* 62* 92*     Lab Results   Component Value Date    NEUTROABS 6.13 06/14/2023       Results from last 7 days   Lab Units 06/14/23  1207 06/14/23  0608 06/13/23  0644   CREATININE mg/dL 0.89 0.91 1.03       Serum creatinine: 0.89 mg/dL 06/14/23 1207  Estimated creatinine clearance: 88.3 mL/min    Lab Results   Component Value Date    HEPBCAB Negative 12/07/2016       Verification attestation:  I have personally reviewed the planned regimen and its administration and dosing. I understand the potential side effects.The patient has been instructed on the regimen, potential side effects, and self care measures; the consent form has been completed. I have confirmed that the appropriate premedication, prehydration, post medication and/or emergency medications are ordered in addition to the chemotherapy.    I have independently verified that the height and weight is current and calculated the BSA. I have verified the doses with the planned regimen and have clarified any deviations with the physician (Dr. Schroeder). I have confirmed the route of administration and patient IV access.    Nurse: Libra Rodriguez RN  2nd " [facility] - Neuro Critical Care  Discharge Reassessment    Primary Care Provider: Nanda Smith MD    Expected Discharge Date: 2/11/2024    Per MD:  Continue ICU care   Continue pain control   Neuro neuro check Q 1   Cervical drain by ENT   ENT planning for Trach PEG   NPO five days    Patient intubated on vent.  Not medically stable for discharge.      Reassessment (most recent)       Discharge Reassessment - 02/01/24 1102          Discharge Reassessment    Assessment Type Discharge Planning Reassessment     Did the patient's condition or plan change since previous assessment? No     Communicated JUSTYN with patient/caregiver Date not available/Unable to determine     Discharge Plan A Rehab     Discharge Plan B Long-term acute care facility (LTAC)     DME Needed Upon Discharge  other (see comments)   tbd    Transition of Care Barriers None     Why the patient remains in the hospital Requires continued medical care                   Discharge Plan A and Plan B have been determined by review of patient's clinical status, future medical and therapeutic needs, and coverage/benefits for post-acute care in coordination with multidisciplinary team members.      Izabela Estrella RN, CCRN-K, [facility]  Neuro-Critical Care   [id number]       verification Nurse: Kath Ibrahim RN

## 2024-02-06 ENCOUNTER — TELEPHONE (OUTPATIENT)
Dept: ONCOLOGY | Facility: CLINIC | Age: 79
End: 2024-02-06
Payer: MEDICARE

## 2024-02-06 NOTE — TELEPHONE ENCOUNTER
CALLED PT W/ NEXT AVAILABLE APPT (OFFERED THIS FRIDAY BUT PT WAS HESITANT TO COME IN) PT TO CALL IF HE FEELS BETTER AND WE WILL TRY TO GET IN FOR PORT FLUSH

## 2024-02-06 NOTE — TELEPHONE ENCOUNTER
Caller: Jeff Bass    Relationship to patient: Self    Best call back number: [phone]    Chief complaint: STILL HAVING COVID SYMPTOMS AND NEEDS TO RESCHEDULE     Type of visit: PORT FLUSH  AND FOLLOW UP      If rescheduling, when is the original appointment: 2-8-24

## 2024-02-27 ENCOUNTER — OFFICE VISIT (OUTPATIENT)
Dept: ONCOLOGY | Facility: CLINIC | Age: 79
End: 2024-02-27
Payer: MEDICARE

## 2024-02-27 ENCOUNTER — INFUSION (OUTPATIENT)
Dept: ONCOLOGY | Facility: HOSPITAL | Age: 79
End: 2024-02-27
Payer: MEDICARE

## 2024-02-27 VITALS
BODY MASS INDEX: 23.83 KG/M2 | OXYGEN SATURATION: 96 % | SYSTOLIC BLOOD PRESSURE: 115 MMHG | HEIGHT: 77 IN | DIASTOLIC BLOOD PRESSURE: 63 MMHG | HEART RATE: 98 BPM | WEIGHT: 201.8 LBS

## 2024-02-27 DIAGNOSIS — C91.10 RICHTER'S SYNDROME: Primary | ICD-10-CM

## 2024-02-27 DIAGNOSIS — Z45.2 ENCOUNTER FOR ADJUSTMENT OR MANAGEMENT OF VASCULAR ACCESS DEVICE: Primary | ICD-10-CM

## 2024-02-27 DIAGNOSIS — C91.10 CLL (CHRONIC LYMPHOCYTIC LEUKEMIA): ICD-10-CM

## 2024-02-27 LAB
ALBUMIN SERPL-MCNC: 3.8 G/DL (ref 3.5–5.2)
ALBUMIN/GLOB SERPL: 1.8 G/DL
ALP SERPL-CCNC: 79 U/L (ref 39–117)
ALT SERPL W P-5'-P-CCNC: <5 U/L (ref 1–41)
ANION GAP SERPL CALCULATED.3IONS-SCNC: 10.2 MMOL/L (ref 5–15)
AST SERPL-CCNC: 16 U/L (ref 1–40)
BASOPHILS # BLD AUTO: 0.06 10*3/MM3 (ref 0–0.2)
BASOPHILS NFR BLD AUTO: 1.6 % (ref 0–1.5)
BILIRUB SERPL-MCNC: 0.9 MG/DL (ref 0–1.2)
BUN SERPL-MCNC: 15 MG/DL (ref 8–23)
BUN/CREAT SERPL: 21.4 (ref 7–25)
CALCIUM SPEC-SCNC: 9.4 MG/DL (ref 8.6–10.5)
CHLORIDE SERPL-SCNC: 101 MMOL/L (ref 98–107)
CO2 SERPL-SCNC: 25.8 MMOL/L (ref 22–29)
CREAT SERPL-MCNC: 0.7 MG/DL (ref 0.76–1.27)
DEPRECATED RDW RBC AUTO: 43.7 FL (ref 37–54)
EGFRCR SERPLBLD CKD-EPI 2021: 93.1 ML/MIN/1.73
EOSINOPHIL # BLD AUTO: 0.18 10*3/MM3 (ref 0–0.4)
EOSINOPHIL NFR BLD AUTO: 4.8 % (ref 0.3–6.2)
ERYTHROCYTE [DISTWIDTH] IN BLOOD BY AUTOMATED COUNT: 14.3 % (ref 12.3–15.4)
GLOBULIN UR ELPH-MCNC: 2.1 GM/DL
GLUCOSE SERPL-MCNC: 98 MG/DL (ref 65–99)
HCT VFR BLD AUTO: 38.7 % (ref 37.5–51)
HGB BLD-MCNC: 13.3 G/DL (ref 13–17.7)
IMM GRANULOCYTES # BLD AUTO: 0.3 10*3/MM3 (ref 0–0.05)
IMM GRANULOCYTES NFR BLD AUTO: 8 % (ref 0–0.5)
LDH SERPL-CCNC: 239 U/L (ref 135–225)
LYMPHOCYTES # BLD AUTO: 1.13 10*3/MM3 (ref 0.7–3.1)
LYMPHOCYTES NFR BLD AUTO: 30.2 % (ref 19.6–45.3)
MCH RBC QN AUTO: 29.1 PG (ref 26.6–33)
MCHC RBC AUTO-ENTMCNC: 34.4 G/DL (ref 31.5–35.7)
MCV RBC AUTO: 84.7 FL (ref 79–97)
MONOCYTES # BLD AUTO: 0.41 10*3/MM3 (ref 0.1–0.9)
MONOCYTES NFR BLD AUTO: 11 % (ref 5–12)
NEUTROPHILS NFR BLD AUTO: 1.66 10*3/MM3 (ref 1.7–7)
NEUTROPHILS NFR BLD AUTO: 44.4 % (ref 42.7–76)
NRBC BLD AUTO-RTO: 0 /100 WBC (ref 0–0.2)
PLATELET # BLD AUTO: 200 10*3/MM3 (ref 140–450)
PMV BLD AUTO: 9 FL (ref 6–12)
POTASSIUM SERPL-SCNC: 3.6 MMOL/L (ref 3.5–5.2)
PROT SERPL-MCNC: 5.9 G/DL (ref 6–8.5)
RBC # BLD AUTO: 4.57 10*6/MM3 (ref 4.14–5.8)
SODIUM SERPL-SCNC: 137 MMOL/L (ref 136–145)
WBC NRBC COR # BLD AUTO: 3.74 10*3/MM3 (ref 3.4–10.8)

## 2024-02-27 PROCEDURE — 83615 LACTATE (LD) (LDH) ENZYME: CPT

## 2024-02-27 PROCEDURE — 80053 COMPREHEN METABOLIC PANEL: CPT

## 2024-02-27 PROCEDURE — 25010000002 HEPARIN LOCK FLUSH PER 10 UNITS: Performed by: INTERNAL MEDICINE

## 2024-02-27 PROCEDURE — 36591 DRAW BLOOD OFF VENOUS DEVICE: CPT

## 2024-02-27 PROCEDURE — 85025 COMPLETE CBC W/AUTO DIFF WBC: CPT

## 2024-02-27 RX ORDER — HEPARIN SODIUM (PORCINE) LOCK FLUSH IV SOLN 100 UNIT/ML 100 UNIT/ML
500 SOLUTION INTRAVENOUS AS NEEDED
OUTPATIENT
Start: 2024-02-27

## 2024-02-27 RX ORDER — HEPARIN SODIUM (PORCINE) LOCK FLUSH IV SOLN 100 UNIT/ML 100 UNIT/ML
500 SOLUTION INTRAVENOUS AS NEEDED
Status: DISCONTINUED | OUTPATIENT
Start: 2024-02-27 | End: 2024-02-27 | Stop reason: HOSPADM

## 2024-02-27 RX ORDER — SODIUM CHLORIDE 0.9 % (FLUSH) 0.9 %
10 SYRINGE (ML) INJECTION AS NEEDED
Status: DISCONTINUED | OUTPATIENT
Start: 2024-02-27 | End: 2024-02-27 | Stop reason: HOSPADM

## 2024-02-27 RX ORDER — SODIUM CHLORIDE 0.9 % (FLUSH) 0.9 %
10 SYRINGE (ML) INJECTION AS NEEDED
OUTPATIENT
Start: 2024-02-27

## 2024-02-27 RX ADMIN — Medication 10 ML: at 13:31

## 2024-02-27 RX ADMIN — Medication 500 UNITS: at 13:31

## 2024-02-27 NOTE — PROGRESS NOTES
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  Progressive disease January 2023 placed on Calquence.  Further progression with Jon's transformation and initiating CHOP Rituxan 6/14/2023, COMPLETED 10/23 5 CYCLES , ACHIEVED A VERY GOOD RESPONSE CLINICALLY, BIO CHEMICALLY BY LDH MEASUREMENT AND RADIOLOGICALLY BY PET SCAN.    HISTORY OF PRESENT ILLNESS:  On 02/27/2024, this patient returns to the office after he has had COVID respiratory infection 3 weeks ago, mostly upper respiratory tract and he was not treated with Paxlovid because he was tested late. His daughter was the source of contagion. In any event the patient is feeling good at this time. He has minimal mucoid sputum production triggered by the use of Mucinex. He has no shortness of breath, hemoptysis, or pleuritic pain. He lost substantial amount of weight during the days of COVID and his appetite is back to normal and his weight is back into improvement. In regard to his Jon's transformation of CLL, the patient has no symptoms including no adenopathy, no fevers, no chills, no sweats, no fatigue. Bowel activity is normal. Urination is normal. No rashes in the skin.      Past Medical History:   Diagnosis Date    Arthritis     Benign prostatic hyperplasia     FOLLOWED BY DR. VIVIEN PATEL    Biliary dyskinesia     Bunion of right foot     GREAT TOE    Bursitis of right hip 03/15/2018    CLL (chronic lymphocytic leukemia) 2016    FOLLOWED BY DR WALKER    Colon polyps     FOLLOWED BY DR. NEHA HAM    Constipation     Degeneration of lumbar intervertebral disc     Diverticulosis     Erectile dysfunction     Fracture of ankle 1975    GERD (gastroesophageal reflux disease)     Hallux valgus of left foot     Hyperlipidemia     MIXED    Hypertension     Inguinal hernia     Kidney stone 02/21/2009    SEEN AT [facility]    Knee swelling 2018    Localized, primary osteoarthritis     Lumbar radiculopathy      Lumbar stenosis     Lung mass     LEFT SIDE - SCAR TISSUE PER PATIENT     Osteoarthritis of right knee     Polyneuropathy     Prostate CA 03/2016    JACQUELYN 6, S/P XRT, FOLLOWED BY DR. VIVIEN PATEL, RADIATION SEEDS    PVC (premature ventricular contraction)     PT STATES WORKED UP YEARS AGO BY [facility] FOR POSSIBLE MURMUR - NO FOLLOW UP REQUIRED     RBBB     Sensory hearing loss, bilateral     Skin cancer     SQUAMOUS CELL CARCINOMA OF FACE    Substernal goiter     Thyromegaly 12/2016    ADMITTED TO [facility]    Vitamin D deficiency      Past Surgical History:   Procedure Laterality Date    BRONCHOSCOPY N/A 12/14/2016    Procedure: BRONCHOSCOPY WITH  BAL AND BIOPSY AND ENDOBRONCHIAL ULTRASOUND  AND NAVIGATION WITH BRUSHINGS AND BIOPSY AND BAL;  Surgeon: Pierre Manning MD;  Location: [facility] ENDOSCOPY;  Service:     COLONOSCOPY N/A 03/13/2019    5 MM SESSILE TUBULAR ADENOMA POLYP IN TRANSVERSE, MULTIPLE SMALL AND LARGE DIVERTICULA IN SIGMOID, RESCOPE IN 5 YRS, DR. NEHA HAM AT [facility]    COLONOSCOPY W/ POLYPECTOMY N/A 03/19/2015    3 TUBULAR ADENOMA POLYPS, 12 TUBULOVILLOUS POLYP RECTO-SIGMOID, DIVERTICULOSIS, RESCOPE IN 3 YRS, DR. NEHA HAM AT [facility]    EYE SURGERY Bilateral     CATARACT EXTRACTION WITH LENS IMPLANT, DR. GOVEA    FINGER NAIL SURGERY Right 2022    TORRES-PATEL    INGUINAL HERNIA REPAIR Left 11/02/2021    Procedure: LEFT OPEN INGUINAL HERNIA REPAIR;  Surgeon: Nixon Kolb MD;  Location: [facility] OR;  Service: General;  Laterality: Left;    PROSTATE RADIOACTIVE SEED IMPLANT N/A 09/06/2016    DR. HOA JARAMILLO AT [facility]    PROSTATE SURGERY N/A 03/11/2016    BIOPSY: ADENOCARCINOMA, DR. ZAID IBARRA    THYROID BIOPSY Bilateral 11/01/2017    NO B CELL OR T CELL LYMPHOMA, DONE AT [facility]    TOTAL KNEE ARTHROPLASTY Right 10/12/2022    Procedure: TOTAL KNEE ARTHROPLASTY;  Surgeon: Ran Rachel MD;  Location: [facility] OR OSC;  Service: Orthopedics;  Laterality: Right;    VENOUS ACCESS DEVICE  "(PORT) INSERTION Right 7/6/2023    Procedure: INSERTION VENOUS ACCESS DEVICE;  Surgeon: Nixon Kolb MD;  Location: [facility] OR [facility];  Service: General;  Laterality: Right;     Social History     Socioeconomic History    Marital status:      Spouse name: No    Years of education: College   Tobacco Use    Smoking status: Never    Smokeless tobacco: Never   Vaping Use    Vaping Use: Never used   Substance and Sexual Activity    Alcohol use: No    Drug use: Never    Sexual activity: Yes     Partners: Female     Birth control/protection: None     Family History   Problem Relation Age of Onset    Heart disease Father         Rheumatic heart disease    Hypertension Father     Osteoporosis Father     Stroke Mother     Osteoporosis Mother     No Known Problems Daughter     Malig Hyperthermia Neg Hx      Current Outpatient Medications on File Prior to Visit   Medication Sig    acyclovir (Zovirax) 400 MG tablet Take 1 tablet by mouth Daily.    ammonium lactate (AMLACTIN) 12 % cream APPLY 1 APPLICATION EXTERNALLY TWICE A DAY    calcium carbonate (TUMS) 500 MG chewable tablet Chew 2 tablets 2 (Two) Times a Day As Needed for Heartburn.    lidocaine-prilocaine (EMLA) 2.5-2.5 % cream Apply 1 application  topically to the appropriate area as directed Every 2 (Two) Hours As Needed for Mild Pain.    Melatonin 10 MG tablet Take 1 tablet by mouth Every Night.     Current Facility-Administered Medications on File Prior to Visit   Medication    [DISCONTINUED] heparin injection 500 Units    [DISCONTINUED] sodium chloride 0.9 % flush 10 mL     No Known Allergies        VITAL SIGNS:  Vitals:    02/27/24 1426   BP: 115/63   Pulse: 98   SpO2: 96%   Weight: 91.5 kg (201 lb 12.8 oz)   Height: 195.6 cm (77\")          PHYSICAL EXAMINATION:             GENERAL:  Well-developed, Patient  in no acute distress.   SKIN:  Warm, dry ,NO purpura ,no rash.  HEENT:  Pupils were equal and reactive to light and accomodation, conjunctivae " noninjected,  normal visual acuity.   NECK:  Supple with good range of motion; no thyromegaly , no JVD or bruits,.No carotid artery pain, no carotid abnormal pulsation   LYMPHATICS:  No cervical, NO supraclavicular, NO axillary, NO inguinal adenopathies.  CARDIAC   normal rate , regular rhythm, without murmur,NO rubs NO S3 NO S4   LUNGS: normal breath sounds bilateral, no wheezing, NO rhonchi, NO crackles ,NO rubs.  VASCULAR VENOUS: no cyanosis, NO collateral circulation, NO varicosities, NO edema, NO palpable cords, NO pain,NO erythema, NO pigmentation of the skin.  ABDOMEN:  Soft, NO pain,no hepatomegaly, no splenomegaly,no masses, no ascites, no collateral circulation,no distention.  EXTREMITIES  AND SPINE:  No clubbing, no cyanosis ,no deformities , no pain .No kyphosis,  no pain in spine, no pain in ribs , no pain in pelvic bone.  NEUROLOGICAL:  Patient was awake, alert, oriented to time, person and place.      LABORATORY DATA:  Results from last 7 days   Lab Units 02/27/24  1331   WBC 10*3/mm3 3.74   NEUTROS ABS 10*3/mm3 1.66*   HEMOGLOBIN g/dL 13.3   HEMATOCRIT % 38.7   PLATELETS 10*3/mm3 200     Results from last 7 days   Lab Units 02/27/24  1331   SODIUM mmol/L 137   POTASSIUM mmol/L 3.6   CHLORIDE mmol/L 101   CO2 mmol/L 25.8   BUN mg/dL 15   CREATININE mg/dL 0.70*   CALCIUM mg/dL 9.4   ALBUMIN g/dL 3.8   BILIRUBIN mg/dL 0.9   ALK PHOS U/L 79   ALT (SGPT) U/L <5   AST (SGOT) U/L 16   GLUCOSE mg/dL 98         Flow Cytometry, Blood (12/29/2023 14:01)         ASSESSMENT:   1.  History of CLL, now with Jon's transformation  Status posttreatment with Bendamustine/Rituxan in 2016  Patient did have severe reaction to initial Rituxan dose requiring hospitalization or completion.  Did well thereafter.  12/30/2022 WBC remains normal at 10.6, 31% lymphs.  Platelets normal at 105,000.  No B symptoms or adenopathy.  No suggestion of recurrent disease.  Hemoglobin has acutely dropped however down to 11.9 (see  further discussion below).  On 01/13/2023 his white count, hemoglobin and platelets are not changing. His total lymphocyte count is very minimal and I do believe that his leukemia remains very quiet on clinical grounds with no need for intervention.  1/13/2023 CT of the abdomen pelvis notes several enlarged peripancreatic and portal lymph nodes measuring up to 2.1 cm which are new.  New retroperitoneal lymphadenopathy with a musa mass to the left of the aorta measuring 3.0 x 3.5 cm.  Lymph nodes near the level of the iliac bifurcation measuring up to 2.1.  Multiple enlarged lymph nodes in the pelvis along both iliac chains measuring up to 1.1 x 2.7 cm in the left 1.3 x 4.4 cm in the right.  These lymph nodes are in the splenic enlargement area likely related to CLL.  1/25/2023 PET scan noting increase in size of his in the adenopathy in the chest abdomen pelvis as well as the chest.  Severe splenomegaly which is intensely FDG avid and highly concerning for leukemic involvement.  Plans for Calquence 200 mg twice daily which was initiated 2/8/2023  Molecular analysis of the peripheral blood flow cytometry has been requested from the [facility] in regard the FISH testing. Also we ordered IgH testing.   IgVH unmutated. This makes his prognosis a little bit worse in the long run  After 2 months of Calquence, clinical improvement in splenomegaly.  5/8/2020 3 repeat PET scan noting scattered adenopathy within the neck chest abdomen pelvis as before.  A few index lesions within the neck and chest are grossly stable.  Severe hypermetabolic splenomegaly as before the degree of FDG uptake appears minimally decreased.  Reevaluation 6/2/2023 with profound fatigue, inability to accomplish ADLs.  Worsening leukocytosis with white count of 23,000 and atypical mononuclear cells in circulation by peripheral blood examination.  Worsening thrombocytopenia and progressive splenomegaly  6/2/2023 flow cytometry on peripheral blood flow  "noting immunophenotype identifies a CD5 positive monoclonal kappa B-cell population representing 67% of total events.  Pathologist comment: Although the phenotype is similar to the patient's previous study, the percentage of disease is more than doubled.  The findings are worrisome for large cell (Jon's) or polymorphic transformation from the previous CLL.  6/7/2023 bone marrow biopsy including flow cytometry A monoclonal population is identified with lymphocyte region containing 32% of total events and the following immunophenotype positive CD46, CD6, bright CD19, bright CD20, HLA-DR bright surface kappa light chain, dim partial CD38   Negative CD4, CD8, CD10, CD11c, CD23. Pathology hypercellular bone marrow (50%) with involvement by malignant lymphoma with presenting 30% of bone marrow cellularity  Per Dr. Schroeder \"the pathology of the bone marrow recently done, even though does not document by flow cytometry any major changes consistent with his CLL, morphologically to my eyes, and I disagree with the Pathologist in certain fashion, shows 2 major populations of cells, number 1 precursors of red cells that were very obvious, and the second population was very monotonous population of largely looking lymphocytes that were very worrisome for diffuse large cell non-Hodgkin's lymphoma.\"  Recommendations for hospitalization, PICC line placement and initiation of CHOP Rituxan  6/12/2023, baseline echocardiogram with normal ejection fraction of 65%  6/13/2023, prior to initiation of chemotherapy WBC 53.8, hemoglobin 7.7, platelets 62,000.  LDH elevated at 1900  Rituxan initiated 6/23/2023  CHOP given 6/14/2023.  Vincristine given at a flat dose of 1 mg.  G-CSF with Neupogen 480 mcg given x8 days following cycle 1  Very dramatic response to first cycle of chemotherapy with decrease in LDH from 2000-400s 6/26/2023.  Also improvement in WBC  Technically due for cycle 2 chemotherapy today, 7/5/2023.  However, this will be " delayed 1 week as the patient is having a Mediport placed tomorrow.  Continued improvement in LDH indicating ongoing response with  today.  On 07/11/2023 the patient was further reviewed. He has had a dramatic clinical comeback in regard to his energy level, appetite, and inability to function. He has been able to become self-independent at home and his daughter is giving him all the support that he needs. He is eating extremely well. He has lost a lot of weight related to water loss and hopefully he will start to gain muscle mass again in the next several weeks.   On 08/01/2023 the patient was reviewed. Clinically he is dramatically better. His spleen has dramatically decreased.   7/25/2023 CT of the chest abdomen pelvis noting improvement in the spleen with stable left periaortic adenopathy and improvement in yasmeen hepatic adenopathy.  In the long run the goal will be to try to complete 6 cycles of chemotherapy and then put him on maintenance. I am planning also upon completion of 6 cycles to repeat his bone marrow testing.  8/8/2023: We will proceed with Cycle 3 R-CHOP and have patient return weekly for lab review to monitor counts closely. Patient to receive additional premedications as planned.   8/29/2023 patient returns the office today for cycle 4 R-CHOP.    9/19/2023 due for cycle 5 CHOP Rituxan.  This was delayed due to tachycardia and need for echocardiogram  9/22/2023 PET scan noting improvement in FDG activity of this bleeding though persistent severe splenomegaly.  Echocardiogram has not shown any detrimental action of Adriamycin and cardiac function after reviewing this and posted above, the patient will proceed with 1 more cycle of CHOP-Rituxan at the previous dose. Strong consideration will be given to go back to medicine that he was taking before his Jon's transformation.  Evaluation by Dr. Schroeder on 11/02/2023, the patient has recovered completely from the toxicity of chemotherapy with  CHOP and Rituxan. He is functioning at a much better level with an ECOG performance status close to 0 going back to the office, walking around, and doing all sorts of activities with no limitations. He has no pain, no bleeding. His LDH was a marker of his disease process being in the 2000 category at the time of induction of his treatment. Therefore, according to recommendations, the patient will be watched in absence of any other intervention.  Dr. Schroeder is not going to proceed with any Brukinsa administration at this time.  Monthly follow-up 11/30/2023 with a slight change in labs with WBC declined at 2.86, hemoglobin normal at 13.2, platelets 100,000.  LDH has slightly increased at 250.  This was all reviewed with Dr. Schroeder who recommended flow cytometry though continuing in observation.  Clinically, the patient has no B symptoms and continues to feel very well.  On 12/29/2023, the patient feels substantially better. I have no evidence of palpable adenopathy or hepatosplenomegaly on clinical examination. His neutrophil count is 700. He still has an excess of lymphocytes. His LDH is pending. A month ago we obtained a peripheral blood flow cytometry that failed to document any monoclonal population of lymphocytes. We requested another one today and this will be reviewed with the patient on the telephone on Monday. I am afraid that maybe his disease is slowly progressing back and that is why the need for flow cytometry has arose today. In any event we will review his peripheral blood. We will review his LDH. Given the circumstances, the patient will be advised and called at home. Depending of what the flow shows and the peripheral blood analysis shows, we will decide if the patient needs to proceed with further intervention including consideration of Gazyva and chlorambucil for example.     Plan to see him back in the office in a month but this could change depending of the circumstances and the flow cytometry  report.    On 02/27/2024, the patient was further reviewed. Clinically, from the point of view of his Jon's transformation of CLL, he looks terrific. He has no peripheral adenopathy, no hepatosplenomegaly, no B symptoms. Very much improvement in regard to energy level even though he had COVID infection 3 weeks ago. Most importantly his white count, hemoglobin and platelets are doing better. His white count differential is better and on 12/29/2023 I performed a new flow cytometry in peripheral blood that failed to document any monoclonal population of lymphocytes or blasts. That is good news. Therefore, I advised him to remain in observation, returning to see us back in 6 weeks with repeat CBC and CMP and LDH. The LDH has been a great tool to follow up on his disease process and remains almost into the normal limits. At the time of his Jon's transformation and before chemotherapy initiation, the LDH was in the 2000 category.        2.  History of prostate cancer  Treated at the [facility]  Most recent PSA April 2022 = 0.203  On 01/13/2023 he has minimal urinary symptomatology comparing his PSA with 3 years ago was 0.8 today, actually a few days ago was 0.1. I doubt that all of the symptoms that he has are associated with prostate cancer. The blood in the urine has a different significance.   On 01/18/2023 no issues pertinent to his prostate cancer. I see the seeds in his prostatic bed on the CT scan. His urinalysis is very much clean and his PSA has remained stable. No activity of this entity at this point.   On 03/17/2023 no issues pertinent to his previous history of prostate cancer. His PSA has been measured and has remained normal.  On 11/30/2023, no issues pertinent.  On 12/29/2023, no issues pertinent to this at this time.    On 02/27/2024, no issues pertinent.      3.  Venous access  Right upper extremity PICC line placed for cycle 1 chemotherapy.  Follow Dr. Kolb for Mediport placement  tomorrow, 7/6/2023  With having Mediport placed tomorrow, we will pull the PICC line today, right upper extremity PICC line removed per nursing  Port in right subclavian position looking extremely well. I will prescribe Emla cream for future access to minimize pain.  On 8/09/2023 his port is functioning perfectly fine with no pain.  On 09/27/2023 his port remains functional with no difficulties whatsoever and will be utilized for further administration of chemotherapy in the next week.  On 11/02/2023, port will remain maintained on monthly basis with port flush.  On 12/29/2023, port remains efficient and ready for access. It has been flushed today.     On 02/27/2024, her port remains functional and has been flushed.      4.  Multifactorial anemia secondary to underlying malignancy and chemotherapy  Transfusion support as needed  Hemoglobin today is improved at 9.9  On 07/11/2023 anemia is self correcting now that he probably has a much better clean bone marrow able to trigger normal erythropoiesis.  On 08/01/2023 anemia is substantially improved.  8/29/2023: Hemoglobin slightly declined at 11.8.  11/30/2023 hemoglobin is normal at 13.2  On 12/29/2023, his hemoglobin has further improved and normalized now into the 14 g category. No notion of hemolysis or blood loss. This probably indicates proper bone marrow function as well.    On 02/27/2024, his hemoglobin is improving. MCV remains unchanged. No notion of blood loss or hemolysis.      5.  Prophylaxis  Continue on acyclovir 400 mg once daily  Continuing allopurinol 300 mg daily  On 07/11/2023 he remains on allopurinol and acyclovir with no viral infections. Eventually allopurinol will be discontinued before the 3rd cycle of chemotherapy.  On 08/01/2023 the patient remains on acyclovir prophylactically.  On 09/19/2023 he remains on acyclovir. The patient is not receiving any Bactrim.   On 12/29/2023, he remains on acyclovir prophylactically. No fever blister  developed or any other lesion.    On 02/27/2024, I asked him to remain on acyclovir.      6.  Bilateral lower extremity swelling  Related to anasarca and immobility  Patient is not a good candidate for diuretics in light of his borderline hypotension and immobility, he is a falls risk.  Status compression and elevation.   On 07/11/2023 most of the swelling is resolved now not with water medication but with just proper nutrition and mobilization. I expect that this swelling will be completely gone for the next cycle. Given the persistency of the swelling as posted above I readjusted his chemotherapy administration medications to account for this.  On 08/01/2023 the patient remains with some swelling in his lower extremities. Encouraged elastic support that he does not want to wear.  8/8/2023: Bilateral lower extremity has improved. Right right does appear more edematous than the left lower extremity. He denies any pain or tenderness. No palpable cords on exam. He has tried compression stockings in the past. Encouraged to utilize again.   11/30/2023 he was without lower extremity swelling    On 12/29/2023, complete resolution of the swelling in his lower extremities  by his nutrition became completely back to normality. No swelling in legs whatsoever today.    On 02/27/2024, complete resolution of the swelling in his lower extremities.    7.On 02/27/2024, 3 weeks ago COVID infection, upper respiratory tract with no consequences. The patient lost his appetite and lost weight. Now his weight is perking up again. His pulmonary auscultation today is completely normal. He has a minimal hacking cough, clear sputum production. I asked him to stop Mucinex that in my opinion is a waste of medication.    I will be reviewing him back in 6 weeks with a port flush and CBC, CMP, LDH at that time.       Gerry Schroeder MD  02/27/2024

## 2024-03-27 ENCOUNTER — TELEPHONE (OUTPATIENT)
Dept: ONCOLOGY | Facility: CLINIC | Age: 79
End: 2024-03-27
Payer: MEDICARE

## 2024-03-27 NOTE — TELEPHONE ENCOUNTER
Called patient who states he has been having some stomach cramping/pain which started on Saturday. States it's around a 3 or 4. Somewhat formed, but mostly loose green stools. Patient denies starting on any new medications. He has been eating less because of this, but states he is not dehydrated at this time. Denies being dizziness/light-headed. Drinking plenty of fluids. No fever. Pt refuses urgent care at this time. Asked him to call back if symptoms worsen and to call back in a few days with an update and to make sure symptoms resolve. Pt v/u. No other needs identified at this time. Anneliese Torres, RN

## 2024-03-27 NOTE — TELEPHONE ENCOUNTER
Provider: Dr. Gerry Schroeder  Caller: Jeff King  Relationship to Patient: Self  Call Back Phone Number: [phone]  Reason for Call: Pt has stomach cramps. Please call to advise.

## 2024-03-29 ENCOUNTER — HOSPITAL ENCOUNTER (INPATIENT)
Facility: HOSPITAL | Age: 79
LOS: 8 days | Discharge: HOME | DRG: 823 | End: 2024-04-06
Attending: EMERGENCY MEDICINE | Admitting: INTERNAL MEDICINE
Payer: MEDICARE

## 2024-03-29 ENCOUNTER — APPOINTMENT (OUTPATIENT)
Dept: CT IMAGING | Facility: HOSPITAL | Age: 79
DRG: 823 | End: 2024-03-29
Payer: MEDICARE

## 2024-03-29 ENCOUNTER — APPOINTMENT (OUTPATIENT)
Dept: GENERAL RADIOLOGY | Facility: HOSPITAL | Age: 79
DRG: 823 | End: 2024-03-29
Payer: MEDICARE

## 2024-03-29 DIAGNOSIS — E87.20 LACTIC ACID INCREASED: ICD-10-CM

## 2024-03-29 DIAGNOSIS — R10.84 GENERALIZED ABDOMINAL PAIN: ICD-10-CM

## 2024-03-29 DIAGNOSIS — R16.1 SPLENOMEGALY: ICD-10-CM

## 2024-03-29 DIAGNOSIS — C91.10 RICHTER'S SYNDROME: ICD-10-CM

## 2024-03-29 DIAGNOSIS — C91.10 CLL (CHRONIC LYMPHOCYTIC LEUKEMIA): ICD-10-CM

## 2024-03-29 DIAGNOSIS — D69.6 THROMBOCYTOPENIA: ICD-10-CM

## 2024-03-29 DIAGNOSIS — D72.829 LEUKOCYTOSIS, UNSPECIFIED TYPE: ICD-10-CM

## 2024-03-29 DIAGNOSIS — F51.01 PRIMARY INSOMNIA: Primary | ICD-10-CM

## 2024-03-29 PROBLEM — R10.9 ABDOMINAL PAIN: Status: ACTIVE | Noted: 2024-03-29

## 2024-03-29 PROBLEM — C80.1 ANEMIA ASSOCIATED WITH MALIGNANT NEOPLASTIC DISEASE: Status: ACTIVE | Noted: 2024-03-29

## 2024-03-29 PROBLEM — D63.0 ANEMIA ASSOCIATED WITH MALIGNANT NEOPLASTIC DISEASE: Status: ACTIVE | Noted: 2024-03-29

## 2024-03-29 PROBLEM — R11.0 NAUSEA: Status: ACTIVE | Noted: 2024-03-29

## 2024-03-29 LAB
ALBUMIN SERPL-MCNC: 3.8 G/DL (ref 3.5–5.2)
ALBUMIN/GLOB SERPL: 2.1 G/DL
ALP SERPL-CCNC: 74 U/L (ref 39–117)
ALT SERPL W P-5'-P-CCNC: 18 U/L (ref 1–41)
ANION GAP SERPL CALCULATED.3IONS-SCNC: 15.2 MMOL/L (ref 5–15)
ANION GAP SERPL CALCULATED.3IONS-SCNC: 18.2 MMOL/L (ref 5–15)
ANISOCYTOSIS BLD QL: ABNORMAL
AST SERPL-CCNC: 42 U/L (ref 1–40)
BILIRUB SERPL-MCNC: 1.6 MG/DL (ref 0–1.2)
BILIRUB UR QL STRIP: NEGATIVE
BUN SERPL-MCNC: 20 MG/DL (ref 8–23)
BUN SERPL-MCNC: 23 MG/DL (ref 8–23)
BUN/CREAT SERPL: 24.5 (ref 7–25)
BUN/CREAT SERPL: 25 (ref 7–25)
CALCIUM SPEC-SCNC: 8.4 MG/DL (ref 8.6–10.5)
CALCIUM SPEC-SCNC: 9 MG/DL (ref 8.6–10.5)
CHLORIDE SERPL-SCNC: 91 MMOL/L (ref 98–107)
CHLORIDE SERPL-SCNC: 93 MMOL/L (ref 98–107)
CLARITY UR: CLEAR
CO2 SERPL-SCNC: 21.8 MMOL/L (ref 22–29)
CO2 SERPL-SCNC: 22.8 MMOL/L (ref 22–29)
COLOR UR: YELLOW
CREAT SERPL-MCNC: 0.8 MG/DL (ref 0.76–1.27)
CREAT SERPL-MCNC: 0.94 MG/DL (ref 0.76–1.27)
D-LACTATE SERPL-SCNC: 3.1 MMOL/L (ref 0.5–2)
D-LACTATE SERPL-SCNC: 3.4 MMOL/L (ref 0.5–2)
D-LACTATE SERPL-SCNC: 3.6 MMOL/L (ref 0.5–2)
D-LACTATE SERPL-SCNC: 3.8 MMOL/L (ref 0.5–2)
D-LACTATE SERPL-SCNC: 5 MMOL/L (ref 0.5–2)
DEPRECATED RDW RBC AUTO: 44.8 FL (ref 37–54)
DEPRECATED RDW RBC AUTO: 44.8 FL (ref 37–54)
EGFRCR SERPLBLD CKD-EPI 2021: 81.9 ML/MIN/1.73
EGFRCR SERPLBLD CKD-EPI 2021: 89.5 ML/MIN/1.73
ELLIPTOCYTES BLD QL SMEAR: ABNORMAL
ERYTHROCYTE [DISTWIDTH] IN BLOOD BY AUTOMATED COUNT: 14.8 % (ref 12.3–15.4)
ERYTHROCYTE [DISTWIDTH] IN BLOOD BY AUTOMATED COUNT: 15 % (ref 12.3–15.4)
GEN 5 2HR TROPONIN T REFLEX: 21 NG/L
GLOBULIN UR ELPH-MCNC: 1.8 GM/DL
GLUCOSE SERPL-MCNC: 95 MG/DL (ref 65–99)
GLUCOSE SERPL-MCNC: 98 MG/DL (ref 65–99)
GLUCOSE UR STRIP-MCNC: NEGATIVE MG/DL
HCT VFR BLD AUTO: 32.7 % (ref 37.5–51)
HCT VFR BLD AUTO: 35.9 % (ref 37.5–51)
HGB BLD-MCNC: 11.2 G/DL (ref 13–17.7)
HGB BLD-MCNC: 12.3 G/DL (ref 13–17.7)
HGB UR QL STRIP.AUTO: NEGATIVE
INR PPP: 1.28 (ref 0.9–1.1)
KETONES UR QL STRIP: ABNORMAL
LDH SERPL-CCNC: >2500 U/L (ref 135–225)
LEUKOCYTE ESTERASE UR QL STRIP.AUTO: NEGATIVE
LIPASE SERPL-CCNC: 8 U/L (ref 13–60)
LYMPHOCYTES # BLD MANUAL: 12.6 10*3/MM3 (ref 0.7–3.1)
LYMPHOCYTES # BLD MANUAL: 3.63 10*3/MM3 (ref 0.7–3.1)
LYMPHOCYTES NFR BLD MANUAL: 31.3 % (ref 5–12)
LYMPHOCYTES NFR BLD MANUAL: 8 % (ref 5–12)
MAGNESIUM SERPL-MCNC: 1.7 MG/DL (ref 1.6–2.4)
MAGNESIUM SERPL-MCNC: 1.7 MG/DL (ref 1.6–2.4)
MCH RBC QN AUTO: 28.2 PG (ref 26.6–33)
MCH RBC QN AUTO: 28.3 PG (ref 26.6–33)
MCHC RBC AUTO-ENTMCNC: 34.3 G/DL (ref 31.5–35.7)
MCHC RBC AUTO-ENTMCNC: 34.3 G/DL (ref 31.5–35.7)
MCV RBC AUTO: 82.3 FL (ref 79–97)
MCV RBC AUTO: 82.6 FL (ref 79–97)
MONOCYTES # BLD: 1.21 10*3/MM3 (ref 0.1–0.9)
MONOCYTES # BLD: 4.87 10*3/MM3 (ref 0.1–0.9)
MYELOCYTES NFR BLD MANUAL: 3 % (ref 0–0)
NEUTROPHILS # BLD AUTO: 0.91 10*3/MM3 (ref 1.7–7)
NEUTROPHILS # BLD AUTO: 7.08 10*3/MM3 (ref 1.7–7)
NEUTROPHILS NFR BLD MANUAL: 45.5 % (ref 42.7–76)
NEUTROPHILS NFR BLD MANUAL: 6 % (ref 42.7–76)
NITRITE UR QL STRIP: NEGATIVE
NT-PROBNP SERPL-MCNC: 2957 PG/ML (ref 0–1800)
PH UR STRIP.AUTO: 6 [PH] (ref 5–8)
PLAT MORPH BLD: NORMAL
PLAT MORPH BLD: NORMAL
PLATELET # BLD AUTO: 67 10*3/MM3 (ref 140–450)
PLATELET # BLD AUTO: 68 10*3/MM3 (ref 140–450)
PMV BLD AUTO: 9.2 FL (ref 6–12)
PMV BLD AUTO: 9.4 FL (ref 6–12)
POTASSIUM SERPL-SCNC: 3.2 MMOL/L (ref 3.5–5.2)
POTASSIUM SERPL-SCNC: 3.3 MMOL/L (ref 3.5–5.2)
POTASSIUM SERPL-SCNC: 3.5 MMOL/L (ref 3.5–5.2)
PROCALCITONIN SERPL-MCNC: 0.29 NG/ML (ref 0–0.25)
PROT SERPL-MCNC: 5.6 G/DL (ref 6–8.5)
PROT UR QL STRIP: ABNORMAL
PROTHROMBIN TIME: 16.2 SECONDS (ref 11.7–14.2)
RBC # BLD AUTO: 3.96 10*6/MM3 (ref 4.14–5.8)
RBC # BLD AUTO: 4.36 10*6/MM3 (ref 4.14–5.8)
RBC MORPH BLD: NORMAL
SODIUM SERPL-SCNC: 131 MMOL/L (ref 136–145)
SODIUM SERPL-SCNC: 131 MMOL/L (ref 136–145)
SP GR UR STRIP: 1.03 (ref 1–1.03)
TROPONIN T DELTA: 1 NG/L
TROPONIN T SERPL HS-MCNC: 20 NG/L
URATE SERPL-MCNC: 9.7 MG/DL (ref 3.4–7)
UROBILINOGEN UR QL STRIP: ABNORMAL
VARIANT LYMPHS NFR BLD MANUAL: 13 % (ref 0–5)
VARIANT LYMPHS NFR BLD MANUAL: 15.2 % (ref 19.6–45.3)
VARIANT LYMPHS NFR BLD MANUAL: 70 % (ref 19.6–45.3)
VARIANT LYMPHS NFR BLD MANUAL: 8.1 % (ref 0–5)
WBC MORPH BLD: NORMAL
WBC MORPH BLD: NORMAL
WBC NRBC COR # BLD AUTO: 15.18 10*3/MM3 (ref 3.4–10.8)
WBC NRBC COR # BLD AUTO: 15.57 10*3/MM3 (ref 3.4–10.8)

## 2024-03-29 PROCEDURE — 81003 URINALYSIS AUTO W/O SCOPE: CPT | Performed by: EMERGENCY MEDICINE

## 2024-03-29 PROCEDURE — 71045 X-RAY EXAM CHEST 1 VIEW: CPT

## 2024-03-29 PROCEDURE — 87040 BLOOD CULTURE FOR BACTERIA: CPT | Performed by: EMERGENCY MEDICINE

## 2024-03-29 PROCEDURE — 83605 ASSAY OF LACTIC ACID: CPT | Performed by: EMERGENCY MEDICINE

## 2024-03-29 PROCEDURE — 84145 PROCALCITONIN (PCT): CPT | Performed by: EMERGENCY MEDICINE

## 2024-03-29 PROCEDURE — 83690 ASSAY OF LIPASE: CPT | Performed by: EMERGENCY MEDICINE

## 2024-03-29 PROCEDURE — 25010000002 PIPERACILLIN SOD-TAZOBACTAM PER 1 G: Performed by: EMERGENCY MEDICINE

## 2024-03-29 PROCEDURE — 25810000003 SODIUM CHLORIDE 0.9 % SOLUTION: Performed by: EMERGENCY MEDICINE

## 2024-03-29 PROCEDURE — 88184 FLOWCYTOMETRY/ TC 1 MARKER: CPT | Performed by: INTERNAL MEDICINE

## 2024-03-29 PROCEDURE — 74177 CT ABD & PELVIS W/CONTRAST: CPT

## 2024-03-29 PROCEDURE — 85007 BL SMEAR W/DIFF WBC COUNT: CPT | Performed by: INTERNAL MEDICINE

## 2024-03-29 PROCEDURE — 84132 ASSAY OF SERUM POTASSIUM: CPT | Performed by: INTERNAL MEDICINE

## 2024-03-29 PROCEDURE — 83880 ASSAY OF NATRIURETIC PEPTIDE: CPT | Performed by: INTERNAL MEDICINE

## 2024-03-29 PROCEDURE — 88185 FLOWCYTOMETRY/TC ADD-ON: CPT

## 2024-03-29 PROCEDURE — 83735 ASSAY OF MAGNESIUM: CPT | Performed by: EMERGENCY MEDICINE

## 2024-03-29 PROCEDURE — 85007 BL SMEAR W/DIFF WBC COUNT: CPT | Performed by: EMERGENCY MEDICINE

## 2024-03-29 PROCEDURE — 99291 CRITICAL CARE FIRST HOUR: CPT

## 2024-03-29 PROCEDURE — 85610 PROTHROMBIN TIME: CPT | Performed by: EMERGENCY MEDICINE

## 2024-03-29 PROCEDURE — 84550 ASSAY OF BLOOD/URIC ACID: CPT | Performed by: INTERNAL MEDICINE

## 2024-03-29 PROCEDURE — 85025 COMPLETE CBC W/AUTO DIFF WBC: CPT | Performed by: EMERGENCY MEDICINE

## 2024-03-29 PROCEDURE — 25010000002 ONDANSETRON PER 1 MG: Performed by: INTERNAL MEDICINE

## 2024-03-29 PROCEDURE — 25010000002 ONDANSETRON PER 1 MG: Performed by: EMERGENCY MEDICINE

## 2024-03-29 PROCEDURE — 25010000002 FENTANYL CITRATE (PF) 50 MCG/ML SOLUTION: Performed by: EMERGENCY MEDICINE

## 2024-03-29 PROCEDURE — 83615 LACTATE (LD) (LDH) ENZYME: CPT | Performed by: INTERNAL MEDICINE

## 2024-03-29 PROCEDURE — 36415 COLL VENOUS BLD VENIPUNCTURE: CPT | Performed by: EMERGENCY MEDICINE

## 2024-03-29 PROCEDURE — 25010000002 HYDROMORPHONE PER 4 MG: Performed by: INTERNAL MEDICINE

## 2024-03-29 PROCEDURE — 25510000001 IOPAMIDOL 61 % SOLUTION: Performed by: EMERGENCY MEDICINE

## 2024-03-29 PROCEDURE — 99223 1ST HOSP IP/OBS HIGH 75: CPT | Performed by: INTERNAL MEDICINE

## 2024-03-29 PROCEDURE — 85025 COMPLETE CBC W/AUTO DIFF WBC: CPT | Performed by: INTERNAL MEDICINE

## 2024-03-29 PROCEDURE — 25010000002 SODIUM CHLORIDE 0.9 % WITH KCL 20 MEQ 20-0.9 MEQ/L-% SOLUTION: Performed by: INTERNAL MEDICINE

## 2024-03-29 PROCEDURE — 99222 1ST HOSP IP/OBS MODERATE 55: CPT | Performed by: SURGERY

## 2024-03-29 PROCEDURE — 83735 ASSAY OF MAGNESIUM: CPT | Performed by: INTERNAL MEDICINE

## 2024-03-29 PROCEDURE — 80053 COMPREHEN METABOLIC PANEL: CPT | Performed by: EMERGENCY MEDICINE

## 2024-03-29 PROCEDURE — 25010000002 PROCHLORPERAZINE 10 MG/2ML SOLUTION: Performed by: INTERNAL MEDICINE

## 2024-03-29 PROCEDURE — 25010000002 METOCLOPRAMIDE PER 10 MG: Performed by: INTERNAL MEDICINE

## 2024-03-29 PROCEDURE — 84484 ASSAY OF TROPONIN QUANT: CPT | Performed by: EMERGENCY MEDICINE

## 2024-03-29 PROCEDURE — 25010000002 PIPERACILLIN SOD-TAZOBACTAM PER 1 G: Performed by: INTERNAL MEDICINE

## 2024-03-29 PROCEDURE — 88182 CELL MARKER STUDY: CPT

## 2024-03-29 RX ORDER — SODIUM CHLORIDE AND POTASSIUM CHLORIDE 150; 900 MG/100ML; MG/100ML
100 INJECTION, SOLUTION INTRAVENOUS CONTINUOUS
Status: DISCONTINUED | OUTPATIENT
Start: 2024-03-29 | End: 2024-03-31

## 2024-03-29 RX ORDER — SODIUM CHLORIDE 0.9 % (FLUSH) 0.9 %
10 SYRINGE (ML) INJECTION AS NEEDED
Status: DISCONTINUED | OUTPATIENT
Start: 2024-03-29 | End: 2024-04-06 | Stop reason: HOSPADM

## 2024-03-29 RX ORDER — ONDANSETRON 2 MG/ML
4 INJECTION INTRAMUSCULAR; INTRAVENOUS ONCE
Status: COMPLETED | OUTPATIENT
Start: 2024-03-29 | End: 2024-03-29

## 2024-03-29 RX ORDER — AMOXICILLIN AND CLAVULANATE POTASSIUM 875; 125 MG/1; MG/1
1 TABLET, FILM COATED ORAL 2 TIMES DAILY
COMMUNITY
End: 2024-03-29

## 2024-03-29 RX ORDER — FAMOTIDINE 20 MG/1
20 TABLET, FILM COATED ORAL DAILY
COMMUNITY

## 2024-03-29 RX ORDER — HYDROMORPHONE HYDROCHLORIDE 1 MG/ML
0.5 INJECTION, SOLUTION INTRAMUSCULAR; INTRAVENOUS; SUBCUTANEOUS
Status: DISPENSED | OUTPATIENT
Start: 2024-03-29 | End: 2024-04-03

## 2024-03-29 RX ORDER — NITROGLYCERIN 0.4 MG/1
0.4 TABLET SUBLINGUAL
Status: DISCONTINUED | OUTPATIENT
Start: 2024-03-29 | End: 2024-03-30

## 2024-03-29 RX ORDER — SODIUM CHLORIDE 9 MG/ML
40 INJECTION, SOLUTION INTRAVENOUS AS NEEDED
Status: DISCONTINUED | OUTPATIENT
Start: 2024-03-29 | End: 2024-04-06 | Stop reason: HOSPADM

## 2024-03-29 RX ORDER — ZOLPIDEM TARTRATE 5 MG/1
5 TABLET ORAL NIGHTLY PRN
Status: DISCONTINUED | OUTPATIENT
Start: 2024-03-29 | End: 2024-03-30

## 2024-03-29 RX ORDER — FENTANYL CITRATE 50 UG/ML
75 INJECTION, SOLUTION INTRAMUSCULAR; INTRAVENOUS ONCE
Status: COMPLETED | OUTPATIENT
Start: 2024-03-29 | End: 2024-03-29

## 2024-03-29 RX ORDER — ENOXAPARIN SODIUM 100 MG/ML
40 INJECTION SUBCUTANEOUS DAILY
Status: DISCONTINUED | OUTPATIENT
Start: 2024-03-29 | End: 2024-03-29

## 2024-03-29 RX ORDER — PRAVASTATIN SODIUM 20 MG
20 TABLET ORAL DAILY
COMMUNITY

## 2024-03-29 RX ORDER — CHOLECALCIFEROL (VITAMIN D3) 125 MCG
5 CAPSULE ORAL NIGHTLY PRN
Status: DISCONTINUED | OUTPATIENT
Start: 2024-03-29 | End: 2024-04-06 | Stop reason: HOSPADM

## 2024-03-29 RX ORDER — SODIUM CHLORIDE 9 MG/ML
125 INJECTION, SOLUTION INTRAVENOUS CONTINUOUS
Status: DISCONTINUED | OUTPATIENT
Start: 2024-03-29 | End: 2024-03-29

## 2024-03-29 RX ORDER — NALOXONE HCL 0.4 MG/ML
0.4 VIAL (ML) INJECTION
Status: DISCONTINUED | OUTPATIENT
Start: 2024-03-29 | End: 2024-04-06 | Stop reason: HOSPADM

## 2024-03-29 RX ORDER — PROCHLORPERAZINE EDISYLATE 5 MG/ML
10 INJECTION INTRAMUSCULAR; INTRAVENOUS EVERY 6 HOURS PRN
Status: DISCONTINUED | OUTPATIENT
Start: 2024-03-29 | End: 2024-04-06

## 2024-03-29 RX ORDER — SODIUM CHLORIDE 0.9 % (FLUSH) 0.9 %
10 SYRINGE (ML) INJECTION EVERY 12 HOURS SCHEDULED
Status: DISCONTINUED | OUTPATIENT
Start: 2024-03-29 | End: 2024-04-06 | Stop reason: HOSPADM

## 2024-03-29 RX ORDER — POTASSIUM CHLORIDE 750 MG/1
40 TABLET, FILM COATED, EXTENDED RELEASE ORAL ONCE
Status: COMPLETED | OUTPATIENT
Start: 2024-03-29 | End: 2024-03-29

## 2024-03-29 RX ORDER — METOCLOPRAMIDE HYDROCHLORIDE 5 MG/ML
5 INJECTION INTRAMUSCULAR; INTRAVENOUS EVERY 6 HOURS PRN
Status: DISCONTINUED | OUTPATIENT
Start: 2024-03-29 | End: 2024-04-06

## 2024-03-29 RX ORDER — POTASSIUM CHLORIDE 1.5 G/1.58G
40 POWDER, FOR SOLUTION ORAL EVERY 4 HOURS
Status: DISPENSED | OUTPATIENT
Start: 2024-03-29 | End: 2024-03-29

## 2024-03-29 RX ORDER — ONDANSETRON 2 MG/ML
4 INJECTION INTRAMUSCULAR; INTRAVENOUS EVERY 6 HOURS PRN
Status: DISCONTINUED | OUTPATIENT
Start: 2024-03-29 | End: 2024-04-06 | Stop reason: HOSPADM

## 2024-03-29 RX ADMIN — METOCLOPRAMIDE 5 MG: 5 INJECTION, SOLUTION INTRAMUSCULAR; INTRAVENOUS at 22:51

## 2024-03-29 RX ADMIN — HYDROMORPHONE HYDROCHLORIDE 0.5 MG: 1 INJECTION, SOLUTION INTRAMUSCULAR; INTRAVENOUS; SUBCUTANEOUS at 22:55

## 2024-03-29 RX ADMIN — HYDROMORPHONE HYDROCHLORIDE 0.5 MG: 1 INJECTION, SOLUTION INTRAMUSCULAR; INTRAVENOUS; SUBCUTANEOUS at 14:50

## 2024-03-29 RX ADMIN — SODIUM CHLORIDE 500 ML: 9 INJECTION, SOLUTION INTRAVENOUS at 09:31

## 2024-03-29 RX ADMIN — PROCHLORPERAZINE EDISYLATE 10 MG: 5 INJECTION INTRAMUSCULAR; INTRAVENOUS at 20:12

## 2024-03-29 RX ADMIN — METOCLOPRAMIDE 5 MG: 5 INJECTION, SOLUTION INTRAMUSCULAR; INTRAVENOUS at 16:45

## 2024-03-29 RX ADMIN — FENTANYL CITRATE 75 MCG: 50 INJECTION, SOLUTION INTRAMUSCULAR; INTRAVENOUS at 09:20

## 2024-03-29 RX ADMIN — ONDANSETRON 4 MG: 2 INJECTION INTRAMUSCULAR; INTRAVENOUS at 09:20

## 2024-03-29 RX ADMIN — POTASSIUM CHLORIDE AND SODIUM CHLORIDE 100 ML/HR: 900; 150 INJECTION, SOLUTION INTRAVENOUS at 16:44

## 2024-03-29 RX ADMIN — SODIUM CHLORIDE 2500 ML: 9 INJECTION, SOLUTION INTRAVENOUS at 11:07

## 2024-03-29 RX ADMIN — ONDANSETRON 4 MG: 2 INJECTION INTRAMUSCULAR; INTRAVENOUS at 14:52

## 2024-03-29 RX ADMIN — PIPERACILLIN AND TAZOBACTAM 3.38 G: 3; .375 INJECTION, POWDER, FOR SOLUTION INTRAVENOUS at 11:04

## 2024-03-29 RX ADMIN — POTASSIUM CHLORIDE 40 MEQ: 1.5 POWDER, FOR SOLUTION ORAL at 14:49

## 2024-03-29 RX ADMIN — PIPERACILLIN AND TAZOBACTAM 3.38 G: 3; .375 INJECTION, POWDER, FOR SOLUTION INTRAVENOUS at 16:44

## 2024-03-29 RX ADMIN — IOPAMIDOL 85 ML: 612 INJECTION, SOLUTION INTRAVENOUS at 10:00

## 2024-03-29 RX ADMIN — ONDANSETRON 4 MG: 2 INJECTION INTRAMUSCULAR; INTRAVENOUS at 22:51

## 2024-03-29 RX ADMIN — Medication 5 MG: at 21:09

## 2024-03-29 RX ADMIN — Medication 10 ML: at 12:19

## 2024-03-29 RX ADMIN — POTASSIUM CHLORIDE 40 MEQ: 750 TABLET, EXTENDED RELEASE ORAL at 21:09

## 2024-03-29 NOTE — PROGRESS NOTES
Clinical Pharmacy Services: Medication History    Jeff Bass is a 78 y.o. male presenting to [facility] for   Chief Complaint   Patient presents with    Abdominal Pain       He  has a past medical history of Arthritis, Benign prostatic hyperplasia, Biliary dyskinesia, Bunion of right foot, Bursitis of right hip (03/15/2018), CLL (chronic lymphocytic leukemia) (2016), Colon polyps, Constipation, Degeneration of lumbar intervertebral disc, Diverticulosis, Erectile dysfunction, Fracture of ankle (1975), GERD (gastroesophageal reflux disease), Hallux valgus of left foot, Hyperlipidemia, Hypertension, Inguinal hernia, Kidney stone (02/21/2009), Knee swelling (2018), Localized, primary osteoarthritis, Lumbar radiculopathy, Lumbar stenosis, Lung mass, Osteoarthritis of right knee, Polyneuropathy, Prostate CA (03/2016), PVC (premature ventricular contraction), RBBB, Sensory hearing loss, bilateral, Skin cancer, Substernal goiter, Thyromegaly (12/2016), and Vitamin D deficiency.    Allergies as of 03/29/2024    (No Known Allergies)       Medication information was obtained from: Patient   Pharmacy and Phone Number:     Prior to Admission Medications       Prescriptions Last Dose Informant Patient Reported? Taking?    famotidine (PEPCID) 20 MG tablet 3/28/2024 Self Yes Yes    Take 1 tablet by mouth Daily.    Melatonin 10 MG tablet 3/28/2024 Self Yes Yes    Take 1 tablet by mouth Every Night.    pravastatin (PRAVACHOL) 20 MG tablet Past Week Pharmacy, Self Yes Yes    Take 1 tablet by mouth Daily.              Medication notes: Patient confirmed he is not taking the Augmentin recently prescribed.     This medication list is complete to the best of my knowledge as of 3/29/2024    Please call if questions.    Scott Sotomayor  Medication History Technician  [phone]    3/29/2024 11:22 EDT

## 2024-03-29 NOTE — CONSULTS
Colorectal & General Surgery  Consultation    Patient: Jeff Bass  YOB: 1945  MRN: [id number]      Assessment  Jeff Bass is a 78 y.o. male with chronic lymphocytic leukemia who presents to the emergency department with severe abdominal pain in the setting of lactic acidosis, tachycardia, leukocytosis.  His clinical presentation is not very specific.  He does not present with evidence of cholecystitis or appendicitis.  With his tachycardia and lactic acidosis, mesenteric ischemia is certainly a concern, but his exam and imaging are not consistent with that.  He has responded very well to fluid resuscitation and seems to be feeling much better now.  I do not think that any further workup is warranted at this time, but we certainly need to focus on intravenous hydration and I agree with empiric antibiotics.  I do not think he needs an emergent operation at this time.  Will continue to follow closely.  Please call with questions or concerns anytime.    Plan  No indication for urgent surgical intervention  Agree with [place]  Agree with aggressive fluid resuscitation  Trend labs  Remain n.p.o. with ice chips for now      History of Present Illness   Jeff Bass is a 78 y.o. male with chronic lymphocytic leukemia presents emergency department with about a day and a half of severe abdominal pain.  He describes it as being a pressure and bloating pain in his lower abdomen.  He has nausea but was not able to vomit.  Denies diarrhea and actually feels somewhat constipated.  Says he was passing a little bit of flatus earlier today.  He is never felt anything like this in the past.  No recent sick contacts.  Denies fevers and chills at home.    Past Medical History   Past Medical History:   Diagnosis Date    Arthritis     Benign prostatic hyperplasia     FOLLOWED BY DR. VIVIEN PATEL    Biliary dyskinesia     Bunion of right foot     GREAT TOE    Bursitis of right hip 03/15/2018     CLL (chronic lymphocytic leukemia) 2016    FOLLOWED BY DR WALKER    Colon polyps     FOLLOWED BY DR. NEHA HAM    Constipation     Degeneration of lumbar intervertebral disc     Diverticulosis     Erectile dysfunction     Fracture of ankle 1975    GERD (gastroesophageal reflux disease)     Hallux valgus of left foot     Hyperlipidemia     MIXED    Hypertension     Inguinal hernia     Kidney stone 02/21/2009    SEEN AT [facility] ER    Knee swelling 2018    Localized, primary osteoarthritis     Lumbar radiculopathy     Lumbar stenosis     Lung mass     LEFT SIDE - SCAR TISSUE PER PATIENT     Osteoarthritis of right knee     Polyneuropathy     Prostate CA 03/2016    JACQUELYN 6, S/P XRT, FOLLOWED BY DR. VVIIEN PATEL, RADIATION SEEDS    PVC (premature ventricular contraction)     PT STATES WORKED UP YEARS AGO BY [facility] FOR POSSIBLE MURMUR - NO FOLLOW UP REQUIRED     RBBB     Sensory hearing loss, bilateral     Skin cancer     SQUAMOUS CELL CARCINOMA OF FACE    Substernal goiter     Thyromegaly 12/2016    ADMITTED TO [facility]    Vitamin D deficiency         Past Surgical History   Past Surgical History:   Procedure Laterality Date    BRONCHOSCOPY N/A 12/14/2016    Procedure: BRONCHOSCOPY WITH  BAL AND BIOPSY AND ENDOBRONCHIAL ULTRASOUND  AND NAVIGATION WITH BRUSHINGS AND BIOPSY AND BAL;  Surgeon: Pierre Manning MD;  Location: [facility] ENDOSCOPY;  Service:     COLONOSCOPY N/A 03/13/2019    5 MM SESSILE TUBULAR ADENOMA POLYP IN TRANSVERSE, MULTIPLE SMALL AND LARGE DIVERTICULA IN SIGMOID, RESCOPE IN 5 YRS, DR. NEHA HAM AT [facility]    COLONOSCOPY W/ POLYPECTOMY N/A 03/19/2015    3 TUBULAR ADENOMA POLYPS, 12 TUBULOVILLOUS POLYP RECTO-SIGMOID, DIVERTICULOSIS, RESCOPE IN 3 YRS, DR. NEHA HAM AT [facility]    EYE SURGERY Bilateral     CATARACT EXTRACTION WITH LENS IMPLANT, DR. GOVEA    FINGER NAIL SURGERY Right 2022    TORRES-PATEL    INGUINAL HERNIA REPAIR Left 11/02/2021    Procedure: LEFT OPEN INGUINAL HERNIA REPAIR;  Surgeon:  Nixon Kolb MD;  Location: [facility] MAIN OR;  Service: General;  Laterality: Left;    PROSTATE RADIOACTIVE SEED IMPLANT N/A 09/06/2016    DR. HOA JARAMILLO AT [facility]    PROSTATE SURGERY N/A 03/11/2016    BIOPSY: ADENOCARCINOMA, DR. ZAID IBARRA    THYROID BIOPSY Bilateral 11/01/2017    NO B CELL OR T CELL LYMPHOMA, DONE AT [facility]    TOTAL KNEE ARTHROPLASTY Right 10/12/2022    Procedure: TOTAL KNEE ARTHROPLASTY;  Surgeon: Ran Rachel MD;  Location:  [facility];  Service: Orthopedics;  Laterality: Right;    VENOUS ACCESS DEVICE (PORT) INSERTION Right 7/6/2023    Procedure: INSERTION VENOUS ACCESS DEVICE;  Surgeon: Nixon Kolb MD;  [place] [facility] [place] [facility]  Service: General;  Laterality: Right;       Social History  Social History     Socioeconomic History    Marital status:      Spouse name: No    Years of education: College   Tobacco Use    Smoking status: Never    Smokeless tobacco: Never   Vaping Use    Vaping status: Never Used   Substance and Sexual Activity    Alcohol use: No    Drug use: Never    Sexual activity: Yes     Partners: Female     Birth control/protection: None       Family History  Family History   Problem Relation Age of Onset    Heart disease Father         Rheumatic heart disease    Hypertension Father     Osteoporosis Father     Stroke Mother     Osteoporosis Mother     No Known Problems Daughter     Malig Hyperthermia Neg Hx        Colorectal cancer family history: None    Review of Systems  Negative except as documented in the HPI.     Allergies  No Known Allergies    Medications    Current Facility-Administered Medications:     Enoxaparin Sodium (LOVENOX) syringe 40 mg, 40 mg, Subcutaneous, Daily, Jovani Salomon MD    HYDROmorphone (DILAUDID) injection 0.5 mg, 0.5 mg, Intravenous, Q2H PRN **AND** naloxone (NARCAN) injection 0.4 mg, 0.4 mg, Intravenous, Q5 Min PRN, Jovani Salomon MD    nitroglycerin (NITROSTAT) SL tablet 0.4 mg, 0.4 mg,  Sublingual, Q5 Min YANN, Jovani Salomon MD    [COMPLETED] Insert Peripheral IV, , , Once **AND** sodium chloride 0.9 % flush 10 mL, 10 mL, Intravenous, PRN, Parag Haider MD    sodium chloride 0.9 % flush 10 mL, 10 mL, Intravenous, Q12H, Jovani Salomon MD, 10 mL at 03/29/24 1219    sodium chloride 0.9 % flush 10 mL, 10 mL, Intravenous, PRN, Jovani Salomon MD    sodium chloride 0.9 % infusion 40 mL, 40 mL, Intravenous, PRN, Jovani Salomon MD    sodium chloride 0.9 % infusion, 125 mL/hr, Intravenous, Continuous, Parag Haider MD    sodium chloride 0.9 % with KCl 20 mEq/L infusion, 100 mL/hr, Intravenous, Continuous, Jovani Salomon MD    Current Outpatient Medications:     famotidine (PEPCID) 20 MG tablet, Take 1 tablet by mouth Daily., Disp: , Rfl:     Melatonin 10 MG tablet, Take 1 tablet by mouth Every Night., Disp: , Rfl:     pravastatin (PRAVACHOL) 20 MG tablet, Take 1 tablet by mouth Daily., Disp: , Rfl:     Vital Signs  Vitals:    03/29/24 1201   BP: 118/64   Pulse: 83   Resp:    Temp:    SpO2:           Physical Exam  Constitutional: Resting comfortably, no acute distress  Neck: Supple, trachea midline  Respiratory: No increased work of breathing, Symmetric excursion  Cardiovascular: Well pefursed, no jugular venous distention evident   Abdominal: Soft, mildly tender in the lower abdomen. non-distended  Lymphatics: No cervical or suprascapular adenopathy  Skin: Warm, dry, no rash on visualized skin surfaces  Musculoskeletal: Symmetric strength, no obvious gross abnormalities  Psychiatric: Alert and oriented ×3, normal affect     Laboratory Results  I have personally reviewed CBC with WC 15, hemoglobin 12, platelets 68.  Lactate 5.0.  Lipase 8.  Procalcitonin 0.29.  INR 1.28.  CMP with creatinine 0.94, potassium 3.3, bicarb 21, magnesium 1.7, albumin 3.8, total bilirubin 1.6, AST 42, ALT 18, clinical phosphatase 74.    Radiology  I have personally reviewed CT  scan of the abdomen pelvis does not demonstrate any acute surgical findings.  There is no pneumoperitoneum.  The bowel appears to be viable.  The celiac axis and SMA are patent without any evidence of significant calcifications.  Gallbladder appears normal.  Appendix appears normal.         Juaquin Witt MD  Colorectal & General Surgery  [facility]    [address], [address]  [place], [place], [address]  P: [phone]  F: [phone]

## 2024-03-29 NOTE — H&P
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  He was begun on a clear liquid diet and Augmentin pain became worse.  He called me this morning and I suggested he come to the ER  In the emergency room.  He is found to have a high white count and hyponatremia with an elevated lactic acid and significant acidosis.  CT scan of the abdomen has found no signs of infectious process, but does suggest that his lymphoma/leukemia has progressed with further evidence of multiple lymph node and spleen enlargement.  Patient is admitted this time because of abdominal pain, acidosis and possible progression of his malignancy.    History of Present IllnessMisterSshameka has not had a complete examination with me for at least 2 years.  His last colonoscopy in 2019 was clear whereas previously he had had prior polyps.  He's had injections of anti-inflammatory medications for both knees.  He's had previous lumbosacral epidurals previously admitted for abnormal biliary scan and was seen by general surgery, but he did not have cholecystectomy coronary calcium score 2016 was 216.  Cardiolite stress test 2018 was normal.  Echocardiogram has shown normal ejection fraction with trace tricuspid valve regurgitation, mild aortic regurgitation.  His heart monitor showed multiple PVCs.  He's been diagnosed as having neurogenic claudication due to spinal stenosis.  Orthopedic evaluation has shown that he has a chronic pain from a previous right ankle injury.  He's had a tendency for chronic constipation.  He's been followed by Dr. Ambrose's hematologist for recurrent leukemia.  Previous kidney stones.  He does have a  peripheral neuropathy of his lower extremities with decreased sensation and with numbness.  Previous history of prostatitis.  He does wear hearing aids.  He's had cataract surgery.  He's had previous left inguinal hernia surgery by Dr. Kolb  He is followed regularly by Dr. Schroeder of [facility] for his chronic lymphoid leukemia.  He had progressive disease in January 2023 and was started on chemotherapy.  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  He completed 6 cycles ofCHOP therapy.  He achieved a very good response.  Patient was seen by Dr. Ambrose last month after he was treated with a Covid respiratory infection.  At that time he had no signs of Jon's transformation of his CLL at that time he had no peripheral adenopathy.  No palpable hepatosplenomegaly.  His energy level has increased in December 2023.  A new flow cytometry was done that failed to document any monoclonal population of lymphocytes.  His prostate cancer has been in remission.He had previous implantation of radioacative seeds.  2 years ago he was admitted for abdominalpain. HIDA scan was abnormal, Ultraosound showed sludge in the gall bladder. He was seen by Dr. Kolb. No intervention was suggested at that time    Review of Systems   Constitutional:  Positive for appetite change.   HENT: Negative.     Eyes: Negative.    Respiratory: Negative.     Cardiovascular: Negative.    Gastrointestinal:  Positive for abdominal pain, nausea and vomiting.   Endocrine: Negative.    Genitourinary: Negative.    Musculoskeletal: Negative.    Skin: Negative.    Allergic/Immunologic: Negative.    Neurological: Negative.    Hematological: Negative.    Psychiatric/Behavioral: Negative.          Personal History     Past Medical History:   Diagnosis Date    Arthritis     Benign prostatic hyperplasia     FOLLOWED BY DR. VIVIEN PATEL    Biliary dyskinesia     Bunion of right foot     GREAT TOE    Bursitis of right hip 03/15/2018    CLL  (chronic lymphocytic leukemia) 2016    FOLLOWED BY DR WALKER    Colon polyps     FOLLOWED BY DR. NEHA HAM    Constipation     Degeneration of lumbar intervertebral disc     Diverticulosis     Erectile dysfunction     Fracture of ankle 1975    GERD (gastroesophageal reflux disease)     Hallux valgus of left foot     Hyperlipidemia     MIXED    Hypertension     Inguinal hernia     Kidney stone 02/21/2009    SEEN AT [facility] ER    Knee swelling 2018    Localized, primary osteoarthritis     Lumbar radiculopathy     Lumbar stenosis     Lung mass     LEFT SIDE - SCAR TISSUE PER PATIENT     Osteoarthritis of right knee     Polyneuropathy     Prostate CA 03/2016    JACQUELYN 6, S/P XRT, FOLLOWED BY DR. VIVIEN PATEL, RADIATION SEEDS    PVC (premature ventricular contraction)     PT STATES WORKED UP YEARS AGO BY [facility] FOR POSSIBLE MURMUR - NO FOLLOW UP REQUIRED     RBBB     Sensory hearing loss, bilateral     Skin cancer     SQUAMOUS CELL CARCINOMA OF FACE    Substernal goiter     Thyromegaly 12/2016    ADMITTED TO [facility]    Vitamin D deficiency        Past Surgical History:   Procedure Laterality Date    BRONCHOSCOPY N/A 12/14/2016    Procedure: BRONCHOSCOPY WITH  BAL AND BIOPSY AND ENDOBRONCHIAL ULTRASOUND  AND NAVIGATION WITH BRUSHINGS AND BIOPSY AND BAL;  Surgeon: Pierre Manning MD;  Location: [facility] ENDOSCOPY;  Service:     COLONOSCOPY N/A 03/13/2019    5 MM SESSILE TUBULAR ADENOMA POLYP IN TRANSVERSE, MULTIPLE SMALL AND LARGE DIVERTICULA IN SIGMOID, RESCOPE IN 5 YRS, DR. NEHA HAM AT [facility]    COLONOSCOPY W/ POLYPECTOMY N/A 03/19/2015    3 TUBULAR ADENOMA POLYPS, 12 TUBULOVILLOUS POLYP RECTO-SIGMOID, DIVERTICULOSIS, RESCOPE IN 3 YRS, DR. NEHA HAM AT [facility]    EYE SURGERY Bilateral     CATARACT EXTRACTION WITH LENS IMPLANT, DR. GOVEA    FINGER NAIL SURGERY Right 2022    TORRES-PATEL    INGUINAL HERNIA REPAIR Left 11/02/2021    Procedure: LEFT OPEN INGUINAL HERNIA REPAIR;  Surgeon: Nixon Kolb MD;   Location: [facility] MAIN OR;  Service: General;  Laterality: Left;    PROSTATE RADIOACTIVE SEED IMPLANT N/A 09/06/2016    DR. HOA JARAMILLO AT [facility]    PROSTATE SURGERY N/A 03/11/2016    BIOPSY: ADENOCARCINOMA, DR. ZAID IBARRA    THYROID BIOPSY Bilateral 11/01/2017    NO B CELL OR T CELL LYMPHOMA, DONE AT [facility]    TOTAL KNEE ARTHROPLASTY Right 10/12/2022    Procedure: TOTAL KNEE ARTHROPLASTY;  Surgeon: Ran Rachel MD;  Location: [facility] OR [facility] [place] [place];  Service: Orthopedics;  Laterality: Right;    VENOUS ACCESS DEVICE (PORT) INSERTION Right 7/6/2023    Procedure: INSERTION VENOUS ACCESS DEVICE;  Surgeon: Nixon Kolb MD;  Location:  [facility] [place] [place];  Service: General;  Laterality: Right;       Family History: family history includes Heart disease in his father; Hypertension in his father; No Known Problems in his daughter; Osteoporosis in his father and mother; Stroke in his mother. Otherwise pertinent FHx was reviewed and not pertinent to current issue.    Social History:  reports that he has never smoked. He has never used smokeless tobacco. He reports that he does not drink alcohol and does not use drugs.    Home Medications:  Melatonin, famotidine, and pravastatin    Allergies:  No Known Allergies    Objective    Objective     Vitals:   Temp:  [99.5 °F (37.5 °C)] 99.5 °F (37.5 °C)  Heart Rate:  [] 91  Resp:  [18] 18  BP: (119-156)/(73-93) 119/73    Physical Exam  Vitals reviewed.   HENT:      Head: Normocephalic.   Eyes:      Pupils: Pupils are equal, round, and reactive to light.   Cardiovascular:      Rate and Rhythm: Normal rate and regular rhythm.      Pulses: Normal pulses.      Heart sounds: Normal heart sounds.   Pulmonary:      Effort: Pulmonary effort is normal.      Breath sounds: Normal breath sounds.   Abdominal:      General: Abdomen is flat. There is no distension.      Palpations: Abdomen is soft.      Tenderness: There is no abdominal tenderness. There is guarding.      Comments: He  does have palpable splenomegaly   Skin:     General: Skin is warm.   Neurological:      General: No focal deficit present.      Mental Status: He is alert.   Psychiatric:         Mood and Affect: Mood normal.         Result Review    Result Review:  I have personally reviewed the results from the time of this admission to 3/29/2024 11:36 EDT and agree with these findings:  [x]  Laboratory list / accordion  []  Microbiology  [x]  Radiology  []  EKG/Telemetry   []  Cardiology/Vascular   []  Pathology  [x]  Old records  []  Other:  Most notable findings include:evidence of possible sepsis with elevated procalcitonin, and lactic acidosis , and hypokalemia and severe diminished platelet count.      Assessment & Plan    Abdominal pain  Splenomegaly and abdominal lymphadenpopathy  Known chronic lymphocytic leukemia  Prior gallbladder dysfunction  Assessment / Plan     Brief Patient Summary:  Jeff Bass is a 78 y.o. male who pesents with abdominal pain, thrompcytopenia, splenomegaly, acidosis and hypokalemia.    Active Hospital Problems:  Active Hospital Problems    Diagnosis     Abdominal pain      Plan:   Hematology consult  General surgery evaluation  IV fluids with potassium  Blood cultures  IV antibiotics  Re evaluate gall bladder    DVT prophylaxis:  Medical DVT prophylaxis orders are present.        CODE STATUS:    Level Of Support Discussed With: Patient  Code Status (Patient has no pulse and is not breathing): CPR (Attempt to Resuscitate)  Medical Interventions (Patient has pulse or is breathing): Full Support    Admission Status:  I believe this patient meets inpatient       status.    Jovani Salomon MD

## 2024-03-29 NOTE — ED NOTES
Nursing report ED to floor  Jeff Bass  78 y.o.  male    HPI :  HPI (Adult)  Stated Reason for Visit: abdominal pain  History Obtained From: patient    Chief Complaint  Chief Complaint   Patient presents with    Abdominal Pain       Admitting doctor:   Jovani Salomon MD    Admitting diagnosis:   The primary encounter diagnosis was Generalized abdominal pain. Diagnoses of CLL (chronic lymphocytic leukemia) with worsening lymphadenopathy in abdomen and pelvis, Lactic acid increased, Leukocytosis, unspecified type, Thrombocytopenia, and Splenomegaly were also pertinent to this visit.    Code status:   Current Code Status       Date Active Code Status Order ID Comments User Context       3/29/2024 1134 CPR (Attempt to Resuscitate) [id number]  Jovani Salomon MD ED        Question Answer    Code Status (Patient has no pulse and is not breathing) CPR (Attempt to Resuscitate)    Medical Interventions (Patient has pulse or is breathing) Full Support    Level Of Support Discussed With Patient                    Allergies:   Patient has no known allergies.    Isolation:   No active isolations    Intake and Output    Intake/Output Summary (Last 24 hours) at 3/29/2024 1401  Last data filed at 3/29/2024 1141  Gross per 24 hour   Intake 600 ml   Output --   Net 600 ml       Weight:       03/29/24  0850   Weight: 99.8 kg (220 lb)       Most recent vitals:   Vitals:    03/29/24 0855 03/29/24 0859 03/29/24 1010 03/29/24 1201   BP:   119/73 118/64   Pulse: 103 103 91 83   Resp:       Temp:       TempSrc:       SpO2:   93%    Weight:       Height:           Active LDAs/IV Access:   Lines, Drains & Airways       Active LDAs       Name Placement date Placement time Site Days    Peripheral IV 03/29/24 0914 Anterior;Right Forearm 03/29/24  0914  Forearm  less than 1    Single Lumen Implantable Port Chest --  --  Chest  --                    Labs (abnormal labs have a star):   Labs Reviewed   COMPREHENSIVE  METABOLIC PANEL - Abnormal; Notable for the following components:       Result Value    Sodium 131 (*)     Potassium 3.3 (*)     Chloride 91 (*)     CO2 21.8 (*)     Total Protein 5.6 (*)     AST (SGOT) 42 (*)     Total Bilirubin 1.6 (*)     Anion Gap 18.2 (*)     All other components within normal limits    Narrative:     GFR Normal >60  Chronic Kidney Disease <60  Kidney Failure <15    The GFR formula is only valid for adults with stable renal function between ages 18 and 70.   LIPASE - Abnormal; Notable for the following components:    Lipase 8 (*)     All other components within normal limits   PROTIME-INR - Abnormal; Notable for the following components:    Protime 16.2 (*)     INR 1.28 (*)     All other components within normal limits   URINALYSIS W/ MICROSCOPIC IF INDICATED (NO CULTURE) - Abnormal; Notable for the following components:    Ketones, UA Trace (*)     Protein, UA Trace (*)     All other components within normal limits    Narrative:     Urine microscopic not indicated.   CBC WITH AUTO DIFFERENTIAL - Abnormal; Notable for the following components:    WBC 15.57 (*)     Hemoglobin 12.3 (*)     Hematocrit 35.9 (*)     Platelets 68 (*)     All other components within normal limits   PROCALCITONIN - Abnormal; Notable for the following components:    Procalcitonin 0.29 (*)     All other components within normal limits    Narrative:     As a Marker for Sepsis (Non-Neonates):    1. <0.5 ng/mL represents a low risk of severe sepsis and/or septic shock.  2. >2 ng/mL represents a high risk of severe sepsis and/or septic shock.    As a Marker for Lower Respiratory Tract Infections that require antibiotic therapy:    PCT on Admission    Antibiotic Therapy       6-12 Hrs later    >0.5                Strongly Recommended  >0.25 - <0.5        Recommended   0.1 - 0.25          Discouraged              Remeasure/reassess PCT  <0.1                Strongly Discouraged     Remeasure/reassess PCT    As 28 day mortality  "risk marker: \"Change in Procalcitonin Result\" (>80% or <=80%) if Day 0 (or Day 1) and Day 4 values are available. Refer to [email or web address]    Change in PCT <=80%  A decrease of PCT levels below or equal to 80% defines a positive change in PCT test result representing a higher risk for 28-day all-cause mortality of patients diagnosed with severe sepsis for septic shock.    Change in PCT >80%  A decrease of PCT levels of more than 80% defines a negative change in PCT result representing a lower risk for 28-day all-cause mortality of patients diagnosed with severe sepsis or septic shock.      LACTIC ACID, PLASMA - Abnormal; Notable for the following components:    Lactate 5.0 (*)     All other components within normal limits   MANUAL DIFFERENTIAL - Abnormal; Notable for the following components:    Lymphocyte % 15.2 (*)     Monocyte % 31.3 (*)     Atypical Lymphocyte % 8.1 (*)     Neutrophils Absolute 7.08 (*)     Lymphocytes Absolute 3.63 (*)     Monocytes Absolute 4.87 (*)     All other components within normal limits   LACTIC ACID, REFLEX - Abnormal; Notable for the following components:    Lactate 3.6 (*)     All other components within normal limits   BASIC METABOLIC PANEL - Abnormal; Notable for the following components:    Sodium 131 (*)     Potassium 3.2 (*)     Chloride 93 (*)     Calcium 8.4 (*)     Anion Gap 15.2 (*)     All other components within normal limits    Narrative:     GFR Normal >60  Chronic Kidney Disease <60  Kidney Failure <15    The GFR formula is only valid for adults with stable renal function between ages 18 and 70.   BNP (IN-HOUSE) - Abnormal; Notable for the following components:    proBNP 2,957.0 (*)     All other components within normal limits    Narrative:     This assay is used as an aid in the diagnosis of individuals suspected of having heart failure. It can be used as an aid in the diagnosis of acute decompensated heart failure (ADHF) in patients presenting " with signs and symptoms of ADHF to the emergency department (ED). In addition, NT-proBNP of <300 pg/mL indicates ADHF is not likely.    Age Range Result Interpretation  NT-proBNP Concentration (pg/mL:      <50             Positive            >450                   Gray                 300-450                    Negative             <300    50-75           Positive            >900                  Gray                300-900                  Negative            <300      >75             Positive            >1800                  Gray                300-1800                  Negative            <300   CBC WITH AUTO DIFFERENTIAL - Abnormal; Notable for the following components:    WBC 15.18 (*)     RBC 3.96 (*)     Hemoglobin 11.2 (*)     Hematocrit 32.7 (*)     Platelets 67 (*)     All other components within normal limits   MANUAL DIFFERENTIAL - Abnormal; Notable for the following components:    Neutrophil % 6.0 (*)     Lymphocyte % 70.0 (*)     Myelocyte % 3.0 (*)     Atypical Lymphocyte % 13.0 (*)     Neutrophils Absolute 0.91 (*)     Lymphocytes Absolute 12.60 (*)     Monocytes Absolute 1.21 (*)     All other components within normal limits   TROPONIN - Normal    Narrative:     High Sensitive Troponin T Reference Range:  <14.0 ng/L- Negative Female for AMI  <22.0 ng/L- Negative Male for AMI  >=14 - Abnormal Female indicating possible myocardial injury.  >=22 - Abnormal Male indicating possible myocardial injury.   Clinicians would have to utilize clinical acumen, EKG, Troponin, and serial changes to determine if it is an Acute Myocardial Infarction or myocardial injury due to an underlying chronic condition.        MAGNESIUM - Normal   HIGH SENSITIVITIY TROPONIN T 2HR - Normal    Narrative:     High Sensitive Troponin T Reference Range:  <14.0 ng/L- Negative Female for AMI  <22.0 ng/L- Negative Male for AMI  >=14 - Abnormal Female indicating possible myocardial injury.  >=22 - Abnormal Male indicating possible  myocardial injury.   Clinicians would have to utilize clinical acumen, EKG, Troponin, and serial changes to determine if it is an Acute Myocardial Infarction or myocardial injury due to an underlying chronic condition.        MAGNESIUM - Normal   BLOOD CULTURE   BLOOD CULTURE   LACTIC ACID, REFLEX   CBC AND DIFFERENTIAL    Narrative:     The following orders were created for panel order CBC & Differential.  Procedure                               Abnormality         Status                     ---------                               -----------         ------                     CBC Auto Differential[[id number]]        Abnormal            Final result                 Please view results for these tests on the individual orders.   CBC AND DIFFERENTIAL    Narrative:     The following orders were created for panel order CBC & Differential.  Procedure                               Abnormality         Status                     ---------                               -----------         ------                     CBC Auto Differential[[id number]]        Abnormal            Final result                 Please view results for these tests on the individual orders.       EKG:   No orders to display       Meds given in ED:   Medications   sodium chloride 0.9 % flush 10 mL (has no administration in time range)   sodium chloride 0.9 % flush 10 mL (10 mL Intravenous Given 3/29/24 1219)   sodium chloride 0.9 % flush 10 mL (has no administration in time range)   sodium chloride 0.9 % infusion 40 mL (has no administration in time range)   nitroglycerin (NITROSTAT) SL tablet 0.4 mg (has no administration in time range)   sodium chloride 0.9 % with KCl 20 mEq/L infusion (has no administration in time range)   HYDROmorphone (DILAUDID) injection 0.5 mg (has no administration in time range)     And   naloxone (NARCAN) injection 0.4 mg (has no administration in time range)   Potassium Replacement - Follow Nurse / BPA Driven Protocol (has  no administration in time range)   piperacillin-tazobactam (ZOSYN) 3.375 g IVPB in 100 mL NS MBP (CD) (has no administration in time range)   ondansetron (ZOFRAN) injection 4 mg (has no administration in time range)   potassium chloride (KLOR-CON) packet 40 mEq (has no administration in time range)   sodium chloride 0.9 % bolus 500 mL (0 mL Intravenous Stopped 3/29/24 1000)   fentaNYL citrate (PF) (SUBLIMAZE) injection 75 mcg (75 mcg Intravenous Given 3/29/24 0920)   ondansetron (ZOFRAN) injection 4 mg (4 mg Intravenous Given 3/29/24 0920)   iopamidol (ISOVUE-300) 61 % injection 100 mL (85 mL Intravenous Given 3/29/24 1000)   sodium chloride 0.9 % bolus 2,994 mL (0 mL Intravenous Stopped 3/29/24 1317)   piperacillin-tazobactam (ZOSYN) 3.375 g IVPB in 100 mL NS MBP (CD) (0 g Intravenous Stopped 3/29/24 1141)       Imaging results:  CT Abdomen Pelvis With Contrast    Result Date: 3/29/2024  Increasing abdominal, pelvic, and lower posterior mediastinal adenopathy. Marked splenomegaly also appears more pronounced today compared with the 09/22/2023 PET/CT. However, there is no CT evidence of acute infection/inflammation.  I discussed the case with Dr. Haider at around 10:45 a.m. on the morning of the exam.   Radiation dose reduction techniques were utilized, including automated exposure control and exposure modulation based on body size.   This report was finalized on 3/29/2024 11:45 AM by Dr. Clifton Salguero M.D on Workstation: [id number]      XR Chest 1 View    Result Date: 3/29/2024  Negative.  This report was finalized on 3/29/2024 9:47 AM by Dr. Clifton Salguero M.D on Workstation: [id number]       Ambulatory status:   - ad fam.     Social issues:   Social History     Socioeconomic History    Marital status:      Spouse name: No    Years of education: College   Tobacco Use    Smoking status: Never    Smokeless tobacco: Never   Vaping Use    Vaping status: Never Used   Substance and Sexual Activity     Alcohol use: No    Drug use: Never    Sexual activity: Yes     Partners: Female     Birth control/protection: None       Peripheral Neurovascular  Peripheral Neurovascular (Adult)  Peripheral Neurovascular WDL: WDL, pulse assessment  Pulse Assessment: dorsalis pedis, radial    Neuro Cognitive  Neuro Cognitive (Adult)  Cognitive/Neuro/Behavioral WDL: WDL, arousability, level of consciousness, mood/behavior, motor response, orientation, speech  Level of Consciousness: Alert  Arousal Level: opens eyes spontaneously  Orientation: person, place, time, situation, oriented x 4  Speech: logical, spontaneous, clear  Mood/Behavior: calm, cooperative  Motor Response  Motor Response: general motor response  General Motor Response: purposeful motor response    Learning  Learning Assessment (Adult)  Learning Readiness and Ability: no barriers identified  Education Provided  Person Taught: patient    Respiratory  Respiratory (Adult)  Airway WDL: WDL  Respiratory WDL  Respiratory WDL: WDL, all  Rhythm/Pattern, Respiratory: unlabored, pattern regular, depth regular, no shortness of breath reported  Expansion/Accessory Muscles/Retractions: no retractions, no use of accessory muscles, expansion symmetric    Abdominal Pain  Abdominal Pain CPG Interventions  Abdominal Pain Management: HOB elevated, NPO status maintained  Coping Interventions: emotional support provided, education/information provided, questions answered, reassurance provided    Pain Assessments  Pain (Adult)  (0-10) Pain Rating: Rest: 0  (0-10) Pain Rating: Activity: 0  Pain Location: abdomen  Pain Management Interventions: see MAR  Response to Pain Interventions: nonverbal indicators absent/decreased, mobility function improved, interventions effective per patient    NIH Stroke Scale       Rukhsana Cox RN  03/29/24 14:01 EDT

## 2024-03-29 NOTE — ED PROVIDER NOTES
" EMERGENCY DEPARTMENT ENCOUNTER    Room Number:  S407/1  Date of encounter:  3/29/2024  PCP: Jovani Salomon MD  Historian: Patient and friend  Relevant information and history provided by sources other than the patient will be included below and in the ED Course.  Review of pertinent past medical records may also be included in record below and ED Course.    HPI:  Chief Complaint: Abdominal pain  A complete HPI/ROS/PMH/PSH/SH/FH are unobtainable due to: Not applicable  Context: Jeff Bass is a 78 y.o. male who presents to the ED c/o this patient started with abdominal pain yesterday afternoon about 3:00 in the afternoon.  Seems to be starting more in the upper abdomen but has now become diffuse.  Pain is constant.  He cannot describe the pain other than just saying \"it is just a bad pain\".  He has had no BM since yesterday morning.  He has had bouts of nausea.  He is only been able to vomiting up a small amount of green phlegm.  He feels like he needs to vomit but he cannot make himself vomit.  He has had an inguinal hernia repair but that is his only previous surgery that is had on his abdomen.  He reports no fevers or chills or urinary symptoms.  Denies any history of coughs colds, chest pain, focal weakness to arms or legs.  Patient complains of diffuse generalized weakness because he is had nothing to eat in almost 24 hours and very little to drink.  Thinks that the symptoms are worse whenever he tries to consume or drink any liquids.        Previous Episodes: 4 years ago had an episode.  They never found out a definitive etiology.  He had a gallbladder ultrasound at that time which looked okay.  Current Symptoms: See above    MEDICAL HISTORY REVIEWED  In looking old records I can see and reviewed the note from the oncologist Dr. Ambrose from 6/18/2023.  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  At that time.  Did have progressive splenomegaly on this evaluation that was noted as well as profound fatigue and again was admitted in June 2023 to receive IV chemotherapy I can see that patient has also had a history of prostate cancer hypertension.      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    Hypertension 01/31/2016    Hypovitaminosis D 01/31/2016    Hyperlipidemia 01/31/2016    Hypogonadism in male 01/31/2016    Vitamin D deficiency     Malignant neoplasm of prostate 06/03/2016    Spinal stenosis of lumbar region with neurogenic claudication 05/29/2018    Degeneration of lumbar intervertebral disc 05/29/2018    Coronary arteriosclerosis 08/08/2018    PVC (premature ventricular contraction) 08/08/2018    History of colon polyps 09/20/2018    Substernal goiter 06/15/2017    Skin tag 12/20/2018    Sensory hearing loss, bilateral 06/02/2016    ED (erectile dysfunction) of organic origin 05/08/2016    Constipation 08/23/2018    Benign essential hypertension 05/08/2016    GERD (gastroesophageal reflux disease)     Left inguinal hernia 09/14/2020    Impacted cerumen 04/16/2020    Hemangioma of liver 09/21/2020    Abnormal finding on thyroid function test 10/03/2019    Bursitis of right hip 03/15/2018    Pain 06/16/2022    Primary osteoarthritis of right knee 06/21/2022    OA (osteoarthritis) of knee 11/01/2022    B12 deficiency 01/04/2023    Pneumonia due to infectious organism 06/02/2023    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     Resolved Ambulatory Problems     Diagnosis Date Noted    Lung mass 11/10/2016    Abdominal pain 01/19/2019    Bladder outlet obstruction 08/16/2019    High risk medication use 02/08/2023    Severe malnutrition 06/13/2023    Weakness 06/27/2023    Pancytopenia due to chemotherapy  06/27/2023    Immobility syndrome 06/27/2023     Past Medical History:   Diagnosis Date    Arthritis     Biliary dyskinesia     Bunion of right foot     CLL (chronic lymphocytic leukemia) 2016    Colon polyps     Diverticulosis     Erectile dysfunction     Fracture of ankle 1975    Hallux valgus of left foot     Inguinal hernia     Kidney stone 02/21/2009    Knee swelling 2018    Localized, primary osteoarthritis     Lumbar radiculopathy     Lumbar stenosis     Osteoarthritis of right knee     Polyneuropathy     Prostate CA 03/2016    RBBB     Skin cancer     Thyromegaly 12/2016         PAST SURGICAL HISTORY  Past Surgical History:   Procedure Laterality Date    BRONCHOSCOPY N/A 12/14/2016    Procedure: BRONCHOSCOPY WITH  BAL AND BIOPSY AND ENDOBRONCHIAL ULTRASOUND  AND NAVIGATION WITH BRUSHINGS AND BIOPSY AND BAL;  Surgeon: Pierre Manning MD;  Location: [facility] ENDOSCOPY;  Service:     COLONOSCOPY N/A 03/13/2019    5 MM SESSILE TUBULAR ADENOMA POLYP IN TRANSVERSE, MULTIPLE SMALL AND LARGE DIVERTICULA IN SIGMOID, RESCOPE IN 5 YRS, DR. NEHA HAM AT [facility]    COLONOSCOPY W/ POLYPECTOMY N/A 03/19/2015    3 TUBULAR ADENOMA POLYPS, 12 TUBULOVILLOUS POLYP RECTO-SIGMOID, DIVERTICULOSIS, RESCOPE IN 3 YRS, DR. NEHA HAM AT [facility]    EYE SURGERY Bilateral     CATARACT EXTRACTION WITH LENS IMPLANT, DR. GOVEA    FINGER NAIL SURGERY Right 2022    TORRES-PATEL    INGUINAL HERNIA REPAIR Left 11/02/2021    Procedure: LEFT OPEN INGUINAL HERNIA REPAIR;  Surgeon: Nixon Kolb MD;  Location: [facility] OR;  Service: General;  Laterality: Left;    PROSTATE RADIOACTIVE SEED IMPLANT N/A 09/06/2016    DR. HOA JARAMILLO AT [facility]    PROSTATE SURGERY N/A 03/11/2016    BIOPSY: ADENOCARCINOMA, DR. ZAID IBARRA    THYROID BIOPSY Bilateral 11/01/2017    NO B CELL OR T CELL LYMPHOMA, DONE AT formerly [facility]    TOTAL KNEE ARTHROPLASTY Right 10/12/2022    Procedure: TOTAL KNEE ARTHROPLASTY;  Surgeon: Ran Rachel MD;  Location: [facility]  OR [facility];  Service: Orthopedics;  Laterality: Right;    VENOUS ACCESS DEVICE (PORT) INSERTION Right 7/6/2023    Procedure: INSERTION VENOUS ACCESS DEVICE;  Surgeon: Nixon Kolb MD;  Location: [facility] OR [facility];  Service: General;  Laterality: Right;         FAMILY HISTORY  Family History   Problem Relation Age of Onset    Heart disease Father         Rheumatic heart disease    Hypertension Father     Osteoporosis Father     Stroke Mother     Osteoporosis Mother     No Known Problems Daughter     [place] Hyperthermia Neg Hx          SOCIAL HISTORY  Social History     Socioeconomic History    Marital status:      Spouse name: No    Years of education: College   Tobacco Use    Smoking status: Never    Smokeless tobacco: Never   Vaping Use    Vaping status: Never Used   Substance and Sexual Activity    Alcohol use: No    Drug use: Never    Sexual activity: Yes     Partners: Female     Birth control/protection: None         ALLERGIES  Patient has no known allergies.        REVIEW OF SYSTEMS  Review of Systems     All systems reviewed and negative except for those discussed in HPI.       PHYSICAL EXAM    I have reviewed the triage vital signs and nursing notes.    ED Triage Vitals   Temp Heart Rate Resp BP SpO2   03/29/24 0850 03/29/24 0850 03/29/24 0850 03/29/24 0853 03/29/24 0850   99.5 °F (37.5 °C) (!) 129 18 156/93 93 %      Temp src Heart Rate Source Patient Position BP Location FiO2 (%)   03/29/24 0850 03/29/24 0850 -- -- --   Tympanic Monitor          GENERAL: This is an elderly male.  He looks uncomfortable.  Vital signs on my initial evaluation his heart rate is in the low 100s.  Blood pressure is normal.  Technically he is afebrile with a temperature of 99.5.  HENT: nares patent  Head/neck/ face are symmetric without gross deformity, signs of trauma, or swelling  EYES: no scleral icterus, no conjunctival pallor.  NECK: Supple, no meningismus  CV: regular rhythm, regular rate with intact distal pulses.   Systolic murmur 2 out of 6  RESPIRATORY: normal effort and no respiratory distress.  To auscultation bilaterally  ABDOMEN: soft and this is diffuse.  His belly is soft.  There is no involuntary guarding or rebound.  He is got good intact femoral pulses that are equal strong and symmetric.  Inspection of his abdomen is unremarkable  GENITOURINARY: Normal phallus. Bilaterally descended testes without masses. No scrotal masses. No hernia.  No inguinal adenopathy.  No rash or signs of infection. No urethral discharge. Nontender to palpation.  MUSCULOSKELETAL: no deformity.  Intact distal pulses to upper and lower extremities are equal strong and symmetric.  NEURO: alert and appropriate, moves all extremities, follows commands.  No focal motor or sensory changes  SKIN: warm, dry    Vital signs and nursing notes reviewed.        LAB RESULTS  Recent Results (from the past 24 hour(s))   Comprehensive Metabolic Panel    Collection Time: 03/29/24  9:13 AM    Specimen: Blood   Result Value Ref Range    Glucose 98 65 - 99 mg/dL    BUN 23 8 - 23 mg/dL    Creatinine 0.94 0.76 - 1.27 mg/dL    Sodium 131 (L) 136 - 145 mmol/L    Potassium 3.3 (L) 3.5 - 5.2 mmol/L    Chloride 91 (L) 98 - 107 mmol/L    CO2 21.8 (L) 22.0 - 29.0 mmol/L    Calcium 9.0 8.6 - 10.5 mg/dL    Total Protein 5.6 (L) 6.0 - 8.5 g/dL    Albumin 3.8 3.5 - 5.2 g/dL    ALT (SGPT) 18 1 - 41 U/L    AST (SGOT) 42 (H) 1 - 40 U/L    Alkaline Phosphatase 74 39 - 117 U/L    Total Bilirubin 1.6 (H) 0.0 - 1.2 mg/dL    Globulin 1.8 gm/dL    A/G Ratio 2.1 g/dL    BUN/Creatinine Ratio 24.5 7.0 - 25.0    Anion Gap 18.2 (H) 5.0 - 15.0 mmol/L    eGFR 81.9 >60.0 mL/min/1.73   Lipase    Collection Time: 03/29/24  9:13 AM    Specimen: Blood   Result Value Ref Range    Lipase 8 (L) 13 - 60 U/L   Protime-INR    Collection Time: 03/29/24  9:13 AM    Specimen: Blood   Result Value Ref Range    Protime 16.2 (H) 11.7 - 14.2 Seconds    INR 1.28 (H) 0.90 - 1.10   High Sensitivity Troponin T     Collection Time: 03/29/24  9:13 AM    Specimen: Blood   Result Value Ref Range    HS Troponin T 20 <22 ng/L   Magnesium    Collection Time: 03/29/24  9:13 AM    Specimen: Blood   Result Value Ref Range    Magnesium 1.7 1.6 - 2.4 mg/dL   CBC Auto Differential    Collection Time: 03/29/24  9:13 AM    Specimen: Blood   Result Value Ref Range    WBC 15.57 (H) 3.40 - 10.80 10*3/mm3    RBC 4.36 4.14 - 5.80 10*6/mm3    Hemoglobin 12.3 (L) 13.0 - 17.7 g/dL    Hematocrit 35.9 (L) 37.5 - 51.0 %    MCV 82.3 79.0 - 97.0 fL    MCH 28.2 26.6 - 33.0 pg    MCHC 34.3 31.5 - 35.7 g/dL    RDW 15.0 12.3 - 15.4 %    RDW-SD 44.8 37.0 - 54.0 fl    MPV 9.2 6.0 - 12.0 fL    Platelets 68 (L) 140 - 450 10*3/mm3   Procalcitonin    Collection Time: 03/29/24  9:13 AM    Specimen: Blood   Result Value Ref Range    Procalcitonin 0.29 (H) 0.00 - 0.25 ng/mL   Manual Differential    Collection Time: 03/29/24  9:13 AM    Specimen: Blood   Result Value Ref Range    Neutrophil % 45.5 42.7 - 76.0 %    Lymphocyte % 15.2 (L) 19.6 - 45.3 %    Monocyte % 31.3 (H) 5.0 - 12.0 %    Atypical Lymphocyte % 8.1 (H) 0.0 - 5.0 %    Neutrophils Absolute 7.08 (H) 1.70 - 7.00 10*3/mm3    Lymphocytes Absolute 3.63 (H) 0.70 - 3.10 10*3/mm3    Monocytes Absolute 4.87 (H) 0.10 - 0.90 10*3/mm3    Anisocytosis Mod/2+ None Seen    Elliptocytes Mod/2+ None Seen    WBC Morphology Normal Normal    Platelet Morphology Normal Normal   BNP    Collection Time: 03/29/24  9:13 AM    Specimen: Blood   Result Value Ref Range    proBNP 2,957.0 (H) 0.0 - 1,800.0 pg/mL   Lactic Acid, Plasma    Collection Time: 03/29/24  9:30 AM    Specimen: Blood   Result Value Ref Range    Lactate 5.0 (C) 0.5 - 2.0 mmol/L   Urinalysis With Microscopic If Indicated (No Culture) - Urine, Clean Catch    Collection Time: 03/29/24 12:13 PM    Specimen: Urine, Clean Catch   Result Value Ref Range    Color, UA Yellow Yellow, Straw    Appearance, UA Clear Clear    pH, UA 6.0 5.0 - 8.0    Specific Gravity, UA  1.027 1.005 - 1.030    Glucose, UA Negative Negative    Ketones, UA Trace (A) Negative    Bilirubin, UA Negative Negative    Blood, UA Negative Negative    Protein, UA Trace (A) Negative    Leuk Esterase, UA Negative Negative    Nitrite, UA Negative Negative    Urobilinogen, UA 0.2 E.U./dL 0.2 - 1.0 E.U./dL   High Sensitivity Troponin T 2Hr    Collection Time: 03/29/24 12:16 PM    Specimen: Blood   Result Value Ref Range    HS Troponin T 21 <22 ng/L    Troponin T Delta 1 >=-4 - <+4 ng/L   STAT Lactic Acid, Reflex    Collection Time: 03/29/24 12:16 PM    Specimen: Blood   Result Value Ref Range    Lactate 3.6 (C) 0.5 - 2.0 mmol/L   Basic Metabolic Panel    Collection Time: 03/29/24 12:16 PM    Specimen: Blood   Result Value Ref Range    Glucose 95 65 - 99 mg/dL    BUN 20 8 - 23 mg/dL    Creatinine 0.80 0.76 - 1.27 mg/dL    Sodium 131 (L) 136 - 145 mmol/L    Potassium 3.2 (L) 3.5 - 5.2 mmol/L    Chloride 93 (L) 98 - 107 mmol/L    CO2 22.8 22.0 - 29.0 mmol/L    Calcium 8.4 (L) 8.6 - 10.5 mg/dL    BUN/Creatinine Ratio 25.0 7.0 - 25.0    Anion Gap 15.2 (H) 5.0 - 15.0 mmol/L    eGFR 89.5 >60.0 mL/min/1.73   Magnesium    Collection Time: 03/29/24 12:16 PM    Specimen: Blood   Result Value Ref Range    Magnesium 1.7 1.6 - 2.4 mg/dL   CBC Auto Differential    Collection Time: 03/29/24 12:16 PM    Specimen: Blood   Result Value Ref Range    WBC 15.18 (H) 3.40 - 10.80 10*3/mm3    RBC 3.96 (L) 4.14 - 5.80 10*6/mm3    Hemoglobin 11.2 (L) 13.0 - 17.7 g/dL    Hematocrit 32.7 (L) 37.5 - 51.0 %    MCV 82.6 79.0 - 97.0 fL    MCH 28.3 26.6 - 33.0 pg    MCHC 34.3 31.5 - 35.7 g/dL    RDW 14.8 12.3 - 15.4 %    RDW-SD 44.8 37.0 - 54.0 fl    MPV 9.4 6.0 - 12.0 fL    Platelets 67 (L) 140 - 450 10*3/mm3   Manual Differential    Collection Time: 03/29/24 12:16 PM    Specimen: Blood   Result Value Ref Range    Neutrophil % 6.0 (L) 42.7 - 76.0 %    Lymphocyte % 70.0 (H) 19.6 - 45.3 %    Monocyte % 8.0 5.0 - 12.0 %    Myelocyte % 3.0 (H) 0.0 -  0.0 %    Atypical Lymphocyte % 13.0 (H) 0.0 - 5.0 %    Neutrophils Absolute 0.91 (L) 1.70 - 7.00 10*3/mm3    Lymphocytes Absolute 12.60 (H) 0.70 - 3.10 10*3/mm3    Monocytes Absolute 1.21 (H) 0.10 - 0.90 10*3/mm3    RBC Morphology Normal Normal    WBC Morphology Normal Normal    Platelet Morphology Normal Normal   STAT Lactic Acid, Reflex    Collection Time: 03/29/24  3:05 PM    Specimen: Blood   Result Value Ref Range    Lactate 3.8 (C) 0.5 - 2.0 mmol/L       Ordered the above labs and independently reviewed the results.        RADIOLOGY  CT Abdomen Pelvis With Contrast    Result Date: 3/29/2024  ABDOMEN AND PELVIS CT WITH CONTRAST  HISTORY: Periumbilical abdominal pain. Prostate cancer and CLL with Jon's transformation.  TECHNIQUE: Abdomen and pelvis CT was performed. Using 85 mL Isovue-300 IV contrast and correlated with PET/CT 09/22/2023 and abdomen and pelvis CT 07/25/2023.  FINDINGS: Visualized lung bases are clear and lower mediastinal structures appear normal.  There is massive splenomegaly which appears similar as on the CT from July 2023, but more prominent than on the PET/CT from 09/22/2023. The spleen measures 10 x 18 cm axial and 23 cm craniocaudad. There is normal enhancement of the splenic vein. No hemorrhage around the spleen. The liver contains numerous cysts but appears otherwise unchanged. The gallbladder appears normal. There is no biliary ductal dilatation.  Parenchyma of the pancreas, adrenals, and kidneys appears essentially normal with several renal cysts appearing unchanged. No hydronephrosis or perinephric stranding.  Retroperitoneal and bilateral pelvic sidewall adenopathy is again observed. Some of those nodes are stable but there are numerous new or increased size abnormal bulky lymph nodes throughout the retroperitoneum. Most cephalad, there is a 17 x 13 mm node between the descending thoracic aorta and the thoracic esophagus on image 15. Bulky adenopathy is observed along the right  inguinal canal, not to be confused with a retracted testicle and nicely demonstrated on coronal reformatted images. In the right lower quadrant, at the upper edge of the inguinal canal there is a 33 x 26 mm node. Right anterior lateral pelvic sidewall node on image 144 measures 14 mm short axis compared with 10-11 mm on 09/22/2023. Anterior left pelvic sidewall node on image 145 measures 13 mm short axis compared with 8 mm in September 2023.  The right common iliac chain node which measured 14 mm short axis 07/25/2023 and 13 mm short axis 09/22/2023 now measures 15 mm short axis. There are other new or increasing lymph nodes in the precaval and aortocaval spaces. Left para-aortic infrarenal musa conglomerate measures 19 mm short axis, unchanged.  The stomach, small bowel, and large bowel appear normal. There is no evidence of mesenteric ischemia, pneumatosis intestinalis, or significant bowel inflammation. No free fluid.  Degenerative change throughout the spine without compression deformity, bone lesion, or paraspinal inflammation. Ankylosis across the left sacroiliac joint. No hip effusion. Radiotherapy seeds in the prostate gland.      Increasing abdominal, pelvic, and lower posterior mediastinal adenopathy. Marked splenomegaly also appears more pronounced today compared with the 09/22/2023 PET/CT. However, there is no CT evidence of acute infection/inflammation.  I discussed the case with Dr. Haider at around 10:45 a.m. on the morning of the exam.   Radiation dose reduction techniques were utilized, including automated exposure control and exposure modulation based on body size.   This report was finalized on 3/29/2024 11:45 AM by Dr. Clifton Salguero M.D on Workstation: [id number]      XR Chest 1 View    Result Date: 3/29/2024  PORTABLE CHEST X-RAY  HISTORY: Upper abdominal pain.  Portable chest x-ray is provided. Correlation: July 6, 2023.  FINDINGS: Right IJ Mediport catheter. The cardiomediastinal  silhouette is normal. The lungs are clear. The costophrenic sulci are dry and the bones appear normal. There is no pneumothorax.      Negative.  This report was finalized on 3/29/2024 9:47 AM by Dr. Clifton Salguero M.D on Workstation: [id number]       I ordered the above noted radiological studies. Reviewed by me and discussed with radiologist.  See dictation for official radiology interpretation.      PROCEDURES    Critical Care    Performed by: Parag Haider MD  Authorized by: Parag Haider MD    Critical care provider statement:     Critical care time (minutes): 40.    Critical care time was exclusive of:  Separately billable procedures and treating other patients    Critical care was necessary to treat or prevent imminent or life-threatening deterioration of the following conditions:  Sepsis    Critical care was time spent personally by me on the following activities:  Blood draw for specimens, development of treatment plan with patient or surrogate, discussions with consultants, discussions with primary provider, examination of patient, obtaining history from patient or surrogate, review of old charts, re-evaluation of patient's condition, pulse oximetry, ordering and review of radiographic studies, ordering and review of laboratory studies and ordering and performing treatments and interventions (Acute hematological emergency)    I assumed direction of critical care for this patient from another provider in my specialty: no      Care discussed with: admitting provider          MEDICATIONS GIVEN IN ER    Medications   sodium chloride 0.9 % flush 10 mL (has no administration in time range)   sodium chloride 0.9 % flush 10 mL (10 mL Intravenous Given 3/29/24 1219)   sodium chloride 0.9 % flush 10 mL (has no administration in time range)   sodium chloride 0.9 % infusion 40 mL (has no administration in time range)   nitroglycerin (NITROSTAT) SL tablet 0.4 mg (has no administration in time range)   sodium  chloride 0.9 % with KCl 20 mEq/L infusion (has no administration in time range)   HYDROmorphone (DILAUDID) injection 0.5 mg (0.5 mg Intravenous Given 3/29/24 1450)     And   naloxone (NARCAN) injection 0.4 mg (has no administration in time range)   Potassium Replacement - Follow Nurse / BPA Driven Protocol (has no administration in time range)   piperacillin-tazobactam (ZOSYN) 3.375 g IVPB in 100 mL NS MBP (CD) (has no administration in time range)   ondansetron (ZOFRAN) injection 4 mg (4 mg Intravenous Given 3/29/24 1452)   potassium chloride (KLOR-CON) packet 40 mEq (40 mEq Oral Given 3/29/24 1449)   potassium chloride (K-DUR,KLOR-CON) ER tablet 40 mEq (has no administration in time range)   sodium chloride 0.9 % bolus 500 mL (0 mL Intravenous Stopped 3/29/24 1000)   fentaNYL citrate (PF) (SUBLIMAZE) injection 75 mcg (75 mcg Intravenous Given 3/29/24 0920)   ondansetron (ZOFRAN) injection 4 mg (4 mg Intravenous Given 3/29/24 0920)   iopamidol (ISOVUE-300) 61 % injection 100 mL (85 mL Intravenous Given 3/29/24 1000)   sodium chloride 0.9 % bolus 2,994 mL (0 mL Intravenous Stopped 3/29/24 1317)   piperacillin-tazobactam (ZOSYN) 3.375 g IVPB in 100 mL NS MBP (CD) (0 g Intravenous Stopped 3/29/24 1141)         All labs have been independently reviewed by me.  All radiology studies have been reviewed by me and I discussed with radiologist dictating the report when indicated below.  All EKG's independently viewed and interpreted by me.  Discussion below represents my analysis of pertinent findings related to patient's condition, differential diagnosis, treatment plan and final disposition.        PROGRESS, DATA ANALYSIS, CONSULTS, AND MEDICAL DECISION MAKING    Differential diagnosis includes   - hepatobiliary pathology such as cholecystitis, cholangitis, and symptomatic cholelithiasis  -PUD  -Mesenteric ischemia  - Pancreatitis  - Dyspepsia  - Small bowel or large bowel obstruction  - Appendicitis  - Diverticulitis  -  UTI including pyelonephritis  - Ureteral stone  - Zoster  - Colitis, including infectious and ischemic  - Atypical ACS  This is a gentleman that looks uncomfortable.  He is requesting medicine for pain and nausea.  I am not quite 100% certain of the etiology of his symptoms on initial exam and history.  This gentleman will very likely need to be admitted to the hospital.  Technically he does not have a fever but his temperature is 99.5.  Informed him of the test that we will order.  All questions answered at this time.      ED Course as of 03/29/24 1610   Fri Mar 29, 2024   1003 Lactate(!!): 5.0 [MM]   1003 Procalcitonin(!): 0.29 [MM]   1003 WBC(!): 15.57 [MM]   1003 Anion Gap(!): 18.2 [MM]   1003 CO2(!): 21.8 [MM]   1019 Procalcitonin(!): 0.29 [MM]   1019 WBC(!): 15.57 [MM]   1020 Potassium(!): 3.3 [MM]   1020 CO2(!): 21.8 [MM]   1020 Anion Gap(!): 18.2 [MM]   1020 Hemoglobin(!): 12.3 [MM]   1020 Patient history of CLL.  1 month ago his white count was 3.74.    Patient has chronic anemia. [MM]   1021 Patient CO2 is a little low as well as anion gap is elevated.  Lactic acid is elevated as well. [MM]   1047 Platelets(!): 68  New drop in platelets from 200 from 1 month ago. [MM]   1054 I have reevaluated this patient.  He has pain has significantly improved.  He essentially states that his pain is 100% resolved.  Repeat abdominal exam is benign.  He has a abdomen is soft.  Does not report tenderness with palpation.  I informed him and his friend the results of the lab work as well as a CT scan. [MM]   1054 I clarified with both of them his serious medical condition.  Informed him of the calls that I have to Dr. Salomon as well as general surgery consultation and hematology oncology consultation. [MM]   1055 I did discuss with the radiologist Dr. Clifton Salguero the results of the CT scan of the abdomen pelvis.  Patient has massive splenomegaly with no significant change from images from last year.  He has progression of  his lymphadenopathy most prominent in the right inguinal area.  He has chronic lymphadenopathy as well.  There is no signs of mesenteric ischemia.  His mesenteric vessels enhance appropriately.  There is no signs of bowel inflammation.  There is no free air.  There is no definitive signs of infectious etiology seen on the CT scan.  Gallbladder does not look infectious as well.  Please see complete dictated report from radiologist [MM]   1102 I did discuss the case with Dr. Salomon.  He is this patient's  doctor.  I informed him of the patient's presenting symptoms and results of the CT scan.  All questions answered.  He obviously clarifies this patient will be admitted to his service to a monitored bed.  Informed him of the consult side made. [MM]   1110 I did discuss the case with the oncologist Dr. Salma Cole and she looked at some of the notes as well as previous imaging.  Informed her of the patient's pending symptoms and results of tests and CT scan here.  Either herself or Dr. Soto will consult on this patient.  Agrees with admission and agrees with antibiotic coverage at this time.  All questions answered [MM]   1143 I did discuss the case with the general surgeon Dr. Witt.  Informed him of the patient's presenting symptoms and results of the tests and his improvement with the treatment down here.  He will consult. [MM]   1244 Lactate(!!): 3.6 [MM]      ED Course User Index  [MM] Parag Haider MD       AS OF 16:10 EDT VITALS:    BP - 142/80  HR - 90  TEMP - 99 °F (37.2 °C) (Oral)  02 SATS - 93%    SOCIAL DETERMINANTS OF HEALTH THAT IMPACT OR LIMIT CARE (For example..Homelessness,safe discharge, inability to obtain care, follow up, or prescriptions):      DIAGNOSIS  Final diagnoses:   Generalized abdominal pain   CLL (chronic lymphocytic leukemia) with worsening lymphadenopathy in abdomen and pelvis   Lactic acid increased   Leukocytosis, unspecified type   Thrombocytopenia   Splenomegaly          DISPOSITION  I have reviewed the test results with my patient and explained the current treatment plan.  I answered all of the patient's questions.  The patient will be admitted to monitor bed at this time.  The patient is not hypotensive and is tolerating their current disease condition well enough for a monitored bed at this time.  The patient's current condition does not require intensive care treatment at this time.            DICTATED UTILIZING DRAGON DICTATION    Note Disclaimer: At [facility], we believe that sharing information builds trust and better relationships. You are receiving this note because you recently visited [facility]. It is possible you will see health information before a provider has talked with you about it. This kind of information can be easy to misunderstand. To help you fully understand what it means for your health, we urge you to discuss this note with your provider.       Parag Haider MD  03/29/24 5880

## 2024-03-29 NOTE — CONSULTS
.     REASON FOR CONSULTATION:     Provide an opinion on any further workup or treatment worsening splenomegaly and retroperitoneal lymphadenopathy with the patient having history of diffuse large B-cell lymphoma in his CLL.                             REQUESTING PHYSICIAN: Parag Haider MD    RECORDS OBTAINED:  Records of the patient's history including those obtained from the referring provider were reviewed and summarized in detail.    HISTORY OF PRESENT ILLNESS:  The patient is a 78 y.o. 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  Patient reports he was at baseline condition, and yesterday afternoon started having abdominal pain around umbilical/lower abdomen area associated with nausea.  Patient reports abdomen pain is constant.  He also reports having poor appetite since this past Sunday which is 5 days ago.  He denies low fever sweating or chills.      Patient also reported generalized weakness in the past 24 hours also and did not drink either too much at all.    This patient has been followed by my colleague Dr. Schroeder because of his lymphoma.  He was initially diagnosed of chronic lymphoid leukemia in the past, treated with Rituxan plus Bendamustine.  He then had a disease progression in January 2023 and was started on acalabrutinib.  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  There was some delay of chemotherapy due to side effects.     Patient had achieved a very good clinical response, by laboratory studies as well as in the radiology study including PET scan.    Patient had a recent follow-up with Dr. Schroeder on 2/27/2024, about 3 weeks after his COVID  infection.  Patient got infected through his daughter.     Nevertheless patient presented today to [facility] emergency room for evaluation.  Laboratory studies today 3/29/2024 reported leukocytosis with WBC 15,180, lymphocytes 12,600, neutrophils 910, monocytes 1210, platelets 67,000 and hemoglobin 11.2.  Chemistry lab reported elevated lactate 3.6, creatinine 0.80, potassium 3.2, sodium 131, chloride 93, and that without liver function panel.    Earlier today laboratory study on 3/29/2024 reported mildly elevated AST 42, normal ALT 18, elevated total bilirubin 1.6, and normal alk phosphatase 74, potassium 3.3 and sodium 131 creatinine 0.94 and a calcium 9.0 with magnesium 1.7.      Past Medical History:   Diagnosis Date    Arthritis     Benign prostatic hyperplasia     FOLLOWED BY DR. VIVIEN PATEL    Biliary dyskinesia     Bunion of right foot     GREAT TOE    Bursitis of right hip 03/15/2018    CLL (chronic lymphocytic leukemia) 2016    FOLLOWED BY DR WALKER    Colon polyps     FOLLOWED BY DR. NEHA HAM    Constipation     Degeneration of lumbar intervertebral disc     Diverticulosis     Erectile dysfunction     Fracture of ankle 1975    GERD (gastroesophageal reflux disease)     Hallux valgus of left foot     Hyperlipidemia     MIXED    Hypertension     Inguinal hernia     Kidney stone 02/21/2009    SEEN AT [facility] ER    Knee swelling 2018    Localized, primary osteoarthritis     Lumbar radiculopathy     Lumbar stenosis     Lung mass     LEFT SIDE - SCAR TISSUE PER PATIENT     Osteoarthritis of right knee     Polyneuropathy     Prostate CA 03/2016    JACQUELYN 6, S/P XRT, FOLLOWED BY DR. VIVIEN PATEL, RADIATION SEEDS    PVC (premature ventricular contraction)     PT STATES WORKED UP YEARS AGO BY [facility] FOR POSSIBLE MURMUR - NO FOLLOW UP REQUIRED     RBBB     Sensory hearing loss, bilateral     Skin cancer     SQUAMOUS CELL CARCINOMA OF FACE    Substernal goiter     Thyromegaly 12/2016     ADMITTED TO [facility]    Vitamin D deficiency      Past Surgical History:   Procedure Laterality Date    BRONCHOSCOPY N/A 12/14/2016    Procedure: BRONCHOSCOPY WITH  BAL AND BIOPSY AND ENDOBRONCHIAL ULTRASOUND  AND NAVIGATION WITH BRUSHINGS AND BIOPSY AND BAL;  Surgeon: Pierre Manning MD;  Location: [facility] ENDOSCOPY;  Service:     COLONOSCOPY N/A 03/13/2019    5 MM SESSILE TUBULAR ADENOMA POLYP IN TRANSVERSE, MULTIPLE SMALL AND LARGE DIVERTICULA IN SIGMOID, RESCOPE IN 5 YRS, DR. NEAH HAM AT [facility]    COLONOSCOPY W/ POLYPECTOMY N/A 03/19/2015    3 TUBULAR ADENOMA POLYPS, 12 TUBULOVILLOUS POLYP RECTO-SIGMOID, DIVERTICULOSIS, RESCOPE IN 3 YRS, DR. NEHA HAM AT [facility]    EYE SURGERY Bilateral     CATARACT EXTRACTION WITH LENS IMPLANT, DR. GOVEA    FINGER NAIL SURGERY Right 2022    TORRES-PATEL    INGUINAL HERNIA REPAIR Left 11/02/2021    Procedure: LEFT OPEN INGUINAL HERNIA REPAIR;  Surgeon: Nixon Kolb MD;  Location: [facility] MAIN OR;  Service: General;  Laterality: Left;    PROSTATE RADIOACTIVE SEED IMPLANT N/A 09/06/2016    DR. HOA JARAMILLO AT [facility]    PROSTATE SURGERY N/A 03/11/2016    BIOPSY: ADENOCARCINOMA, DR. ZAID IBARRA    THYROID BIOPSY Bilateral 11/01/2017    NO B CELL OR T CELL LYMPHOMA, DONE AT [facility]    TOTAL KNEE ARTHROPLASTY Right 10/12/2022    Procedure: TOTAL KNEE ARTHROPLASTY;  Surgeon: Ran Rachel MD;  [place] [facility] [place] [facility];  Service: Orthopedics;  Laterality: Right;    VENOUS ACCESS DEVICE (PORT) INSERTION Right 7/6/2023    Procedure: INSERTION VENOUS ACCESS DEVICE;  Surgeon: Nixon oKlb MD;  [place] [facility] [place] [facility] [place];  Service: General;  Laterality: Right;       MEDICATIONS    Current Facility-Administered Medications:     HYDROmorphone (DILAUDID) injection 0.5 mg, 0.5 mg, Intravenous, Q2H PRN, 0.5 mg at 03/29/24 1450 **AND** naloxone (NARCAN) injection 0.4 mg, 0.4 mg, Intravenous, Q5 Min PRN, Jovani Salomon MD    nitroglycerin (NITROSTAT) SL tablet 0.4 mg, 0.4  mg, Sublingual, Q5 Min PRN, Jovani Salomon MD    ondansetron (ZOFRAN) injection 4 mg, 4 mg, Intravenous, Q6H PRN, Jovani Salomon MD, 4 mg at 03/29/24 1452    piperacillin-tazobactam (ZOSYN) 3.375 g IVPB in 100 mL NS MBP (CD), 3.375 g, Intravenous, Q8H, Jovani Salomon MD    potassium chloride (K-DUR,KLOR-CON) ER tablet 40 mEq, 40 mEq, Oral, Once, Jovani Salomon MD    potassium chloride (KLOR-CON) packet 40 mEq, 40 mEq, Oral, Q4H, Jovani Salomon MD, 40 mEq at 03/29/24 1449    Potassium Replacement - Follow Nurse / BPA Driven Protocol, , Does not apply, Meliza RODRÍGUEZ Mark, MD    [COMPLETED] Insert Peripheral IV, , , Once **AND** sodium chloride 0.9 % flush 10 mL, 10 mL, Intravenous, PRN, Parag Haider MD    sodium chloride 0.9 % flush 10 mL, 10 mL, Intravenous, Q12H, Jovani Salomon MD, 10 mL at 03/29/24 1219    sodium chloride 0.9 % flush 10 mL, 10 mL, Intravenous, PRN, Jovani Salomon MD    sodium chloride 0.9 % infusion 40 mL, 40 mL, Intravenous, PRN, Jovani Salomon MD    sodium chloride 0.9 % with KCl 20 mEq/L infusion, 100 mL/hr, Intravenous, Continuous, Jovani Salomon MD    ALLERGIES:   No Known Allergies    SOCIAL HISTORY:       Social History     Socioeconomic History    Marital status:      Spouse name: No    Years of education: College   Tobacco Use    Smoking status: Never    Smokeless tobacco: Never   Vaping Use    Vaping status: Never Used   Substance and Sexual Activity    Alcohol use: No    Drug use: Never    Sexual activity: Yes     Partners: Female     Birth control/protection: None         FAMILY HISTORY:  Family History   Problem Relation Age of Onset    Heart disease Father         Rheumatic heart disease    Hypertension Father     Osteoporosis Father     Stroke Mother     Osteoporosis Mother     No Known Problems Daughter     Malig Hyperthermia Neg Hx        REVIEW OF SYSTEMS:  Review of Systems    Constitutional:  Positive for activity change, appetite change, diaphoresis (Mild) and fatigue. Negative for chills, fever and unexpected weight change.   HENT:  Negative for mouth sores, nosebleeds, sore throat and trouble swallowing.    Respiratory:  Negative for cough and shortness of breath.    Cardiovascular:  Negative for chest pain and leg swelling.   Gastrointestinal:  Positive for abdominal pain and nausea. Negative for anal bleeding, blood in stool and vomiting.   Genitourinary:  Negative for dysuria and hematuria.   Musculoskeletal:  Negative for arthralgias and joint swelling.   Skin:  Negative for color change.   Allergic/Immunologic: Positive for immunocompromised state (Previous chemotherapy in 2023).   Neurological:  Negative for syncope and headaches.   Hematological:  Negative for adenopathy. Does not bruise/bleed easily.   Psychiatric/Behavioral:  Negative for confusion.               Vitals:    03/29/24 0859 03/29/24 1010 03/29/24 1201 03/29/24 1401   BP:  119/73 118/64 142/80   BP Location:    Right arm   Patient Position:    Lying   Pulse: 103 91 83 90   Resp:    18   Temp:    99 °F (37.2 °C)   TempSrc:    Oral   SpO2:  93%  93%   Weight:       Height:             12/29/2023     1:34 PM   Current Status   ECOG score 0      PHYSICAL EXAM:      CONSTITUTIONAL:  Vital signs reviewed.  Well-developed well-nourished male.  No distress, looks comfortable.  EYES:  Conjunctivae and lids unremarkable.    EARS,NOSE,MOUTH,THROAT: Very poor hearing..  Lips appear unremarkable.  I could not palpate the right inguinal lymphadenopathy despite seeing it on the CT scan examination.   RESPIRATORY:  Normal respiratory effort.  Lungs clear to auscultation bilaterally.  CARDIOVASCULAR:  Normal S1, S2.  No murmurs.  No significant lower extremity edema.  GASTROINTESTINAL: Abdomen appears unremarkable.  Nontender.  No hepatomegaly.  No splenomegaly.  LYMPHATIC:  No cervical, supraclavicular, axillary or inguinal  lymphadenopathy.  MUSCULOSKELETAL:  Unremarkable digits/nails.  No cyanosis or clubbing.  SKIN:  Warm.  No rashes.  PSYCHIATRIC:  Normal judgment and insight.  Normal mood and affect.      RECENT LABS:    CBC:      Lab 03/29/24  1216 03/29/24 0913   WBC 15.18* 15.57*   HEMOGLOBIN 11.2* 12.3*   HEMATOCRIT 32.7* 35.9*   PLATELETS 67* 68*   NEUTROS ABS 0.91* 7.08*   MCV 82.6 82.3     CMP:        Lab 03/29/24  1216 03/29/24 0913   SODIUM 131* 131*   POTASSIUM 3.2* 3.3*   CHLORIDE 93* 91*   CO2 22.8 21.8*   ANION GAP 15.2* 18.2*   BUN 20 23   CREATININE 0.80 0.94   EGFR 89.5 81.9   GLUCOSE 95 98   CALCIUM 8.4* 9.0   MAGNESIUM 1.7 1.7   TOTAL PROTEIN  --  5.6*   ALBUMIN  --  3.8   GLOBULIN  --  1.8   ALT (SGPT)  --  18   AST (SGOT)  --  42*   BILIRUBIN  --  1.6*   ALK PHOS  --  74   LIPASE  --  8*     Lab Results   Component Value Date    BDHXQDFA56 463 01/13/2023     Lab Results   Component Value Date    FOLATE 12.90 12/30/2022     Lab Results   Component Value Date    IRON 41 (L) 12/30/2022    TIBC 309 12/30/2022    FERRITIN 136.00 12/30/2022           IMAGING:  CT Abdomen Pelvis With Contrast  Narrative: ABDOMEN AND PELVIS CT WITH CONTRAST     HISTORY: Periumbilical abdominal pain. Prostate cancer and CLL with  Jon's transformation.     TECHNIQUE: Abdomen and pelvis CT was performed. Using 85 mL Isovue-300  IV contrast and correlated with PET/CT 09/22/2023 and abdomen and pelvis  CT 07/25/2023.     FINDINGS: Visualized lung bases are clear and lower mediastinal  structures appear normal.     There is massive splenomegaly which appears similar as on the CT from  July 2023, but more prominent than on the PET/CT from 09/22/2023. The  spleen measures 10 x 18 cm axial and 23 cm craniocaudad. There is normal  enhancement of the splenic vein. No hemorrhage around the spleen. The  liver contains numerous cysts but appears otherwise unchanged. The  gallbladder appears normal. There is no biliary ductal dilatation.      Parenchyma of the pancreas, adrenals, and kidneys appears essentially  normal with several renal cysts appearing unchanged. No hydronephrosis  or perinephric stranding.     Retroperitoneal and bilateral pelvic sidewall adenopathy is again  observed. Some of those nodes are stable but there are numerous new or  increased size abnormal bulky lymph nodes throughout the  retroperitoneum. Most cephalad, there is a 17 x 13 mm node between the  descending thoracic aorta and the thoracic esophagus on image 15. Bulky  adenopathy is observed along the right inguinal canal, not to be  confused with a retracted testicle and nicely demonstrated on coronal  reformatted images. In the right lower quadrant, at the upper edge of  the inguinal canal there is a 33 x 26 mm node. Right anterior lateral  pelvic sidewall node on image 144 measures 14 mm short axis compared  with 10-11 mm on 09/22/2023. Anterior left pelvic sidewall node on image  145 measures 13 mm short axis compared with 8 mm in September 2023.     The right common iliac chain node which measured 14 mm short axis  07/25/2023 and 13 mm short axis 09/22/2023 now measures 15 mm short  axis. There are other new or increasing lymph nodes in the precaval and  aortocaval spaces. Left para-aortic infrarenal musa conglomerate  measures 19 mm short axis, unchanged.     The stomach, small bowel, and large bowel appear normal. There is no  evidence of mesenteric ischemia, pneumatosis intestinalis, or  significant bowel inflammation. No free fluid.     Degenerative change throughout the spine without compression deformity,  bone lesion, or paraspinal inflammation. Ankylosis across the left  sacroiliac joint. No hip effusion. Radiotherapy seeds in the prostate  gland.     Impression: Increasing abdominal, pelvic, and lower posterior  mediastinal adenopathy. Marked splenomegaly also appears more pronounced  today compared with the 09/22/2023 PET/CT. However, there is no  CT  evidence of acute infection/inflammation.     I discussed the case with Dr. Haider at around 10:45 a.m. on the  morning of the exam.        Radiation dose reduction techniques were utilized, including automated  exposure control and exposure modulation based on body size.        This report was finalized on 3/29/2024 11:45 AM by Dr. Clifton Salguero M.D on Workstation: [id number]     XR Chest 1 View  Narrative: PORTABLE CHEST X-RAY     HISTORY: Upper abdominal pain.     Portable chest x-ray is provided. Correlation: July 6, 2023.     FINDINGS: Right IJ Mediport catheter. The cardiomediastinal silhouette  is normal. The lungs are clear. The costophrenic sulci are dry and the  bones appear normal. There is no pneumothorax.     Impression: Negative.     This report was finalized on 3/29/2024 9:47 AM by Dr. Clifton Salguero M.D on Workstation: [id number]               Assessment & Plan     *.  History of SLL status post Rituxan plus Bendamustine, and had Jon's transformation in 2023 for which he had 5 cycles Rituxan plus CHOP with the good clinical response.    *.  Abdomen pain new onset for the past 2 days.  Patient also feels malaise nausea for about 5 days.  Denies fever chills, he did have mild sweating but not drenching sweating.  Has poor appetite for the past 4 5 days, no significant weight loss.  Patient was seen by general surgeon Dr. Witt, no acute abdomen or indication for surgical intervention.  CT scan for abdomen pelvis today reported significant worsening  Splenomegaly, retroperitoneal lymphadenopathy in the pelvic lymphadenopathy.  Laboratory study also showed newly developed leukocytosis, lymphocytosis, neutropenia, and thrombocytopenia worsening anemia.  I think this patient has disease progression from his lymphoma.  I recommended following: With ultrasound-guided right inguinal lymph node biopsy for comprehensive study to distinguish of recurrent diffuse large B-cell lymphoma versus  progression of SLL/CLL, obtain PET scan as inpatient, and also subsequently start first cycle chemotherapy as inpatient.  With the patient having symptoms and significant disease progression, I do not think arrange outpatient chemotherapy is in the best interest of this patient because of the elevated time for biopsy, PET scan and initiation of chemotherapy.    Will also check laboratory studies for LDH and uric acid.  This patient also has leukocytosis/lymphocytosis, I also recommended to have peripheral blood for flow cytometry study.  This also will help distinguish between CLL and diffuse large B-cell lymphoma.  I think this patient may have leukemia phase of the diffuse large B-cell lymphoma.      *.  Worsening anemia and newly developed thrombocytopenia.  This is due to lymphoma progression.  Nevertheless I recommended to obtain iron study ferritin iron profile, together with B12 and folate.  Will also check LDH.  Patient does have elevated total bilirubin.    *.  Insomnia.  Patient reports poor sleeping quality, especially in the hospital.  Patient was taking melatonin at home not really helping.  I recommended to start the patient Ambien as needed.      PLAN:  Obtain ultrasound-guided core needle biopsy of the right inguinal lymphadenopathy for comprehensive study including flow cytometry, and histology study per protocol..  Obtain PET scan as soon as possible.   Laboratory study for ferritin iron profile B12 folate.  LDH, liver function panel, and direct bilirubin.  Check PT/INR PTT in the morning in preparation for biopsy.  Monitor CBC in the morning.  Peripheral blood for flow cytometry study.   Start Ambien 5 mg nightly as needed.   Other management per primary team.    This patient is completely new to me for all the problem listed above.    I reviewed medical records including ER note, notes from general surgery, and also from my colleague Dr. Schroeder.    I independently reviewed the CT scan images  obtained earlier today on 3/29/2024, and also his previous PET scan images from 9/22/2023.    I discussed with the patient for the evaluation and management plan.  He voiced understanding and appreciation.    I spent 82 minutes caring for Jeff on this date of service. This time includes time spent by me in the following activities: preparing for the visit, reviewing tests, obtaining and/or reviewing a separately obtained history, performing a medically appropriate examination and/or evaluation, counseling and educating the patient/family/caregiver, ordering medications, tests, or procedures, referring and communicating with other health care professionals, documenting information in the medical record, independently interpreting results and communicating that information with the patient/family/caregiver and care coordination         ROSALIND JOHN M.D., Ph.D.

## 2024-03-30 PROBLEM — E79.0 ELEVATED BLOOD URIC ACID LEVEL: Status: ACTIVE | Noted: 2024-03-30

## 2024-03-30 LAB
ALBUMIN SERPL-MCNC: 3.2 G/DL (ref 3.5–5.2)
ALP SERPL-CCNC: 56 U/L (ref 39–117)
ALT SERPL W P-5'-P-CCNC: 16 U/L (ref 1–41)
ANION GAP SERPL CALCULATED.3IONS-SCNC: 14 MMOL/L (ref 5–15)
APTT PPP: 30.8 SECONDS (ref 22.7–35.4)
AST SERPL-CCNC: 26 U/L (ref 1–40)
BASOPHILS # BLD MANUAL: 0.17 10*3/MM3 (ref 0–0.2)
BASOPHILS NFR BLD MANUAL: 1 % (ref 0–1.5)
BILIRUB CONJ SERPL-MCNC: 0.5 MG/DL (ref 0–0.3)
BILIRUB INDIRECT SERPL-MCNC: 0.8 MG/DL
BILIRUB SERPL-MCNC: 1.3 MG/DL (ref 0–1.2)
BLASTS NFR BLD MANUAL: 8 % (ref 0–0)
BUN SERPL-MCNC: 21 MG/DL (ref 8–23)
BUN/CREAT SERPL: 21.9 (ref 7–25)
CALCIUM SPEC-SCNC: 8.1 MG/DL (ref 8.6–10.5)
CHLORIDE SERPL-SCNC: 99 MMOL/L (ref 98–107)
CO2 SERPL-SCNC: 23 MMOL/L (ref 22–29)
CREAT SERPL-MCNC: 0.96 MG/DL (ref 0.76–1.27)
D-LACTATE SERPL-SCNC: 3.1 MMOL/L (ref 0.5–2)
DEPRECATED RDW RBC AUTO: 43.1 FL (ref 37–54)
EGFRCR SERPLBLD CKD-EPI 2021: 79.9 ML/MIN/1.73
EOSINOPHIL # BLD MANUAL: 0.34 10*3/MM3 (ref 0–0.4)
EOSINOPHIL NFR BLD MANUAL: 2 % (ref 0.3–6.2)
ERYTHROCYTE [DISTWIDTH] IN BLOOD BY AUTOMATED COUNT: 14.5 % (ref 12.3–15.4)
FERRITIN SERPL-MCNC: 801 NG/ML (ref 30–400)
FOLATE SERPL-MCNC: 6.02 NG/ML (ref 4.78–24.2)
GLUCOSE SERPL-MCNC: 77 MG/DL (ref 65–99)
HCT VFR BLD AUTO: 30 % (ref 37.5–51)
HGB BLD-MCNC: 10.1 G/DL (ref 13–17.7)
HYPOCHROMIA BLD QL: ABNORMAL
INR PPP: 1.3 (ref 0.9–1.1)
IRON 24H UR-MRATE: 34 MCG/DL (ref 59–158)
IRON SATN MFR SERPL: 20 % (ref 20–50)
LYMPHOCYTES # BLD MANUAL: 9.78 10*3/MM3 (ref 0.7–3.1)
LYMPHOCYTES NFR BLD MANUAL: 10 % (ref 5–12)
MCH RBC QN AUTO: 27.2 PG (ref 26.6–33)
MCHC RBC AUTO-ENTMCNC: 33.7 G/DL (ref 31.5–35.7)
MCV RBC AUTO: 80.9 FL (ref 79–97)
MONOCYTES # BLD: 1.69 10*3/MM3 (ref 0.1–0.9)
NEUTROPHILS # BLD AUTO: 3.54 10*3/MM3 (ref 1.7–7)
NEUTROPHILS NFR BLD MANUAL: 21 % (ref 42.7–76)
NRBC SPEC MANUAL: 1 /100 WBC (ref 0–0.2)
OVALOCYTES BLD QL SMEAR: ABNORMAL
PLAT MORPH BLD: NORMAL
PLATELET # BLD AUTO: 65 10*3/MM3 (ref 140–450)
PMV BLD AUTO: 9.4 FL (ref 6–12)
POTASSIUM SERPL-SCNC: 3.5 MMOL/L (ref 3.5–5.2)
POTASSIUM SERPL-SCNC: 3.7 MMOL/L (ref 3.5–5.2)
PROT SERPL-MCNC: 4.6 G/DL (ref 6–8.5)
PROTHROMBIN TIME: 16.4 SECONDS (ref 11.7–14.2)
RBC # BLD AUTO: 3.71 10*6/MM3 (ref 4.14–5.8)
SMUDGE CELLS BLD QL SMEAR: ABNORMAL
SODIUM SERPL-SCNC: 136 MMOL/L (ref 136–145)
TIBC SERPL-MCNC: 171 MCG/DL (ref 298–536)
TRANSFERRIN SERPL-MCNC: 115 MG/DL (ref 200–360)
VARIANT LYMPHS NFR BLD MANUAL: 12 % (ref 19.6–45.3)
VARIANT LYMPHS NFR BLD MANUAL: 46 % (ref 0–5)
VIT B12 BLD-MCNC: 437 PG/ML (ref 211–946)
WBC NRBC COR # BLD AUTO: 16.87 10*3/MM3 (ref 3.4–10.8)

## 2024-03-30 PROCEDURE — 82746 ASSAY OF FOLIC ACID SERUM: CPT | Performed by: INTERNAL MEDICINE

## 2024-03-30 PROCEDURE — 99233 SBSQ HOSP IP/OBS HIGH 50: CPT | Performed by: INTERNAL MEDICINE

## 2024-03-30 PROCEDURE — 80048 BASIC METABOLIC PNL TOTAL CA: CPT | Performed by: INTERNAL MEDICINE

## 2024-03-30 PROCEDURE — 25010000002 HYDROMORPHONE PER 4 MG: Performed by: INTERNAL MEDICINE

## 2024-03-30 PROCEDURE — 83540 ASSAY OF IRON: CPT | Performed by: INTERNAL MEDICINE

## 2024-03-30 PROCEDURE — 25010000002 PROCHLORPERAZINE 10 MG/2ML SOLUTION: Performed by: INTERNAL MEDICINE

## 2024-03-30 PROCEDURE — 85730 THROMBOPLASTIN TIME PARTIAL: CPT | Performed by: INTERNAL MEDICINE

## 2024-03-30 PROCEDURE — 84132 ASSAY OF SERUM POTASSIUM: CPT | Performed by: INTERNAL MEDICINE

## 2024-03-30 PROCEDURE — 82728 ASSAY OF FERRITIN: CPT | Performed by: INTERNAL MEDICINE

## 2024-03-30 PROCEDURE — 25010000002 ONDANSETRON PER 1 MG: Performed by: INTERNAL MEDICINE

## 2024-03-30 PROCEDURE — 83605 ASSAY OF LACTIC ACID: CPT | Performed by: EMERGENCY MEDICINE

## 2024-03-30 PROCEDURE — 80076 HEPATIC FUNCTION PANEL: CPT | Performed by: INTERNAL MEDICINE

## 2024-03-30 PROCEDURE — 25010000002 PIPERACILLIN SOD-TAZOBACTAM PER 1 G: Performed by: INTERNAL MEDICINE

## 2024-03-30 PROCEDURE — 85007 BL SMEAR W/DIFF WBC COUNT: CPT | Performed by: INTERNAL MEDICINE

## 2024-03-30 PROCEDURE — 25010000002 METOCLOPRAMIDE PER 10 MG: Performed by: INTERNAL MEDICINE

## 2024-03-30 PROCEDURE — 82607 VITAMIN B-12: CPT | Performed by: INTERNAL MEDICINE

## 2024-03-30 PROCEDURE — 85025 COMPLETE CBC W/AUTO DIFF WBC: CPT | Performed by: INTERNAL MEDICINE

## 2024-03-30 PROCEDURE — 99232 SBSQ HOSP IP/OBS MODERATE 35: CPT | Performed by: SURGERY

## 2024-03-30 PROCEDURE — 25010000002 SODIUM CHLORIDE 0.9 % WITH KCL 20 MEQ 20-0.9 MEQ/L-% SOLUTION: Performed by: INTERNAL MEDICINE

## 2024-03-30 PROCEDURE — 84466 ASSAY OF TRANSFERRIN: CPT | Performed by: INTERNAL MEDICINE

## 2024-03-30 PROCEDURE — 85610 PROTHROMBIN TIME: CPT | Performed by: INTERNAL MEDICINE

## 2024-03-30 RX ORDER — ALLOPURINOL 300 MG/1
300 TABLET ORAL DAILY
Status: DISCONTINUED | OUTPATIENT
Start: 2024-03-30 | End: 2024-04-01

## 2024-03-30 RX ORDER — POLYETHYLENE GLYCOL 3350 17 G/17G
17 POWDER, FOR SOLUTION ORAL DAILY
Status: DISCONTINUED | OUTPATIENT
Start: 2024-03-30 | End: 2024-04-06 | Stop reason: HOSPADM

## 2024-03-30 RX ORDER — FOLIC ACID 1 MG/1
1 TABLET ORAL DAILY
Status: DISCONTINUED | OUTPATIENT
Start: 2024-03-30 | End: 2024-04-06 | Stop reason: HOSPADM

## 2024-03-30 RX ORDER — CHOLECALCIFEROL (VITAMIN D3) 125 MCG
1000 CAPSULE ORAL DAILY
Status: DISCONTINUED | OUTPATIENT
Start: 2024-03-30 | End: 2024-04-06 | Stop reason: HOSPADM

## 2024-03-30 RX ORDER — BISACODYL 10 MG
10 SUPPOSITORY, RECTAL RECTAL DAILY PRN
Status: DISCONTINUED | OUTPATIENT
Start: 2024-03-30 | End: 2024-04-06 | Stop reason: HOSPADM

## 2024-03-30 RX ADMIN — ALLOPURINOL 300 MG: 300 TABLET ORAL at 16:33

## 2024-03-30 RX ADMIN — HYDROMORPHONE HYDROCHLORIDE 0.5 MG: 1 INJECTION, SOLUTION INTRAMUSCULAR; INTRAVENOUS; SUBCUTANEOUS at 23:29

## 2024-03-30 RX ADMIN — METOCLOPRAMIDE 5 MG: 5 INJECTION, SOLUTION INTRAMUSCULAR; INTRAVENOUS at 12:55

## 2024-03-30 RX ADMIN — METOCLOPRAMIDE 5 MG: 5 INJECTION, SOLUTION INTRAMUSCULAR; INTRAVENOUS at 18:49

## 2024-03-30 RX ADMIN — FOLIC ACID 1 MG: 1 TABLET ORAL at 17:14

## 2024-03-30 RX ADMIN — ONDANSETRON 4 MG: 2 INJECTION INTRAMUSCULAR; INTRAVENOUS at 06:12

## 2024-03-30 RX ADMIN — HYDROMORPHONE HYDROCHLORIDE 0.5 MG: 1 INJECTION, SOLUTION INTRAMUSCULAR; INTRAVENOUS; SUBCUTANEOUS at 06:12

## 2024-03-30 RX ADMIN — Medication 10 ML: at 08:28

## 2024-03-30 RX ADMIN — PIPERACILLIN AND TAZOBACTAM 3.38 G: 3; .375 INJECTION, POWDER, FOR SOLUTION INTRAVENOUS at 16:32

## 2024-03-30 RX ADMIN — HYDROMORPHONE HYDROCHLORIDE 0.5 MG: 1 INJECTION, SOLUTION INTRAMUSCULAR; INTRAVENOUS; SUBCUTANEOUS at 18:49

## 2024-03-30 RX ADMIN — HYDROMORPHONE HYDROCHLORIDE 0.5 MG: 1 INJECTION, SOLUTION INTRAMUSCULAR; INTRAVENOUS; SUBCUTANEOUS at 15:19

## 2024-03-30 RX ADMIN — PIPERACILLIN AND TAZOBACTAM 3.38 G: 3; .375 INJECTION, POWDER, FOR SOLUTION INTRAVENOUS at 08:28

## 2024-03-30 RX ADMIN — POTASSIUM CHLORIDE AND SODIUM CHLORIDE 100 ML/HR: 900; 150 INJECTION, SOLUTION INTRAVENOUS at 14:37

## 2024-03-30 RX ADMIN — POTASSIUM CHLORIDE AND SODIUM CHLORIDE 100 ML/HR: 900; 150 INJECTION, SOLUTION INTRAVENOUS at 01:27

## 2024-03-30 RX ADMIN — Medication 5 MG: at 23:29

## 2024-03-30 RX ADMIN — Medication 1000 MCG: at 17:14

## 2024-03-30 RX ADMIN — ONDANSETRON 4 MG: 2 INJECTION INTRAMUSCULAR; INTRAVENOUS at 15:19

## 2024-03-30 RX ADMIN — PIPERACILLIN AND TAZOBACTAM 3.38 G: 3; .375 INJECTION, POWDER, FOR SOLUTION INTRAVENOUS at 01:27

## 2024-03-30 RX ADMIN — PROCHLORPERAZINE EDISYLATE 10 MG: 5 INJECTION INTRAMUSCULAR; INTRAVENOUS at 10:31

## 2024-03-30 RX ADMIN — HYDROMORPHONE HYDROCHLORIDE 0.5 MG: 1 INJECTION, SOLUTION INTRAMUSCULAR; INTRAVENOUS; SUBCUTANEOUS at 10:31

## 2024-03-30 RX ADMIN — METOCLOPRAMIDE 5 MG: 5 INJECTION, SOLUTION INTRAMUSCULAR; INTRAVENOUS at 04:55

## 2024-03-30 RX ADMIN — POLYETHYLENE GLYCOL 3350 17 G: 17 POWDER, FOR SOLUTION ORAL at 11:14

## 2024-03-30 NOTE — PROGRESS NOTES
Colorectal & General Surgery  Progress Note    Patient: Jeff Bass  YOB: 1945  MRN: [id number]      Assessment  Jeff Bass is a 78 y.o. male with chronic lymphocytic leukemia who presents with lactic acidosis and leukocytosis in the setting of abdominal pain.  His abdominal pain has resolved and his exam is relatively benign today.  LFTs normal, bilirubin downtrending.  Lactate stable in the threes.  No indication for urgent surgical intervention.  Agree with empiric Zosyn.  While there is no radiographic abnormality of his gallbladder on CT scan yesterday, could consider right upper quadrant ultrasound if unable to identify any other source or if he fails to improve.    Feel free to call anytime with questions or concerns.      Subjective  No acute events overnight.  Abdominal pain improved.  Resting comfortably today.  Complains of a little bit of nausea.    Objective    Vitals:    03/30/24 0740   BP: 148/78   Pulse:    Resp: 16   Temp: 98.2 °F (36.8 °C)   SpO2: 94%       Physical Exam  Constitutional: Well-developed well-nourished, no acute distress  Neck: Supple, trachea midline  Respiratory: No increased work of breathing, Symmetric excursion  Cardiovascular: Well pefursed, no jugular venous distention evident   Abdominal: Soft, mildly tender at most in the infraumbilical abdomen, non-distended  Skin: Warm, dry, no rash on visualized skin surfaces  Psychiatric: Alert and oriented ×3, normal affect     Laboratory Results  I have personally reviewed CBC with WBC 16, hemoglobin 10, platelets 65.  INR 1.3.  Lactate 3.1.  CMP with creatinine 0.96, bicarb 23, alkaline phosphatase 56, albumin 3.2, AST 26, ALT 16, total bilirubin 1.3, direct bilirubin 0.5, indirect bilirubin 0.8.    Radiology  None to review         Juaquin Witt MD  Colorectal & General Surgery  [facility]    [address], [address]  [place], [place], [address]  P: [phone]  F: [phone]

## 2024-03-30 NOTE — PLAN OF CARE
Goal Outcome Evaluation:  Patient continues with pain and nausea, but good relief with medications,poor appetite and up with help. Continues on IVF and antibiotics.

## 2024-03-30 NOTE — PROGRESS NOTES
Sodium and potassium normal this morning  PLatelets 30,000  Hemoglobin 10    WBC16,000 lympocytosis  Slept ok with Melatonin  Reglan is effective for hiccups  Appreciate Dr. Perkins's evaluation  PET scan on Monday  Lymph node Bx on MONday    No abdominal pain, but having nausea and dryheaving  Main emphasis now is on the lymphoma but need to consider other etiiologies of the nausea and abdominal pain and hiccups. Lymphocytosis is not consistent with cholecystitis. But need to consider gall bladder evaluation. He had abnormal HIDA in past and had gall bladder sludge.    Plan:  PET as soon as possible              Lymph node biopsy              Continue symptomatic treatment for pain, nausea and hiccups              Discontinue telemetry    Frank Ruiz md

## 2024-03-30 NOTE — PROGRESS NOTES
LOS: 1 day   Patient Care Team:  Jovani Salomon MD as PCP - General (Internal Medicine)  Gerry Schroeder MD as Consulting Physician (Hematology and Oncology)  Stephanie Toledo PA as Referring Physician (Physician Assistant)    Chief Complaint: Abdomen pain.  Recurrent lymphoma who is splenomegaly and progressive retroperitoneal lymphadenopathy.    Interval History:  On 3/30/2024  Abdomen pain is reasonably controlled severity 8/10.  Had a good bowel movement.  Afebrile.      HISTORY OF PRESENT ILLNESS:  The patient is a 78 y.o. 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 emergency room this noon, because of nausea and abdomen pain.  Patient reports he was at baseline condition, and yesterday afternoon started having abdominal pain around umbilical/lower abdomen area associated with nausea.  Patient reports abdomen pain is constant.  He also reports having poor appetite since this past Sunday which is 5 days ago.  He denies low fever sweating or chills.       Patient also reported generalized weakness in the past 24 hours also and did not drink either too much at all.    A comprehensive 14 point review of systems was performed and was negative except as mentioned.    Vital Signs:  Temp:  [98.1 °F (36.7 °C)-99 °F (37.2 °C)] 98.2 °F (36.8 °C)  Heart Rate:  [] 103  Resp:  [16-20] 16  BP: (142-148)/(75-83) 148/78    Intake/Output Summary (Last 24 hours) at 3/30/2024 1210  Last data filed at 3/30/2024 0600  Gross per 24 hour   Intake 871.67 ml   Output 650 ml   Net 221.67 ml       Physical Exam:   General Appearance:    No acute distress, alert and oriented x4   Lungs:     Clear to auscultation bilaterally     Heart:    Regular rhythm and normal rate.     Abdomen:     Soft, splenomegaly.  Bowel sounds present.    Extremities:    Moves all extremities well.  No clubbing, cyanosis, or edema.     Results Review:   CBC:      Lab 03/30/24 0257 03/29/24  1216 03/29/24  0913   WBC 16.87* 15.18* 15.57*   HEMOGLOBIN 10.1* 11.2* 12.3*   HEMATOCRIT 30.0* 32.7* 35.9*   PLATELETS 65* 67* 68*   NEUTROS ABS 3.54 0.91* 7.08*   EOS ABS 0.34  --   --    MCV 80.9 82.6 82.3     CMP:        Lab 03/30/24  0257 03/29/24 2158 03/29/24 1216 03/29/24  0913   SODIUM 136  --  131* 131*   POTASSIUM 3.5 3.5 3.2* 3.3*   CHLORIDE 99  --  93* 91*   CO2 23.0  --  22.8 21.8*   ANION GAP 14.0  --  15.2* 18.2*   BUN 21  --  20 23   CREATININE 0.96  --  0.80 0.94   EGFR 79.9  --  89.5 81.9   GLUCOSE 77  --  95 98   CALCIUM 8.1*  --  8.4* 9.0   MAGNESIUM  --   --  1.7 1.7   TOTAL PROTEIN 4.6*  --   --  5.6*   ALBUMIN 3.2*  --   --  3.8   GLOBULIN  --   --   --  1.8   ALT (SGPT) 16  --   --  18   AST (SGOT) 26  --   --  42*   BILIRUBIN 1.3*  --   --  1.6*   INDIRECT BILIRUBIN 0.8  --   --   --    BILIRUBIN DIRECT 0.5*  --   --   --    ALK PHOS 56  --   --  74   LIPASE  --   --   --  8*     LDH   Date Value Ref Range Status   03/29/2024 >2,500 (H) 135 - 225 U/L Final     Uric Acid   Date Value Ref Range Status   03/29/2024 9.7 (H) 3.4 - 7.0 mg/dL Final     Lab Results   Component Value Date    IRON 34 (L) 03/30/2024    TIBC 171 (L) 03/30/2024    FERRITIN 801.00 (H) 03/30/2024     Lab Results   Component Value Date    FOLATE 6.02 03/30/2024     Lab Results   Component Value Date    [id number] 437 03/30/2024       Results from last 7 days   Lab Units 03/29/24  1216 03/29/24  0913   HSTROP T ng/L 21 20     Results from last 7 days   Lab Units 03/30/24  0257 03/29/24  0913   INR  1.30* 1.28*   APTT seconds 30.8  --          Results from last 7 days   Lab Units 03/29/24  1216   MAGNESIUM mg/dL 1.7           I reviewed the patient's new clinical results.        Assessment:  *.  History of SLL status post Rituxan plus Bendamustine, and had Jon's transformation in 2023 for which he  had 5 cycles Rituxan plus CHOP with the good clinical response.     *.  Abdomen pain new onset for the past 2 days.  Patient also feels malaise nausea for about 5 days.  Denies fever chills, he did have mild sweating but not drenching sweating.  Has poor appetite for the past 4 5 days, no significant weight loss.  Patient was seen by general surgeon Dr. Witt, no acute abdomen or indication for surgical intervention.  CT scan for abdomen pelvis today reported significant worsening  Splenomegaly, retroperitoneal lymphadenopathy in the pelvic lymphadenopathy.  Laboratory study also showed newly developed leukocytosis, lymphocytosis, neutropenia, and thrombocytopenia worsening anemia.  I think this patient has disease progression from his lymphoma.  I recommended following: ultrasound-guided right inguinal lymph node biopsy for comprehensive study to distinguish of recurrent diffuse large B-cell lymphoma versus progression of SLL/CLL, obtain PET scan as inpatient, and also subsequently start first cycle chemotherapy as inpatient.  With the patient having symptoms and significant disease progression, I do not think arrange outpatient chemotherapy is in the best interest of this patient because of the elevated time for biopsy, PET scan and initiation of chemotherapy.    Will also check laboratory studies for LDH and uric acid.  This patient also has leukocytosis/lymphocytosis, I also recommended to have peripheral blood for flow cytometry study.  This also will help distinguish between CLL and diffuse large B-cell lymphoma.  I think this patient may have leukemia phase of the diffuse large B-cell lymphoma.  3/29/2024 enormously elevated LDH > 2500.  This is secondary to his lymphoma.  3/30/2024 pain is better controlled severity 4/10.         *.  Worsening anemia and newly developed thrombocytopenia.  This is due to lymphoma progression.  Nevertheless I recommended to obtain iron study ferritin iron profile, together  with B12 and folate.  Will also check LDH.  Patient does have elevated total bilirubin.  Lab study 3/30/2024 hemoglobin 10.1.  Ferritin 801, free iron 34 iron saturation 20%, B12 at 437 and folate 6.02 ng/mL.  No iron deficiency, fits with inflammatory condition related to his lymphoma.  Nevertheless low normal B12 and folate level, I think patient would benefit from oral supplement for both with no apparent side effects.    *.  Insomnia.  Patient reports poor sleeping quality, especially in the hospital.  Patient was taking melatonin at home not really helping.  I recommended to start the patient Ambien as needed.     *.  Hypokalemia.  Management per primary team and a normalized.    *.  Elevated uric acid level.  This is related to his progressive lymphoma.  Uric acid 9.7 on 3/29/2024.  I recommend to start allopurinol to decrease uric acid and also preventing tumor lysis syndrome, expecting patient will have chemotherapy in the next few days.    PLAN:  Pending ultrasound-guided core needle biopsy of the right inguinal lymphadenopathy for comprehensive study including flow cytometry, and histology study per protocol..  Pending inpatient PET scan as soon as possible.   Pending results for peripheral blood flow cytometry study.  Start allopurinol 300 mg daily.    Start oral vitamin B12 at 1000 mcg daily.    Start folic acid 1 mg daily.    Continue Ambien 5 mg nightly as needed.   Other management per primary team.   Monitor CBC and uric acid level in the morning.        Discussed with the patient at the bedside about the lab results and the management plan.  He voiced understanding and agreeable.     Spoke with nursing staff today.      Teresa Perkins MD PhD  03/30/24  12:10 EDT

## 2024-03-30 NOTE — PLAN OF CARE
Problem: Adult Inpatient Plan of Care  Goal: Plan of Care Review  Outcome: Ongoing, Not Progressing  Flowsheets (Taken 3/30/2024 0645)  Progress: improving  Plan of Care Reviewed With: patient  Outcome Evaluation: Pain and nausea meds per MAR. Continued to have hiccups overnight, relieved with reglan. VSS. IVF and abx per order. Voiding per urinal. Plan for lymph node bx and PET CT today. Plan of care updated.  Goal: Patient-Specific Goal (Individualized)  Outcome: Ongoing, Progressing  Goal: Absence of Hospital-Acquired Illness or Injury  Outcome: Ongoing, Progressing  Intervention: Identify and Manage Fall Risk  Recent Flowsheet Documentation  Taken 3/30/2024 0612 by Jillian Mi, RN  Safety Promotion/Fall Prevention:   safety round/check completed   activity supervised   assistive device/personal items within reach   fall prevention program maintained   nonskid shoes/slippers when out of bed  Taken 3/30/2024 0443 by Jillian iM, RN  Safety Promotion/Fall Prevention:   safety round/check completed   activity supervised   assistive device/personal items within reach   fall prevention program maintained   nonskid shoes/slippers when out of bed  Taken 3/30/2024 0200 by Jillian Mi, RN  Safety Promotion/Fall Prevention:   safety round/check completed   activity supervised   assistive device/personal items within reach   fall prevention program maintained   nonskid shoes/slippers when out of bed  Taken 3/30/2024 0045 by Jillian Mi, RN  Safety Promotion/Fall Prevention:   safety round/check completed   activity supervised   assistive device/personal items within reach   fall prevention program maintained   nonskid shoes/slippers when out of bed  Taken 3/29/2024 2213 by Jillian Mi, RN  Safety Promotion/Fall Prevention:   safety round/check completed   activity supervised   assistive device/personal items within reach   fall prevention program maintained   nonskid shoes/slippers when out of  bed  Taken 3/29/2024 2031 by Jillian Mi RN  Safety Promotion/Fall Prevention:   safety round/check completed   activity supervised   assistive device/personal items within reach   fall prevention program maintained   nonskid shoes/slippers when out of bed  Intervention: Prevent and Manage VTE (Venous Thromboembolism) Risk  Recent Flowsheet Documentation  Taken 3/30/2024 0612 by Jillian Mi RN  Activity Management: up ad fam  Taken 3/30/2024 0443 by Jillian Mi RN  Activity Management: up ad fam  Taken 3/30/2024 0200 by Jillian Mi RN  Activity Management: up ad fam  Taken 3/30/2024 0045 by Jillian Mi RN  Activity Management: up ad fam  Taken 3/29/2024 2213 by Jillian Mi RN  Activity Management: up ad fam  Taken 3/29/2024 2031 by Jillian Mi RN  Activity Management: up ad fam  Intervention: Prevent Infection  Recent Flowsheet Documentation  Taken 3/30/2024 0612 by Jillian Mi RN  Infection Prevention:   rest/sleep promoted   personal protective equipment utilized  Taken 3/30/2024 0443 by Jillian Mi RN  Infection Prevention:   rest/sleep promoted   personal protective equipment utilized  Taken 3/30/2024 0200 by Jillian Mi RN  Infection Prevention:   rest/sleep promoted   personal protective equipment utilized  Taken 3/30/2024 0045 by Jillian Mi RN  Infection Prevention:   rest/sleep promoted   personal protective equipment utilized  Taken 3/29/2024 2213 by Jillian Mi RN  Infection Prevention:   rest/sleep promoted   personal protective equipment utilized  Taken 3/29/2024 2031 by Jillian Mi RN  Infection Prevention:   rest/sleep promoted   personal protective equipment utilized  Goal: Optimal Comfort and Wellbeing  Outcome: Ongoing, Progressing  Intervention: Monitor Pain and Promote Comfort  Recent Flowsheet Documentation  Taken 3/30/2024 0612 by Jillian Mi RN  Pain Management Interventions: see MAR  Intervention: Provide Person-Centered  Care  Recent Flowsheet Documentation  Taken 3/30/2024 0045 by Jillian Mi, RN  Trust Relationship/Rapport:   care explained   choices provided   questions answered   reassurance provided   thoughts/feelings acknowledged  Taken 3/29/2024 2031 by Jillian Mi, RN  Trust Relationship/Rapport:   care explained   choices provided   questions answered   reassurance provided   thoughts/feelings acknowledged  Goal: Readiness for Transition of Care  Outcome: Ongoing, Progressing   Goal Outcome Evaluation:  Plan of Care Reviewed With: patient        Progress: improving  Outcome Evaluation: Pain and nausea meds per MAR. Continued to have hiccups overnight, relieved with reglan. VSS. IVF and abx per order. Voiding per urinal. Plan for lymph node bx and PET CT today. Plan of care updated.

## 2024-03-31 ENCOUNTER — APPOINTMENT (OUTPATIENT)
Dept: ULTRASOUND IMAGING | Facility: HOSPITAL | Age: 79
DRG: 823 | End: 2024-03-31
Payer: MEDICARE

## 2024-03-31 ENCOUNTER — APPOINTMENT (OUTPATIENT)
Dept: GENERAL RADIOLOGY | Facility: HOSPITAL | Age: 79
DRG: 823 | End: 2024-03-31
Payer: MEDICARE

## 2024-03-31 PROBLEM — H61.20 IMPACTED CERUMEN: Status: RESOLVED | Noted: 2020-04-16 | Resolved: 2024-03-31

## 2024-03-31 PROBLEM — R52 PAIN: Status: RESOLVED | Noted: 2022-06-16 | Resolved: 2024-03-31

## 2024-03-31 PROBLEM — M70.71 BURSITIS OF RIGHT HIP: Status: RESOLVED | Noted: 2018-03-15 | Resolved: 2024-03-31

## 2024-03-31 PROBLEM — J18.9 PNEUMONIA DUE TO INFECTIOUS ORGANISM: Status: RESOLVED | Noted: 2023-06-02 | Resolved: 2024-03-31

## 2024-03-31 LAB
ALBUMIN SERPL-MCNC: 3.2 G/DL (ref 3.5–5.2)
ALP SERPL-CCNC: 53 U/L (ref 39–117)
ALT SERPL W P-5'-P-CCNC: 14 U/L (ref 1–41)
ANION GAP SERPL CALCULATED.3IONS-SCNC: 16.8 MMOL/L (ref 5–15)
ANISOCYTOSIS BLD QL: ABNORMAL
AST SERPL-CCNC: 29 U/L (ref 1–40)
BILIRUB CONJ SERPL-MCNC: 0.5 MG/DL (ref 0–0.3)
BILIRUB INDIRECT SERPL-MCNC: 0.7 MG/DL
BILIRUB SERPL-MCNC: 1.2 MG/DL (ref 0–1.2)
BUN SERPL-MCNC: 22 MG/DL (ref 8–23)
BUN/CREAT SERPL: 25.9 (ref 7–25)
BURR CELLS BLD QL SMEAR: ABNORMAL
CALCIUM SPEC-SCNC: 8.2 MG/DL (ref 8.6–10.5)
CHLORIDE SERPL-SCNC: 97 MMOL/L (ref 98–107)
CO2 SERPL-SCNC: 18.2 MMOL/L (ref 22–29)
CREAT SERPL-MCNC: 0.85 MG/DL (ref 0.76–1.27)
DEPRECATED RDW RBC AUTO: 46 FL (ref 37–54)
EGFRCR SERPLBLD CKD-EPI 2021: 87.8 ML/MIN/1.73
ERYTHROCYTE [DISTWIDTH] IN BLOOD BY AUTOMATED COUNT: 15.4 % (ref 12.3–15.4)
GLUCOSE BLDC GLUCOMTR-MCNC: 92 MG/DL (ref 70–130)
GLUCOSE SERPL-MCNC: 82 MG/DL (ref 65–99)
HCT VFR BLD AUTO: 31.4 % (ref 37.5–51)
HGB BLD-MCNC: 10.7 G/DL (ref 13–17.7)
HYPOCHROMIA BLD QL: ABNORMAL
LYMPHOCYTES # BLD MANUAL: 4.77 10*3/MM3 (ref 0.7–3.1)
LYMPHOCYTES NFR BLD MANUAL: 57 % (ref 5–12)
MAGNESIUM SERPL-MCNC: 1.8 MG/DL (ref 1.6–2.4)
MCH RBC QN AUTO: 28.1 PG (ref 26.6–33)
MCHC RBC AUTO-ENTMCNC: 34.1 G/DL (ref 31.5–35.7)
MCV RBC AUTO: 82.4 FL (ref 79–97)
MICROCYTES BLD QL: ABNORMAL
MONOCYTES # BLD: 15.11 10*3/MM3 (ref 0.1–0.9)
NEUTROPHILS # BLD AUTO: 6.63 10*3/MM3 (ref 1.7–7)
NEUTROPHILS NFR BLD MANUAL: 25 % (ref 42.7–76)
NRBC SPEC MANUAL: 1 /100 WBC (ref 0–0.2)
OVALOCYTES BLD QL SMEAR: ABNORMAL
PLAT MORPH BLD: NORMAL
PLATELET # BLD AUTO: 64 10*3/MM3 (ref 140–450)
PMV BLD AUTO: 9 FL (ref 6–12)
POIKILOCYTOSIS BLD QL SMEAR: ABNORMAL
POLYCHROMASIA BLD QL SMEAR: ABNORMAL
POTASSIUM SERPL-SCNC: 3.8 MMOL/L (ref 3.5–5.2)
PROT SERPL-MCNC: 4.7 G/DL (ref 6–8.5)
QT INTERVAL: 264 MS
QT INTERVAL: 284 MS
QTC INTERVAL: 499 MS
QTC INTERVAL: 517 MS
RBC # BLD AUTO: 3.81 10*6/MM3 (ref 4.14–5.8)
SMUDGE CELLS BLD QL SMEAR: ABNORMAL
SODIUM SERPL-SCNC: 132 MMOL/L (ref 136–145)
TSH SERPL DL<=0.05 MIU/L-ACNC: 2.22 UIU/ML (ref 0.27–4.2)
URATE SERPL-MCNC: 6.4 MG/DL (ref 3.4–7)
VARIANT LYMPHS NFR BLD MANUAL: 18 % (ref 19.6–45.3)
WBC NRBC COR # BLD AUTO: 26.5 10*3/MM3 (ref 3.4–10.8)

## 2024-03-31 PROCEDURE — 93005 ELECTROCARDIOGRAM TRACING: CPT | Performed by: INTERNAL MEDICINE

## 2024-03-31 PROCEDURE — 76705 ECHO EXAM OF ABDOMEN: CPT

## 2024-03-31 PROCEDURE — 83735 ASSAY OF MAGNESIUM: CPT | Performed by: INTERNAL MEDICINE

## 2024-03-31 PROCEDURE — 85025 COMPLETE CBC W/AUTO DIFF WBC: CPT | Performed by: INTERNAL MEDICINE

## 2024-03-31 PROCEDURE — 25010000002 FUROSEMIDE PER 20 MG: Performed by: INTERNAL MEDICINE

## 2024-03-31 PROCEDURE — 84443 ASSAY THYROID STIM HORMONE: CPT | Performed by: INTERNAL MEDICINE

## 2024-03-31 PROCEDURE — 25010000002 SODIUM CHLORIDE 0.9 % WITH KCL 20 MEQ 20-0.9 MEQ/L-% SOLUTION: Performed by: INTERNAL MEDICINE

## 2024-03-31 PROCEDURE — 82948 REAGENT STRIP/BLOOD GLUCOSE: CPT

## 2024-03-31 PROCEDURE — 85007 BL SMEAR W/DIFF WBC COUNT: CPT | Performed by: INTERNAL MEDICINE

## 2024-03-31 PROCEDURE — 93010 ELECTROCARDIOGRAM REPORT: CPT | Performed by: INTERNAL MEDICINE

## 2024-03-31 PROCEDURE — 94760 N-INVAS EAR/PLS OXIMETRY 1: CPT

## 2024-03-31 PROCEDURE — 80076 HEPATIC FUNCTION PANEL: CPT | Performed by: SURGERY

## 2024-03-31 PROCEDURE — 25010000002 PIPERACILLIN SOD-TAZOBACTAM PER 1 G: Performed by: INTERNAL MEDICINE

## 2024-03-31 PROCEDURE — 25010000002 MAGNESIUM SULFATE IN D5W 1G/100ML (PREMIX) 1-5 GM/100ML-% SOLUTION: Performed by: INTERNAL MEDICINE

## 2024-03-31 PROCEDURE — 25010000002 METOCLOPRAMIDE PER 10 MG: Performed by: INTERNAL MEDICINE

## 2024-03-31 PROCEDURE — 84550 ASSAY OF BLOOD/URIC ACID: CPT | Performed by: INTERNAL MEDICINE

## 2024-03-31 PROCEDURE — 99233 SBSQ HOSP IP/OBS HIGH 50: CPT | Performed by: INTERNAL MEDICINE

## 2024-03-31 PROCEDURE — 99232 SBSQ HOSP IP/OBS MODERATE 35: CPT | Performed by: SURGERY

## 2024-03-31 PROCEDURE — 25010000002 HYDROMORPHONE PER 4 MG: Performed by: INTERNAL MEDICINE

## 2024-03-31 PROCEDURE — 71045 X-RAY EXAM CHEST 1 VIEW: CPT

## 2024-03-31 PROCEDURE — 80048 BASIC METABOLIC PNL TOTAL CA: CPT | Performed by: INTERNAL MEDICINE

## 2024-03-31 PROCEDURE — 94799 UNLISTED PULMONARY SVC/PX: CPT

## 2024-03-31 PROCEDURE — 99222 1ST HOSP IP/OBS MODERATE 55: CPT | Performed by: INTERNAL MEDICINE

## 2024-03-31 RX ORDER — MAGNESIUM SULFATE 1 G/100ML
1 INJECTION INTRAVENOUS ONCE
Status: COMPLETED | OUTPATIENT
Start: 2024-03-31 | End: 2024-03-31

## 2024-03-31 RX ORDER — FUROSEMIDE 10 MG/ML
20 INJECTION INTRAMUSCULAR; INTRAVENOUS ONCE
Status: COMPLETED | OUTPATIENT
Start: 2024-03-31 | End: 2024-03-31

## 2024-03-31 RX ORDER — NAPROXEN 500 MG/1
500 TABLET ORAL 2 TIMES DAILY PRN
Status: DISCONTINUED | OUTPATIENT
Start: 2024-03-31 | End: 2024-04-06

## 2024-03-31 RX ORDER — ACETAMINOPHEN 325 MG/1
650 TABLET ORAL EVERY 4 HOURS PRN
Status: DISCONTINUED | OUTPATIENT
Start: 2024-03-31 | End: 2024-04-06 | Stop reason: HOSPADM

## 2024-03-31 RX ORDER — FUROSEMIDE 10 MG/ML
10 INJECTION INTRAMUSCULAR; INTRAVENOUS ONCE
Status: DISCONTINUED | OUTPATIENT
Start: 2024-03-31 | End: 2024-03-31

## 2024-03-31 RX ORDER — NITROGLYCERIN 0.4 MG/1
0.4 TABLET SUBLINGUAL
Status: DISCONTINUED | OUTPATIENT
Start: 2024-03-31 | End: 2024-04-06 | Stop reason: HOSPADM

## 2024-03-31 RX ADMIN — PIPERACILLIN AND TAZOBACTAM 3.38 G: 3; .375 INJECTION, POWDER, FOR SOLUTION INTRAVENOUS at 09:44

## 2024-03-31 RX ADMIN — MAGNESIUM SULFATE HEPTAHYDRATE 1 G: 1 INJECTION, SOLUTION INTRAVENOUS at 09:43

## 2024-03-31 RX ADMIN — PIPERACILLIN AND TAZOBACTAM 3.38 G: 3; .375 INJECTION, POWDER, FOR SOLUTION INTRAVENOUS at 00:35

## 2024-03-31 RX ADMIN — METOPROLOL TARTRATE 12.5 MG: 25 TABLET, FILM COATED ORAL at 20:43

## 2024-03-31 RX ADMIN — NAPROXEN 500 MG: 500 TABLET ORAL at 19:09

## 2024-03-31 RX ADMIN — POTASSIUM CHLORIDE AND SODIUM CHLORIDE 100 ML/HR: 900; 150 INJECTION, SOLUTION INTRAVENOUS at 00:39

## 2024-03-31 RX ADMIN — POLYETHYLENE GLYCOL 3350 17 G: 17 POWDER, FOR SOLUTION ORAL at 09:44

## 2024-03-31 RX ADMIN — ACETAMINOPHEN 325MG 650 MG: 325 TABLET ORAL at 16:38

## 2024-03-31 RX ADMIN — PIPERACILLIN AND TAZOBACTAM 3.38 G: 3; .375 INJECTION, POWDER, FOR SOLUTION INTRAVENOUS at 23:04

## 2024-03-31 RX ADMIN — HYDROMORPHONE HYDROCHLORIDE 0.5 MG: 1 INJECTION, SOLUTION INTRAMUSCULAR; INTRAVENOUS; SUBCUTANEOUS at 16:26

## 2024-03-31 RX ADMIN — ALLOPURINOL 300 MG: 300 TABLET ORAL at 09:44

## 2024-03-31 RX ADMIN — FUROSEMIDE 20 MG: 10 INJECTION, SOLUTION INTRAMUSCULAR; INTRAVENOUS at 09:44

## 2024-03-31 RX ADMIN — Medication 1000 MCG: at 09:43

## 2024-03-31 RX ADMIN — METOPROLOL TARTRATE 12.5 MG: 25 TABLET, FILM COATED ORAL at 09:44

## 2024-03-31 RX ADMIN — Medication 10 ML: at 20:44

## 2024-03-31 RX ADMIN — METOCLOPRAMIDE 5 MG: 5 INJECTION, SOLUTION INTRAMUSCULAR; INTRAVENOUS at 12:20

## 2024-03-31 RX ADMIN — Medication 10 ML: at 09:44

## 2024-03-31 RX ADMIN — FOLIC ACID 1 MG: 1 TABLET ORAL at 09:44

## 2024-03-31 RX ADMIN — PIPERACILLIN AND TAZOBACTAM 3.38 G: 3; .375 INJECTION, POWDER, FOR SOLUTION INTRAVENOUS at 16:26

## 2024-03-31 RX ADMIN — METOPROLOL TARTRATE 5 MG: 1 INJECTION, SOLUTION INTRAVENOUS at 05:52

## 2024-03-31 NOTE — PLAN OF CARE
Goal Outcome Evaluation:  Plan of Care Reviewed With: patient        Progress: no change  Outcome Evaluation: Patient is alert and oriented x4. Night shift RN said she called a rapid around 5am for elevated heartrate. EKG obtained and placed on a heart monitor. Admitted for abdominal pain. Cardiology consulted and ordered one time dose of lasix IV, chest xray, and stopped IV fluids. Patient had a temp around 430 tylenol given and rechecked and his temp was then 101.7. Paged oncology, they felt this was related to tumor. Gave an order for naproxen, awaiting for the medication to come from pharmacy. Up with assistance x1 urinal at bedside, wears brief, had a large BM today. Possible liver biopsy and PET scan tomorrow. Continue to monitor.

## 2024-03-31 NOTE — PROGRESS NOTES
LOS: 2 days   Patient Care Team:  Jovani Salomon MD as PCP - General (Internal Medicine)  Gerry Schroeder MD as Consulting Physician (Hematology and Oncology)  Stephanie Toledo PA as Referring Physician (Physician Assistant)    Chief Complaint: Abdomen pain.  Recurrent lymphoma who is splenomegaly and progressive retroperitoneal lymphadenopathy.    Interval History:  On 3/30/2024  Abdomen pain is reasonably controlled severity 8/10.  Had a good bowel movement.  Afebrile.    3/31/2024  Abdomen pain is controlled.  Patient is afebrile.  No nausea vomiting.  Worsening leukocytosis.       HISTORY OF PRESENT ILLNESS:  The patient is a 78 y.o. 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  Patient reports he was at baseline condition, and yesterday afternoon started having abdominal pain around umbilical/lower abdomen area associated with nausea.  Patient reports abdomen pain is constant.  He also reports having poor appetite since this past Sunday which is 5 days ago.  He denies low fever sweating or chills.       Patient also reported generalized weakness in the past 24 hours also and did not drink either too much at all.    A comprehensive 14 point review of systems was performed and was negative except as mentioned.    Vital Signs:  Temp:  [98.6 °F (37 °C)-100.2 °F (37.9 °C)] 98.6 °F (37 °C)  Heart Rate:  [] 99  Resp:  [16] 16  BP: ()/(63-81) 126/76    Intake/Output Summary (Last 24 hours) at 3/31/2024 1003  Last data filed at 3/31/2024 0950  Gross per 24 hour   Intake 7524.67 ml   Output 350 ml   Net 7174.67 ml       Physical Exam:  GENERAL:  Well-developed, well-nourished elderly gentleman, lying in bed.  In no acute distress.  Orientated to time place and the  people.  SKIN:  Warm, dry without rashes, purpura or petechiae.  HEENT:  Normocephalic.  Very poor hearing.   LYMPHATICS:  No cervical, supraclavicular adenopathy.  CHEST: Normal respiratory effort.  Lungs clear to auscultation. Good airflow.  CARDIAC:  Regular rate and rhythm. Normal S1,S2.  ABDOMEN:  Soft, no tender, guarding or rebound.  Positive for splenomegaly. Bowel sounds normal.  EXTREMITIES:  No lower extremity edema.      Results Review:   CBC:      Lab 03/31/24  0539 03/30/24  0257 03/29/24  1216 03/29/24  0913   WBC 26.50* 16.87* 15.18* 15.57*   HEMOGLOBIN 10.7* 10.1* 11.2* 12.3*   HEMATOCRIT 31.4* 30.0* 32.7* 35.9*   PLATELETS 64* 65* 67* 68*   NEUTROS ABS 6.63 3.54 0.91* 7.08*   EOS ABS  --  0.34  --   --    MCV 82.4 80.9 82.6 82.3     CMP:        Lab 03/31/24  0539 03/30/24  1145 03/30/24  0257 03/29/24  2158 03/29/24  1216 03/29/24  0913   SODIUM 132*  --  136  --  131* 131*   POTASSIUM 3.8 3.7 3.5 3.5 3.2* 3.3*   CHLORIDE 97*  --  99  --  93* 91*   CO2 18.2*  --  23.0  --  22.8 21.8*   ANION GAP 16.8*  --  14.0  --  15.2* 18.2*   BUN 22  --  21  --  20 23   CREATININE 0.85  --  0.96  --  0.80 0.94   EGFR 87.8  --  79.9  --  89.5 81.9   GLUCOSE 82  --  77  --  95 98   CALCIUM 8.2*  --  8.1*  --  8.4* 9.0   MAGNESIUM 1.8  --   --   --  1.7 1.7   TOTAL PROTEIN 4.7*  --  4.6*  --   --  5.6*   ALBUMIN 3.2*  --  3.2*  --   --  3.8   GLOBULIN  --   --   --   --   --  1.8   ALT (SGPT) 14  --  16  --   --  18   AST (SGOT) 29  --  26  --   --  42*   BILIRUBIN 1.2  --  1.3*  --   --  1.6*   INDIRECT BILIRUBIN 0.7  --  0.8  --   --   --    BILIRUBIN DIRECT 0.5*  --  0.5*  --   --   --    ALK PHOS 53  --  56  --   --  74   LIPASE  --   --   --   --   --  8*     LDH   Date Value Ref Range Status   03/29/2024 >2,500 (H) 135 - 225 U/L Final     Uric Acid   Date Value Ref Range Status   03/31/2024 6.4 3.4 - 7.0 mg/dL Final     Lab Results   Component Value Date    IRON 34 (L) 03/30/2024    TIBC 171 (L) 03/30/2024     FERRITIN 801.00 (H) 03/30/2024     Lab Results   Component Value Date    FOLATE 6.02 03/30/2024     Lab Results   Component Value Date    [id number] 437 03/30/2024       Results from last 7 days   Lab Units 03/29/24  1216 03/29/24  0913   HSTROP T ng/L 21 20     Results from last 7 days   Lab Units 03/30/24  0257 03/29/24  0913   INR  1.30* 1.28*   APTT seconds 30.8  --          Results from last 7 days   Lab Units 03/31/24  0539   MAGNESIUM mg/dL 1.8           Peripheral blood smear reviewed today on 3/31/2024, they are significant abnormal mononuclear cells, the diameter is about 3-4 times larger than the erythrocytes, many of those cells having 4 or 5 nucleoli within the nucleus, the chromatin is much less condensed.  And I can see some of those cells having dividing nucleus, however they do not look like typical blasts.  There are also typical small lymphocytes with condensed chromatin.    I reviewed the patient's new clinical results.        Assessment:  *.  History of SLL status post Rituxan plus Bendamustine, and had Jon's transformation in 2023 for which he had 5 cycles Rituxan plus CHOP with the good clinical response.     *.  Abdomen pain new onset for the past 2 days.  Patient also feels malaise nausea for about 5 days.  Denies fever chills, he did have mild sweatling but not drenching sweating.  Has poor appetite for the past 4 5 days, no significant weight loss.  Patient was seen by general surgeon Dr. Witt, no acute abdomen or indication for surgical intervention.  CT scan for abdomen pelvis today reported significant worsening  Splenomegaly, retroperitoneal lymphadenopathy in the pelvic lymphadenopathy.  Laboratory study also showed newly developed leukocytosis, lymphocytosis, neutropenia, and thrombocytopenia worsening anemia.  I think this patient has disease progression from his lymphoma.  I recommended following: ultrasound-guided right inguinal lymph node biopsy for comprehensive study  to distinguish of recurrent diffuse large B-cell lymphoma versus progression of SLL/CLL, obtain PET scan as inpatient, and also subsequently start first cycle chemotherapy as inpatient.  With the patient having symptoms and significant disease progression, I do not think arrange outpatient chemotherapy is in the best interest of this patient because of the elevated time for biopsy, PET scan and initiation of chemotherapy.    Will also check laboratory studies for LDH and uric acid.  This patient also has leukocytosis/lymphocytosis, I also recommended to have peripheral blood for flow cytometry study.  This also will help distinguish between CLL and diffuse large B-cell lymphoma.  I think this patient may have leukemia phase of the diffuse large B-cell lymphoma.  3/29/2024 enormously elevated LDH > 2500.  This is secondary to his lymphoma.  3/30/2024 pain is better controlled severity 4/10.   3/31/2024 abdomen pain is controlled.  Worsening leukocytosis.  Peripheral blood smear reviewed, they are significant abnormal mononuclear cells, the diameter is about 3-4 times larger than the erythrocytes, many of those cells having 4 or 5 nucleoli within the nucleus, the chromatin is much less condensed.  And I can see some of those cells having dividing nucleus, however they do not look like typical blasts.  There are also typical small lymphocytes with condensed chromatin.  I explained to patient, he has leukemia phase of aggressive lymphoma relapse.  That is why I think this patient definitely needs a biopsy.         *.  Worsening anemia and newly developed thrombocytopenia.  This is due to lymphoma progression.  Nevertheless I recommended to obtain iron study ferritin iron profile, together with B12 and folate.  Will also check LDH.  Patient does have elevated total bilirubin.  Lab study 3/30/2024 hemoglobin 10.1.  Ferritin 801, free iron 34 iron saturation 20%, B12 at 437 and folate 6.02 ng/mL.  No iron deficiency, fits  with inflammatory condition related to his lymphoma.  Nevertheless low normal B12 and folate level, I think patient would benefit from oral supplement for both with no apparent side effects.  3/31/2024 hemoglobin 10.7.    *.  Insomnia.  Patient reports poor sleeping quality, especially in the hospital.  Patient was taking melatonin at home not really helping.  I recommended to start the patient Ambien as needed.     *.  Hypokalemia.  Management per primary team and normalized.    *.  Elevated uric acid level.  This is related to his progressive lymphoma.  Uric acid 9.7 on 3/29/2024.  I recommend to start allopurinol to decrease uric acid and also preventing tumor lysis syndrome, expecting patient will have chemotherapy in the next few days.  3/31/2024 normalized uric acid 6.4.     PLAN:  Pending ultrasound-guided core needle biopsy of the right inguinal lymphadenopathy for comprehensive study including flow cytometry, and histology study per protocol..  Pending inpatient PET scan as soon as possible.   Pending result for peripheral blood flow cytometry study.  Continue allopurinol 300 mg daily.    Continue oral vitamin B12 at 1000 mcg daily.    Continue folic acid 1 mg daily.    Continue Ambien 5 mg nightly as needed.   Other management per primary team.   Monitor CBC and uric acid level in the morning.   Continue follow-up by general surgery Dr. Witt for ongoing evaluation of abdominal pain.  This patient has very aggressive lymphoma with leukemia phase and significantly symptomatic.  Will need inpatient chemotherapy as inpatient as soon as possible, pending pathology evaluation.  If flow cytometry study positive for CD20, could use anti-CD20 monoclonal antibody first prior to starting chemotherapy.       Discussed with the patient at the bedside about the lab results and management plan.  He voiced understanding and agreeable.     Spoke with nursing staff today.    I will discuss with my colleague Dr. Schroeder,  who is patient's primary oncologist and will take over patient care tomorrow.      Teresa Perkins MD PhD  03/31/24  10:03 EDT

## 2024-03-31 NOTE — CODE DOCUMENTATION
"Patient Name:  Jeff Bass  YOB: 1945  MRN:  [id number]  Admit Date:  3/29/2024    Visit Diagnoses:     ICD-10-CM ICD-9-CM   1. Generalized abdominal pain  R10.84 789.07   2. CLL (chronic lymphocytic leukemia) with worsening lymphadenopathy in abdomen and pelvis  C91.10 204.10   3. Lactic acid increased  E87.20 276.2   4. Leukocytosis, unspecified type  D72.829 288.60   5. Thrombocytopenia  D69.6 287.5   6. Splenomegaly  R16.1 789.2       Reason For Rapid: elevated hr    RN Communicated With: primary RN and Dr Campbell      Rapid Outcome: remain on unit    Communication From Rapid Team:  Elevated HR, rate in 180-250. Asymptomatic. No chest pain, no soa. Spoke with DR Campbell, see orders. Valsava maneuver attempted. Metoprolol 5mg iv given x1. Heart rate now 99, bp 112/63    Cardiology to see today.    Most Recent Vital Signs  Temp:  [98.2 °F (36.8 °C)-100.2 °F (37.9 °C)] 98.8 °F (37.1 °C)  Heart Rate:  [] 99  Resp:  [16] 16  BP: ()/(63-81) 112/63  SpO2:  [91 %-96 %] 96 %  on   ;   Device (Oxygen Therapy): room air    Labs:      Glucose   Date/Time Value Ref Range Status   03/31/2024 0532 92 70 - 130 mg/dL Final     No results found for: \"SITE\", \"ALLENTEST\", \"PHART\", \"YCD3UQS\", \"PO2ART\", \"TWJ6TTM\", \"BASEEXCESS\", \"I8NWJLFF\", \"HGBBG\", \"HCTABG\", \"OXYHEMOGLOBI\", \"METHHGBN\", \"CARBOXYHGB\", \"CO2CT\", \"BAROMETRIC\", \"MODALITY\", \"FIO2\"  Results from last 7 days   Lab Units 03/30/24  0257 03/29/24  1216 03/29/24  0913   WBC 10*3/mm3 16.87* 15.18* 15.57*   HEMOGLOBIN g/dL 10.1* 11.2* 12.3*   PLATELETS 10*3/mm3 65* 67* 68*     Results from last 7 days   Lab Units 03/30/24  1145 03/30/24  0257 03/29/24  2158 03/29/24  1216 03/29/24  0913   SODIUM mmol/L  --  136  --  131* 131*   POTASSIUM mmol/L 3.7 3.5 3.5 3.2* 3.3*   CHLORIDE mmol/L  --  99  --  93* 91*   CO2 mmol/L  --  23.0  --  22.8 21.8*   BUN mg/dL  --  21  --  20 23   CREATININE mg/dL  --  0.96  --  0.80 0.94   GLUCOSE mg/dL  --  77  --  95 98 " "  ALBUMIN g/dL  --  3.2*  --   --  3.8   BILIRUBIN mg/dL  --  1.3*  --   --  1.6*   ALK PHOS U/L  --  56  --   --  74   AST (SGOT) U/L  --  26  --   --  42*   ALT (SGPT) U/L  --  16  --   --  18   Estimated Creatinine Clearance: 86.6 mL/min (by C-G formula based on SCr of 0.96 mg/dL).  Results from last 7 days   Lab Units 03/29/24  1216 03/29/24  0913   HSTROP T ng/L 21 20   PROBNP pg/mL  --  2,957.0*   URIC ACID mg/dL 9.7*  --      Results from last 7 days   Lab Units 03/30/24  0257 03/29/24  2245 03/29/24  1706 03/29/24  1505 03/29/24  1216 03/29/24  0930 03/29/24  0913   PROCALCITONIN ng/mL  --   --   --   --   --   --  0.29*   LACTATE mmol/L 3.1* 3.4* 3.1* 3.8* 3.6* 5.0*  --      No results found for: \"STREPPNEUAG\", \"LEGANTIGENUR\"  Results from last 7 days   Lab Units 03/29/24  0944   BLOODCX  No growth at 24 hours         NIH Stroke Scale:                                                         Please refer to full rapid documentation on summary page under Index / Code Timeline   "

## 2024-03-31 NOTE — PLAN OF CARE
Goal Outcome Evaluation:  Plan of Care Reviewed With: patient           Outcome Evaluation: Pain is ongoing. Pt alert and oriented. Pt started running low grade fever through the night but asymptomatic. Awaiting lymph node biopsy and PETCT.    Pt started having high heart rate from 76 to 176 then to over 200. Dr. Salomon called and a cardiac consult to Dr. Clifton Ceja was called in. Stat EKG and a rapid was called. Dr. Campbell with Cardiology returned my stat phone consult and orders were given. Family notified. Pt put on telemetry. Pt temp went down on its own to 98.8 and blood glucose was at 90. IV normal saline with 20k was paused. IV normal saline was restarted and pt was given lopressor. Blood was drawn. Heart rate went down to 98. K=3.8 Mg=1.8.

## 2024-03-31 NOTE — PROGRESS NOTES
Colorectal & General Surgery  Progress Note    Patient: Jeff Bass  YOB: 1945  MRN: [id number]      Assessment  Jeff Bass is a 78 y.o. male who presents with worsening leukocytosis and persistent abdominal pain with nausea and some emesis, and tachycardia overnight.  I remain concerned for an intra-abdominal process outside of his lymphoma, including cholecystitis.  I discussed with Dr. Salomon by phone this morning.  We will obtain a stat ultrasound of his gallbladder.  Continue antibiotics.  I have made him n.p.o. with sips and chips.  No urgent surgical intervention this morning.  I will continue to follow.  Please call with any questions or concerns at any time.    Subjective  Nonsustained atrial fibrillation overnight.  Transferred to telemetry.  Complains of 4 out of 10 abdominal pain that is stable from the last 2 days.  Continued nausea with some emesis.    Objective    Vitals:    03/31/24 0815   BP: 126/76   Pulse:    Resp: 16   Temp: 98.6 °F (37 °C)   SpO2:        Physical Exam  Constitutional: Well-developed well-nourished, no acute distress  Neck: Supple, trachea midline  Respiratory: No increased work of breathing, Symmetric excursion  Cardiovascular: Well pefursed, no jugular venous distention evident   Abdominal: Soft, minimally tender, non-distended  Skin: Warm, dry, no rash on visualized skin surfaces  Psychiatric: Alert and oriented ×3, normal affect     Laboratory Results  I have personally reviewed CBC with WBC 26, hemoglobin 10, platelets 64.  BMP with creatinine 0.5, bicarb 18, sodium 132, potassium 3.8, chloride 97, calcium 8.2, magnesium 1.8.  TSH 2.22.    Radiology  I have personally reviewed chest x-ray with no infiltrates         Juaquin Witt MD  Colorectal & General Surgery  [facility]    [address], [address]  [place], [place], [address]  P: [phone]  F: [phone]

## 2024-03-31 NOTE — PROGRESS NOTES
Fever  Abdominal pain  Lymphoma  Nausea and vomiting        Temp:  [98.6 °F (37 °C)-100.2 °F (37.9 °C)] 98.6 °F (37 °C)  Heart Rate:  [] 99  Resp:  [16] 16  BP: ()/(63-81) 126/76  I/O last 3 completed shifts:  In: 871.7 [I.V.:871.7]  Out: 1000 [Urine:1000]  I/O this shift:  In: 3086.7 [I.V.:3086.7]  Out: -    Nauseated, cannot eat.Fever  Physical Exam  Cardiovascular:      Rate and Rhythm: Tachycardia present.   Pulmonary:      Effort: Pulmonary effort is normal.      Breath sounds: Normal breath sounds.   Abdominal:      General: Abdomen is flat.      Palpations: Abdomen is soft.   Neurological:      Mental Status: He is alert.     Potassium normal  [id number]  WBC 26,000  EKG tachycardia      Jon's syndrome    Hypertension    Lymphoma of spleen    Abdominal pain    Anemia associated with malignant neoplastic disease    Thrombocytopenia    Leukocytosis    Primary insomnia    Nausea    Elevated blood uric acid level  Appreciate Cardiology input.Now on Metoprolol  PET scan tomorrow  Lymph node biopsy as soon as possible  Will eventually need UGI and Ultrasound    Genesis

## 2024-04-01 ENCOUNTER — APPOINTMENT (OUTPATIENT)
Dept: ULTRASOUND IMAGING | Facility: HOSPITAL | Age: 79
DRG: 823 | End: 2024-04-01
Payer: MEDICARE

## 2024-04-01 ENCOUNTER — TELEPHONE (OUTPATIENT)
Dept: ONCOLOGY | Facility: CLINIC | Age: 79
End: 2024-04-01
Payer: MEDICARE

## 2024-04-01 ENCOUNTER — APPOINTMENT (OUTPATIENT)
Dept: CARDIOLOGY | Facility: HOSPITAL | Age: 79
DRG: 823 | End: 2024-04-01
Payer: MEDICARE

## 2024-04-01 LAB
ALBUMIN SERPL-MCNC: 3 G/DL (ref 3.5–5.2)
ALBUMIN/GLOB SERPL: 2 G/DL
ALP SERPL-CCNC: 53 U/L (ref 39–117)
ALT SERPL W P-5'-P-CCNC: 15 U/L (ref 1–41)
ANION GAP SERPL CALCULATED.3IONS-SCNC: 17 MMOL/L (ref 5–15)
ANISOCYTOSIS BLD QL: ABNORMAL
AORTIC ARCH: 3.1 CM
ASCENDING AORTA: 4 CM
AST SERPL-CCNC: 26 U/L (ref 1–40)
BASOPHILS # BLD MANUAL: 0.19 10*3/MM3 (ref 0–0.2)
BASOPHILS NFR BLD MANUAL: 1 % (ref 0–1.5)
BH CV ECHO MEAS - ACS: 1.67 CM
BH CV ECHO MEAS - AO MAX PG: 14.3 MMHG
BH CV ECHO MEAS - AO MEAN PG: 8.2 MMHG
BH CV ECHO MEAS - AO ROOT DIAM: 3.9 CM
BH CV ECHO MEAS - AO V2 MAX: 188.9 CM/SEC
BH CV ECHO MEAS - AO V2 VTI: 34.4 CM
BH CV ECHO MEAS - AVA(I,D): 2.12 CM2
BH CV ECHO MEAS - EDV(CUBED): 73.4 ML
BH CV ECHO MEAS - EDV(MOD-SP2): 112 ML
BH CV ECHO MEAS - EDV(MOD-SP4): 128 ML
BH CV ECHO MEAS - EF(MOD-BP): 55.3 %
BH CV ECHO MEAS - EF(MOD-SP2): 53.6 %
BH CV ECHO MEAS - EF(MOD-SP4): 55.5 %
BH CV ECHO MEAS - ESV(CUBED): 31.3 ML
BH CV ECHO MEAS - ESV(MOD-SP2): 52 ML
BH CV ECHO MEAS - ESV(MOD-SP4): 57 ML
BH CV ECHO MEAS - FS: 24.8 %
BH CV ECHO MEAS - IVS/LVPW: 0.87 CM
BH CV ECHO MEAS - IVSD: 0.81 CM
BH CV ECHO MEAS - LAT PEAK E' VEL: 16.6 CM/SEC
BH CV ECHO MEAS - LV DIASTOLIC VOL/BSA (35-75): 55.8 CM2
BH CV ECHO MEAS - LV MASS(C)D: 111.6 GRAMS
BH CV ECHO MEAS - LV MAX PG: 4.1 MMHG
BH CV ECHO MEAS - LV MEAN PG: 1.82 MMHG
BH CV ECHO MEAS - LV SYSTOLIC VOL/BSA (12-30): 24.9 CM2
BH CV ECHO MEAS - LV V1 MAX: 101.5 CM/SEC
BH CV ECHO MEAS - LV V1 VTI: 18.4 CM
BH CV ECHO MEAS - LVIDD: 4.2 CM
BH CV ECHO MEAS - LVIDS: 3.1 CM
BH CV ECHO MEAS - LVOT AREA: 4 CM2
BH CV ECHO MEAS - LVOT DIAM: 2.25 CM
BH CV ECHO MEAS - LVPWD: 0.92 CM
BH CV ECHO MEAS - MED PEAK E' VEL: 9.3 CM/SEC
BH CV ECHO MEAS - MV A DUR: 0.13 SEC
BH CV ECHO MEAS - MV A MAX VEL: 137.1 CM/SEC
BH CV ECHO MEAS - MV DEC SLOPE: 675 CM/SEC2
BH CV ECHO MEAS - MV DEC TIME: 0.18 SEC
BH CV ECHO MEAS - MV E MAX VEL: 76.5 CM/SEC
BH CV ECHO MEAS - MV E/A: 0.56
BH CV ECHO MEAS - MV MAX PG: 12.7 MMHG
BH CV ECHO MEAS - MV MEAN PG: 4.9 MMHG
BH CV ECHO MEAS - MV P1/2T: 41.9 MSEC
BH CV ECHO MEAS - MV V2 VTI: 23.3 CM
BH CV ECHO MEAS - MVA(P1/2T): 5.2 CM2
BH CV ECHO MEAS - MVA(VTI): 3.1 CM2
BH CV ECHO MEAS - PA ACC TIME: 0.1 SEC
BH CV ECHO MEAS - PA V2 MAX: 76.4 CM/SEC
BH CV ECHO MEAS - RAP SYSTOLE: 3 MMHG
BH CV ECHO MEAS - RV MAX PG: 2.6 MMHG
BH CV ECHO MEAS - RV V1 MAX: 80.1 CM/SEC
BH CV ECHO MEAS - RV V1 VTI: 13 CM
BH CV ECHO MEAS - RVSP: 30 MMHG
BH CV ECHO MEAS - SI(MOD-SP2): 26.2 ML/M2
BH CV ECHO MEAS - SI(MOD-SP4): 31 ML/M2
BH CV ECHO MEAS - SV(LVOT): 73 ML
BH CV ECHO MEAS - SV(MOD-SP2): 60 ML
BH CV ECHO MEAS - SV(MOD-SP4): 71 ML
BH CV ECHO MEAS - TAPSE (>1.6): 2.04 CM
BH CV ECHO MEAS - TR MAX PG: 27.9 MMHG
BH CV ECHO MEAS - TR MAX VEL: 264 CM/SEC
BH CV ECHO MEASUREMENTS AVERAGE E/E' RATIO: 5.91
BH CV XLRA - RV BASE: 3.7 CM
BH CV XLRA - RV LENGTH: 8.2 CM
BH CV XLRA - RV MID: 3.8 CM
BH CV XLRA - TDI S': 17 CM/SEC
BILIRUB SERPL-MCNC: 1.5 MG/DL (ref 0–1.2)
BLASTS NFR BLD MANUAL: 29 % (ref 0–0)
BUN SERPL-MCNC: 23 MG/DL (ref 8–23)
BUN/CREAT SERPL: 27.4 (ref 7–25)
BURR CELLS BLD QL SMEAR: ABNORMAL
CALCIUM SPEC-SCNC: 8.5 MG/DL (ref 8.6–10.5)
CHLORIDE SERPL-SCNC: 100 MMOL/L (ref 98–107)
CO2 SERPL-SCNC: 21 MMOL/L (ref 22–29)
CREAT SERPL-MCNC: 0.84 MG/DL (ref 0.76–1.27)
DEPRECATED RDW RBC AUTO: 47 FL (ref 37–54)
EGFRCR SERPLBLD CKD-EPI 2021: 88.2 ML/MIN/1.73
EOSINOPHIL # BLD MANUAL: 0.19 10*3/MM3 (ref 0–0.4)
EOSINOPHIL NFR BLD MANUAL: 1 % (ref 0.3–6.2)
ERYTHROCYTE [DISTWIDTH] IN BLOOD BY AUTOMATED COUNT: 15.7 % (ref 12.3–15.4)
GLOBULIN UR ELPH-MCNC: 1.5 GM/DL
GLUCOSE SERPL-MCNC: 73 MG/DL (ref 65–99)
HCT VFR BLD AUTO: 31.9 % (ref 37.5–51)
HGB BLD-MCNC: 10.9 G/DL (ref 13–17.7)
LDH SERPL-CCNC: >2500 U/L (ref 135–225)
LEFT ATRIUM VOLUME INDEX: 24.4 ML/M2
LYMPHOCYTES # BLD MANUAL: 4.72 10*3/MM3 (ref 0.7–3.1)
LYMPHOCYTES NFR BLD MANUAL: 6 % (ref 5–12)
MCH RBC QN AUTO: 28.4 PG (ref 26.6–33)
MCHC RBC AUTO-ENTMCNC: 34.2 G/DL (ref 31.5–35.7)
MCV RBC AUTO: 83.1 FL (ref 79–97)
METAMYELOCYTES NFR BLD MANUAL: 1 % (ref 0–0)
MONOCYTES # BLD: 1.13 10*3/MM3 (ref 0.1–0.9)
NEUTROPHILS # BLD AUTO: 6.99 10*3/MM3 (ref 1.7–7)
NEUTROPHILS NFR BLD MANUAL: 37 % (ref 42.7–76)
NRBC SPEC MANUAL: 1 /100 WBC (ref 0–0.2)
PHOSPHATE SERPL-MCNC: 2.4 MG/DL (ref 2.5–4.5)
PLATELET # BLD AUTO: 49 10*3/MM3 (ref 140–450)
PMV BLD AUTO: 9.6 FL (ref 6–12)
POIKILOCYTOSIS BLD QL SMEAR: ABNORMAL
POLYCHROMASIA BLD QL SMEAR: ABNORMAL
POTASSIUM SERPL-SCNC: 3.4 MMOL/L (ref 3.5–5.2)
PROT SERPL-MCNC: 4.5 G/DL (ref 6–8.5)
RBC # BLD AUTO: 3.84 10*6/MM3 (ref 4.14–5.8)
SINUS: 4.3 CM
SMALL PLATELETS BLD QL SMEAR: ABNORMAL
SMUDGE CELLS BLD QL SMEAR: ABNORMAL
SODIUM SERPL-SCNC: 138 MMOL/L (ref 136–145)
STJ: 3.6 CM
URATE SERPL-MCNC: 6.6 MG/DL (ref 3.4–7)
VARIANT LYMPHS NFR BLD MANUAL: 16 % (ref 19.6–45.3)
VARIANT LYMPHS NFR BLD MANUAL: 9 % (ref 0–5)
WBC NRBC COR # BLD AUTO: 18.88 10*3/MM3 (ref 3.4–10.8)

## 2024-04-01 PROCEDURE — 25010000002 HYDROMORPHONE PER 4 MG: Performed by: INTERNAL MEDICINE

## 2024-04-01 PROCEDURE — 88360 TUMOR IMMUNOHISTOCHEM/MANUAL: CPT | Performed by: INTERNAL MEDICINE

## 2024-04-01 PROCEDURE — 83615 LACTATE (LD) (LDH) ENZYME: CPT | Performed by: INTERNAL MEDICINE

## 2024-04-01 PROCEDURE — 25010000002 LIDOCAINE 1 % SOLUTION: Performed by: RADIOLOGY

## 2024-04-01 PROCEDURE — 93306 TTE W/DOPPLER COMPLETE: CPT | Performed by: INTERNAL MEDICINE

## 2024-04-01 PROCEDURE — 07BH3ZX EXCISION OF RIGHT INGUINAL LYMPHATIC, PERCUTANEOUS APPROACH, DIAGNOSTIC: ICD-10-PCS | Performed by: RADIOLOGY

## 2024-04-01 PROCEDURE — 88341 IMHCHEM/IMCYTCHM EA ADD ANTB: CPT | Performed by: INTERNAL MEDICINE

## 2024-04-01 PROCEDURE — 76942 ECHO GUIDE FOR BIOPSY: CPT

## 2024-04-01 PROCEDURE — 85007 BL SMEAR W/DIFF WBC COUNT: CPT | Performed by: INTERNAL MEDICINE

## 2024-04-01 PROCEDURE — 88300 SURGICAL PATH GROSS: CPT | Performed by: INTERNAL MEDICINE

## 2024-04-01 PROCEDURE — 80053 COMPREHEN METABOLIC PANEL: CPT | Performed by: INTERNAL MEDICINE

## 2024-04-01 PROCEDURE — 25010000002 PIPERACILLIN SOD-TAZOBACTAM PER 1 G: Performed by: INTERNAL MEDICINE

## 2024-04-01 PROCEDURE — 88305 TISSUE EXAM BY PATHOLOGIST: CPT | Performed by: INTERNAL MEDICINE

## 2024-04-01 PROCEDURE — 85025 COMPLETE CBC W/AUTO DIFF WBC: CPT | Performed by: INTERNAL MEDICINE

## 2024-04-01 PROCEDURE — 88342 IMHCHEM/IMCYTCHM 1ST ANTB: CPT | Performed by: INTERNAL MEDICINE

## 2024-04-01 PROCEDURE — 99232 SBSQ HOSP IP/OBS MODERATE 35: CPT | Performed by: INTERNAL MEDICINE

## 2024-04-01 PROCEDURE — 99233 SBSQ HOSP IP/OBS HIGH 50: CPT | Performed by: INTERNAL MEDICINE

## 2024-04-01 PROCEDURE — 84100 ASSAY OF PHOSPHORUS: CPT | Performed by: INTERNAL MEDICINE

## 2024-04-01 PROCEDURE — 84550 ASSAY OF BLOOD/URIC ACID: CPT | Performed by: INTERNAL MEDICINE

## 2024-04-01 PROCEDURE — 25810000003 SODIUM CHLORIDE 0.9 % SOLUTION: Performed by: INTERNAL MEDICINE

## 2024-04-01 PROCEDURE — 99232 SBSQ HOSP IP/OBS MODERATE 35: CPT | Performed by: SURGERY

## 2024-04-01 PROCEDURE — 93306 TTE W/DOPPLER COMPLETE: CPT

## 2024-04-01 RX ORDER — SODIUM CHLORIDE 9 MG/ML
150 INJECTION, SOLUTION INTRAVENOUS CONTINUOUS
Status: DISCONTINUED | OUTPATIENT
Start: 2024-04-01 | End: 2024-04-03

## 2024-04-01 RX ORDER — POTASSIUM CHLORIDE 750 MG/1
40 TABLET, FILM COATED, EXTENDED RELEASE ORAL EVERY 4 HOURS
Status: COMPLETED | OUTPATIENT
Start: 2024-04-01 | End: 2024-04-01

## 2024-04-01 RX ORDER — LIDOCAINE HYDROCHLORIDE 10 MG/ML
10 INJECTION, SOLUTION INFILTRATION; PERINEURAL ONCE
Status: COMPLETED | OUTPATIENT
Start: 2024-04-01 | End: 2024-04-01

## 2024-04-01 RX ORDER — ALLOPURINOL 300 MG/1
300 TABLET ORAL DAILY
Status: DISCONTINUED | OUTPATIENT
Start: 2024-04-01 | End: 2024-04-06 | Stop reason: HOSPADM

## 2024-04-01 RX ORDER — NEBIVOLOL 5 MG/1
2.5 TABLET ORAL
Status: DISCONTINUED | OUTPATIENT
Start: 2024-04-02 | End: 2024-04-03

## 2024-04-01 RX ADMIN — METOPROLOL TARTRATE 12.5 MG: 25 TABLET, FILM COATED ORAL at 20:26

## 2024-04-01 RX ADMIN — SODIUM CHLORIDE 125 ML/HR: 9 INJECTION, SOLUTION INTRAVENOUS at 15:51

## 2024-04-01 RX ADMIN — POTASSIUM CHLORIDE 40 MEQ: 750 TABLET, EXTENDED RELEASE ORAL at 17:34

## 2024-04-01 RX ADMIN — PIPERACILLIN AND TAZOBACTAM 3.38 G: 3; .375 INJECTION, POWDER, FOR SOLUTION INTRAVENOUS at 10:03

## 2024-04-01 RX ADMIN — POTASSIUM CHLORIDE 40 MEQ: 750 TABLET, EXTENDED RELEASE ORAL at 20:40

## 2024-04-01 RX ADMIN — ALLOPURINOL 300 MG: 300 TABLET ORAL at 17:34

## 2024-04-01 RX ADMIN — Medication 10 ML: at 20:33

## 2024-04-01 RX ADMIN — LIDOCAINE HYDROCHLORIDE 3 ML: 10 INJECTION, SOLUTION INFILTRATION; PERINEURAL at 11:35

## 2024-04-01 RX ADMIN — METOPROLOL TARTRATE 12.5 MG: 25 TABLET, FILM COATED ORAL at 10:03

## 2024-04-01 RX ADMIN — HYDROMORPHONE HYDROCHLORIDE 0.5 MG: 1 INJECTION, SOLUTION INTRAMUSCULAR; INTRAVENOUS; SUBCUTANEOUS at 21:38

## 2024-04-01 RX ADMIN — Medication 10 ML: at 10:04

## 2024-04-01 NOTE — PROGRESS NOTES
Colorectal & General Surgery  Progress Note    Patient: Jeff Bass  YOB: 1945  MRN: [id number]      Assessment  Jeff Bass is a 78 y.o. male who I am seeing in follow-up today regarding abdominal pain in the setting of leukocytosis and lactic acidosis.  Gallbladder ultrasound yesterday essentially normal.  Pain improved this morning.  Given the monocytic predominance of his leukocytosis, I do not suspect an intra-abdominal surgical problem.  Okay to discontinue Zosyn at any time from my standpoint.  Okay to proceed with all workup for his malignancy.  I will sign off at this time.  Please call with any questions or concerns.    Subjective  Feels better this morning.  Pain improved.  Nausea improved.    Objective    Vitals:    04/01/24 0727   BP: 145/74   Pulse:    Resp: 16   Temp: 98 °F (36.7 °C)   SpO2:        Physical Exam  Constitutional: Well-developed well-nourished, no acute distress  Neck: Supple, trachea midline  Respiratory: No increased work of breathing, Symmetric excursion  Cardiovascular: Well pefursed, no jugular venous distention evident   Abdominal: Soft, non-tender, non-distended  Skin: Warm, dry, no rash on visualized skin surfaces  Psychiatric: Alert and oriented ×3, normal affect     Laboratory Results  No new labs to review    Radiology  I have personally reviewed ultrasound of the gallbladder with a tiniest amount of sludge with no pericholecystic fluid or gallbladder wall thickening.         Juaquin Witt MD  Colorectal & General Surgery  [facility]    [address], [address]  [place], [place], [address]  P: [phone]  F: [phone]

## 2024-04-01 NOTE — TELEPHONE ENCOUNTER
Provider: Alexandre  Caller: patient  Relationship to Patient: patient  Call Back Phone Number: self  Reason for Call: Pt calling to see why he has delay in getting Pet scan done?

## 2024-04-01 NOTE — PROGRESS NOTES
LOS: 3 days   Patient Care Team:  Jovani Salomon MD as PCP - General (Internal Medicine)  Gerry Schroeder MD as Consulting Physician (Hematology and Oncology)  Stephanie Toledo PA as Referring Physician (Physician Assistant)    Chief Complaint: Abdomen pain.  Recurrent lymphoma who is splenomegaly and progressive retroperitoneal lymphadenopathy.    Interval History:  On 3/30/2024  Abdomen pain is reasonably controlled severity 8/10.  Had a good bowel movement.  Afebrile.    3/31/2024  Abdomen pain is controlled.  Patient is afebrile.  No nausea vomiting.  Worsening leukocytosis.   On 04/01/2024 the patient feels very fatigued and tired with no appetite, lesser abdominal pain. Spleen remains enlarged but nontender. He remains with low grade temperature and profuse perspiration. No cough or shortness of breath. His lymph node biopsy was performed a few minutes ago from the right groin. PET scan to be done tomorrow morning. The patient wants to go home today.        HISTORY OF PRESENT ILLNESS:  The patient is a 78 y.o. 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  Patient reports he was at baseline condition, and yesterday afternoon started having abdominal pain around umbilical/lower abdomen area associated with nausea.  Patient reports abdomen pain is constant.  He also reports having poor appetite since this past Sunday which is 5 days ago.  He denies low fever sweating or chills.       Patient also reported generalized weakness in the past 24 hours also and did not drink either too much at all.    Vital Signs:  Temp:  [98 °F (36.7 °C)-101.7 °F (38.7 °C)] 98.1 °F (36.7 °C)  Heart Rate:  [] 92  Resp:  [16-20] 20  BP: (117-146)/(67-74) 134/72    Intake/Output Summary  (Last 24 hours) at 4/1/2024 1357  Last data filed at 4/1/2024 0535  Gross per 24 hour   Intake --   Output 775 ml   Net -775 ml       Physical Exam:  Eyes and oral mucosa's were normal. No peripheral adenopathy in neck or axillas, lymph node in the right groin measuring 3 cm in size, mildly tender because of just recent biopsy. Left groin no lymph node available. Abdomen shows spleen below the left costal margin almost 15 cm below. No liver enlargement. No abdominal pain. No tenderness. His lungs were clear, his heart was regular with no gallops, murmurs or rubs. His skin was perspiring abundantly and moist. He was awake, alert, oriented in time and place.       Results Review:   CBC:      Lab 03/31/24  0539 03/30/24  0257 03/29/24  1216 03/29/24  0913   WBC 26.50* 16.87* 15.18* 15.57*   HEMOGLOBIN 10.7* 10.1* 11.2* 12.3*   HEMATOCRIT 31.4* 30.0* 32.7* 35.9*   PLATELETS 64* 65* 67* 68*   NEUTROS ABS 6.63 3.54 0.91* 7.08*   EOS ABS  --  0.34  --   --    MCV 82.4 80.9 82.6 82.3     CMP:        Lab 03/31/24  0539 03/30/24  1145 03/30/24  0257 03/29/24  2158 03/29/24  1216 03/29/24  0913   SODIUM 132*  --  136  --  131* 131*   POTASSIUM 3.8 3.7 3.5 3.5 3.2* 3.3*   CHLORIDE 97*  --  99  --  93* 91*   CO2 18.2*  --  23.0  --  22.8 21.8*   ANION GAP 16.8*  --  14.0  --  15.2* 18.2*   BUN 22  --  21  --  20 23   CREATININE 0.85  --  0.96  --  0.80 0.94   EGFR 87.8  --  79.9  --  89.5 81.9   GLUCOSE 82  --  77  --  95 98   CALCIUM 8.2*  --  8.1*  --  8.4* 9.0   MAGNESIUM 1.8  --   --   --  1.7 1.7   TOTAL PROTEIN 4.7*  --  4.6*  --   --  5.6*   ALBUMIN 3.2*  --  3.2*  --   --  3.8   GLOBULIN  --   --   --   --   --  1.8   ALT (SGPT) 14  --  16  --   --  18   AST (SGOT) 29  --  26  --   --  42*   BILIRUBIN 1.2  --  1.3*  --   --  1.6*   INDIRECT BILIRUBIN 0.7  --  0.8  --   --   --    BILIRUBIN DIRECT 0.5*  --  0.5*  --   --   --    ALK PHOS 53  --  56  --   --  74   LIPASE  --   --   --   --   --  8*     Uric Acid   Date  Value Ref Range Status   03/31/2024 6.4 3.4 - 7.0 mg/dL Final     Lab Results   Component Value Date    IRON 34 (L) 03/30/2024    TIBC 171 (L) 03/30/2024    FERRITIN 801.00 (H) 03/30/2024     Lab Results   Component Value Date    FOLATE 6.02 03/30/2024     Lab Results   Component Value Date    [id number] 437 03/30/2024       Results from last 7 days   Lab Units 03/29/24  1216 03/29/24  0913   HSTROP T ng/L 21 20     Results from last 7 days   Lab Units 03/30/24  0257 03/29/24  0913   INR  1.30* 1.28*   APTT seconds 30.8  --          Results from last 7 days   Lab Units 03/31/24  0539   MAGNESIUM mg/dL 1.8         Patient Name: MAC THOMPSON   Accession #: [id number]     [facility]   [address], [address]   [place], [place]  [address]       CLINICAL HISTORY:   78 year old male with a history of CLL     SPECIMEN SOURCE:   BLOOD ([id number])       FINAL DIAGNOSIS:   LEUKEMIA/LYMPHOMA PANEL     INTERPRETATION     CD5+ MONOCLONAL B CELL PROCESS, CONSISTENT WITH HISTORICAL LYMPHOMA         PATHOLOGIST COMMENT:   The B cell lymphoma comprises 72.5% of peripheral blood cellularity.       Electronically Signed Out on 4/1/2024      KETAN VARGAS MD    [facility]/4/1/2024  Interpretation performed at:   [facility]   [address], [address]   [place]  [address]         Assessment:  *.  History of SLL status post Rituxan plus Bendamustine, and had Jon's transformation in 2023 for which he had 5 cycles Rituxan plus CHOP with the good clinical response.     *.  Abdomen pain new onset for the past 2 days.  Patient also feels malaise nausea for about 5 days.  Denies fever chills, he did have mild sweatling but not drenching sweating.  Has poor appetite for the past 4 5 days, no significant weight loss.  Patient was seen by general surgeon Dr. Witt, no acute abdomen or indication for surgical intervention.  CT scan for abdomen pelvis today reported significant worsening  Splenomegaly,  retroperitoneal lymphadenopathy in the pelvic lymphadenopathy.  Laboratory study also showed newly developed leukocytosis, lymphocytosis, neutropenia, and thrombocytopenia worsening anemia.  I think this patient has disease progression from his lymphoma.  I recommended following: ultrasound-guided right inguinal lymph node biopsy for comprehensive study to distinguish of recurrent diffuse large B-cell lymphoma versus progression of SLL/CLL, obtain PET scan as inpatient, and also subsequently start first cycle chemotherapy as inpatient.  With the patient having symptoms and significant disease progression, I do not think arrange outpatient chemotherapy is in the best interest of this patient because of the elevated time for biopsy, PET scan and initiation of chemotherapy.    Will also check laboratory studies for LDH and uric acid.  This patient also has leukocytosis/lymphocytosis, I also recommended to have peripheral blood for flow cytometry study.  This also will help distinguish between CLL and diffuse large B-cell lymphoma.  I think this patient may have leukemia phase of the diffuse large B-cell lymphoma.  3/29/2024 enormously elevated LDH > 2500.  This is secondary to his lymphoma.  3/30/2024 pain is better controlled severity 4/10.   3/31/2024 abdomen pain is controlled.  Worsening leukocytosis.  Peripheral blood smear reviewed, they are significant abnormal mononuclear cells, the diameter is about 3-4 times larger than the erythrocytes, many of those cells having 4 or 5 nucleoli within the nucleus, the chromatin is much less condensed.  And I can see some of those cells having dividing nucleus, however they do not look like typical blasts.  There are also typical small lymphocytes with condensed chromatin.  I explained to patient, he has leukemia phase of aggressive lymphoma relapse.  That is why I think this patient definitely needs a biopsy.    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 The similar picture that he has back last year in 2023 when he had exactly the same clinical circumstances and he improved dramatically with chemotherapy administration using CHOP and Rituxan.     At this point I do believe that the patient needs to proceed as follows:    1. He will require initiation of Rituxan administration tomorrow and once a week.  2. He will receive IV fluids today normal saline 125 cc/hr along with allopurinol 300 mg orally everyday.   3. He will repeat uric acid, LDH, CMP on a regular basis on a daily basis.  4. We will require continuation of monitoring his pathological analysis of the lymph node.   5. The flow cytometry from his peripheral blood documents a monoclonal population of cells CD5 positive encompassing 75% of the events that has been posted. This documents that the so called monocytes are large cell lymphoid malignant cells consistent with his Jon's transformation and going along with his LDH.   6. Once that the patient leaves the hospital probably by this Thursday or Friday and once that we have proper monitoring of tumor lysis syndrome the patient will require continuation of Rituxan in the office on a weekly basis and very likely incorporation of new modality of therapy to him including the consideration of new monoclonal antibodies to deliver care for him.     I discussed this with him, discussed this with nursing and discussed this with Pathologist, Dr. Rashid at [facility] today.          *.  Worsening anemia and newly developed thrombocytopenia.  This is due to lymphoma progression.  Nevertheless I recommended to obtain iron study ferritin iron profile, together with B12 and folate.  Will also check LDH.  Patient does have elevated total bilirubin.  Lab study 3/30/2024  hemoglobin 10.1.  Ferritin 801, free iron 34 iron saturation 20%, B12 at 437 and folate 6.02 ng/mL.  No iron deficiency, fits with inflammatory condition related to his lymphoma.  Nevertheless low normal B12 and folate level, I think patient would benefit from oral supplement for both with no apparent side effects.  3/31/2024 hemoglobin 10.7.    *.  Insomnia.  Patient reports poor sleeping quality, especially in the hospital.  Patient was taking melatonin at home not really helping.  I recommended to start the patient Ambien as needed.     *.  Hypokalemia.  Management per primary team and normalized.    *.  Elevated uric acid level.  This is related to his progressive lymphoma.  Uric acid 9.7 on 3/29/2024.  I recommend to start allopurinol to decrease uric acid and also preventing tumor lysis syndrome, expecting patient will have chemotherapy in the next few days.  3/31/2024 normalized uric acid 6.4.     PLAN:   The care plan is clearly explicit and posted above.     The patient will proceed with therapy tomorrow with Rituxan administration. He has a high risk for tumor lysis syndrome. This will be monitored and he will require initiation of IV fluids tonight through his port catheter. Allopurinol is already ongoing and we will monitor today CBC, CMP, LDH, uric acid.       Spoke with nursing staff today.      Gerry Schroeder MD  04/01/24  13:57 EDT

## 2024-04-01 NOTE — PROGRESS NOTES
Nutrition Services    Patient Name:  Jeff Bass  YOB: 1945  MRN: [id number]  Admit Date:  3/29/2024    Assessment Date:  04/01/24    Summary: MST 3    Visit initiated per MST 3. 78yoM admitted for c/o severe abdominal pain. Hx of chronic lymphocytic leukemia, hearing loss, GERD, inguinal hernia, HTN. Here w/ progression of splenomegaly and retroperitoneal lymphadenopathy. Reports poor appetite x 1 wk. No significant wt loss per chart review. Currently on CLD. Voiced few food preference during my nutrition assessment w/ him. Discussed doing nutrition supplement to help w/ PO intake.    Na 132, hgb 10.7, hct 31.4, platelets 64  IVF 125mL/hr    REC:  Boost breeze TID w/ meals  Likes italian ice - noted on CBORD for  to see  Monitor PO,ONS intake and wt.     RD to follow nutrition needs, clinical course    CLINICAL NUTRITION ASSESSMENT      Reason for Assessment MST score 2+     Diagnosis/Problem   Abdominal pain, thrompcytopenia, splenomegaly, acidosis and hypokalemia.    Medical/Surgical History Past Medical History:   Diagnosis Date    Arthritis     Benign prostatic hyperplasia     FOLLOWED BY DR. VIVIEN PATEL    Biliary dyskinesia     Bunion of right foot     GREAT TOE    Bursitis of right hip 03/15/2018    CLL (chronic lymphocytic leukemia) 2016    FOLLOWED BY DR WALKER    Colon polyps     FOLLOWED BY DR. NEHA HAM    Constipation     Degeneration of lumbar intervertebral disc     Diverticulosis     Erectile dysfunction     Fracture of ankle 1975    GERD (gastroesophageal reflux disease)     Hallux valgus of left foot     Hyperlipidemia     MIXED    Hypertension     Inguinal hernia     Kidney stone 02/21/2009    SEEN AT [facility]    Knee swelling 2018    Localized, primary osteoarthritis     Lumbar radiculopathy     Lumbar stenosis     Osteoarthritis of right knee     Polyneuropathy     Prostate CA 03/2016    JACQUELYN 6, S/P XRT, FOLLOWED BY DR. VIVIEN PATEL, RADIATION  SEEDS    PVC (premature ventricular contraction)     PT STATES WORKED UP YEARS AGO BY [facility] FOR POSSIBLE MURMUR - NO FOLLOW UP REQUIRED     RBBB     Sensory hearing loss, bilateral     Skin cancer     SQUAMOUS CELL CARCINOMA OF FACE    Substernal goiter     Thyromegaly 12/2016    ADMITTED TO [facility]    Vitamin D deficiency        Past Surgical History:   Procedure Laterality Date    BRONCHOSCOPY N/A 12/14/2016    Procedure: BRONCHOSCOPY WITH  BAL AND BIOPSY AND ENDOBRONCHIAL ULTRASOUND  AND NAVIGATION WITH BRUSHINGS AND BIOPSY AND BAL;  Surgeon: Pierre Manning MD;  Location: [facility] ENDOSCOPY;  Service:     COLONOSCOPY N/A 03/13/2019    5 MM SESSILE TUBULAR ADENOMA POLYP IN TRANSVERSE, MULTIPLE SMALL AND LARGE DIVERTICULA IN SIGMOID, RESCOPE IN 5 YRS, DR. NEHA HAM AT [facility]    COLONOSCOPY W/ POLYPECTOMY N/A 03/19/2015    3 TUBULAR ADENOMA POLYPS, 12 TUBULOVILLOUS POLYP RECTO-SIGMOID, DIVERTICULOSIS, RESCOPE IN 3 YRS, DR. NEHA HAM AT [facility]    EYE SURGERY Bilateral     CATARACT EXTRACTION WITH LENS IMPLANT, DR. GOVEA    FINGER NAIL SURGERY Right 2022    TORRES-PATEL    INGUINAL HERNIA REPAIR Left 11/02/2021    Procedure: LEFT OPEN INGUINAL HERNIA REPAIR;  Surgeon: Nixon Kolb MD;  Location: [facility] MAIN OR;  Service: General;  Laterality: Left;    PROSTATE RADIOACTIVE SEED IMPLANT N/A 09/06/2016    DR. HOA JARAMILLO AT [facility]    PROSTATE SURGERY N/A 03/11/2016    BIOPSY: ADENOCARCINOMA, DR. ZAID IBARRA    THYROID BIOPSY Bilateral 11/01/2017    NO B CELL OR T CELL LYMPHOMA, DONE AT [facility]    TOTAL KNEE ARTHROPLASTY Right 10/12/2022    Procedure: TOTAL KNEE ARTHROPLASTY;  Surgeon: Ran Rachel MD;  Location: [facility] OR [facility];  Service: Orthopedics;  Laterality: Right;    US GUIDED LYMPH NODE BIOPSY  4/1/2024    VENOUS ACCESS DEVICE (PORT) INSERTION Right 7/6/2023    Procedure: INSERTION VENOUS ACCESS DEVICE;  Surgeon: Nixon Kolb MD;  Location: [facility] OR [facility];  Service: General;  Laterality:  "Right;        Anthropometrics        Current Height  Current Weight  BMI kg/m2 Height: 195.6 cm (77\")  Weight: 96.2 kg (212 lb) (04/01/24 0905)  Body mass index is 25.14 kg/m².   Adjusted BMI (if applicable)    BMI Category Overweight (25 - 29.9)   Ideal Body Weight (IBW) 208#   Usual Body Weight (UBW) 206-220#   Weight Trend Stable   Weight History Wt Readings from Last 30 Encounters:   04/01/24 0905 96.2 kg (212 lb)   04/01/24 0410 96.5 kg (212 lb 11.9 oz)   03/29/24 0850 99.8 kg (220 lb)   02/27/24 1426 91.5 kg (201 lb 12.8 oz)   12/29/23 1335 97.6 kg (215 lb 1.6 oz)   11/30/23 1319 97.2 kg (214 lb 3.2 oz)   11/02/23 1603 94.1 kg (207 lb 8 oz)   10/03/23 0832 95 kg (209 lb 6.4 oz)   09/27/23 1239 94 kg (207 lb 3.2 oz)   09/21/23 1445 93.9 kg (207 lb)   09/19/23 0933 94.3 kg (207 lb 12.8 oz)   08/29/23 0938 93.8 kg (206 lb 11.2 oz)   08/08/23 0951 94.2 kg (207 lb 9.6 oz)   08/01/23 1454 93.8 kg (206 lb 14.4 oz)   07/11/23 0954 91.2 kg (201 lb)   07/05/23 1509 94.9 kg (209 lb 3.2 oz)   06/18/23 0257 97.9 kg (215 lb 13.3 oz)   06/17/23 0444 99.6 kg (219 lb 9.3 oz)   06/16/23 0525 99.1 kg (218 lb 7.6 oz)   06/13/23 0300 92.8 kg (204 lb 9.4 oz)   06/12/23 1719 93.4 kg (206 lb)   06/12/23 1115 93.6 kg (206 lb 4.8 oz)   06/12/23 1011 93.6 kg (206 lb 4.8 oz)   06/07/23 1530 95.8 kg (211 lb 4.8 oz)   06/02/23 1126 94.3 kg (207 lb 14.4 oz)   05/09/23 1556 97.3 kg (214 lb 8 oz)   04/13/23 1159 95.4 kg (210 lb 4.8 oz)   03/17/23 1620 97.8 kg (215 lb 9.6 oz)   02/14/23 0858 97.6 kg (215 lb 3.2 oz)   02/08/23 1354 98.3 kg (216 lb 11.2 oz)   01/20/23 1303 101 kg (222 lb)   01/20/23 1311 101 kg (222 lb)   01/18/23 1317 101 kg (221 lb 14.4 oz)   01/13/23 1307 99.6 kg (219 lb 9.6 oz)   12/30/22 1328 101 kg (222 lb 6.4 oz)   12/13/22 1451 107 kg (235 lb)   10/27/22 1148 107 kg (235 lb 11.2 oz)      --  Labs       Pertinent Labs    Results from last 7 days   Lab Units 03/31/24  0539 03/30/24  1145 03/30/24  0257 03/29/24  2158 " "03/29/24 1216 03/29/24  0913   SODIUM mmol/L 132*  --  136  --  131* 131*   POTASSIUM mmol/L 3.8 3.7 3.5   < > 3.2* 3.3*   CHLORIDE mmol/L 97*  --  99  --  93* 91*   CO2 mmol/L 18.2*  --  23.0  --  22.8 21.8*   BUN mg/dL 22  --  21  --  20 23   CREATININE mg/dL 0.85  --  0.96  --  0.80 0.94   CALCIUM mg/dL 8.2*  --  8.1*  --  8.4* 9.0   BILIRUBIN mg/dL 1.2  --  1.3*  --   --  1.6*   ALK PHOS U/L 53  --  56  --   --  74   ALT (SGPT) U/L 14  --  16  --   --  18   AST (SGOT) U/L 29  --  26  --   --  42*   GLUCOSE mg/dL 82  --  77  --  95 98    < > = values in this interval not displayed.     Results from last 7 days   Lab Units 03/31/24  0539 03/30/24 0257 03/29/24 1216 03/29/24  0913   MAGNESIUM mg/dL 1.8  --  1.7 1.7   HEMOGLOBIN g/dL 10.7*   < > 11.2* 12.3*   HEMATOCRIT % 31.4*   < > 32.7* 35.9*   WBC 10*3/mm3 26.50*   < > 15.18* 15.57*   ALBUMIN g/dL 3.2*   < >  --  3.8    < > = values in this interval not displayed.     Results from last 7 days   Lab Units 03/31/24 0539 03/30/24 0257 03/29/24 1216 03/29/24  0913   INR   --  1.30*  --  1.28*   APTT seconds  --  30.8  --   --    PLATELETS 10*3/mm3 64* 65* 67* 68*     COVID19   Date Value Ref Range Status   10/30/2021 Not Detected Not Detected - Ref. Range Final     No results found for: \"HGBA1C\"       Medications           Scheduled Medications allopurinol, 300 mg, Oral, Daily  folic acid, 1 mg, Oral, Daily  metoprolol tartrate, 12.5 mg, Oral, Q12H  piperacillin-tazobactam, 3.375 g, Intravenous, Q8H  polyethylene glycol, 17 g, Oral, Daily  sodium chloride, 10 mL, Intravenous, Q12H  vitamin B-12, 1,000 mcg, Oral, Daily       Infusions Pharmacy Consult,   sodium chloride, 125 mL/hr       PRN Medications   acetaminophen    bisacodyl    HYDROmorphone **AND** naloxone    melatonin    melatonin    metoclopramide    metoprolol tartrate    naproxen    nitroglycerin    ondansetron    Pharmacy Consult    Potassium Replacement - Follow Nurse / BPA Driven Protocol    " prochlorperazine    [COMPLETED] Insert Peripheral IV **AND** sodium chloride    sodium chloride    sodium chloride     Physical Findings          General Findings alert, oriented, overweight   Oral/Mouth Cavity tooth or teeth missing   Edema  not assessed   Gastrointestinal diarrhea, last bowel movement: 3/31   Skin  bruising   Tubes/Drains/Lines none   NFPE Not indicated at this time   --  Current Nutrition Orders & Evaluation of Intake       Oral Nutrition     Food Allergies NKFA   Current PO Diet Diet: Liquid; Clear Liquid; Fluid Consistency: Thin (IDDSI 0)   Supplement n/a   PO Evaluation     % PO Intake CLD    Factors Affecting Intake: abdominal pain, altered/poor sleep, decreased appetite, diarrhea   --  PES STATEMENT / NUTRITION DIAGNOSIS      Nutrition Dx Problem  Problem: Inadequate Oral Intake  Etiology: Medical Diagnosis - chronic lymphocytic leukemia, splenomegaly, abdominal lymphadenpopathy    Signs/Symptoms: Clear Liquid Diet and Report of Minimal PO Intake     NUTRITION INTERVENTION / PLAN OF CARE      Intervention Goal(s) Establish goals of care, Reduce/improve symptoms, Meet estimated needs, Disease management/therapy, Initiate feeding/diet, Tolerate PO , Increase intake, Accepts oral nutrition supplement, Maintain weight, No significant weight loss, and PO intake goal %: %         RD Intervention/Action Await initiation/advancement of PO diet, Encourage intake, Continue to monitor, Care plan reviewed, and Recommend/order: ONS   --      Prescription/Orders:       PO Diet       Supplements Boosts breeze TID      Enteral Nutrition       Parenteral Nutrition    New Prescription Ordered? Yes   --      Monitor/Evaluation Per protocol, PO intake, Supplement intake, Weight, POC/GOC   Discharge Plan/Needs Pending clinical course   --    RD to follow per protocol.      Electronically signed by:  Lynn Ash  04/01/24 15:32 EDT

## 2024-04-01 NOTE — PLAN OF CARE
Goal Outcome Evaluation:  Plan of Care Reviewed With: patient           Outcome Evaluation: Cont on cardiac monitoring. R-chest port accessed this shift per Oncology. NS infusing 125mL/hr. Potassium replacement this evening. Platelets 49; Oncology notified. VSS.

## 2024-04-01 NOTE — PROGRESS NOTES
[facility]  Progress note: 2024    Patient Identification:  Name:Jeff Bass  Age:78 y.o.  Sex: male  :  1945  MRN: [id number]           CC:  Ventricular tachycardia and brief atrial fibrillation.    Interval history:  Appears to be controlled on low-dose metoprolol.  Repeat echo with ejection fraction 53%, strain not obtained, mildly dilated ascending aorta.  Patient denies any chest pain shortness of breath.    Vital Signs:   Temp:  [98 °F (36.7 °C)-101.7 °F (38.7 °C)] 98.1 °F (36.7 °C)  Heart Rate:  [] 92  Resp:  [16-20] 18  BP: (117-146)/(65-74) 122/65    Intake/Output Summary (Last 24 hours) at 2024 1616  Last data filed at 2024 0535  Gross per 24 hour   Intake --   Output 775 ml   Net -775 ml       Physical Examination:    General Appearance No acute distress   Neck No adenopathy, supple, trachea midline, no thyromegaly, no carotid bruit, no JVD   Lungs Clear to auscultation,respirations regular, even and unlabored   Heart Regular rhythm and normal rate, normal S1 and S2, no murmur, no gallop, no rub, no click   Chest wall No abnormalities observed   Abdomen Normal bowel sounds, no masses, no hepatomegaly, soft   Extremities Moves all extremities well, no edema, no cyanosis, no redness   Neurological Alert and oriented x 3     Lab Review:  Personally reviewed the labs, radiology imaging and other cardiac procedures.   Results from last 7 days   Lab Units 24  0539   SODIUM mmol/L 132*   POTASSIUM mmol/L 3.8   CHLORIDE mmol/L 97*   CO2 mmol/L 18.2*   BUN mg/dL 22   CREATININE mg/dL 0.85   CALCIUM mg/dL 8.2*   BILIRUBIN mg/dL 1.2   ALK PHOS U/L 53   ALT (SGPT) U/L 14   AST (SGOT) U/L 29   GLUCOSE mg/dL 82     Results from last 7 days   Lab Units 24  1216 24  0913   HSTROP T ng/L 21 20     Results from last 7 days   Lab Units 24  0539 24  0257 24  1216   WBC 10*3/mm3 26.50* 16.87* 15.18*   HEMOGLOBIN g/dL 10.7* 10.1* 11.2*    HEMATOCRIT % 31.4* 30.0* 32.7*   PLATELETS 10*3/mm3 64* 65* 67*     Results from last 7 days   Lab Units 03/30/24  0257 03/29/24  0913   INR  1.30* 1.28*   APTT seconds 30.8  --      Medication Review:   Meds reviewed  Scheduled Meds:allopurinol, 300 mg, Oral, Daily  folic acid, 1 mg, Oral, Daily  metoprolol tartrate, 12.5 mg, Oral, Q12H  piperacillin-tazobactam, 3.375 g, Intravenous, Q8H  polyethylene glycol, 17 g, Oral, Daily  sodium chloride, 10 mL, Intravenous, Q12H  vitamin B-12, 1,000 mcg, Oral, Daily      Continuous Infusions:Pharmacy Consult,   sodium chloride, 125 mL/hr, Last Rate: 125 mL/hr (04/01/24 1551)        I personally viewed and interpreted the patient's EKG/Telemetry data    Assessment and Plan  1.  SVT with history of nonsustained atrial fibrillation SVT resolved with IV metoprolol.  Given better profile with the use of Bystolic in the setting of chemotherapy will switch metoprolol to Bystolic.  2.  Coronary disease with elevated coronary calcium score 229 and 2016   3.  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  Prechemo echo EF 60%, GLS -22% normal diastolic function for age.  Repeat echo pending  4.  Abdominal pain  5.  Diastolic heart failure, appears compensated  6.  Hypoxia      Mini Garcia  4/1/202416:16 EDT  25min spent in reviewing records, discussion and examination of the patient and discussion with other members of the patient's medical team.     Dictated utilizing Dragon dictation

## 2024-04-01 NOTE — PLAN OF CARE
Goal Outcome Evaluation:            Patient rested well for the most part, this shift.  This nurse awakened the patient a couple times this shift to ask him to take some deep breaths related to saturation readings at that time and 1 episode of PVC @ 840pm. See MAR for c/o pain, O2 @3L via nasal cannula, cardiac monitor #[id number], NPO at midnight r/t possible testing. Plan is poss liver biopsy and PET scan on 04/1/24, also awaiting a 2D echo. Bed alarm in place. Chest port present but not accessed. Plan of care is ongoing.

## 2024-04-01 NOTE — PROGRESS NOTES
Abdominal pain  Jon's transformation of CLL  Tachycardia  Hypertension  Appetite loss      Review of Systems   Constitutional:         Not very much pain   Still not interested in food  No dysphagia       Temp:  [98 °F (36.7 °C)-98.2 °F (36.8 °C)] 98.1 °F (36.7 °C)  Heart Rate:  [70-93] 92  Resp:  [16-20] 18  BP: (117-145)/(65-74) 122/65  I/O last 3 completed shifts:  In: 7524.7 [I.V.:7524.7]  Out: 1025 [Urine:1025]  No intake/output data recorded.    Physical Exam  Vitals reviewed.   Cardiovascular:      Rate and Rhythm: Normal rate and regular rhythm.   Pulmonary:      Effort: Pulmonary effort is normal.      Breath sounds: Normal breath sounds.   Abdominal:      General: Abdomen is flat.      Palpations: Abdomen is soft.      Comments: Splenomegaly   Neurological:      Mental Status: He is alert.     Heart rate and BP Ok now  Continuing atenolol  Dr. Ambrose note very much appreciated  Will have confirmatory PET tomorrow  Lymph node Bx Positive for lymphoma  Rituxan chemo to begin tomorrow  Allopurinol on board        Jon's syndrome    Hypertension    Lymphoma of spleen    Abdominal pain    Anemia associated with malignant neoplastic disease    Thrombocytopenia    Leukocytosis    Primary insomnia    Nausea    Elevated blood uric acid level

## 2024-04-01 NOTE — SIGNIFICANT NOTE
04/01/24 1047   OTHER   Discipline physical therapist   Rehab Time/Intention   Session Not Performed other (see comments)  (Patient very frustrated about waiting for tests and states he is not in the mood to participate in PT eval this date. Patient agreeable to follow up tomorrow.)   Recommendation   PT - Next Appointment 04/02/24

## 2024-04-02 ENCOUNTER — APPOINTMENT (OUTPATIENT)
Dept: PET IMAGING | Facility: HOSPITAL | Age: 79
DRG: 823 | End: 2024-04-02
Payer: MEDICARE

## 2024-04-02 DIAGNOSIS — C91.10 RICHTER'S SYNDROME: Primary | ICD-10-CM

## 2024-04-02 LAB
ALBUMIN SERPL-MCNC: 2.8 G/DL (ref 3.5–5.2)
ALBUMIN/GLOB SERPL: 2.5 G/DL
ALP SERPL-CCNC: 51 U/L (ref 39–117)
ALT SERPL W P-5'-P-CCNC: 14 U/L (ref 1–41)
ANION GAP SERPL CALCULATED.3IONS-SCNC: 13 MMOL/L (ref 5–15)
ANION GAP SERPL CALCULATED.3IONS-SCNC: 18 MMOL/L (ref 5–15)
AST SERPL-CCNC: 26 U/L (ref 1–40)
BILIRUB SERPL-MCNC: 1.3 MG/DL (ref 0–1.2)
BUN SERPL-MCNC: 24 MG/DL (ref 8–23)
BUN SERPL-MCNC: 31 MG/DL (ref 8–23)
BUN/CREAT SERPL: 30.1 (ref 7–25)
BUN/CREAT SERPL: 32 (ref 7–25)
CALCIUM SPEC-SCNC: 7.7 MG/DL (ref 8.6–10.5)
CALCIUM SPEC-SCNC: 8.2 MG/DL (ref 8.6–10.5)
CHLORIDE SERPL-SCNC: 104 MMOL/L (ref 98–107)
CHLORIDE SERPL-SCNC: 107 MMOL/L (ref 98–107)
CO2 SERPL-SCNC: 15 MMOL/L (ref 22–29)
CO2 SERPL-SCNC: 20 MMOL/L (ref 22–29)
CREAT SERPL-MCNC: 0.75 MG/DL (ref 0.76–1.27)
CREAT SERPL-MCNC: 1.03 MG/DL (ref 0.76–1.27)
DEPRECATED RDW RBC AUTO: 48.7 FL (ref 37–54)
EGFRCR SERPLBLD CKD-EPI 2021: 73.4 ML/MIN/1.73
EGFRCR SERPLBLD CKD-EPI 2021: 91.2 ML/MIN/1.73
ERYTHROCYTE [DISTWIDTH] IN BLOOD BY AUTOMATED COUNT: 16 % (ref 12.3–15.4)
GLOBULIN UR ELPH-MCNC: 1.1 GM/DL
GLUCOSE SERPL-MCNC: 89 MG/DL (ref 65–99)
GLUCOSE SERPL-MCNC: 94 MG/DL (ref 65–99)
HBV SURFACE AB SER RIA-ACNC: NORMAL
HBV SURFACE AG SERPL QL IA: NORMAL
HCT VFR BLD AUTO: 29.8 % (ref 37.5–51)
HGB BLD-MCNC: 10.1 G/DL (ref 13–17.7)
LDH SERPL-CCNC: >2500 U/L (ref 135–225)
LYMPHOCYTES # BLD MANUAL: 12.22 10*3/MM3 (ref 0.7–3.1)
LYMPHOCYTES NFR BLD MANUAL: 16.2 % (ref 5–12)
MAGNESIUM SERPL-MCNC: 1.8 MG/DL (ref 1.6–2.4)
MCH RBC QN AUTO: 28.3 PG (ref 26.6–33)
MCHC RBC AUTO-ENTMCNC: 33.9 G/DL (ref 31.5–35.7)
MCV RBC AUTO: 83.5 FL (ref 79–97)
MONOCYTES # BLD: 4.46 10*3/MM3 (ref 0.1–0.9)
MYELOCYTES NFR BLD MANUAL: 4 % (ref 0–0)
NEUTROPHILS # BLD AUTO: 9.74 10*3/MM3 (ref 1.7–7)
NEUTROPHILS NFR BLD MANUAL: 35.4 % (ref 42.7–76)
PHOSPHATE SERPL-MCNC: 1.9 MG/DL (ref 2.5–4.5)
PHOSPHATE SERPL-MCNC: 2.2 MG/DL (ref 2.5–4.5)
PLAT MORPH BLD: NORMAL
PLATELET # BLD AUTO: 52 10*3/MM3 (ref 140–450)
PMV BLD AUTO: 9.5 FL (ref 6–12)
POTASSIUM SERPL-SCNC: 3.8 MMOL/L (ref 3.5–5.2)
PROT SERPL-MCNC: 3.9 G/DL (ref 6–8.5)
RBC # BLD AUTO: 3.57 10*6/MM3 (ref 4.14–5.8)
RBC MORPH BLD: NORMAL
REF LAB TEST METHOD: NORMAL
SMUDGE CELLS BLD QL SMEAR: ABNORMAL
SODIUM SERPL-SCNC: 137 MMOL/L (ref 136–145)
SODIUM SERPL-SCNC: 140 MMOL/L (ref 136–145)
URATE SERPL-MCNC: 5.9 MG/DL (ref 3.4–7)
URATE SERPL-MCNC: 6.2 MG/DL (ref 3.4–7)
VARIANT LYMPHS NFR BLD MANUAL: 44.4 % (ref 19.6–45.3)
WBC NRBC COR # BLD AUTO: 27.52 10*3/MM3 (ref 3.4–10.8)

## 2024-04-02 PROCEDURE — 99233 SBSQ HOSP IP/OBS HIGH 50: CPT | Performed by: INTERNAL MEDICINE

## 2024-04-02 PROCEDURE — 84100 ASSAY OF PHOSPHORUS: CPT | Performed by: INTERNAL MEDICINE

## 2024-04-02 PROCEDURE — 86706 HEP B SURFACE ANTIBODY: CPT | Performed by: INTERNAL MEDICINE

## 2024-04-02 PROCEDURE — A9552 F18 FDG: HCPCS | Performed by: INTERNAL MEDICINE

## 2024-04-02 PROCEDURE — 0 FLUDEOXYGLUCOSE F18 SOLUTION: Performed by: INTERNAL MEDICINE

## 2024-04-02 PROCEDURE — 97162 PT EVAL MOD COMPLEX 30 MIN: CPT

## 2024-04-02 PROCEDURE — 80053 COMPREHEN METABOLIC PANEL: CPT | Performed by: INTERNAL MEDICINE

## 2024-04-02 PROCEDURE — 3E0W305 INTRODUCTION OF OTHER ANTINEOPLASTIC INTO LYMPHATICS, PERCUTANEOUS APPROACH: ICD-10-PCS | Performed by: INTERNAL MEDICINE

## 2024-04-02 PROCEDURE — 86704 HEP B CORE ANTIBODY TOTAL: CPT | Performed by: INTERNAL MEDICINE

## 2024-04-02 PROCEDURE — 25010000002 HYDROCORTISONE SOD SUC (PF) 250 MG RECONSTITUTED SOLUTION: Performed by: INTERNAL MEDICINE

## 2024-04-02 PROCEDURE — 25810000003 SODIUM CHLORIDE 0.9 % SOLUTION 250 ML FLEX CONT: Performed by: INTERNAL MEDICINE

## 2024-04-02 PROCEDURE — 25010000002 MEPERIDINE PER 100 MG: Performed by: INTERNAL MEDICINE

## 2024-04-02 PROCEDURE — 83615 LACTATE (LD) (LDH) ENZYME: CPT | Performed by: INTERNAL MEDICINE

## 2024-04-02 PROCEDURE — 97530 THERAPEUTIC ACTIVITIES: CPT

## 2024-04-02 PROCEDURE — 25010000002 RITUXIMAB-ARRX 500 MG/50ML SOLUTION 50 ML VIAL: Performed by: INTERNAL MEDICINE

## 2024-04-02 PROCEDURE — 25810000003 SODIUM CHLORIDE 0.9 % SOLUTION: Performed by: INTERNAL MEDICINE

## 2024-04-02 PROCEDURE — 99232 SBSQ HOSP IP/OBS MODERATE 35: CPT | Performed by: INTERNAL MEDICINE

## 2024-04-02 PROCEDURE — 84550 ASSAY OF BLOOD/URIC ACID: CPT | Performed by: INTERNAL MEDICINE

## 2024-04-02 PROCEDURE — 87340 HEPATITIS B SURFACE AG IA: CPT | Performed by: INTERNAL MEDICINE

## 2024-04-02 PROCEDURE — 82948 REAGENT STRIP/BLOOD GLUCOSE: CPT

## 2024-04-02 PROCEDURE — 83735 ASSAY OF MAGNESIUM: CPT | Performed by: INTERNAL MEDICINE

## 2024-04-02 PROCEDURE — 84132 ASSAY OF SERUM POTASSIUM: CPT | Performed by: INTERNAL MEDICINE

## 2024-04-02 PROCEDURE — 85025 COMPLETE CBC W/AUTO DIFF WBC: CPT | Performed by: INTERNAL MEDICINE

## 2024-04-02 PROCEDURE — 85007 BL SMEAR W/DIFF WBC COUNT: CPT | Performed by: INTERNAL MEDICINE

## 2024-04-02 PROCEDURE — 25010000002 DIPHENHYDRAMINE PER 50 MG: Performed by: INTERNAL MEDICINE

## 2024-04-02 PROCEDURE — 25010000002 HYDROCORTISONE SOD SUC (PF) 100 MG RECONSTITUTED SOLUTION: Performed by: INTERNAL MEDICINE

## 2024-04-02 PROCEDURE — 78815 PET IMAGE W/CT SKULL-THIGH: CPT

## 2024-04-02 RX ORDER — ACETAMINOPHEN 325 MG/1
650 TABLET ORAL ONCE
OUTPATIENT
Start: 2024-04-23

## 2024-04-02 RX ORDER — FAMOTIDINE 10 MG/ML
20 INJECTION, SOLUTION INTRAVENOUS AS NEEDED
Status: CANCELLED | OUTPATIENT
Start: 2024-04-02

## 2024-04-02 RX ORDER — MEPERIDINE HYDROCHLORIDE 25 MG/ML
25 INJECTION INTRAMUSCULAR; INTRAVENOUS; SUBCUTANEOUS
OUTPATIENT
Start: 2024-04-23

## 2024-04-02 RX ORDER — MEPERIDINE HYDROCHLORIDE 25 MG/ML
25 INJECTION INTRAMUSCULAR; INTRAVENOUS; SUBCUTANEOUS
Status: CANCELLED | OUTPATIENT
Start: 2024-04-04

## 2024-04-02 RX ORDER — DIPHENHYDRAMINE HYDROCHLORIDE 50 MG/ML
50 INJECTION INTRAMUSCULAR; INTRAVENOUS AS NEEDED
Status: CANCELLED | OUTPATIENT
Start: 2024-04-04

## 2024-04-02 RX ORDER — MEPERIDINE HYDROCHLORIDE 25 MG/ML
25 INJECTION INTRAMUSCULAR; INTRAVENOUS; SUBCUTANEOUS
Status: COMPLETED | OUTPATIENT
Start: 2024-04-02 | End: 2024-04-02

## 2024-04-02 RX ORDER — MEPERIDINE HYDROCHLORIDE 25 MG/ML
25 INJECTION INTRAMUSCULAR; INTRAVENOUS; SUBCUTANEOUS
OUTPATIENT
Start: 2024-04-09

## 2024-04-02 RX ORDER — ACETAMINOPHEN 325 MG/1
650 TABLET ORAL ONCE
Status: COMPLETED | OUTPATIENT
Start: 2024-04-02 | End: 2024-04-02

## 2024-04-02 RX ORDER — SODIUM CHLORIDE 9 MG/ML
20 INJECTION, SOLUTION INTRAVENOUS ONCE
Status: COMPLETED | OUTPATIENT
Start: 2024-04-02 | End: 2024-04-02

## 2024-04-02 RX ORDER — SODIUM CHLORIDE 9 MG/ML
20 INJECTION, SOLUTION INTRAVENOUS ONCE
OUTPATIENT
Start: 2024-04-16

## 2024-04-02 RX ORDER — SODIUM CHLORIDE 9 MG/ML
20 INJECTION, SOLUTION INTRAVENOUS ONCE
Status: CANCELLED | OUTPATIENT
Start: 2024-04-04

## 2024-04-02 RX ORDER — SODIUM CHLORIDE 9 MG/ML
20 INJECTION, SOLUTION INTRAVENOUS ONCE
Status: CANCELLED | OUTPATIENT
Start: 2024-04-02

## 2024-04-02 RX ORDER — DIPHENHYDRAMINE HYDROCHLORIDE 50 MG/ML
50 INJECTION INTRAMUSCULAR; INTRAVENOUS AS NEEDED
OUTPATIENT
Start: 2024-04-16

## 2024-04-02 RX ORDER — MEPERIDINE HYDROCHLORIDE 25 MG/ML
25 INJECTION INTRAMUSCULAR; INTRAVENOUS; SUBCUTANEOUS
OUTPATIENT
Start: 2024-04-16

## 2024-04-02 RX ORDER — ACETAMINOPHEN 325 MG/1
650 TABLET ORAL ONCE
OUTPATIENT
Start: 2024-04-16

## 2024-04-02 RX ORDER — FAMOTIDINE 10 MG/ML
20 INJECTION, SOLUTION INTRAVENOUS AS NEEDED
Status: CANCELLED | OUTPATIENT
Start: 2024-04-04

## 2024-04-02 RX ORDER — FAMOTIDINE 10 MG/ML
20 INJECTION, SOLUTION INTRAVENOUS AS NEEDED
Status: COMPLETED | OUTPATIENT
Start: 2024-04-02 | End: 2024-04-02

## 2024-04-02 RX ORDER — DIPHENHYDRAMINE HYDROCHLORIDE 50 MG/ML
50 INJECTION INTRAMUSCULAR; INTRAVENOUS AS NEEDED
OUTPATIENT
Start: 2024-04-23

## 2024-04-02 RX ORDER — ACETAMINOPHEN 325 MG/1
650 TABLET ORAL ONCE
Status: CANCELLED | OUTPATIENT
Start: 2024-04-04

## 2024-04-02 RX ORDER — DIPHENHYDRAMINE HYDROCHLORIDE 50 MG/ML
50 INJECTION INTRAMUSCULAR; INTRAVENOUS AS NEEDED
OUTPATIENT
Start: 2024-04-09

## 2024-04-02 RX ORDER — FAMOTIDINE 10 MG/ML
20 INJECTION, SOLUTION INTRAVENOUS AS NEEDED
OUTPATIENT
Start: 2024-04-09

## 2024-04-02 RX ORDER — DIPHENHYDRAMINE HYDROCHLORIDE 50 MG/ML
50 INJECTION INTRAMUSCULAR; INTRAVENOUS AS NEEDED
Status: COMPLETED | OUTPATIENT
Start: 2024-04-02 | End: 2024-04-02

## 2024-04-02 RX ORDER — SODIUM CHLORIDE 9 MG/ML
20 INJECTION, SOLUTION INTRAVENOUS ONCE
OUTPATIENT
Start: 2024-04-23

## 2024-04-02 RX ORDER — FAMOTIDINE 10 MG/ML
20 INJECTION, SOLUTION INTRAVENOUS AS NEEDED
OUTPATIENT
Start: 2024-04-16

## 2024-04-02 RX ORDER — ACETAMINOPHEN 325 MG/1
650 TABLET ORAL ONCE
Status: CANCELLED | OUTPATIENT
Start: 2024-04-02

## 2024-04-02 RX ORDER — ACETAMINOPHEN 325 MG/1
650 TABLET ORAL ONCE
OUTPATIENT
Start: 2024-04-09

## 2024-04-02 RX ORDER — MEPERIDINE HYDROCHLORIDE 25 MG/ML
25 INJECTION INTRAMUSCULAR; INTRAVENOUS; SUBCUTANEOUS
Status: CANCELLED | OUTPATIENT
Start: 2024-04-02

## 2024-04-02 RX ORDER — FAMOTIDINE 10 MG/ML
20 INJECTION, SOLUTION INTRAVENOUS AS NEEDED
OUTPATIENT
Start: 2024-04-23

## 2024-04-02 RX ORDER — SODIUM CHLORIDE 9 MG/ML
20 INJECTION, SOLUTION INTRAVENOUS ONCE
OUTPATIENT
Start: 2024-04-09

## 2024-04-02 RX ORDER — NAPROXEN 500 MG/1
1000 TABLET ORAL ONCE
Status: COMPLETED | OUTPATIENT
Start: 2024-04-02 | End: 2024-04-02

## 2024-04-02 RX ORDER — ONDANSETRON HYDROCHLORIDE 8 MG/1
8 TABLET, FILM COATED ORAL 3 TIMES DAILY PRN
Qty: 30 TABLET | Refills: 3 | Status: SHIPPED | OUTPATIENT
Start: 2024-04-02

## 2024-04-02 RX ORDER — DIPHENHYDRAMINE HYDROCHLORIDE 50 MG/ML
50 INJECTION INTRAMUSCULAR; INTRAVENOUS AS NEEDED
Status: CANCELLED | OUTPATIENT
Start: 2024-04-02

## 2024-04-02 RX ADMIN — ACETAMINOPHEN 325MG 650 MG: 325 TABLET ORAL at 17:56

## 2024-04-02 RX ADMIN — DIPHENHYDRAMINE HYDROCHLORIDE 50 MG: 50 INJECTION, SOLUTION INTRAMUSCULAR; INTRAVENOUS at 14:27

## 2024-04-02 RX ADMIN — NEBIVOLOL 2.5 MG: 5 TABLET ORAL at 09:09

## 2024-04-02 RX ADMIN — RITUXIMAB-ARRX 430 MG: 500 INJECTION, SOLUTION INTRAVENOUS at 15:20

## 2024-04-02 RX ADMIN — Medication 10 ML: at 21:51

## 2024-04-02 RX ADMIN — HYDROCORTISONE SODIUM SUCCINATE 100 MG: 100 INJECTION, POWDER, FOR SOLUTION INTRAMUSCULAR; INTRAVENOUS at 16:25

## 2024-04-02 RX ADMIN — Medication 10 ML: at 09:12

## 2024-04-02 RX ADMIN — FLUDEOXYGLUCOSE F 18 1 DOSE: 200 INJECTION, SOLUTION INTRAVENOUS at 06:50

## 2024-04-02 RX ADMIN — DIPHENHYDRAMINE HYDROCHLORIDE 50 MG: 50 INJECTION, SOLUTION INTRAMUSCULAR; INTRAVENOUS at 16:14

## 2024-04-02 RX ADMIN — HYDROCORTISONE SODIUM SUCCINATE 250 MG: 250 INJECTION, POWDER, FOR SOLUTION INTRAMUSCULAR; INTRAVENOUS at 18:47

## 2024-04-02 RX ADMIN — NAPROXEN 1000 MG: 500 TABLET ORAL at 18:47

## 2024-04-02 RX ADMIN — MEPERIDINE HYDROCHLORIDE 25 MG: 25 INJECTION INTRAMUSCULAR; INTRAVENOUS; SUBCUTANEOUS at 16:10

## 2024-04-02 RX ADMIN — MEPERIDINE HYDROCHLORIDE 25 MG: 25 INJECTION INTRAMUSCULAR; INTRAVENOUS; SUBCUTANEOUS at 16:30

## 2024-04-02 RX ADMIN — FAMOTIDINE 20 MG: 10 INJECTION INTRAVENOUS at 16:13

## 2024-04-02 RX ADMIN — ACETAMINOPHEN 325MG 650 MG: 325 TABLET ORAL at 14:26

## 2024-04-02 RX ADMIN — SODIUM CHLORIDE 20 ML/HR: 9 INJECTION, SOLUTION INTRAVENOUS at 15:07

## 2024-04-02 RX ADMIN — FOLIC ACID 1 MG: 1 TABLET ORAL at 09:09

## 2024-04-02 RX ADMIN — Medication 1000 MCG: at 09:09

## 2024-04-02 RX ADMIN — ALLOPURINOL 300 MG: 300 TABLET ORAL at 09:09

## 2024-04-02 NOTE — PLAN OF CARE
Goal Outcome Evaluation:  Plan of Care Reviewed With: (P) patient           Outcome Evaluation: (P) Pt is a 79 yo Male who was admitted to [facility] for abdominal pain, consitpation, and nausea. Pt states that he has not been able to eat much since his admission to the hospital as he has difficulty with holding food down. Due to his lack of eating, pt states that he is progressively getting weaker in his legs. Pt previously lived at home and his daughter regularly visted to assist with ADLs as needed. PMH significant for HTN, HLD, and CLL (chronic lymphocytic leukemia). Pt agreeable to therapy with strong encouragement this date. All bed mobility performed with SBA. Pt able to sit at EOB with SBA and no overt LOB. STS from EOB with CGA and rwx. Pt able to ambulate to bathroom commode and back with rwx and CGA. STS from bathroom commode with MinAx1 and use of grab bar. Pt in supine with bed alarm on when PT left room. Continued skilled therapy is indicated to address deficits in functional mobility. D/C to home with assist, pending progress.      Anticipated Discharge Disposition (PT): (P) home with assist, home with home health (possible SNF, pending progress)

## 2024-04-02 NOTE — PLAN OF CARE
Goal Outcome Evaluation:      Rituxan started today. Infusion reaction occurred, pt treated appropriately, see MAR. Ambulating with assist x1 and a walker this shift. Several bowel incontinence occurrences this shift. Clear liquid diet tolerated well, no nausea reported.

## 2024-04-02 NOTE — PROGRESS NOTES
[facility]  Progress note: 2024    Patient Identification:  Name:Jeff Bass  Age:78 y.o.  Sex: male  :  1945  MRN: [id number]           CC:  SVT    Interval history:  Had a reaction to Rituxan with tachycardia this afternoon.  Prior to this had had no tachycardia.  Currently slowly improving though obviously still anxious and feels unwell.    Vital Signs:   Temp:  [97.9 °F (36.6 °C)-98.1 °F (36.7 °C)] 97.9 °F (36.6 °C)  Heart Rate:  [] 86  Resp:  [18-22] 22  BP: (110-159)/(62-77) 110/62    Intake/Output Summary (Last 24 hours) at 2024 1511  Last data filed at 2024 0915  Gross per 24 hour   Intake 400 ml   Output 350 ml   Net 50 ml       Physical Examination:    General Appearance No acute distress   Neck No adenopathy, supple, trachea midline, no thyromegaly, no carotid bruit, no JVD   Lungs Clear to auscultation,respirations regular, even and unlabored   Heart Regular rhythm and mild tachycardia, normal S1 and S2, no murmur, no gallop, no rub, no click   Chest wall No abnormalities observed   Abdomen Normal bowel sounds, no masses, no hepatomegaly, soft   Extremities Moves all extremities well, no edema, no cyanosis, no redness   Neurological Alert and oriented x 3     Lab Review:  Personally reviewed the labs, radiology imaging and other cardiac procedures.   Results from last 7 days   Lab Units 24  0543   SODIUM mmol/L 140   POTASSIUM mmol/L 3.8  3.8   CHLORIDE mmol/L 107   CO2 mmol/L 20.0*   BUN mg/dL 24*   CREATININE mg/dL 0.75*   CALCIUM mg/dL 7.7*   BILIRUBIN mg/dL 1.3*   ALK PHOS U/L 51   ALT (SGPT) U/L 14   AST (SGOT) U/L 26   GLUCOSE mg/dL 89     Results from last 7 days   Lab Units 24  1216 24  0913   HSTROP T ng/L 21 20     Results from last 7 days   Lab Units 24  0543 24  1550 24  0539   WBC 10*3/mm3 27.52* 18.88* 26.50*   HEMOGLOBIN g/dL 10.1* 10.9* 10.7*   HEMATOCRIT % 29.8* 31.9* 31.4*   PLATELETS 10*3/mm3 52* 49* 64*      Results from last 7 days   Lab Units 03/30/24  0257 03/29/24  0913   INR  1.30* 1.28*   APTT seconds 30.8  --      Medication Review:   Meds reviewed  Scheduled Meds:allopurinol, 300 mg, Oral, Daily  folic acid, 1 mg, Oral, Daily  nebivolol, 2.5 mg, Oral, Q24H  polyethylene glycol, 17 g, Oral, Daily  riTUXimab-arrx (RIABNI) 430 mg in sodium chloride 0.9 % 293 mL IVPB, 187.5 mg/m2 (Treatment Plan Recorded), Intravenous, Once  sodium chloride, 10 mL, Intravenous, Q12H  sodium chloride, 20 mL/hr, Intravenous, Once  vitamin B-12, 1,000 mcg, Oral, Daily      Continuous Infusions:Pharmacy Consult,   sodium chloride, 125 mL/hr, Last Rate: 125 mL/hr (04/01/24 1551)      I personally viewed and interpreted the patient's EKG/Telemetry data    Assessment and Plan  1.  SVT with history of nonsustained atrial fibrillation SVT.  Continue Bystolic.  May need to increase regimen to 2.5 mg twice daily if tachycardia persists.  I instructed the nurse to contact service overnight if this occurs.  Will reassess in a.m.  2.  Coronary disease with elevated coronary calcium score 229 and 2016.  No anginal symptoms but needs better heart rate control  3.  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  Prechemo echo EF 60%, GLS -22% normal diastolic function for age.  Repeat echo 4/1/2024 with ejection fraction 53%, normal diastolic function, ascending aorta measuring 4 cm.  Overall unchanged.  4.  Abdominal pain.  None today.  5.  Diastolic heart failure, appears compensated  6.  Hypoxia        Mini Garcia  4/2/202415:11 EDT  35min spent in reviewing records, discussion and examination of the patient and discussion with other members of the patient's medical team.     Dictated utilizing Dragon dictation

## 2024-04-02 NOTE — SIGNIFICANT NOTE
Patient with Rituximab infusing at 100mg/hr.  Patient report of shaking chills. Rituximab stopped at 1605 with visible shaking chills.  Temp 98.2.  124/97, 100, 22 with oxygen saturation at 92% on room air.  Medicated with Demerol as ordered. Chills continued.  Pepcid, Benadryl, and SoluCortef administered. Minimal audible wheezing with continued chills.  Demerol repeated as ordered.  All symptoms resolved at 1650 - 130/60, 116, 98.2.  Restarted Rituximab at previously tolerated rate of 50mg/hour.

## 2024-04-02 NOTE — PROGRESS NOTES
Pharmacy Chemotherapy Education - Rituximab    Jeff Bass is a 78 y.o. male admitted with abdominal pain due to CLL/ Richters transformation. Patient to start rituximab therapy for 4 weeks. His chemotherapy regimen begins today, 4/2/24, with 2 split doses (430 mg each) today and tomorrow (4/3/24) due to patient being high risk for development of TLS. Plan to have him receive 1 dose weekly (x 4 weeks) outpatient.     Reviewed with patient common and clinically significant effects including the risk of infusion reactions, flu-like symptoms including low grade fever/chills in the first 24 hours, headache, cough or runny nose/sinusitis. Infection prevention precautions were reviewed including hand washing, food safety and the avoidance of crowds/use of mask; patient was advised to contact provider in the event of a sustained fever >100.4 for more than 1 hour.       Patient was counseled on when to notify a provider including in the event of the following:   - Infusion reactions, including the development of hives, phlebitis, SOA, dizziness, chills, swelling, palpitations or chest pain.   - Rare, but severe mucocutaneous reactions including a severe rash or mucositis; patient to notify MD in the event of unexplained rash, blisters, or mouth sores.     - PML - counseled to notify MD in the event of confusion, dizziness, changes in vision or other neurological complications.    Reassured patient that treatment is generally well tolerated and the above complications are extremely rare. Provided patient with handout regarding medication, including the FDA medication guide. Patient engaged in counseling throughout and asked appropriate questions as needed to confirm understanding. Patient without any further questions at this time; patient and he verbalized understanding.    Thank you for the opportunity to provide education on chemotherapy; please do not hesitate if further assistance is needed.    Chrystal Foster  Pharm.D.  Pharmacy Resident  [phone]

## 2024-04-02 NOTE — THERAPY EVALUATION
Patient Name: Jeff Bass  : 1945    MRN: [id number]                              Today's Date: 2024       Admit Date: 3/29/2024    Visit Dx:     ICD-10-CM ICD-9-CM   1. Generalized abdominal pain  R10.84 789.07   2. CLL (chronic lymphocytic leukemia) with worsening lymphadenopathy in abdomen and pelvis  C91.10 204.10   3. Lactic acid increased  E87.20 276.2   4. Leukocytosis, unspecified type  D72.829 288.60   5. Thrombocytopenia  D69.6 287.5   6. 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     Patient Active Problem List   Diagnosis    Encounter for adjustment or management of vascular access device    Benign prostatic hyperplasia    Jon's syndrome    Hypertension    Hyperlipidemia    Hypogonadism in male    Vitamin D deficiency    Malignant neoplasm of prostate    Spinal stenosis of lumbar region with neurogenic claudication    Degeneration of lumbar intervertebral disc    Coronary arteriosclerosis    PVC (premature ventricular contraction)    History of colon polyps    Substernal goiter    Skin tag    Sensory hearing loss, bilateral    ED (erectile dysfunction) of organic origin    Constipation    GERD (gastroesophageal reflux disease)    Left inguinal hernia    Hemangioma of liver    Abnormal finding on thyroid function test    Primary osteoarthritis of right knee    OA (osteoarthritis) of knee    B12 deficiency    Lymphoma of spleen    Abdominal pain    Anemia associated with malignant neoplastic disease    Thrombocytopenia    Leukocytosis    Primary insomnia    Nausea    Elevated blood uric acid level     Past Medical History:   Diagnosis Date    Arthritis     Benign prostatic hyperplasia     FOLLOWED BY DR. VIVIEN PATEL    Biliary dyskinesia     Bunion of right foot     GREAT TOE    Bursitis of right hip 03/15/2018    CLL (chronic lymphocytic leukemia) 2016    FOLLOWED BY DR WALKER    Colon polyps     FOLLOWED BY DR. NEHA Xiong      Degeneration of lumbar intervertebral disc     Diverticulosis     Erectile dysfunction     Fracture of ankle 1975    GERD (gastroesophageal reflux disease)     Hallux valgus of left foot     Hyperlipidemia     MIXED    Hypertension     Inguinal hernia     Kidney stone 02/21/2009    SEEN AT [facility] ER    Knee swelling 2018    Localized, primary osteoarthritis     Lumbar radiculopathy     Lumbar stenosis     Osteoarthritis of right knee     Polyneuropathy     Prostate CA 03/2016    JACQUELYN 6, S/P XRT, FOLLOWED BY DR. VIVIEN PATEL, RADIATION SEEDS    PVC (premature ventricular contraction)     PT STATES WORKED UP YEARS AGO BY [facility] FOR POSSIBLE MURMUR - NO FOLLOW UP REQUIRED     RBBB     Sensory hearing loss, bilateral     Skin cancer     SQUAMOUS CELL CARCINOMA OF FACE    Substernal goiter     Thyromegaly 12/2016    ADMITTED TO [facility]    Vitamin D deficiency      Past Surgical History:   Procedure Laterality Date    BRONCHOSCOPY N/A 12/14/2016    Procedure: BRONCHOSCOPY WITH  BAL AND BIOPSY AND ENDOBRONCHIAL ULTRASOUND  AND NAVIGATION WITH BRUSHINGS AND BIOPSY AND BAL;  Surgeon: Pierre Manning MD;  Location: [facility] ENDOSCOPY;  Service:     COLONOSCOPY N/A 03/13/2019    5 MM SESSILE TUBULAR ADENOMA POLYP IN TRANSVERSE, MULTIPLE SMALL AND LARGE DIVERTICULA IN SIGMOID, RESCOPE IN 5 YRS, DR. NEHA HAM AT [facility]    COLONOSCOPY W/ POLYPECTOMY N/A 03/19/2015    3 TUBULAR ADENOMA POLYPS, 12 TUBULOVILLOUS POLYP RECTO-SIGMOID, DIVERTICULOSIS, RESCOPE IN 3 YRS, DR. NEHA HAM AT [facility]    EYE SURGERY Bilateral     CATARACT EXTRACTION WITH LENS IMPLANT, DR. GOVEA    FINGER NAIL SURGERY Right 2022    TORRES-PATEL    INGUINAL HERNIA REPAIR Left 11/02/2021    Procedure: LEFT OPEN INGUINAL HERNIA REPAIR;  Surgeon: Nixon Kolb MD;  Location: [facility] MAIN OR;  Service: General;  Laterality: Left;    PROSTATE RADIOACTIVE SEED IMPLANT N/A 09/06/2016    DR. HOA JARAMILLO AT [facility]    PROSTATE SURGERY N/A 03/11/2016     BIOPSY: ADENOCARCINOMA, DR. ZAID IBARRA    THYROID BIOPSY Bilateral 11/01/2017    NO B CELL OR T CELL LYMPHOMA, DONE AT [facility]    TOTAL KNEE ARTHROPLASTY Right 10/12/2022    Procedure: TOTAL KNEE ARTHROPLASTY;  Surgeon: Ran Rachel MD;  [place] [facility] [place] [facility] [place]  Service: Orthopedics;  Laterality: Right;    US GUIDED LYMPH NODE BIOPSY  4/1/2024    VENOUS ACCESS DEVICE (PORT) INSERTION Right 7/6/2023    Procedure: INSERTION VENOUS ACCESS DEVICE;  Surgeon: Nixon Kolb MD;  [place] [facility] [place];  Service: General;  Laterality: Right;      General Information       Row Name 04/02/24 1407          Physical Therapy Time and Intention    Document Type evaluation (P)   -JG     Mode of Treatment individual therapy;physical therapy (P)   -JG       Row Name 04/02/24 1407          General Information    Patient Profile Reviewed yes (P)   -JG     Prior Level of Function independent: (P)   daughter helps to assist pt, as needed. she is close by  -JG     Existing Precautions/Restrictions fall (P)   -JG     Barriers to Rehab none identified (P)   -JG       Row Name 04/02/24 1407          Living Environment    People in Home alone (P)   -JG       Row Name 04/02/24 1407          Cognition    Orientation Status (Cognition) oriented x 4 (P)   -JG       Row Name 04/02/24 1407          Safety Issues, Functional Mobility    Safety Issues Affecting Function (Mobility) ability to follow commands;at risk behavior observed;impulsivity;judgment;insight into deficits/self-awareness;safety precaution awareness;safety precautions follow-through/compliance (P)   -JG     Impairments Affecting Function (Mobility) balance;endurance/activity tolerance;strength (P)   -JG               User Key  (r) = Recorded By, (t) = Taken By, (c) = Cosigned By      Initials Name Provider Type    FREDERICKG Abram Mccullough, PT Student PT Student                   Mobility       Row Name 04/02/24 1412          Bed Mobility    Bed Mobility rolling  right;supine-sit;sit-supine (P)   -JG     Rolling Right [place] (Bed Mobility) standby assist (P)   -JG     Supine-Sit [place] (Bed Mobility) standby assist (P)   -JG     Sit-Supine [place] (Bed Mobility) standby assist (P)   -JG     Assistive Device (Bed Mobility) bed rails;head of bed elevated (P)   -JG     Comment, (Bed Mobility) extra time needed (P)   -JG       Row Name 04/02/24 1412          Sit-Stand Transfer    Sit-Stand [place] (Transfers) contact guard;verbal cues;minimum assist (75% patient effort);1 person assist (P)   -JG     Assistive Device (Sit-Stand Transfers) walker, front-wheeled (P)   -JG     Comment, (Sit-Stand Transfer) CGA from EOB. MinAx1 from bathroom commode (P)   -JG       Row Name 04/02/24 1412          Gait/Stairs (Locomotion)    [place] Level (Gait) contact guard;verbal cues (P)   -JG     Assistive Device (Gait) walker, front-wheeled (P)   -JG     Patient was able to Ambulate yes (P)   -JG     Distance in Feet (Gait) 20 (P)   -JG     Deviations/Abnormal Patterns (Gait) stride length decreased;weight shifting decreased;gait speed decreased (P)   -JG     Bilateral Gait Deviations forward flexed posture (P)   -JG     Comment, (Gait/Stairs) ambulated to bathroom and back. rest break on toilet in the middle. pt is quickly fatigued with all activity (P)   -JG               User Key  (r) = Recorded By, (t) = Taken By, (c) = Cosigned By      Initials Name Provider Type    Abram Leblanc PT Student PT Student                   Obj/Interventions       Row Name 04/02/24 1413          Range of Motion Comprehensive    General Range of Motion bilateral lower extremity ROM WFL (P)   -JG       Row Name 04/02/24 1413          Strength Comprehensive (MMT)    General Manual Muscle Testing (MMT) Assessment other (see comments) (P)   -JG     Comment, General Manual Muscle Testing (MMT) Assessment generalized weakness of BLE noted (P)   -JG       Row Name 04/02/24 1413           Motor Skills    Therapeutic Exercise other (see comments) (P)   seated BLE AP, knee extension, seated marches  -JG       Row Name 04/02/24 1413          Balance    Balance Assessment sitting static balance;sitting dynamic balance;standing static balance;sit to stand dynamic balance;standing dynamic balance (P)   -JG     Static Sitting Balance standby assist (P)   -JG     Dynamic Sitting Balance standby assist (P)   -JG     Position, Sitting Balance unsupported;sitting edge of bed (P)   -JG     Static Standing Balance contact guard;verbal cues (P)   -JG     Dynamic Standing Balance verbal cues;contact guard (P)   -JG     Position/Device Used, Standing Balance supported;walker, front-wheeled (P)   -JG               User Key  (r) = Recorded By, (t) = Taken By, (c) = Cosigned By      Initials Name Provider Type    Abram Leblanc, PT Student PT Student                   Goals/Plan       Row Name 04/02/24 1421          Bed Mobility Goal 1 (PT)    Activity/Assistive Device (Bed Mobility Goal 1, PT) bed mobility activities, all (P)   -JG     [place] Level/Cues Needed (Bed Mobility Goal 1, PT) modified independence (P)   -JG     Time Frame (Bed Mobility Goal 1, PT) 1 week (P)   -JG       Row Name 04/02/24 1421          Transfer Goal 1 (PT)    Activity/Assistive Device (Transfer Goal 1, PT) sit-to-stand/stand-to-sit (P)   -JG     [place] Level/Cues Needed (Transfer Goal 1, PT) standby assist (P)   -JG     Time Frame (Transfer Goal 1, PT) 1 week (P)   -JG       Row Name 04/02/24 1421          Gait Training Goal 1 (PT)    Activity/Assistive Device (Gait Training Goal 1, PT) gait (walking locomotion);assistive device use;walker, rolling (P)   -JG     [place] Level (Gait Training Goal 1, PT) standby assist (P)   -JG     Distance (Gait Training Goal 1, PT) 150 (P)   -JG     Time Frame (Gait Training Goal 1, PT) 1 week (P)   -JG       Row Name 04/02/24 1421          Therapy Assessment/Plan (PT)    Planned Therapy  Interventions (PT) balance training;gait training;bed mobility training;home exercise program;patient/family education;postural re-education;ROM (range of motion);stair training;strengthening;stretching;transfer training (P)   -JG               User Key  (r) = Recorded By, (t) = Taken By, (c) = Cosigned By      Initials Name Provider Type    Abram Leblanc PT Student PT Student                   Clinical Impression       Row Name 04/02/24 1415          Pain    Pretreatment Pain Rating 0/10 - no pain (P)   -JG     Posttreatment Pain Rating 0/10 - no pain (P)   -JG       Row Name 04/02/24 1415          Plan of Care Review    Plan of Care Reviewed With patient (P)   -JG     Outcome Evaluation Pt is a 79 yo Male who was admitted to [facility] for abdominal pain, consitpation, and nausea. Pt states that he has not been able to eat much since his admission to the hospital as he has difficulty with holding food down. Due to his lack of eating, pt states that he is progressively getting weaker in his legs. Pt previously lived at home and his daughter regularly visted to assist with ADLs as needed. PMH significant for HTN, HLD, and CLL (chronic lymphocytic leukemia). Pt agreeable to therapy with strong encouragement this date. All bed mobility performed with SBA. Pt able to sit at EOB with SBA and no overt LOB. STS from EOB with CGA and rwx. Pt able to ambulate to bathroom commode and back with rwx and CGA. STS from bathroom commode with MinAx1 and use of grab bar. Pt in supine with bed alarm on when PT left room. Continued skilled therapy is indicated to address deficits in functional mobility. D/C to home with assist, pending progress. (P)   -JG       Row Name 04/02/24 1415          Therapy Assessment/Plan (PT)    Patient/Family Therapy Goals Statement (PT) go home (P)   -JG     Criteria for Skilled Interventions Met (PT) yes;skilled treatment is necessary (P)   -JG     Therapy Frequency (PT) 5 times/wk (P)   -JG       Sabi  Name 04/02/24 1415          Positioning and Restraints    Pre-Treatment Position in bed (P)   -JG     Post Treatment Position bed (P)   -JG     In Bed notified nsg;supine;call light within reach;encouraged to call for assist;exit alarm on;side rails up x3 (P)   -JG               User Key  (r) = Recorded By, (t) = Taken By, (c) = Cosigned By      Initials Name Provider Type    Abram Leblanc, PT Student PT Student                   Outcome Measures       Row Name 04/02/24 1422 04/02/24 0915       How much help from another person do you currently need...    Turning from your back to your side while in flat bed without using bedrails? 4 (P)   -JG 4  -MB    Moving from lying on back to sitting on the side of a flat bed without bedrails? 4 (P)   -JG 4  -MB    Moving to and from a bed to a chair (including a wheelchair)? 3 (P)   -JG 3  -MB    Standing up from a chair using your arms (e.g., wheelchair, bedside chair)? 3 (P)   -JG 3  -MB    Climbing 3-5 steps with a railing? 2 (P)   -JG 2  -MB    To walk in hospital room? 3 (P)   -JG 3  -MB    AM-PAC 6 Clicks Score (PT) 19 (P)   -JG 19  -MB    Highest Level of Mobility Goal 6 --> Walk 10 steps or more (P)   -JG 6 --> Walk 10 steps or more  -MB      Row Name 04/02/24 1422          Functional Assessment    Outcome Measure Options AM-PAC 6 Clicks Basic Mobility (PT) (P)   -EUGENIO               User Key  (r) = Recorded By, (t) = Taken By, (c) = Cosigned By      Initials Name Provider Type    Lavinia Calles, RN Registered Nurse    Abram Leblanc, PT Student PT Student                                 Physical Therapy Education       Title: PT OT SLP Therapies (Done)       Topic: Physical Therapy (Done)       Point: Mobility training (Done)       Learning Progress Summary             Patient Acceptance, E, VU by EUGENIO at 4/2/2024 1423                         Point: Home exercise program (Done)       Learning Progress Summary             Patient Acceptance, E, VU by EUGENIO at 4/2/2024  1423                         Point: Body mechanics (Done)       Learning Progress Summary             Patient Acceptance, E, VU by FREDERICK at 4/2/2024 1423                         Point: Precautions (Done)       Learning Progress Summary             Patient Acceptance, E, VU by FREDERICK at 4/2/2024 1423                                         User Key       Initials Effective Dates Name Provider Type Discipline     02/29/24 -  Abram Mccullough, PT Student PT Student PT                  PT Recommendation and Plan  Planned Therapy Interventions (PT): (P) balance training, gait training, bed mobility training, home exercise program, patient/family education, postural re-education, ROM (range of motion), stair training, strengthening, stretching, transfer training  Plan of Care Reviewed With: (P) patient  Outcome Evaluation: (P) Pt is a 77 yo Male who was admitted to [facility] for abdominal pain, consitpation, and nausea. Pt states that he has not been able to eat much since his admission to the hospital as he has difficulty with holding food down. Due to his lack of eating, pt states that he is progressively getting weaker in his legs. Pt previously lived at home and his daughter regularly visted to assist with ADLs as needed. PMH significant for HTN, HLD, and CLL (chronic lymphocytic leukemia). Pt agreeable to therapy with strong encouragement this date. All bed mobility performed with SBA. Pt able to sit at EOB with SBA and no overt LOB. STS from EOB with CGA and rwx. Pt able to ambulate to bathroom commode and back with rwx and CGA. STS from bathroom commode with MinAx1 and use of grab bar. Pt in supine with bed alarm on when PT left room. Continued skilled therapy is indicated to address deficits in functional mobility. D/C to home with assist, pending progress.     Time Calculation:         PT Charges       Row Name 04/02/24 1423             Time Calculation    Start Time 1318 (P)   -JG      Stop Time 1336 (P)   -JG      Time  Calculation (min) 18 min (P)   -JG      PT Received On 04/02/24 (P)   -JG      PT - Next Appointment 04/03/24 (P)   -JG      PT Goal Re-Cert Due Date 04/09/24 (P)   -JG         Time Calculation- PT    Total Timed Code Minutes- PT 15 minute(s) (P)   -JG         Timed Charges    67637 - PT Therapeutic Activity Minutes 15 (P)   -JG         Total Minutes    Timed Charges Total Minutes 15 (P)   -JG       Total Minutes 15 (P)   -JG                User Key  (r) = Recorded By, (t) = Taken By, (c) = Cosigned By      Initials Name Provider Type    JG Abram Mccullough, PT Student PT Student                  Therapy Charges for Today       Code Description Service Date Service Provider Modifiers Qty    24411208965  PT THERAPEUTIC ACT EA 15 MIN 4/2/2024 Abram Mccullough, PT Student GP 1    [id number]  PT EVAL MOD COMPLEXITY 2 4/2/2024 Abram Mccullough, PT Student GP 1            PT G-Codes  Outcome Measure Options: (P) AM-PAC 6 Clicks Basic Mobility (PT)  AM-PAC 6 Clicks Score (PT): (P) 19  PT Discharge Summary  Anticipated Discharge Disposition (PT): (P) home with assist, home with home health (possible SNF, pending progress)    Abram Mccullough PT Student  4/2/2024

## 2024-04-02 NOTE — PROGRESS NOTES
Chart reviewed  Information from  is reviewed.  Patient starting Rituxan today  PET results reviewed.   Vital signs stable  Watching for chemo reactions  Tachycardia under control    Jovani Ruiz MD

## 2024-04-02 NOTE — CASE MANAGEMENT/SOCIAL WORK
Discharge Planning Assessment  [facility]     Patient Name: Jeff Bass  MRN: [id number]  Today's Date: 4/2/2024    Admit Date: 3/29/2024    Plan: Home with family and caregiver support.   Discharge Needs Assessment       Row Name 04/02/24 1348       Living Environment    People in Home child(char), adult    Name(s) of People in Home Stephanie Andujar [facility] [phone]    Current Living Arrangements home    Potentially Unsafe Housing Conditions none    Primary Care Provided by self;child(char)    Provides Primary Care For no one    Family Caregiver if Needed child(char), adult;other (see comments)    Family Caregiver Names Stephanie Andujar [facility] [phone]    Quality of Family Relationships supportive    Able to Return to Prior Arrangements yes       Resource/Environmental Concerns    Resource/Environmental Concerns none       Transition Planning    Patient/Family Anticipates Transition to home with family    Patient/Family Anticipated Services at Transition none    Transportation Anticipated family or friend will provide       Discharge Needs Assessment    Readmission Within the Last 30 Days no previous admission in last 30 days    Equipment Currently Used at Home none    Concerns to be Addressed discharge planning    Provided Post Acute Provider List? N/A    N/A Provider List Comment No needs identified    Provided Post Acute Provider Quality & Resource List? N/A    Current Discharge Risk chronically ill                   Discharge Plan       Row Name 04/02/24 1352       Plan    Plan Home with family and caregiver support.    Patient/Family in Agreement with Plan yes    Plan Comments IMM 3/29/2024. Met with patient at bedside. Face sheet verified. Prior to admission patient was living in his own home with support from his daughter. He states he has a bedside commode, walker and cane. Patient uses the [facility] in [place], denies any issues affording or remembering to take his medications. Patient will use [facility] at discharge. Stephanie Andujar dtg [phone] is patient’s health care surrogate. Discharge plans discussed; plan is to return home with support from daughter and caregiver. Friend will transport home.   Will continue to monitor for new or changing discharge needs. Maddie MURPHY RN CCP                  Continued Care and Services - Admitted Since 3/29/2024    No active coordination exists for this encounter.       Expected Discharge Date and Time       Expected Discharge Date Expected Discharge Time    Apr 3, 2024            Demographic Summary       Row Name 04/02/24 1343       General Information    Admission Type inpatient    Arrived From emergency department    Required Notices Provided Important Message from Medicare    Referral Source admission list    Reason for Consult discharge planning    Preferred Language English       Contact Information    Permission Granted to Share Info With family/designee  Stephanie Andujar [facility] [phone]                   Functional Status       Row Name 04/02/24 1344       Functional Status    Usual Activity Tolerance moderate    Current Activity Tolerance moderate       Functional Status, IADL    Medications assistive person    Meal Preparation assistive person    Housekeeping assistive person    Laundry assistive person    Shopping assistive person       Mental Status    General Appearance WDL WDL       Mental Status Summary    Recent Changes in Mental Status/Cognitive Functioning no changes       Employment/    Employment Status retired                   Psychosocial    No documentation.                  Abuse/Neglect    No documentation.                  Legal    No documentation.                  Substance Abuse    No documentation.                  Patient Forms    No documentation.                     Maddie Sandoval RN

## 2024-04-02 NOTE — PROGRESS NOTES
LOS: 4 days   Patient Care Team:  Jovani Salomon MD as PCP - General (Internal Medicine)  Gerry Schroeder MD as Consulting Physician (Hematology and Oncology)  Stephanie Toledo PA as Referring Physician (Physician Assistant)    Chief Complaint: Abdomen pain.  Recurrent lymphoma who is splenomegaly and progressive retroperitoneal lymphadenopathy.    Interval History:  On 3/30/2024  Abdomen pain is reasonably controlled severity 8/10.  Had a good bowel movement.  Afebrile.    3/31/2024  Abdomen pain is controlled.  Patient is afebrile.  No nausea vomiting.  Worsening leukocytosis.   On 04/01/2024 the patient feels very fatigued and tired with no appetite, lesser abdominal pain. Spleen remains enlarged but nontender. He remains with low grade temperature and profuse perspiration. No cough or shortness of breath. His lymph node biopsy was performed a few minutes ago from the right groin. PET scan to be done tomorrow morning. The patient wants to go home today.  On 04/02/2024 the patient feels very fatigued and tired, no appetite, perspiration profusely, low grade temperature. Minimal if any abdominal pain. PET scan was done this morning, the findings are very striking and to be reviewed below. He has had some bowel activity, proper urinary flow once that the port was opened and receiving IV fluids abundantly in preparation for treatment today.          HISTORY OF PRESENT ILLNESS:  The patient is a 78 y.o. 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  Patient reports he was at baseline condition, and yesterday afternoon started having abdominal pain around umbilical/lower abdomen area associated with nausea.  Patient reports abdomen pain is constant.  He also reports having  poor appetite since this past Sunday which is 5 days ago.  He denies low fever sweating or chills.       Patient also reported generalized weakness in the past 24 hours also and did not drink either too much at all.    Vital Signs:  Temp:  [97.9 °F (36.6 °C)-98.1 °F (36.7 °C)] 98 °F (36.7 °C)  Heart Rate:  [] 88  Resp:  [18-20] 18  BP: (122-159)/(65-77) 133/68    Intake/Output Summary (Last 24 hours) at 4/2/2024 1350  Last data filed at 4/2/2024 0915  Gross per 24 hour   Intake 400 ml   Output 350 ml   Net 50 ml       Physical Exam:  Eyes and oral mucosa's were normal. No peripheral adenopathy in neck or axillas, lymph node in the right groin measuring 3 cm in size, mildly tender because of just recent biopsy. Left groin no lymph node available. Abdomen shows spleen below the left costal margin almost 15 cm below. No liver enlargement. No abdominal pain. No tenderness. His lungs were clear, his heart was regular with no gallops, murmurs or rubs. His skin was perspiring abundantly and moist. He was awake, alert, oriented in time and place.     On 04/02/2024 the clinical examination today is no different than what it was yesterday. The spleen remains enlarged, large similar to what it was yesterday. The right groin surgical site is healing well. No other new findings.       Results Review:   CBC:      Lab 04/02/24  0543 04/01/24  1550 03/31/24  0539 03/30/24  0257 03/29/24  1216   WBC 27.52* 18.88* 26.50* 16.87* 15.18*   HEMOGLOBIN 10.1* 10.9* 10.7* 10.1* 11.2*   HEMATOCRIT 29.8* 31.9* 31.4* 30.0* 32.7*   PLATELETS 52* 49* 64* 65* 67*   NEUTROS ABS 9.74* 6.99 6.63 3.54 0.91*   EOS ABS  --  0.19  --  0.34  --    MCV 83.5 83.1 82.4 80.9 82.6     CMP:        Lab 04/02/24  0543 04/01/24  1550 03/31/24  0539 03/30/24  1145 03/30/24  0257 03/29/24  2158 03/29/24  1216 03/29/24  0913   SODIUM 140 138 132*  --  136  --  131* 131*   POTASSIUM 3.8  3.8 3.4* 3.8 3.7 3.5   < > 3.2* 3.3*   CHLORIDE 107 100 97*  --  99  --   93* 91*   CO2 20.0* 21.0* 18.2*  --  23.0  --  22.8 21.8*   ANION GAP 13.0 17.0* 16.8*  --  14.0  --  15.2* 18.2*   BUN 24* 23 22  --  21  --  20 23   CREATININE 0.75* 0.84 0.85  --  0.96  --  0.80 0.94   EGFR 91.2 88.2 87.8  --  79.9  --  89.5 81.9   GLUCOSE 89 73 82  --  77  --  95 98   CALCIUM 7.7* 8.5* 8.2*  --  8.1*  --  8.4* 9.0   MAGNESIUM  --   --  1.8  --   --   --  1.7 1.7   PHOSPHORUS 1.9* 2.4*  --   --   --   --   --   --    TOTAL PROTEIN 3.9* 4.5* 4.7*  --  4.6*  --   --  5.6*   ALBUMIN 2.8* 3.0* 3.2*  --  3.2*  --   --  3.8   GLOBULIN 1.1 1.5  --   --   --   --   --  1.8   ALT (SGPT) 14 15 14  --  16  --   --  18   AST (SGOT) 26 26 29  --  26  --   --  42*   BILIRUBIN 1.3* 1.5* 1.2  --  1.3*  --   --  1.6*   INDIRECT BILIRUBIN  --   --  0.7  --  0.8  --   --   --    BILIRUBIN DIRECT  --   --  0.5*  --  0.5*  --   --   --    ALK PHOS 51 53 53  --  56  --   --  74   LIPASE  --   --   --   --   --   --   --  8*    < > = values in this interval not displayed.     LDH   Date Value Ref Range Status   04/02/2024 >2,500 (H) 135 - 225 U/L Final     Uric Acid   Date Value Ref Range Status   04/02/2024 5.9 3.4 - 7.0 mg/dL Final     Lab Results   Component Value Date    IRON 34 (L) 03/30/2024    TIBC 171 (L) 03/30/2024    FERRITIN 801.00 (H) 03/30/2024     Lab Results   Component Value Date    FOLATE 6.02 03/30/2024     Lab Results   Component Value Date    [id number] 437 03/30/2024       Results from last 7 days   Lab Units 03/29/24  1216 03/29/24  0913   HSTROP T ng/L 21 20     Results from last 7 days   Lab Units 03/30/24  0257 03/29/24  0913   INR  1.30* 1.28*   APTT seconds 30.8  --          Results from last 7 days   Lab Units 03/31/24  0539   MAGNESIUM mg/dL 1.8         Patient Name: MAC THOMPSON   Accession #: [id number]     [facility]   [address], [address]   [place] [address]       CLINICAL HISTORY:   78 year old male with a history of CLL     SPECIMEN SOURCE:   BLOOD ([id number])        FINAL DIAGNOSIS:   LEUKEMIA/LYMPHOMA PANEL     INTERPRETATION     CD5+ MONOCLONAL B CELL PROCESS, CONSISTENT WITH HISTORICAL LYMPHOMA         PATHOLOGIST COMMENT:   The B cell lymphoma comprises 72.5% of peripheral blood cellularity.       Electronically Signed Out on 4/1/2024      KETAN VARGAS MD    [facility]/4/1/2024  Interpretation performed at:   [facility]   [address], [address]   [place]  [address]         Assessment:  *.  History of SLL status post Rituxan plus Bendamustine, and had Jon's transformation in 2023 for which he had 5 cycles Rituxan plus CHOP with the good clinical response.     *.  Abdomen pain new onset for the past 2 days.  Patient also feels malaise nausea for about 5 days.  Denies fever chills, he did have mild sweatling but not drenching sweating.  Has poor appetite for the past 4 5 days, no significant weight loss.  Patient was seen by general surgeon Dr. Witt, no acute abdomen or indication for surgical intervention.  CT scan for abdomen pelvis today reported significant worsening  Splenomegaly, retroperitoneal lymphadenopathy in the pelvic lymphadenopathy.  Laboratory study also showed newly developed leukocytosis, lymphocytosis, neutropenia, and thrombocytopenia worsening anemia.  I think this patient has disease progression from his lymphoma.  I recommended following: ultrasound-guided right inguinal lymph node biopsy for comprehensive study to distinguish of recurrent diffuse large B-cell lymphoma versus progression of SLL/CLL, obtain PET scan as inpatient, and also subsequently start first cycle chemotherapy as inpatient.  With the patient having symptoms and significant disease progression, I do not think arrange outpatient chemotherapy is in the best interest of this patient because of the elevated time for biopsy, PET scan and initiation of chemotherapy.    Will also check laboratory studies for LDH and uric acid.  This patient also has  leukocytosis/lymphocytosis, I also recommended to have peripheral blood for flow cytometry study.  This also will help distinguish between CLL and diffuse large B-cell lymphoma.  I think this patient may have leukemia phase of the diffuse large B-cell lymphoma.  3/29/2024 enormously elevated LDH > 2500.  This is secondary to his lymphoma.  3/30/2024 pain is better controlled severity 4/10.   3/31/2024 abdomen pain is controlled.  Worsening leukocytosis.  Peripheral blood smear reviewed, they are significant abnormal mononuclear cells, the diameter is about 3-4 times larger than the erythrocytes, many of those cells having 4 or 5 nucleoli within the nucleus, the chromatin is much less condensed.  And I can see some of those cells having dividing nucleus, however they do not look like typical blasts.  There are also typical small lymphocytes with condensed chromatin.  I explained to patient, he has leukemia phase of aggressive lymphoma relapse.  That is why I think this patient definitely needs a biopsy.    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 The similar picture that he has back last year in 2023 when he had exactly the same clinical circumstances and he improved dramatically with chemotherapy administration using CHOP and Rituxan.     At this point I do believe that the patient needs to proceed as follows:    1. He will require initiation of Rituxan administration tomorrow and once a week.  2. He will receive IV fluids today normal saline 125 cc/hr along with allopurinol 300 mg orally everyday.   3. He will repeat uric acid, LDH, CMP on a regular basis on a daily basis.  4. We will require continuation of monitoring his pathological analysis of the lymph node.   5. The flow cytometry from his  peripheral blood documents a monoclonal population of cells CD5 positive encompassing 75% of the events that has been posted. This documents that the so called monocytes are large cell lymphoid malignant cells consistent with his Jon's transformation and going along with his LDH.   6. Once that the patient leaves the hospital probably by this Thursday or Friday and once that we have proper monitoring of tumor lysis syndrome the patient will require continuation of Rituxan in the office on a weekly basis and very likely incorporation of new modality of therapy to him including the consideration of new monoclonal antibodies to deliver care for him.     I discussed this with him, discussed this with nursing and discussed this with Pathologist, Dr. Rashid at [facility] today.     On 04/02/2024 I discussed with the patient the findings on his PET scan that ae very striking. His spleen is huge with very significant SUV activity higher than ever before and also he has a line of retroperitoneal adenopathy that goes all of the way down into the right scrotum. There is minimal mediastinal adenopathy. Liver anatomy was normal. No other sites of disease including no abnormal bone uptake.     The biopsy of the lymph node in the right groin is still pending. This will be available hopefully tomorrow.     On the basis that he has high LDH and his persistent symptomatology it is very obvious that this patient has a diffuse large cell non-Hodgkin's lymphoma coming from the transformation from his CLL as discussed last year. The patient needs to be treated today. Given his high risk of tumor lysis syndrome we will split the dose of Rituxan with 1/2 of the dose today, 1/2 of the dose tomorrow and he will require frequent monitoring of his chemistry profile including CMP, phosphorus, uric acid and LDH. If the uric acid or the phosphorus or the potassium goes up he will require immediate assessment and he will require  rasburicase as well discussed with pharmacist. His uric acid today is appropriate. His LDH remains above 2500.     Obviously future plans will require to be made once that he is ready for discharge and this will be further discussed with him including continuation of Rituxan administration and consideration of second line of therapy through CAR T-cell therapy that could be discussed at the [facility].          *.  Worsening anemia and newly developed thrombocytopenia.  This is due to lymphoma progression.  Nevertheless I recommended to obtain iron study ferritin iron profile, together with B12 and folate.  Will also check LDH.  Patient does have elevated total bilirubin.  Lab study 3/30/2024 hemoglobin 10.1.  Ferritin 801, free iron 34 iron saturation 20%, B12 at 437 and folate 6.02 ng/mL.  No iron deficiency, fits with inflammatory condition related to his lymphoma.  Nevertheless low normal B12 and folate level, I think patient would benefit from oral supplement for both with no apparent side effects.  3/31/2024 hemoglobin 10.7.  On 04/02/2024 the hemoglobin remains stable, unchanged, no notion of blood loss, no notion of hemolysis.      *.  Insomnia.  Patient reports poor sleeping quality, especially in the hospital.  Patient was taking melatonin at home not really helping.  I recommended to start the patient Ambien as needed.   On 04/02/2024 the insomnia is the result of his disease process and his worries and stressors. We could deliver a low dose of Xanax if necessary.      *.  Hypokalemia.  Management per primary team and normalized.    *.  Elevated uric acid level.  This is related to his progressive lymphoma.  Uric acid 9.7 on 3/29/2024.  I recommend to start allopurinol to decrease uric acid and also preventing tumor lysis syndrome, expecting patient will have chemotherapy in the next few days.  3/31/2024 normalized uric acid 6.4.   On 04/02/2024 uric acid is in much better shape today than  what it was 2 days ago. Allopurinol has been ongoing, rasburicase could be utilized in case of need after proper testing today.      PLAN:   I discussed on 04/02/2024 the patient will split the dose of Rituxan today in 1/2 and tomorrow the other 1/2 given the high potential for tumor lysis syndrome given the findings on his PET scan.    We will monitor electrolytes, uric acid and LDH every 6 hours or so and rasburicase could be included in his regimen of medicine if uric acid goes high.    I reviewed with him his PET scan in the PAC system [facility], explained to him the findings and the notion of very significant uptake in the spleen that is very dramatic. I pointed out to the patient many months ago that he will require a splenectomy that he refused to have, now we have the consequences of what we see and we have to live with this. Obviously at this point the patient needs to have systemic therapy, cool off the process and then strong consideration for some other modality of therapy as a form of salvage treatment.    On 04/02/2024 I discussed the case with pharmacist in detail.        Spoke with nursing staff today.      Gerry Schroeder MD  04/02/24  13:50 EDT

## 2024-04-02 NOTE — PLAN OF CARE
Goal Outcome Evaluation:            Patient rested well this shift, room air, bed in low position w/call light and belongings within reach, family at bedside but has since left. Patient voices being prepared for confirmatory PET SCAN. Patient on telemetry box #248, right chest port accessed, NPO at this time, Rituxin regiment to begin soon. Plan of care is ongoing.

## 2024-04-03 LAB
ALBUMIN SERPL-MCNC: 2.5 G/DL (ref 3.5–5.2)
ALBUMIN/GLOB SERPL: 1.9 G/DL
ALP SERPL-CCNC: 69 U/L (ref 39–117)
ALT SERPL W P-5'-P-CCNC: 15 U/L (ref 1–41)
ANION GAP SERPL CALCULATED.3IONS-SCNC: 18 MMOL/L (ref 5–15)
ANION GAP SERPL CALCULATED.3IONS-SCNC: 18 MMOL/L (ref 5–15)
ANION GAP SERPL CALCULATED.3IONS-SCNC: 18.1 MMOL/L (ref 5–15)
ANION GAP SERPL CALCULATED.3IONS-SCNC: 19.8 MMOL/L (ref 5–15)
ANION GAP SERPL CALCULATED.3IONS-SCNC: 23.8 MMOL/L (ref 5–15)
ANISOCYTOSIS BLD QL: ABNORMAL
AST SERPL-CCNC: 45 U/L (ref 1–40)
BACTERIA SPEC AEROBE CULT: NORMAL
BACTERIA SPEC AEROBE CULT: NORMAL
BILIRUB SERPL-MCNC: 0.9 MG/DL (ref 0–1.2)
BUN SERPL-MCNC: 37 MG/DL (ref 8–23)
BUN SERPL-MCNC: 46 MG/DL (ref 8–23)
BUN SERPL-MCNC: 46 MG/DL (ref 8–23)
BUN SERPL-MCNC: 48 MG/DL (ref 8–23)
BUN SERPL-MCNC: 49 MG/DL (ref 8–23)
BUN/CREAT SERPL: 27.8 (ref 7–25)
BUN/CREAT SERPL: 30.5 (ref 7–25)
BUN/CREAT SERPL: 30.5 (ref 7–25)
BUN/CREAT SERPL: 30.6 (ref 7–25)
BUN/CREAT SERPL: 30.8 (ref 7–25)
BURR CELLS BLD QL SMEAR: ABNORMAL
CALCIUM SPEC-SCNC: 7.6 MG/DL (ref 8.6–10.5)
CALCIUM SPEC-SCNC: 7.7 MG/DL (ref 8.6–10.5)
CALCIUM SPEC-SCNC: 8.2 MG/DL (ref 8.6–10.5)
CHLORIDE SERPL-SCNC: 100 MMOL/L (ref 98–107)
CHLORIDE SERPL-SCNC: 103 MMOL/L (ref 98–107)
CHLORIDE SERPL-SCNC: 104 MMOL/L (ref 98–107)
CHLORIDE SERPL-SCNC: 106 MMOL/L (ref 98–107)
CHLORIDE SERPL-SCNC: 106 MMOL/L (ref 98–107)
CO2 SERPL-SCNC: 10.2 MMOL/L (ref 22–29)
CO2 SERPL-SCNC: 14.9 MMOL/L (ref 22–29)
CO2 SERPL-SCNC: 16 MMOL/L (ref 22–29)
CO2 SERPL-SCNC: 16 MMOL/L (ref 22–29)
CO2 SERPL-SCNC: 17.2 MMOL/L (ref 22–29)
CREAT SERPL-MCNC: 1.21 MG/DL (ref 0.76–1.27)
CREAT SERPL-MCNC: 1.51 MG/DL (ref 0.76–1.27)
CREAT SERPL-MCNC: 1.51 MG/DL (ref 0.76–1.27)
CREAT SERPL-MCNC: 1.56 MG/DL (ref 0.76–1.27)
CREAT SERPL-MCNC: 1.76 MG/DL (ref 0.76–1.27)
DACRYOCYTES BLD QL SMEAR: ABNORMAL
DEPRECATED RDW RBC AUTO: 48.7 FL (ref 37–54)
EGFRCR SERPLBLD CKD-EPI 2021: 38.6 ML/MIN/1.73
EGFRCR SERPLBLD CKD-EPI 2021: 44.6 ML/MIN/1.73
EGFRCR SERPLBLD CKD-EPI 2021: 46.4 ML/MIN/1.73
EGFRCR SERPLBLD CKD-EPI 2021: 46.4 ML/MIN/1.73
EGFRCR SERPLBLD CKD-EPI 2021: 60.5 ML/MIN/1.73
ELLIPTOCYTES BLD QL SMEAR: ABNORMAL
ERYTHROCYTE [DISTWIDTH] IN BLOOD BY AUTOMATED COUNT: 16.2 % (ref 12.3–15.4)
GLOBULIN UR ELPH-MCNC: 1.3 GM/DL
GLUCOSE BLDC GLUCOMTR-MCNC: 94 MG/DL (ref 70–130)
GLUCOSE SERPL-MCNC: 136 MG/DL (ref 65–99)
GLUCOSE SERPL-MCNC: 136 MG/DL (ref 65–99)
GLUCOSE SERPL-MCNC: 161 MG/DL (ref 65–99)
GLUCOSE SERPL-MCNC: 166 MG/DL (ref 65–99)
GLUCOSE SERPL-MCNC: 82 MG/DL (ref 65–99)
HBV CORE AB SERPL QL IA: NEGATIVE
HCT VFR BLD AUTO: 32.5 % (ref 37.5–51)
HGB BLD-MCNC: 11.2 G/DL (ref 13–17.7)
LDH SERPL-CCNC: >2500 U/L (ref 135–225)
LYMPHOCYTES # BLD MANUAL: 0.96 10*3/MM3 (ref 0.7–3.1)
LYMPHOCYTES NFR BLD MANUAL: 10.1 % (ref 5–12)
MAGNESIUM SERPL-MCNC: 1.7 MG/DL (ref 1.6–2.4)
MAGNESIUM SERPL-MCNC: 1.8 MG/DL (ref 1.6–2.4)
MAGNESIUM SERPL-MCNC: 2 MG/DL (ref 1.6–2.4)
MAGNESIUM SERPL-MCNC: 2 MG/DL (ref 1.6–2.4)
MCH RBC QN AUTO: 28.6 PG (ref 26.6–33)
MCHC RBC AUTO-ENTMCNC: 34.5 G/DL (ref 31.5–35.7)
MCV RBC AUTO: 83.1 FL (ref 79–97)
MICROCYTES BLD QL: ABNORMAL
MONOCYTES # BLD: 2.43 10*3/MM3 (ref 0.1–0.9)
NEUTROPHILS # BLD AUTO: 20.68 10*3/MM3 (ref 1.7–7)
NEUTROPHILS NFR BLD MANUAL: 85.9 % (ref 42.7–76)
PHOSPHATE SERPL-MCNC: 3.9 MG/DL (ref 2.5–4.5)
PHOSPHATE SERPL-MCNC: 4.3 MG/DL (ref 2.5–4.5)
PHOSPHATE SERPL-MCNC: 4.4 MG/DL (ref 2.5–4.5)
PHOSPHATE SERPL-MCNC: 5 MG/DL (ref 2.5–4.5)
PLAT MORPH BLD: NORMAL
PLATELET # BLD AUTO: 20 10*3/MM3 (ref 140–450)
PMV BLD AUTO: 11.4 FL (ref 6–12)
POIKILOCYTOSIS BLD QL SMEAR: ABNORMAL
POLYCHROMASIA BLD QL SMEAR: ABNORMAL
POTASSIUM SERPL-SCNC: 3.9 MMOL/L (ref 3.5–5.2)
POTASSIUM SERPL-SCNC: 4.2 MMOL/L (ref 3.5–5.2)
POTASSIUM SERPL-SCNC: 4.6 MMOL/L (ref 3.5–5.2)
POTASSIUM SERPL-SCNC: 4.8 MMOL/L (ref 3.5–5.2)
POTASSIUM SERPL-SCNC: 4.8 MMOL/L (ref 3.5–5.2)
PROT SERPL-MCNC: 3.8 G/DL (ref 6–8.5)
RBC # BLD AUTO: 3.91 10*6/MM3 (ref 4.14–5.8)
SODIUM SERPL-SCNC: 136 MMOL/L (ref 136–145)
SODIUM SERPL-SCNC: 137 MMOL/L (ref 136–145)
SODIUM SERPL-SCNC: 138 MMOL/L (ref 136–145)
SODIUM SERPL-SCNC: 140 MMOL/L (ref 136–145)
SODIUM SERPL-SCNC: 140 MMOL/L (ref 136–145)
URATE SERPL-MCNC: 6.4 MG/DL (ref 3.4–7)
URATE SERPL-MCNC: 6.7 MG/DL (ref 3.4–7)
URATE SERPL-MCNC: 6.7 MG/DL (ref 3.4–7)
URATE SERPL-MCNC: 7.1 MG/DL (ref 3.4–7)
VARIANT LYMPHS NFR BLD MANUAL: 4 % (ref 19.6–45.3)
WBC MORPH BLD: NORMAL
WBC NRBC COR # BLD AUTO: 24.07 10*3/MM3 (ref 3.4–10.8)

## 2024-04-03 PROCEDURE — 85025 COMPLETE CBC W/AUTO DIFF WBC: CPT | Performed by: INTERNAL MEDICINE

## 2024-04-03 PROCEDURE — 25010000002 RASBURICASE PER 0.5 MG: Performed by: INTERNAL MEDICINE

## 2024-04-03 PROCEDURE — 84550 ASSAY OF BLOOD/URIC ACID: CPT | Performed by: INTERNAL MEDICINE

## 2024-04-03 PROCEDURE — 84100 ASSAY OF PHOSPHORUS: CPT | Performed by: INTERNAL MEDICINE

## 2024-04-03 PROCEDURE — 85007 BL SMEAR W/DIFF WBC COUNT: CPT | Performed by: INTERNAL MEDICINE

## 2024-04-03 PROCEDURE — 25810000003 SODIUM CHLORIDE 0.9 % SOLUTION: Performed by: INTERNAL MEDICINE

## 2024-04-03 PROCEDURE — 83735 ASSAY OF MAGNESIUM: CPT | Performed by: INTERNAL MEDICINE

## 2024-04-03 PROCEDURE — P9047 ALBUMIN (HUMAN), 25%, 50ML: HCPCS | Performed by: INTERNAL MEDICINE

## 2024-04-03 PROCEDURE — 25010000002 ALBUMIN HUMAN 25% PER 50 ML: Performed by: INTERNAL MEDICINE

## 2024-04-03 PROCEDURE — 99232 SBSQ HOSP IP/OBS MODERATE 35: CPT | Performed by: INTERNAL MEDICINE

## 2024-04-03 PROCEDURE — 83615 LACTATE (LD) (LDH) ENZYME: CPT | Performed by: INTERNAL MEDICINE

## 2024-04-03 PROCEDURE — 80053 COMPREHEN METABOLIC PANEL: CPT | Performed by: INTERNAL MEDICINE

## 2024-04-03 PROCEDURE — 97530 THERAPEUTIC ACTIVITIES: CPT

## 2024-04-03 RX ORDER — MIDODRINE HYDROCHLORIDE 5 MG/1
5 TABLET ORAL
Status: DISCONTINUED | OUTPATIENT
Start: 2024-04-03 | End: 2024-04-06

## 2024-04-03 RX ORDER — SEVELAMER CARBONATE 800 MG/1
800 TABLET, FILM COATED ORAL
Status: DISCONTINUED | OUTPATIENT
Start: 2024-04-03 | End: 2024-04-05

## 2024-04-03 RX ORDER — ALBUMIN (HUMAN) 12.5 G/50ML
25 SOLUTION INTRAVENOUS EVERY 6 HOURS
Status: COMPLETED | OUTPATIENT
Start: 2024-04-03 | End: 2024-04-05

## 2024-04-03 RX ADMIN — ALBUMIN (HUMAN) 25 G: 0.25 INJECTION, SOLUTION INTRAVENOUS at 19:26

## 2024-04-03 RX ADMIN — MIDODRINE HYDROCHLORIDE 5 MG: 5 TABLET ORAL at 17:59

## 2024-04-03 RX ADMIN — SODIUM BICARBONATE 150 MEQ: 84 INJECTION, SOLUTION INTRAVENOUS at 18:43

## 2024-04-03 RX ADMIN — MIDODRINE HYDROCHLORIDE 5 MG: 5 TABLET ORAL at 13:59

## 2024-04-03 RX ADMIN — Medication 10 ML: at 09:04

## 2024-04-03 RX ADMIN — SODIUM CHLORIDE 125 ML/HR: 9 INJECTION, SOLUTION INTRAVENOUS at 00:51

## 2024-04-03 RX ADMIN — SODIUM CHLORIDE 3 MG: 9 INJECTION, SOLUTION INTRAVENOUS at 09:26

## 2024-04-03 RX ADMIN — SODIUM BICARBONATE 150 MEQ: 84 INJECTION, SOLUTION INTRAVENOUS at 11:49

## 2024-04-03 RX ADMIN — ALBUMIN (HUMAN) 25 G: 0.25 INJECTION, SOLUTION INTRAVENOUS at 14:00

## 2024-04-03 RX ADMIN — Medication 10 ML: at 22:04

## 2024-04-03 RX ADMIN — NEBIVOLOL 2.5 MG: 5 TABLET ORAL at 08:57

## 2024-04-03 RX ADMIN — ALLOPURINOL 300 MG: 300 TABLET ORAL at 08:57

## 2024-04-03 RX ADMIN — FOLIC ACID 1 MG: 1 TABLET ORAL at 08:57

## 2024-04-03 RX ADMIN — SEVELAMER CARBONATE 800 MG: 800 TABLET, FILM COATED ORAL at 11:49

## 2024-04-03 RX ADMIN — Medication 1000 MCG: at 08:57

## 2024-04-03 RX ADMIN — Medication 5 MG: at 23:33

## 2024-04-03 RX ADMIN — SEVELAMER CARBONATE 800 MG: 800 TABLET, FILM COATED ORAL at 17:59

## 2024-04-03 NOTE — CONSULTS
[facility] Consult Note      Patient Name: Jeff Bass  : 1945  MRN: [id number]  Primary Care Physician:  Jovani Salomon MD  Referring Physician: Parag Haider MD  Date of admission: 3/29/2024    Subjective     Reason for Consult: Renal dysfunction    HPI:   Jeff Bass is a 78 y.o. 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  Followed closely by oncology    Patient presents for onset of abdominal discomfort with mild upper GI symptoms weight loss .  Initial workup with evidence of a worsening leukocytosis with lymphocytosis thrombocytopenia worsening anemia accompanied with splenomegaly and retroperitoneal lymphadenopathy.  The patient was admitted to the oncology department To undergo further chemotherapy    During admission found to have worsening renal function with hyperuricemia and hyperphosphatemia concern for TLS.  Nephrology consultation been requested for the management.    Review of Systems:   14 point review of systems is otherwise negative except for mentioned above on HPI    Personal History     Past Medical History:   Diagnosis Date    Arthritis     Benign prostatic hyperplasia     FOLLOWED BY DR. VIVIEN PATEL    Biliary dyskinesia     Bunion of right foot     GREAT TOE    Bursitis of right hip 03/15/2018    CLL (chronic lymphocytic leukemia) 2016    FOLLOWED BY DR WALKER    Colon polyps     FOLLOWED BY DR. NEHA HAM     Constipation     Degeneration of lumbar intervertebral disc     Diverticulosis     Erectile dysfunction     Fracture of ankle 1975    GERD (gastroesophageal reflux disease)     Hallux valgus of left foot     Hyperlipidemia     MIXED    Hypertension     Inguinal hernia     Kidney stone 02/21/2009    SEEN AT [facility] ER    Knee swelling 2018    Localized, primary osteoarthritis     Lumbar radiculopathy     Lumbar stenosis     Osteoarthritis of right knee     Polyneuropathy     Prostate CA 03/2016    JACQUELYN 6, S/P XRT, FOLLOWED BY DR. VIVIEN PATEL, RADIATION SEEDS    PVC (premature ventricular contraction)     PT STATES WORKED UP YEARS AGO BY [facility] FOR POSSIBLE MURMUR - NO FOLLOW UP REQUIRED     RBBB     Sensory hearing loss, bilateral     Skin cancer     SQUAMOUS CELL CARCINOMA OF FACE    Substernal goiter     Thyromegaly 12/2016    ADMITTED TO [facility]    Vitamin D deficiency        Past Surgical History:   Procedure Laterality Date    BRONCHOSCOPY N/A 12/14/2016    Procedure: BRONCHOSCOPY WITH  BAL AND BIOPSY AND ENDOBRONCHIAL ULTRASOUND  AND NAVIGATION WITH BRUSHINGS AND BIOPSY AND BAL;  Surgeon: Pierre Manning MD;  Location: [facility] ENDOSCOPY;  Service:     COLONOSCOPY N/A 03/13/2019    5 MM SESSILE TUBULAR ADENOMA POLYP IN TRANSVERSE, MULTIPLE SMALL AND LARGE DIVERTICULA IN SIGMOID, RESCOPE IN 5 YRS, DR. NEHA HAM AT [facility]    COLONOSCOPY W/ POLYPECTOMY N/A 03/19/2015    3 TUBULAR ADENOMA POLYPS, 12 TUBULOVILLOUS POLYP RECTO-SIGMOID, DIVERTICULOSIS, RESCOPE IN 3 YRS, DR. NEHA HAM AT [facility]    EYE SURGERY Bilateral     CATARACT EXTRACTION WITH LENS IMPLANT, DR. GOVEA    FINGER NAIL SURGERY Right 2022    TORRES-PATEL    INGUINAL HERNIA REPAIR Left 11/02/2021    Procedure: LEFT OPEN INGUINAL HERNIA REPAIR;  Surgeon: Nixon Kolb MD;  Location: [facility] MAIN OR;  Service: General;  Laterality: Left;    PROSTATE RADIOACTIVE SEED IMPLANT N/A 09/06/2016    DR. HOA JARAMILLO AT [facility]    PROSTATE  SURGERY N/A 03/11/2016    BIOPSY: ADENOCARCINOMA, DR. ZAID IBARRA    THYROID BIOPSY Bilateral 11/01/2017    NO B CELL OR T CELL LYMPHOMA, DONE AT [facility]    TOTAL KNEE ARTHROPLASTY Right 10/12/2022    Procedure: TOTAL KNEE ARTHROPLASTY;  Surgeon: Ran Rachel MD;  Location: [facility] OR [facility];  Service: Orthopedics;  Laterality: Right;    US GUIDED LYMPH NODE BIOPSY  4/1/2024    VENOUS ACCESS DEVICE (PORT) INSERTION Right 7/6/2023    Procedure: INSERTION VENOUS ACCESS DEVICE;  Surgeon: Nixon Kolb MD;  Location: [facility] OR [facility];  Service: General;  Laterality: Right;       Family History: family history includes Heart disease in his father; Hypertension in his father; No Known Problems in his daughter; Osteoporosis in his father and mother; Stroke in his mother.    Social History:  reports that he has never smoked. He has never used smokeless tobacco. He reports that he does not drink alcohol and does not use drugs.    Home Medications:  Prior to Admission medications    Medication Sig Start Date End Date Taking? Authorizing Provider   famotidine (PEPCID) 20 MG tablet Take 1 tablet by mouth Daily.   Yes ProviderLatoya MD   Melatonin 10 MG tablet Take 1 tablet by mouth Every Night.   Yes Provider, MD Latoya   pravastatin (PRAVACHOL) 20 MG tablet Take 1 tablet by mouth Daily.   Yes ProviderLatoya MD   ondansetron (ZOFRAN) 8 MG tablet Take 1 tablet by mouth 3 (Three) Times a Day As Needed for Nausea or Vomiting. 4/2/24   Gerry Schroeder MD       Allergies:  No Known Allergies    Objective     Vitals:   Temp:  [96.5 °F (35.8 °C)-102.4 °F (39.1 °C)] 97.7 °F (36.5 °C)  Heart Rate:  [] 93  Resp:  [16-36] 16  BP: ()/() 123/69  Flow (L/min):  [1.5] 1.5    Intake/Output Summary (Last 24 hours) at 4/3/2024 1159  Last data filed at 4/3/2024 0858  Gross per 24 hour   Intake 1190 ml   Output --   Net 1190 ml       Physical Exam:   Constitutional: Awake, alert, no acute distress.  HEENT: Sclera  anicteric, no conjunctival injection  Neck: Supple, no thyromegaly, no lymphadenopathy, trachea at midline, no JVD  Respiratory: Clear to auscultation bilaterally, nonlabored respiration  Cardiovascular: RRR, no murmurs, no rubs or gallops, no carotid bruit  Gastrointestinal: Positive bowel sounds, abdomen is soft, nontender and nondistended  : No palpable bladder  Musculoskeletal: No edema, no clubbing or cyanosis  Psychiatric: Appropriate affect, cooperative  Neurologic: Oriented x3, moving all extremities, normal speech and mental status  Skin: Warm and dry       Scheduled Meds:     allopurinol, 300 mg, Oral, Daily  folic acid, 1 mg, Oral, Daily  nebivolol, 2.5 mg, Oral, Q24H  polyethylene glycol, 17 g, Oral, Daily  sevelamer, 800 mg, Oral, TID With Meals  sodium chloride, 10 mL, Intravenous, Q12H  vitamin B-12, 1,000 mcg, Oral, Daily      IV Meds:   Pharmacy Consult,   Pharmacy Consult,   sodium bicarbonate, 150 mEq, Last Rate: 150 mEq (04/03/24 1149)        Results Reviewed:   I have personally reviewed the results from the time of this admission to 4/3/2024 11:59 EDT     Lab Results   Component Value Date    GLUCOSE 136 (H) 04/03/2024    GLUCOSE 136 (H) 04/03/2024    CALCIUM 7.6 (L) 04/03/2024    CALCIUM 7.6 (L) 04/03/2024     04/03/2024     04/03/2024    K 4.8 04/03/2024    K 4.8 04/03/2024    CO2 16.0 (L) 04/03/2024    CO2 16.0 (L) 04/03/2024     04/03/2024     04/03/2024    BUN 46 (H) 04/03/2024    BUN 46 (H) 04/03/2024    CREATININE 1.51 (H) 04/03/2024    CREATININE 1.51 (H) 04/03/2024    EGFRIFAFRI 103 02/05/2016    EGFRIFNONA 90 10/29/2021    BCR 30.5 (H) 04/03/2024    BCR 30.5 (H) 04/03/2024    ANIONGAP 18.0 (H) 04/03/2024    ANIONGAP 18.0 (H) 04/03/2024      Lab Results   Component Value Date    MG 1.7 04/03/2024    PHOS 5.0 (H) 04/03/2024    ALBUMIN 2.5 (L) 04/03/2024     US Gallbladder    Result Date: 3/31/2024  EXAMINATION: GALLBLADDER SONOGRAM  HISTORY: 78-year-old male  with a history of abdominal pain and leukocytosis.  FINDINGS: Sonographic evaluation of the structures of the right upper quadrant was performed. Comparison is made to a prior gallbladder sonogram dated 1/19/2019. The right kidney measures 11.4 x 5.9 x 4.6 cm. A 2 cm simple right renal cyst is noted. The liver demonstrates multiple scattered simple appearing cysts, the largest which measures on the order of 10.0 x 10.3 x 6.6 cm. The liver parenchyma appears normal otherwise. The liver measures on the order of 19 cm in anterior to posterior dimension. The portal vein is patent with appropriate direction of flow. The gallbladder demonstrates mild internal sludge. The common bile duct measures 0.6 cm in diameter. The visualized portion of the pancreas appears normal. Per the sonographer, the patient was nontender over the right upper quadrant during the examination.      Impression: 1. Mild gallbladder sludge.  2. Multiple hepatic cysts as described.  3. Right renal cyst.  This report was finalized on 3/31/2024 11:52 AM by Dr. Rush Jordan M.D on Workstation: [id number]        Assessment / Plan       Jon's syndrome    Hypertension    Lymphoma of spleen    Abdominal pain    Anemia associated with malignant neoplastic disease    Thrombocytopenia    Leukocytosis    Primary insomnia    Nausea    Elevated blood uric acid level      ASSESSMENT:      Nonoliguric acute kidney injury baseline creatinine between 0.8 and 0.9 slowly worsening to 1.5, accompanied with the hypophosphatemia.  Borderline hyperkalemia and hyperuricemia of 7.1.  Fulfilling criteria for Green criteria .  Patient was given rasburicase IV once.  Currently on allopurinol 300 mg daily.  Currently on hydration we will switch to sodium bicarbonate due to metabolic acidosis will add, phosphorus binder due to hyperphosphatemia and will adjust renal diet with low K last phosphorus.  Will continue to follow renal function closely and adjust treatment  based on trend    Metabolic acidosis from CKD lactic acid    Recurrent diffuse large B-cell lymp adjusted fluids to sodium bicarbonate and follow CO2 trendhoma status post R-CHOP and rituximab with presents elevated LDH followed closely by.Hematology    Benign prostatic hypertrophy with history of prostate cancer will obtain bladder scan.  Based on results he may need a Mcdaniel catheter placement    Hypotension will discontinue nebivolol and will add midodrine and albumin trial 25 g every 6 hours    PLAN:    -Adjusted IV fluids and switch to sodium bicarbonate will follow CO2 trend  -Agree with  rasburicase and allopurinol will adjust diet, and add binders and will follow electrolytes closely  -Patient with history of benign prostatic hypertrophy and prostate cancer.  Currently in bed with risk of urinary retention will obtain bladder scan   -Will hold beta-blockers and add midodrine to improve blood pressure w combination of albumin  and renal perfusion  -Minimize narcotics to limit hypotension    Discussed with nursing staff        Thank you for involving us in the care of Jeff Bass.  Please feel free to call with any questions.    David Carroll MD  04/03/24  11:59 EDT    [facility]  [phone]      Please note that portions of this note were completed with a voice recognition program.

## 2024-04-03 NOTE — PROGRESS NOTES
LOS: 5 days   Patient Care Team:  Jovani Salomon MD as PCP - General (Internal Medicine)  Gerry Schroeder MD as Consulting Physician (Hematology and Oncology)  Stephanie Toledo PA as Referring Physician (Physician Assistant)    Chief Complaint: Abdomen pain.  Recurrent lymphoma who is splenomegaly and progressive retroperitoneal lymphadenopathy.    Interval History:  On 3/30/2024  Abdomen pain is reasonably controlled severity 8/10.  Had a good bowel movement.  Afebrile.    3/31/2024  Abdomen pain is controlled.  Patient is afebrile.  No nausea vomiting.  Worsening leukocytosis.   On 04/01/2024 the patient feels very fatigued and tired with no appetite, lesser abdominal pain. Spleen remains enlarged but nontender. He remains with low grade temperature and profuse perspiration. No cough or shortness of breath. His lymph node biopsy was performed a few minutes ago from the right groin. PET scan to be done tomorrow morning. The patient wants to go home today.  On 04/02/2024 the patient feels very fatigued and tired, no appetite, perspiration profusely, low grade temperature. Minimal if any abdominal pain. PET scan was done this morning, the findings are very striking and to be reviewed below. He has had some bowel activity, proper urinary flow once that the port was opened and receiving IV fluids abundantly in preparation for treatment today.    On 04/03/2024 the patient had significant reaction to infusion of Rituxan yesterday including fevers and chills. We had to give him Solu-Cortef and naprosyn and the symptoms improved. This morning he feels dramatically better and he is hungry. He has no abdominal pain, nausea or vomiting. Urinary output is abundant. Uric acid is going up, potassium is 5 and he will require reassessment in regard further continuation of Rituxan that probably will be postponed until tomorrow. I am going to go ahead and consult the nephrology team to help us out in regard to tumor lysis  management.          HISTORY OF PRESENT ILLNESS:  The patient is a 78 y.o. 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  Patient reports he was at baseline condition, and yesterday afternoon started having abdominal pain around umbilical/lower abdomen area associated with nausea.  Patient reports abdomen pain is constant.  He also reports having poor appetite since this past Sunday which is 5 days ago.  He denies low fever sweating or chills.       Patient also reported generalized weakness in the past 24 hours also and did not drink either too much at all.    Vital Signs:  Temp:  [96.5 °F (35.8 °C)-102.4 °F (39.1 °C)] 97.7 °F (36.5 °C)  Heart Rate:  [] 93  Resp:  [16-36] 16  BP: ()/() 123/69    Intake/Output Summary (Last 24 hours) at 4/3/2024 1151  Last data filed at 4/3/2024 0858  Gross per 24 hour   Intake 1190 ml   Output --   Net 1190 ml       Physical Exam:  Eyes and oral mucosa's were normal. No peripheral adenopathy in neck or axillas, lymph node in the right groin measuring 3 cm in size, mildly tender because of just recent biopsy. Left groin no lymph node available. Abdomen shows spleen below the left costal margin almost 15 cm below. No liver enlargement. No abdominal pain. No tenderness. His lungs were clear, his heart was regular with no gallops, murmurs or rubs. His skin was perspiring abundantly and moist. He was awake, alert, oriented in time and place.     On 04/02/2024 the clinical examination today is no different than what it was yesterday. The spleen remains enlarged, large similar to what it was yesterday. The right groin surgical site is healing well. No other new findings.     Eyes and oral mucosa's were normal, no cervical or axillary  adenopathy, lungs were clear, heart was regular, abdomen the spleen was lesser in size, almost 1/2 of the size it was yesterday with no tenderness. No liver enlargement. No abdominal masses otherwise. Area of lymph node biopsy in the right groin is healing. He was awake, alert, oriented in time and place, carry normal conversation.      Results Review:   CBC:      Lab 04/03/24  0632 04/02/24  0543 04/01/24  1550 03/31/24  0539 03/30/24  0257   WBC 24.07* 27.52* 18.88* 26.50* 16.87*   HEMOGLOBIN 11.2* 10.1* 10.9* 10.7* 10.1*   HEMATOCRIT 32.5* 29.8* 31.9* 31.4* 30.0*   PLATELETS 20* 52* 49* 64* 65*   NEUTROS ABS 20.68* 9.74* 6.99 6.63 3.54   EOS ABS  --   --  0.19  --  0.34   MCV 83.1 83.5 83.1 82.4 80.9     CMP:        Lab 04/03/24  0632 04/02/24  2353 04/02/24  1931 04/02/24  0543 04/01/24  1550 03/31/24  0539 03/30/24  1145 03/30/24  0257 03/29/24  2158 03/29/24  1216 03/29/24  0913   SODIUM 140  140 138 137 140 138 132*  --  136  --  131* 131*   POTASSIUM 4.8  4.8 4.2 3.8 3.8  3.8 3.4* 3.8   < > 3.5   < > 3.2* 3.3*   CHLORIDE 106  106 104 104 107 100 97*  --  99  --  93* 91*   CO2 16.0*  16.0* 10.2* 15.0* 20.0* 21.0* 18.2*  --  23.0  --  22.8 21.8*   ANION GAP 18.0*  18.0* 23.8* 18.0* 13.0 17.0* 16.8*  --  14.0  --  15.2* 18.2*   BUN 46*  46* 37* 31* 24* 23 22  --  21  --  20 23   CREATININE 1.51*  1.51* 1.21 1.03 0.75* 0.84 0.85  --  0.96  --  0.80 0.94   EGFR 46.4*  46.4* 60.5 73.4 91.2 88.2 87.8  --  79.9  --  89.5 81.9   GLUCOSE 136*  136* 82 94 89 73 82  --  77  --  95 98   CALCIUM 7.6*  7.6* 8.2* 8.2* 7.7* 8.5* 8.2*  --  8.1*  --  8.4* 9.0   MAGNESIUM 1.7 1.8 1.8  --   --  1.8  --   --   --  1.7 1.7   PHOSPHORUS 5.0* 4.3 2.2* 1.9* 2.4*  --   --   --   --   --   --    TOTAL PROTEIN 3.8*  --   --  3.9* 4.5* 4.7*  --  4.6*  --   --  5.6*   ALBUMIN 2.5*  --   --  2.8* 3.0* 3.2*  --  3.2*  --   --  3.8   GLOBULIN 1.3  --   --  1.1 1.5  --   --   --   --   --  1.8   ALT (SGPT) 15  --   --  14 15 14  --   16  --   --  18   AST (SGOT) 45*  --   --  26 26 29  --  26  --   --  42*   BILIRUBIN 0.9  --   --  1.3* 1.5* 1.2  --  1.3*  --   --  1.6*   INDIRECT BILIRUBIN  --   --   --   --   --  0.7  --  0.8  --   --   --    BILIRUBIN DIRECT  --   --   --   --   --  0.5*  --  0.5*  --   --   --    ALK PHOS 69  --   --  51 53 53  --  56  --   --  74   LIPASE  --   --   --   --   --   --   --   --   --   --  8*    < > = values in this interval not displayed.     LDH   Date Value Ref Range Status   04/03/2024 >2,500 (H) 135 - 225 U/L Final     Uric Acid   Date Value Ref Range Status   04/03/2024 7.1 (H) 3.4 - 7.0 mg/dL Final     Lab Results   Component Value Date    IRON 34 (L) 03/30/2024    TIBC 171 (L) 03/30/2024    FERRITIN 801.00 (H) 03/30/2024     Lab Results   Component Value Date    FOLATE 6.02 03/30/2024     Lab Results   Component Value Date    [id number] 03/30/2024       Results from last 7 days   Lab Units 03/29/24  1216 03/29/24  0913   HSTROP T ng/L 21 20     Results from last 7 days   Lab Units 03/30/24  0257 03/29/24  0913   INR  1.30* 1.28*   APTT seconds 30.8  --          Results from last 7 days   Lab Units 04/03/24  0632   MAGNESIUM mg/dL 1.7         Patient Name: MAC THOMPSON   Accession #: [id number]     [facility]   [address], [address]   [place] [address]       CLINICAL HISTORY:   78 year old male with a history of CLL     SPECIMEN SOURCE:   BLOOD ([id number])       FINAL DIAGNOSIS:   LEUKEMIA/LYMPHOMA PANEL     INTERPRETATION     CD5+ MONOCLONAL B CELL PROCESS, CONSISTENT WITH HISTORICAL LYMPHOMA         PATHOLOGIST COMMENT:   The B cell lymphoma comprises 72.5% of peripheral blood cellularity.       Electronically Signed Out on 4/1/2024      KETAN connor/4/1/2024  Interpretation performed at:   [facility]   [address], [address]   [place]  [address]         Assessment:  *.  History of SLL status post Rituxan plus [place], and had  Jon's transformation in 2023 for which he had 5 cycles Rituxan plus CHOP with the good clinical response.     *.  Abdomen pain new onset for the past 2 days.  Patient also feels malaise nausea for about 5 days.  Denies fever chills, he did have mild sweatling but not drenching sweating.  Has poor appetite for the past 4 5 days, no significant weight loss.  Patient was seen by general surgeon Dr. Witt, no acute abdomen or indication for surgical intervention.  CT scan for abdomen pelvis today reported significant worsening  Splenomegaly, retroperitoneal lymphadenopathy in the pelvic lymphadenopathy.  Laboratory study also showed newly developed leukocytosis, lymphocytosis, neutropenia, and thrombocytopenia worsening anemia.  I think this patient has disease progression from his lymphoma.  I recommended following: ultrasound-guided right inguinal lymph node biopsy for comprehensive study to distinguish of recurrent diffuse large B-cell lymphoma versus progression of SLL/CLL, obtain PET scan as inpatient, and also subsequently start first cycle chemotherapy as inpatient.  With the patient having symptoms and significant disease progression, I do not think arrange outpatient chemotherapy is in the best interest of this patient because of the elevated time for biopsy, PET scan and initiation of chemotherapy.    Will also check laboratory studies for LDH and uric acid.  This patient also has leukocytosis/lymphocytosis, I also recommended to have peripheral blood for flow cytometry study.  This also will help distinguish between CLL and diffuse large B-cell lymphoma.  I think this patient may have leukemia phase of the diffuse large B-cell lymphoma.  3/29/2024 enormously elevated LDH > 2500.  This is secondary to his lymphoma.  3/30/2024 pain is better controlled severity 4/10.   3/31/2024 abdomen pain is controlled.  Worsening leukocytosis.  Peripheral blood smear reviewed, they are significant abnormal mononuclear  cells, the diameter is about 3-4 times larger than the erythrocytes, many of those cells having 4 or 5 nucleoli within the nucleus, the chromatin is much less condensed.  And I can see some of those cells having dividing nucleus, however they do not look like typical blasts.  There are also typical small lymphocytes with condensed chromatin.  I explained to patient, he has leukemia phase of aggressive lymphoma relapse.  That is why I think this patient definitely needs a biopsy.    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 The similar picture that he has back last year in 2023 when he had exactly the same clinical circumstances and he improved dramatically with chemotherapy administration using CHOP and Rituxan.     At this point I do believe that the patient needs to proceed as follows:    1. He will require initiation of Rituxan administration tomorrow and once a week.  2. He will receive IV fluids today normal saline 125 cc/hr along with allopurinol 300 mg orally everyday.   3. He will repeat uric acid, LDH, CMP on a regular basis on a daily basis.  4. We will require continuation of monitoring his pathological analysis of the lymph node.   5. The flow cytometry from his peripheral blood documents a monoclonal population of cells CD5 positive encompassing 75% of the events that has been posted. This documents that the so called monocytes are large cell lymphoid malignant cells consistent with his Jon's transformation and going along with his LDH.   6. Once that the patient leaves the hospital probably by this Thursday or Friday and once that we have proper monitoring of tumor lysis syndrome the patient will require continuation of Rituxan in the office on a weekly basis and very likely incorporation  of new modality of therapy to him including the consideration of new monoclonal antibodies to deliver care for him.     I discussed this with him, discussed this with nursing and discussed this with Pathologist, Dr. Rashid at [facility] today.     On 04/02/2024 I discussed with the patient the findings on his PET scan that ae very striking. His spleen is huge with very significant SUV activity higher than ever before and also he has a line of retroperitoneal adenopathy that goes all of the way down into the right scrotum. There is minimal mediastinal adenopathy. Liver anatomy was normal. No other sites of disease including no abnormal bone uptake.     The biopsy of the lymph node in the right groin is still pending. This will be available hopefully tomorrow.     On the basis that he has high LDH and his persistent symptomatology it is very obvious that this patient has a diffuse large cell non-Hodgkin's lymphoma coming from the transformation from his CLL as discussed last year. The patient needs to be treated today. Given his high risk of tumor lysis syndrome we will split the dose of Rituxan with 1/2 of the dose today, 1/2 of the dose tomorrow and he will require frequent monitoring of his chemistry profile including CMP, phosphorus, uric acid and LDH. If the uric acid or the phosphorus or the potassium goes up he will require immediate assessment and he will require rasburicase as well discussed with pharmacist. His uric acid today is appropriate. His LDH remains above 2500.     Obviously future plans will require to be made once that he is ready for discharge and this will be further discussed with him including continuation of Rituxan administration and consideration of second line of therapy through CAR T-cell therapy that could be discussed at the [facility].     On 04/03/2024 the patient tolerated the day before 1/2 of the dose of Rituxan with a lot of inconveniences including febrile  illness with temperature going as high as 102 associated with chills and fever requiring hydrocortisone  mg. He finally settled down. He was able to complete the first half of the infusion of Rituxan. Today his creatinine has gone up to 1.5, his uric acid has increased, his phosphorus level has mildly increased, potassium level mildly increased. I would prefer for the patient to postpone any further Rituxan administration until tomorrow. I went ahead and increased his IV fluid rate to 150 cc/hr and I went ahead and called the nephology team to help us out with the management of electrolytes, calcium, potassium, phosphorus and so forth in this kind of setting. I discussed with Pharmacist, Alina, the fact that I will postpone the Rituxan administration until tomorrow and it is perfectly fine from this point of view.    He will be premedicated tomorrow with hydrocortisone and Tylenol.          *.  Worsening anemia and newly developed thrombocytopenia.  This is due to lymphoma progression.  Nevertheless I recommended to obtain iron study ferritin iron profile, together with B12 and folate.  Will also check LDH.  Patient does have elevated total bilirubin.  Lab study 3/30/2024 hemoglobin 10.1.  Ferritin 801, free iron 34 iron saturation 20%, B12 at 437 and folate 6.02 ng/mL.  No iron deficiency, fits with inflammatory condition related to his lymphoma.  Nevertheless low normal B12 and folate level, I think patient would benefit from oral supplement for both with no apparent side effects.  3/31/2024 hemoglobin 10.7.  On 04/02/2024 the hemoglobin remains stable, unchanged, no notion of blood loss, no notion of hemolysis.    On 04/03/2024 hemoglobin remains stable, no notion of blood loss.       *.  Insomnia.  Patient reports poor sleeping quality, especially in the hospital.  Patient was taking melatonin at home not really helping.  I recommended to start the patient Ambien as needed.   On 04/02/2024 the insomnia is  the result of his disease process and his worries and stressors. We could deliver a low dose of Xanax if necessary.    On 04/03/2024 in spite of all of the fevers, chills and issues pertinent to Rituxan administration the patient was able to have a night of sleep that was decent. Hopefully now he will start to eat, he will improve this situation and he will have a quiet night tonight.       *.  Hypokalemia.  Management per primary team and normalized.  On 04/03/2024 given the evidence of tumor lysis syndrome with an LDH above 2500, uric acid elevation, potassium elevation, phosphorus elevation we will ask nephrology team to see him.       *.  Elevated uric acid level.  This is related to his progressive lymphoma.  Uric acid 9.7 on 3/29/2024.  I recommend to start allopurinol to decrease uric acid and also preventing tumor lysis syndrome, expecting patient will have chemotherapy in the next few days.  3/31/2024 normalized uric acid 6.4.   On 04/02/2024 uric acid is in much better shape today than what it was 2 days ago. Allopurinol has been ongoing, rasburicase could be utilized in case of need after proper testing today.    On 04/03/2024 I went ahead and ordered rasburicase for this patient to be given to today and probably he will need another dose tomorrow depending on the uric acid level tomorrow morning. As posted above the patient will delay further Rituxan administration for tomorrow.      PLAN:   I discussed on 04/02/2024 the patient will split the dose of Rituxan today in 1/2 and tomorrow the other 1/2 given the high potential for tumor lysis syndrome given the findings on his PET scan.    We will monitor electrolytes, uric acid and LDH every 6 hours or so and rasburicase could be included in his regimen of medicine if uric acid goes high.    I reviewed with him his PET scan in the PAC system [facility], explained to him the findings and the notion of very significant uptake in the spleen that  is very dramatic. I pointed out to the patient many months ago that he will require a splenectomy that he refused to have, now we have the consequences of what we see and we have to live with this. Obviously at this point the patient needs to have systemic therapy, cool off the process and then strong consideration for some other modality of therapy as a form of salvage treatment.    On 04/02/2024 I discussed the case with pharmacist in detail.    On 04/02/2024 as posted above we postponed further Rituxan administration until tomorrow. We will ask the nephrology team to help us out to control the tumor lysis syndrome. He had more IV fluids ongoing today and he will require calcium supplementation, close monitoring of potassium, phosphorus and uric acid. Rasburicase will be given to him at the dose of 3 mg and discussed with pharmacist.    On the other hand the patient feels better, he is hungry and his spleen is 1/2 of the size of what it was yesterday. That is encouraging.     On 04/03/2024 we have the pathological analysis of the lymph node biopsy of the right groin. This analysis is not concordant with the clinical picture that the patient has. Unfortunately what I think the pathology sees, another thing is the clinical situation of the patient and that is not surprising, similar situation happened during the development of his Jon's transformation last year. The pathology was telling us something, the patient was telling us something completely different.         Spoke with pharmacist staff today.      Gerry Schroeder MD  04/03/24  11:51 EDT

## 2024-04-03 NOTE — THERAPY TREATMENT NOTE
Patient Name: Jeff Bass  : 1945    MRN: [id number]                              Today's Date: 4/3/2024       Admit Date: 3/29/2024    Visit Dx:     ICD-10-CM ICD-9-CM   1. Generalized abdominal pain  R10.84 789.07   2. CLL (chronic lymphocytic leukemia) with worsening lymphadenopathy in abdomen and pelvis  C91.10 204.10   3. Lactic acid increased  E87.20 276.2   4. Leukocytosis, unspecified type  D72.829 288.60   5. Thrombocytopenia  D69.6 287.5   6. 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     Patient Active Problem List   Diagnosis    Encounter for adjustment or management of vascular access device    Benign prostatic hyperplasia    Jon's syndrome    Hypertension    Hyperlipidemia    Hypogonadism in male    Vitamin D deficiency    Malignant neoplasm of prostate    Spinal stenosis of lumbar region with neurogenic claudication    Degeneration of lumbar intervertebral disc    Coronary arteriosclerosis    PVC (premature ventricular contraction)    History of colon polyps    Substernal goiter    Skin tag    Sensory hearing loss, bilateral    ED (erectile dysfunction) of organic origin    Constipation    GERD (gastroesophageal reflux disease)    Left inguinal hernia    Hemangioma of liver    Abnormal finding on thyroid function test    Primary osteoarthritis of right knee    OA (osteoarthritis) of knee    B12 deficiency    Lymphoma of spleen    Abdominal pain    Anemia associated with malignant neoplastic disease    Thrombocytopenia    Leukocytosis    Primary insomnia    Nausea    Elevated blood uric acid level     Past Medical History:   Diagnosis Date    Arthritis     Benign prostatic hyperplasia     FOLLOWED BY DR. VIVIEN PATEL    Biliary dyskinesia     Bunion of right foot     GREAT TOE    Bursitis of right hip 03/15/2018    CLL (chronic lymphocytic leukemia) 2016    FOLLOWED BY DR WALKER    Colon polyps     FOLLOWED BY DR. NEHA Xiong      Degeneration of lumbar intervertebral disc     Diverticulosis     Erectile dysfunction     Fracture of ankle 1975    GERD (gastroesophageal reflux disease)     Hallux valgus of left foot     Hyperlipidemia     MIXED    Hypertension     Inguinal hernia     Kidney stone 02/21/2009    SEEN AT [facility] ER    Knee swelling 2018    Localized, primary osteoarthritis     Lumbar radiculopathy     Lumbar stenosis     Osteoarthritis of right knee     Polyneuropathy     Prostate CA 03/2016    JACQUELYN 6, S/P XRT, FOLLOWED BY DR. VIVIEN PATEL, RADIATION SEEDS    PVC (premature ventricular contraction)     PT STATES WORKED UP YEARS AGO BY [facility] FOR POSSIBLE MURMUR - NO FOLLOW UP REQUIRED     RBBB     Sensory hearing loss, bilateral     Skin cancer     SQUAMOUS CELL CARCINOMA OF FACE    Substernal goiter     Thyromegaly 12/2016    ADMITTED TO [facility]    Vitamin D deficiency      Past Surgical History:   Procedure Laterality Date    BRONCHOSCOPY N/A 12/14/2016    Procedure: BRONCHOSCOPY WITH  BAL AND BIOPSY AND ENDOBRONCHIAL ULTRASOUND  AND NAVIGATION WITH BRUSHINGS AND BIOPSY AND BAL;  Surgeon: Pierre Manning MD;  Location: [facility] ENDOSCOPY;  Service:     COLONOSCOPY N/A 03/13/2019    5 MM SESSILE TUBULAR ADENOMA POLYP IN TRANSVERSE, MULTIPLE SMALL AND LARGE DIVERTICULA IN SIGMOID, RESCOPE IN 5 YRS, DR. NEHA HAM AT [facility]    COLONOSCOPY W/ POLYPECTOMY N/A 03/19/2015    3 TUBULAR ADENOMA POLYPS, 12 TUBULOVILLOUS POLYP RECTO-SIGMOID, DIVERTICULOSIS, RESCOPE IN 3 YRS, DR. NEHA HAM AT [facility]    EYE SURGERY Bilateral     CATARACT EXTRACTION WITH LENS IMPLANT, DR. GOVEA    FINGER NAIL SURGERY Right 2022    TORRES-PATEL    INGUINAL HERNIA REPAIR Left 11/02/2021    Procedure: LEFT OPEN INGUINAL HERNIA REPAIR;  Surgeon: Nixon Kolb MD;  Location: [facility] MAIN OR;  Service: General;  Laterality: Left;    PROSTATE RADIOACTIVE SEED IMPLANT N/A 09/06/2016    DR. HOA JARAMILLO AT [facility]    PROSTATE SURGERY N/A 03/11/2016     BIOPSY: ADENOCARCINOMA, DR. ZAID IBARRA    THYROID BIOPSY Bilateral 11/01/2017    NO B CELL OR T CELL LYMPHOMA, DONE AT [facility]    TOTAL KNEE ARTHROPLASTY Right 10/12/2022    Procedure: TOTAL KNEE ARTHROPLASTY;  Surgeon: Ran Rachel MD;  [place] [facility] [place] [facility] [place]  Service: Orthopedics;  Laterality: Right;    US GUIDED LYMPH NODE BIOPSY  4/1/2024    VENOUS ACCESS DEVICE (PORT) INSERTION Right 7/6/2023    Procedure: INSERTION VENOUS ACCESS DEVICE;  Surgeon: Nixon Kolb MD;  [place] [facility] [place] [facility] [place];  Service: General;  Laterality: Right;      General Information       Row Name 04/03/24 1301          Physical Therapy Time and Intention    Document Type therapy note (daily note)  -EM (r) JG (t) EM (c)     Mode of Treatment individual therapy;physical therapy  -EM (r) JG (t) EM (c)       Row Name 04/03/24 1301          General Information    Patient Profile Reviewed yes  -EM (r) JG (t) EM (c)     Existing Precautions/Restrictions fall  -EM (r) JG (t) EM (c)       Row Name 04/03/24 1301          Cognition    Orientation Status (Cognition) oriented x 4  -EM (r) JG (t) EM (c)       Row Name 04/03/24 1301          Safety Issues, Functional Mobility    Impairments Affecting Function (Mobility) balance;endurance/activity tolerance;strength  -EM (r) JG (t) EM (c)               User Key  (r) = Recorded By, (t) = Taken By, (c) = Cosigned By      Initials Name Provider Type    EM Mary Grace Alvarado, PT Physical Therapist    Abram Leblanc, REBECCA Student PT Student                   Mobility       Row Name 04/03/24 1301          Bed Mobility    Bed Mobility supine-sit  -EM (r) JG (t) EM (c)     Supine-Sit [place] (Bed Mobility) minimum assist (75% patient effort);1 person assist  -EM (r) JG (t) EM (c)     Assistive Device (Bed Mobility) bed rails;head of bed elevated  -EM (r) JG (t) EM (c)     Comment, (Bed Mobility) extra time needed  -EM (r) JG (t) EM (c)       Row Name 04/03/24 1301          Sit-Stand Transfer     Sit-Stand [place] (Transfers) minimum assist (75% patient effort);1 person assist;verbal cues  -EM (r) JG (t) EM (c)     Assistive Device (Sit-Stand Transfers) walker, front-wheeled  -EM (r) JG (t) EM (c)       Row Name 04/03/24 1301          Gait/Stairs (Locomotion)    [place] Level (Gait) contact guard;verbal cues  -EM (r) JG (t) EM (c)     Assistive Device (Gait) walker, front-wheeled  -EM (r) JG (t) EM (c)     Patient was able to Ambulate yes  -EM (r) JG (t) EM (c)     Distance in Feet (Gait) 20  -EM (r) JG (t) EM (c)     Deviations/Abnormal Patterns (Gait) stride length decreased;weight shifting decreased;gait speed decreased  -EM (r) JG (t) EM (c)     Bilateral Gait Deviations forward flexed posture  -EM (r) JG (t) EM (c)     Comment, (Gait/Stairs) ambulated within room. pt quickly requests to sit down secondary to fatigue  -EM (r) JG (t) EM (c)               User Key  (r) = Recorded By, (t) = Taken By, (c) = Cosigned By      Initials Name Provider Type    EM Mary Grace Alvarado, PT Physical Therapist    Abram Leblanc, PT Student PT Student                   Obj/Interventions       Row Name 04/03/24 1307          Balance    Balance Assessment sitting static balance;sitting dynamic balance;sit to stand dynamic balance;standing static balance;standing dynamic balance  -EM (r) JG (t) EM (c)     Static Sitting Balance standby assist  -EM (r) JG (t) EM (c)     Dynamic Sitting Balance standby assist  -EM (r) JG (t) EM (c)     Position, Sitting Balance unsupported;sitting edge of bed  -EM (r) JG (t) EM (c)     Sit to Stand Dynamic Balance minimal assist;1-person assist  -EM (r) JG (t) EM (c)     Static Standing Balance contact guard  -EM (r) JG (t) EM (c)     Dynamic Standing Balance contact guard  -EM (r) JG (t) EM (c)     Position/Device Used, Standing Balance supported;walker, front-wheeled  -EM (r) JG (t) EM (c)     Comment, Balance minor LOB at end of STS requiring ModAx1 to regain balance  -EM (r)  JG (t) EM (c)               User Key  (r) = Recorded By, (t) = Taken By, (c) = Cosigned By      Initials Name Provider Type    Mary Grace Emery, PT Physical Therapist    Abram Leblanc, PT Student PT Student                   Goals/Plan    No documentation.                  Clinical Impression       Row Name 04/03/24 1305          Pain    Pretreatment Pain Rating 0/10 - no pain  -EM (r) JG (t) EM (c)     Posttreatment Pain Rating 0/10 - no pain  -EM (r) JG (t) EM (c)       Row Name 04/03/24 1305          Plan of Care Review    Plan of Care Reviewed With patient  -EM (r) JG (t) EM (c)     Progress no change  -EM (r) JG (t) EM (c)     Outcome Evaluation Pt agreeable to therapy this date. Pt supine and alert when PT entered room. Supine -> sit with MinAx1 and use of handrails. Pt able to sit at EOB with SBA and no overt LOB. STS from EOB with MinAx1 and use of rwx. Pt with minor LOB at conclusion of STS, requiring ModAx1 to regain standing balance. Pt able to ambulate 20' within room this date with CGA and rwx. Pt quickly requests to sit down secondary to fatigue. Pt UIC at end of session. Continue to progress as indicated.  -EM (r) JG (t) EM (c)       Row Name 04/03/24 1305          Positioning and Restraints    Pre-Treatment Position in bed  -EM (r) JG (t) EM (c)     Post Treatment Position chair  -EM (r) JG (t) EM (c)     In Chair notified nsg;reclined;call light within reach;encouraged to call for assist;exit alarm on  -EM (r) JG (t) EM (c)               User Key  (r) = Recorded By, (t) = Taken By, (c) = Cosigned By      Initials Name Provider Type    Mary Grace Emery, PT Physical Therapist    Abram Leblanc, PT Student PT Student                   Outcome Measures       Row Name 04/03/24 1308 04/03/24 0858       How much help from another person do you currently need...    Turning from your back to your side while in flat bed without using bedrails? 4  -EM (r) JG (t) EM (c) 4  -MB    Moving from  lying on back to sitting on the side of a flat bed without bedrails? 3  -EM (r) JG (t) EM (c) 4  -MB    Moving to and from a bed to a chair (including a wheelchair)? 3  -EM (r) JG (t) EM (c) 3  -MB    Standing up from a chair using your arms (e.g., wheelchair, bedside chair)? 3  -EM (r) JG (t) EM (c) 3  -MB    Climbing 3-5 steps with a railing? 2  -EM (r) JG (t) EM (c) 2  -MB    To walk in hospital room? 3  -EM (r) JG (t) EM (c) 3  -MB    AM-PAC 6 Clicks Score (PT) 18  -EM (r) JG (t) 19  -MB    Highest Level of Mobility Goal 6 --> Walk 10 steps or more  -EM (r) JG (t) 6 --> Walk 10 steps or more  -MB      Row Name 04/03/24 1308          Functional Assessment    Outcome Measure Options AM-PAC 6 Clicks Basic Mobility (PT)  -EM (r) JG (t) EM (c)               User Key  (r) = Recorded By, (t) = Taken By, (c) = Cosigned By      Initials Name Provider Type    EM Mary Grace Alvarado, PT Physical Therapist    Lavinia Calles, RN Registered Nurse    Abram Leblanc, PT Student PT Student                                 Physical Therapy Education       Title: PT OT SLP Therapies (Done)       Topic: Physical Therapy (Done)       Point: Mobility training (Done)       Learning Progress Summary             Patient Acceptance, E, VU by EUGENIO at 4/3/2024 1308    Acceptance, E, VU by EUGENIO at 4/2/2024 1423                         Point: Home exercise program (Done)       Learning Progress Summary             Patient Acceptance, E, VU by EUGENIO at 4/2/2024 1423                         Point: Body mechanics (Done)       Learning Progress Summary             Patient Acceptance, E, VU by EUGENIO at 4/3/2024 1308    Acceptance, E, VU by EUGENIO at 4/2/2024 1423                         Point: Precautions (Done)       Learning Progress Summary             Patient Acceptance, E, VU by EUGENIO at 4/2/2024 1423                                         User Key       Initials Effective Dates Name Provider Type Discipline    EUGENIO 02/29/24 -  Abram Mccullough, PT Student  PT Student PT                  PT Recommendation and Plan  Planned Therapy Interventions (PT): balance training, gait training, bed mobility training, home exercise program, patient/family education, postural re-education, ROM (range of motion), stair training, strengthening, stretching, transfer training  Plan of Care Reviewed With: patient  Progress: no change  Outcome Evaluation: Pt agreeable to therapy this date. Pt supine and alert when PT entered room. Supine -> sit with MinAx1 and use of handrails. Pt able to sit at EOB with SBA and no overt LOB. STS from EOB with MinAx1 and use of rwx. Pt with minor LOB at conclusion of STS, requiring ModAx1 to regain standing balance. Pt able to ambulate 20' within room this date with CGA and rwx. Pt quickly requests to sit down secondary to fatigue. Pt UIC at end of session. Continue to progress as indicated.     Time Calculation:         PT Charges       Row Name 04/03/24 1309             Time Calculation    Start Time 1152  -EM (r) JG (t) EM (c)      Stop Time 1206  -EM (r) JG (t) EM (c)      Time Calculation (min) 14 min  -EM (r) JG (t)      PT Received On 04/03/24  -EM (r) JG (t) EM (c)      PT - Next Appointment 04/04/24  -EM (r) JG (t) EM (c)         Time Calculation- PT    Total Timed Code Minutes- PT 14 minute(s)  -EM (r) JG (t) EM (c)         Timed Charges    18811 - PT Therapeutic Activity Minutes 14  -EM (r) JG (t) EM (c)         Total Minutes    Timed Charges Total Minutes 14  -EM (r) JG (t)       Total Minutes 14  -EM (r) JG (t)                User Key  (r) = Recorded By, (t) = Taken By, (c) = Cosigned By      Initials Name Provider Type    EM Mary Grace Alvarado, PT Physical Therapist    Abram Leblanc, PT Student PT Student                  Therapy Charges for Today       Code Description Service Date Service Provider Modifiers Qty    57577431288  PT THERAPEUTIC ACT EA 15 MIN 4/2/2024 Abram Mccullough, PT Student GP 1    [id number]  PT EVAL MOD  COMPLEXITY 2 4/2/2024 Abram Mccullough, PT Student GP 1    10057789535 HC PT THERAPEUTIC ACT EA 15 MIN 4/3/2024 Abram Mccullough, PT Student GP 1            PT G-Codes  Outcome Measure Options: AM-PAC 6 Clicks Basic Mobility (PT)  AM-PAC 6 Clicks Score (PT): 18  PT Discharge Summary  Anticipated Discharge Disposition (PT): home with assist, home with home health (possible SNF, pending progress)    Abram Mccullough PT Student  4/3/2024

## 2024-04-03 NOTE — PLAN OF CARE
Goal Outcome Evaluation:  Plan of Care Reviewed With: patient        Progress: no change  Outcome Evaluation: Pt agreeable to therapy this date. Pt supine and alert when PT entered room. Supine -> sit with MinAx1 and use of handrails. Pt able to sit at EOB with SBA and no overt LOB. STS from EOB with MinAx1 and use of rwx. Pt with minor LOB at conclusion of STS, requiring ModAx1 to regain standing balance. Pt able to ambulate 20' within room this date with CGA and rwx. Pt quickly requests to sit down secondary to fatigue. Pt UIC at end of session. Continue to progress as indicated.      Anticipated Discharge Disposition (PT): home with assist, home with home health (possible SNF, pending progress)

## 2024-04-03 NOTE — NURSING NOTE
Patient was restarted on Rituxan last night at 1948. VSS. I bumped him up every 30 to 35 minutes till the Rituxan infused completely at 2200. VSS No fevers. The patient tolerated it well. Around 12 midnight the patient woke up more alert and said he felt much better. Labs were drawn and Pharmacy is monitoring them. WCTM The patient had a very good night over all.

## 2024-04-03 NOTE — PLAN OF CARE
Goal Outcome Evaluation:              Outcome Evaluation: Rasburicase given this shift, as well as Q6 albumin infusions. Pt tolerated all very well. Encouraged pt to sit on the edge of the bed for meals and sit up in the chair. Regular diet tolerated well. Next dose of Rituxan to begin tomorrow. Port CDI flushing and giving good blood return.

## 2024-04-03 NOTE — PROGRESS NOTES
Acute CLL lymphoma transition  Thromocytopenia  Chemo reaction      Review of Systems   Constitutional: Negative.         He says he is doing great. He say the food is wonderful  Having some loose stool from miralax  Urinating ok  Sleeping well.     Temp:  [96.5 °F (35.8 °C)-102.4 °F (39.1 °C)] 97.6 °F (36.4 °C)  Heart Rate:  [] 81  Resp:  [16-22] 16  BP: ()/(50-69) 111/59  I/O last 3 completed shifts:  In: 1350 [P.O.:400; I.V.:950]  Out: 200 [Urine:200]  I/O this shift:  In: 240 [P.O.:240]  Out: 150 [Urine:150]    Physical Exam  Cardiovascular:      Rate and Rhythm: Normal rate.   Pulmonary:      Effort: Pulmonary effort is normal.   Neurological:      General: No focal deficit present.      Mental Status: He is alert.   Psychiatric:         Mood and Affect: Mood normal.           Jon's syndrome    Hypertension    Lymphoma of spleen    Abdominal pain    Anemia associated with malignant neoplastic disease    Thrombocytopenia    Leukocytosis    Primary insomnia    Nausea    Elevated blood uric acid level    Following peripherally  Oncology pursuing chemo  Nephrology providing assistance    Jovani Ruiz MD

## 2024-04-03 NOTE — PROGRESS NOTES
[facility]  Progress note: 4/3/2024    Patient Identification:  Name:Jeff Bass  Age:78 y.o.  Sex: male  :  1945  MRN: [id number]           CC:  Tachycardia/SVT    Interval history:  Tachycardia resolved.  Blood pressure slightly low last night but improved this morning. Blood pressure was low yesterday after rituximab reaction treated with Solu-Cortef Benadryl Pepcid Demerol.  Subsequently was tachycardic with hypotension.  This subsequently improved and completed the infusion. Renal labs worse.He feels much better and no chest pain or dyspnea. Neph has stopped bystolic and added midodrine    Vital Signs:   Temp:  [96.5 °F (35.8 °C)-102.4 °F (39.1 °C)] 96.5 °F (35.8 °C)  Heart Rate:  [] 75  Resp:  [18-36] 18  BP: ()/() 99/65    Intake/Output Summary (Last 24 hours) at 4/3/2024 0812  Last data filed at 4/3/2024 0051  Gross per 24 hour   Intake 1350 ml   Output --   Net 1350 ml       Physical Examination:    General Appearance No acute distress   Neck No adenopathy, supple, trachea midline, no thyromegaly, no carotid bruit, no JVD   Lungs Clear to auscultation,respirations regular, even and unlabored   Heart Regular rhythm and normal rate, normal S1 and S2, no murmur, no gallop, no rub, no click   Chest wall No abnormalities observed   Abdomen Normal bowel sounds, no masses, no hepatomegaly, soft   Extremities Moves all extremities well, no edema, no cyanosis, no redness   Neurological Alert and oriented x 3     Lab Review:  Personally reviewed the labs, radiology imaging and other cardiac procedures.   Results from last 7 days   Lab Units 24  0632   SODIUM mmol/L 140  140   POTASSIUM mmol/L 4.8  4.8   CHLORIDE mmol/L 106  106   CO2 mmol/L 16.0*  16.0*   BUN mg/dL 46*  46*   CREATININE mg/dL 1.51*  1.51*   CALCIUM mg/dL 7.6*  7.6*   BILIRUBIN mg/dL 0.9   ALK PHOS U/L 69   ALT (SGPT) U/L 15   AST (SGOT) U/L 45*   GLUCOSE mg/dL 136*  136*     Results from last  7 days   Lab Units 03/29/24  1216 03/29/24  0913   HSTROP T ng/L 21 20     Results from last 7 days   Lab Units 04/03/24  0632 04/02/24  0543 04/01/24  1550   WBC 10*3/mm3 24.07* 27.52* 18.88*   HEMOGLOBIN g/dL 11.2* 10.1* 10.9*   HEMATOCRIT % 32.5* 29.8* 31.9*   PLATELETS 10*3/mm3 20* 52* 49*     Results from last 7 days   Lab Units 03/30/24  0257 03/29/24  0913   INR  1.30* 1.28*   APTT seconds 30.8  --      Medication Review:   Meds reviewed  Scheduled Meds:allopurinol, 300 mg, Oral, Daily  folic acid, 1 mg, Oral, Daily  nebivolol, 2.5 mg, Oral, Q24H  polyethylene glycol, 17 g, Oral, Daily  sodium chloride, 10 mL, Intravenous, Q12H  vitamin B-12, 1,000 mcg, Oral, Daily      Continuous Infusions:Pharmacy Consult,   Pharmacy Consult,   sodium chloride, 125 mL/hr, Last Rate: 125 mL/hr (04/03/24 0051)      I personally viewed and interpreted the patient's EKG/Telemetry data    Assessment and Plan  1.  SVT with history of nonsustained atrial fibrillation SVT.  Blood pressure improved this morning and bystolic on hold at least for now. Tachycardia seems to be limited at this point and remains on tele now off BBlk.    2.  Coronary disease with elevated coronary calcium score 229 and 2016.  No anginal symptoms but needs better heart rate control. No chest pain yesterday with the tachycardia  3.  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  Prechemo echo EF 60%, GLS -22% normal diastolic function for age.  Repeat echo 4/1/2024 with ejection fraction 53%, normal diastolic function, ascending aorta measuring 4 cm.  Overall unchanged.  4.  History of diastolic heart failure, appears compensated  5.  Acute renal insufficiency..  Recommendations per nephrology including BP med changes as above to avoid renal hypoperfusion.   6.  Severe thrombocytopenia, recommendations per oncology    Mini Garcia  4/3/615241:12 EDT  35min spent in reviewing records,  discussion and examination of the patient and discussion with other members of the patient's medical team.     Dictated utilizing Dragon dictation

## 2024-04-04 ENCOUNTER — TELEPHONE (OUTPATIENT)
Dept: ONCOLOGY | Facility: CLINIC | Age: 79
End: 2024-04-04
Payer: MEDICARE

## 2024-04-04 LAB
ANION GAP SERPL CALCULATED.3IONS-SCNC: 12.5 MMOL/L (ref 5–15)
ANION GAP SERPL CALCULATED.3IONS-SCNC: 13.1 MMOL/L (ref 5–15)
ANION GAP SERPL CALCULATED.3IONS-SCNC: 15.8 MMOL/L (ref 5–15)
ANION GAP SERPL CALCULATED.3IONS-SCNC: 25 MMOL/L (ref 5–15)
BLASTS NFR BLD MANUAL: 30 % (ref 0–0)
BUN SERPL-MCNC: 45 MG/DL (ref 8–23)
BUN SERPL-MCNC: 46 MG/DL (ref 8–23)
BUN SERPL-MCNC: 49 MG/DL (ref 8–23)
BUN SERPL-MCNC: 52 MG/DL (ref 8–23)
BUN/CREAT SERPL: 26.8 (ref 7–25)
BUN/CREAT SERPL: 32.3 (ref 7–25)
BUN/CREAT SERPL: 34.9 (ref 7–25)
BUN/CREAT SERPL: 36.2 (ref 7–25)
BURR CELLS BLD QL SMEAR: ABNORMAL
CALCIUM SPEC-SCNC: 7.7 MG/DL (ref 8.6–10.5)
CALCIUM SPEC-SCNC: 7.8 MG/DL (ref 8.6–10.5)
CALCIUM SPEC-SCNC: 7.8 MG/DL (ref 8.6–10.5)
CALCIUM SPEC-SCNC: 8 MG/DL (ref 8.6–10.5)
CHLORIDE SERPL-SCNC: 100 MMOL/L (ref 98–107)
CHLORIDE SERPL-SCNC: 102 MMOL/L (ref 98–107)
CHLORIDE SERPL-SCNC: 102 MMOL/L (ref 98–107)
CHLORIDE SERPL-SCNC: 99 MMOL/L (ref 98–107)
CO2 SERPL-SCNC: 16 MMOL/L (ref 22–29)
CO2 SERPL-SCNC: 21.5 MMOL/L (ref 22–29)
CO2 SERPL-SCNC: 23.2 MMOL/L (ref 22–29)
CO2 SERPL-SCNC: 23.9 MMOL/L (ref 22–29)
CREAT SERPL-MCNC: 1.27 MG/DL (ref 0.76–1.27)
CREAT SERPL-MCNC: 1.29 MG/DL (ref 0.76–1.27)
CREAT SERPL-MCNC: 1.61 MG/DL (ref 0.76–1.27)
CREAT SERPL-MCNC: 1.83 MG/DL (ref 0.76–1.27)
DEPRECATED RDW RBC AUTO: 47.9 FL (ref 37–54)
EGFRCR SERPLBLD CKD-EPI 2021: 36.8 ML/MIN/1.73
EGFRCR SERPLBLD CKD-EPI 2021: 43 ML/MIN/1.73
EGFRCR SERPLBLD CKD-EPI 2021: 56.1 ML/MIN/1.73
EGFRCR SERPLBLD CKD-EPI 2021: 57.1 ML/MIN/1.73
ERYTHROCYTE [DISTWIDTH] IN BLOOD BY AUTOMATED COUNT: 16.1 % (ref 12.3–15.4)
GLUCOSE SERPL-MCNC: 107 MG/DL (ref 65–99)
GLUCOSE SERPL-MCNC: 77 MG/DL (ref 65–99)
GLUCOSE SERPL-MCNC: 82 MG/DL (ref 65–99)
GLUCOSE SERPL-MCNC: 91 MG/DL (ref 65–99)
HCT VFR BLD AUTO: 24.7 % (ref 37.5–51)
HGB BLD-MCNC: 8.5 G/DL (ref 13–17.7)
LYMPHOCYTES # BLD MANUAL: 13.78 10*3/MM3 (ref 0.7–3.1)
LYMPHOCYTES NFR BLD MANUAL: 8 % (ref 5–12)
MAGNESIUM SERPL-MCNC: 1.6 MG/DL (ref 1.6–2.4)
MAGNESIUM SERPL-MCNC: 1.8 MG/DL (ref 1.6–2.4)
MAGNESIUM SERPL-MCNC: 1.9 MG/DL (ref 1.6–2.4)
MAGNESIUM SERPL-MCNC: 1.9 MG/DL (ref 1.6–2.4)
MCH RBC QN AUTO: 28.1 PG (ref 26.6–33)
MCHC RBC AUTO-ENTMCNC: 34.4 G/DL (ref 31.5–35.7)
MCV RBC AUTO: 81.8 FL (ref 79–97)
MONOCYTES # BLD: 2.45 10*3/MM3 (ref 0.1–0.9)
NEUTROPHILS # BLD AUTO: 5.21 10*3/MM3 (ref 1.7–7)
NEUTROPHILS NFR BLD MANUAL: 17 % (ref 42.7–76)
NRBC SPEC MANUAL: 2 /100 WBC (ref 0–0.2)
PHOSPHATE SERPL-MCNC: 2.4 MG/DL (ref 2.5–4.5)
PHOSPHATE SERPL-MCNC: 2.6 MG/DL (ref 2.5–4.5)
PHOSPHATE SERPL-MCNC: 3.1 MG/DL (ref 2.5–4.5)
PHOSPHATE SERPL-MCNC: 3.5 MG/DL (ref 2.5–4.5)
PLAT MORPH BLD: NORMAL
PLATELET # BLD AUTO: 33 10*3/MM3 (ref 140–450)
PMV BLD AUTO: 9.4 FL (ref 6–12)
POLYCHROMASIA BLD QL SMEAR: ABNORMAL
POTASSIUM SERPL-SCNC: 3.4 MMOL/L (ref 3.5–5.2)
POTASSIUM SERPL-SCNC: 3.4 MMOL/L (ref 3.5–5.2)
POTASSIUM SERPL-SCNC: 3.5 MMOL/L (ref 3.5–5.2)
POTASSIUM SERPL-SCNC: 3.6 MMOL/L (ref 3.5–5.2)
POTASSIUM SERPL-SCNC: 4.4 MMOL/L (ref 3.5–5.2)
RBC # BLD AUTO: 3.02 10*6/MM3 (ref 4.14–5.8)
SMUDGE CELLS BLD QL SMEAR: ABNORMAL
SODIUM SERPL-SCNC: 136 MMOL/L (ref 136–145)
SODIUM SERPL-SCNC: 139 MMOL/L (ref 136–145)
SODIUM SERPL-SCNC: 139 MMOL/L (ref 136–145)
SODIUM SERPL-SCNC: 140 MMOL/L (ref 136–145)
URATE SERPL-MCNC: 4.6 MG/DL (ref 3.4–7)
URATE SERPL-MCNC: 5.5 MG/DL (ref 3.4–7)
URATE SERPL-MCNC: 6.1 MG/DL (ref 3.4–7)
URATE SERPL-MCNC: 6.2 MG/DL (ref 3.4–7)
VARIANT LYMPHS NFR BLD MANUAL: 15 % (ref 0–5)
VARIANT LYMPHS NFR BLD MANUAL: 30 % (ref 19.6–45.3)
WBC NRBC COR # BLD AUTO: 30.63 10*3/MM3 (ref 3.4–10.8)

## 2024-04-04 PROCEDURE — 97530 THERAPEUTIC ACTIVITIES: CPT

## 2024-04-04 PROCEDURE — 84100 ASSAY OF PHOSPHORUS: CPT | Performed by: INTERNAL MEDICINE

## 2024-04-04 PROCEDURE — 85025 COMPLETE CBC W/AUTO DIFF WBC: CPT | Performed by: INTERNAL MEDICINE

## 2024-04-04 PROCEDURE — 80048 BASIC METABOLIC PNL TOTAL CA: CPT | Performed by: INTERNAL MEDICINE

## 2024-04-04 PROCEDURE — P9047 ALBUMIN (HUMAN), 25%, 50ML: HCPCS | Performed by: INTERNAL MEDICINE

## 2024-04-04 PROCEDURE — 85007 BL SMEAR W/DIFF WBC COUNT: CPT | Performed by: INTERNAL MEDICINE

## 2024-04-04 PROCEDURE — 25810000003 SODIUM CHLORIDE 0.9 % SOLUTION 250 ML FLEX CONT: Performed by: INTERNAL MEDICINE

## 2024-04-04 PROCEDURE — 83735 ASSAY OF MAGNESIUM: CPT | Performed by: INTERNAL MEDICINE

## 2024-04-04 PROCEDURE — 99232 SBSQ HOSP IP/OBS MODERATE 35: CPT

## 2024-04-04 PROCEDURE — 25010000002 ALBUMIN HUMAN 25% PER 50 ML: Performed by: INTERNAL MEDICINE

## 2024-04-04 PROCEDURE — 25810000003 SODIUM CHLORIDE 0.9 % SOLUTION: Performed by: INTERNAL MEDICINE

## 2024-04-04 PROCEDURE — 84550 ASSAY OF BLOOD/URIC ACID: CPT | Performed by: INTERNAL MEDICINE

## 2024-04-04 PROCEDURE — 25010000002 RASBURICASE PER 0.5 MG: Performed by: INTERNAL MEDICINE

## 2024-04-04 PROCEDURE — 25010000002 HYDROCORTISONE SOD SUC (PF) 100 MG RECONSTITUTED SOLUTION: Performed by: INTERNAL MEDICINE

## 2024-04-04 PROCEDURE — 25010000002 RITUXIMAB-ARRX 500 MG/50ML SOLUTION 50 ML VIAL: Performed by: INTERNAL MEDICINE

## 2024-04-04 PROCEDURE — 84132 ASSAY OF SERUM POTASSIUM: CPT | Performed by: INTERNAL MEDICINE

## 2024-04-04 PROCEDURE — 25010000002 DIPHENHYDRAMINE PER 50 MG: Performed by: INTERNAL MEDICINE

## 2024-04-04 RX ORDER — ACETAMINOPHEN 325 MG/1
650 TABLET ORAL ONCE
Status: COMPLETED | OUTPATIENT
Start: 2024-04-04 | End: 2024-04-04

## 2024-04-04 RX ORDER — MEPERIDINE HYDROCHLORIDE 25 MG/ML
25 INJECTION INTRAMUSCULAR; INTRAVENOUS; SUBCUTANEOUS
Status: DISCONTINUED | OUTPATIENT
Start: 2024-04-04 | End: 2024-04-06 | Stop reason: HOSPADM

## 2024-04-04 RX ORDER — POTASSIUM CHLORIDE 750 MG/1
40 TABLET, FILM COATED, EXTENDED RELEASE ORAL EVERY 4 HOURS
Status: COMPLETED | OUTPATIENT
Start: 2024-04-04 | End: 2024-04-04

## 2024-04-04 RX ORDER — DIPHENHYDRAMINE HYDROCHLORIDE 50 MG/ML
50 INJECTION INTRAMUSCULAR; INTRAVENOUS AS NEEDED
Status: DISCONTINUED | OUTPATIENT
Start: 2024-04-04 | End: 2024-04-06 | Stop reason: HOSPADM

## 2024-04-04 RX ORDER — SODIUM CHLORIDE 9 MG/ML
20 INJECTION, SOLUTION INTRAVENOUS ONCE
Status: COMPLETED | OUTPATIENT
Start: 2024-04-04 | End: 2024-04-04

## 2024-04-04 RX ORDER — FAMOTIDINE 10 MG/ML
20 INJECTION, SOLUTION INTRAVENOUS AS NEEDED
Status: DISCONTINUED | OUTPATIENT
Start: 2024-04-04 | End: 2024-04-06 | Stop reason: HOSPADM

## 2024-04-04 RX ADMIN — MIDODRINE HYDROCHLORIDE 5 MG: 5 TABLET ORAL at 12:54

## 2024-04-04 RX ADMIN — SODIUM CHLORIDE 20 ML/HR: 9 INJECTION, SOLUTION INTRAVENOUS at 12:54

## 2024-04-04 RX ADMIN — SEVELAMER CARBONATE 800 MG: 800 TABLET, FILM COATED ORAL at 18:21

## 2024-04-04 RX ADMIN — ALBUMIN (HUMAN) 25 G: 0.25 INJECTION, SOLUTION INTRAVENOUS at 19:26

## 2024-04-04 RX ADMIN — ACETAMINOPHEN 325MG 650 MG: 325 TABLET ORAL at 12:51

## 2024-04-04 RX ADMIN — Medication 10 ML: at 22:20

## 2024-04-04 RX ADMIN — SODIUM BICARBONATE 150 MEQ: 84 INJECTION, SOLUTION INTRAVENOUS at 12:15

## 2024-04-04 RX ADMIN — Medication 5 MG: at 22:55

## 2024-04-04 RX ADMIN — SODIUM CHLORIDE 3 MG: 9 INJECTION, SOLUTION INTRAVENOUS at 10:23

## 2024-04-04 RX ADMIN — ALBUMIN (HUMAN) 25 G: 0.25 INJECTION, SOLUTION INTRAVENOUS at 08:05

## 2024-04-04 RX ADMIN — FOLIC ACID 1 MG: 1 TABLET ORAL at 09:15

## 2024-04-04 RX ADMIN — ALLOPURINOL 300 MG: 300 TABLET ORAL at 09:15

## 2024-04-04 RX ADMIN — SEVELAMER CARBONATE 800 MG: 800 TABLET, FILM COATED ORAL at 09:15

## 2024-04-04 RX ADMIN — Medication 10 ML: at 09:18

## 2024-04-04 RX ADMIN — POTASSIUM CHLORIDE 40 MEQ: 750 TABLET, EXTENDED RELEASE ORAL at 18:20

## 2024-04-04 RX ADMIN — RITUXIMAB-ARRX 430 MG: 500 INJECTION, SOLUTION INTRAVENOUS at 13:32

## 2024-04-04 RX ADMIN — HYDROCORTISONE SODIUM SUCCINATE 100 MG: 100 INJECTION, POWDER, FOR SOLUTION INTRAMUSCULAR; INTRAVENOUS at 12:51

## 2024-04-04 RX ADMIN — ALBUMIN (HUMAN) 25 G: 0.25 INJECTION, SOLUTION INTRAVENOUS at 14:05

## 2024-04-04 RX ADMIN — POTASSIUM CHLORIDE 40 MEQ: 750 TABLET, EXTENDED RELEASE ORAL at 12:51

## 2024-04-04 RX ADMIN — MIDODRINE HYDROCHLORIDE 5 MG: 5 TABLET ORAL at 18:21

## 2024-04-04 RX ADMIN — POTASSIUM CHLORIDE 40 MEQ: 750 TABLET, EXTENDED RELEASE ORAL at 22:19

## 2024-04-04 RX ADMIN — ALBUMIN (HUMAN) 25 G: 0.25 INJECTION, SOLUTION INTRAVENOUS at 01:15

## 2024-04-04 RX ADMIN — DIPHENHYDRAMINE HYDROCHLORIDE 50 MG: 50 INJECTION, SOLUTION INTRAMUSCULAR; INTRAVENOUS at 12:52

## 2024-04-04 RX ADMIN — SEVELAMER CARBONATE 800 MG: 800 TABLET, FILM COATED ORAL at 12:54

## 2024-04-04 RX ADMIN — POTASSIUM CHLORIDE 40 MEQ: 750 TABLET, EXTENDED RELEASE ORAL at 09:15

## 2024-04-04 RX ADMIN — Medication 1000 MCG: at 09:15

## 2024-04-04 RX ADMIN — SODIUM BICARBONATE 150 MEQ: 84 INJECTION, SOLUTION INTRAVENOUS at 09:17

## 2024-04-04 RX ADMIN — MIDODRINE HYDROCHLORIDE 5 MG: 5 TABLET ORAL at 09:18

## 2024-04-04 RX ADMIN — SODIUM BICARBONATE 150 MEQ: 84 INJECTION, SOLUTION INTRAVENOUS at 02:58

## 2024-04-04 NOTE — PLAN OF CARE
Goal Outcome Evaluation:           Progress: no change  Outcome Evaluation: Rasburicase infusion given. Rituxan given, tolerated well. Cardiac monitor in place, see chart for readings. Albumin infused per order. Pt complaints of burning with urination, unable to obtain UA due to urine urgency. Encouraged patient to eat meals in the chair this shift. Port CDI, flushing and infusing well. R PIV placed due to multiple infusions being incompatible. Regular diet tolerated well. Will continue to monitor for the remainder of my shift.

## 2024-04-04 NOTE — PROGRESS NOTES
LOS: 6 days   Patient Care Team:  Jovani Salomon MD as PCP - General (Internal Medicine)  Gerry Schroeder MD as Consulting Physician (Hematology and Oncology)  Stephanie Toledo PA as Referring Physician (Physician Assistant)    Chief Complaint: Abdomen pain.  Recurrent lymphoma who is splenomegaly and progressive retroperitoneal lymphadenopathy.    Interval History:  On 3/30/2024  Abdomen pain is reasonably controlled severity 8/10.  Had a good bowel movement.  Afebrile.    3/31/2024  Abdomen pain is controlled.  Patient is afebrile.  No nausea vomiting.  Worsening leukocytosis.   On 04/01/2024 the patient feels very fatigued and tired with no appetite, lesser abdominal pain. Spleen remains enlarged but nontender. He remains with low grade temperature and profuse perspiration. No cough or shortness of breath. His lymph node biopsy was performed a few minutes ago from the right groin. PET scan to be done tomorrow morning. The patient wants to go home today.  On 04/02/2024 the patient feels very fatigued and tired, no appetite, perspiration profusely, low grade temperature. Minimal if any abdominal pain. PET scan was done this morning, the findings are very striking and to be reviewed below. He has had some bowel activity, proper urinary flow once that the port was opened and receiving IV fluids abundantly in preparation for treatment today.    On 04/03/2024 the patient had significant reaction to infusion of Rituxan yesterday including fevers and chills. We had to give him Solu-Cortef and naprosyn and the symptoms improved. This morning he feels dramatically better and he is hungry. He has no abdominal pain, nausea or vomiting. Urinary output is abundant. Uric acid is going up, potassium is 5 and he will require reassessment in regard further continuation of Rituxan that probably will be postponed until tomorrow. I am going to go ahead and consult the nephrology team to help us out in regard to tumor lysis  management.    On 04/04/2024 the patient has had a quiet night with good appetite last night and good bowel movement today. He had a good breakfast. He has no abdominal pain, nausea or vomiting. Proper urination. He has not had any fever or chills. Today his clinical examination will be reviewed below. Laboratory parameters will be discussed below.          HISTORY OF PRESENT ILLNESS:  The patient is a 78 y.o. 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  Patient reports he was at baseline condition, and yesterday afternoon started having abdominal pain around umbilical/lower abdomen area associated with nausea.  Patient reports abdomen pain is constant.  He also reports having poor appetite since this past Sunday which is 5 days ago.  He denies low fever sweating or chills.       Patient also reported generalized weakness in the past 24 hours also and did not drink either too much at all.    Vital Signs:  Temp:  [97 °F (36.1 °C)-98.4 °F (36.9 °C)] 97.3 °F (36.3 °C)  Heart Rate:  [69-81] 69  Resp:  [16] 16  BP: (105-112)/(55-59) 107/58    Intake/Output Summary (Last 24 hours) at 4/4/2024 1203  Last data filed at 4/4/2024 0912  Gross per 24 hour   Intake 720 ml   Output 325 ml   Net 395 ml       Physical Exam:  Eyes and oral mucosa's were normal. No peripheral adenopathy in neck or axillas, lymph node in the right groin measuring 3 cm in size, mildly tender because of just recent biopsy. Left groin no lymph node available. Abdomen shows spleen below the left costal margin almost 15 cm below. No liver enlargement. No abdominal pain. No tenderness. His lungs were clear, his heart was regular with no gallops, murmurs or rubs. His skin was perspiring abundantly and moist. He was awake,  alert, oriented in time and place.     On 04/02/2024 the clinical examination today is no different than what it was yesterday. The spleen remains enlarged, large similar to what it was yesterday. The right groin surgical site is healing well. No other new findings.     Eyes and oral mucosa's were normal, no cervical or axillary adenopathy, lungs were clear, heart was regular, abdomen the spleen was lesser in size, almost 1/2 of the size it was yesterday with no tenderness. No liver enlargement. No abdominal masses otherwise. Area of lymph node biopsy in the right groin is healing. He was awake, alert, oriented in time and place, carry normal conversation.    Stable vital signs. Eyes, skin and mucosa's were normal. Port site without any changes, lungs were clear, heart was regular, abdomen was soft. Spleen palpable 7 cm below the left costal margin, nontender, no liver enlargement. Right inguinal surgical site biopsy site well healed with no hematoma formation or pain. He was awake, alert, oriented in time and place. Very fatigued.       Results Review:   CBC:      Lab 04/04/24  0605 04/03/24  0632 04/02/24  0543 04/01/24  1550 03/31/24  0539 03/30/24  0257   WBC 30.63* 24.07* 27.52* 18.88* 26.50* 16.87*   HEMOGLOBIN 8.5* 11.2* 10.1* 10.9* 10.7* 10.1*   HEMATOCRIT 24.7* 32.5* 29.8* 31.9* 31.4* 30.0*   PLATELETS 33* 20* 52* 49* 64* 65*   NEUTROS ABS 5.21 20.68* 9.74* 6.99 6.63 3.54   EOS ABS  --   --   --  0.19  --  0.34   MCV 81.8 83.1 83.5 83.1 82.4 80.9     CMP:        Lab 04/04/24  0605 04/04/24  0014 04/03/24  1807 04/03/24  1216 04/03/24  0632 04/02/24  1931 04/02/24  0543 04/01/24  1550 03/31/24  0539 03/31/24  0539 03/30/24  1145 03/30/24  0257 03/29/24  1216 03/29/24  0913   SODIUM 139 136 137 136 140  140   < > 140 138  --  132*  --  136   < > 131*   POTASSIUM 3.6 4.4 3.9 4.6 4.8  4.8   < > 3.8  3.8 3.4*  --  3.8   < > 3.5   < > 3.3*   CHLORIDE 102 102 100 103 106  106   < > 107 100  --  97*  --  99   <  > 91*   CO2 23.9 21.5* 17.2* 14.9* 16.0*  16.0*   < > 20.0* 21.0*  --  18.2*  --  23.0   < > 21.8*   ANION GAP 13.1 12.5 19.8* 18.1* 18.0*  18.0*   < > 13.0 17.0*  --  16.8*  --  14.0   < > 18.2*   BUN 52* 49* 49* 48* 46*  46*   < > 24* 23  --  22  --  21   < > 23   CREATININE 1.61* 1.83* 1.76* 1.56* 1.51*  1.51*   < > 0.75* 0.84  --  0.85  --  0.96   < > 0.94   EGFR 43.0* 36.8* 38.6* 44.6* 46.4*  46.4*   < > 91.2 88.2  --  87.8  --  79.9   < > 81.9   GLUCOSE 82 107* 166* 161* 136*  136*   < > 89 73  --  82  --  77   < > 98   CALCIUM 7.8* 7.7* 7.6* 7.7* 7.6*  7.6*   < > 7.7* 8.5*  --  8.2*  --  8.1*   < > 9.0   MAGNESIUM 1.9 1.9 2.0 2.0 1.7   < >  --   --   --  1.8  --   --    < > 1.7   PHOSPHORUS 3.1 3.5 3.9 4.4 5.0*   < > 1.9* 2.4*   < >  --   --   --   --   --    TOTAL PROTEIN  --   --   --   --  3.8*  --  3.9* 4.5*  --  4.7*  --  4.6*  --  5.6*   ALBUMIN  --   --   --   --  2.5*  --  2.8* 3.0*  --  3.2*  --  3.2*  --  3.8   GLOBULIN  --   --   --   --  1.3  --  1.1 1.5  --   --   --   --   --  1.8   ALT (SGPT)  --   --   --   --  15  --  14 15  --  14  --  16  --  18   AST (SGOT)  --   --   --   --  45*  --  26 26  --  29  --  26  --  42*   BILIRUBIN  --   --   --   --  0.9  --  1.3* 1.5*  --  1.2  --  1.3*  --  1.6*   INDIRECT BILIRUBIN  --   --   --   --   --   --   --   --   --  0.7  --  0.8  --   --    BILIRUBIN DIRECT  --   --   --   --   --   --   --   --   --  0.5*  --  0.5*  --   --    ALK PHOS  --   --   --   --  69  --  51 53  --  53  --  56  --  74   LIPASE  --   --   --   --   --   --   --   --   --   --   --   --   --  8*    < > = values in this interval not displayed.     LDH   Date Value Ref Range Status   04/03/2024 >2,500 (H) 135 - 225 U/L Final     Uric Acid   Date Value Ref Range Status   04/04/2024 6.2 3.4 - 7.0 mg/dL Final     Lab Results   Component Value Date    IRON 34 (L) 03/30/2024    TIBC 171 (L) 03/30/2024    FERRITIN 801.00 (H) 03/30/2024     Lab Results   Component Value Date     FOLATE 6.02 03/30/2024     Lab Results   Component Value Date    RANUYTNX18 437 03/30/2024       Results from last 7 days   Lab Units 03/29/24  1216 03/29/24  0913   HSTROP T ng/L 21 20     Results from last 7 days   Lab Units 03/30/24  0257 03/29/24  0913   INR  1.30* 1.28*   APTT seconds 30.8  --          Results from last 7 days   Lab Units 04/04/24  0605   MAGNESIUM mg/dL 1.9         Patient Name: MAC THOMPSON   Accession #: [id number]     [facility]   [address], [address]   [place], [place]  [address]       CLINICAL HISTORY:   78 year old male with a history of CLL     SPECIMEN SOURCE:   BLOOD ([id number])       FINAL DIAGNOSIS:   LEUKEMIA/LYMPHOMA PANEL     INTERPRETATION     CD5+ MONOCLONAL B CELL PROCESS, CONSISTENT WITH HISTORICAL LYMPHOMA         PATHOLOGIST COMMENT:   The B cell lymphoma comprises 72.5% of peripheral blood cellularity.       Electronically Signed Out on 4/1/2024      KETAN VARGAS MD    [facility]/4/1/2024  Interpretation performed at:   [facility]   [address], [address]   [place]  [address]         Assessment:  *.  History of SLL status post Rituxan plus Bendamustine, and had Jon's transformation in 2023 for which he had 5 cycles Rituxan plus CHOP with the good clinical response.     *.  Abdomen pain new onset for the past 2 days.  Patient also feels malaise nausea for about 5 days.  Denies fever chills, he did have mild sweatling but not drenching sweating.  Has poor appetite for the past 4 5 days, no significant weight loss.  Patient was seen by general surgeon Dr. Witt, no acute abdomen or indication for surgical intervention.  CT scan for abdomen pelvis today reported significant worsening  Splenomegaly, retroperitoneal lymphadenopathy in the pelvic lymphadenopathy.  Laboratory study also showed newly developed leukocytosis, lymphocytosis, neutropenia, and thrombocytopenia worsening anemia.  I think this patient has disease progression  from his lymphoma.  I recommended following: ultrasound-guided right inguinal lymph node biopsy for comprehensive study to distinguish of recurrent diffuse large B-cell lymphoma versus progression of SLL/CLL, obtain PET scan as inpatient, and also subsequently start first cycle chemotherapy as inpatient.  With the patient having symptoms and significant disease progression, I do not think arrange outpatient chemotherapy is in the best interest of this patient because of the elevated time for biopsy, PET scan and initiation of chemotherapy.    Will also check laboratory studies for LDH and uric acid.  This patient also has leukocytosis/lymphocytosis, I also recommended to have peripheral blood for flow cytometry study.  This also will help distinguish between CLL and diffuse large B-cell lymphoma.  I think this patient may have leukemia phase of the diffuse large B-cell lymphoma.  3/29/2024 enormously elevated LDH > 2500.  This is secondary to his lymphoma.  3/30/2024 pain is better controlled severity 4/10.   3/31/2024 abdomen pain is controlled.  Worsening leukocytosis.  Peripheral blood smear reviewed, they are significant abnormal mononuclear cells, the diameter is about 3-4 times larger than the erythrocytes, many of those cells having 4 or 5 nucleoli within the nucleus, the chromatin is much less condensed.  And I can see some of those cells having dividing nucleus, however they do not look like typical blasts.  There are also typical small lymphocytes with condensed chromatin.  I explained to patient, he has leukemia phase of aggressive lymphoma relapse.  That is why I think this patient definitely needs a biopsy.    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 The similar picture that he has back last  year in 2023 when he had exactly the same clinical circumstances and he improved dramatically with chemotherapy administration using CHOP and Rituxan.     At this point I do believe that the patient needs to proceed as follows:    1. He will require initiation of Rituxan administration tomorrow and once a week.  2. He will receive IV fluids today normal saline 125 cc/hr along with allopurinol 300 mg orally everyday.   3. He will repeat uric acid, LDH, CMP on a regular basis on a daily basis.  4. We will require continuation of monitoring his pathological analysis of the lymph node.   5. The flow cytometry from his peripheral blood documents a monoclonal population of cells CD5 positive encompassing 75% of the events that has been posted. This documents that the so called monocytes are large cell lymphoid malignant cells consistent with his Jon's transformation and going along with his LDH.   6. Once that the patient leaves the hospital probably by this Thursday or Friday and once that we have proper monitoring of tumor lysis syndrome the patient will require continuation of Rituxan in the office on a weekly basis and very likely incorporation of new modality of therapy to him including the consideration of new monoclonal antibodies to deliver care for him.     I discussed this with him, discussed this with nursing and discussed this with Pathologist, Dr. Rashid at [facility] today.     On 04/02/2024 I discussed with the patient the findings on his PET scan that ae very striking. His spleen is huge with very significant SUV activity higher than ever before and also he has a line of retroperitoneal adenopathy that goes all of the way down into the right scrotum. There is minimal mediastinal adenopathy. Liver anatomy was normal. No other sites of disease including no abnormal bone uptake.     The biopsy of the lymph node in the right groin is still pending. This will be available hopefully tomorrow.     On the  basis that he has high LDH and his persistent symptomatology it is very obvious that this patient has a diffuse large cell non-Hodgkin's lymphoma coming from the transformation from his CLL as discussed last year. The patient needs to be treated today. Given his high risk of tumor lysis syndrome we will split the dose of Rituxan with 1/2 of the dose today, 1/2 of the dose tomorrow and he will require frequent monitoring of his chemistry profile including CMP, phosphorus, uric acid and LDH. If the uric acid or the phosphorus or the potassium goes up he will require immediate assessment and he will require rasburicase as well discussed with pharmacist. His uric acid today is appropriate. His LDH remains above 2500.     Obviously future plans will require to be made once that he is ready for discharge and this will be further discussed with him including continuation of Rituxan administration and consideration of second line of therapy through CAR T-cell therapy that could be discussed at the [facility].     On 04/03/2024 the patient tolerated the day before 1/2 of the dose of Rituxan with a lot of inconveniences including febrile illness with temperature going as high as 102 associated with chills and fever requiring hydrocortisone  mg. He finally settled down. He was able to complete the first half of the infusion of Rituxan. Today his creatinine has gone up to 1.5, his uric acid has increased, his phosphorus level has mildly increased, potassium level mildly increased. I would prefer for the patient to postpone any further Rituxan administration until tomorrow. I went ahead and increased his IV fluid rate to 150 cc/hr and I went ahead and called the nephology team to help us out with the management of electrolytes, calcium, potassium, phosphorus and so forth in this kind of setting. I discussed with PharmacistAlina, the fact that I will postpone the Rituxan administration until tomorrow and it  is perfectly fine from this point of view.    He will be premedicated tomorrow with hydrocortisone and Tylenol.     On 04/04/2024 the patient was ready to proceed with Rituxan administration leftover from the induction treatment a couple of days ago. His creatinine is 1.6, his potassium and phosphorus levels are okay, uric acid is under better control and we will proceed with treatment in that way. He will be premedicated with hydrocortisone, Benadryl and Tylenol and hopefully he will not have any fever or chills during the infusion time.    Planning to continue monitoring his laboratory parameters, maybe plan to go home tomorrow or Saturday.          *.  Worsening anemia and newly developed thrombocytopenia.  This is due to lymphoma progression.  Nevertheless I recommended to obtain iron study ferritin iron profile, together with B12 and folate.  Will also check LDH.  Patient does have elevated total bilirubin.  Lab study 3/30/2024 hemoglobin 10.1.  Ferritin 801, free iron 34 iron saturation 20%, B12 at 437 and folate 6.02 ng/mL.  No iron deficiency, fits with inflammatory condition related to his lymphoma.  Nevertheless low normal B12 and folate level, I think patient would benefit from oral supplement for both with no apparent side effects.  3/31/2024 hemoglobin 10.7.  On 04/02/2024 the hemoglobin remains stable, unchanged, no notion of blood loss, no notion of hemolysis.    On 04/03/2024 hemoglobin remains stable, no notion of blood loss.     On 04/04/2024 the hemoglobin is lower related to the large volume of IV fluids provided to help out with proper hydration and proper creatinine clearance. Right now he has no need for blood products. He probably will require diuresis tomorrow. He continues being seen by nephrology and we appreciate, Bud Frost, input very much.       *.  Insomnia.  Patient reports poor sleeping quality, especially in the hospital.  Patient was taking melatonin at home not really  helping.  I recommended to start the patient Ambien as needed.   On 04/02/2024 the insomnia is the result of his disease process and his worries and stressors. We could deliver a low dose of Xanax if necessary.    On 04/03/2024 in spite of all of the fevers, chills and issues pertinent to Rituxan administration the patient was able to have a night of sleep that was decent. Hopefully now he will start to eat, he will improve this situation and he will have a quiet night tonight.     On 04/04/2024 he had a good night of sleep. He will proceed with treatment today. Hopefully he will have a quiet night the rest of the day after the infusion is completed.       *.  Hypokalemia.  Management per primary team and normalized.  On 04/03/2024 given the evidence of tumor lysis syndrome with an LDH above 2500, uric acid elevation, potassium elevation, phosphorus elevation we will ask nephrology team to see him.     On 04/04/2024 potassium level is appropriate at this time. No evidence of hyperkalemia or hypokalemia.      *.  Elevated uric acid level.  This is related to his progressive lymphoma.  Uric acid 9.7 on 3/29/2024.  I recommend to start allopurinol to decrease uric acid and also preventing tumor lysis syndrome, expecting patient will have chemotherapy in the next few days.  3/31/2024 normalized uric acid 6.4.   On 04/02/2024 uric acid is in much better shape today than what it was 2 days ago. Allopurinol has been ongoing, rasburicase could be utilized in case of need after proper testing today.    On 04/03/2024 I went ahead and ordered rasburicase for this patient to be given to today and probably he will need another dose tomorrow depending on the uric acid level tomorrow morning. As posted above the patient will delay further Rituxan administration for tomorrow.    On 04/04/2024 the patient will receive rasburicase today another dose of 3 mg IV and he will proceed with his Rituxan administration. We will continue  monitoring numbers as early as tomorrow.       PLAN:   I discussed on 04/02/2024 the patient will split the dose of Rituxan today in 1/2 and tomorrow the other 1/2 given the high potential for tumor lysis syndrome given the findings on his PET scan.    We will monitor electrolytes, uric acid and LDH every 6 hours or so and rasburicase could be included in his regimen of medicine if uric acid goes high.    I reviewed with him his PET scan in the PAC system [facility], explained to him the findings and the notion of very significant uptake in the spleen that is very dramatic. I pointed out to the patient many months ago that he will require a splenectomy that he refused to have, now we have the consequences of what we see and we have to live with this. Obviously at this point the patient needs to have systemic therapy, cool off the process and then strong consideration for some other modality of therapy as a form of salvage treatment.    On 04/02/2024 I discussed the case with pharmacist in detail.    On 04/02/2024 as posted above we postponed further Rituxan administration until tomorrow. We will ask the nephrology team to help us out to control the tumor lysis syndrome. He had more IV fluids ongoing today and he will require calcium supplementation, close monitoring of potassium, phosphorus and uric acid. Rasburicase will be given to him at the dose of 3 mg and discussed with pharmacist.    On the other hand the patient feels better, he is hungry and his spleen is 1/2 of the size of what it was yesterday. That is encouraging.     On 04/03/2024 we have the pathological analysis of the lymph node biopsy of the right groin. This analysis is not concordant with the clinical picture that the patient has. Unfortunately what I think the pathology sees, another thing is the clinical situation of the patient and that is not surprising, similar situation happened during the development of his Jon's  transformation last year. The pathology was telling us something, the patient was telling us something completely different.     On 04/04/2024 the patient requested for me to talk with his daughter by video telephone. I will proceed with this today. Compilation of that conversation will be discussed with the patient tomorrow.        Spoke with pharmacist staff today.      Gerry Schroeder MD  04/04/24  12:03 EDT

## 2024-04-04 NOTE — TELEPHONE ENCOUNTER
Returned call to patient's daughter from  left on my direct line. Caught up on hospital stay and updates regarding PET and Lymph Node biopsy, treatment now, subsequent side effects, and future possibilities outpatient. Pt's daughter v/u and is looking forward to a call from Dr. Schroeder this afternoon. Anneliese Torres RN

## 2024-04-04 NOTE — PROGRESS NOTES
LOS: 6 days   Patient Care Team:  Jovani Salomon MD as PCP - General (Internal Medicine)  Gerry Schroeder MD as Consulting Physician (Hematology and Oncology)  Stephanie Toledo PA as Referring Physician (Physician Assistant)    Chief Complaint: follow up SVT, tachycardia     Interval History: He was resting in bed. Patient denies chest pain or discomfort, palpitations, or shortness of breath. He had a brief episode of SVT this morning, he was asymptomatic.     Vital Signs:  Temp:  [97 °F (36.1 °C)-98.4 °F (36.9 °C)] 97 °F (36.1 °C)  Heart Rate:  [69-93] 69  Resp:  [16] 16  BP: (105-123)/(55-69) 105/55    Intake/Output Summary (Last 24 hours) at 4/4/2024 0635  Last data filed at 4/3/2024 1300  Gross per 24 hour   Intake 240 ml   Output 150 ml   Net 90 ml     Physical Exam  Vitals reviewed.   Constitutional:       General: He is not in acute distress.  HENT:      Head: Normocephalic.      Nose: Nose normal.   Eyes:      Extraocular Movements: Extraocular movements intact.      Pupils: Pupils are equal, round, and reactive to light.   Cardiovascular:      Rate and Rhythm: Normal rate and regular rhythm.      Pulses: Normal pulses.      Heart sounds: Normal heart sounds. Heart sounds not distant. No murmur heard.     No friction rub. No gallop. No S3 or S4 sounds.   Pulmonary:      Effort: Pulmonary effort is normal.      Breath sounds: Normal breath sounds.   Abdominal:      General: Abdomen is flat. Bowel sounds are normal.      Palpations: Abdomen is soft.      Tenderness: There is no abdominal tenderness.   Skin:     General: Skin is warm and dry.   Neurological:      General: No focal deficit present.      Mental Status: He is alert and oriented to person, place, and time. Mental status is at baseline.   Psychiatric:         Mood and Affect: Mood normal.         Behavior: Behavior normal.       Results Review:    Results from last 7 days   Lab Units 04/04/24  0014   SODIUM mmol/L 136   POTASSIUM mmol/L  4.4   CHLORIDE mmol/L 102   CO2 mmol/L 21.5*   BUN mg/dL 49*   CREATININE mg/dL 1.83*   GLUCOSE mg/dL 107*   CALCIUM mg/dL 7.7*     Results from last 7 days   Lab Units 03/29/24  1216 03/29/24  0913   HSTROP T ng/L 21 20     Results from last 7 days   Lab Units 04/03/24  0632   WBC 10*3/mm3 24.07*   HEMOGLOBIN g/dL 11.2*   HEMATOCRIT % 32.5*   PLATELETS 10*3/mm3 20*     Results from last 7 days   Lab Units 03/30/24  0257 03/29/24  0913   INR  1.30* 1.28*   APTT seconds 30.8  --          Results from last 7 days   Lab Units 04/04/24  0014   MAGNESIUM mg/dL 1.9           I reviewed the patient's new clinical results.        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   Prechemo echo EF 60%, GLS -22%, normal diastolic function for age  Repeat echo on 4/1/24 showed EF 53%, normal diastolic function, ascending aorta measuring 4 cm  History of diastolic heart failure  Acute renal insufficiency   Recommendations per nephrology   Severe thrombocytopenia, recommendations per oncology     Mary Foster, ARCHIE  04/04/24  06:35 EDT

## 2024-04-04 NOTE — PLAN OF CARE
Goal Outcome Evaluation:  Plan of Care Reviewed With: patient        Progress: no change  Outcome Evaluation: Pt seen by PT this AM for tx. Pt expressed concern about recent CA dx and was provided emotional support.  Pt at first was not agreeable to therapy although finally agreed to short transfer to chair w/ encouragement. Pt was educ on imp of continued mobility in order to stay strong while in hospital. Pt sat up to EOB w/ CGA. Pt then stood and transferred approx 1' to chair w/ CGA and use of fww. Pt was fairly steady w/ no overt LOB. Pt was UIC w/ breakfast tray at the end of the session. PT will prog as pt jose.      Anticipated Discharge Disposition (PT): home with home health, home with assist

## 2024-04-04 NOTE — THERAPY TREATMENT NOTE
Patient Name: Jeff Bass  : 1945    MRN: [id number]                              Today's Date: 2024       Admit Date: 3/29/2024    Visit Dx:     ICD-10-CM ICD-9-CM   1. Generalized abdominal pain  R10.84 789.07   2. CLL (chronic lymphocytic leukemia) with worsening lymphadenopathy in abdomen and pelvis  C91.10 204.10   3. Lactic acid increased  E87.20 276.2   4. Leukocytosis, unspecified type  D72.829 288.60   5. Thrombocytopenia  D69.6 287.5   6. 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     Patient Active Problem List   Diagnosis    Encounter for adjustment or management of vascular access device    Benign prostatic hyperplasia    Jon's syndrome    Hypertension    Hyperlipidemia    Hypogonadism in male    Vitamin D deficiency    Malignant neoplasm of prostate    Spinal stenosis of lumbar region with neurogenic claudication    Degeneration of lumbar intervertebral disc    Coronary arteriosclerosis    PVC (premature ventricular contraction)    History of colon polyps    Substernal goiter    Skin tag    Sensory hearing loss, bilateral    ED (erectile dysfunction) of organic origin    Constipation    GERD (gastroesophageal reflux disease)    Left inguinal hernia    Hemangioma of liver    Abnormal finding on thyroid function test    Primary osteoarthritis of right knee    OA (osteoarthritis) of knee    B12 deficiency    Lymphoma of spleen    Abdominal pain    Anemia associated with malignant neoplastic disease    Thrombocytopenia    Leukocytosis    Primary insomnia    Nausea    Elevated blood uric acid level     Past Medical History:   Diagnosis Date    Arthritis     Benign prostatic hyperplasia     FOLLOWED BY DR. VIVIEN PATEL    Biliary dyskinesia     Bunion of right foot     GREAT TOE    Bursitis of right hip 03/15/2018    CLL (chronic lymphocytic leukemia) 2016    FOLLOWED BY DR WALKER    Colon polyps     FOLLOWED BY DR. NEHA Xiong      Degeneration of lumbar intervertebral disc     Diverticulosis     Erectile dysfunction     Fracture of ankle 1975    GERD (gastroesophageal reflux disease)     Hallux valgus of left foot     Hyperlipidemia     MIXED    Hypertension     Inguinal hernia     Kidney stone 02/21/2009    SEEN AT [facility] ER    Knee swelling 2018    Localized, primary osteoarthritis     Lumbar radiculopathy     Lumbar stenosis     Osteoarthritis of right knee     Polyneuropathy     Prostate CA 03/2016    JACQUELYN 6, S/P XRT, FOLLOWED BY DR. VIVIEN PATEL, RADIATION SEEDS    PVC (premature ventricular contraction)     PT STATES WORKED UP YEARS AGO BY [facility] FOR POSSIBLE MURMUR - NO FOLLOW UP REQUIRED     RBBB     Sensory hearing loss, bilateral     Skin cancer     SQUAMOUS CELL CARCINOMA OF FACE    Substernal goiter     Thyromegaly 12/2016    ADMITTED TO [facility]    Vitamin D deficiency      Past Surgical History:   Procedure Laterality Date    BRONCHOSCOPY N/A 12/14/2016    Procedure: BRONCHOSCOPY WITH  BAL AND BIOPSY AND ENDOBRONCHIAL ULTRASOUND  AND NAVIGATION WITH BRUSHINGS AND BIOPSY AND BAL;  Surgeon: Pierre Manning MD;  Location: [facility] ENDOSCOPY;  Service:     COLONOSCOPY N/A 03/13/2019    5 MM SESSILE TUBULAR ADENOMA POLYP IN TRANSVERSE, MULTIPLE SMALL AND LARGE DIVERTICULA IN SIGMOID, RESCOPE IN 5 YRS, DR. NEHA HAM AT [facility]    COLONOSCOPY W/ POLYPECTOMY N/A 03/19/2015    3 TUBULAR ADENOMA POLYPS, 12 TUBULOVILLOUS POLYP RECTO-SIGMOID, DIVERTICULOSIS, RESCOPE IN 3 YRS, DR. NEHA HAM AT [facility]    EYE SURGERY Bilateral     CATARACT EXTRACTION WITH LENS IMPLANT, DR. GOVEA    FINGER NAIL SURGERY Right 2022    TORRES-PATEL    INGUINAL HERNIA REPAIR Left 11/02/2021    Procedure: LEFT OPEN INGUINAL HERNIA REPAIR;  Surgeon: Nixon Kolb MD;  Location: [facility] MAIN OR;  Service: General;  Laterality: Left;    PROSTATE RADIOACTIVE SEED IMPLANT N/A 09/06/2016    DR. HOA JARAMILLO AT [facility]    PROSTATE SURGERY N/A 03/11/2016  "   BIOPSY: ADENOCARCINOMA, DR. ZAID IBARRA    THYROID BIOPSY Bilateral 11/01/2017    NO B CELL OR T CELL LYMPHOMA, DONE AT [facility]    TOTAL KNEE ARTHROPLASTY Right 10/12/2022    Procedure: TOTAL KNEE ARTHROPLASTY;  Surgeon: Ran Rachel MD;  [place] [facility] [place] [facility] [place]  Service: Orthopedics;  Laterality: Right;    US GUIDED LYMPH NODE BIOPSY  4/1/2024    VENOUS ACCESS DEVICE (PORT) INSERTION Right 7/6/2023    Procedure: INSERTION VENOUS ACCESS DEVICE;  Surgeon: Nixon Kolb MD;  [place] [facility] [place] [facility] [place];  Service: General;  Laterality: Right;      General Information       Row Name 04/04/24 0909          Physical Therapy Time and Intention    Document Type therapy note (daily note)  -PH     Mode of Treatment physical therapy  -PH       Row Name 04/04/24 0909          General Information    Existing Precautions/Restrictions fall  -PH       Row Name 04/04/24 0909          Cognition    Orientation Status (Cognition) oriented x 4  -PH       Row Name 04/04/24 0909          Safety Issues, Functional Mobility    Impairments Affecting Function (Mobility) balance;endurance/activity tolerance;strength  -PH     Comment, Safety Issues/Impairments (Mobility) gt belt and non skid socks donned  -PH               User Key  (r) = Recorded By, (t) = Taken By, (c) = Cosigned By      Initials Name Provider Type    PH Mikala Pearson PTA Physical Therapist Assistant                   Mobility       Row Name 04/04/24 0910          Bed Mobility    Bed Mobility supine-sit  -PH     Supine-Sit [place] (Bed Mobility) contact guard;verbal cues  -PH     Assistive Device (Bed Mobility) bed rails;head of bed elevated  -PH     Comment, (Bed Mobility) incr time to EOB w/ pt pulling on this PTA\"s R hand w/ pt's L hand  -PH       Row Name 04/04/24 0910          Bed-Chair Transfer    Bed-Chair [place] (Transfers) contact guard;verbal cues  -PH     Assistive Device (Bed-Chair Transfers) walker, front-wheeled  -PH     Comment, " (Bed-Chair Transfer) steady w/  no overt LOB  -PH       Row Name 04/04/24 0910          Sit-Stand Transfer    Sit-Stand [place] (Transfers) contact guard  -PH     Assistive Device (Sit-Stand Transfers) walker, front-wheeled  -PH     Comment, (Sit-Stand Transfer) from elevated EOB  -PH       Row Name 04/04/24 0910          Gait/Stairs (Locomotion)    [place] Level (Gait) contact guard  -PH     Assistive Device (Gait) walker, front-wheeled  -PH     Distance in Feet (Gait) 1  -PH     [place] Level (Stairs) unable to assess  -PH     Comment, (Gait/Stairs) pt was agreeable to short amb to chair only in spite of educ and encouragement to amb  -PH               User Key  (r) = Recorded By, (t) = Taken By, (c) = Cosigned By      Initials Name Provider Type     Mikala Pearson PTA Physical Therapist Assistant                   Obj/Interventions       Row Name 04/04/24 0912          Balance    Balance Assessment sitting static balance;standing static balance  -PH     Static Sitting Balance standby assist  -PH     Sit to Stand Dynamic Balance contact guard;verbal cues  -PH     Static Standing Balance contact guard  -PH     Position/Device Used, Standing Balance walker, front-wheeled;supported  -PH     Comment, Balance no overt LOB w/ short transfer to chair  -PH               User Key  (r) = Recorded By, (t) = Taken By, (c) = Cosigned By      Initials Name Provider Type     Mikala Pearson PTA Physical Therapist Assistant                   Goals/Plan    No documentation.                  Clinical Impression       Row Name 04/04/24 0912          Pain    Pretreatment Pain Rating 0/10 - no pain  -PH     Posttreatment Pain Rating 0/10 - no pain  -PH     Additional Documentation Pain Scale: Numbers Pre/Post-Treatment (Group)  -PH       Row Name 04/04/24 0912          Plan of Care Review    Plan of Care Reviewed With patient  -PH     Progress no change  -PH     Outcome Evaluation Pt seen by PT this  AM for tx. Pt expressed concern about recent CA dx and was provided emotional support.  Pt at first was not agreeable to therapy although finally agreed to short transfer to chair w/ encouragement. Pt was educ on imp of continued mobility in order to stay strong while in hospital. Pt sat up to EOB w/ CGA. Pt then stood and transferred approx 1' to chair w/ CGA and use of fww. Pt was fairly steady w/ no overt LOB. Pt was UIC w/ breakfast tray at the end of the session. PT will prog as pt jose.  -PH       Row Name 04/04/24 0912          Vital Signs    O2 Delivery Pre Treatment room air  -PH     O2 Delivery Intra Treatment room air  -PH     O2 Delivery Post Treatment room air  -PH       Row Name 04/04/24 0912          Positioning and Restraints    Pre-Treatment Position in bed  -PH     Post Treatment Position chair  -PH     In Chair reclined;encouraged to call for assist;exit alarm on;with nsg  -PH               User Key  (r) = Recorded By, (t) = Taken By, (c) = Cosigned By      Initials Name Provider Type    PH Mikala Pearson PTA Physical Therapist Assistant                   Outcome Measures       Row Name 04/04/24 0915          How much help from another person do you currently need...    Turning from your back to your side while in flat bed without using bedrails? 4  -PH     Moving from lying on back to sitting on the side of a flat bed without bedrails? 3  -PH     Moving to and from a bed to a chair (including a wheelchair)? 3  -PH     Standing up from a chair using your arms (e.g., wheelchair, bedside chair)? 3  -PH     Climbing 3-5 steps with a railing? 2  -PH     To walk in hospital room? 3  -PH     AM-PAC 6 Clicks Score (PT) 18  -PH     Highest Level of Mobility Goal 6 --> Walk 10 steps or more  -PH       Row Name 04/04/24 0915          Functional Assessment    Outcome Measure Options AM-PAC 6 Clicks Basic Mobility (PT)  -PH               User Key  (r) = Recorded By, (t) = Taken By, (c) = Cosigned By       Initials Name Provider Type     Mikala Pearson PTA Physical Therapist Assistant                                 Physical Therapy Education       Title: PT OT SLP Therapies (Done)       Topic: Physical Therapy (Done)       Point: Mobility training (Done)       Learning Progress Summary             Patient Acceptance, E, VU by  at 4/4/2024 0915    Acceptance, E, VU by JG at 4/3/2024 1308    Acceptance, E, VU by JG at 4/2/2024 1423                         Point: Home exercise program (Done)       Learning Progress Summary             Patient Acceptance, E, VU by JG at 4/2/2024 1423                         Point: Body mechanics (Done)       Learning Progress Summary             Patient Acceptance, E, VU by  at 4/4/2024 0915    Acceptance, E, VU by JG at 4/3/2024 1308    Acceptance, E, VU by JG at 4/2/2024 1423                         Point: Precautions (Done)       Learning Progress Summary             Patient Acceptance, E, VU by  at 4/4/2024 0915    Acceptance, E, VU by JG at 4/2/2024 1423                                         User Key       Initials Effective Dates Name Provider Type Discipline     06/16/21 -  Mikala Pearson PTA Physical Therapist Assistant PT     02/29/24 -  Abram Mccullough PT Student PT Student PT                  PT Recommendation and Plan     Plan of Care Reviewed With: patient  Progress: no change  Outcome Evaluation: Pt seen by PT this AM for tx. Pt expressed concern about recent CA dx and was provided emotional support.  Pt at first was not agreeable to therapy although finally agreed to short transfer to chair w/ encouragement. Pt was educ on imp of continued mobility in order to stay strong while in hospital. Pt sat up to EOB w/ CGA. Pt then stood and transferred approx 1' to chair w/ CGA and use of fww. Pt was fairly steady w/ no overt LOB. Pt was UIC w/ breakfast tray at the end of the session. PT will prog as pt jose.     Time Calculation:         PT  Charges       Row Name 04/04/24 0916             Time Calculation    Start Time 0823  -PH      Stop Time 0837  -PH      Time Calculation (min) 14 min  -PH      PT Received On 04/04/24  -PH      PT - Next Appointment 04/05/24  -PH         Timed Charges    96490 - PT Therapeutic Activity Minutes 14  -PH         Total Minutes    Timed Charges Total Minutes 14  -PH       Total Minutes 14  -PH                User Key  (r) = Recorded By, (t) = Taken By, (c) = Cosigned By      Initials Name Provider Type    PH Mikala Pearson PTA Physical Therapist Assistant                  Therapy Charges for Today       Code Description Service Date Service Provider Modifiers Qty    24206490095 HC PT THERAPEUTIC ACT EA 15 MIN 4/4/2024 Mikala Pearson PTA GP 1            PT G-Codes  Outcome Measure Options: AM-PAC 6 Clicks Basic Mobility (PT)  AM-PAC 6 Clicks Score (PT): 18  PT Discharge Summary  Anticipated Discharge Disposition (PT): home with home health, home with assist    Mikala Pearson PTA  4/4/2024

## 2024-04-04 NOTE — PROGRESS NOTES
Tolerated chemo today  Complains of burning on urination  Says he is going home tomorrow?????  Will get urinalysis  NO fever or chills  Good appetite. BM ok    Jovani Ruiz MD

## 2024-04-04 NOTE — PROGRESS NOTES
[facility] Progress Note      Patient Name: Jeff Bass  : 1945  MRN: [id number]  Primary Care Physician:  Jovani Salomon MD  Date of admission: 3/29/2024    Subjective     Interval History:     Patient lying in bed  Improving abdominal discomfort tolerating oral intake but feeling tired and fatigue    Had an episode of loose bowel movements    No fevers or chills    Review of Systems:   As noted above    Objective     Vitals:   Temp:  [97 °F (36.1 °C)-98.4 °F (36.9 °C)] 97 °F (36.1 °C)  Heart Rate:  [69-93] 69  Resp:  [16] 16  BP: (105-123)/(55-69) 105/55    Intake/Output Summary (Last 24 hours) at 2024 0722  Last data filed at 4/3/2024 1300  Gross per 24 hour   Intake 240 ml   Output 150 ml   Net 90 ml       Physical Exam:    General Appearance: alert, oriented x 3, no acute distress   Skin: warm and dry  HEENT: oral mucosa normal, nonicteric sclera  Neck: supple, no JVD  Lungs: CTA  Heart: RRR, normal S1 and S2  Abdomen: soft, nontender, nondistended  : no palpable bladder  Extremities: no edema, cyanosis or clubbing  Neuro: normal speech and mental status     Scheduled Meds:     albumin human, 25 g, Intravenous, Q6H  allopurinol, 300 mg, Oral, Daily  folic acid, 1 mg, Oral, Daily  midodrine, 5 mg, Oral, TID AC  polyethylene glycol, 17 g, Oral, Daily  sevelamer, 800 mg, Oral, TID With Meals  sodium chloride, 10 mL, Intravenous, Q12H  vitamin B-12, 1,000 mcg, Oral, Daily      IV Meds:   sodium bicarbonate, 150 mEq        Results Reviewed:   I have personally reviewed the results from the time of this admission to 2024 07:22 EDT     Results from last 7 days   Lab Units 24  0605 24  0014 24  1807 24  1216 24  0632 24  1931 24  0543 24  1550   SODIUM mmol/L 139 136 137   < > 140  140   < > 140 138   POTASSIUM mmol/L 3.6 4.4 3.9   < > 4.8  4.8   < > 3.8  3.8 3.4*   CHLORIDE mmol/L 102 102 100   < > 106   106   < > 107 100   CO2 mmol/L 23.9 21.5* 17.2*   < > 16.0*  16.0*   < > 20.0* 21.0*   BUN mg/dL 52* 49* 49*   < > 46*  46*   < > 24* 23   CREATININE mg/dL 1.61* 1.83* 1.76*   < > 1.51*  1.51*   < > 0.75* 0.84   CALCIUM mg/dL 7.8* 7.7* 7.6*   < > 7.6*  7.6*   < > 7.7* 8.5*   BILIRUBIN mg/dL  --   --   --   --  0.9  --  1.3* 1.5*   ALK PHOS U/L  --   --   --   --  69  --  51 53   ALT (SGPT) U/L  --   --   --   --  15  --  14 15   AST (SGOT) U/L  --   --   --   --  45*  --  26 26   GLUCOSE mg/dL 82 107* 166*   < > 136*  136*   < > 89 73    < > = values in this interval not displayed.       Estimated Creatinine Clearance: 50.4 mL/min (A) (by C-G formula based on SCr of 1.61 mg/dL (H)).    Results from last 7 days   Lab Units 04/04/24  0605 04/04/24  0014 04/03/24  1807   MAGNESIUM mg/dL 1.9 1.9 2.0   PHOSPHORUS mg/dL 3.1 3.5 3.9       Results from last 7 days   Lab Units 04/04/24  0605 04/04/24  0014 04/03/24  1807 04/03/24  1216 04/03/24  0632 04/02/24  2353 04/02/24  1931 04/02/24  0543 04/01/24  1550 03/31/24  0539 03/29/24  1216   URIC ACID mg/dL 6.2 6.1 6.4 6.7 7.1* 6.7 6.2 5.9 6.6 6.4 9.7*       Results from last 7 days   Lab Units 04/03/24  0632 04/02/24  0543 04/01/24  1550 03/31/24  0539 03/30/24  0257   WBC 10*3/mm3 24.07* 27.52* 18.88* 26.50* 16.87*   HEMOGLOBIN g/dL 11.2* 10.1* 10.9* 10.7* 10.1*   PLATELETS 10*3/mm3 20* 52* 49* 64* 65*       Results from last 7 days   Lab Units 03/30/24 0257 03/29/24  0913   INR  1.30* 1.28*       Assessment / Plan     ASSESSMENT:    Nonoliguric acute kidney injury baseline creatinine between 0.8 and 0.9 slowly worsening to 1.5, accompanied with the hypophosphatemia.  Borderline hyperkalemia and hyperuricemia of 7.1.  Fulfilling criteria for Green criteria .  Patient was given rasburicase IV once.  Currently on allopurinol 300 mg daily.  Continue sodium bicarbonate due to metabolic acidosis will add, phosphorus binders due to hyperphosphatemia and will adjust renal  diet with low K last phosphorus.  Will continue to follow renal function closely and adjust treatment based on trend     Metabolic acidosis from CKD lactic acid follow CO2     Recurrent diffuse large B-cell lymp adjusted fluids to sodium bicarbonate and follow CO2 trendhoma status post R-CHOP and rituximab with presents elevated LDH followed closely by.Hematology     Benign prostatic hypertrophy with history of prostate cancer will obtain bladder scan.  Based on results he may need a Mcdaniel catheter placement     Hypotension  continue midodrine TID  and albumin trial 25 g every 6 hours       PLAN:  -Will continue albumin 25 g every 6 hours and combination with midodrine 5 mg 3 times daily  -Continue bladder scan due to high risk of urinary retention  -Decreasing adjusted sodium bicarbonate his CO2 is improving, potassium phosphorus and uric acid better  - Bladder scan     Discussed with nursing staff    Thank you for involving us in the care of Jeff Bass.  Please feel free to call with any questions.    David Carroll MD  04/04/24  07:22 EDT    [facility]  [phone]    Please note that portions of this note were completed with a voice recognition program.

## 2024-04-05 LAB
ALBUMIN SERPL-MCNC: 3.3 G/DL (ref 3.5–5.2)
ANION GAP SERPL CALCULATED.3IONS-SCNC: 15.3 MMOL/L (ref 5–15)
ANION GAP SERPL CALCULATED.3IONS-SCNC: 16.8 MMOL/L (ref 5–15)
ANION GAP SERPL CALCULATED.3IONS-SCNC: 17.8 MMOL/L (ref 5–15)
ANION GAP SERPL CALCULATED.3IONS-SCNC: 20.8 MMOL/L (ref 5–15)
ANION GAP SERPL CALCULATED.3IONS-SCNC: 21.9 MMOL/L (ref 5–15)
BLASTS NFR BLD MANUAL: 34 % (ref 0–0)
BUN SERPL-MCNC: 34 MG/DL (ref 8–23)
BUN SERPL-MCNC: 37 MG/DL (ref 8–23)
BUN SERPL-MCNC: 40 MG/DL (ref 8–23)
BUN SERPL-MCNC: 43 MG/DL (ref 8–23)
BUN SERPL-MCNC: 45 MG/DL (ref 8–23)
BUN/CREAT SERPL: 36 (ref 7–25)
BUN/CREAT SERPL: 36.3 (ref 7–25)
BUN/CREAT SERPL: 37 (ref 7–25)
BUN/CREAT SERPL: 38.5 (ref 7–25)
BUN/CREAT SERPL: 39.8 (ref 7–25)
CALCIUM SPEC-SCNC: 8.1 MG/DL (ref 8.6–10.5)
CALCIUM SPEC-SCNC: 8.1 MG/DL (ref 8.6–10.5)
CALCIUM SPEC-SCNC: 8.3 MG/DL (ref 8.6–10.5)
CALCIUM SPEC-SCNC: 8.4 MG/DL (ref 8.6–10.5)
CALCIUM SPEC-SCNC: 8.6 MG/DL (ref 8.6–10.5)
CHLORIDE SERPL-SCNC: 100 MMOL/L (ref 98–107)
CHLORIDE SERPL-SCNC: 101 MMOL/L (ref 98–107)
CHLORIDE SERPL-SCNC: 99 MMOL/L (ref 98–107)
CO2 SERPL-SCNC: 19.1 MMOL/L (ref 22–29)
CO2 SERPL-SCNC: 21.2 MMOL/L (ref 22–29)
CO2 SERPL-SCNC: 23.2 MMOL/L (ref 22–29)
CO2 SERPL-SCNC: 24.2 MMOL/L (ref 22–29)
CO2 SERPL-SCNC: 26.7 MMOL/L (ref 22–29)
CREAT SERPL-MCNC: 0.92 MG/DL (ref 0.76–1.27)
CREAT SERPL-MCNC: 1.02 MG/DL (ref 0.76–1.27)
CREAT SERPL-MCNC: 1.04 MG/DL (ref 0.76–1.27)
CREAT SERPL-MCNC: 1.08 MG/DL (ref 0.76–1.27)
CREAT SERPL-MCNC: 1.25 MG/DL (ref 0.76–1.27)
DEPRECATED RDW RBC AUTO: 48.6 FL (ref 37–54)
EGFRCR SERPLBLD CKD-EPI 2021: 58.2 ML/MIN/1.73
EGFRCR SERPLBLD CKD-EPI 2021: 69.4 ML/MIN/1.73
EGFRCR SERPLBLD CKD-EPI 2021: 72.6 ML/MIN/1.73
EGFRCR SERPLBLD CKD-EPI 2021: 74.3 ML/MIN/1.73
EGFRCR SERPLBLD CKD-EPI 2021: 84.1 ML/MIN/1.73
EOSINOPHIL # BLD MANUAL: 0.29 10*3/MM3 (ref 0–0.4)
EOSINOPHIL NFR BLD MANUAL: 1 % (ref 0.3–6.2)
ERYTHROCYTE [DISTWIDTH] IN BLOOD BY AUTOMATED COUNT: 16.8 % (ref 12.3–15.4)
GLUCOSE SERPL-MCNC: 102 MG/DL (ref 65–99)
GLUCOSE SERPL-MCNC: 103 MG/DL (ref 65–99)
GLUCOSE SERPL-MCNC: 116 MG/DL (ref 65–99)
GLUCOSE SERPL-MCNC: 122 MG/DL (ref 65–99)
GLUCOSE SERPL-MCNC: 83 MG/DL (ref 65–99)
HCT VFR BLD AUTO: 25.6 % (ref 37.5–51)
HGB BLD-MCNC: 8.7 G/DL (ref 13–17.7)
LDH SERPL-CCNC: >2500 U/L (ref 135–225)
LYMPHOCYTES # BLD MANUAL: 4.95 10*3/MM3 (ref 0.7–3.1)
LYMPHOCYTES NFR BLD MANUAL: 9 % (ref 5–12)
MAGNESIUM SERPL-MCNC: 1.7 MG/DL (ref 1.6–2.4)
MAGNESIUM SERPL-MCNC: 1.8 MG/DL (ref 1.6–2.4)
MAGNESIUM SERPL-MCNC: 1.9 MG/DL (ref 1.6–2.4)
MAGNESIUM SERPL-MCNC: 1.9 MG/DL (ref 1.6–2.4)
MCH RBC QN AUTO: 27.9 PG (ref 26.6–33)
MCHC RBC AUTO-ENTMCNC: 34 G/DL (ref 31.5–35.7)
MCV RBC AUTO: 82.1 FL (ref 79–97)
MONOCYTES # BLD: 2.62 10*3/MM3 (ref 0.1–0.9)
NEUTROPHILS # BLD AUTO: 11.35 10*3/MM3 (ref 1.7–7)
NEUTROPHILS NFR BLD MANUAL: 39 % (ref 42.7–76)
NRBC SPEC MANUAL: 1 /100 WBC (ref 0–0.2)
PHOSPHATE SERPL-MCNC: 1.8 MG/DL (ref 2.5–4.5)
PHOSPHATE SERPL-MCNC: 1.8 MG/DL (ref 2.5–4.5)
PHOSPHATE SERPL-MCNC: 1.9 MG/DL (ref 2.5–4.5)
PHOSPHATE SERPL-MCNC: 2 MG/DL (ref 2.5–4.5)
PHOSPHATE SERPL-MCNC: 2.7 MG/DL (ref 2.5–4.5)
PLAT MORPH BLD: NORMAL
PLATELET # BLD AUTO: 13 10*3/MM3 (ref 140–450)
POTASSIUM SERPL-SCNC: 3.7 MMOL/L (ref 3.5–5.2)
POTASSIUM SERPL-SCNC: 3.9 MMOL/L (ref 3.5–5.2)
POTASSIUM SERPL-SCNC: 3.9 MMOL/L (ref 3.5–5.2)
POTASSIUM SERPL-SCNC: 4.3 MMOL/L (ref 3.5–5.2)
POTASSIUM SERPL-SCNC: 4.5 MMOL/L (ref 3.5–5.2)
RBC # BLD AUTO: 3.12 10*6/MM3 (ref 4.14–5.8)
RBC MORPH BLD: NORMAL
SODIUM SERPL-SCNC: 140 MMOL/L (ref 136–145)
SODIUM SERPL-SCNC: 141 MMOL/L (ref 136–145)
SODIUM SERPL-SCNC: 142 MMOL/L (ref 136–145)
URATE SERPL-MCNC: 3.8 MG/DL (ref 3.4–7)
URATE SERPL-MCNC: 4 MG/DL (ref 3.4–7)
URATE SERPL-MCNC: 4 MG/DL (ref 3.4–7)
URATE SERPL-MCNC: 4.2 MG/DL (ref 3.4–7)
VARIANT LYMPHS NFR BLD MANUAL: 17 % (ref 19.6–45.3)
WBC MORPH BLD: NORMAL
WBC NRBC COR # BLD AUTO: 29.1 10*3/MM3 (ref 3.4–10.8)

## 2024-04-05 PROCEDURE — P9047 ALBUMIN (HUMAN), 25%, 50ML: HCPCS | Performed by: INTERNAL MEDICINE

## 2024-04-05 PROCEDURE — 83615 LACTATE (LD) (LDH) ENZYME: CPT | Performed by: INTERNAL MEDICINE

## 2024-04-05 PROCEDURE — 83735 ASSAY OF MAGNESIUM: CPT | Performed by: INTERNAL MEDICINE

## 2024-04-05 PROCEDURE — 84100 ASSAY OF PHOSPHORUS: CPT | Performed by: INTERNAL MEDICINE

## 2024-04-05 PROCEDURE — 25010000002 ALBUMIN HUMAN 25% PER 50 ML: Performed by: INTERNAL MEDICINE

## 2024-04-05 PROCEDURE — 80048 BASIC METABOLIC PNL TOTAL CA: CPT | Performed by: INTERNAL MEDICINE

## 2024-04-05 PROCEDURE — 80069 RENAL FUNCTION PANEL: CPT | Performed by: INTERNAL MEDICINE

## 2024-04-05 PROCEDURE — 85025 COMPLETE CBC W/AUTO DIFF WBC: CPT | Performed by: INTERNAL MEDICINE

## 2024-04-05 PROCEDURE — 25010000002 BUMETANIDE PER 0.5 MG: Performed by: INTERNAL MEDICINE

## 2024-04-05 PROCEDURE — 99232 SBSQ HOSP IP/OBS MODERATE 35: CPT | Performed by: NURSE PRACTITIONER

## 2024-04-05 PROCEDURE — 84550 ASSAY OF BLOOD/URIC ACID: CPT | Performed by: INTERNAL MEDICINE

## 2024-04-05 PROCEDURE — 85007 BL SMEAR W/DIFF WBC COUNT: CPT | Performed by: INTERNAL MEDICINE

## 2024-04-05 RX ORDER — SODIUM BICARBONATE 650 MG/1
1300 TABLET ORAL 2 TIMES DAILY
Status: DISCONTINUED | OUTPATIENT
Start: 2024-04-05 | End: 2024-04-05

## 2024-04-05 RX ORDER — SODIUM BICARBONATE 650 MG/1
650 TABLET ORAL 2 TIMES DAILY
Status: DISCONTINUED | OUTPATIENT
Start: 2024-04-05 | End: 2024-04-06

## 2024-04-05 RX ORDER — TEMAZEPAM 15 MG/1
15 CAPSULE ORAL NIGHTLY PRN
Status: DISCONTINUED | OUTPATIENT
Start: 2024-04-05 | End: 2024-04-06 | Stop reason: HOSPADM

## 2024-04-05 RX ORDER — BUMETANIDE 0.25 MG/ML
2 INJECTION INTRAMUSCULAR; INTRAVENOUS ONCE
Status: COMPLETED | OUTPATIENT
Start: 2024-04-05 | End: 2024-04-05

## 2024-04-05 RX ADMIN — SODIUM BICARBONATE 650 MG: 650 TABLET ORAL at 21:42

## 2024-04-05 RX ADMIN — TEMAZEPAM 15 MG: 15 CAPSULE ORAL at 23:45

## 2024-04-05 RX ADMIN — Medication 10 ML: at 08:28

## 2024-04-05 RX ADMIN — DIBASIC SODIUM PHOSPHATE, MONOBASIC POTASSIUM PHOSPHATE AND MONOBASIC SODIUM PHOSPHATE 2 TABLET: 852; 155; 130 TABLET ORAL at 17:21

## 2024-04-05 RX ADMIN — MIDODRINE HYDROCHLORIDE 5 MG: 5 TABLET ORAL at 17:22

## 2024-04-05 RX ADMIN — Medication 10 ML: at 21:42

## 2024-04-05 RX ADMIN — Medication 1000 MCG: at 08:27

## 2024-04-05 RX ADMIN — DIBASIC SODIUM PHOSPHATE, MONOBASIC POTASSIUM PHOSPHATE AND MONOBASIC SODIUM PHOSPHATE 2 TABLET: 852; 155; 130 TABLET ORAL at 21:42

## 2024-04-05 RX ADMIN — SODIUM BICARBONATE 650 MG: 650 TABLET ORAL at 12:40

## 2024-04-05 RX ADMIN — ALBUMIN (HUMAN) 25 G: 0.25 INJECTION, SOLUTION INTRAVENOUS at 01:33

## 2024-04-05 RX ADMIN — BUMETANIDE 2 MG: 0.25 INJECTION, SOLUTION INTRAMUSCULAR; INTRAVENOUS at 11:38

## 2024-04-05 RX ADMIN — MIDODRINE HYDROCHLORIDE 5 MG: 5 TABLET ORAL at 08:27

## 2024-04-05 RX ADMIN — MIDODRINE HYDROCHLORIDE 5 MG: 5 TABLET ORAL at 11:42

## 2024-04-05 RX ADMIN — SODIUM BICARBONATE 150 MEQ: 84 INJECTION, SOLUTION INTRAVENOUS at 02:20

## 2024-04-05 RX ADMIN — ALLOPURINOL 300 MG: 300 TABLET ORAL at 08:27

## 2024-04-05 RX ADMIN — FOLIC ACID 1 MG: 1 TABLET ORAL at 08:27

## 2024-04-05 NOTE — PROGRESS NOTES
LOS: 7 days   Patient Care Team:  Jovani Salomon MD as PCP - General (Internal Medicine)  Gerry Schroeder MD as Consulting Physician (Hematology and Oncology)  Stephanie Toledo PA as Referring Physician (Physician Assistant)    Chief Complaint: Abdomen pain.  Recurrent lymphoma who is splenomegaly and progressive retroperitoneal lymphadenopathy.    Interval History:  On 3/30/2024  Abdomen pain is reasonably controlled severity 8/10.  Had a good bowel movement.  Afebrile.    3/31/2024  Abdomen pain is controlled.  Patient is afebrile.  No nausea vomiting.  Worsening leukocytosis.   On 04/01/2024 the patient feels very fatigued and tired with no appetite, lesser abdominal pain. Spleen remains enlarged but nontender. He remains with low grade temperature and profuse perspiration. No cough or shortness of breath. His lymph node biopsy was performed a few minutes ago from the right groin. PET scan to be done tomorrow morning. The patient wants to go home today.  On 04/02/2024 the patient feels very fatigued and tired, no appetite, perspiration profusely, low grade temperature. Minimal if any abdominal pain. PET scan was done this morning, the findings are very striking and to be reviewed below. He has had some bowel activity, proper urinary flow once that the port was opened and receiving IV fluids abundantly in preparation for treatment today.    On 04/03/2024 the patient had significant reaction to infusion of Rituxan yesterday including fevers and chills. We had to give him Solu-Cortef and naprosyn and the symptoms improved. This morning he feels dramatically better and he is hungry. He has no abdominal pain, nausea or vomiting. Urinary output is abundant. Uric acid is going up, potassium is 5 and he will require reassessment in regard further continuation of Rituxan that probably will be postponed until tomorrow. I am going to go ahead and consult the nephrology team to help us out in regard to tumor lysis  management.    On 04/04/2024 the patient has had a quiet night with good appetite last night and good bowel movement today. He had a good breakfast. He has no abdominal pain, nausea or vomiting. Proper urination. He has not had any fever or chills. Today his clinical examination will be reviewed below. Laboratory parameters will be discussed below.    On 04/05/2024 the patient completed last night his 2nd day of Rituxan administration with no difficulties as premedications were provided including Solu-cortef. Today he has had a good breakfast, he has not had bowel activity yet. He feels better. He really wants to go home, the patient still has electrolyte imbalance and I have discussed this with Dr. Salomon and with Nephrologist, David Carroll MD. Please review below. He is not experiencing any fever or chill. He is very fatigued and tired.          HISTORY OF PRESENT ILLNESS:  The patient is a 78 y.o. 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  Patient reports he was at baseline condition, and yesterday afternoon started having abdominal pain around umbilical/lower abdomen area associated with nausea.  Patient reports abdomen pain is constant.  He also reports having poor appetite since this past Sunday which is 5 days ago.  He denies low fever sweating or chills.       Patient also reported generalized weakness in the past 24 hours also and did not drink either too much at all.    Vital Signs:  Temp:  [97.1 °F (36.2 °C)-99.1 °F (37.3 °C)] 97.1 °F (36.2 °C)  Heart Rate:  [74-87] 87  Resp:  [16-20] 20  BP: (103-131)/(52-76) 131/76    Intake/Output Summary (Last 24 hours) at 4/5/2024 0844  Last data filed at 4/5/2024 0800  Gross per 24 hour   Intake 1320 ml   Output 350 ml   Net  970 ml       Physical Exam:  Eyes and oral mucosa's were normal. No peripheral adenopathy in neck or axillas, lymph node in the right groin measuring 3 cm in size, mildly tender because of just recent biopsy. Left groin no lymph node available. Abdomen shows spleen below the left costal margin almost 15 cm below. No liver enlargement. No abdominal pain. No tenderness. His lungs were clear, his heart was regular with no gallops, murmurs or rubs. His skin was perspiring abundantly and moist. He was awake, alert, oriented in time and place.     On 04/02/2024 the clinical examination today is no different than what it was yesterday. The spleen remains enlarged, large similar to what it was yesterday. The right groin surgical site is healing well. No other new findings.     Eyes and oral mucosa's were normal, no cervical or axillary adenopathy, lungs were clear, heart was regular, abdomen the spleen was lesser in size, almost 1/2 of the size it was yesterday with no tenderness. No liver enlargement. No abdominal masses otherwise. Area of lymph node biopsy in the right groin is healing. He was awake, alert, oriented in time and place, carry normal conversation.    Stable vital signs. Eyes, skin and mucosa's were normal. Port site without any changes, lungs were clear, heart was regular, abdomen was soft. Spleen palpable 7 cm below the left costal margin, nontender, no liver enlargement. Right inguinal surgical site biopsy site well healed with no hematoma formation or pain. He was awake, alert, oriented in time and place. Very fatigued.     Patient was pale. He was awake, alert, oriented in time and place. He was able to carry a normal conversation. The spleen was barely palpable in the left costal margin. No liver enlargement, no peripheral adenopathies. Lung were clear. Heart was regular. Abdominal exam was otherwise benign. He had no inguinal adenopathy or pain in the right groin area. Extremities with 1+ edema. He was  able to discuss and carry conversation with me but he was adamant of wanting to go home today.      Results Review:   CBC:      Lab 04/05/24  0654 04/04/24  0605 04/03/24  0632 04/02/24  0543 04/01/24  1550 03/31/24  0539 03/30/24  0257   WBC 29.10* 30.63* 24.07* 27.52* 18.88*   < > 16.87*   HEMOGLOBIN 8.7* 8.5* 11.2* 10.1* 10.9*   < > 10.1*   HEMATOCRIT 25.6* 24.7* 32.5* 29.8* 31.9*   < > 30.0*   PLATELETS 13* 33* 20* 52* 49*   < > 65*   NEUTROS ABS 11.35* 5.21 20.68* 9.74* 6.99   < > 3.54   EOS ABS 0.29  --   --   --  0.19  --  0.34   MCV 82.1 81.8 83.1 83.5 83.1   < > 80.9    < > = values in this interval not displayed.     CMP:        Lab 04/05/24  0654 04/05/24  0040 04/04/24  1815 04/04/24  1311 04/04/24  0605 04/03/24  1216 04/03/24  0632 04/02/24  1931 04/02/24  0543 04/01/24  1550 03/31/24  0539 03/31/24  0539 03/30/24  1145 03/30/24  0257 03/29/24  1216 03/29/24  0913   SODIUM 142 140 140 139 139   < > 140  140   < > 140 138  --  132*  --  136   < > 131*   POTASSIUM 4.3 4.5 3.5 3.4*  3.4* 3.6   < > 4.8  4.8   < > 3.8  3.8 3.4*  --  3.8   < > 3.5   < > 3.3*   CHLORIDE 101 99 99 100 102   < > 106  106   < > 107 100  --  97*  --  99   < > 91*   CO2 23.2 19.1* 16.0* 23.2 23.9   < > 16.0*  16.0*   < > 20.0* 21.0*  --  18.2*  --  23.0   < > 21.8*   ANION GAP 17.8* 21.9* 25.0* 15.8* 13.1   < > 18.0*  18.0*   < > 13.0 17.0*  --  16.8*  --  14.0   < > 18.2*   BUN 43* 45* 45* 46* 52*   < > 46*  46*   < > 24* 23  --  22  --  21   < > 23   CREATININE 1.08 1.25 1.29* 1.27 1.61*   < > 1.51*  1.51*   < > 0.75* 0.84  --  0.85  --  0.96   < > 0.94   EGFR 69.4 58.2* 56.1* 57.1* 43.0*   < > 46.4*  46.4*   < > 91.2 88.2  --  87.8  --  79.9   < > 81.9   GLUCOSE 83 116* 77 91 82   < > 136*  136*   < > 89 73  --  82  --  77   < > 98   CALCIUM 8.1* 8.1* 8.0* 7.8* 7.8*   < > 7.6*  7.6*   < > 7.7* 8.5*  --  8.2*  --  8.1*   < > 9.0   MAGNESIUM 1.9 1.8 1.6 1.8 1.9   < > 1.7   < >  --   --   --  1.8  --   --    < > 1.7    PHOSPHORUS 2.0* 2.7 2.6 2.4* 3.1   < > 5.0*   < > 1.9* 2.4*   < >  --   --   --   --   --    TOTAL PROTEIN  --   --   --   --   --   --  3.8*  --  3.9* 4.5*  --  4.7*  --  4.6*  --  5.6*   ALBUMIN  --   --   --   --   --   --  2.5*  --  2.8* 3.0*  --  3.2*  --  3.2*  --  3.8   GLOBULIN  --   --   --   --   --   --  1.3  --  1.1 1.5  --   --   --   --   --  1.8   ALT (SGPT)  --   --   --   --   --   --  15  --  14 15  --  14  --  16  --  18   AST (SGOT)  --   --   --   --   --   --  45*  --  26 26  --  29  --  26  --  42*   BILIRUBIN  --   --   --   --   --   --  0.9  --  1.3* 1.5*  --  1.2  --  1.3*  --  1.6*   INDIRECT BILIRUBIN  --   --   --   --   --   --   --   --   --   --   --  0.7  --  0.8  --   --    BILIRUBIN DIRECT  --   --   --   --   --   --   --   --   --   --   --  0.5*  --  0.5*  --   --    ALK PHOS  --   --   --   --   --   --  69  --  51 53  --  53  --  56  --  74   LIPASE  --   --   --   --   --   --   --   --   --   --   --   --   --   --   --  8*    < > = values in this interval not displayed.     LDH   Date Value Ref Range Status   04/03/2024 >2,500 (H) 135 - 225 U/L Final     Uric Acid   Date Value Ref Range Status   04/05/2024 4.0 3.4 - 7.0 mg/dL Final     Lab Results   Component Value Date    IRON 34 (L) 03/30/2024    TIBC 171 (L) 03/30/2024    FERRITIN 801.00 (H) 03/30/2024     Lab Results   Component Value Date    FOLATE 6.02 03/30/2024     Lab Results   Component Value Date    ALELASLG70 437 03/30/2024       Results from last 7 days   Lab Units 03/29/24  1216 03/29/24  0913   HSTROP T ng/L 21 20     Results from last 7 days   Lab Units 03/30/24  0257 03/29/24  0913   INR  1.30* 1.28*   APTT seconds 30.8  --          Results from last 7 days   Lab Units 04/05/24  0654   MAGNESIUM mg/dL 1.9         Patient Name: MAC THOMPSON   Accession #: [id number]     [facility]   [address], [address]   [place], [place]  [address]       CLINICAL HISTORY:   78 year old male with a history  of CLL     SPECIMEN SOURCE:   BLOOD ([id number])       FINAL DIAGNOSIS:   LEUKEMIA/LYMPHOMA PANEL     INTERPRETATION     CD5+ MONOCLONAL B CELL PROCESS, CONSISTENT WITH HISTORICAL LYMPHOMA         PATHOLOGIST COMMENT:   The B cell lymphoma comprises 72.5% of peripheral blood cellularity.       Electronically Signed Out on 4/1/2024      KETAN VARGAS MD    cw/4/1/2024  Interpretation performed at:   [facility]   [address] [place] [address], [address]   [place]  [address]         Assessment:  *.  History of SLL status post Rituxan plus Bendamustine, and had Jon's transformation in 2023 for which he had 5 cycles Rituxan plus CHOP with the good clinical response.     *.  Abdomen pain new onset for the past 2 days.  Patient also feels malaise nausea for about 5 days.  Denies fever chills, he did have mild sweatling but not drenching sweating.  Has poor appetite for the past 4 5 days, no significant weight loss.  Patient was seen by general surgeon Dr. Witt, no acute abdomen or indication for surgical intervention.  CT scan for abdomen pelvis today reported significant worsening  Splenomegaly, retroperitoneal lymphadenopathy in the pelvic lymphadenopathy.  Laboratory study also showed newly developed leukocytosis, lymphocytosis, neutropenia, and thrombocytopenia worsening anemia.  I think this patient has disease progression from his lymphoma.  I recommended following: ultrasound-guided right inguinal lymph node biopsy for comprehensive study to distinguish of recurrent diffuse large B-cell lymphoma versus progression of SLL/CLL, obtain PET scan as inpatient, and also subsequently start first cycle chemotherapy as inpatient.  With the patient having symptoms and significant disease progression, I do not think arrange outpatient chemotherapy is in the best interest of this patient because of the elevated time for biopsy, PET scan and initiation of chemotherapy.    Will also check laboratory studies for  LDH and uric acid.  This patient also has leukocytosis/lymphocytosis, I also recommended to have peripheral blood for flow cytometry study.  This also will help distinguish between CLL and diffuse large B-cell lymphoma.  I think this patient may have leukemia phase of the diffuse large B-cell lymphoma.  3/29/2024 enormously elevated LDH > 2500.  This is secondary to his lymphoma.  3/30/2024 pain is better controlled severity 4/10.   3/31/2024 abdomen pain is controlled.  Worsening leukocytosis.  Peripheral blood smear reviewed, they are significant abnormal mononuclear cells, the diameter is about 3-4 times larger than the erythrocytes, many of those cells having 4 or 5 nucleoli within the nucleus, the chromatin is much less condensed.  And I can see some of those cells having dividing nucleus, however they do not look like typical blasts.  There are also typical small lymphocytes with condensed chromatin.  I explained to patient, he has leukemia phase of aggressive lymphoma relapse.  That is why I think this patient definitely needs a biopsy.    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 The similar picture that he has back last year in 2023 when he had exactly the same clinical circumstances and he improved dramatically with chemotherapy administration using CHOP and Rituxan.     At this point I do believe that the patient needs to proceed as follows:    1. He will require initiation of Rituxan administration tomorrow and once a week.  2. He will receive IV fluids today normal saline 125 cc/hr along with allopurinol 300 mg orally everyday.   3. He will repeat uric acid, LDH, CMP on a regular basis on a daily basis.  4. We will require continuation of monitoring his pathological analysis of the lymph node.    5. The flow cytometry from his peripheral blood documents a monoclonal population of cells CD5 positive encompassing 75% of the events that has been posted. This documents that the so called monocytes are large cell lymphoid malignant cells consistent with his Jon's transformation and going along with his LDH.   6. Once that the patient leaves the hospital probably by this Thursday or Friday and once that we have proper monitoring of tumor lysis syndrome the patient will require continuation of Rituxan in the office on a weekly basis and very likely incorporation of new modality of therapy to him including the consideration of new monoclonal antibodies to deliver care for him.     I discussed this with him, discussed this with nursing and discussed this with Pathologist, Dr. Rashid at [facility] today.     On 04/02/2024 I discussed with the patient the findings on his PET scan that ae very striking. His spleen is huge with very significant SUV activity higher than ever before and also he has a line of retroperitoneal adenopathy that goes all of the way down into the right scrotum. There is minimal mediastinal adenopathy. Liver anatomy was normal. No other sites of disease including no abnormal bone uptake.     The biopsy of the lymph node in the right groin is still pending. This will be available hopefully tomorrow.     On the basis that he has high LDH and his persistent symptomatology it is very obvious that this patient has a diffuse large cell non-Hodgkin's lymphoma coming from the transformation from his CLL as discussed last year. The patient needs to be treated today. Given his high risk of tumor lysis syndrome we will split the dose of Rituxan with 1/2 of the dose today, 1/2 of the dose tomorrow and he will require frequent monitoring of his chemistry profile including CMP, phosphorus, uric acid and LDH. If the uric acid or the phosphorus or the potassium goes up he will require immediate  assessment and he will require rasburicase as well discussed with pharmacist. His uric acid today is appropriate. His LDH remains above 2500.     Obviously future plans will require to be made once that he is ready for discharge and this will be further discussed with him including continuation of Rituxan administration and consideration of second line of therapy through CAR T-cell therapy that could be discussed at the [facility].     On 04/03/2024 the patient tolerated the day before 1/2 of the dose of Rituxan with a lot of inconveniences including febrile illness with temperature going as high as 102 associated with chills and fever requiring hydrocortisone  mg. He finally settled down. He was able to complete the first half of the infusion of Rituxan. Today his creatinine has gone up to 1.5, his uric acid has increased, his phosphorus level has mildly increased, potassium level mildly increased. I would prefer for the patient to postpone any further Rituxan administration until tomorrow. I went ahead and increased his IV fluid rate to 150 cc/hr and I went ahead and called the nephology team to help us out with the management of electrolytes, calcium, potassium, phosphorus and so forth in this kind of setting. I discussed with Pharmacist, Alina, the fact that I will postpone the Rituxan administration until tomorrow and it is perfectly fine from this point of view.    He will be premedicated tomorrow with hydrocortisone and Tylenol.     On 04/04/2024 the patient was ready to proceed with Rituxan administration leftover from the induction treatment a couple of days ago. His creatinine is 1.6, his potassium and phosphorus levels are okay, uric acid is under better control and we will proceed with treatment in that way. He will be premedicated with hydrocortisone, Benadryl and Tylenol and hopefully he will not have any fever or chills during the infusion time.    Planning to continue monitoring  his laboratory parameters, maybe plan to go home tomorrow or Saturday.     On 04/05/2024, his laboratory parameters including phosphorus, uric acid, LDH remain abnormal and I do not believe that this patient can go home yet. I discussed this with Dr. Salomon. I also discussed this with Dr. Carroll, who has switched most of his medications orally.     At the most if we get very charlotte the patient should be able to go home either tomorrow or on Sunday.     The daughter is in the process of arrangement for the patient to have care 24 hours a day at home. This situation has not been scheduled and worked up completely.     Again, I discussed all these issues with the patient's daughter yesterday.         *.  Worsening anemia and newly developed thrombocytopenia.  This is due to lymphoma progression.  Nevertheless I recommended to obtain iron study ferritin iron profile, together with B12 and folate.  Will also check LDH.  Patient does have elevated total bilirubin.  Lab study 3/30/2024 hemoglobin 10.1.  Ferritin 801, free iron 34 iron saturation 20%, B12 at 437 and folate 6.02 ng/mL.  No iron deficiency, fits with inflammatory condition related to his lymphoma.  Nevertheless low normal B12 and folate level, I think patient would benefit from oral supplement for both with no apparent side effects.  3/31/2024 hemoglobin 10.7.  On 04/02/2024 the hemoglobin remains stable, unchanged, no notion of blood loss, no notion of hemolysis.    On 04/03/2024 hemoglobin remains stable, no notion of blood loss.     On 04/04/2024 the hemoglobin is lower related to the large volume of IV fluids provided to help out with proper hydration and proper creatinine clearance. Right now he has no need for blood products. He probably will require diuresis tomorrow. He continues being seen by nephrology and we appreciate, David Carroll, Bud, input very much.     On 04/05/2024, the patient remains anemic. I do not think he needs blood products. He  will require diuresis per Nephrology today. IV fluids have been STOPPED.      *.  Insomnia.  Patient reports poor sleeping quality, especially in the hospital.  Patient was taking melatonin at home not really helping.  I recommended to start the patient Ambien as needed.   On 04/02/2024 the insomnia is the result of his disease process and his worries and stressors. We could deliver a low dose of Xanax if necessary.    On 04/03/2024 in spite of all of the fevers, chills and issues pertinent to Rituxan administration the patient was able to have a night of sleep that was decent. Hopefully now he will start to eat, he will improve this situation and he will have a quiet night tonight.     On 04/04/2024 he had a good night of sleep. He will proceed with treatment today. Hopefully he will have a quiet night the rest of the day after the infusion is completed.       *.  Hypokalemia.  Management per primary team and normalized.  On 04/03/2024 given the evidence of tumor lysis syndrome with an LDH above 2500, uric acid elevation, potassium elevation, phosphorus elevation we will ask nephrology team to see him.     On 04/04/2024 potassium level is appropriate at this time. No evidence of hyperkalemia or hypokalemia.      *.  Elevated uric acid level.  This is related to his progressive lymphoma.  Uric acid 9.7 on 3/29/2024.  I recommend to start allopurinol to decrease uric acid and also preventing tumor lysis syndrome, expecting patient will have chemotherapy in the next few days.  3/31/2024 normalized uric acid 6.4.   On 04/02/2024 uric acid is in much better shape today than what it was 2 days ago. Allopurinol has been ongoing, rasburicase could be utilized in case of need after proper testing today.    On 04/03/2024 I went ahead and ordered rasburicase for this patient to be given to today and probably he will need another dose tomorrow depending on the uric acid level tomorrow morning. As posted above the patient will  delay further Rituxan administration for tomorrow.    On 04/04/2024 the patient will receive rasburicase today another dose of 3 mg IV and he will proceed with his Rituxan administration. We will continue monitoring numbers as early as tomorrow.     On 04/05/2024, uric acid is excellent. Allopurinol will be CONTINUED.      PLAN:   I discussed on 04/02/2024 the patient will split the dose of Rituxan today in 1/2 and tomorrow the other 1/2 given the high potential for tumor lysis syndrome given the findings on his PET scan.    We will monitor electrolytes, uric acid and LDH every 6 hours or so and rasburicase could be included in his regimen of medicine if uric acid goes high.    I reviewed with him his PET scan in the PAC system [facility], explained to him the findings and the notion of very significant uptake in the spleen that is very dramatic. I pointed out to the patient many months ago that he will require a splenectomy that he refused to have, now we have the consequences of what we see and we have to live with this. Obviously at this point the patient needs to have systemic therapy, cool off the process and then strong consideration for some other modality of therapy as a form of salvage treatment.    On 04/02/2024 I discussed the case with pharmacist in detail.    On 04/02/2024 as posted above we postponed further Rituxan administration until tomorrow. We will ask the nephrology team to help us out to control the tumor lysis syndrome. He had more IV fluids ongoing today and he will require calcium supplementation, close monitoring of potassium, phosphorus and uric acid. Rasburicase will be given to him at the dose of 3 mg and discussed with pharmacist.    On the other hand the patient feels better, he is hungry and his spleen is 1/2 of the size of what it was yesterday. That is encouraging.     On 04/03/2024 we have the pathological analysis of the lymph node biopsy of the right groin. This  analysis is not concordant with the clinical picture that the patient has. Unfortunately what I think the pathology sees, another thing is the clinical situation of the patient and that is not surprising, similar situation happened during the development of his Jon's transformation last year. The pathology was telling us something, the patient was telling us something completely different.     On 04/04/2024 the patient requested for me to talk with his daughter by video telephone. I will proceed with this today. Compilation of that conversation will be discussed with the patient tomorrow.    As I discussed with Dr. Salomon, the patient's primary physician, and with Dr. Carroll, I do not believe that the patient is ready to go home. He will require to be in the hospital. We will stop his IV fluids, mobilize him, give him diuretics and then go from there. His phosphorus will be corrected. His allopurinol will be continued and we will see how things evolve tomorrow.        Spoke with pharmacist staff today.      Gerry Schroeder MD  04/05/24  08:44 EDT

## 2024-04-05 NOTE — SIGNIFICANT NOTE
"Pt refused PT this PM after stating he had a few episodes today of urinating \"all over\". Pt stated he did not want to get up again as a result. Family was visiting at the time. Pt was encouraged to sit [facility] for dinner to which pt verbalized understanding. Notified RN. PT will follow up 8/8.   "

## 2024-04-05 NOTE — PROGRESS NOTES
[facility] Progress Note      Patient Name: Jeff Bass  : 1945  MRN: [id number]  Primary Care Physician:  Jovani Salomon MD  Date of admission: 3/29/2024    Subjective     Interval History:     Patient lying in bed  And nursing staff at bedside  Patient denies any chest pain or palpitations   Decreased oral intake  No abdominal pain  Urinary spontaneously    Bowel movement present    No fevers or chills    Review of Systems:   As noted above    Objective     Vitals:   Temp:  [97.1 °F (36.2 °C)-99.1 °F (37.3 °C)] 97.1 °F (36.2 °C)  Heart Rate:  [74-87] 87  Resp:  [18-20] 20  BP: (103-131)/(52-76) 131/76    Intake/Output Summary (Last 24 hours) at 2024 0956  Last data filed at 2024 0800  Gross per 24 hour   Intake 240 ml   Output 350 ml   Net -110 ml       Physical Exam:    General Appearance: alert, oriented x 3, no acute distress   Skin: warm and dry  HEENT: oral mucosa normal, nonicteric sclera  Neck: supple, no JVD  Lungs: CTA  Heart: RRR, normal S1 and S2  Abdomen: soft, nontender, nondistended  : no palpable bladder  Extremities: no edema, cyanosis or clubbing  Neuro: normal speech and mental status     Scheduled Meds:     allopurinol, 300 mg, Oral, Daily  folic acid, 1 mg, Oral, Daily  midodrine, 5 mg, Oral, TID AC  polyethylene glycol, 17 g, Oral, Daily  sodium chloride, 10 mL, Intravenous, Q12H  vitamin B-12, 1,000 mcg, Oral, Daily      IV Meds:   sodium bicarbonate, 150 mEq, Last Rate: 150 mEq (24 0220)        Results Reviewed:   I have personally reviewed the results from the time of this admission to 2024 09:56 EDT     Results from last 7 days   Lab Units 24  0654 24  0040 24  1815 24  1216 24  0632 24  1931 24  0543 24  1550   SODIUM mmol/L 142 140 140   < > 140  140   < > 140 138   POTASSIUM mmol/L 4.3 4.5 3.5   < > 4.8  4.8   < > 3.8  3.8 3.4*   CHLORIDE mmol/L 101 99 99   < >  106  106   < > 107 100   CO2 mmol/L 23.2 19.1* 16.0*   < > 16.0*  16.0*   < > 20.0* 21.0*   BUN mg/dL 43* 45* 45*   < > 46*  46*   < > 24* 23   CREATININE mg/dL 1.08 1.25 1.29*   < > 1.51*  1.51*   < > 0.75* 0.84   CALCIUM mg/dL 8.1* 8.1* 8.0*   < > 7.6*  7.6*   < > 7.7* 8.5*   BILIRUBIN mg/dL  --   --   --   --  0.9  --  1.3* 1.5*   ALK PHOS U/L  --   --   --   --  69  --  51 53   ALT (SGPT) U/L  --   --   --   --  15  --  14 15   AST (SGOT) U/L  --   --   --   --  45*  --  26 26   GLUCOSE mg/dL 83 116* 77   < > 136*  136*   < > 89 73    < > = values in this interval not displayed.       Estimated Creatinine Clearance: 75.1 mL/min (by C-G formula based on SCr of 1.08 mg/dL).    Results from last 7 days   Lab Units 04/05/24  0654 04/05/24  0040 04/04/24  1815   MAGNESIUM mg/dL 1.9 1.8 1.6   PHOSPHORUS mg/dL 2.0* 2.7 2.6       Results from last 7 days   Lab Units 04/05/24  0654 04/05/24  0040 04/04/24  1815 04/04/24  1311 04/04/24  0605 04/04/24  0014 04/03/24  1807 04/03/24  1216 04/03/24  0632 04/02/24  2353 04/02/24  1931 04/02/24  0543 04/01/24  1550 03/31/24  0539   URIC ACID mg/dL 4.0 4.2 4.6 5.5 6.2 6.1 6.4 6.7 7.1* 6.7 6.2 5.9 6.6 6.4       Results from last 7 days   Lab Units 04/05/24  0654 04/04/24  0605 04/03/24  0632 04/02/24  0543 04/01/24  1550   WBC 10*3/mm3 29.10* 30.63* 24.07* 27.52* 18.88*   HEMOGLOBIN g/dL 8.7* 8.5* 11.2* 10.1* 10.9*   PLATELETS 10*3/mm3 13* 33* 20* 52* 49*       Results from last 7 days   Lab Units 03/30/24  0257   INR  1.30*       Assessment / Plan     ASSESSMENT:    Nonoliguric acute kidney injury baseline creatinine between 0.8 and 0.9 slowly worsening to 1.5, accompanied with the hypophosphatemia.  Borderline hyperkalemia and hyperuricemia of 7.1.  Fulfilling criteria for Green criteria .  Patient was given rasburicase IV once.  Currently on allopurinol 300 mg daily.  Continue sodium bicarbonate due to metabolic acidosis will add, phosphorus binders due to  hyperphosphatemia and will adjust renal diet with low K last phosphorus.  Will continue to follow renal function closely and adjust treatment based on trend     Metabolic acidosis from CKD lactic acid follow CO2     Recurrent diffuse large B-cell lymp adjusted fluids to sodium bicarbonate and follow CO2 trendhoma status post R-CHOP and rituximab with presents elevated LDH followed closely by.Hematology     Benign prostatic hypertrophy with history of prostate cancer will obtain bladder scan.  Based on results he may need a Mcdaniel catheter placement     Hypotension  continue midodrine TID  and albumin trial 25 g every 6 hours completed , continue midodrine only     Hypophosphatemia .  Started on K-Phos replacement and 4 times daily and change diet from renal diet to regular diet to increase phosphate intake       PLAN:  - I will discontinue  Renvela and albumin  -I will transition sodium bicarbonate drip to oral tablets and order bumetanide 2 mg IV once  -Would continue current dose of midodrine  -*K-Phos replacement 4 times daily  -Renal function back to baseline ,  electrolytes improved  -I will arrange close follow-up with renal clinic as an outpatient    Discussed with nursing staff and Dr. Schroeder    Thank you for involving us in the care of Jeff Bass.  Please feel free to call with any questions.    David Carroll MD  04/05/24  09:56 EDT    [facility]""  [phone]    Please note that portions of this note were completed with a voice recognition program.

## 2024-04-05 NOTE — PLAN OF CARE
Problem: Adult Inpatient Plan of Care  Goal: Plan of Care Review  Outcome: Ongoing, Progressing     Problem: Adult Inpatient Plan of Care  Goal: Absence of Hospital-Acquired Illness or Injury  Intervention: Identify and Manage Fall Risk  Recent Flowsheet Documentation  Taken 4/5/2024 1600 by Kath Ibrahim RN  Safety Promotion/Fall Prevention:   safety round/check completed   room organization consistent  Taken 4/5/2024 1400 by Kath Ibrahim RN  Safety Promotion/Fall Prevention:   room organization consistent   safety round/check completed  Taken 4/5/2024 1200 by Kath Ibrahim RN  Safety Promotion/Fall Prevention:   room organization consistent   safety round/check completed  Taken 4/5/2024 1000 by Kath Ibrahim RN  Safety Promotion/Fall Prevention:   safety round/check completed   room organization consistent  Taken 4/5/2024 0800 by Kath Ibrahim RN  Safety Promotion/Fall Prevention:   safety round/check completed   room organization consistent     Problem: Adult Inpatient Plan of Care  Goal: Optimal Comfort and Wellbeing  Outcome: Ongoing, Progressing  Intervention: Provide Person-Centered Care  Recent Flowsheet Documentation  Taken 4/5/2024 0800 by Kath Ibrahim RN  Trust Relationship/Rapport:   care explained   choices provided   emotional support provided   questions answered   questions encouraged   reassurance provided     Problem: Nutrition Impaired (Sepsis/Septic Shock)  Goal: Optimal Nutrition Intake  Outcome: Ongoing, Progressing     Problem: Coping Ineffective (Oncology Care)  Goal: Effective Coping  Outcome: Ongoing, Progressing  Intervention: Support and Enhance Coping Strategies  Recent Flowsheet Documentation  Taken 4/5/2024 0800 by Kath Ibrahim RN  Family/Support System Care: self-care encouraged   Goal Outcome Evaluation:  AOX4.  VSS. Bleeding precautions advised.  Phosphorus replaced, per Nephrology.  Q2H turn.  R PAC accessed. Dressing CDI.  Positive blood return.

## 2024-04-05 NOTE — PROGRESS NOTES
"    Patient Name: Jeff Bass  :1945  78 y.o.      Patient Care Team:  Jovani Salomon MD as PCP - General (Internal Medicine)  Gerry Schroeder MD as Consulting Physician (Hematology and Oncology)  Stephanie Toledo PA as Referring Physician (Physician Assistant)    Chief Complaint: follow up SVT    Interval History: had very brief asymptomatic run of tachycardia last night. Otherwise rhythm has been very stable. He is urinating a lot per his report.          Objective   Vital Signs  Temp:  [97.1 °F (36.2 °C)-98.4 °F (36.9 °C)] 97.1 °F (36.2 °C)  Resp:  [18-20] 20  BP: (111-131)/(52-76) 131/76    Intake/Output Summary (Last 24 hours) at 2024 1629  Last data filed at 2024 0800  Gross per 24 hour   Intake 240 ml   Output 200 ml   Net 40 ml     Flowsheet Rows      Flowsheet Row First Filed Value   Admission Height 195.6 cm (77\") Documented at 2024 0850   Admission Weight 99.8 kg (220 lb) Documented at 2024 0850            Physical Exam:   General Appearance:    Alert, cooperative, in no acute distress   Lungs:     Clear to auscultation.  Normal respiratory effort and rate.      Heart:    Regular rhythm and normal rate, normal S1 and S2, no murmurs, gallops or rubs.     Chest Wall:    No abnormalities observed   Abdomen:     Soft, nontender, positive bowel sounds.     Extremities:   no cyanosis, clubbing or edema.  No marked joint deformities.  Adequate musculoskeletal strength.       Results Review:    Results from last 7 days   Lab Units 24  1249   SODIUM mmol/L 141   POTASSIUM mmol/L 3.9   CHLORIDE mmol/L 100   CO2 mmol/L 24.2   BUN mg/dL 40*   CREATININE mg/dL 1.04   GLUCOSE mg/dL 103*   CALCIUM mg/dL 8.6         Results from last 7 days   Lab Units 24  0654   WBC 10*3/mm3 29.10*   HEMOGLOBIN g/dL 8.7*   HEMATOCRIT % 25.6*   PLATELETS 10*3/mm3 13*     Results from last 7 days   Lab Units 24  0257   INR  1.30*   APTT seconds 30.8     Results from last  " days   Lab Units 04/05/24  1249   MAGNESIUM mg/dL 1.9                   Medication Review:   allopurinol, 300 mg, Oral, Daily  folic acid, 1 mg, Oral, Daily  midodrine, 5 mg, Oral, TID AC  phosphorus, 500 mg, Oral, 4x Daily  polyethylene glycol, 17 g, Oral, Daily  sodium bicarbonate, 650 mg, Oral, BID  sodium chloride, 10 mL, Intravenous, Q12H  vitamin B-12, 1,000 mcg, Oral, Daily              Assessment & Plan   SVT - resolved. Sustained SVT noted on 3/31 . Got a dose of IV metoprolol. Spontaneously converted. Tolerating low dose beta blocker.   Nonsustained atrial fibrillation - brief. AC not recommended given acute oncologic issues and low platelets.   Progressive diffuse B cell lymphoma status post RCHOP  Acute kidney injury nephrology is following. He is getting diuresed today.   Chronic diastolic heart failure   Hypotension on midodrine.   Severe thrombocytopenia , Plts 13K today.     Tele reviewed. On brief episode of asymptomatic tachycardia last night. Otherwise has been stable. Not on beta blocker due to hypotension. Seems to be doing ok from a rhythm standpoint. Arrhythmia is suspected due to catechominergic surge from pain, inflammation, etc. No changes recommended today.     Will see again Monday -- please call in the interim if needed.     ARCHIE Ratliff  [facility]  04/05/24  16:29 EDT

## 2024-04-05 NOTE — PROGRESS NOTES
Lymphoma    Review of Systems   Constitutional:         He says  he feels good  Says he is supposed to go home today     Temp:  [97.1 °F (36.2 °C)-99.1 °F (37.3 °C)] 97.1 °F (36.2 °C)  Heart Rate:  [74-87] 87  Resp:  [18-20] 20  BP: (103-131)/(52-76) 131/76  I/O last 3 completed shifts:  In: 1080 [P.O.:1080]  Out: 525 [Urine:525]  I/O this shift:  In: 240 [P.O.:240]  Out: -     Physical Exam  Constitutional:       Appearance: Normal appearance.   Neurological:      Mental Status: He is alert.           Jon's syndrome    Hypertension    Lymphoma of spleen    Abdominal pain    Anemia associated with malignant neoplastic disease    Thrombocytopenia    Leukocytosis    Primary insomnia    Nausea    Elevated blood uric acid level    He says that Alexandre Shannon has a referral for him to another specialist?  PLans to do outpatient chemo next week  He wants home PT.    He may be discharged today by Hematology if they desire    Jovani Ruiz MD

## 2024-04-05 NOTE — CASE MANAGEMENT/SOCIAL WORK
Continued Stay Note  [facility]     Patient Name: Jeff Bass  MRN: [id number]  Today's Date: 4/5/2024    Admit Date: 3/29/2024    Plan: Home with family and caregivers. Family to transport.   Discharge Plan       Row Name 04/05/24 1543       Plan    Plan Home with family and caregivers. Family to transport.    Patient/Family in Agreement with Plan yes    Plan Comments Spoke with patient at bedside. Plans remains to return home with family to transport.                   Discharge Codes    No documentation.                 Expected Discharge Date and Time       Expected Discharge Date Expected Discharge Time    Apr 6, 2024               Elizabeth Calero RN

## 2024-04-06 ENCOUNTER — READMISSION MANAGEMENT (OUTPATIENT)
Dept: CALL CENTER | Facility: HOSPITAL | Age: 79
End: 2024-04-06
Payer: MEDICARE

## 2024-04-06 VITALS
WEIGHT: 214.51 LBS | OXYGEN SATURATION: 91 % | SYSTOLIC BLOOD PRESSURE: 142 MMHG | RESPIRATION RATE: 18 BRPM | TEMPERATURE: 98.4 F | BODY MASS INDEX: 25.33 KG/M2 | HEIGHT: 77 IN | HEART RATE: 94 BPM | DIASTOLIC BLOOD PRESSURE: 71 MMHG

## 2024-04-06 LAB
ANION GAP SERPL CALCULATED.3IONS-SCNC: 15.2 MMOL/L (ref 5–15)
ANION GAP SERPL CALCULATED.3IONS-SCNC: 15.3 MMOL/L (ref 5–15)
BLASTS NFR BLD MANUAL: 39 % (ref 0–0)
BUN SERPL-MCNC: 29 MG/DL (ref 8–23)
BUN SERPL-MCNC: 34 MG/DL (ref 8–23)
BUN/CREAT SERPL: 35.8 (ref 7–25)
BUN/CREAT SERPL: 35.8 (ref 7–25)
CALCIUM SPEC-SCNC: 8.2 MG/DL (ref 8.6–10.5)
CALCIUM SPEC-SCNC: 8.5 MG/DL (ref 8.6–10.5)
CHLORIDE SERPL-SCNC: 100 MMOL/L (ref 98–107)
CHLORIDE SERPL-SCNC: 101 MMOL/L (ref 98–107)
CO2 SERPL-SCNC: 27.8 MMOL/L (ref 22–29)
CO2 SERPL-SCNC: 28.7 MMOL/L (ref 22–29)
CREAT SERPL-MCNC: 0.81 MG/DL (ref 0.76–1.27)
CREAT SERPL-MCNC: 0.95 MG/DL (ref 0.76–1.27)
DEPRECATED RDW RBC AUTO: 49.6 FL (ref 37–54)
EGFRCR SERPLBLD CKD-EPI 2021: 80.9 ML/MIN/1.73
EGFRCR SERPLBLD CKD-EPI 2021: 89.1 ML/MIN/1.73
ERYTHROCYTE [DISTWIDTH] IN BLOOD BY AUTOMATED COUNT: 16.7 % (ref 12.3–15.4)
GLUCOSE SERPL-MCNC: 73 MG/DL (ref 65–99)
GLUCOSE SERPL-MCNC: 83 MG/DL (ref 65–99)
HCT VFR BLD AUTO: 26.7 % (ref 37.5–51)
HGB BLD-MCNC: 8.8 G/DL (ref 13–17.7)
LYMPHOCYTES # BLD MANUAL: 9.02 10*3/MM3 (ref 0.7–3.1)
LYMPHOCYTES NFR BLD MANUAL: 9 % (ref 5–12)
MAGNESIUM SERPL-MCNC: 1.6 MG/DL (ref 1.6–2.4)
MAGNESIUM SERPL-MCNC: 1.6 MG/DL (ref 1.6–2.4)
MCH RBC QN AUTO: 27.7 PG (ref 26.6–33)
MCHC RBC AUTO-ENTMCNC: 33 G/DL (ref 31.5–35.7)
MCV RBC AUTO: 84 FL (ref 79–97)
MONOCYTES # BLD: 3.01 10*3/MM3 (ref 0.1–0.9)
NEUTROPHILS # BLD AUTO: 8.35 10*3/MM3 (ref 1.7–7)
NEUTROPHILS NFR BLD MANUAL: 25 % (ref 42.7–76)
PHOSPHATE SERPL-MCNC: 2.8 MG/DL (ref 2.5–4.5)
PHOSPHATE SERPL-MCNC: 2.9 MG/DL (ref 2.5–4.5)
PLAT MORPH BLD: NORMAL
PLATELET # BLD AUTO: 22 10*3/MM3 (ref 140–450)
PMV BLD AUTO: 9.7 FL (ref 6–12)
POTASSIUM SERPL-SCNC: 3.5 MMOL/L (ref 3.5–5.2)
POTASSIUM SERPL-SCNC: 4 MMOL/L (ref 3.5–5.2)
RBC # BLD AUTO: 3.18 10*6/MM3 (ref 4.14–5.8)
RBC MORPH BLD: NORMAL
SODIUM SERPL-SCNC: 143 MMOL/L (ref 136–145)
SODIUM SERPL-SCNC: 145 MMOL/L (ref 136–145)
URATE SERPL-MCNC: 3.8 MG/DL (ref 3.4–7)
URATE SERPL-MCNC: 3.9 MG/DL (ref 3.4–7)
VARIANT LYMPHS NFR BLD MANUAL: 27 % (ref 19.6–45.3)
WBC MORPH BLD: NORMAL
WBC NRBC COR # BLD AUTO: 33.39 10*3/MM3 (ref 3.4–10.8)

## 2024-04-06 PROCEDURE — 84550 ASSAY OF BLOOD/URIC ACID: CPT | Performed by: INTERNAL MEDICINE

## 2024-04-06 PROCEDURE — 85007 BL SMEAR W/DIFF WBC COUNT: CPT | Performed by: INTERNAL MEDICINE

## 2024-04-06 PROCEDURE — 85025 COMPLETE CBC W/AUTO DIFF WBC: CPT | Performed by: INTERNAL MEDICINE

## 2024-04-06 PROCEDURE — 83735 ASSAY OF MAGNESIUM: CPT | Performed by: INTERNAL MEDICINE

## 2024-04-06 PROCEDURE — 80048 BASIC METABOLIC PNL TOTAL CA: CPT | Performed by: INTERNAL MEDICINE

## 2024-04-06 PROCEDURE — 84100 ASSAY OF PHOSPHORUS: CPT | Performed by: INTERNAL MEDICINE

## 2024-04-06 RX ORDER — TEMAZEPAM 15 MG/1
15 CAPSULE ORAL NIGHTLY PRN
Qty: 30 CAPSULE | Refills: 2 | Status: SHIPPED | OUTPATIENT
Start: 2024-04-06 | End: 2024-07-05

## 2024-04-06 RX ORDER — POTASSIUM CHLORIDE 750 MG/1
40 TABLET, FILM COATED, EXTENDED RELEASE ORAL EVERY 4 HOURS
Status: COMPLETED | OUTPATIENT
Start: 2024-04-06 | End: 2024-04-06

## 2024-04-06 RX ORDER — ALLOPURINOL 300 MG/1
300 TABLET ORAL DAILY
Qty: 30 TABLET | Refills: 2 | Status: SHIPPED | OUTPATIENT
Start: 2024-04-07

## 2024-04-06 RX ORDER — FOLIC ACID 1 MG/1
1 TABLET ORAL DAILY
Qty: 30 TABLET | Refills: 2 | Status: SHIPPED | OUTPATIENT
Start: 2024-04-07

## 2024-04-06 RX ORDER — BUMETANIDE 0.25 MG/ML
2 INJECTION INTRAMUSCULAR; INTRAVENOUS ONCE
Status: DISCONTINUED | OUTPATIENT
Start: 2024-04-06 | End: 2024-04-06

## 2024-04-06 RX ORDER — TORSEMIDE 20 MG/1
60 TABLET ORAL ONCE
Status: DISCONTINUED | OUTPATIENT
Start: 2024-04-06 | End: 2024-04-06 | Stop reason: HOSPADM

## 2024-04-06 RX ADMIN — FOLIC ACID 1 MG: 1 TABLET ORAL at 09:10

## 2024-04-06 RX ADMIN — DIBASIC SODIUM PHOSPHATE, MONOBASIC POTASSIUM PHOSPHATE AND MONOBASIC SODIUM PHOSPHATE 2 TABLET: 852; 155; 130 TABLET ORAL at 09:10

## 2024-04-06 RX ADMIN — MIDODRINE HYDROCHLORIDE 5 MG: 5 TABLET ORAL at 11:10

## 2024-04-06 RX ADMIN — DIBASIC SODIUM PHOSPHATE, MONOBASIC POTASSIUM PHOSPHATE AND MONOBASIC SODIUM PHOSPHATE 2 TABLET: 852; 155; 130 TABLET ORAL at 11:10

## 2024-04-06 RX ADMIN — Medication 10 ML: at 09:13

## 2024-04-06 RX ADMIN — MIDODRINE HYDROCHLORIDE 5 MG: 5 TABLET ORAL at 09:10

## 2024-04-06 RX ADMIN — POTASSIUM CHLORIDE 40 MEQ: 750 TABLET, EXTENDED RELEASE ORAL at 11:11

## 2024-04-06 RX ADMIN — POTASSIUM CHLORIDE 40 MEQ: 750 TABLET, EXTENDED RELEASE ORAL at 06:33

## 2024-04-06 RX ADMIN — SODIUM BICARBONATE 650 MG: 650 TABLET ORAL at 09:10

## 2024-04-06 NOTE — PROGRESS NOTES
[facility] Progress Note      Patient Name: Jeff Bass  : 1945  MRN: [id number]  Primary Care Physician:  Jovani Salomon MD  Date of admission: 3/29/2024    Subjective     Interval History:     Patient lying in bed  Denies any chest pain, shortness of breath palpitations  Tolerating oral intake no abdominal pain  Urinating spontaneously      Review of Systems:   As noted above    Objective     Vitals:   Temp:  [97 °F (36.1 °C)-98.4 °F (36.9 °C)] 98.4 °F (36.9 °C)  Heart Rate:  [81-94] 94  Resp:  [16-18] 18  BP: (116-142)/(55-71) 142/71    Intake/Output Summary (Last 24 hours) at 2024 1512  Last data filed at 2024 0600  Gross per 24 hour   Intake 1017 ml   Output 600 ml   Net 417 ml       Physical Exam:    General Appearance: alert, oriented x 3, no acute distress   Skin: warm and dry  HEENT: oral mucosa normal, nonicteric sclera  Neck: supple, no JVD  Lungs: CTA  Heart: RRR, normal S1 and S2  Abdomen: soft, nontender, nondistended  : no palpable bladder  Extremities: no edema, cyanosis or clubbing  Neuro: normal speech and mental status     Scheduled Meds:     allopurinol, 300 mg, Oral, Daily  folic acid, 1 mg, Oral, Daily  midodrine, 5 mg, Oral, TID AC  phosphorus, 500 mg, Oral, 4x Daily  polyethylene glycol, 17 g, Oral, Daily  sodium chloride, 10 mL, Intravenous, Q12H  vitamin B-12, 1,000 mcg, Oral, Daily      IV Meds:          Results Reviewed:   I have personally reviewed the results from the time of this admission to 2024 15:12 EDT     Results from last 7 days   Lab Units 24  0635 24  2346 24  1841 24  1216 24  0632 24  1931 24  0543 24  1550   SODIUM mmol/L 145 143 142   < > 140  140   < > 140 138   POTASSIUM mmol/L 4.0 3.5 3.7   < > 4.8  4.8   < > 3.8  3.8 3.4*   CHLORIDE mmol/L 101 100 100   < > 106  106   < > 107 100   CO2 mmol/L 28.7 27.8 26.7   < > 16.0*  16.0*   < > 20.0* 21.0*    BUN mg/dL 29* 34* 34*   < > 46*  46*   < > 24* 23   CREATININE mg/dL 0.81 0.95 0.92   < > 1.51*  1.51*   < > 0.75* 0.84   CALCIUM mg/dL 8.2* 8.5* 8.3*   < > 7.6*  7.6*   < > 7.7* 8.5*   BILIRUBIN mg/dL  --   --   --   --  0.9  --  1.3* 1.5*   ALK PHOS U/L  --   --   --   --  69  --  51 53   ALT (SGPT) U/L  --   --   --   --  15  --  14 15   AST (SGOT) U/L  --   --   --   --  45*  --  26 26   GLUCOSE mg/dL 73 83 122*   < > 136*  136*   < > 89 73    < > = values in this interval not displayed.       Estimated Creatinine Clearance: 100.1 mL/min (by C-G formula based on SCr of 0.81 mg/dL).    Results from last 7 days   Lab Units 04/06/24 0635 04/05/24 2346 04/05/24  1841   MAGNESIUM mg/dL 1.6 1.6 1.7   PHOSPHORUS mg/dL 2.8 2.9 1.9*       Results from last 7 days   Lab Units 04/06/24  0635 04/05/24  2346 04/05/24  1841 04/05/24  1249 04/05/24  0654 04/05/24  0040 04/04/24  1815 04/04/24  1311 04/04/24  0605 04/04/24  0014 04/03/24  1807 04/03/24  1216 04/03/24  0632 04/02/24  2353   URIC ACID mg/dL 3.9 3.8 4.0 3.8 4.0 4.2 4.6 5.5 6.2 6.1 6.4 6.7 7.1* 6.7       Results from last 7 days   Lab Units 04/06/24  0635 04/05/24  0654 04/04/24  0605 04/03/24  0632 04/02/24  0543   WBC 10*3/mm3 33.39* 29.10* 30.63* 24.07* 27.52*   HEMOGLOBIN g/dL 8.8* 8.7* 8.5* 11.2* 10.1*   PLATELETS 10*3/mm3 22* 13* 33* 20* 52*               Assessment / Plan     ASSESSMENT:    Nonoliguric acute kidney injury baseline creatinine between 0.8 and 0.9 slowly worsening to 1.5, accompanied with the hypophosphatemia.  Borderline hyperkalemia and hyperuricemia of 7.1.  Fulfilling criteria for Green criteria .  Patient was given rasburicase IV once.  Currently on allopurinol 300 mg daily.  Continue sodium bicarbonate due to metabolic acidosis will add, phosphorus binders due to hyperphosphatemia and will adjust renal diet with low K last phosphorus.  Will continue to follow renal function closely and adjust treatment based on trend      Metabolic acidosis from CKD lactic acid follow CO2     Recurrent diffuse large B-cell lymp adjusted fluids to sodium bicarbonate and follow CO2 trendhoma status post R-CHOP and rituximab with presents elevated LDH followed closely by.Hematology     Benign prostatic hypertrophy with history of prostate cancer will obtain bladder scan.  Based on results he may need a Mcdaniel catheter placement     Hypotension  continue midodrine TID  and albumin trial 25 g every 6 hours completed , continue midodrine only     Hypophosphatemia .  Started on K-Phos replacement and 4 times daily and change diet from renal diet to regular diet to increase phosphate intake       PLAN:  -Will discontinue his midodrine upon discharge  - Ordered torsemide 60 mg oral once   -I will arrange close follow-up with renal clinic as an outpatient        Thank you for involving us in the care of Jeffkacey Bass.  Please feel free to call with any questions.    David Carroll MD  04/06/24  15:12 EDT    [facility] \[facility]""  [phone]    Please note that portions of this note were completed with a voice recognition program.

## 2024-04-06 NOTE — PROGRESS NOTES
LOS: 8 days   Patient Care Team:  Jovani Salomon MD as PCP - General (Internal Medicine)  Gerry Schroeder MD as Consulting Physician (Hematology and Oncology)  Stephanie Toledo PA as Referring Physician (Physician Assistant)    Chief Complaint: Abdomen pain.  Recurrent lymphoma who is splenomegaly and progressive retroperitoneal lymphadenopathy.    Interval History:  On 3/30/2024  Abdomen pain is reasonably controlled severity 8/10.  Had a good bowel movement.  Afebrile.    3/31/2024  Abdomen pain is controlled.  Patient is afebrile.  No nausea vomiting.  Worsening leukocytosis.   On 04/01/2024 the patient feels very fatigued and tired with no appetite, lesser abdominal pain. Spleen remains enlarged but nontender. He remains with low grade temperature and profuse perspiration. No cough or shortness of breath. His lymph node biopsy was performed a few minutes ago from the right groin. PET scan to be done tomorrow morning. The patient wants to go home today.  On 04/02/2024 the patient feels very fatigued and tired, no appetite, perspiration profusely, low grade temperature. Minimal if any abdominal pain. PET scan was done this morning, the findings are very striking and to be reviewed below. He has had some bowel activity, proper urinary flow once that the port was opened and receiving IV fluids abundantly in preparation for treatment today.    On 04/03/2024 the patient had significant reaction to infusion of Rituxan yesterday including fevers and chills. We had to give him Solu-Cortef and naprosyn and the symptoms improved. This morning he feels dramatically better and he is hungry. He has no abdominal pain, nausea or vomiting. Urinary output is abundant. Uric acid is going up, potassium is 5 and he will require reassessment in regard further continuation of Rituxan that probably will be postponed until tomorrow. I am going to go ahead and consult the nephrology team to help us out in regard to tumor lysis  management.    On 04/04/2024 the patient has had a quiet night with good appetite last night and good bowel movement today. He had a good breakfast. He has no abdominal pain, nausea or vomiting. Proper urination. He has not had any fever or chills. Today his clinical examination will be reviewed below. Laboratory parameters will be discussed below.    On 04/05/2024 the patient completed last night his 2nd day of Rituxan administration with no difficulties as premedications were provided including Solu-cortef. Today he has had a good breakfast, he has not had bowel activity yet. He feels better. He really wants to go home, the patient still has electrolyte imbalance and I have discussed this with Dr. Salomon and with Nephrologist, David Carroll MD. Please review below. He is not experiencing any fever or chill. He is very fatigued and tired.    On 04/06/2024, the patient had a good night of sleep. He has no abdominal pain. No nausea or vomiting. Bowel activity is ongoing. He is weak. He had no other difficulties yesterday overall. He wants to go home today. His port will be flushed and the needle will be removed today.          HISTORY OF PRESENT ILLNESS:  The patient is a 78 y.o. 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  Patient reports he was at baseline condition, and yesterday afternoon started having abdominal pain around umbilical/lower abdomen area associated with nausea.  Patient reports abdomen pain is constant.  He also reports having poor appetite since this past Sunday which is 5 days ago.  He denies low fever sweating or chills.       Patient also reported generalized weakness in the past 24 hours also and did not drink either too much at all.    Vital  Signs:  Temp:  [97 °F (36.1 °C)-98.4 °F (36.9 °C)] 98.4 °F (36.9 °C)  Heart Rate:  [81-94] 94  Resp:  [16-18] 18  BP: (116-142)/(55-71) 142/71    Intake/Output Summary (Last 24 hours) at 4/6/2024 1352  Last data filed at 4/6/2024 0600  Gross per 24 hour   Intake 1017 ml   Output 600 ml   Net 417 ml       Physical Exam:  Eyes and oral mucosa's were normal. No peripheral adenopathy in neck or axillas, lymph node in the right groin measuring 3 cm in size, mildly tender because of just recent biopsy. Left groin no lymph node available. Abdomen shows spleen below the left costal margin almost 15 cm below. No liver enlargement. No abdominal pain. No tenderness. His lungs were clear, his heart was regular with no gallops, murmurs or rubs. His skin was perspiring abundantly and moist. He was awake, alert, oriented in time and place.     On 04/02/2024 the clinical examination today is no different than what it was yesterday. The spleen remains enlarged, large similar to what it was yesterday. The right groin surgical site is healing well. No other new findings.     Eyes and oral mucosa's were normal, no cervical or axillary adenopathy, lungs were clear, heart was regular, abdomen the spleen was lesser in size, almost 1/2 of the size it was yesterday with no tenderness. No liver enlargement. No abdominal masses otherwise. Area of lymph node biopsy in the right groin is healing. He was awake, alert, oriented in time and place, carry normal conversation.    Stable vital signs. Eyes, skin and mucosa's were normal. Port site without any changes, lungs were clear, heart was regular, abdomen was soft. Spleen palpable 7 cm below the left costal margin, nontender, no liver enlargement. Right inguinal surgical site biopsy site well healed with no hematoma formation or pain. He was awake, alert, oriented in time and place. Very fatigued.     Patient was pale. He was awake, alert, oriented in time and place. He was able to carry a  normal conversation. The spleen was barely palpable in the left costal margin. No liver enlargement, no peripheral adenopathies. Lung were clear. Heart was regular. Abdominal exam was otherwise benign. He had no inguinal adenopathy or pain in the right groin area. Extremities with 1+ edema. He was able to discuss and carry conversation with me but he was adamant of wanting to go home today.    His vital signs are stable on 04/06/2024. He is awake, alert, oriented to time and place, able to carry a normal conversation. No jugular vein distention. Lungs were clear. Heart was regular. Abdominal exam is benign. Spleen is barely palpable. No abdominal pain, rebound or guarding. No inguinal adenopathies. No axillary adenopathies. Extremities with 1+ to 2+ edema. No tenderness in the calves. Port site was normal. He was carrying normal conversation.      Results Review:   CBC:      Lab 04/06/24  0635 04/05/24  0654 04/04/24  0605 04/03/24  0632 04/02/24  0543 04/01/24  1550   WBC 33.39* 29.10* 30.63* 24.07* 27.52* 18.88*   HEMOGLOBIN 8.8* 8.7* 8.5* 11.2* 10.1* 10.9*   HEMATOCRIT 26.7* 25.6* 24.7* 32.5* 29.8* 31.9*   PLATELETS 22* 13* 33* 20* 52* 49*   NEUTROS ABS 8.35* 11.35* 5.21 20.68* 9.74* 6.99   EOS ABS  --  0.29  --   --   --  0.19   MCV 84.0 82.1 81.8 83.1 83.5 83.1     CMP:        Lab 04/06/24  0635 04/05/24  2346 04/05/24  1841 04/05/24  1249 04/05/24  0654 04/03/24  1216 04/03/24  0632 04/02/24  1931 04/02/24  0543 04/01/24  1550 03/31/24  0539 03/31/24  0539   SODIUM 145 143 142 142  141 142   < > 140  140   < > 140 138  --  132*   POTASSIUM 4.0 3.5 3.7 3.9  3.9 4.3   < > 4.8  4.8   < > 3.8  3.8 3.4*  --  3.8   CHLORIDE 101 100 100 100  100 101   < > 106  106   < > 107 100  --  97*   CO2 28.7 27.8 26.7 21.2*  24.2 23.2   < > 16.0*  16.0*   < > 20.0* 21.0*  --  18.2*   ANION GAP 15.3* 15.2* 15.3* 20.8*  16.8* 17.8*   < > 18.0*  18.0*   < > 13.0 17.0*  --  16.8*   BUN 29* 34* 34* 37*  40* 43*   < > 46*   46*   < > 24* 23  --  22   CREATININE 0.81 0.95 0.92 1.02  1.04 1.08   < > 1.51*  1.51*   < > 0.75* 0.84  --  0.85   EGFR 89.1 80.9 84.1 74.3  72.6 69.4   < > 46.4*  46.4*   < > 91.2 88.2  --  87.8   GLUCOSE 73 83 122* 102*  103* 83   < > 136*  136*   < > 89 73  --  82   CALCIUM 8.2* 8.5* 8.3* 8.4*  8.6 8.1*   < > 7.6*  7.6*   < > 7.7* 8.5*  --  8.2*   MAGNESIUM 1.6 1.6 1.7 1.9 1.9   < > 1.7   < >  --   --   --  1.8   PHOSPHORUS 2.8 2.9 1.9* 1.8*  1.8* 2.0*   < > 5.0*   < > 1.9* 2.4*   < >  --    TOTAL PROTEIN  --   --   --   --   --   --  3.8*  --  3.9* 4.5*  --  4.7*   ALBUMIN  --   --   --  3.3*  --   --  2.5*  --  2.8* 3.0*  --  3.2*   GLOBULIN  --   --   --   --   --   --  1.3  --  1.1 1.5  --   --    ALT (SGPT)  --   --   --   --   --   --  15  --  14 15  --  14   AST (SGOT)  --   --   --   --   --   --  45*  --  26 26  --  29   BILIRUBIN  --   --   --   --   --   --  0.9  --  1.3* 1.5*  --  1.2   INDIRECT BILIRUBIN  --   --   --   --   --   --   --   --   --   --   --  0.7   BILIRUBIN DIRECT  --   --   --   --   --   --   --   --   --   --   --  0.5*   ALK PHOS  --   --   --   --   --   --  69  --  51 53  --  53    < > = values in this interval not displayed.     LDH   Date Value Ref Range Status   04/05/2024 >2,500 (H) 135 - 225 U/L Final     Uric Acid   Date Value Ref Range Status   04/06/2024 3.9 3.4 - 7.0 mg/dL Final     Lab Results   Component Value Date    IRON 34 (L) 03/30/2024    TIBC 171 (L) 03/30/2024    FERRITIN 801.00 (H) 03/30/2024     Lab Results   Component Value Date    FOLATE 6.02 03/30/2024     Lab Results   Component Value Date    ILCXHYQP52 437 03/30/2024                       Results from last 7 days   Lab Units 04/06/24  0635   MAGNESIUM mg/dL 1.6         Patient Name: MAC THOMPSON   Accession #: [id number]     [facility]   [address], [address]   [place], [place]  [address]       CLINICAL HISTORY:   78 year old male with a history of CLL     SPECIMEN SOURCE:    BLOOD ([id number])       FINAL DIAGNOSIS:   LEUKEMIA/LYMPHOMA PANEL     INTERPRETATION     CD5+ MONOCLONAL B CELL PROCESS, CONSISTENT WITH HISTORICAL LYMPHOMA         PATHOLOGIST COMMENT:   The B cell lymphoma comprises 72.5% of peripheral blood cellularity.       Electronically Signed Out on 4/1/2024      KETAN VARGAS MD    cw/4/1/2024  Interpretation performed at:   [facility]   [address]   [place]  [address]         Assessment:  *.  History of SLL status post Rituxan plus Bendamustine, and had Jon's transformation in 2023 for which he had 5 cycles Rituxan plus CHOP with the good clinical response.     *.  Abdomen pain new onset for the past 2 days.  Patient also feels malaise nausea for about 5 days.  Denies fever chills, he did have mild sweatling but not drenching sweating.  Has poor appetite for the past 4 5 days, no significant weight loss.  Patient was seen by general surgeon Dr. Witt, no acute abdomen or indication for surgical intervention.  CT scan for abdomen pelvis today reported significant worsening  Splenomegaly, retroperitoneal lymphadenopathy in the pelvic lymphadenopathy.  Laboratory study also showed newly developed leukocytosis, lymphocytosis, neutropenia, and thrombocytopenia worsening anemia.  I think this patient has disease progression from his lymphoma.  I recommended following: ultrasound-guided right inguinal lymph node biopsy for comprehensive study to distinguish of recurrent diffuse large B-cell lymphoma versus progression of SLL/CLL, obtain PET scan as inpatient, and also subsequently start first cycle chemotherapy as inpatient.  With the patient having symptoms and significant disease progression, I do not think arrange outpatient chemotherapy is in the best interest of this patient because of the elevated time for biopsy, PET scan and initiation of chemotherapy.    Will also check laboratory studies for LDH and uric acid.  This  patient also has leukocytosis/lymphocytosis, I also recommended to have peripheral blood for flow cytometry study.  This also will help distinguish between CLL and diffuse large B-cell lymphoma.  I think this patient may have leukemia phase of the diffuse large B-cell lymphoma.  3/29/2024 enormously elevated LDH > 2500.  This is secondary to his lymphoma.  3/30/2024 pain is better controlled severity 4/10.   3/31/2024 abdomen pain is controlled.  Worsening leukocytosis.  Peripheral blood smear reviewed, they are significant abnormal mononuclear cells, the diameter is about 3-4 times larger than the erythrocytes, many of those cells having 4 or 5 nucleoli within the nucleus, the chromatin is much less condensed.  And I can see some of those cells having dividing nucleus, however they do not look like typical blasts.  There are also typical small lymphocytes with condensed chromatin.  I explained to patient, he has leukemia phase of aggressive lymphoma relapse.  That is why I think this patient definitely needs a biopsy.    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 The similar picture that he has back last year in 2023 when he had exactly the same clinical circumstances and he improved dramatically with chemotherapy administration using CHOP and Rituxan.     At this point I do believe that the patient needs to proceed as follows:    1. He will require initiation of Rituxan administration tomorrow and once a week.  2. He will receive IV fluids today normal saline 125 cc/hr along with allopurinol 300 mg orally everyday.   3. He will repeat uric acid, LDH, CMP on a regular basis on a daily basis.  4. We will require continuation of monitoring his pathological analysis of the lymph node.   5. The flow cytometry  from his peripheral blood documents a monoclonal population of cells CD5 positive encompassing 75% of the events that has been posted. This documents that the so called monocytes are large cell lymphoid malignant cells consistent with his Jon's transformation and going along with his LDH.   6. Once that the patient leaves the hospital probably by this Thursday or Friday and once that we have proper monitoring of tumor lysis syndrome the patient will require continuation of Rituxan in the office on a weekly basis and very likely incorporation of new modality of therapy to him including the consideration of new monoclonal antibodies to deliver care for him.     I discussed this with him, discussed this with nursing and discussed this with Pathologist, Dr. Rashid at [facility] today.     On 04/02/2024 I discussed with the patient the findings on his PET scan that ae very striking. His spleen is huge with very significant SUV activity higher than ever before and also he has a line of retroperitoneal adenopathy that goes all of the way down into the right scrotum. There is minimal mediastinal adenopathy. Liver anatomy was normal. No other sites of disease including no abnormal bone uptake.     The biopsy of the lymph node in the right groin is still pending. This will be available hopefully tomorrow.     On the basis that he has high LDH and his persistent symptomatology it is very obvious that this patient has a diffuse large cell non-Hodgkin's lymphoma coming from the transformation from his CLL as discussed last year. The patient needs to be treated today. Given his high risk of tumor lysis syndrome we will split the dose of Rituxan with 1/2 of the dose today, 1/2 of the dose tomorrow and he will require frequent monitoring of his chemistry profile including CMP, phosphorus, uric acid and LDH. If the uric acid or the phosphorus or the potassium goes up he will require immediate assessment and he will require  rasburicase as well discussed with pharmacist. His uric acid today is appropriate. His LDH remains above 2500.     Obviously future plans will require to be made once that he is ready for discharge and this will be further discussed with him including continuation of Rituxan administration and consideration of second line of therapy through CAR T-cell therapy that could be discussed at the [facility].     On 04/03/2024 the patient tolerated the day before 1/2 of the dose of Rituxan with a lot of inconveniences including febrile illness with temperature going as high as 102 associated with chills and fever requiring hydrocortisone  mg. He finally settled down. He was able to complete the first half of the infusion of Rituxan. Today his creatinine has gone up to 1.5, his uric acid has increased, his phosphorus level has mildly increased, potassium level mildly increased. I would prefer for the patient to postpone any further Rituxan administration until tomorrow. I went ahead and increased his IV fluid rate to 150 cc/hr and I went ahead and called the nephology team to help us out with the management of electrolytes, calcium, potassium, phosphorus and so forth in this kind of setting. I discussed with Pharmacist, Alina, the fact that I will postpone the Rituxan administration until tomorrow and it is perfectly fine from this point of view.    He will be premedicated tomorrow with hydrocortisone and Tylenol.     On 04/04/2024 the patient was ready to proceed with Rituxan administration leftover from the induction treatment a couple of days ago. His creatinine is 1.6, his potassium and phosphorus levels are okay, uric acid is under better control and we will proceed with treatment in that way. He will be premedicated with hydrocortisone, Benadryl and Tylenol and hopefully he will not have any fever or chills during the infusion time.    Planning to continue monitoring his laboratory parameters, maybe  plan to go home tomorrow or Saturday.     On 04/05/2024, his laboratory parameters including phosphorus, uric acid, LDH remain abnormal and I do not believe that this patient can go home yet. I discussed this with Dr. Salomon. I also discussed this with Dr. Carroll, who has switched most of his medications orally.     At the most if we get very charlotte the patient should be able to go home either tomorrow or on Sunday.     The daughter is in the process of arrangement for the patient to have care 24 hours a day at home. This situation has not been scheduled and worked up completely.     Again, I discussed all these issues with the patient's daughter yesterday.         *.  Worsening anemia and newly developed thrombocytopenia.  This is due to lymphoma progression.  Nevertheless I recommended to obtain iron study ferritin iron profile, together with B12 and folate.  Will also check LDH.  Patient does have elevated total bilirubin.  Lab study 3/30/2024 hemoglobin 10.1.  Ferritin 801, free iron 34 iron saturation 20%, B12 at 437 and folate 6.02 ng/mL.  No iron deficiency, fits with inflammatory condition related to his lymphoma.  Nevertheless low normal B12 and folate level, I think patient would benefit from oral supplement for both with no apparent side effects.  3/31/2024 hemoglobin 10.7.  On 04/02/2024 the hemoglobin remains stable, unchanged, no notion of blood loss, no notion of hemolysis.    On 04/03/2024 hemoglobin remains stable, no notion of blood loss.     On 04/04/2024 the hemoglobin is lower related to the large volume of IV fluids provided to help out with proper hydration and proper creatinine clearance. Right now he has no need for blood products. He probably will require diuresis tomorrow. He continues being seen by nephrology and we appreciate, David Carroll, Bud, input very much.     On 04/05/2024, the patient remains anemic. I do not think he needs blood products. He will require diuresis per  Nephrology today. IV fluids have been STOPPED.      *.  Insomnia.  Patient reports poor sleeping quality, especially in the hospital.  Patient was taking melatonin at home not really helping.  I recommended to start the patient Ambien as needed.   On 04/02/2024 the insomnia is the result of his disease process and his worries and stressors. We could deliver a low dose of Xanax if necessary.    On 04/03/2024 in spite of all of the fevers, chills and issues pertinent to Rituxan administration the patient was able to have a night of sleep that was decent. Hopefully now he will start to eat, he will improve this situation and he will have a quiet night tonight.     On 04/04/2024 he had a good night of sleep. He will proceed with treatment today. Hopefully he will have a quiet night the rest of the day after the infusion is completed.       *.  Hypokalemia.  Management per primary team and normalized.  On 04/03/2024 given the evidence of tumor lysis syndrome with an LDH above 2500, uric acid elevation, potassium elevation, phosphorus elevation we will ask nephrology team to see him.     On 04/04/2024 potassium level is appropriate at this time. No evidence of hyperkalemia or hypokalemia.      *.  Elevated uric acid level.  This is related to his progressive lymphoma.  Uric acid 9.7 on 3/29/2024.  I recommend to start allopurinol to decrease uric acid and also preventing tumor lysis syndrome, expecting patient will have chemotherapy in the next few days.  3/31/2024 normalized uric acid 6.4.   On 04/02/2024 uric acid is in much better shape today than what it was 2 days ago. Allopurinol has been ongoing, rasburicase could be utilized in case of need after proper testing today.    On 04/03/2024 I went ahead and ordered rasburicase for this patient to be given to today and probably he will need another dose tomorrow depending on the uric acid level tomorrow morning. As posted above the patient will delay further Rituxan  administration for tomorrow.    On 04/04/2024 the patient will receive rasburicase today another dose of 3 mg IV and he will proceed with his Rituxan administration. We will continue monitoring numbers as early as tomorrow.     On 04/05/2024, uric acid is excellent. Allopurinol will be CONTINUED.      PLAN:   I discussed on 04/02/2024 the patient will split the dose of Rituxan today in 1/2 and tomorrow the other 1/2 given the high potential for tumor lysis syndrome given the findings on his PET scan.    We will monitor electrolytes, uric acid and LDH every 6 hours or so and rasburicase could be included in his regimen of medicine if uric acid goes high.    I reviewed with him his PET scan in the PAC system [facility], explained to him the findings and the notion of very significant uptake in the spleen that is very dramatic. I pointed out to the patient many months ago that he will require a splenectomy that he refused to have, now we have the consequences of what we see and we have to live with this. Obviously at this point the patient needs to have systemic therapy, cool off the process and then strong consideration for some other modality of therapy as a form of salvage treatment.    On 04/02/2024 I discussed the case with pharmacist in detail.    On 04/02/2024 as posted above we postponed further Rituxan administration until tomorrow. We will ask the nephrology team to help us out to control the tumor lysis syndrome. He had more IV fluids ongoing today and he will require calcium supplementation, close monitoring of potassium, phosphorus and uric acid. Rasburicase will be given to him at the dose of 3 mg and discussed with pharmacist.    On the other hand the patient feels better, he is hungry and his spleen is 1/2 of the size of what it was yesterday. That is encouraging.     On 04/03/2024 we have the pathological analysis of the lymph node biopsy of the right groin. This analysis is not  concordant with the clinical picture that the patient has. Unfortunately what I think the pathology sees, another thing is the clinical situation of the patient and that is not surprising, similar situation happened during the development of his Jon's transformation last year. The pathology was telling us something, the patient was telling us something completely different.     On 04/04/2024 the patient requested for me to talk with his daughter by video telephone. I will proceed with this today. Compilation of that conversation will be discussed with the patient tomorrow.    As I discussed with Dr. Salomon, the patient's primary physician, and with Dr. Carroll, I do not believe that the patient is ready to go home. He will require to be in the hospital. We will stop his IV fluids, mobilize him, give him diuretics and then go from there. His phosphorus will be corrected. His allopurinol will be continued and we will see how things evolve tomorrow.    On 04/06/2024, the patient is relatively stable. He had a good night of sleep. He is hungry. He has bowel and urinary activity. He has no fever. His hemoglobin is stable. I find no need for blood products and his white count is in the 30,000s. Platelet count is stable. His pain is decreasing in size with no tenderness in the abdomen.     I do believe that I would like to remove the needle from the port and have him ready for discharge per Dr. Salomon today if he wishes. The patient really wants to go home and he will be arranging for somebody to be at home with him 24 hours a day after 3 or 4 p.m. this afternoon. His daughter will require to pick him up.     Besides this no other new issues. I will sign off on his care at this time. The patient will require weekly visits to the office for clinical assessment, Rituxan administration. Eventually, referral for consideration of different modality of therapy for his relapsed Jon's transformation of CLL.        Spoke  with pharmacist staff today.      Gerry Schroeder MD  04/06/24  13:52 EDT

## 2024-04-06 NOTE — DISCHARGE SUMMARY
Shama Bass was admtted for abdominal pain.He was seen in the ER. Nausea accompanied the upper abdominal pain.He was having a poor appetite and diffuse muscle weakness.    Previous ultrasound several years before this admssion had shown sludge in the gallbladder and a HIDAscan had been abnormal.    CT on this admission showed massive splenomegaly more prominent than the PET scan of 9/23 The liver had numerous cysts .  The gall bladder appeared normal.Retroperitoneal and  pelvic  adenopathuy is observed.  New lymph nodes in the retroperitoneum.Right  inguinal node was large.Increased levels of nodes in the precaval and the aortocaval spaces.    WBC was 29,000 34% blasts. Hemoglobin 8.7   PlATELETWS 22,000.    He was seen by general surgery .He did no think that he needed any surgical intervention. Gall bladder untrasound was normal. PET scanning confirmed the diffuse lymphoma of the abdomen and pelvis.    He was seen by his regular team of hematologists including Dr Perkins and Dr. Schroeder.He was thought to have disease progression of his known Lymphoma. Right inguinal lymph node biopsy was done.PET scan was done.Uric acid was elevated. Allopurinol was started and folic acid.     Patient developed SVT and was seen by cardiology. Begun on Metoprolol. He had one very short run of Atrial fibrillation. Anticoagulion was not recommended.    He was assumed to have return of Jon's transformation of CLL to lymphoma and received IV Rituxan and Bendamustine.He was assumed to have diffuse large cell non-Hodgkins lymphoma.He had rather severe reaction to the Rituxan and had hypotension, fever, acute changes in his renal function. He was seen by nephrology who assisted in dealing with his renal reaction Creatinine increased from normal to 1.5 and low phosphorus level. Hyperkalemia and Hyperuricemia.He was given IV rasburicase. And sodium bicarbonate for his metabolic acidosis. Hypotension treated with midodrine and  albumin.Phosphosus replacement. He was switched to oral sodium bicarbonate,and received one dose of IV Bumex.    Symptomatically he has improved greatly. He is eating, and drinking and his nausea has resovled.He wishes to continue PT at home with [facility]  health.    Diffuse large cell non Hodgkinslymphoma  Supraventricular tachycardia  Hypotension due to chemo reaction  Tumor Lysis syndrome      He will be seen by Dr Schroeder this week for further chemo.      Jovani Ruiz MD

## 2024-04-07 NOTE — CASE MANAGEMENT/SOCIAL WORK
Case Management Discharge Note      Final Note: dc home    Provided Post Acute Provider List?: N/A  N/A Provider List Comment: No needs identified  Provided Post Acute Provider Quality & Resource List?: N/A    Selected Continued Care - Discharged on 4/6/2024 Admission date: 3/29/2024 - Discharge disposition: Home or Self Care      Destination    No services have been selected for the patient.                Durable Medical Equipment    No services have been selected for the patient.                Dialysis/Infusion    No services have been selected for the patient.                Home Medical Care    No services have been selected for the patient.                Therapy    No services have been selected for the patient.                Community Resources    No services have been selected for the patient.                Community & DME    No services have been selected for the patient.                         Final Discharge Disposition Code: 01 - home or self-care

## 2024-04-07 NOTE — OUTREACH NOTE
Prep Survey      Flowsheet Row Responses   [facility] facility patient discharged from? [place]   Is LACE score < 7 ? No   Eligibility Readm Mgmt   Discharge diagnosis Oncology treatment   Does the patient have one of the following disease processes/diagnoses(primary or secondary)? Other   Does the patient have Home health ordered? No   Is there a DME ordered? No   Prep survey completed? Yes            JOANN POLO - Registered Nurse

## 2024-04-08 LAB
DX PRELIMINARY: NORMAL
LAB AP CASE REPORT: NORMAL
LAB AP CLINICAL INFORMATION: NORMAL
LAB AP SPECIAL STAINS: NORMAL
PATH REPORT.FINAL DX SPEC: NORMAL
PATH REPORT.GROSS SPEC: NORMAL

## 2024-04-08 NOTE — TELEPHONE ENCOUNTER
Received a voicemail from patient's daughter stating her dad who was recently discharged has a fever, significantly decreased appetite, rust colored drainage from nose, and general malaise/lethargy, and that yesterday he had SOA that she is now attributing to anxiety. Denies cough. Tmax 100.7. Advised she take him back to the hospital. Pt's daughter v/u. Anneliese Torres RN

## 2024-04-10 NOTE — HOME HEALTH
"Reason for referral/Focus of Care:   79 yo M referred to home health PT with difficulty with amb and transfers related to diffuse weakness s/p hospitalization for progression of his lymphoma    Subjective: \"I have never been this week before.\" per patient    Patient's PT Goal: \"get my strength back\" per patient    Pertinent Medical History: diffuse dougie cell non Hodgkins lymphoma, hypotension due to chemo reaction, tumor lysis syndrome, SVT, splenomegaly, Jon's syndrome, thrombocytopenian, HTN, spinal stenosis of lumbar spine, constipation, GERD, OA, s/p R TKR    Previous level of function: IND with amb with walker household distances, able to bath/dress self IND/SUP    Social Environment/DME/Potential Barriers for Goal Attainment: lives in own home. Daughter and granddaughter have been staying with him since initial diagnosis in June and provided assist as needed. Daughter currently works from patient's home. Patient has hired caregiver daily for 4 hours and looking to increase her time. Has bedroom on first floor. 2 steps to get to bathroom. Has FWW, WC, BSC, shower, chair.    Skin Integrity/wound status: see wound care screen for details.    Code Status: Full    Medication issues/concerns: none identified    HB status: yes. See homebound screen for details.    Problems Identified: see Care Plan    Functional Status/Fall Risk/Safety: see PT evaluation/care plan    POC notification to  Dr. Salomon     on 4/9/2024    via case communication.    Assessment: Skilled physical therapy is needed for 2w4 due to increased difficulty with transfers and amb related to diffuse weakness after recent hospitalization for chronic lymphocytic leukemia for evaluation, gait training, ther ex, HEP, fall prevention, home safety instruction. Patient/daughter report that Dr. Salomon has placed order for hospital bed that daughter is following up on. Patient asking about condom type catheters as he is having incontinence problems. " Therapist requested from Dr. Aime SHIN evaluation and treatment for med instruction, cardiopulmonary assessment, and to assess for need for urniary catheter and for OT evaluation and treatment for bathroom safety, ADL training and possible need for bath aide.    Patient only able to stand momentarily with therapist's assist today and declined any attempts at ambulation. Reports he did not walk in hospital at all and that he declined any type of inpatient rehab stay.    Patient and daughter report that patient has been found with increased temps of 100.7 to 101.0 degrees and have received conflicting information about when to go to ER from different MD's    Plan for next visit: gait training, HEP instruction, fall prevention

## 2024-04-11 PROBLEM — A41.9 SEPSIS: Status: ACTIVE | Noted: 2024-01-01

## 2024-04-11 NOTE — HOME HEALTH
Eval Note    patient appears to be in distress. heart rate 112 (irregular), temp 100.9, resp 30, oxygen level 88% RA. Noted to have what appeared to be thrush in mouth. reports last chemo treatment was about 1 wk ago. Complaints of sore throat and anxiety. Spoke with Meghan for Dr. Schroeder who requested patient go to ER. Patient's daughter states hired caregiver will be providing transportation. They are going to call Dr. Salomon to request a direct admit.  Notified intake, both therapist, and manager Nilton MOHAMUD confirmed with Meghan (representative for Dr. Schroeder) on date 4/11/24

## 2024-04-11 NOTE — ED NOTES
Pt is soa.  He had temp 100.9 this am.  He is a chemo pt.  His sats at home this am were 88%.. at triage sats are 93%

## 2024-04-11 NOTE — PROGRESS NOTES
[facility] Clinical Pharmacy Services: Piperacillin-Tazobactam Consult    Pt Name: Jeff Bass   : 1945    Indication: Sepsis    Relevant clinical data and objective history reviewed:    Past Medical History:   Diagnosis Date    Arthritis     Benign prostatic hyperplasia     FOLLOWED BY DR. VIVIEN PATEL    Biliary dyskinesia     Bunion of right foot     GREAT TOE    Bursitis of right hip 03/15/2018    CLL (chronic lymphocytic leukemia)     FOLLOWED BY DR WALKER    Colon polyps     FOLLOWED BY DR. NEHA HAM    Constipation     Degeneration of lumbar intervertebral disc     Diverticulosis     Erectile dysfunction     Fracture of ankle     GERD (gastroesophageal reflux disease)     Hallux valgus of left foot     Hyperlipidemia     MIXED    Hypertension     Inguinal hernia     Kidney stone 2009    SEEN AT [facility] ER    Knee swelling     Localized, primary osteoarthritis     Lumbar radiculopathy     Lumbar stenosis     Osteoarthritis of right knee     Polyneuropathy     Prostate CA 2016    JACQUELYN 6, S/P XRT, FOLLOWED BY DR. VIVIEN PATEL, RADIATION SEEDS    PVC (premature ventricular contraction)     PT STATES WORKED UP YEARS AGO BY [facility] FOR POSSIBLE MURMUR - NO FOLLOW UP REQUIRED     RBBB     Sensory hearing loss, bilateral     Skin cancer     SQUAMOUS CELL CARCINOMA OF FACE    Substernal goiter     Thyromegaly 2016    ADMITTED TO [facility]    Vitamin D deficiency      Creatinine   Date Value Ref Range Status   2024 1.08 0.76 - 1.27 mg/dL Final   2024 1.14 0.76 - 1.27 mg/dL Final   2024 0.81 0.76 - 1.27 mg/dL Final   2019 0.80 0.60 - 1.30 mg/dL Final     Comment:     Serial Number: [id number]:  [id number]     BUN   Date Value Ref Range Status   2024 28 (H) 8 - 23 mg/dL Final     Estimated Creatinine Clearance: 81 mL/min (by C-G formula based on SCr of 1.08 mg/dL).    Lab Results   Component Value Date    WBC 82.95 (C) 2024     Temp  Readings from Last 3 Encounters:   04/11/24 (!) 102.9 °F (39.4 °C) (Rectal)   04/11/24 (!) 100.9 °F (38.3 °C) (Oral)   04/09/24 97.7 °F (36.5 °C)      Assessment/Plan  Estimated CrCl >20 mL/min at this time; BMI 27.45 kg/m2  Will start piperacillin-tazobactam 3.375 g IV every 8 hours for 7 days    Pharmacy will continue to follow daily while on piperacillin-tazobactam and adjust as needed. Thank you for this consult.    Sue Arora, [facility]  Clinical Pharmacist

## 2024-04-11 NOTE — NURSING NOTE
Pt in vtach HR in the 200s, daughter at bedside and requests palliative care, Pulmonary paged for orders.   [id number]  via telephone. Palliative order set in place.

## 2024-04-11 NOTE — ED PROVIDER NOTES
EMERGENCY DEPARTMENT ENCOUNTER    Room Number:  3003/1  PCP: Jovani Salomon MD  Historian: Patient      HPI:  Chief Complaint: Fever, sore throat  A complete HPI/ROS/PMH/PSH/SH/FH are unobtainable due to: None    Context: Jeff Bass is a 78 y.o. male who presents to the ED via private vehicle from home c/o acute fever up to 101 at home over the last several days, has had a mild cough, sore throat.  Patient is a cancer patient on chemotherapy.  No vomiting or diarrhea.  No chest pains.  Mild shortness of breath.      MEDICAL RECORD REVIEW    External (non-ED) record review: Discharge summary reviewed from April 6, 2024, patient has a history of diffuse large cell non-Hodgkin's lymphoma, did have hypotension due to chemo reaction as well as tumor lysis syndrome, follows up with oncology here at [facility].               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    Hypertension 01/31/2016    Hyperlipidemia 01/31/2016    Hypogonadism in male 01/31/2016    Vitamin D deficiency     Malignant neoplasm of prostate 06/03/2016    Spinal stenosis of lumbar region with neurogenic claudication 05/29/2018    Degeneration of lumbar intervertebral disc 05/29/2018    Coronary arteriosclerosis 08/08/2018    PVC (premature ventricular contraction) 08/08/2018    History of colon polyps 09/20/2018    Substernal goiter 06/15/2017    Skin tag 12/20/2018    Sensory hearing loss, bilateral 06/02/2016    ED (erectile dysfunction) of organic origin 05/08/2016    Constipation 08/23/2018    GERD (gastroesophageal reflux disease)     Left inguinal hernia 09/14/2020    Hemangioma of liver 09/21/2020    Abnormal finding on thyroid function test 10/03/2019    Primary osteoarthritis of right knee 06/21/2022    OA (osteoarthritis) of knee  11/01/2022    B12 deficiency 01/04/2023    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    Abdominal pain 03/29/2024    Anemia associated with malignant neoplastic disease 03/29/2024    Thrombocytopenia 03/29/2024    Leukocytosis 03/29/2024    Primary insomnia 03/29/2024    Nausea 03/29/2024    Elevated blood uric acid level 03/30/2024     Resolved Ambulatory Problems     Diagnosis Date Noted    Lung mass 11/10/2016    Abdominal pain 01/19/2019    Bladder outlet obstruction 08/16/2019    Impacted cerumen 04/16/2020    Bursitis of right hip 03/15/2018    Pain 06/16/2022    High risk medication use 02/08/2023    Pneumonia due to infectious organism 06/02/2023    Severe malnutrition 06/13/2023    Weakness 06/27/2023    Pancytopenia due to chemotherapy 06/27/2023    Immobility syndrome 06/27/2023     Past Medical History:   Diagnosis Date    Arthritis     Biliary dyskinesia     Bunion of right foot     CLL (chronic lymphocytic leukemia) 2016    Colon polyps     Diverticulosis     Erectile dysfunction     Fracture of ankle 1975    Hallux valgus of left foot     Inguinal hernia     Kidney stone 02/21/2009    Knee swelling 2018    Localized, primary osteoarthritis     Lumbar radiculopathy     Lumbar stenosis     Osteoarthritis of right knee     Polyneuropathy     Prostate CA 03/2016    RBBB     Skin cancer     Thyromegaly 12/2016         PAST SURGICAL HISTORY  Past Surgical History:   Procedure Laterality Date    BRONCHOSCOPY N/A 12/14/2016    Procedure: BRONCHOSCOPY WITH  BAL AND BIOPSY AND ENDOBRONCHIAL ULTRASOUND  AND NAVIGATION WITH BRUSHINGS AND BIOPSY AND BAL;  Surgeon: Pierre Manning MD;  Location: [facility] ENDOSCOPY;  Service:     COLONOSCOPY N/A 03/13/2019    5 MM SESSILE TUBULAR ADENOMA POLYP IN TRANSVERSE, MULTIPLE SMALL AND LARGE DIVERTICULA IN SIGMOID, RESCOPE IN 5 YRS, DR. NEHA HAM AT [facility]    COLONOSCOPY W/ POLYPECTOMY N/A 03/19/2015    3 TUBULAR ADENOMA POLYPS, 12 TUBULOVILLOUS POLYP RECTO-SIGMOID, DIVERTICULOSIS,  RESCOPE IN 3 YRS, DR. NEHA HAM AT [facility]    EYE SURGERY Bilateral     CATARACT EXTRACTION WITH LENS IMPLANT, DR. GOVEA    FINGER NAIL SURGERY Right 2022    TORRES-PATEL    INGUINAL HERNIA REPAIR Left 11/02/2021    Procedure: LEFT OPEN INGUINAL HERNIA REPAIR;  Surgeon: Nixon Kolb MD;  Location: [facility] MAIN OR;  Service: General;  Laterality: Left;    PROSTATE RADIOACTIVE SEED IMPLANT N/A 09/06/2016    DR. HOA JARAMILLO AT [facility]    PROSTATE SURGERY N/A 03/11/2016    BIOPSY: ADENOCARCINOMA, DR. ZAID IBARRA    THYROID BIOPSY Bilateral 11/01/2017    NO B CELL OR T CELL LYMPHOMA, DONE AT [facility]    TOTAL KNEE ARTHROPLASTY Right 10/12/2022    Procedure: TOTAL KNEE ARTHROPLASTY;  Surgeon: Ran Rachel MD;  [place]  [facility] [place] [facility] [place]  Service: [place]opedics;  Laterality: Right;    US GUIDED LYMPH NODE BIOPSY  4/1/2024    VENOUS ACCESS DEVICE (PORT) INSERTION Right 7/6/2023    Procedure: INSERTION VENOUS ACCESS DEVICE;  Surgeon: Nixon Kolb MD;  [place]  [facility] [place] [facility] [place]  Service: General;  Laterality: Right;         FAMILY HISTORY  Family History   Problem Relation Age of Onset    Heart disease Father         Rheumatic heart disease    Hypertension Father     Osteoporosis Father     Stroke Mother     Osteoporosis Mother     No Known Problems Daughter     Malig Hyperthermia Neg Hx          SOCIAL HISTORY  Social History     Socioeconomic History    Marital status:      Spouse name: No    Years of education: College   Tobacco Use    Smoking status: Never    Smokeless tobacco: Never   Vaping Use    Vaping status: Never Used   Substance and Sexual Activity    Alcohol use: No    Drug use: Never    Sexual activity: Yes     Partners: Female     Birth control/protection: None         ALLERGIES  Patient has no known allergies.        REVIEW OF SYSTEMS  Review of Systems     All systems reviewed and negative except for those discussed in HPI.       PHYSICAL EXAM    I have reviewed the triage vital  signs and nursing notes.    ED Triage Vitals   Temp Heart Rate Resp BP SpO2   04/11/24 1146 04/11/24 1146 04/11/24 1146 04/11/24 1221 04/11/24 1146   (!) 101.4 °F (38.6 °C) (!) 131 (!) 30 119/74 93 %      Temp src Heart Rate Source Patient Position BP Location FiO2 (%)   04/11/24 1146 04/11/24 1146 -- -- --   Tympanic Monitor          Physical Exam  General: Appears uncomfortable, nontoxic, nondiaphoretic  HEENT: Mucous membranes moist, atraumatic,, erythematous posterior oropharynx without obvious purulence or mass, EOMI  Neck: Full ROM  Pulm: Symmetric chest rise, mild tachypnea, lungs slightly diminished bilaterally but no overt wheezes/rales/rhonchi  Cardiovascular: Regular rate and rhythm, intact distal pulses, 2+ pitting edema bilateral lower extremities  GI: Soft, nontender, nondistended, no rebound, no guarding, bowel sounds present  MSK: Full ROM, no deformity  Skin: Warm, dry  Neuro: Awake, alert, oriented x 4, GCS 15, moving all extremities, no focal deficits  Psych: Calm, cooperative      I wore an N95 mask, eye protection, and gloves used during this encounter.       LAB RESULTS  Recent Results (from the past 24 hour(s))   Comprehensive Metabolic Panel    Collection Time: 04/11/24 12:18 PM    Specimen: Blood   Result Value Ref Range    Glucose 91 65 - 99 mg/dL    BUN 29 (H) 8 - 23 mg/dL    Creatinine 1.14 0.76 - 1.27 mg/dL    Sodium 141 136 - 145 mmol/L    Potassium 4.2 3.5 - 5.2 mmol/L    Chloride 99 98 - 107 mmol/L    CO2 18.4 (L) 22.0 - 29.0 mmol/L    Calcium 8.9 8.6 - 10.5 mg/dL    Total Protein 4.7 (L) 6.0 - 8.5 g/dL    Albumin 3.2 (L) 3.5 - 5.2 g/dL    ALT (SGPT) 14 1 - 41 U/L    AST (SGOT) 42 (H) 1 - 40 U/L    Alkaline Phosphatase 83 39 - 117 U/L    Total Bilirubin 3.0 (H) 0.0 - 1.2 mg/dL    Globulin 1.5 gm/dL    A/G Ratio 2.1 g/dL    BUN/Creatinine Ratio 25.4 (H) 7.0 - 25.0    Anion Gap 23.6 (H) 5.0 - 15.0 mmol/L    eGFR 65.0 >60.0 mL/min/1.73   Lipase    Collection Time: 04/11/24 12:18 PM     Specimen: Blood   Result Value Ref Range    Lipase 24 13 - 60 U/L   Lactic Acid, Plasma    Collection Time: 04/11/24 12:18 PM    Specimen: Blood   Result Value Ref Range    Lactate 10.4 (C) 0.5 - 2.0 mmol/L   Procalcitonin    Collection Time: 04/11/24 12:18 PM    Specimen: Blood   Result Value Ref Range    Procalcitonin 3.93 (H) 0.00 - 0.25 ng/mL   Magnesium    Collection Time: 04/11/24 12:18 PM    Specimen: Blood   Result Value Ref Range    Magnesium 1.8 1.6 - 2.4 mg/dL   CBC Auto Differential    Collection Time: 04/11/24 12:18 PM    Specimen: Blood   Result Value Ref Range    .15 (C) 3.40 - 10.80 10*3/mm3    RBC 3.39 (L) 4.14 - 5.80 10*6/mm3    Hemoglobin 9.1 (L) 13.0 - 17.7 g/dL    Hematocrit 29.3 (L) 37.5 - 51.0 %    MCV 86.4 79.0 - 97.0 fL    MCH 26.8 26.6 - 33.0 pg    MCHC 31.1 (L) 31.5 - 35.7 g/dL    RDW 19.6 (H) 12.3 - 15.4 %    RDW-SD 59.3 (H) 37.0 - 54.0 fl    MPV 10.2 6.0 - 12.0 fL    Platelets 83 (L) 140 - 450 10*3/mm3   Manual Differential    Collection Time: 04/11/24 12:18 PM    Specimen: Blood   Result Value Ref Range    Neutrophil % 22.0 (L) 42.7 - 76.0 %    Lymphocyte % 14.0 (L) 19.6 - 45.3 %    Monocyte % 5.0 5.0 - 12.0 %    Blasts % 59.0 (H) 0.0 - 0.0 %    Neutrophils Absolute 22.25 (H) 1.70 - 7.00 10*3/mm3    Lymphocytes Absolute 14.16 (H) 0.70 - 3.10 10*3/mm3    Monocytes Absolute 5.06 (H) 0.10 - 0.90 10*3/mm3    RBC Morphology Normal Normal    WBC Morphology Normal Normal    Platelet Morphology Normal Normal   Uric Acid    Collection Time: 04/11/24 12:18 PM    Specimen: Blood   Result Value Ref Range    Uric Acid 6.2 3.4 - 7.0 mg/dL   Phosphorus    Collection Time: 04/11/24 12:18 PM    Specimen: Blood   Result Value Ref Range    Phosphorus 3.1 2.5 - 4.5 mg/dL   BNP    Collection Time: 04/11/24 12:18 PM    Specimen: Blood   Result Value Ref Range    proBNP 1,513.0 0.0 - 1,800.0 pg/mL   High Sensitivity Troponin T    Collection Time: 04/11/24 12:18 PM    Specimen: Blood   Result Value Ref  Range    HS Troponin T 75 (C) <22 ng/L   Respiratory Panel PCR w/COVID-19(SARS-CoV-2) [facility] In-House, NP Swab in UTM/VTM, 2 HR TAT - Swab, Nasopharynx    Collection Time: 04/11/24 12:19 PM    Specimen: Nasopharynx; Swab   Result Value Ref Range    ADENOVIRUS, PCR Not Detected Not Detected    Coronavirus 229E Not Detected Not Detected    Coronavirus HKU1 Not Detected Not Detected    Coronavirus NL63 Not Detected Not Detected    Coronavirus OC43 Not Detected Not Detected    COVID19 Not Detected Not Detected - Ref. Range    Human Metapneumovirus Not Detected Not Detected    Human Rhinovirus/Enterovirus Not Detected Not Detected    Influenza A PCR Not Detected Not Detected    Influenza B PCR Not Detected Not Detected    Parainfluenza Virus 1 Not Detected Not Detected    Parainfluenza Virus 2 Not Detected Not Detected    Parainfluenza Virus 3 Not Detected Not Detected    Parainfluenza Virus 4 Not Detected Not Detected    RSV, PCR Not Detected Not Detected    Bordetella pertussis pcr Not Detected Not Detected    Bordetella parapertussis PCR Not Detected Not Detected    Chlamydophila pneumoniae PCR Not Detected Not Detected    Mycoplasma pneumo by PCR Not Detected Not Detected   Rapid Strep A Screen - Swab, Throat    Collection Time: 04/11/24  1:00 PM    Specimen: Throat; Swab   Result Value Ref Range    Strep A Ag Negative Negative   STAT Lactic Acid, Reflex    Collection Time: 04/11/24  3:22 PM    Specimen: Blood   Result Value Ref Range    Lactate 10.7 (C) 0.5 - 2.0 mmol/L   BNP    Collection Time: 04/11/24  3:22 PM    Specimen: Blood   Result Value Ref Range    proBNP 1,309.0 0.0 - 1,800.0 pg/mL   Magnesium    Collection Time: 04/11/24  3:22 PM    Specimen: Blood   Result Value Ref Range    Magnesium 1.5 (L) 1.6 - 2.4 mg/dL   CBC Auto Differential    Collection Time: 04/11/24  3:22 PM    Specimen: Blood   Result Value Ref Range    WBC 82.95 (C) 3.40 - 10.80 10*3/mm3    RBC 2.92 (L) 4.14 - 5.80  10*6/mm3    Hemoglobin 8.3 (L) 13.0 - 17.7 g/dL    Hematocrit 25.8 (L) 37.5 - 51.0 %    MCV 88.4 79.0 - 97.0 fL    MCH 28.4 26.6 - 33.0 pg    MCHC 32.2 31.5 - 35.7 g/dL    RDW 19.6 (H) 12.3 - 15.4 %    RDW-SD 60.7 (H) 37.0 - 54.0 fl    MPV 10.4 6.0 - 12.0 fL    Platelets 81 (L) 140 - 450 10*3/mm3    nRBC 0.7 (H) 0.0 - 0.2 /100 WBC   Comprehensive Metabolic Panel    Collection Time: 04/11/24  3:22 PM    Specimen: Blood   Result Value Ref Range    Glucose 75 65 - 99 mg/dL    BUN 28 (H) 8 - 23 mg/dL    Creatinine 1.08 0.76 - 1.27 mg/dL    Sodium 143 136 - 145 mmol/L    Potassium 3.6 3.5 - 5.2 mmol/L    Chloride 102 98 - 107 mmol/L    CO2 19.9 (L) 22.0 - 29.0 mmol/L    Calcium 7.8 (L) 8.6 - 10.5 mg/dL    Total Protein 4.0 (L) 6.0 - 8.5 g/dL    Albumin 3.0 (L) 3.5 - 5.2 g/dL    ALT (SGPT) 12 1 - 41 U/L    AST (SGOT) 39 1 - 40 U/L    Alkaline Phosphatase 123 (H) 39 - 117 U/L    Total Bilirubin 2.8 (H) 0.0 - 1.2 mg/dL    Globulin 1.0 gm/dL    A/G Ratio 3.0 g/dL    BUN/Creatinine Ratio 25.9 (H) 7.0 - 25.0    Anion Gap 21.1 (H) 5.0 - 15.0 mmol/L    eGFR 69.4 >60.0 mL/min/1.73   High Sensitivity Troponin T    Collection Time: 04/11/24  3:22 PM    Specimen: Blood   Result Value Ref Range    HS Troponin T 69 (C) <22 ng/L   Manual Differential    Collection Time: 04/11/24  3:22 PM    Specimen: Blood   Result Value Ref Range    Neutrophil % 25.0 (L) 42.7 - 76.0 %    Lymphocyte % 15.0 (L) 19.6 - 45.3 %    Monocyte % 11.0 5.0 - 12.0 %    Eosinophil % 3.0 0.3 - 6.2 %    Atypical Lymphocyte % 29.0 (H) 0.0 - 5.0 %    Blasts % 17.0 (H) 0.0 - 0.0 %    Neutrophils Absolute 20.74 (H) 1.70 - 7.00 10*3/mm3    Lymphocytes Absolute 36.50 (H) 0.70 - 3.10 10*3/mm3    Monocytes Absolute 9.12 (H) 0.10 - 0.90 10*3/mm3    Eosinophils Absolute 2.49 (H) 0.00 - 0.40 10*3/mm3    RBC Morphology Normal Normal    WBC Morphology Normal Normal    Platelet Morphology Normal Normal   Urinalysis With Culture If Indicated - Urine, Clean Catch    Collection  Time: 04/11/24  3:37 PM    Specimen: Urine, Clean Catch   Result Value Ref Range    Color, UA Dark Yellow (A) Yellow, Straw    Appearance, UA Cloudy (A) Clear    pH, UA <=5.0 5.0 - 8.0    Specific Gravity, UA 1.021 1.005 - 1.030    Glucose, UA Negative Negative    Ketones, UA Trace (A) Negative    Bilirubin, UA Negative Negative    Blood, UA Negative Negative    Protein, UA 30 mg/dL (1+) (A) Negative    Leuk Esterase, UA Trace (A) Negative    Nitrite, UA Negative Negative    Urobilinogen, UA 2.0 E.U./dL (A) 0.2 - 1.0 E.U./dL   Urinalysis, Microscopic Only - Urine, Clean Catch    Collection Time: 04/11/24  3:37 PM    Specimen: Urine, Clean Catch   Result Value Ref Range    RBC, UA None Seen None Seen, 0-2 /HPF    WBC, UA 0-2 None Seen, 0-2 /HPF    Bacteria, UA Trace (A) None Seen /HPF    Squamous Epithelial Cells, UA 0-2 None Seen, 0-2 /HPF    Hyaline Casts, UA 0-2 None Seen /LPF    Methodology Manual Light Microscopy    POC Glucose Once    Collection Time: 04/11/24  4:15 PM    Specimen: Blood   Result Value Ref Range    Glucose 76 70 - 130 mg/dL   ECG 12 Lead Rhythm Change    Collection Time: 04/11/24  4:17 PM   Result Value Ref Range    QT Interval 348 ms    QTC Interval 522 ms   POC Glucose Once    Collection Time: 04/11/24  4:54 PM    Specimen: Blood   Result Value Ref Range    Glucose 136 (H) 70 - 130 mg/dL   High Sensitivity Troponin T 2Hr    Collection Time: 04/11/24  4:58 PM    Specimen: Blood   Result Value Ref Range    HS Troponin T 71 (C) <22 ng/L    Troponin T Delta 2 >=-4 - <+4 ng/L   STAT Lactic Acid, Reflex    Collection Time: 04/11/24  4:58 PM    Specimen: Blood   Result Value Ref Range    Lactate 11.4 (C) 0.5 - 2.0 mmol/L   Comprehensive Metabolic Panel    Collection Time: 04/11/24  4:58 PM    Specimen: Blood   Result Value Ref Range    Glucose 114 (H) 65 - 99 mg/dL    BUN 31 (H) 8 - 23 mg/dL    Creatinine 1.29 (H) 0.76 - 1.27 mg/dL    Sodium 143 136 - 145 mmol/L    Potassium 3.3 (L) 3.5 - 5.2 mmol/L     Chloride 102 98 - 107 mmol/L    CO2 18.0 (L) 22.0 - 29.0 mmol/L    Calcium 7.8 (L) 8.6 - 10.5 mg/dL    Total Protein 3.9 (L) 6.0 - 8.5 g/dL    Albumin 2.4 (L) 3.5 - 5.2 g/dL    ALT (SGPT) 9 1 - 41 U/L    AST (SGOT) 37 1 - 40 U/L    Alkaline Phosphatase 118 (H) 39 - 117 U/L    Total Bilirubin 2.7 (H) 0.0 - 1.2 mg/dL    Globulin 1.5 gm/dL    A/G Ratio 1.6 g/dL    BUN/Creatinine Ratio 24.0 7.0 - 25.0    Anion Gap 23.0 (H) 5.0 - 15.0 mmol/L    eGFR 56.1 (L) >60.0 mL/min/1.73   High Sensitivity Troponin T    Collection Time: 04/11/24  4:58 PM    Specimen: Blood   Result Value Ref Range    HS Troponin T 71 (C) <22 ng/L   MRSA Screen, PCR (Inpatient) - Swab, Nares    Collection Time: 04/11/24  5:00 PM    Specimen: Nares; Swab   Result Value Ref Range    MRSA PCR No MRSA Detected No MRSA Detected       Ordered the above labs and independently interpreted results. My findings will be discussed in the medical decision making section below        RADIOLOGY  XR Chest 1 View    Result Date: 4/11/2024  XR CHEST 1 VW-  HISTORY: Male who is 78 years-old, fever  TECHNIQUE: Frontal view of the chest  COMPARISON:  image from CT from 4/2/2024   FINDINGS: Right chest port appears stable. The heart size is normal. Aorta is calcified. Fullness of the left upper mediastinum, corresponding to thyroid enlargement on the prior CT exam, appears grossly similar to prior exam. Pulmonary vasculature is unremarkable. No focal pulmonary consolidation. Minimal left pleural effusion is suggested. No pneumothorax. No acute osseous process.      No focal pulmonary consolidation. Minimal left pleural effusion. Follow-up as indications persist.  This report was finalized on 4/11/2024 1:18 PM by Dr. Jeffrey Rivera M.D on Workstation: [id number]       Ordered the above noted radiological studies.  Independently interpreted by me and my independent review of findings can be found in the ED Course.  See dictation for official radiology  interpretation.      PROCEDURES    Critical Care    Performed by: Michael Julian MD  Authorized by: Michael Julian MD    Critical care provider statement:     Critical care time (minutes):  57    Critical care time was exclusive of:  Separately billable procedures and treating other patients and teaching time    Critical care was necessary to treat or prevent imminent or life-threatening deterioration of the following conditions:  Circulatory failure, cardiac failure, sepsis and metabolic crisis    Critical care was time spent personally by me on the following activities:  Development of treatment plan with patient or surrogate, discussions with consultants, evaluation of patient's response to treatment, examination of patient, obtaining history from patient or surrogate, ordering and performing treatments and interventions, ordering and review of laboratory studies, ordering and review of radiographic studies, pulse oximetry, re-evaluation of patient's condition and review of old charts    Care discussed with: admitting provider      Care discussed with comment:  Dr. Manning, ICU, admitting; Dr. Salomon, PMD; Dr. Ambrose, Oncology        MEDICATIONS GIVEN IN ER    Medications   morphine injection 4 mg (4 mg Intravenous Not Given 4/11/24 1418)   sodium chloride 0.9 % flush 10 mL (10 mL Intravenous Given 4/11/24 1442)   sodium chloride 0.9 % flush 10 mL (has no administration in time range)   sodium chloride 0.9 % infusion 40 mL (has no administration in time range)   sodium chloride 0.9 % flush 10 mL (has no administration in time range)   sodium chloride 0.9 % flush 10 mL (has no administration in time range)   sodium chloride 0.9 % infusion 40 mL (has no administration in time range)   sodium chloride 0.9 % flush 10 mL (has no administration in time range)   sodium chloride 0.9 % flush 10 mL (has no administration in time range)   sodium chloride 0.9 % infusion 40 mL (has no administration in time range)    aluminum-magnesium hydroxide-simethicone (MAALOX MAX) 400-400-40 MG/5ML suspension 15 mL (has no administration in time range)   sennosides-docusate (PERICOLACE) 8.6-50 MG per tablet 2 tablet (has no administration in time range)     And   polyethylene glycol (MIRALAX) packet 17 g (has no administration in time range)     And   bisacodyl (DULCOLAX) EC tablet 5 mg (has no administration in time range)     And   bisacodyl (DULCOLAX) suppository 10 mg (has no administration in time range)   acetaminophen (TYLENOL) tablet 650 mg (has no administration in time range)     Or   acetaminophen (TYLENOL) suppository 650 mg (has no administration in time range)   ondansetron ODT (ZOFRAN-ODT) disintegrating tablet 4 mg (has no administration in time range)     Or   ondansetron (ZOFRAN) injection 4 mg (has no administration in time range)   temazepam (RESTORIL) capsule 15 mg (has no administration in time range)   dextrose (D50W) (25 g/50 mL) IV injection 50 mL (50 mL Intravenous Given 4/11/24 3373)   morphine injection 1 mg (has no administration in time range)   LORazepam (ATIVAN) injection 0.5 mg (has no administration in time range)     Or   LORazepam (ATIVAN) injection 0.5 mg (has no administration in time range)     Or   LORazepam (ATIVAN) 2 MG/ML concentrated solution 0.5 mg (has no administration in time range)   LORazepam (ATIVAN) injection 1 mg (has no administration in time range)     Or   LORazepam (ATIVAN) injection 1 mg (has no administration in time range)     Or   LORazepam (ATIVAN) 2 MG/ML concentrated solution 1 mg (has no administration in time range)   LORazepam (ATIVAN) injection 2 mg (has no administration in time range)     Or   LORazepam (ATIVAN) injection 2 mg (has no administration in time range)     Or   LORazepam (ATIVAN) 2 MG/ML concentrated solution 2 mg (has no administration in time range)   morphine injection 2 mg (has no administration in time range)     Or   morphine concentrated solution 5  mg (has no administration in time range)     Or   HYDROmorphone (DILAUDID) injection 0.5 mg (has no administration in time range)   ketorolac (TORADOL) injection 15 mg (has no administration in time range)   morphine injection 4 mg (4 mg Intravenous Given 4/11/24 1807)     Or   morphine concentrated solution 10 mg ( Sublingual Not Given:  See Alt 4/11/24 1807)     Or   HYDROmorphone (DILAUDID) injection 1 mg ( Intravenous Not Given:  See Alt 4/11/24 1807)   Morphine injection 6 mg (has no administration in time range)     Or   morphine concentrated solution 20 mg (has no administration in time range)     Or   HYDROmorphone (DILAUDID) injection 1.5 mg (has no administration in time range)   diphenoxylate-atropine (LOMOTIL) 2.5-0.025 MG per tablet 1 tablet (has no administration in time range)   acetaminophen (TYLENOL) tablet 650 mg (has no administration in time range)     Or   acetaminophen (TYLENOL) 160 MG/5ML oral solution 650 mg (has no administration in time range)     Or   acetaminophen (TYLENOL) suppository 650 mg (has no administration in time range)   glycopyrrolate (ROBINUL) injection 0.2 mg (has no administration in time range)     Or   glycopyrrolate (ROBINUL) injection 0.2 mg (has no administration in time range)     Or   glycopyrrolate (ROBINUL) injection 0.4 mg (has no administration in time range)     Or   glycopyrrolate (ROBINUL) injection 0.4 mg (has no administration in time range)   acetaminophen (TYLENOL) tablet 1,000 mg (1,000 mg Oral Given 4/11/24 1226)   sodium chloride 0.9 % bolus 1,000 mL (0 mL Intravenous Stopped 4/11/24 1435)   sodium chloride 0.9 % bolus 1,000 mL (0 mL Intravenous Stopped 4/11/24 1435)   piperacillin-tazobactam (ZOSYN) 3.375 g IVPB in 100 mL NS MBP (CD) (0 g Intravenous Stopped 4/11/24 1417)   LORazepam (ATIVAN) injection 1 mg (1 mg Intravenous Given 4/11/24 1441)   lactated ringers bolus 1,000 mL (0 mL Intravenous Stopped 4/11/24 1519)   vancomycin IVPB 2000 mg in 0.9%  Sodium Chloride 500 mL ( Intravenous Currently Infusing 4/11/24 1541)         PROGRESS, DATA ANALYSIS, CONSULTS, AND MEDICAL DECISION MAKING    Please note that this section constitutes my independent interpretation of clinical data including lab results, radiology, EKG's.  This constitutes my independent professional opinion regarding differential diagnosis and management of this patient.  It may include any factors such as history from outside sources, review of external records, social determinants of health, management of medications, response to those treatments, and discussions with other providers.    Differential Diagnosis and Plan: Initial concern for sepsis, bacteremia, pneumonia, UTI, viral process, dehydration, renal failure, electrolyte abnormalities, anemia, among others.  Plan for labs, supportive care, chest x-ray, and a reevaluation with results.    Additional sources:  - Discussed/ obtained information from independent historians: Daughter at bedside states that Tmax at home of 101 over the last couple days     - (Social Determinants of Health): None     - Shared decision making:  Patient and daughter at bedside fully updated on and in agreement with the course and plan moving forward    ED Course as of 04/11/24 2039   Thu Apr 11, 2024   1241 WBC(!!): 101.15  33 five days ago [DC]   1242 Hemoglobin(!): 9.1  8.8 five days ago [DC]   1242 Platelets(!): 83  22 five days ago [DC]   1300 Lactate(!!): 10.4 [DC]   1300 Procalcitonin(!): 3.93 [DC]   1300 Lipase: 24 [DC]   1310 Discussed with Dr. Ambrose, Oncology, discussed patient's clinical course and findings today, treatment modalities, he is comfortable with just Zosyn at this time, request infectious disease consult. [DC]   1312 XR Chest 1 View  Per my independent interpretation of the chest x-ray, no overtly obvious pneumothorax [DC]   1339 Discussed with Dr. Salomon, PMD, discussed patient's clinical course and findings today, treatment modalities,  oncology consult and recommendations, pending infectious disease consult, also discussed the patient's lactic of 10 but overall hemodynamic and clinical stability, he is comfortable admitting to his service on telemetry. [DC]   1340 Respiratory Panel PCR w/COVID-19(SARS-CoV-2) HERB/ISIDORO/KAITY/PAD/COR/MEME In-House, NP Swab in UTM/VTM, 2 HR TAT - Swab, Nasopharynx  Pan-negative [DC]   1340 Strep A Ag: Negative [DC]   1348 Patient and daughter at bedside fully updated on the findings today and need for hospital stay.  All questions or concerns addressed. [DC]   1408 Discussed with Dr. Rene, Infectious Disease, discussed patient's clinical course and findings today, treatment modalities, consult recommendation by hematology/oncology, and they will consult [DC]   1457 She is clinically deteriorating, becoming tremulous, tachycardic, borderline hypotension.  Plan to switch up and go to ICU. Dr. Salomon notified.  [DC]   1513 Discussed with Dr. Harjit Manning, ICU, discussed patient's clinical course and findings today, treatment modalities, hematology/oncology consultation recommendations, infectious disease consultation, and need for ICU stay. [DC]      ED Course User Index  [DC] Michael Julian MD       Hospitalization Considered?: yes    Orders Placed During This Visit:  Orders Placed This Encounter   Procedures    Critical Care    Respiratory Panel PCR w/COVID-19(SARS-CoV-2) HERB/ISIDORO/KAITY/PAD/COR/MEME In-House, NP Swab in UTM/VTM, 2 HR TAT - Swab, Nasopharynx    Blood Culture - Blood,    Blood Culture - Blood,    Rapid Strep A Screen - Swab, Throat    Beta Strep Culture, Throat - Swab, Throat    MRSA Screen, PCR (Inpatient) - Swab, Nares    XR Chest 1 View    Comprehensive Metabolic Panel    Lipase    Urinalysis With Culture If Indicated - Urine, Clean Catch    Lactic Acid, Plasma    Procalcitonin    Magnesium    CBC Auto Differential    Manual Differential    Uric Acid    STAT Lactic Acid, Reflex    Phosphorus    BNP     BNP    Magnesium    High Sensitivity Troponin T    CBC Auto Differential    Comprehensive Metabolic Panel    High Sensitivity Troponin T    High Sensitivity Troponin T 2Hr    Urinalysis, Microscopic Only - Urine, Clean Catch    Manual Differential    STAT Lactic Acid, Reflex    Comprehensive Metabolic Panel    High Sensitivity Troponin T    Diet: Liquid; Clear Liquid; Fluid Consistency: Thin (IDDSI 0)    Vital Signs    Up in Chair    Intake & Output    Weigh patient    Oral Care    Place Sequential Compression Device    Maintain Sequential Compression Device    Telemetry - Place Orders & Notify Provider of Results When Patient Experiences Acute Chest Pain, Dysrhythmia or Respiratory Distress    May Be Off Telemetry for Tests    Up With Assistance    Vital Signs    Up in Chair    Intake & Output    Weigh patient    Oral Care    Place Sequential Compression Device    Maintain Sequential Compression Device    Up With Assistance    Vital Signs Every Hour and Per Hospital Policy Based on Patient Condition    Height & Weight    Daily Weights    Intake and Output    Oral Care - Patient Not on NPPV & Not Intubated    Target Arousal Level RASS 0 to -1    ICU / CCU - Place Order Consult Intensivist For Critical Care Management (If Patient Not Admitted to Cardiology for Primary Cardiology Condition)    Use Mobility Guidelines for Advancement of Activity    Place Sequential Compression Device    Maintain Sequential Compression Device    If Patient has BG Less Than 80 & is Symptomatic But Not on IV Insulin Protocol - Use Adult Hypoglycemia Treatment Orders    Maintain IV Access    Up With Assistance    Vital Signs Per Unit Protocol    Code Status and Medical Interventions:    Hematology and Oncology (on-call MD unless specified)    Inpatient Infectious Diseases Consult    Family Medicine Consult    Inpatient Hematology & Oncology Consult    Inpatient Infectious Diseases Consult    Inpatient consult to Case Management Social  Services    Inpatient Palliative Care Team Consult    Pulmonology (on-call MD unless specified)    POC Glucose Once    POC Glucose Once    ECG 12 Lead Rhythm Change    Insert Peripheral IV    Insert Peripheral IV    Insert Peripheral IV    Inpatient Admission    Inpatient Admission    Transfer Patient    CBC & Differential    CBC & Differential       Additional orders considered but not placed:      Independent interpretation of labs, radiology studies, and discussions with consultants: See ED Course        DIAGNOSIS  Final diagnoses:   Sepsis, due to unspecified organism, unspecified whether acute organ dysfunction present   Fever in adult   Immunocompromised   History of cancer   Lactic acidosis         DISPOSITION  ED Disposition       ED Disposition   Decision to Admit    Condition   --    Comment   Level of Care: Critical Care [6]   Diagnosis: Sepsis [[id number]]   Admitting Physician: FLORENTINO PATEL [[id number]]   Attending Physician: FLORENTINO PATEL [[id number]]   Isolate for COVID?: No [0]   Certification: I Certify That Inpatient Hospital Services Are Medically Necessary For Greater Than 2 Midnights                  Please note that portions of this document were completed with a voice recognition program.    Note Disclaimer: At [facility], we believe that sharing information builds trust and better relationships. You are receiving this note because you recently visited [facility]. It is possible you will see health information before a provider has talked with you about it. This kind of information can be easy to misunderstand. To help you fully understand what it means for your health, we urge you to discuss this note with your provider.                       Michael Julian MD  04/11/24 5846

## 2024-04-11 NOTE — Clinical Note
patient appears to be in distress. heart rate 112 (irregular), temp 100.9, resp 30, oxygen level 88% RA. Complaints of sore throat and anxiety. Spoke with Meghan for Dr. Schroeder who requested patient go to ER. Patient's daughter states hired caregiver will be providing transportation. They are going to call Dr. Salomon to request a direct admit.  Notified intake, both therapist, and manager [place]

## 2024-04-11 NOTE — TELEPHONE ENCOUNTER
Called patient's daughter as requested by our  who noticed his voice sounded very different. S/w patient on the phone and his voice is very garbled and he is difficult to understand. Patient is having generalized weakness, but nothing unilateral. Denies headache. Asked daughter if she has noticed any difficulty with him trying to say what he wants to say which she responded yes, but this isn't unusual since his hospitalization last week. When asked how long his voice has sounded like this she stated it has been progressive over the last few days and this is the worst it has sounded. Denies him being dry. Advised the ER for evaluation. Stephanie stated his home health nurse was walking in the door so we got off the phone so she could do a more thorough assessment than I can over the phone. Anneliese Torres RN

## 2024-04-11 NOTE — CONSULTS
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  ID consulted for \"consult\".    Patient recently admitted 3/29 through 4/6 in the setting of fever and abdominal pain with tumor lysis syndrome.  PET scan done at that time showed diffuse lymphoma of the abdomen and pelvis.  He received Rituxan and Bendamustine while admitted and states after discharge home he has had multiple fevers.  Referred back to the ER due to shortness of breath.  States he was unable to lie flat due to shortness of breath and has had a slight cough.  Denies any production of mucus.  Denies any pain or rash.  Denies any nausea, vomiting, diarrhea.  Denies any abdominal pain.  Denies any dysuria or diarrhea.    On presentation patient Tmax of 101.4 and tachycardic to 131.  Patient was hypoxic at home however here responding to 1 L nasal cannula.   with Pro-Jesus Manuel of 3.9.  Patient with increased creatinine up to 1.14 from a prior discharge value of 0.8.  Bilirubin up to 3 and was previously normal.  Lactate also elevated at 10.  Chest x-ray without focal infiltrate and started on Zosyn.      Past Medical History:   Diagnosis Date    Arthritis     Benign prostatic hyperplasia     FOLLOWED BY DR. VIVIEN PATEL    Biliary dyskinesia     Bunion of right foot     GREAT TOE    Bursitis of right hip 03/15/2018    CLL (chronic lymphocytic leukemia) 2016    FOLLOWED BY DR WALKER    Colon polyps     FOLLOWED BY DR. NEHA HAM    Constipation     Degeneration of lumbar intervertebral disc     Diverticulosis     Erectile dysfunction     Fracture of ankle 1975    GERD (gastroesophageal reflux disease)     Hallux valgus of left foot     " Hyperlipidemia     MIXED    Hypertension     Inguinal hernia     Kidney stone 02/21/2009    SEEN AT [facility]    Knee swelling 2018    Localized, primary osteoarthritis     Lumbar radiculopathy     Lumbar stenosis     Osteoarthritis of right knee     Polyneuropathy     Prostate CA 03/2016    JACQUELYN 6, S/P XRT, FOLLOWED BY DR. VIVIEN PATEL, RADIATION SEEDS    PVC (premature ventricular contraction)     PT STATES WORKED UP YEARS AGO BY [facility] FOR POSSIBLE MURMUR - NO FOLLOW UP REQUIRED     RBBB     Sensory hearing loss, bilateral     Skin cancer     SQUAMOUS CELL CARCINOMA OF FACE    Substernal goiter     Thyromegaly 12/2016    ADMITTED TO [facility]    Vitamin D deficiency        Past Surgical History:   Procedure Laterality Date    BRONCHOSCOPY N/A 12/14/2016    Procedure: BRONCHOSCOPY WITH  BAL AND BIOPSY AND ENDOBRONCHIAL ULTRASOUND  AND NAVIGATION WITH BRUSHINGS AND BIOPSY AND BAL;  Surgeon: Pierre Manning MD;  Location: [facility] ENDOSCOPY;  Service:     COLONOSCOPY N/A 03/13/2019    5 MM SESSILE TUBULAR ADENOMA POLYP IN TRANSVERSE, MULTIPLE SMALL AND LARGE DIVERTICULA IN SIGMOID, RESCOPE IN 5 YRS, DR. NEHA HAM AT [facility]    COLONOSCOPY W/ POLYPECTOMY N/A 03/19/2015    3 TUBULAR ADENOMA POLYPS, 12 TUBULOVILLOUS POLYP RECTO-SIGMOID, DIVERTICULOSIS, RESCOPE IN 3 YRS, DR. NEHA HAM AT [facility]    EYE SURGERY Bilateral     CATARACT EXTRACTION WITH LENS IMPLANT, DR. GOVEA    FINGER NAIL SURGERY Right 2022    TORRES-PATEL    INGUINAL HERNIA REPAIR Left 11/02/2021    Procedure: LEFT OPEN INGUINAL HERNIA REPAIR;  Surgeon: Nixon Kolb MD;  Location: [facility] OR;  Service: General;  Laterality: Left;    PROSTATE RADIOACTIVE SEED IMPLANT N/A 09/06/2016    DR. HOA JARAMILLO AT [facility]    PROSTATE SURGERY N/A 03/11/2016    BIOPSY: ADENOCARCINOMA, DR. ZAID IBARRA    THYROID BIOPSY Bilateral 11/01/2017    NO B CELL OR T CELL LYMPHOMA, DONE AT [facility]    TOTAL KNEE ARTHROPLASTY Right 10/12/2022    Procedure:  TOTAL KNEE ARTHROPLASTY;  Surgeon: Ran Rachel MD;  [place] [facility] [place] [facility] [place]  Service: Orthopedics;  Laterality: Right;    US GUIDED LYMPH NODE BIOPSY  4/1/2024    VENOUS ACCESS DEVICE (PORT) INSERTION Right 7/6/2023    Procedure: INSERTION VENOUS ACCESS DEVICE;  Surgeon: Nixon Kolb MD;  [place] [facility] [place] [facility] [place];  Service: General;  Laterality: Right;       family history includes Heart disease in his father; Hypertension in his father; No Known Problems in his daughter; Osteoporosis in his father and mother; Stroke in his mother.     reports that he has never smoked. He has never used smokeless tobacco. He reports that he does not drink alcohol and does not use drugs.     No Known Allergies    Medication:  Antibiotics:  Anti-Infectives (From admission, onward)      Ordered     Dose/Rate Route Frequency Start Stop    04/11/24 1303  piperacillin-tazobactam (ZOSYN) 3.375 g IVPB in 100 mL NS MBP (CD)        Ordering Provider: Michael Julian MD    3.375 g  over 30 Minutes Intravenous Once 04/11/24 1319 04/11/24 1417              Objective     Physical Exam:   Vital Signs   Temp:  [99.1 °F (37.3 °C)-101.4 °F (38.6 °C)] 99.1 °F (37.3 °C)  Heart Rate:  [100-131] 100  Resp:  [26-30] 26  BP: (119-142)/(58-74) 119/58    GENERAL: Awake and alert, in no acute distress.   HEENT: Oropharynx is clear. Hearing is grossly normal.   EYES: PERRL. No conjunctival injection. No lid lag.   LUNGS: Normal work of breathing on nasal cannula  GI: Soft, nontender, nondistended.   SKIN: Warm and dry without cutaneous eruptions in exposed areas  PSYCHIATRIC: Appropriate mood, affect, insight, and judgment.     Results Review:   I reviewed the patient's new clinical results.  I reviewed the patient's new imaging results and agree with the interpretation.  I reviewed the patient's other test results and agree with the interpretation    Lab Results   Component Value Date    .15 (C) 04/11/2024    HGB 9.1 (L) 04/11/2024    HCT 29.3  "(L) 04/11/2024    MCV 86.4 04/11/2024    PLT 83 (L) 04/11/2024       No results found for: \"VANCOPEAK\", \"VANCOTROUGH\", \"VANCORANDOM\"    Lab Results   Component Value Date    GLUCOSE 91 04/11/2024    BUN 29 (H) 04/11/2024    CREATININE 1.14 04/11/2024    EGFRIFNONA 90 10/29/2021    EGFRIFAFRI 103 02/05/2016    BCR 25.4 (H) 04/11/2024    CO2 18.4 (L) 04/11/2024    CALCIUM 8.9 04/11/2024    PROTENTOTREF 5.2 (L) 06/12/2023    ALBUMIN 3.2 (L) 04/11/2024    LABIL2 1.5 06/12/2023    AST 42 (H) 04/11/2024    ALT 14 04/11/2024         Estimated Creatinine Clearance: 71.1 mL/min (by C-G formula based on SCr of 1.14 mg/dL).      Microbiology:  3/29 blood cultures no growth  4/11 respiratory panel negative  4/11 rapid group A strep screen negative  4/11 blood cultures in process    Radiology:  4/11 chest x-ray reviewed by me with no focal consolidation.    Assessment     #Fever  #Lactic acidosis  #Leukocytosis predominantly blasts  #CLL with transformation to diffuse large B-cell non-Hodgkin's lymphoma  #DEBORAH, baseline creatinine 1.8  #Immunosuppressed on chemotherapy  #Hyperbilirubinemia    Differential for fever is broad.  Will follow-up blood cultures and urinalysis.  Obtain MRSA nares. Continue Zosyn 3.375 every 8 hours and add vancomycin goal -600 for now while cultures mature.  Recommend oncology consult.    Thank you for this consult.  We will continue to follow along and tailor antibiotics as the patient's clinical course evolves.    "

## 2024-04-11 NOTE — Clinical Note
Level of Care: Telemetry [5]   Diagnosis: Sepsis [[id number]]   Admitting Physician: LOTTIE SUÁREZ [[id number]]   Attending Physician: LOTTIE SUÁREZ [[id number]]   Isolate for COVID?: No [0]   Certification: I Certify That Inpatient Hospital Services Are Medically Necessary For Greater Than 2 Midnights

## 2024-04-11 NOTE — CONSULTS
Patient well known to me who was just in the hospital this past week with Jon's transformation of CLL to lymphoma with severe splenomegaly and diffuse lymphadenopathy.  Received rituxan and had severe acute tumor lysis syndrome.Went home to wait for follow up with Dr. Schroeder tomorrow, but developed fever and confusion at home.    Seen in ER with WBC >100,000 with numerous blasts and fever. Has been admitted to CCU and has had infectious disease consult and Heme consult. He now is in constant V -tach . Antibiotics have been ordered. Daughter is at bedside. She has asked for a DNR and no treatment for the V-tach.Heme-onc says that there is more treatment that they can give for his underlying lymphoma.    Discussed with patient and daughter at bedside.I told him of his severe prognosis. He says that he knows where he is going after death.He seems confused,but seems to understand his situation.    Prognosis is for short term survival. We will call his . Morphine ordered, although he says he is not in pain.    Jovani Huddleston MD

## 2024-04-11 NOTE — CONSULTS
.     REASON FOR CONSULTATION:   CLL, Jon's transformation  Provide an opinion on any further workup or treatment                             REQUESTING PHYSICIAN: Michael Julian MD  RECORDS OBTAINED:  Records of the patient's history including those from the electronic medical record were reviewed and summarized in detail.    HISTORY OF PRESENT ILLNESS:  The patient is a 78 y.o. year old male  who is here for follow-up with the above-mentioned history.    Came into the ER today due to fever of 101 for the last several days.  , from 33 5 days ago.  Hb 9.1, PLT 83.  59% blasts.  Now in the CCU.  Having episodes of V. tach.  Patient minimally verbal, quite weak.    Past Medical History:   Diagnosis Date    Arthritis     Benign prostatic hyperplasia     FOLLOWED BY DR. VIVIEN PATEL    Biliary dyskinesia     Bunion of right foot     GREAT TOE    Bursitis of right hip 03/15/2018    CLL (chronic lymphocytic leukemia) 2016    FOLLOWED BY DR WALKER    Colon polyps     FOLLOWED BY DR. NEHA HAM    Constipation     Degeneration of lumbar intervertebral disc     Diverticulosis     Erectile dysfunction     Fracture of ankle 1975    GERD (gastroesophageal reflux disease)     Hallux valgus of left foot     Hyperlipidemia     MIXED    Hypertension     Inguinal hernia     Kidney stone 02/21/2009    SEEN AT [facility] ER    Knee swelling 2018    Localized, primary osteoarthritis     Lumbar radiculopathy     Lumbar stenosis     Osteoarthritis of right knee     Polyneuropathy     Prostate CA 03/2016    JACQUELYN 6, S/P XRT, FOLLOWED BY DR. VIVIEN PATEL, RADIATION SEEDS    PVC (premature ventricular contraction)     PT STATES WORKED UP YEARS AGO BY [facility] FOR POSSIBLE MURMUR - NO FOLLOW UP REQUIRED     RBBB     Sensory hearing loss, bilateral     Skin cancer     SQUAMOUS CELL CARCINOMA OF FACE    Substernal goiter     Thyromegaly 12/2016    ADMITTED TO [facility]    Vitamin D deficiency      Past Surgical History:    Procedure Laterality Date    BRONCHOSCOPY N/A 12/14/2016    Procedure: BRONCHOSCOPY WITH  BAL AND BIOPSY AND ENDOBRONCHIAL ULTRASOUND  AND NAVIGATION WITH BRUSHINGS AND BIOPSY AND BAL;  Surgeon: Pierre Manning MD;  Location: [facility] ENDOSCOPY;  Service:     COLONOSCOPY N/A 03/13/2019    5 MM SESSILE TUBULAR ADENOMA POLYP IN TRANSVERSE, MULTIPLE SMALL AND LARGE DIVERTICULA IN SIGMOID, RESCOPE IN 5 YRS, DR. NEHA HAM AT [facility]    COLONOSCOPY W/ POLYPECTOMY N/A 03/19/2015    3 TUBULAR ADENOMA POLYPS, 12 TUBULOVILLOUS POLYP RECTO-SIGMOID, DIVERTICULOSIS, RESCOPE IN 3 YRS, DR. NEHA HAM AT [facility]    EYE SURGERY Bilateral     CATARACT EXTRACTION WITH LENS IMPLANT, DR. GOVEA    FINGER NAIL SURGERY Right 2022    TORRES-PATEL    INGUINAL HERNIA REPAIR Left 11/02/2021    Procedure: LEFT OPEN INGUINAL HERNIA REPAIR;  Surgeon: Nixon Kolb MD;  Location: [facility] MAIN OR;  Service: General;  Laterality: Left;    PROSTATE RADIOACTIVE SEED IMPLANT N/A 09/06/2016    DR. HOA JARAMILLO AT [facility]    PROSTATE SURGERY N/A 03/11/2016    BIOPSY: ADENOCARCINOMA, DR. ZAID IBARRA    THYROID BIOPSY Bilateral 11/01/2017    NO B CELL OR T CELL LYMPHOMA, DONE AT [facility]    TOTAL KNEE ARTHROPLASTY Right 10/12/2022    Procedure: TOTAL KNEE ARTHROPLASTY;  Surgeon: Ran Rachel MD;  Location: [facility] OR [facility];  Service: Orthopedics;  Laterality: Right;    US GUIDED LYMPH NODE BIOPSY  4/1/2024    VENOUS ACCESS DEVICE (PORT) INSERTION Right 7/6/2023    Procedure: INSERTION VENOUS ACCESS DEVICE;  Surgeon: Nixon Kolb MD;  Location: [facility] OR [facility];  Service: General;  Laterality: Right;       MEDICATIONS    Current Facility-Administered Medications:     acetaminophen (TYLENOL) suppository 650 mg, 650 mg, Rectal, Q4H PRN, Harjit Manning MD, 650 mg at 04/11/24 1617    lactated ringers infusion, 100 mL/hr, Intravenous, Continuous, Jovani Slaomon MD, Last Rate: 100 mL/hr at 04/11/24 1542, 100 mL/hr at 04/11/24 1542     morphine injection 4 mg, 4 mg, Intravenous, Once, Michael Julian MD    nitroglycerin (NITROSTAT) SL tablet 0.4 mg, 0.4 mg, Sublingual, Q5 Min PRN, Jovani Salomon MD    Pharmacy to dose vancomycin, , Does not apply, Continuous PRN, Himanshu Serrano DO    Pharmacy to Dose Zosyn, , Does not apply, Continuous PRNZach Adam Crawford, DO    sodium chloride 0.9 % flush 10 mL, 10 mL, Intravenous, Q12H, Jovani Salomon MD, 10 mL at 04/11/24 1442    sodium chloride 0.9 % flush 10 mL, 10 mL, Intravenous, Aime RODRÍGUEZ Robert Ellsworth, MD    sodium chloride 0.9 % flush 10 mL, 10 mL, Intravenous, Q12H, Jovani Salomon MD    sodium chloride 0.9 % flush 10 mL, 10 mL, Intravenous, PRAime STEVE Robert Ellsworth, MD    sodium chloride 0.9 % infusion 40 mL, 40 mL, Intravenous, PRAime STEVE Robert Ellsworth, MD    sodium chloride 0.9 % infusion 40 mL, 40 mL, Intravenous, Aime RODRÍGUEZ Robert Ellsworth, MD    vancomycin IVPB 2000 mg in 0.9% Sodium Chloride 500 mL, 20 mg/kg, Intravenous, Once, Himanshu Serrano DO, Last Rate: 250 mL/hr at 04/11/24 1541, Currently Infusing at 04/11/24 1541    ALLERGIES:   No Known Allergies    SOCIAL HISTORY:       Social History     Socioeconomic History    Marital status:      Spouse name: No    Years of education: College   Tobacco Use    Smoking status: Never    Smokeless tobacco: Never   Vaping Use    Vaping status: Never Used   Substance and Sexual Activity    Alcohol use: No    Drug use: Never    Sexual activity: Yes     Partners: Female     Birth control/protection: None         FAMILY HISTORY:  Family History   Problem Relation Age of Onset    Heart disease Father         Rheumatic heart disease    Hypertension Father     Osteoporosis Father     Stroke Mother     Osteoporosis Mother     No Known Problems Daughter     Malig Hyperthermia Neg Hx        REVIEW OF SYSTEMS:  Review of Systems   Constitutional:  Negative for activity change.   HENT:   Negative for nosebleeds and trouble swallowing.    Respiratory:  Negative for shortness of breath and wheezing.    Cardiovascular:  Negative for chest pain and palpitations.   Gastrointestinal:  Negative for constipation, diarrhea and nausea.   Genitourinary:  Negative for dysuria and hematuria.   Musculoskeletal:  Negative for arthralgias and myalgias.   Neurological:  Negative for seizures and syncope.   Hematological:  Negative for adenopathy. Does not bruise/bleed easily.   Psychiatric/Behavioral:  Negative for confusion.               Vitals:    04/11/24 1515 04/11/24 1537 04/11/24 1613 04/11/24 1616   BP:  120/49  105/40   Pulse: (!) 138 (!) 136  (!) 139   Resp:       Temp:   (!) 102.9 °F (39.4 °C)    TempSrc:   Rectal    SpO2: 93% 94%  95%   Weight:    105 kg (231 lb 7.7 oz)   Height:             12/29/2023     1:34 PM   Current Status   ECOG score 0      PHYSICAL EXAM:    CONSTITUTIONAL:  Vital signs reviewed.  No distress, looks comfortable.  EYES:  Conjunctivae and lids unremarkable.  PERRLA  EARS, NOSE, MOUTH, THROAT:  Ears and nose appear unremarkable.  Lips, teeth, gums appear unremarkable.  RESPIRATORY:  Normal respiratory effort.  Lungs clear to auscultation bilaterally.  CARDIOVASCULAR:  Normal S1, S2.  No murmurs, rubs or gallops.  No significant lower extremity edema.  GASTROINTESTINAL: Abdomen appears unremarkable.  Nontender.  No hepatomegaly.  No splenomegaly.  NEURO: Cranial nerves 2-12 grossly intact.  No focal deficits.  Appears to have equal strength all 4 extremities.  MUSCULOSKELETAL:  Unremarkable digits/nails.  No cyanosis or clubbing.  SKIN:  Warm.  No rashes.  PSYCHIATRIC:  Normal judgment and insight.  Normal mood and affect.       RECENT LABS:        WBC   Date Value Ref Range Status   04/11/2024 82.95 (C) 3.40 - 10.80 10*3/mm3 Final   04/11/2024 101.15 (C) 3.40 - 10.80 10*3/mm3 Final     Hemoglobin   Date Value Ref Range Status   04/11/2024 8.3 (L) 13.0 - 17.7 g/dL Final    04/11/2024 9.1 (L) 13.0 - 17.7 g/dL Final     Platelets   Date Value Ref Range Status   04/11/2024 81 (L) 140 - 450 10*3/mm3 Final   04/11/2024 83 (L) 140 - 450 10*3/mm3 Final       Assessment & Plan   Jeff Bass [unfilled]   *SLL/CLL.  Jon's transformation 2023.  Had CHOP R x 5.  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  Therefore, continued issues with Jon's transformation.  Came into the ER 4/11/2024 with fever.  .  Having episodes of V. tach.  Declining quickly.  Discussed with Dr. Ambrose, his usual outpatient oncologist.  He states no remaining treatment options for the leukemia/lymphoma.  Daughter is on her way back to the hospital considering his rapid decline.  Discussed with Dr. Wright, ICU MD.  He discussed CODE STATUS with daughter and decision was made for DO NOT RESUSCITATE status.  I confirmed this with the daughter as well.    *Leukocytosis, due to what appears to be a leukemic phase of large cell transformation of underlying CLL/SLL.    *Anemia    *Thrombocytopenia    *Lactic acidosis, fever     Plan  Supportive care  No treatments remaining for leukemia/lymphoma    Critically ill with life-threatening medical problems.  However she is new to me.  Discussed with Dr. Wright and Dr. Ambrose.  75 minutes. Total time same day.  More time was spent than what is reflected in chart time.

## 2024-04-11 NOTE — OUTREACH NOTE
Medical Week 1 Survey      Flowsheet Row Responses   [facility] patient discharged from? [facility]   Does the patient have one of the following disease processes/diagnoses(primary or secondary)? Other   Week 1 attempt successful? No   Unsuccessful attempts Attempt 1  [All numbers listed were attempted-no answer]            Lilo H - Registered Nurse

## 2024-04-11 NOTE — ED NOTES
Nursing report ED to floor  Jeff Bass  78 y.o.  male    Jeff Bass is a 78 y.o. male who presents to the ED via private vehicle from home c/o acute fever up to 101 at home over the last several days, has had a mild cough, sore throat.  Patient is a cancer patient on chemotherapy.  No vomiting or diarrhea.  No chest pains.  Mild shortness of breath.            HPI :  HPI (Adult)  Stated Reason for Visit: fver, soa    Chief Complaint  Chief Complaint   Patient presents with    Fever       Admitting doctor:   Jovani Salomon MD    Admitting diagnosis:   The primary encounter diagnosis was Sepsis, due to unspecified organism, unspecified whether acute organ dysfunction present. Diagnoses of Fever in adult, Immunocompromised, History of cancer, and Lactic acidosis were also pertinent to this visit.    Code status:   Current Code Status       Date Active Code Status Order ID Comments User Context       Prior            Allergies:   Patient has no known allergies.    Isolation:   No active isolations    Intake and Output  No intake or output data in the 24 hours ending 04/11/24 1352    Weight:   There were no vitals filed for this visit.    Most recent vitals:   Vitals:    04/11/24 1221 04/11/24 1256 04/11/24 1310 04/11/24 1326   BP: 119/74 123/62  126/67   Pulse: 116 110  106   Resp:   26    Temp:   99.1 °F (37.3 °C)    TempSrc:   Tympanic    SpO2: 91% 95%  92%       Active LDAs/IV Access:   Lines, Drains & Airways       Active LDAs       Name Placement date Placement time Site Days    Peripheral IV 04/11/24 1220 Right Antecubital 04/11/24  1220  Antecubital  less than 1                    Labs (abnormal labs have a star):   Labs Reviewed   COMPREHENSIVE METABOLIC PANEL - Abnormal; Notable for the following components:       Result Value    BUN 29 (*)     CO2 18.4 (*)     Total Protein 4.7 (*)     Albumin 3.2 (*)     AST (SGOT) 42 (*)     Total Bilirubin 3.0 (*)     BUN/Creatinine Ratio 25.4 (*)  "    Anion Gap 23.6 (*)     All other components within normal limits    Narrative:     GFR Normal >60  Chronic Kidney Disease <60  Kidney Failure <15    The GFR formula is only valid for adults with stable renal function between ages 18 and 70.   LACTIC ACID, PLASMA - Abnormal; Notable for the following components:    Lactate 10.4 (*)     All other components within normal limits   PROCALCITONIN - Abnormal; Notable for the following components:    Procalcitonin 3.93 (*)     All other components within normal limits    Narrative:     As a Marker for Sepsis (Non-Neonates):    1. <0.5 ng/mL represents a low risk of severe sepsis and/or septic shock.  2. >2 ng/mL represents a high risk of severe sepsis and/or septic shock.    As a Marker for Lower Respiratory Tract Infections that require antibiotic therapy:    PCT on Admission    Antibiotic Therapy       6-12 Hrs later    >0.5                Strongly Recommended  >0.25 - <0.5        Recommended   0.1 - 0.25          Discouraged              Remeasure/reassess PCT  <0.1                Strongly Discouraged     Remeasure/reassess PCT    As 28 day mortality risk marker: \"Change in Procalcitonin Result\" (>80% or <=80%) if Day 0 (or Day 1) and Day 4 values are available. Refer to http://www.[email or web address] [email or web address]-pct-calculator.com    Change in PCT <=80%  A decrease of PCT levels below or equal to 80% defines a positive change in PCT test result representing a higher risk for 28-day all-cause mortality of patients diagnosed with severe sepsis for septic shock.    Change in PCT >80%  A decrease of PCT levels of more than 80% defines a negative change in PCT result representing a lower risk for 28-day all-cause mortality of patients diagnosed with severe sepsis or septic shock.      CBC WITH AUTO DIFFERENTIAL - Abnormal; Notable for the following components:    .15 (*)     RBC 3.39 (*)     Hemoglobin 9.1 (*)     Hematocrit 29.3 (*)     MCHC 31.1 (*)     RDW 19.6 (*)     RDW-SD 59.3 (*) "     Platelets 83 (*)     All other components within normal limits   MANUAL DIFFERENTIAL - Abnormal; Notable for the following components:    Neutrophil % 22.0 (*)     Lymphocyte % 14.0 (*)     Blasts % 59.0 (*)     Neutrophils Absolute 22.25 (*)     Lymphocytes Absolute 14.16 (*)     Monocytes Absolute 5.06 (*)     All other components within normal limits   RESPIRATORY PANEL PCR W/ COVID-19 (SARS-COV-2), NP SWAB IN UTM/VTP, 2 HR TAT - Normal    Narrative:     In the setting of a positive respiratory panel with a viral infection PLUS a negative procalcitonin without other underlying concern for bacterial infection, consider observing off antibiotics or discontinuation of antibiotics and continue supportive care. If the respiratory panel is positive for atypical bacterial infection (Bordetella pertussis, Chlamydophila pneumoniae, or Mycoplasma pneumoniae), consider antibiotic de-escalation to target atypical bacterial infection.   RAPID STREP A SCREEN - Normal   LIPASE - Normal   MAGNESIUM - Normal   BLOOD CULTURE   BLOOD CULTURE   BETA HEMOLYTIC STREP CULTURE, THROAT   URINALYSIS W/ CULTURE IF INDICATED   URIC ACID   LACTIC ACID, REFLEX   PHOSPHORUS   BNP (IN-HOUSE)   CBC AND DIFFERENTIAL    Narrative:     The following orders were created for panel order CBC & Differential.  Procedure                               Abnormality         Status                     ---------                               -----------         ------                     CBC Auto Differential[[id number]]        Abnormal            Final result                 Please view results for these tests on the individual orders.       EKG:   No orders to display       Meds given in ED:   Medications   piperacillin-tazobactam (ZOSYN) 3.375 g IVPB in 100 mL NS MBP (CD) (3.375 g Intravenous New Bag 4/11/24 1335)   morphine injection 4 mg (has no administration in time range)   acetaminophen (TYLENOL) tablet 1,000 mg (1,000 mg Oral Given 4/11/24 1226)    sodium chloride 0.9 % bolus 1,000 mL (1,000 mL Intravenous New Bag 4/11/24 1226)   sodium chloride 0.9 % bolus 1,000 mL (1,000 mL Intravenous New Bag 4/11/24 1308)       Imaging results:  XR Chest 1 View    Result Date: 4/11/2024  No focal pulmonary consolidation. Minimal left pleural effusion. Follow-up as indications persist.  This report was finalized on 4/11/2024 1:18 PM by Dr. Jeffrey Rivera M.D on Workstation: [email or web address]       Ambulatory status:   - with assist x1    Social issues:   Social History     Socioeconomic History    Marital status:      Spouse name: No    Years of education: College   Tobacco Use    Smoking status: Never    Smokeless tobacco: Never   Vaping Use    Vaping status: Never Used   Substance and Sexual Activity    Alcohol use: No    Drug use: Never    Sexual activity: Yes     Partners: Female     Birth control/protection: None       Peripheral Neurovascular  Peripheral Neurovascular (Adult)  Peripheral Neurovascular WDL: WDL    Neuro Cognitive  Neuro Cognitive (Adult)  Cognitive/Neuro/Behavioral WDL: WDL, orientation  Orientation: oriented x 4    Learning  Learning Assessment (Adult)  Learning Readiness and Ability: no barriers identified  Education Provided  Person Taught: patient  Teaching Method: verbal instruction  Teaching Focus: symptom/problem overview  Education Outcome Evaluation: eager to learn, acceptance expressed, verbalizes understanding    Respiratory  Respiratory WDL  Respiratory WDL: .WDL except, rhythm/pattern, cough  Rhythm/Pattern, Respiratory: shortness of breath, tachypneic  Cough Frequency: infrequent  Cough Type: nonproductive    Abdominal Pain       Pain Assessments  Pain (Adult)  (0-10) Pain Rating: Rest: 0  Response to Pain Interventions: nonverbal indicators absent/decreased    NIH Stroke Scale       Musa Estrada RN  04/11/24 13:52 EDT

## 2024-04-11 NOTE — H&P
.     Admission Note    Patient Identification:  Jeff Bass  78 y.o.  male  1945  [id number]            CC: Weakness fever    History of Present Illness:  Patient is a 78-year-old with a previous medical history of recurrent lymphoma-diffuse large B cell who follows with Dr. Ambrose has been treated with R-CHOP and most recently completing Rituxan on the fifth.    Patient recently discharged on the sixth reviewed prior discharge summary or patient was treated with tumor lysis syndrome abdominal pain and fever with PET scan showing diffuse lymphoma involving abdomen and pelvis.  Patient is having shortness of breath however denies abdominal pain diarrhea vomiting emesis.  Denies pain.  No mucus production.  Cough is nonproductive.  Does have shortness of breath.    Patient seems mildly confused says he just wants to go home.  Repeats this multiple times despite education regarding how sick his clinical condition is.    Patient is confused poor historian.      Past Medical History:   Diagnosis Date    Arthritis     Benign prostatic hyperplasia     FOLLOWED BY DR. VIVIEN PATEL    Biliary dyskinesia     Bunion of right foot     GREAT TOE    Bursitis of right hip 03/15/2018    CLL (chronic lymphocytic leukemia) 2016    FOLLOWED BY DR WALKER    Colon polyps     FOLLOWED BY DR. NEHA HAM    Constipation     Degeneration of lumbar intervertebral disc     Diverticulosis     Erectile dysfunction     Fracture of ankle 1975    GERD (gastroesophageal reflux disease)     Hallux valgus of left foot     Hyperlipidemia     MIXED    Hypertension     Inguinal hernia     Kidney stone 02/21/2009    SEEN AT [facility] ER    Knee swelling 2018    Localized, primary osteoarthritis     Lumbar radiculopathy     Lumbar stenosis     Osteoarthritis of right knee     Polyneuropathy     Prostate CA 03/2016    JACQUELYN 6, S/P XRT, FOLLOWED BY DR. VIVIEN PATEL, RADIATION SEEDS    PVC (premature ventricular contraction)     PT  STATES WORKED UP YEARS AGO BY [facility] FOR POSSIBLE MURMUR - NO FOLLOW UP REQUIRED     RBBB     Sensory hearing loss, bilateral     Skin cancer     SQUAMOUS CELL CARCINOMA OF FACE    Substernal goiter     Thyromegaly 12/2016    ADMITTED TO [facility]    Vitamin D deficiency        Past Surgical History:   Procedure Laterality Date    BRONCHOSCOPY N/A 12/14/2016    Procedure: BRONCHOSCOPY WITH  BAL AND BIOPSY AND ENDOBRONCHIAL ULTRASOUND  AND NAVIGATION WITH BRUSHINGS AND BIOPSY AND BAL;  Surgeon: Pierre Manning MD;  Location: [facility] ENDOSCOPY;  Service:     COLONOSCOPY N/A 03/13/2019    5 MM SESSILE TUBULAR ADENOMA POLYP IN TRANSVERSE, MULTIPLE SMALL AND LARGE DIVERTICULA IN SIGMOID, RESCOPE IN 5 YRS, DR. NEHA HAM AT [facility]    COLONOSCOPY W/ POLYPECTOMY N/A 03/19/2015    3 TUBULAR ADENOMA POLYPS, 12 TUBULOVILLOUS POLYP RECTO-SIGMOID, DIVERTICULOSIS, RESCOPE IN 3 YRS, DR. NEHA HAM AT [facility]    EYE SURGERY Bilateral     CATARACT EXTRACTION WITH LENS IMPLANT, DR. GOVEA    FINGER NAIL SURGERY Right 2022    TORRES-PATEL    INGUINAL HERNIA REPAIR Left 11/02/2021    Procedure: LEFT OPEN INGUINAL HERNIA REPAIR;  Surgeon: Nixon Kolb MD;  Location: [facility] MAIN OR;  Service: General;  Laterality: Left;    PROSTATE RADIOACTIVE SEED IMPLANT N/A 09/06/2016    DR. HOA JARAMILLO AT [facility]    PROSTATE SURGERY N/A 03/11/2016    BIOPSY: ADENOCARCINOMA, DR. ZAID IBARRA    THYROID BIOPSY Bilateral 11/01/2017    NO B CELL OR T CELL LYMPHOMA, DONE AT [facility]    TOTAL KNEE ARTHROPLASTY Right 10/12/2022    Procedure: TOTAL KNEE ARTHROPLASTY;  Surgeon: Ran Rachel MD;  [place] [facility] [place] [facility] [place]  Service: Orthopedics;  Laterality: Right;    US GUIDED LYMPH NODE BIOPSY  4/1/2024    VENOUS ACCESS DEVICE (PORT) INSERTION Right 7/6/2023    Procedure: INSERTION VENOUS ACCESS DEVICE;  Surgeon: Nixon Kolb MD;  [place] [facility] [place] [facility] [place]  Service: General;  Laterality: Right;        Social History     Socioeconomic History     "Marital status:      Spouse name: No    Years of education: College   Tobacco Use    Smoking status: Never    Smokeless tobacco: Never   Vaping Use    Vaping status: Never Used   Substance and Sexual Activity    Alcohol use: No    Drug use: Never    Sexual activity: Yes     Partners: Female     Birth control/protection: None       Family History   Problem Relation Age of Onset    Heart disease Father         Rheumatic heart disease    Hypertension Father     Osteoporosis Father     Stroke Mother     Osteoporosis Mother     No Known Problems Daughter     Mallorna Hyperthermia Neg Hx        (Not in a hospital admission)      No Known Allergies    Review of Systems:  As above 12 system review of systems performed and all else negative    Physical Exam:  Vitals:  Vitals:    04/11/24 1447 04/11/24 1501 04/11/24 1503 04/11/24 1515   BP:       Pulse: (!) 133  (!) 138 (!) 138   Resp:   (!) 32    Temp:       TempSrc:       SpO2: 90%  93% 93%   Weight:  94.2 kg (207 lb 10.8 oz)     Height:  195.6 cm (77\")             Body mass index is 24.63 kg/m².  No intake or output data in the 24 hours ending 04/11/24 1526    Exam:  General appearance: ill toxic, conversant byut confused  Eyes: Anicteric sclerae, moist conjunctivae; no lid lag;    HENT: Atraumatic; oropharynx clear   +JVD  Lungs: prabhu, with shallow labored respiratory effort and no intercostal retractions  CV: tachy reg no rub  Abdomen: Soft, nontender; no masses or HSM  Extremities:   peripheral edema   Skin: warm  dry  Psych/neuro: confused will speak move, very weak generally    Scheduled meds:  Morphine, 4 mg, Intravenous, Once  sodium chloride, 10 mL, Intravenous, Q12H  sodium chloride, 10 mL, Intravenous, Q12H  vancomycin, 20 mg/kg, Intravenous, Once        Data Review:   I reviewed the patient's medications and new clinical results.  Lab Results   Component Value Date    CALCIUM 8.9 04/11/2024    PHOS 3.1 04/11/2024     Results from last 7 days   Lab Units " 04/11/24  1218 04/06/24  0635 04/05/24  2346 04/05/24  1249 04/05/24  0654   AST (SGOT) U/L 42*  --   --   --   --    ALT (SGPT) U/L 14  --   --   --   --    MAGNESIUM mg/dL 1.8 1.6 1.6   < > 1.9   SODIUM mmol/L 141 145 143   < > 142   POTASSIUM mmol/L 4.2 4.0 3.5   < > 4.3   CHLORIDE mmol/L 99 101 100   < > 101   CO2 mmol/L 18.4* 28.7 27.8   < > 23.2   BUN mg/dL 29* 29* 34*   < > 43*   CREATININE mg/dL 1.14 0.81 0.95   < > 1.08   GLUCOSE mg/dL 91 73 83   < > 83   CALCIUM mg/dL 8.9 8.2* 8.5*   < > 8.1*   WBC 10*3/mm3 101.15* 33.39*  --   --  29.10*   HEMOGLOBIN g/dL 9.1* 8.8*  --   --  8.7*   PLATELETS 10*3/mm3 83* 22*  --   --  13*    < > = values in this interval not displayed.             Estimated Creatinine Clearance: 68.9 mL/min (by C-G formula based on SCr of 1.14 mg/dL).    IMAGING:  I have reviewed all imaging studies since my last documentation.  Imaging Results (Most Recent)       Procedure Component Value Units Date/Time    XR Chest 1 View [[id number]] Collected: 04/11/24 1313     Updated: 04/11/24 1321    Narrative:      XR CHEST 1 VW-     HISTORY: Male who is 78 years-old, fever     TECHNIQUE: Frontal view of the chest     COMPARISON:  image from CT from 4/2/2024        FINDINGS: Right chest port appears stable. The heart size is normal.  Aorta is calcified. Fullness of the left upper mediastinum,  corresponding to thyroid enlargement on the prior CT exam, appears  grossly similar to prior exam. Pulmonary vasculature is unremarkable. No  focal pulmonary consolidation. Minimal left pleural effusion is  suggested. No pneumothorax. No acute osseous process.       Impression:      No focal pulmonary consolidation. Minimal left pleural  effusion. Follow-up as indications persist.     This report was finalized on 4/11/2024 1:18 PM by Dr. Jeffrey Rivera M.D on Workstation: [id number]               ASSESSMENT /   PLAN:  CLL large B cell lymphoma with AML transformation?    Hypertension  Hyperlipidemia  Spinal stenosis  GERD  Coronary artery disease  Degenerative disc disease  Spinal stenosis  Splenomegaly  Osteoarthritis  Lactic acidosis from above  AGMA from above  Fever - suspect fever tumor  Abnormal ecg - st depressions - check trop  Troponinemia    Appreciate id, cont abx at their guidance for now however significant concern for below    Onc Consult -patient is having transformation into AM.  Discussed with oncology team - outpatient oncologist coming to discuss goals of care/transition to comfort measures/if any treatment options.  Torsten Moreira, appreciate his prompt attention to patient.    Scan abd for completeness given splenomegaly, lactic acidosis however suspect related to underlying malignancy    Discussed goals of care with his daughter Stephanie Andujar.  She relates it is her fathers wishes to be dnr dni, but all measures up to that point at this time at this time.  She had to leave to  her daughter from bus stop and is on way back to hospital.  She understands he is very sick and that I am concerned regarding the blood work, but will wait for his oncologist to discuss and review.     Non sustained vt, repeat bmp, lactic trend.  Trop recheck    Phenylephrine prn - avoid worsening tachycardia and vt.    Admit to icu    DNR   DNI     Torsten Moreira, Dr Harris.    Total critical care time was 85 minutes from 325-445, excluding any separately billable procedure time.      Electronically signed by Harjit Manning MD, 04/11/24, 4:37 PM EDT.

## 2024-04-12 ENCOUNTER — HOME CARE VISIT (OUTPATIENT)
Dept: HOME HEALTH SERVICES | Facility: HOME HEALTHCARE | Age: 79
End: 2024-04-12
Payer: MEDICARE

## 2024-04-12 LAB
QT INTERVAL: 348 MS
QTC INTERVAL: 522 MS

## 2024-04-12 NOTE — DISCHARGE SUMMARY
[facility]  Discharge Summary    Date of Admission: 4/11/2024  Date of Death:  4/12/2024  Time of Death:  1939     PCP: Jovani Salomon MD    Principle Cause of Death:   *SLL/CLL. Jon's transformation 2023. Had CHOP R x 5      Secondary Diagnosis:  CLL large B cell lymphoma with Blast Crisis  Hypertension  Hyperlipidemia  Spinal stenosis  GERD  Coronary artery disease  Degenerative disc disease  Spinal stenosis  Splenomegaly  Osteoarthritis  Lactic acidosis from above  AGMA from above  Fever - suspect fever tumor  Abnormal ecg - st depressions - check trop  Troponinemia         Hospital Course  Patient was a 78 y.o. male who presented to [facility] with lactic acidosis and leukemic phase of large cell transformation of underlying CLL.  Upon discussions with oncology, no further therapy would be available.  After this discussion with patient and his daughter, decision to pursue comfort measures was had. He developed vtach and eventually pea.      Harjit Manning MD  04/12/24  05:43 EDT

## 2024-04-13 LAB — BACTERIA SPEC AEROBE CULT: NORMAL

## 2024-04-15 LAB — REF LAB TEST METHOD: NORMAL

## 2024-04-15 NOTE — TELEPHONE ENCOUNTER
----- Message from Sue Elaine [facility] sent at 1/27/2023  9:34 AM EST -----  Dirk Castano,     He can take Pepcid.  He will just need to take the Calquence 2 hours prior to the Pepcid.     Carlos Alejandra     ----- Message -----  From: Anneliese Torres RN  Sent: 1/27/2023   8:44 AM EST  To: [facility] Oral Onc Pool    Hey I thought I sent a message, but maybe I forgot. This gentleman said he couldn't take pantoprazole and calquence together. What else could he take for acid? Thanks!       Spinal stenosis

## 2024-04-16 ENCOUNTER — READMISSION MANAGEMENT (OUTPATIENT)
Dept: CALL CENTER | Facility: HOSPITAL | Age: 79
End: 2024-04-16
Payer: MEDICARE

## 2024-04-16 LAB
BACTERIA SPEC AEROBE CULT: NORMAL
BACTERIA SPEC AEROBE CULT: NORMAL

## 2024-04-16 NOTE — OUTREACH NOTE
Medical Week 1 Survey      Flowsheet Row Responses   [facility] patient discharged from? [facility]   Does the patient have one of the following disease processes/diagnoses(primary or secondary)? Other   Week 1 attempt successful? No   Unsuccessful attempts Attempt 2   Revoke Patient             Kath ARMAS - Licensed Nurse

## 2024-08-17 NOTE — PROGRESS NOTES
Patient is here for lab review with RN.  CBC reviewed, counts are stable for this patient at this time. We reviewed pts stool card results that were negative. Patient has no complaints. Copy of labs given to patient and follow up appointment reviewed. Patient is instructed to call the office with any concerns or new symptoms prior to next visit. Patient verbalized understanding and discharged in stable condition.     Yes

## (undated) DEVICE — SOL IRR NACL 0.9PCT 3000ML

## (undated) DEVICE — GLV SURG BIOGEL LTX PF 7 1/2

## (undated) DEVICE — PREP SOL POVIDONE/IODINE BT 4OZ

## (undated) DEVICE — GLV SURG SENSICARE PI MIC PF SZ7 LF STRL

## (undated) DEVICE — TBG PENCL TELESCP MEGADYNE SMOKE EVAC 10FT

## (undated) DEVICE — ANTIBACTERIAL UNDYED BRAIDED (POLYGLACTIN 910), SYNTHETIC ABSORBABLE SUTURE: Brand: COATED VICRYL

## (undated) DEVICE — SKIN PREP TRAY W/CHG: Brand: MEDLINE INDUSTRIES, INC.

## (undated) DEVICE — GLV SURG PREMIERPRO ORTHO LTX PF SZ7.5 BRN

## (undated) DEVICE — KT DRN EVAC WND PVC PCH WTROC RND 10F400

## (undated) DEVICE — DUAL CUT SAGITTAL BLADE

## (undated) DEVICE — NDL HYPO PRECISIONGLIDE REG 25G 1 1/2

## (undated) DEVICE — TUBING, SUCTION, 1/4" X 10', STRAIGHT: Brand: MEDLINE

## (undated) DEVICE — JACKSON-PRATT 100CC BULB RESERVOIR: Brand: CARDINAL HEALTH

## (undated) DEVICE — GLV SURG BIOGEL LTX PF 6

## (undated) DEVICE — THE TORRENT IRRIGATION SCOPE CONNECTOR IS USED WITH THE TORRENT IRRIGATION TUBING TO PROVIDE IRRIGATION FLUIDS SUCH AS STERILE WATER DURING GASTROINTESTINAL ENDOSCOPIC PROCEDURES WHEN USED IN CONJUNCTION WITH AN IRRIGATION PUMP (OR ELECTROSURGICAL UNIT).: Brand: TORRENT

## (undated) DEVICE — SYS CLS SKIN PREMIERPRO EXOFINFUSION 22CM

## (undated) DEVICE — PENCL ES MEGADINE EZ/CLEAN BUTN W/HOLSTR 10FT

## (undated) DEVICE — ADHS SKIN SURG TISS VISC PREMIERPRO EXOFIN HI/VISC FAST/DRY

## (undated) DEVICE — DRESSING, SURESITE SELECT, 2.8'X3.50': Brand: MEDLINE

## (undated) DEVICE — STERILE PATIENT PROTECTIVE PAD FOR IMP® KNEE POSITIONERS & COHESIVE WRAP (10 / CASE): Brand: DE MAYO KNEE POSITIONER®

## (undated) DEVICE — DRSNG WND BORDR/ADHS NONADHR/GZ LF 4X4IN STRL

## (undated) DEVICE — GLV SURG SENSICARE W/ALOE PF LF 8 STRL

## (undated) DEVICE — MAT FLR ABSORBENT LG 4FT 10 2.5FT

## (undated) DEVICE — THE STERILE LIGHT HANDLE COVER IS USED WITH STERIS SURGICAL LIGHTING AND VISUALIZATION SYSTEMS.

## (undated) DEVICE — PREMIUM WET SKIN PREP TRAY: Brand: MEDLINE INDUSTRIES, INC.

## (undated) DEVICE — BLAKE SILICONE DRAIN, 15 FR ROUND, HUBLESS: Brand: BLAKE

## (undated) DEVICE — SUT VIC 5/0 PS2 18IN J495H

## (undated) DEVICE — MEDI-VAC YANKAUER SUCTION HANDLE W/BULBOUS TIP: Brand: CARDINAL HEALTH

## (undated) DEVICE — PENROSE DRAIN 18" X 5/8": Brand: CARDINAL HEALTH

## (undated) DEVICE — PATIENT RETURN ELECTRODE, SINGLE-USE, CONTACT QUALITY MONITORING, ADULT, WITH 9FT CORD, FOR PATIENTS WEIGING OVER 33LBS. (15KG): Brand: MEGADYNE

## (undated) DEVICE — BNDG ELAS ELITE V/CLOSE 6IN 5YD LF STRL

## (undated) DEVICE — GOWN ,SIRUS,NONREINFORCED SMALL: Brand: MEDLINE

## (undated) DEVICE — PK KN TOTL 40

## (undated) DEVICE — SUT ETHIB 0/0 MO6 I8IN CX45D

## (undated) DEVICE — Device: Brand: DEFENDO AIR/WATER/SUCTION AND BIOPSY VALVE

## (undated) DEVICE — SINGLE-USE BIOPSY FORCEPS: Brand: RADIAL JAW 4

## (undated) DEVICE — 3M™ IOBAN™ 2 ANTIMICROBIAL INCISE DRAPE 6640EZ: Brand: IOBAN™ 2

## (undated) DEVICE — GLV SURG SENSICARE PI PF LF 7 GRN STRL

## (undated) DEVICE — SUT VIC 1 CT1 36IN J947H

## (undated) DEVICE — LOU MINOR PROCEDURE: Brand: MEDLINE INDUSTRIES, INC.

## (undated) DEVICE — ELECTRD BLD EZ CLN MOD XLNG 2.75IN

## (undated) DEVICE — SUT VIC 3/0 TIES 18IN J110T

## (undated) DEVICE — NEEDLE, QUINCKE 22GX3.5": Brand: MEDLINE INDUSTRIES, INC.

## (undated) DEVICE — CANN NASL CO2 TRULINK W/O2 A/

## (undated) DEVICE — TRAP FLD MINIVAC MEGADYNE 100ML

## (undated) DEVICE — APPL DURAPREP IODOPHOR APL 26ML